# Patient Record
Sex: FEMALE | Race: WHITE | NOT HISPANIC OR LATINO | Employment: OTHER | URBAN - METROPOLITAN AREA
[De-identification: names, ages, dates, MRNs, and addresses within clinical notes are randomized per-mention and may not be internally consistent; named-entity substitution may affect disease eponyms.]

---

## 2016-05-27 LAB
CHOLEST SERPL-MCNC: 151 MG/DL (ref 100–199)
CREAT SERPL-MCNC: 0.76 MG/DL (ref 0.57–1.11)
HDLC SERPL-MCNC: 45 MG/DL (ref 40–?)
LDLC SERPL CALC-MCNC: 89 MG/DL (ref 0–130)
NONHDLC SERPL-MCNC: 106 MG/DL (ref 0–145)
TRIGL SERPL-MCNC: 86 MG/DL (ref 0–150)

## 2017-02-21 LAB
CHOLEST SERPL-MCNC: 154 MG/DL (ref 100–199)
HDLC SERPL-MCNC: 51 MG/DL (ref 40–?)
LDLC SERPL CALC-MCNC: 86 MG/DL (ref 0–130)
NONHDLC SERPL-MCNC: 103 MG/DL (ref 0–145)
TRIGL SERPL-MCNC: 86 MG/DL (ref 0–150)
TSH SERPL-ACNC: 1.63 UIU/ML (ref 0.35–4.94)

## 2017-07-21 ENCOUNTER — HOSPITAL ENCOUNTER (EMERGENCY)
Facility: CLINIC | Age: 72
Discharge: HOME | End: 2017-07-21
Attending: EMERGENCY MEDICINE | Admitting: EMERGENCY MEDICINE
Payer: MEDICARE

## 2017-07-21 VITALS
BODY MASS INDEX: 24.11 KG/M2 | RESPIRATION RATE: 25 BRPM | HEIGHT: 66 IN | WEIGHT: 150 LBS | OXYGEN SATURATION: 97 % | SYSTOLIC BLOOD PRESSURE: 131 MMHG | HEART RATE: 70 BPM | DIASTOLIC BLOOD PRESSURE: 70 MMHG | TEMPERATURE: 97.2 F

## 2017-07-21 DIAGNOSIS — F43.9 SITUATIONAL STRESS: ICD-10-CM

## 2017-07-21 DIAGNOSIS — R07.9 ACUTE CHEST PAIN: ICD-10-CM

## 2017-07-21 LAB
ALBUMIN SERPL-MCNC: 3.5 G/DL (ref 3.4–5)
ALP SERPL-CCNC: 65 U/L (ref 40–150)
ALT SERPL W P-5'-P-CCNC: 30 U/L (ref 0–50)
ANION GAP SERPL CALCULATED.3IONS-SCNC: 8 MMOL/L (ref 3–14)
AST SERPL W P-5'-P-CCNC: 62 U/L (ref 0–45)
BASOPHILS # BLD AUTO: 0.1 10E9/L (ref 0–0.2)
BASOPHILS NFR BLD AUTO: 0.9 %
BILIRUB SERPL-MCNC: 0.6 MG/DL (ref 0.2–1.3)
BUN SERPL-MCNC: 13 MG/DL (ref 7–30)
CALCIUM SERPL-MCNC: 8.4 MG/DL (ref 8.5–10.1)
CHLORIDE SERPL-SCNC: 106 MMOL/L (ref 94–109)
CO2 SERPL-SCNC: 27 MMOL/L (ref 20–32)
CREAT SERPL-MCNC: 0.7 MG/DL (ref 0.52–1.04)
DIFFERENTIAL METHOD BLD: NORMAL
EOSINOPHIL # BLD AUTO: 0.2 10E9/L (ref 0–0.7)
EOSINOPHIL NFR BLD AUTO: 3 %
ERYTHROCYTE [DISTWIDTH] IN BLOOD BY AUTOMATED COUNT: 12.7 % (ref 10–15)
GFR SERPL CREATININE-BSD FRML MDRD: 83 ML/MIN/1.7M2
GLUCOSE SERPL-MCNC: 97 MG/DL (ref 70–99)
HCT VFR BLD AUTO: 38.2 % (ref 35–47)
HGB BLD-MCNC: 12.9 G/DL (ref 11.7–15.7)
IMM GRANULOCYTES # BLD: 0 10E9/L (ref 0–0.4)
IMM GRANULOCYTES NFR BLD: 0 %
INTERPRETATION ECG - MUSE: NORMAL
LYMPHOCYTES # BLD AUTO: 1.5 10E9/L (ref 0.8–5.3)
LYMPHOCYTES NFR BLD AUTO: 27.5 %
MCH RBC QN AUTO: 32.4 PG (ref 26.5–33)
MCHC RBC AUTO-ENTMCNC: 33.8 G/DL (ref 31.5–36.5)
MCV RBC AUTO: 96 FL (ref 78–100)
MONOCYTES # BLD AUTO: 0.7 10E9/L (ref 0–1.3)
MONOCYTES NFR BLD AUTO: 11.6 %
NEUTROPHILS # BLD AUTO: 3.2 10E9/L (ref 1.6–8.3)
NEUTROPHILS NFR BLD AUTO: 57 %
NRBC # BLD AUTO: 0 10*3/UL
NRBC BLD AUTO-RTO: 0 /100
PLATELET # BLD AUTO: 269 10E9/L (ref 150–450)
POTASSIUM SERPL-SCNC: 4.2 MMOL/L (ref 3.4–5.3)
PROT SERPL-MCNC: 6.7 G/DL (ref 6.8–8.8)
RBC # BLD AUTO: 3.98 10E12/L (ref 3.8–5.2)
SODIUM SERPL-SCNC: 141 MMOL/L (ref 133–144)
TROPONIN I SERPL-MCNC: NORMAL UG/L (ref 0–0.04)
TROPONIN I SERPL-MCNC: NORMAL UG/L (ref 0–0.04)
WBC # BLD AUTO: 5.6 10E9/L (ref 4–11)

## 2017-07-21 PROCEDURE — 99284 EMERGENCY DEPT VISIT MOD MDM: CPT

## 2017-07-21 PROCEDURE — 85025 COMPLETE CBC W/AUTO DIFF WBC: CPT | Performed by: EMERGENCY MEDICINE

## 2017-07-21 PROCEDURE — 80053 COMPREHEN METABOLIC PANEL: CPT | Performed by: EMERGENCY MEDICINE

## 2017-07-21 PROCEDURE — 84484 ASSAY OF TROPONIN QUANT: CPT | Mod: 91 | Performed by: EMERGENCY MEDICINE

## 2017-07-21 PROCEDURE — 93005 ELECTROCARDIOGRAM TRACING: CPT

## 2017-07-21 RX ORDER — BUSPIRONE HYDROCHLORIDE 5 MG/1
TABLET ORAL
COMMUNITY
Start: 2016-10-19 | End: 2018-08-13

## 2017-07-21 RX ORDER — LORAZEPAM 0.5 MG/1
0.5 TABLET ORAL
Qty: 5 TABLET | Refills: 0 | Status: SHIPPED | OUTPATIENT
Start: 2017-07-21 | End: 2018-08-13

## 2017-07-21 RX ORDER — CITALOPRAM HYDROBROMIDE 40 MG/1
40 TABLET ORAL DAILY
COMMUNITY
Start: 2016-09-13 | End: 2018-08-13

## 2017-07-21 RX ORDER — ALENDRONATE SODIUM 70 MG/1
70 TABLET ORAL
COMMUNITY
Start: 2016-11-02 | End: 2018-11-20

## 2017-07-21 RX ORDER — SIMVASTATIN 20 MG
20 TABLET ORAL AT BEDTIME
COMMUNITY
Start: 2016-11-02 | End: 2018-12-04

## 2017-07-21 RX ORDER — MINOCYCLINE HYDROCHLORIDE 50 MG/1
50 CAPSULE ORAL
COMMUNITY
Start: 2016-10-17 | End: 2018-08-13

## 2017-07-21 ASSESSMENT — ENCOUNTER SYMPTOMS
PALPITATIONS: 1
FATIGUE: 0
SHORTNESS OF BREATH: 1
SLEEP DISTURBANCE: 1
COUGH: 0
LIGHT-HEADEDNESS: 0
CHILLS: 0
GASTROINTESTINAL NEGATIVE: 1
DIAPHORESIS: 0
FEVER: 0

## 2017-07-21 NOTE — ED AVS SNAPSHOT
Emergency Department    [address] [place] [id number]    Phone:  [phone]    Fax:  [phone]                                       Mary Jo Mcleod   MRN: [id number]    Department:   Emergency Department   Date of Visit:  7/21/2017           After Visit Summary Signature Page     I have received my discharge instructions, and my questions have been answered. I have discussed any challenges I see with this plan with the nurse or doctor.    ..........................................................................................................................................  Patient/Patient Representative Signature      ..........................................................................................................................................  Patient Representative Print Name and Relationship to Patient    ..................................................               ................................................  Date                                            Time    ..........................................................................................................................................  Reviewed by Signature/Title    ...................................................              ..............................................  Date                                                            Time
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
## 2017-07-21 NOTE — ED AVS SNAPSHOT
Emergency Department    [address] [facility] [address]    Phone:  [phone]    Fax:  [phone]                                       Mary Jo Mcleod   MRN: [id number]    Department:   Emergency Department   Date of Visit:  7/21/2017           Patient Information     Date Of Birth          1945        Your diagnoses for this visit were:     Situational stress     Acute chest pain        You were seen by Fiona Simon MD.      Follow-up Information     Follow up with [facility].    Why:  next week    Contact information:    [address] [place] [id number] [phone]          Discharge Instructions       You will receive a call about setting up a stress test. Discharge Instructions  Chest Pain    You have been seen today for chest pain or discomfort.  At this time, your doctor has found no signs that your chest pain is due to a serious or life-threatening condition, (or you have declined more testing and/or admission to the hospital). However, sometimes there is a serious problem that does not show up right away. Your evaluation today may not be complete and you may need further testing and evaluation.     You need to follow-up with your regular doctor within 3 days.    Return to the Emergency Department if:    Your chest pain changes, gets worse, starts to happen more often, or comes with less activity.    You are short of breath.    You get very weak or tired.    You pass out or faint.    You have any new symptoms, like fever, cough, numb legs, or you cough up blood.    You have anything else that worries you.    Until you follow-up with your regular doctor please do the following:    Take one aspirin daily unless you have an allergy or are told not to by your doctor.    If a stress test appointment has been made, go to the appointment.    If you have questions, contact your regular doctor.    If your doctor today has told you to follow-up with your  regular doctor, it is very important that you make an appointment with your clinic and go to the appointment.  If you do not follow-up with your primary doctor, it may result in missing an important development which could result in permanent injury or disability and/or lasting pain.  If there is any problem keeping your appointment, call your doctor or return to the Emergency Department.    If you were given a prescription for medicine here today, be sure to read all of the information (including the package insert) that comes with your prescription.  This will include important information about the medicine, its side effects, and any warnings that you need to know about.  The pharmacist who fills the prescription can provide more information and answer questions you may have about the medicine.  If you have questions or concerns that the pharmacist cannot address, please call or return to the Emergency Department.     Opioid Medication Information    Pain medications are among the most commonly prescribed medicines, so we are including this information for all our patients. If you did not receive pain medication or get a prescription for pain medicine, you can ignore it.     You may have been given a prescription for an opioid (narcotic) pain medicine and/or have received a pain medicine while here in the Emergency Department. These medicines can make you drowsy or impaired. You must not drive, operate dangerous equipment, or engage in any other dangerous activities while taking these medications. If you drive while taking these medications, you could be arrested for DUI, or driving under the influence. Do not drink any alcohol while you are taking these medications.     Opioid pain medications can cause addiction. If you have a history of chemical dependency of any type, you are at a higher risk of becoming addicted to pain medications.  Only take these prescribed medications to treat your pain when all other  options have been tried. Take it for as short a time and as few doses as possible. Store your pain pills in a secure place, as they are frequently stolen and provide a dangerous opportunity for children or visitors in your house to start abusing these powerful medications. We will not replace any lost or stolen medicine.  As soon as your pain is better, you should flush all your remaining medication.     Many prescription pain medications contain Tylenol  (acetaminophen), including Vicodin , Tylenol #3 , Norco , Lortab , and Percocet .  You should not take any extra pills of Tylenol  if you are using these prescription medications or you can get very sick.  Do not ever take more than 3000 mg of acetaminophen in any 24 hour period.    All opioids tend to cause constipation. Drink plenty of water and eat foods that have a lot of fiber, such as fruits, vegetables, prune juice, apple juice and high fiber cereal.  Take a laxative if you don t move your bowels at least every other day. Miralax , Milk of Magnesia, Colace , or Senna  can be used to keep you regular.      Remember that you can always come back to the Emergency Department if you are not able to see your regular doctor in the amount of time listed above, if you get any new symptoms, or if there is anything that worries you.        Causes and Effects of Stress  Anything that brings on feelings of stress is called a stressor. Today, we often face many stressors. Read on to find out how stress affects you and how you can gain control.    Your body s response to stress  When you re faced with stress, certain hormones (chemicals) in your body are released. These hormones trigger many changes in your body. For instance, your:    Blood pressure may rise    Heart may pound    Muscles may tighten    Stomach may become tense  Stressors  Stressors may include:    Adapting to constant, rapid change    Worrying about your finances and the economy    Handling a major life  event, such as changing jobs or moving to a new home    Handling more than one major life event at the same time, for instance, dealing with a family illness while changing jobs    Juggling many roles and responsibilities, such as spouse or life partner, parent, friend, employee, and caregiver for aging parents    Going from one challenging situation to the next without taking time to relax    Being overwhelmed by technology such as, keeping up with cell phone messages, e-mails, and text messages   The long-term effects of stress  If you re often under stress, you need to learn to manage it well. Stress can affect your well-being. Over time, you may show some of these symptoms of being stressed:    Physical. Frequent colds or flu, headaches, trouble sleeping, muscle tension, skin problems, trouble with digestion    Mental. Poor concentration, forgetfulness, learning problems, frequent negative thoughts, speech problems    Emotional. Anxiety, depression, anger, irritability, feelings of helplessness, lack of purpose, relationship troubles    Behavioral. Eating poorly, driving recklessly, abusing alcohol or drugs, being accident prone, showing aggression  If you do not believe you are successfully managing the stressors in your life, get help from your healthcare provider or a mental health professional.  There are many effective strategies that can help you adjust your environment and get your stress level under better control.  Date Last Reviewed: 1/1/2017 2000-2017 [employer]. [address] [place] [address]. All rights reserved. This information is not intended as a substitute for professional medical care. Always follow your healthcare professional's instructions.          24 Hour Appointment Hotline       To make an appointment at any [facility] clinic, call [phone] ([phone]). If you don't have a family doctor or clinic, we will help you find one. [facility] are  conveniently located to serve the needs of you and your family.          ED Discharge Orders     Exercise Stress Echocardiogram       The supervising Cardiologist may change the type of echocardiogram requested to answer a specific clinical question based on existing protocols in the Echocardiography laboratory.    Use of contrast is at the discretion of the supervising Cardiologist.                     Review of your medicines      Our records show that you are taking the medicines listed below. If these are incorrect, please call your family doctor or clinic.        Dose / Directions Last dose taken    alendronate 70 MG tablet   Commonly known as:  FOSAMAX        Refills:  0        busPIRone 5 MG tablet   Commonly known as:  BUSPAR        TAKE 1 TABLET BY MOUTH ONCE DAILY. AT BEDTIME   Refills:  0        * CELEXA PO        Take  by mouth.   Refills:  0        * citalopram 40 MG tablet   Commonly known as:  celeXA        Refills:  0        minocycline 50 MG capsule   Commonly known as:  MINOCIN/DYNACIN   Dose:  50 mg        Take 50 mg by mouth   Refills:  0        simvastatin 20 MG tablet   Commonly known as:  ZOCOR        Refills:  0        * Notice:  This list has 2 medication(s) that are the same as other medications prescribed for you. Read the directions carefully, and ask your doctor or other care provider to review them with you.            Procedures and tests performed during your visit     Procedure/Test Number of Times Performed    CBC with platelets differential 1    Comprehensive metabolic panel 1    EKG 12-lead, tracing only 1    Troponin I 2      Orders Needing Specimen Collection     None      Pending Results     No orders found from 7/19/2017 to 7/22/2017.            Pending Culture Results     No orders found from 7/19/2017 to 7/22/2017.            Pending Results Instructions     If you had any lab results that were not finalized at the time of your Discharge, you can call the ED Lab Result RN at  [phone]. You will be contacted by this team for any positive Lab results or changes in treatment. The nurses are available 7 days a week from 10A to 6:30P.  You can leave a message 24 hours per day and they will return your call.        Test Results From Your Hospital Stay        7/21/2017  8:45 AM      Component Results     Component Value Ref Range & Units Status    WBC 5.6 4.0 - 11.0 10e9/L Final    RBC Count 3.98 3.8 - 5.2 10e12/L Final    Hemoglobin 12.9 11.7 - 15.7 g/dL Final    Hematocrit 38.2 35.0 - 47.0 % Final    MCV 96 78 - 100 fl Final    MCH 32.4 26.5 - 33.0 pg Final    MCHC 33.8 31.5 - 36.5 g/dL Final    RDW 12.7 10.0 - 15.0 % Final    Platelet Count 269 150 - 450 10e9/L Final    Diff Method Automated Method  Final    % Neutrophils 57.0 % Final    % Lymphocytes 27.5 % Final    % Monocytes 11.6 % Final    % Eosinophils 3.0 % Final    % Basophils 0.9 % Final    % Immature Granulocytes 0.0 % Final    Nucleated RBCs 0 0 /100 Final    Absolute Neutrophil 3.2 1.6 - 8.3 10e9/L Final    Absolute Lymphocytes 1.5 0.8 - 5.3 10e9/L Final    Absolute Monocytes 0.7 0.0 - 1.3 10e9/L Final    Absolute Eosinophils 0.2 0.0 - 0.7 10e9/L Final    Absolute Basophils 0.1 0.0 - 0.2 10e9/L Final    Abs Immature Granulocytes 0.0 0 - 0.4 10e9/L Final    Absolute Nucleated RBC 0.0  Final         7/21/2017  9:08 AM      Component Results     Component Value Ref Range & Units Status    Sodium 141 133 - 144 mmol/L Final    Potassium 4.2 3.4 - 5.3 mmol/L Final    Chloride 106 94 - 109 mmol/L Final    Carbon Dioxide 27 20 - 32 mmol/L Final    Anion Gap 8 3 - 14 mmol/L Final    Glucose 97 70 - 99 mg/dL Final    Urea Nitrogen 13 7 - 30 mg/dL Final    Creatinine 0.70 0.52 - 1.04 mg/dL Final    GFR Estimate 83 >60 mL/min/1.7m2 Final    Non  GFR Calc    GFR Estimate If Black >90   GFR Calc   >60 mL/min/1.7m2 Final    Calcium 8.4 (L) 8.5 - 10.1 mg/dL Final    Bilirubin Total 0.6 0.2 - 1.3 mg/dL Final     Albumin 3.5 3.4 - 5.0 g/dL Final    Protein Total 6.7 (L) 6.8 - 8.8 g/dL Final    Alkaline Phosphatase 65 40 - 150 U/L Final    ALT 30 0 - 50 U/L Final    AST 62 (H) 0 - 45 U/L Final         7/21/2017  9:08 AM      Component Results     Component Value Ref Range & Units Status    Troponin I ES  0.000 - 0.045 ug/L Final    <0.015  The 99th percentile for upper reference range is 0.045 ug/L.  Troponin values in   the range of 0.045 - 0.120 ug/L may be associated with risks of adverse   clinical events.           7/21/2017 10:35 AM      Component Results     Component Value Ref Range & Units Status    Troponin I ES  0.000 - 0.045 ug/L Final    <0.015  The 99th percentile for upper reference range is 0.045 ug/L.  Troponin values in   the range of 0.045 - 0.120 ug/L may be associated with risks of adverse   clinical events.                  Clinical Quality Measure: Blood Pressure Screening     Your blood pressure was checked while you were in the emergency department today. The last reading we obtained was  BP: 132/66 . Please read the guidelines below about what these numbers mean and what you should do about them.  If your systolic blood pressure (the top number) is less than 120 and your diastolic blood pressure (the bottom number) is less than 80, then your blood pressure is normal. There is nothing more that you need to do about it.  If your systolic blood pressure (the top number) is 120-139 or your diastolic blood pressure (the bottom number) is 80-89, your blood pressure may be higher than it should be. You should have your blood pressure rechecked within a year by a primary care provider.  If your systolic blood pressure (the top number) is 140 or greater or your diastolic blood pressure (the bottom number) is 90 or greater, you may have high blood pressure. High blood pressure is treatable, but if left untreated over time it can put you at risk for heart attack, stroke, or kidney failure. You should have your  "blood pressure rechecked by a primary care provider within the next 4 weeks.  If your provider in the emergency department today gave you specific instructions to follow-up with your doctor or provider even sooner than that, you should follow that instruction and not wait for up to 4 weeks for your follow-up visit.        Thank you for choosing [facility]       Thank you for choosing [facility] for your care. Our goal is always to provide you with excellent care. Hearing back from our patients is one way we can continue to improve our services. Please take a few minutes to complete the written survey that you may receive in the mail after you visit with us. Thank you!        [employer] Information     [employer] lets you send messages to your doctor, view your test results, renew your prescriptions, schedule appointments and more. To sign up, go to [email or web address] . Click on \"Log in\" on the left side of the screen, which will take you to the Welcome page. Then click on \"Sign up Now\" on the right side of the page.     You will be asked to enter the access code listed below, as well as some personal information. Please follow the directions to create your username and password.     Your access code is: [email or web address]  Expires: 10/19/2017 10:52 AM     Your access code will  in 90 days. If you need help or a new code, please call your [facility] clinic or [phone].        Care EveryWhere ID     This is your Care EveryWhere ID. This could be used by other organizations to access your [facility] medical records  [id number]        Equal Access to Services     SMITA LIMON : Hadii miguelina wray hadasho Soomaali, waaxda luqadaha, qaybta kaalmada adeegyada, roxanne cheek . So [facility] [phone].    ATENCIÓN: Si habla español, tiene a blake disposición servicios gratuitos de asistencia lingüística. Llame al [phone].    We comply with applicable federal civil rights laws and [place] laws. We do not " discriminate on the basis of race, color, national origin, age, disability sex, sexual orientation or gender identity.            After Visit Summary       This is your record. Keep this with you and show to your community pharmacist(s) and doctor(s) at your next visit.

## 2017-07-21 NOTE — ED NOTES
Discharge instructions discussed in detail including medications and follow-up appointments. Patient verbalizes understanding of discharge instructions and leaves in no acute distress.

## 2017-07-21 NOTE — DISCHARGE INSTRUCTIONS
You will receive a call about setting up a stress test. Discharge Instructions  Chest Pain    You have been seen today for chest pain or discomfort.  At this time, your doctor has found no signs that your chest pain is due to a serious or life-threatening condition, (or you have declined more testing and/or admission to the hospital). However, sometimes there is a serious problem that does not show up right away. Your evaluation today may not be complete and you may need further testing and evaluation.     You need to follow-up with your regular doctor within 3 days.    Return to the Emergency Department if:    Your chest pain changes, gets worse, starts to happen more often, or comes with less activity.    You are short of breath.    You get very weak or tired.    You pass out or faint.    You have any new symptoms, like fever, cough, numb legs, or you cough up blood.    You have anything else that worries you.    Until you follow-up with your regular doctor please do the following:    Take one aspirin daily unless you have an allergy or are told not to by your doctor.    If a stress test appointment has been made, go to the appointment.    If you have questions, contact your regular doctor.    If your doctor today has told you to follow-up with your regular doctor, it is very important that you make an appointment with your clinic and go to the appointment.  If you do not follow-up with your primary doctor, it may result in missing an important development which could result in permanent injury or disability and/or lasting pain.  If there is any problem keeping your appointment, call your doctor or return to the Emergency Department.    If you were given a prescription for medicine here today, be sure to read all of the information (including the package insert) that comes with your prescription.  This will include important information about the medicine, its side effects, and any warnings that you need to know  about.  The pharmacist who fills the prescription can provide more information and answer questions you may have about the medicine.  If you have questions or concerns that the pharmacist cannot address, please call or return to the Emergency Department.     Opioid Medication Information    Pain medications are among the most commonly prescribed medicines, so we are including this information for all our patients. If you did not receive pain medication or get a prescription for pain medicine, you can ignore it.     You may have been given a prescription for an opioid (narcotic) pain medicine and/or have received a pain medicine while here in the Emergency Department. These medicines can make you drowsy or impaired. You must not drive, operate dangerous equipment, or engage in any other dangerous activities while taking these medications. If you drive while taking these medications, you could be arrested for DUI, or driving under the influence. Do not drink any alcohol while you are taking these medications.     Opioid pain medications can cause addiction. If you have a history of chemical dependency of any type, you are at a higher risk of becoming addicted to pain medications.  Only take these prescribed medications to treat your pain when all other options have been tried. Take it for as short a time and as few doses as possible. Store your pain pills in a secure place, as they are frequently stolen and provide a dangerous opportunity for children or visitors in your house to start abusing these powerful medications. We will not replace any lost or stolen medicine.  As soon as your pain is better, you should flush all your remaining medication.     Many prescription pain medications contain Tylenol  (acetaminophen), including Vicodin , Tylenol #3 , Norco , Lortab , and Percocet .  You should not take any extra pills of Tylenol  if you are using these prescription medications or you can get very sick.  Do not ever  take more than 3000 mg of acetaminophen in any 24 hour period.    All opioids tend to cause constipation. Drink plenty of water and eat foods that have a lot of fiber, such as fruits, vegetables, prune juice, apple juice and high fiber cereal.  Take a laxative if you don t move your bowels at least every other day. Miralax , Milk of Magnesia, Colace , or Senna  can be used to keep you regular.      Remember that you can always come back to the Emergency Department if you are not able to see your regular doctor in the amount of time listed above, if you get any new symptoms, or if there is anything that worries you.        Causes and Effects of Stress  Anything that brings on feelings of stress is called a stressor. Today, we often face many stressors. Read on to find out how stress affects you and how you can gain control.    Your body s response to stress  When you re faced with stress, certain hormones (chemicals) in your body are released. These hormones trigger many changes in your body. For instance, your:    Blood pressure may rise    Heart may pound    Muscles may tighten    Stomach may become tense  Stressors  Stressors may include:    Adapting to constant, rapid change    Worrying about your finances and the economy    Handling a major life event, such as changing jobs or moving to a new home    Handling more than one major life event at the same time, for instance, dealing with a family illness while changing jobs    Juggling many roles and responsibilities, such as spouse or life partner, parent, friend, employee, and caregiver for aging parents    Going from one challenging situation to the next without taking time to relax    Being overwhelmed by technology such as, keeping up with cell phone messages, e-mails, and text messages   The long-term effects of stress  If you re often under stress, you need to learn to manage it well. Stress can affect your well-being. Over time, you may show some of these  symptoms of being stressed:    Physical. Frequent colds or flu, headaches, trouble sleeping, muscle tension, skin problems, trouble with digestion    Mental. Poor concentration, forgetfulness, learning problems, frequent negative thoughts, speech problems    Emotional. Anxiety, depression, anger, irritability, feelings of helplessness, lack of purpose, relationship troubles    Behavioral. Eating poorly, driving recklessly, abusing alcohol or drugs, being accident prone, showing aggression  If you do not believe you are successfully managing the stressors in your life, get help from your healthcare provider or a mental health professional.  There are many effective strategies that can help you adjust your environment and get your stress level under better control.  Date Last Reviewed: 1/1/2017 2000-2017 [employer]. [address] [place] [address], [place] [address]. All rights reserved. This information is not intended as a substitute for professional medical care. Always follow your healthcare professional's instructions.

## 2017-07-27 ENCOUNTER — HOSPITAL ENCOUNTER (OUTPATIENT)
Dept: CARDIOLOGY | Facility: CLINIC | Age: 72
Discharge: HOME | End: 2017-07-27
Attending: EMERGENCY MEDICINE | Admitting: EMERGENCY MEDICINE
Payer: MEDICARE

## 2017-07-27 DIAGNOSIS — R07.9 ACUTE CHEST PAIN: ICD-10-CM

## 2017-07-27 PROCEDURE — 93018 CV STRESS TEST I&R ONLY: CPT | Performed by: INTERNAL MEDICINE

## 2017-07-27 PROCEDURE — 93321 DOPPLER ECHO F-UP/LMTD STD: CPT | Mod: 26 | Performed by: INTERNAL MEDICINE

## 2017-07-27 PROCEDURE — 93325 DOPPLER ECHO COLOR FLOW MAPG: CPT | Mod: 26 | Performed by: INTERNAL MEDICINE

## 2017-07-27 PROCEDURE — 93350 STRESS TTE ONLY: CPT | Mod: 26 | Performed by: INTERNAL MEDICINE

## 2017-07-27 PROCEDURE — 25500064 ZZH RX 255 OP 636: Performed by: EMERGENCY MEDICINE

## 2017-07-27 PROCEDURE — 40000264 ECHO STRESS TEST WITH LUMASON

## 2017-07-27 PROCEDURE — 93016 CV STRESS TEST SUPVJ ONLY: CPT | Performed by: INTERNAL MEDICINE

## 2017-07-27 RX ADMIN — SULFUR HEXAFLUORIDE 5 ML: KIT at 15:54

## 2017-11-13 LAB
CHOLEST SERPL-MCNC: 160 MG/DL (ref 100–199)
HDLC SERPL-MCNC: 47 MG/DL (ref 40–?)
LDLC SERPL CALC-MCNC: 89 MG/DL (ref ?–130)
NONHDLC SERPL-MCNC: 113 MG/DL (ref 0–145)
TRIGL SERPL-MCNC: 121 MG/DL (ref 0–150)

## 2017-12-09 ENCOUNTER — HOSPITAL ENCOUNTER (EMERGENCY)
Facility: CLINIC | Age: 72
Discharge: HOME | End: 2017-12-09
Attending: EMERGENCY MEDICINE | Admitting: EMERGENCY MEDICINE
Payer: MEDICARE

## 2017-12-09 VITALS
WEIGHT: 157 LBS | RESPIRATION RATE: 16 BRPM | HEART RATE: 84 BPM | OXYGEN SATURATION: 97 % | HEIGHT: 65 IN | DIASTOLIC BLOOD PRESSURE: 72 MMHG | SYSTOLIC BLOOD PRESSURE: 133 MMHG | BODY MASS INDEX: 26.16 KG/M2 | TEMPERATURE: 97.2 F

## 2017-12-09 DIAGNOSIS — R09.89 THROAT TIGHTNESS: ICD-10-CM

## 2017-12-09 LAB
ANION GAP SERPL CALCULATED.3IONS-SCNC: 6 MMOL/L (ref 3–14)
BASOPHILS # BLD AUTO: 0 10E9/L (ref 0–0.2)
BASOPHILS NFR BLD AUTO: 0 %
BUN SERPL-MCNC: 15 MG/DL (ref 7–30)
CALCIUM SERPL-MCNC: 8.9 MG/DL (ref 8.5–10.1)
CHLORIDE SERPL-SCNC: 107 MMOL/L (ref 94–109)
CO2 SERPL-SCNC: 25 MMOL/L (ref 20–32)
CREAT SERPL-MCNC: 0.75 MG/DL (ref 0.52–1.04)
DIFFERENTIAL METHOD BLD: ABNORMAL
EOSINOPHIL # BLD AUTO: 0 10E9/L (ref 0–0.7)
EOSINOPHIL NFR BLD AUTO: 0 %
ERYTHROCYTE [DISTWIDTH] IN BLOOD BY AUTOMATED COUNT: 12.5 % (ref 10–15)
GFR SERPL CREATININE-BSD FRML MDRD: 76 ML/MIN/1.7M2
GLUCOSE SERPL-MCNC: 131 MG/DL (ref 70–99)
HCT VFR BLD AUTO: 38.5 % (ref 35–47)
HGB BLD-MCNC: 12.9 G/DL (ref 11.7–15.7)
IMM GRANULOCYTES # BLD: 0.1 10E9/L (ref 0–0.4)
IMM GRANULOCYTES NFR BLD: 0.6 %
INTERPRETATION ECG - MUSE: NORMAL
LYMPHOCYTES # BLD AUTO: 1.2 10E9/L (ref 0.8–5.3)
LYMPHOCYTES NFR BLD AUTO: 11 %
MCH RBC QN AUTO: 32.2 PG (ref 26.5–33)
MCHC RBC AUTO-ENTMCNC: 33.5 G/DL (ref 31.5–36.5)
MCV RBC AUTO: 96 FL (ref 78–100)
MONOCYTES # BLD AUTO: 1.1 10E9/L (ref 0–1.3)
MONOCYTES NFR BLD AUTO: 10.4 %
NEUTROPHILS # BLD AUTO: 8.4 10E9/L (ref 1.6–8.3)
NEUTROPHILS NFR BLD AUTO: 78 %
NRBC # BLD AUTO: 0 10*3/UL
NRBC BLD AUTO-RTO: 0 /100
PLATELET # BLD AUTO: 272 10E9/L (ref 150–450)
POTASSIUM SERPL-SCNC: 4.1 MMOL/L (ref 3.4–5.3)
RBC # BLD AUTO: 4.01 10E12/L (ref 3.8–5.2)
SODIUM SERPL-SCNC: 138 MMOL/L (ref 133–144)
TROPONIN I SERPL-MCNC: <0.015 UG/L (ref 0–0.04)
WBC # BLD AUTO: 10.7 10E9/L (ref 4–11)

## 2017-12-09 PROCEDURE — 93005 ELECTROCARDIOGRAM TRACING: CPT

## 2017-12-09 PROCEDURE — 25000132 ZZH RX MED GY IP 250 OP 250 PS 637: Mod: GY | Performed by: EMERGENCY MEDICINE

## 2017-12-09 PROCEDURE — 80048 BASIC METABOLIC PNL TOTAL CA: CPT | Performed by: EMERGENCY MEDICINE

## 2017-12-09 PROCEDURE — 85025 COMPLETE CBC W/AUTO DIFF WBC: CPT | Performed by: EMERGENCY MEDICINE

## 2017-12-09 PROCEDURE — A9270 NON-COVERED ITEM OR SERVICE: HCPCS | Mod: GY | Performed by: EMERGENCY MEDICINE

## 2017-12-09 PROCEDURE — 99284 EMERGENCY DEPT VISIT MOD MDM: CPT

## 2017-12-09 PROCEDURE — 84484 ASSAY OF TROPONIN QUANT: CPT | Performed by: EMERGENCY MEDICINE

## 2017-12-09 RX ORDER — FAMOTIDINE 20 MG/1
40 TABLET, FILM COATED ORAL ONCE
Status: COMPLETED | OUTPATIENT
Start: 2017-12-09 | End: 2017-12-09

## 2017-12-09 RX ADMIN — FAMOTIDINE 40 MG: 20 TABLET ORAL at 05:32

## 2017-12-09 NOTE — ED AVS SNAPSHOT
Emergency Department    [address] [place] [address]    Phone:  [phone]    Fax:  [phone]                                       Mary Jo Mcleod   MRN: [id number]    Department:   Emergency Department   Date of Visit:  12/9/2017           Patient Information     Date Of Birth          1945        Your diagnoses for this visit were:     Throat tightness        You were seen by Benedict Pierre MD.        Discharge Instructions       You may take over the counter pepcid as needed.      24 Hour Appointment Hotline       To make an appointment at any [facility], call [phone]). If you don't have a family doctor or clinic, we will help you find one. [facility] are conveniently located to serve the needs of you and your family.             Review of your medicines      Our records show that you are taking the medicines listed below. If these are incorrect, please call your family doctor or clinic.        Dose / Directions Last dose taken    alendronate 70 MG tablet   Commonly known as:  FOSAMAX        Refills:  0        busPIRone 5 MG tablet   Commonly known as:  BUSPAR        TAKE 1 TABLET BY MOUTH ONCE DAILY. AT BEDTIME   Refills:  0        * CELEXA PO        Take  by mouth.   Refills:  0        * citalopram 40 MG tablet   Commonly known as:  celeXA        Refills:  0        LORazepam 0.5 MG tablet   Commonly known as:  ATIVAN   Dose:  0.5 mg   Quantity:  5 tablet        Take 1 tablet (0.5 mg) by mouth nightly as needed for anxiety   Refills:  0        minocycline 50 MG capsule   Commonly known as:  MINOCIN/DYNACIN   Dose:  50 mg        Take 50 mg by mouth   Refills:  0        simvastatin 20 MG tablet   Commonly known as:  ZOCOR        Refills:  0        * Notice:  This list has 2 medication(s) that are the same as other medications prescribed for you. Read the directions carefully, and ask your doctor or other care provider to review them  with you.            Procedures and tests performed during your visit     Basic metabolic panel    CBC with platelets differential    EKG 12 lead    Troponin I (now)      Orders Needing Specimen Collection     None      Pending Results     No orders found from 12/7/2017 to 12/10/2017.            Pending Culture Results     No orders found from 12/7/2017 to 12/10/2017.            Pending Results Instructions     If you had any lab results that were not finalized at the time of your Discharge, you can call the ED Lab Result RN at [phone]. You will be contacted by this team for any positive Lab results or changes in treatment. The nurses are available 7 days a week from 10A to 6:30P.  You can leave a message 24 hours per day and they will return your call.        Test Results From Your Hospital Stay        12/9/2017  5:44 AM      Component Results     Component Value Ref Range & Units Status    WBC 10.7 4.0 - 11.0 10e9/L Final    RBC Count 4.01 3.8 - 5.2 10e12/L Final    Hemoglobin 12.9 11.7 - 15.7 g/dL Final    Hematocrit 38.5 35.0 - 47.0 % Final    MCV 96 78 - 100 fl Final    MCH 32.2 26.5 - 33.0 pg Final    MCHC 33.5 31.5 - 36.5 g/dL Final    RDW 12.5 10.0 - 15.0 % Final    Platelet Count 272 150 - 450 10e9/L Final    Diff Method Automated Method  Final    % Neutrophils 78.0 % Final    % Lymphocytes 11.0 % Final    % Monocytes 10.4 % Final    % Eosinophils 0.0 % Final    % Basophils 0.0 % Final    % Immature Granulocytes 0.6 % Final    Nucleated RBCs 0 0 /100 Final    Absolute Neutrophil 8.4 (H) 1.6 - 8.3 10e9/L Final    Absolute Lymphocytes 1.2 0.8 - 5.3 10e9/L Final    Absolute Monocytes 1.1 0.0 - 1.3 10e9/L Final    Absolute Eosinophils 0.0 0.0 - 0.7 10e9/L Final    Absolute Basophils 0.0 0.0 - 0.2 10e9/L Final    Abs Immature Granulocytes 0.1 0 - 0.4 10e9/L Final    Absolute Nucleated RBC 0.0  Final         12/9/2017  5:57 AM      Component Results     Component Value Ref Range & Units Status    Sodium 138  133 - 144 mmol/L Final    Potassium 4.1 3.4 - 5.3 mmol/L Final    Chloride 107 94 - 109 mmol/L Final    Carbon Dioxide 25 20 - 32 mmol/L Final    Anion Gap 6 3 - 14 mmol/L Final    Glucose 131 (H) 70 - 99 mg/dL Final    Urea Nitrogen 15 7 - 30 mg/dL Final    Creatinine 0.75 0.52 - 1.04 mg/dL Final    GFR Estimate 76 >60 mL/min/1.7m2 Final    Non  GFR Calc    GFR Estimate If Black >90 >60 mL/min/1.7m2 Final    African American GFR Calc    Calcium 8.9 8.5 - 10.1 mg/dL Final         12/9/2017  6:02 AM      Component Results     Component Value Ref Range & Units Status    Troponin I ES <0.015 0.000 - 0.045 ug/L Final    The 99th percentile for upper reference range is 0.045 ug/L.  Troponin values   in the range of 0.045 - 0.120 ug/L may be associated with risks of adverse   clinical events.                  Clinical Quality Measure: Blood Pressure Screening     Your blood pressure was checked while you were in the emergency department today. The last reading we obtained was  BP: 133/72 . Please read the guidelines below about what these numbers mean and what you should do about them.  If your systolic blood pressure (the top number) is less than 120 and your diastolic blood pressure (the bottom number) is less than 80, then your blood pressure is normal. There is nothing more that you need to do about it.  If your systolic blood pressure (the top number) is 120-139 or your diastolic blood pressure (the bottom number) is 80-89, your blood pressure may be higher than it should be. You should have your blood pressure rechecked within a year by a primary care provider.  If your systolic blood pressure (the top number) is 140 or greater or your diastolic blood pressure (the bottom number) is 90 or greater, you may have high blood pressure. High blood pressure is treatable, but if left untreated over time it can put you at risk for heart attack, stroke, or kidney failure. You should have your blood pressure  "rechecked by a primary care provider within the next 4 weeks.  If your provider in the emergency department today gave you specific instructions to follow-up with your doctor or provider even sooner than that, you should follow that instruction and not wait for up to 4 weeks for your follow-up visit.        Thank you for choosing [facility]       Thank you for choosing [facility] for your care. Our goal is always to provide you with excellent care. Hearing back from our patients is one way we can continue to improve our services. Please take a few minutes to complete the written survey that you may receive in the mail after you visit with us. Thank you!        [employer] Information     [employer] lets you send messages to your doctor, view your test results, renew your prescriptions, schedule appointments and more. To sign up, go to [email or web address] . Click on \"Log in\" on the left side of the screen, which will take you to the Welcome page. Then click on \"Sign up Now\" on the right side of the page.     You will be asked to enter the access code listed below, as well as some personal information. Please follow the directions to create your username and password.     Your access code is: [email or web address]  Expires: 3/9/2018  7:22 AM     Your access code will  in 90 days. If you need help or a new code, please call your [facility] clinic or [phone].        Care EveryWhere ID     This is your Care EveryWhere ID. This could be used by other organizations to access your [facility] medical records  [id number]        Equal Access to Services     SMITA LIMON : Hadii miguelina wray hadasho Sovaniaali, waaxda luqadaha, qaybta kaalmada adejeffyyada, roxanne cheek . [facility] [phone].    ATENCIÓN: Si habla español, tiene a blake disposición servicios gratuitos de asistencia lingüística. Llame al [phone].    We comply with applicable federal civil rights laws and [place] laws. We do not discriminate on the " basis of race, color, national origin, age, disability, sex, sexual orientation, or gender identity.            After Visit Summary       This is your record. Keep this with you and show to your community pharmacist(s) and doctor(s) at your next visit.

## 2017-12-09 NOTE — ED AVS SNAPSHOT
Emergency Department    [address] [place] [address]    Phone:  [phone]    Fax:  [phone]                                       Mary Jo Mcleod   MRN: [id number]    Department:   Emergency Department   Date of Visit:  12/9/2017           After Visit Summary Signature Page     I have received my discharge instructions, and my questions have been answered. I have discussed any challenges I see with this plan with the nurse or doctor.    ..........................................................................................................................................  Patient/Patient Representative Signature      ..........................................................................................................................................  Patient Representative Print Name and Relationship to Patient    ..................................................               ................................................  Date                                            Time    ..........................................................................................................................................  Reviewed by Signature/Title    ...................................................              ..............................................  Date                                                            Time

## 2017-12-09 NOTE — ED PROVIDER NOTES
"  History     Chief Complaint:  Allergic Reaction     HPI   Mary Jo Mcleod is a 72-year-old female who presents for evaluation of a potential allergic reaction. The patient reports that she received her second cortisone shot ever yesterday at 0900. The patient states that she began to notice symptoms around 2130 last night. The patient reports that it started with scalp itchiness and she later developed itchiness in her legs, arms, and upper lip around 0300 this morning.  Pt reports symptoms would initially improve with eating. The patient also feels like her throat is constricted and she feels short of breath. The patient then decided to call the nursing line who told her to come to the ED and to take Benadryl before presenting. The patient also complains of pain across her eyes that goes into her ears, although she mentions that this is a chronic sensation for her. She denies abdominal pain, nausea, vomiting, or rash. She thinks that she is breaking out in hives across her forehead. The patient denies history of ancxiety. The patient took two Benadryl at 0330 and states that she is feeling mostly better on presentation, but still has some throat tightness.     Allergies:  Codeine Sulfate  Penicillin G  Sulfa Drugs  Tetracycline  Wellbutrin [Bupropion Hydrobromide]      Medications:    Fosamax   Buspar   Minocycline   Simvastatin   Celexa   Ativan     Past Medical History:    Depressive disorder     Past Surgical History:    History reviewed. No pertinent past surgical history.     Family History:    MI--Father     Social History:  Marital Status:    Presents to the ED alone  Tobacco Use: Never  Alcohol Use: No   PCP: Rodger Vergara     Review of Systems  As stated in the HPI, otherwise negative ROS.     Physical Exam   First Vitals:  BP: 133/72  Pulse: 84  Heart Rate: 89  Temp: 97.7  F (36.5  C)  Resp: 16  Height: 165.1 cm (5' 5\")  Weight: 71.2 kg (157 lb)  SpO2: 97 %    Physical Exam  Constitutional: Well " developed, nontox appearance  Head: Atraumatic.   Mouth/Throat: Oropharynx is clear and moist.   Neck:  no stridor  Eyes: no scleral icterus  Cardiovascular: RRR, 2+ bilat radial pulses  Pulmonary/Chest: nml resp effort, Clear BS bilat  Abdominal: ND, +BS, soft, NT, no rebound or guarding   : no CVA tenderness bilat  Ext: Warm, well perfused, no edema  Neurological: A&O, symmetric facies, moves ext x4  Skin: Skin is warm and dry.   Psychiatric: Behavior is normal. Thought content normal.   Nursing note and vitals reviewed.        Emergency Department Course   ECG:  @ 0511  Indication: Shortness of breath   Vent. Rate 76 bpm. MS interval 138 ms. QRS duration 78 ms. QT/QTc 422/474 ms. P-R-T axis 61 55 51.   Normal sinus rhythm. Abnormal ECG.  No significant change when compared to previous ECG from 9/21/12   Read @ 0520 by Dr. Pierre.     Laboratory:  Blood:  CBC:  WBC 10.7, HGB 12.9, , otherwise WNL  CMP: Glc 131, otherwise WNL (Creatinine 0.75)   Troponin I: <0.015 (WNL)      Interventions:  (0532) Pepcid, 40 mg, IV     Emergency Department Course:  Nursing notes and vitals reviewed.    (3435) I entered the room with my scribe, obtained the history, and performed an exam of the patient as documented above.    EKG was done, interpretation as above.     A peripheral IV was established. Blood was drawn from the patient. This was sent for laboratory testing, findings above.      Findings and plan explained to the patient. Patient discharged home with instructions regarding supportive care, medications, and reasons to return. The importance of close follow-up was reviewed.      Impression & Plan    Medical Decision Making:  Mary Jo Mcleod is a 72 year old female who presents for evaluation of throat tightness described as an allergic reaction.  DDx includes allergic reaction, anxiety, malingering, abnml throat sensation NOS.  Symptoms somewhat concerning for allergic reaction but pt reports symptoms were  alleviated by eating which is inconsistent with allergic reaction.  Exam is nml despite pt's symptoms. No airway involvement, bronchospasm, GI symptoms, hypotension, or other sign of anaphylaxis. Patient was treated here with medications as noted above stating that her symptoms resolved.  True allergic reaction seems less like.  Screening labs and EKG ordered for atypical ACS which returned nml.  At this point I feel the pt is safe for DC. Recommendations given regarding follow up with primary care doctor and return to the emergency department as needed for new or worsening symptoms.  Counseled on all results, disposition and diagnosis.  Pt understanding and agreeable to plan. Patient discharged in stable condition.        Diagnosis:    ICD-10-CM    1. Throat tightness R68.89        Disposition:  discharged to home    Discharge Medications:  Discharge Medication List as of 12/9/2017  7:29 AM            [facility] EMERGENCY DEPARTMENT     Scribe disclosure:  Alex SOUZA, am serving as a scribe on 12/9/2017 at 4:53 AM to personally document services performed by Benedict Pierre MD, based on my observations and the provider's statements to me.                Benedict Pierre MD  12/09/17 3534

## 2017-12-09 NOTE — DISCHARGE INSTRUCTIONS
You may take over the counter pepcid as needed.  Please follow-up with your primary doctor this week.  Please return to the ED as needed for new or worsening symptoms such as chest pain, vomiting, difficulty breathing, fainting, any other concerning symptoms.  Take benadryl 25 to 50 mg by mouth every 6 hours as needed for throat tightness, itching.

## 2018-08-06 ENCOUNTER — TRANSFERRED RECORDS (OUTPATIENT)
Dept: FAMILY MEDICINE | Facility: CLINIC | Age: 73
End: 2018-08-06

## 2018-08-13 ENCOUNTER — OFFICE VISIT (OUTPATIENT)
Dept: FAMILY MEDICINE | Facility: CLINIC | Age: 73
End: 2018-08-13

## 2018-08-13 VITALS
HEART RATE: 92 BPM | WEIGHT: 152.8 LBS | HEIGHT: 64 IN | RESPIRATION RATE: 12 BRPM | BODY MASS INDEX: 26.09 KG/M2 | DIASTOLIC BLOOD PRESSURE: 68 MMHG | SYSTOLIC BLOOD PRESSURE: 112 MMHG

## 2018-08-13 DIAGNOSIS — F41.1 GAD (GENERALIZED ANXIETY DISORDER): ICD-10-CM

## 2018-08-13 DIAGNOSIS — R15.2 INCONTINENCE OF FECES WITH FECAL URGENCY: ICD-10-CM

## 2018-08-13 DIAGNOSIS — F33.1 MODERATE EPISODE OF RECURRENT MAJOR DEPRESSIVE DISORDER (H): Primary | ICD-10-CM

## 2018-08-13 DIAGNOSIS — M85.89 OSTEOPENIA OF MULTIPLE SITES: ICD-10-CM

## 2018-08-13 DIAGNOSIS — R15.9 INCONTINENCE OF FECES WITH FECAL URGENCY: ICD-10-CM

## 2018-08-13 DIAGNOSIS — F43.10 PTSD (POST-TRAUMATIC STRESS DISORDER): ICD-10-CM

## 2018-08-13 DIAGNOSIS — E78.00 HYPERCHOLESTEROLEMIA: ICD-10-CM

## 2018-08-13 PROCEDURE — 99204 OFFICE O/P NEW MOD 45 MIN: CPT | Performed by: FAMILY MEDICINE

## 2018-08-13 RX ORDER — CALCIUM CARBONATE 750 MG/1
750 TABLET, CHEWABLE ORAL DAILY
COMMUNITY
Start: 2016-08-10 | End: 2018-12-04

## 2018-08-13 RX ORDER — CITALOPRAM HYDROBROMIDE 40 MG/1
40 TABLET ORAL DAILY
Qty: 90 TABLET | Refills: 3 | Status: SHIPPED | OUTPATIENT
Start: 2018-08-13 | End: 2018-12-04

## 2018-08-13 NOTE — MR AVS SNAPSHOT
"              After Visit Summary   8/13/2018    Mary Jo Mcleod    MRN: [id number]           Patient Information     Date Of Birth          1945        Visit Information        Provider Department      8/13/2018 3:45 PM Lore Tran MD [facility]        Today's Diagnoses     Moderate episode of recurrent major depressive disorder (H)    -  1    PTSD (post-traumatic stress disorder)        GERSON (generalized anxiety disorder)        Osteopenia of multiple sites        Incontinence of feces with fecal urgency        Hypercholesterolemia          Care Instructions    Recommend referral to counseling at [facility]  If they do not take your insurance, ask the counseling center where you currently go if they do any EMDR therapy.          Follow-ups after your visit        Who to contact     If you have questions or need follow up information about today's clinic visit or your schedule please contact [facility] directly at [phone].  Normal or non-critical lab and imaging results will be communicated to you by Boxeverhart, letter or phone within 4 business days after the clinic has received the results. If you do not hear from us within 7 days, please contact the clinic through Box[employer] or phone. If you have a critical or abnormal lab result, we will notify you by phone as soon as possible.  Submit refill requests through [employer] or call your pharmacy and they will forward the refill request to us. Please allow 3 business days for your refill to be completed.          Additional Information About Your Visit        [employer] Information     [employer] lets you send messages to your doctor, view your test results, renew your prescriptions, schedule appointments and more. To sign up, go to [email or web address] . Click on \"Log in\" on the left side of the screen, which will take you to the Welcome page. Then click on \"Sign up Now\" on the right side of " "the page.     You will be asked to enter the access code listed below, as well as some personal information. Please follow the directions to create your username and password.     Your access code is: [email or web address]  Expires: 2018  4:36 PM     Your access code will  in 90 days. If you need help or a new code, please call your [facility] clinic or [phone].        Care EveryWhere ID     This is your Care EveryWhere ID. This could be used by other organizations to access your [facility] medical records  [id number]        Your Vitals Were     Pulse Respirations Height BMI (Body Mass Index)          92 12 1.626 m (5' 4\") 26.23 kg/m2         Blood Pressure from Last 3 Encounters:   18 112/68   17 133/72   17 131/70    Weight from Last 3 Encounters:   18 69.3 kg (152 lb 12.8 oz)   17 71.2 kg (157 lb)   17 68 kg (150 lb)              Today, you had the following     No orders found for display         Today's Medication Changes          These changes are accurate as of 18  4:36 PM.  If you have any questions, ask your nurse or doctor.               Stop taking these medicines if you haven't already. Please contact your care team if you have questions.     busPIRone 5 MG tablet   Commonly known as:  BUSPAR   Stopped by:  Lore Tran MD                Where to get your medicines      These medications were sent to [place] [id number] [place] [facility] [address]  [address] [facility] [address]     Phone:  [phone]     citalopram 40 MG tablet                Primary Care Provider Office Phone # Fax #    Lore Tarn [phone] [address] [place] [place] [address]        Equal Access to Services     [facility]: Jaret quarles Somason, waaxda luqadaha, qaybta kaalmaroxanne carias. [facility] [phone].    ATENCIÓN: Si zeke wadsworth, tiene a blake disposición servicios " semaj de asistencia lingüística. Danna mcrae [phone].    We comply with applicable federal civil rights laws and [place] laws. We do not discriminate on the basis of race, color, national origin, age, disability, sex, sexual orientation, or gender identity.            Thank you!     Thank you for choosing [facility]  for your care. Our goal is always to provide you with excellent care. Hearing back from our patients is one way we can continue to improve our services. Please take a few minutes to complete the written survey that you may receive in the mail after your visit with us. Thank you!             Your Updated Medication List - Protect others around you: Learn how to safely use, store and throw away your medicines at www.disposemymeds.org.          This list is accurate as of 8/13/18  4:36 PM.  Always use your most recent med list.                   Brand Name Dispense Instructions for use Diagnosis    alendronate 70 MG tablet    FOSAMAX     Take 70 mg by mouth every 7 days On Thursdays        calcium carbonate 750 MG Chew      Take 750 mg by mouth daily        citalopram 40 MG tablet    celeXA    90 tablet    Take 1 tablet (40 mg) by mouth daily    Moderate episode of recurrent major depressive disorder (H), PTSD (post-traumatic stress disorder), GERSON (generalized anxiety disorder)       simvastatin 20 MG tablet    ZOCOR     Take 20 mg by mouth At Bedtime

## 2018-08-13 NOTE — PATIENT INSTRUCTIONS
Recommend referral to counseling at [facility] for Trauma and Emotional Healing  If they do not take your insurance, ask the counseling center where you currently go if they do any EMDR therapy.

## 2018-08-13 NOTE — LETTER
[facility]  [address] [place] [address] [phone]      August 13, 2018      Mary Jo Mcleod  [address]  [facility] [address]      EMERGENCY CARE PLAN  Presenting Problem Treatment Plan   Questions or concerns during clinic hours I will call the clinic directly:    [facility]  [address] [place], [place] [id number] [phone]   Questions or concerns outside clinic hours I will call the after-hours on-call line at [phone]   Patient needs to schedule an appointment I will call the scheduling team during business hours at [phone]   Same day treatment  I will call the clinic first, then urgent care and express care if needed   Clinic Care Coordinators TARA Benton:  [phone]  Jennifer Pickering RN: [phone]   Crisis Services:  Behavioral or Mental Health [facility] (Behavioral Health Providers)   [phone]   Emergency treatment--Immediately CALL [phone]
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
## 2018-11-20 DIAGNOSIS — M81.0 AGE-RELATED OSTEOPOROSIS WITHOUT CURRENT PATHOLOGICAL FRACTURE: Primary | ICD-10-CM

## 2018-11-20 RX ORDER — ALENDRONATE SODIUM 70 MG/1
TABLET ORAL
Qty: 4 TABLET | Refills: 3 | Status: SHIPPED | OUTPATIENT
Start: 2018-11-20 | End: 2018-12-17

## 2018-12-04 ENCOUNTER — OFFICE VISIT (OUTPATIENT)
Dept: FAMILY MEDICINE | Facility: CLINIC | Age: 73
End: 2018-12-04

## 2018-12-04 VITALS
HEART RATE: 72 BPM | DIASTOLIC BLOOD PRESSURE: 72 MMHG | BODY MASS INDEX: 26.6 KG/M2 | SYSTOLIC BLOOD PRESSURE: 102 MMHG | WEIGHT: 155.8 LBS | RESPIRATION RATE: 16 BRPM | HEIGHT: 64 IN

## 2018-12-04 DIAGNOSIS — F33.1 MODERATE EPISODE OF RECURRENT MAJOR DEPRESSIVE DISORDER (H): ICD-10-CM

## 2018-12-04 DIAGNOSIS — F41.1 GAD (GENERALIZED ANXIETY DISORDER): ICD-10-CM

## 2018-12-04 DIAGNOSIS — M85.852 OSTEOPENIA OF LEFT HIP: ICD-10-CM

## 2018-12-04 DIAGNOSIS — F43.10 PTSD (POST-TRAUMATIC STRESS DISORDER): ICD-10-CM

## 2018-12-04 DIAGNOSIS — M81.0 AGE-RELATED OSTEOPOROSIS WITHOUT CURRENT PATHOLOGICAL FRACTURE: ICD-10-CM

## 2018-12-04 DIAGNOSIS — Z13.6 SCREENING FOR CARDIOVASCULAR CONDITION: ICD-10-CM

## 2018-12-04 DIAGNOSIS — K59.09 CHRONIC CONSTIPATION WITH OVERFLOW INCONTINENCE: ICD-10-CM

## 2018-12-04 DIAGNOSIS — E78.00 HYPERCHOLESTEROLEMIA: ICD-10-CM

## 2018-12-04 DIAGNOSIS — Z00.00 MEDICARE ANNUAL WELLNESS VISIT, SUBSEQUENT: Primary | ICD-10-CM

## 2018-12-04 LAB
% GRANULOCYTES: 56.2 % (ref 42.2–75.2)
HCT VFR BLD AUTO: 41.8 % (ref 35–46)
HEMOGLOBIN: 13.6 G/DL (ref 11.8–15.5)
LYMPHOCYTES NFR BLD AUTO: 34.2 % (ref 20.5–51.1)
MCH RBC QN AUTO: 31.2 PG (ref 27–31)
MCHC RBC AUTO-ENTMCNC: 32.5 G/DL (ref 33–37)
MCV RBC AUTO: 96 FL (ref 80–100)
MONOCYTES NFR BLD AUTO: 8.6 % (ref 1.7–9.3)
PLATELET # BLD AUTO: 293 K/UL (ref 140–450)
RBC # BLD AUTO: 4.35 X10/CMM (ref 3.7–5.2)
WBC # BLD AUTO: 5.8 X10/CMM (ref 3.8–11)

## 2018-12-04 PROCEDURE — 36415 COLL VENOUS BLD VENIPUNCTURE: CPT | Performed by: FAMILY MEDICINE

## 2018-12-04 PROCEDURE — 99214 OFFICE O/P EST MOD 30 MIN: CPT | Mod: 25 | Performed by: FAMILY MEDICINE

## 2018-12-04 PROCEDURE — G0439 PPPS, SUBSEQ VISIT: HCPCS | Performed by: FAMILY MEDICINE

## 2018-12-04 PROCEDURE — 82306 VITAMIN D 25 HYDROXY: CPT | Mod: 90 | Performed by: FAMILY MEDICINE

## 2018-12-04 PROCEDURE — 85025 COMPLETE CBC W/AUTO DIFF WBC: CPT | Performed by: FAMILY MEDICINE

## 2018-12-04 PROCEDURE — 80061 LIPID PANEL: CPT | Mod: 90 | Performed by: FAMILY MEDICINE

## 2018-12-04 PROCEDURE — 80053 COMPREHEN METABOLIC PANEL: CPT | Mod: 90 | Performed by: FAMILY MEDICINE

## 2018-12-04 RX ORDER — CITALOPRAM HYDROBROMIDE 40 MG/1
40 TABLET ORAL DAILY
Qty: 90 TABLET | Refills: 3 | Status: SHIPPED | OUTPATIENT
Start: 2018-12-04 | End: 2019-02-12

## 2018-12-04 RX ORDER — CALCIUM CARBONATE 500 MG/1
2 TABLET, CHEWABLE ORAL DAILY
COMMUNITY

## 2018-12-04 RX ORDER — SIMVASTATIN 20 MG
20 TABLET ORAL AT BEDTIME
Qty: 90 TABLET | Refills: 3 | Status: SHIPPED | OUTPATIENT
Start: 2018-12-04 | End: 2019-02-07

## 2018-12-04 ASSESSMENT — PATIENT HEALTH QUESTIONNAIRE - PHQ9: SUM OF ALL RESPONSES TO PHQ QUESTIONS 1-9: 14

## 2018-12-04 NOTE — MR AVS SNAPSHOT
After Visit Summary   12/4/2018    Mary Jo Mcleod    MRN: [id number]           Patient Information     Date Of Birth          1945        Visit Information        Provider Department      12/4/2018 10:00 AM Lore Tran MD [facility]        Today's Diagnoses     Medicare annual wellness visit, subsequent    -  1    Hypercholesterolemia        PTSD (post-traumatic stress disorder)        Moderate episode of recurrent major depressive disorder (H)        Age-related osteoporosis without current pathological fracture        GERSON (generalized anxiety disorder)        Screening for cardiovascular condition        Osteopenia of left hip        Chronic constipation with overflow incontinence          Care Instructions      Preventive Health Recommendations    See your health care provider every year to    Review health changes.     Discuss preventive care.      Review your medicines if your doctor has prescribed any.      You no longer need a yearly Pap test unless you've had an abnormal Pap test in the past 10 years. If you have vaginal symptoms, such as bleeding or discharge, be sure to talk with your provider about a Pap test.      Every 1 to 2 years, have a mammogram.  If you are over 69, talk with your health care provider about whether or not you want to continue having screening mammograms.      Every 10 years, have a colonoscopy. Or, have a yearly FIT test (stool test). These exams will check for colon cancer.       Have a cholesterol test every 5 years, or more often if your doctor advises it.       Have a diabetes test (fasting glucose) every three years. If you are at risk for diabetes, you should have this test more often.       At age 65, have a bone density scan (DEXA) to check for osteoporosis (brittle bone disease).    Shots:    Get a flu shot each year.    Get a tetanus shot every 10 years.    Talk to your doctor about your pneumonia vaccines. There are now two  you should receive - Pneumovax (PPSV 23) and Prevnar (PCV 13).    Talk to your pharmacist about the shingles vaccine.    Talk to your doctor about the hepatitis B vaccine.    Nutrition:     Eat at least 5 servings of fruits and vegetables each day.      Eat whole-grain bread, whole-wheat pasta and brown rice instead of white grains and rice.      Get adequate Calcium and Vitamin D.     Lifestyle    Exercise at least 150 minutes a week (30 minutes a day, 5 days a week). This will help you control your weight and prevent disease.      Limit alcohol to one drink per day.      No smoking.       Wear sunscreen to prevent skin cancer.       See your dentist twice a year for an exam and cleaning.      See your eye doctor every 1 to 2 years to screen for conditions such as glaucoma, macular degeneration and cataracts.    Personalized Prevention Plan  You are due for the preventive services outlined below.  Your care team is available to assist you in scheduling these services.  If you have already completed any of these items, please share that information with your care team to update in your medical record.  Health Maintenance Due   Topic Date Due     Depression Action Plan Review  09/21/1964     Depression Assessment - every 6 months  09/21/1964     Hepatitis C Screening  09/21/1964     Cholesterol Lab - every 5 years  09/21/1991     Mammogram - every 2 years  09/21/1996     Discuss Advance Directive Planning  09/21/2001     FALL RISK ASSESSMENT  09/21/2011     Bone Density Screening (Dexa)  09/21/2011     Flu Vaccine (1) 09/01/2018       To help with constipation, I recommend a bowel clean out.  Start with fleets enema, then take Dulcolax oral tablets (three tabs) followed by 16 -24 ounces of water.  This process may need to be repeated in 2-3 days if you do not get an adequate response.    For bowel maintenance, I recommend increased fiber in diet and at minimum 64 ounces of water daily.          Follow-ups after your  "visit        Future tests that were ordered for you today     Open Future Orders        Priority Expected Expires Ordered    DEXA - Hip/Pelvis/Spine (FUTURE/[facility]) Routine  2019            Who to contact     If you have questions or need follow up information about today's clinic visit or your schedule please contact [facility] directly at [phone].  Normal or non-critical lab and imaging results will be communicated to you by Twitpayhart, letter or phone within 4 business days after the clinic has received the results. If you do not hear from us within 7 days, please contact the clinic through Twitpayhart or phone. If you have a critical or abnormal lab result, we will notify you by phone as soon as possible.  Submit refill requests through 1jiajie or call your pharmacy and they will forward the refill request to us. Please allow 3 business days for your refill to be completed.          Additional Information About Your Visit        MyChart Information     1jiajie lets you send messages to your doctor, view your test results, renew your prescriptions, schedule appointments and more. To sign up, go to [email or web address] . Click on \"Log in\" on the left side of the screen, which will take you to the Welcome page. Then click on \"Sign up Now\" on the right side of the page.     You will be asked to enter the access code listed below, as well as some personal information. Please follow the directions to create your username and password.     Your access code is: [email or web address]  Expires: 3/4/2019 11:07 AM     Your access code will  in 90 days. If you need help or a new code, please call your [place] clinic or [phone].        Care EveryWhere ID     This is your Care EveryWhere ID. This could be used by other organizations to access your [place] medical records  [id number]        Your Vitals Were     Pulse Respirations Height Last Period BMI (Body Mass Index)       72 16 " "1.626 m (5' 4\") 12/04/1989 26.74 kg/m2        Blood Pressure from Last 3 Encounters:   12/04/18 102/72   08/13/18 112/68   12/09/17 133/72    Weight from Last 3 Encounters:   12/04/18 70.7 kg (155 lb 12.8 oz)   08/13/18 69.3 kg (152 lb 12.8 oz)   12/09/17 71.2 kg (157 lb)              We Performed the Following     CBC with Diff/Plt ([facility])     Comp. Metabolic Panel (14) ([facility])     Lipid Panel ([facility])     Vitamin D  25-Hydroxy ([facility])          Where to get your medicines      These medications were sent to [facility] [address] [facility] - [facility] [address]  [address] [facility] [address]     Phone:  [phone]     citalopram 40 MG tablet    simvastatin 20 MG tablet          Primary Care Provider Office Phone # Fax #    Lore Genesis Tran [phone] [address] [place] [address]        Equal Access to Services     [facility]: Hadii miguelina ku hadasho Soomaali, waaxda luqadaha, qaybta kaalmada adeegyada, roxanne cheek . So [facility] [phone].    ATENCIÓN: Si habla español, tiene a blake disposición servicios gratuitos de asistencia lingüística. [facility] [phone].    We comply with applicable federal civil rights laws and [place] laws. We do not discriminate on the basis of race, color, national origin, age, disability, sex, sexual orientation, or gender identity.            Thank you!     Thank you for choosing [facility]  for your care. Our goal is always to provide you with excellent care. Hearing back from our patients is one way we can continue to improve our services. Please take a few minutes to complete the written survey that you may receive in the mail after your visit with us. Thank you!             Your Updated Medication List - Protect others around you: Learn how to safely use, store and throw away your medicines at www.disposemymeds.org.          This list is accurate as of 12/4/18 11:07 AM.  Always use your most " recent med list.                   Brand Name Dispense Instructions for use Diagnosis    alendronate 70 MG tablet    FOSAMAX    4 tablet    Take 1 tablet by mouth once a week in the morning    Age-related osteoporosis without current pathological fracture       calcium carbonate 500 MG chewable tablet    TUMS     Take 1 chew tab by mouth 3 times daily        citalopram 40 MG tablet    celeXA    90 tablet    Take 1 tablet (40 mg) by mouth daily    Moderate episode of recurrent major depressive disorder (H), PTSD (post-traumatic stress disorder), GERSON (generalized anxiety disorder)       simvastatin 20 MG tablet    ZOCOR    90 tablet    Take 1 tablet (20 mg) by mouth At Bedtime    Hypercholesterolemia       UNABLE TO FIND      Take by mouth daily MEDICATION NAME: quinine tonic

## 2018-12-04 NOTE — LETTER
[facility] [address] [place]  [address]  Phone: [phone]    December 6, 2018      Mary Jo [address]  [place] [address]              Dear Mary Jo,    The results from your recent visit showed Labs all look good.  Normal blood counts.  Normal glucose, electrolytes, kidney function, and liver function.  Normal vitamin D level.  Cholesterol is fine.        Sincerely,     Lore Tran M.D.    Results for orders placed or performed in visit on 12/04/18   Lipid Panel ([facility])   Result Value Ref Range    Cholesterol 188 100 - 199 mg/dL    Triglycerides 136 0 - 149 mg/dL    HDL Cholesterol 43 >39 mg/dL    VLDL Cholesterol Mickey 27 5 - 40 mg/dL    LDL Cholesterol Calculated 118 (H) 0 - 99 mg/dL    LDL/HDL Ratio 2.7 0.0 - 3.2 ratio    Narrative    Performed at:  01 - [facility]  [employer] [place] [address]  [id number]  : Angel Diaz MD, Phone:  [phone]   Comp. Metabolic Panel (14) ([facility])   Result Value Ref Range    Glucose 94 65 - 99 mg/dL    Urea Nitrogen 11 8 - 27 mg/dL    Creatinine 0.73 0.57 - 1.00 mg/dL    eGFR If NonAfricn Am 83 >59 mL/min/1.73    eGFR If Africn Am 95 >59 mL/min/1.73    BUN/Creatinine Ratio 15 12 - 28    Sodium 142 134 - 144 mmol/L    Potassium 4.6 3.5 - 5.2 mmol/L    Chloride 102 96 - 106 mmol/L    Total CO2 27 20 - 29 mmol/L    Calcium 9.4 8.7 - 10.3 mg/dL    Protein Total 7.1 6.0 - 8.5 g/dL    Albumin 4.2 3.5 - 4.8 g/dL    Globulin, Total 2.9 1.5 - 4.5 g/dL    A/G Ratio 1.4 1.2 - 2.2    Bilirubin Total 0.5 0.0 - 1.2 mg/dL    Alkaline Phosphatase 61 39 - 117 IU/L    AST 25 0 - 40 IU/L    ALT 15 0 - 32 IU/L    Narrative    Performed at:  01 - [facility]  [facility] [place], [place]  [id number]  : Angel Diaz MD, Phone:  [phone]   CBC with Diff/Plt ([facility])   Result Value Ref Range    WBC x10/cmm 5.8 3.8 - 11.0 x10/cmm    % Lymphocytes 34.2 20.5 - 51.1 %    % Monocytes 8.6 1.7 - 9.3 %    %  Granulocytes 56.2 42.2 - 75.2 %    RBC x10/cmm 4.35 3.7 - 5.2 x10/cmm    Hemoglobin 13.6 11.8 - 15.5 g/dl    Hematocrit 41.8 35 - 46 %    MCV 96.0 80 - 100 fL    MCH 31.2 (A) 27.0 - 31.0 pg    MCHC 32.5 (A) 33.0 - 37.0 g/dL    Platelet Count 293 140 - 450 K/uL   Vitamin D  25-Hydroxy ([facility])   Result Value Ref Range    Vitamin D,25-Hydroxy 66.0 30.0 - 100.0 ng/mL    Narrative    Performed at:  01 - [facility] [address], [place], [place]  [id number]  : Angel Diaz MD, Phone:  [phone]

## 2018-12-04 NOTE — PATIENT INSTRUCTIONS
Preventive Health Recommendations    See your health care provider every year to    Review health changes.     Discuss preventive care.      Review your medicines if your doctor has prescribed any.      You no longer need a yearly Pap test unless you've had an abnormal Pap test in the past 10 years. If you have vaginal symptoms, such as bleeding or discharge, be sure to talk with your provider about a Pap test.      Every 1 to 2 years, have a mammogram.  If you are over 69, talk with your health care provider about whether or not you want to continue having screening mammograms.      Every 10 years, have a colonoscopy. Or, have a yearly FIT test (stool test). These exams will check for colon cancer.       Have a cholesterol test every 5 years, or more often if your doctor advises it.       Have a diabetes test (fasting glucose) every three years. If you are at risk for diabetes, you should have this test more often.       At age 65, have a bone density scan (DEXA) to check for osteoporosis (brittle bone disease).    Shots:    Get a flu shot each year.    Get a tetanus shot every 10 years.    Talk to your doctor about your pneumonia vaccines. There are now two you should receive - Pneumovax (PPSV 23) and Prevnar (PCV 13).    Talk to your pharmacist about the shingles vaccine.    Talk to your doctor about the hepatitis B vaccine.    Nutrition:     Eat at least 5 servings of fruits and vegetables each day.      Eat whole-grain bread, whole-wheat pasta and brown rice instead of white grains and rice.      Get adequate Calcium and Vitamin D.     Lifestyle    Exercise at least 150 minutes a week (30 minutes a day, 5 days a week). This will help you control your weight and prevent disease.      Limit alcohol to one drink per day.      No smoking.       Wear sunscreen to prevent skin cancer.       See your dentist twice a year for an exam and cleaning.      See your eye doctor every 1 to 2 years to screen for conditions  such as glaucoma, macular degeneration and cataracts.    Personalized Prevention Plan  You are due for the preventive services outlined below.  Your care team is available to assist you in scheduling these services.  If you have already completed any of these items, please share that information with your care team to update in your medical record.  Health Maintenance Due   Topic Date Due     Depression Action Plan Review  09/21/1964     Depression Assessment - every 6 months  09/21/1964     Hepatitis C Screening  09/21/1964     Cholesterol Lab - every 5 years  09/21/1991     Mammogram - every 2 years  09/21/1996     Discuss Advance Directive Planning  09/21/2001     FALL RISK ASSESSMENT  09/21/2011     Bone Density Screening (Dexa)  09/21/2011     Flu Vaccine (1) 09/01/2018       To help with constipation, I recommend a bowel clean out.  Start with fleets enema, then take Dulcolax oral tablets (three tabs) followed by 16 -24 ounces of water.  This process may need to be repeated in 2-3 days if you do not get an adequate response.    For bowel maintenance, I recommend increased fiber in diet and at minimum 64 ounces of water daily.

## 2018-12-04 NOTE — PROGRESS NOTES
"  SUBJECTIVE:   Mary Jo Mcleod is a 73 year old female who presents for Preventive Visit.  ISSUES TO ADDRESS TODAY:  1. Hypercholesterolemia: tolerating simvastatin, no apparent muscle aches. She is trying to eat low saturated fat diet.   2. GERSON/Depression/PTSD: taking citalopram and doing well. She has a therapist weekly and is doing well with this. She has  herself from her family and this has been very helpful. She denies any impulse control issues. She does not use any substances for management.   3. Osteopenia: taking alendronate. Her most recent DEXA was 2013 with T score of -2.1 of the left hip and -2.5 of the right hip. She has been on alendranate since then.    Are you in the first 12 months of your Medicare Part B coverage?  No    Physical Health:    In general, how would you rate your overall physical health? good    Outside of work, how many days during the week do you exercise? 6-7 days/week    Outside of work, approximately how many minutes a day do you exercise?30-45 minutes    If you drink alcohol do you typically have >3 drinks per day or >7 drinks per week? No    Do you usually eat at least 4 servings of fruit and vegetables a day, include whole grains & fiber and avoid regularly eating high fat or \"junk\" foods? NO, supplements. Avoids junk food    Do you have any problems taking medications regularly?  No    Do you have any side effects from medications? muscle aches and cramps in legs    Needs assistance for the following daily activities: no assistance needed    Which of the following safety concerns are present in your home?  throw rugs in the hallway and poor lighting     Hearing impairment: No  2  HEARING FREQUENCY TESTED BOTH EARS:Failed  Right Ear/Left Ear      500 Hz: passed/failed: pass    1000 Hz: Passed   2000 Hz: Passed   4000 Hz: Passed  Monica Christian MA 12/4/2018            In the past 6 months, have you been bothered by leaking of urine? Yes, sneezing , " coughing    Mental Health:    In general, how would you rate your overall mental or emotional health? poor  PHQ-2 Score:      Do you feel safe in your environment? Yes, from [facility]    Do you have a Health Care Directive? Yes: Advance Directive has been received and scanned. 2018    Additional concerns to address?  YES  Mental health issues  Urine issues  Fasting labs    Fall risk:  Fallen 2 or more times in the past year?: No  Any fall with injury in the past year?: No    Cognitive Screenin) Repeat 3 items (Leader, Season, Table)    2) Clock draw: NORMAL  3) 3 item recall: Recalls 2 objects   Results: NORMAL clock, 1-2 items recalled: COGNITIVE IMPAIRMENT LESS LIKELY    Mini-CogTM Copyright S Sushma. Licensed by the author for use in [facility]; reprinted with permission ([email or web address]). All rights reserved.      Do you have sleep apnea, excessive snoring or daytime drowsiness?: no            Reviewed and updated as needed this visit by clinical staff  Tobacco  Allergies  Meds         Reviewed and updated as needed this visit by Provider        Social History   Substance Use Topics     Smoking status: Never Smoker     Smokeless tobacco: Never Used     Alcohol use 0.0 - 1.2 oz/week     0 - 2 Cans of beer per week                           Current providers sharing in care for this patient include:   Patient Care Team:  Lore Tran MD as PCP - General (Family Practice)    The following health maintenance items are reviewed in Epic and correct as of today:  Health Maintenance   Topic Date Due     DEPRESSION ACTION PLAN  1964     PHQ-9 Q6 MONTHS  1964     HEPATITIS C SCREENING  1964     LIPID SCREEN Q5 YR FEMALE (SYSTEM ASSIGNED)  1991     MAMMO SCREEN Q2 YR (SYSTEM ASSIGNED)  1996     ADVANCE DIRECTIVE PLANNING Q5 YRS  2001     FALL RISK ASSESSMENT  2011     DEXA SCAN SCREENING (SYSTEM ASSIGNED)  2011     COLON CANCER SCREEN  (SYSTEM ASSIGNED)  06/24/2020     TETANUS IMMUNIZATION (SYSTEM ASSIGNED)  05/09/2025     PNEUMOCOCCAL  Completed     INFLUENZA VACCINE  Addressed     Labs reviewed in EPIC  BP Readings from Last 3 Encounters:   12/04/18 102/72   08/13/18 112/68   12/09/17 133/72    Wt Readings from Last 3 Encounters:   12/04/18 70.7 kg (155 lb 12.8 oz)   08/13/18 69.3 kg (152 lb 12.8 oz)   12/09/17 71.2 kg (157 lb)                  Patient Active Problem List   Diagnosis     Incontinence of feces with fecal urgency     Hypercholesterolemia     Moderate episode of recurrent major depressive disorder (H)     PTSD (post-traumatic stress disorder)     Osteopenia of multiple sites     GERSON (generalized anxiety disorder)     Past Surgical History:   Procedure Laterality Date     finger reattachment       TONSILLECTOMY         Social History   Substance Use Topics     Smoking status: Never Smoker     Smokeless tobacco: Never Used     Alcohol use 0.0 - 1.2 oz/week     0 - 2 Cans of beer per week     Family History   Problem Relation Age of Onset     Myocardial Infarction Father 63     Bipolar Disorder Sister      Chronic Obstructive Pulmonary Disease Brother      Lung Cancer Brother      Suicide Brother      Influenza/Pneumonia Sister      Alcoholism Sister          Current Outpatient Prescriptions   Medication Sig Dispense Refill     alendronate (FOSAMAX) 70 MG tablet Take 1 tablet by mouth once a week in the morning 4 tablet 3     calcium carbonate (TUMS) 500 MG chewable tablet Take 1 chew tab by mouth 3 times daily       citalopram (CELEXA) 40 MG tablet Take 1 tablet (40 mg) by mouth daily 90 tablet 3     simvastatin (ZOCOR) 20 MG tablet Take 1 tablet (20 mg) by mouth At Bedtime 90 tablet 3     UNABLE TO FIND Take by mouth daily MEDICATION NAME: quinine tonic       [DISCONTINUED] citalopram (CELEXA) 40 MG tablet Take 1 tablet (40 mg) by mouth daily 90 tablet 3     [DISCONTINUED] simvastatin (ZOCOR) 20 MG tablet Take 20 mg by mouth At  "Bedtime        Allergies   Allergen Reactions     Bupropion      Codeine Sulfate      Hydrocodone Other (See Comments) and Itching     Ear ache     Penicillin G      Sulfa Drugs      Tetracycline      Trazodone      Does not work for patient     Wellbutrin [Bupropion Hydrobromide]      Recent Labs   Lab Test  12/09/17   0536  07/21/17   0819   ALT   --   30   CR  0.75  0.70   GFRESTIMATED  76  83   GFRESTBLACK  >90  >90  African American GFR Calc     POTASSIUM  4.1  4.2          ROS:  Constitutional, HEENT, cardiovascular, pulmonary, GI, , musculoskeletal, neuro, skin, endocrine and psych systems are negative, except as otherwise noted.    OBJECTIVE:   /72  Pulse 72  Resp 16  Ht 1.626 m (5' 4\")  Wt 70.7 kg (155 lb 12.8 oz)  LMP 12/04/1989  BMI 26.74 kg/m2 Estimated body mass index is 26.74 kg/(m^2) as calculated from the following:    Height as of this encounter: 1.626 m (5' 4\").    Weight as of this encounter: 70.7 kg (155 lb 12.8 oz).  EXAM:   GENERAL APPEARANCE: healthy, alert and no distress  EYES: Eyes grossly normal to inspection, PERRL and conjunctivae and sclerae normal  HENT: ear canals and TM's normal, nose and mouth without ulcers or lesions, oropharynx clear and oral mucous membranes moist  NECK: no adenopathy, no asymmetry, masses, or scars and thyroid normal to palpation  RESP: lungs clear to auscultation - no rales, rhonchi or wheezes  BREAST: normal without masses, tenderness or nipple discharge and no palpable axillary masses or adenopathy  CV: regular rate and rhythm, normal S1 S2, no S3 or S4, no murmur, click or rub, no peripheral edema and peripheral pulses strong  ABDOMEN: soft, nontender, no hepatosplenomegaly, no masses, bowel sounds normal and large amount of stool is palpated throughout  MS: no musculoskeletal defects are noted and gait is age appropriate without ataxia  SKIN: no suspicious lesions or rashes  NEURO: Normal strength and tone, sensory exam grossly normal, " mentation intact and speech normal  PSYCH: mentation appears normal and affect normal/bright        ASSESSMENT / PLAN:   Mary Jo was seen today for physical.    Diagnoses and all orders for this visit:    Medicare annual wellness visit, subsequent  Mary Jo is a healthy appearing 73 year old female. Imms are UTD, BSE reviewed, mammo is ordered through her GYN, request we get a copy for our records.    Hypercholesterolemia  -     simvastatin (ZOCOR) 20 MG tablet; Take 1 tablet (20 mg) by mouth At Bedtime  -     Lipid Panel ([facility])  -     Comp. Metabolic Panel (14) ([facility])  -     CBC with Diff/Plt ([facility])    PTSD / GERSON (generalized anxiety disorder) / Moderate episode of recurrent major depressive disorder (H)  -     citalopram (CELEXA) 40 MG tablet; Take 1 tablet (40 mg) by mouth daily  Stable, continue same dose of medication.    Screening for cardiovascular condition  -     Comp. Metabolic Panel (14) ([facility])    Osteopenia of left hip / Age-related osteoporosis without current pathological fracture  -     DEXA - Hip/Pelvis/Spine (FUTURE/[facility]); Future  -     Vitamin D  25-Hydroxy ([facility])  Recommend adequate calcium in the diet, vitamin D supplement, and weight bearing exercise.   Continue weekly alendronate.    Chronic constipation with overflow incontinence  To help with constipation, I recommend a bowel clean out.  Start with fleets enema, then take Dulcolax oral tablets (three tabs) followed by 16 -24 ounces of water.  This process may need to be repeated in 2-3 days if you do not get an adequate response.    For bowel maintenance, I recommend increased fiber in diet and at minimum 64 ounces of water daily.      End of Life Planning:  Patient currently has an advanced directive: No.  I have verified the patient's ablity to prepare an advanced directive/make health care decisions.  Literature was provided to assist patient in preparing an advanced directive.    COUNSELING:  Reviewed preventive health  "counseling, as reflected in patient instructions       Regular exercise       Healthy diet/nutrition       Vision screening       Hearing screening       Dental care       Osteoporosis Prevention/Bone Health       Colon cancer screening    BP Readings from Last 1 Encounters:   12/04/18 102/72     Estimated body mass index is 26.74 kg/(m^2) as calculated from the following:    Height as of this encounter: 1.626 m (5' 4\").    Weight as of this encounter: 70.7 kg (155 lb 12.8 oz).           reports that she has never smoked. She has never used smokeless tobacco.      Appropriate preventive services were discussed with this patient, including applicable screening as appropriate for cardiovascular disease, diabetes, osteopenia/osteoporosis, and glaucoma.  As appropriate for age/gender, discussed screening for colorectal cancer, prostate cancer, breast cancer, and cervical cancer. Checklist reviewing preventive services available has been given to the patient.    Reviewed patients plan of care and provided an AVS. The Basic Care Plan (routine screening as documented in Health Maintenance) for Mary Jo meets the Care Plan requirement. This Care Plan has been established and reviewed with the Patient.    Counseling Resources:  ATP IV Guidelines  Pooled Cohorts Equation Calculator  Breast Cancer Risk Calculator  FRAX Risk Assessment  ICSI Preventive Guidelines  Dietary Guidelines for Americans, 2010  [employer]'s MyPlate  ASA Prophylaxis  Lung CA Screening    Lore Tran MD  [facility]  "

## 2018-12-05 LAB
ALBUMIN SERPL-MCNC: 4.2 G/DL (ref 3.5–4.8)
ALBUMIN/GLOB SERPL: 1.4 {RATIO} (ref 1.2–2.2)
ALP SERPL-CCNC: 61 IU/L (ref 39–117)
ALT SERPL-CCNC: 15 IU/L (ref 0–32)
AST SERPL-CCNC: 25 IU/L (ref 0–40)
BILIRUB SERPL-MCNC: 0.5 MG/DL (ref 0–1.2)
BUN SERPL-MCNC: 11 MG/DL (ref 8–27)
BUN/CREATININE RATIO: 15 (ref 12–28)
CALCIUM SERPL-MCNC: 9.4 MG/DL (ref 8.7–10.3)
CHLORIDE SERPLBLD-SCNC: 102 MMOL/L (ref 96–106)
CHOLEST SERPL-MCNC: 188 MG/DL (ref 100–199)
CREAT SERPL-MCNC: 0.73 MG/DL (ref 0.57–1)
EGFR IF AFRICN AM: 95 ML/MIN/1.73
EGFR IF NONAFRICN AM: 83 ML/MIN/1.73
GLOBULIN, TOTAL: 2.9 G/DL (ref 1.5–4.5)
GLUCOSE SERPL-MCNC: 94 MG/DL (ref 65–99)
HDLC SERPL-MCNC: 43 MG/DL
LDL/HDL RATIO: 2.7 RATIO (ref 0–3.2)
LDLC SERPL CALC-MCNC: 118 MG/DL (ref 0–99)
POTASSIUM SERPL-SCNC: 4.6 MMOL/L (ref 3.5–5.2)
PROT SERPL-MCNC: 7.1 G/DL (ref 6–8.5)
SODIUM SERPL-SCNC: 142 MMOL/L (ref 134–144)
TOTAL CO2: 27 MMOL/L (ref 20–29)
TRIGL SERPL-MCNC: 136 MG/DL (ref 0–149)
VITAMIN D, 25-HYDROXY: 66 NG/ML (ref 30–100)
VLDLC SERPL CALC-MCNC: 27 MG/DL (ref 5–40)

## 2018-12-17 RX ORDER — ALENDRONATE SODIUM 70 MG/1
TABLET ORAL
Qty: 12 TABLET | Refills: 3 | Status: SHIPPED | OUTPATIENT
Start: 2018-12-17 | End: 2019-02-12

## 2019-02-07 DIAGNOSIS — E78.00 HYPERCHOLESTEROLEMIA: ICD-10-CM

## 2019-02-07 RX ORDER — SIMVASTATIN 20 MG
20 TABLET ORAL AT BEDTIME
Qty: 90 TABLET | Refills: 3 | Status: SHIPPED | OUTPATIENT
Start: 2019-02-07 | End: 2020-02-17

## 2019-02-11 DIAGNOSIS — M81.0 AGE-RELATED OSTEOPOROSIS WITHOUT CURRENT PATHOLOGICAL FRACTURE: ICD-10-CM

## 2019-02-11 DIAGNOSIS — F33.1 MODERATE EPISODE OF RECURRENT MAJOR DEPRESSIVE DISORDER (H): ICD-10-CM

## 2019-02-11 DIAGNOSIS — F41.1 GAD (GENERALIZED ANXIETY DISORDER): ICD-10-CM

## 2019-02-11 DIAGNOSIS — F43.10 PTSD (POST-TRAUMATIC STRESS DISORDER): ICD-10-CM

## 2019-02-11 DIAGNOSIS — M85.852 OSTEOPENIA OF LEFT HIP: ICD-10-CM

## 2019-02-12 RX ORDER — CITALOPRAM HYDROBROMIDE 40 MG/1
40 TABLET ORAL DAILY
Qty: 90 TABLET | Refills: 3 | Status: SHIPPED | OUTPATIENT
Start: 2019-02-12 | End: 2020-02-17

## 2019-02-12 RX ORDER — ALENDRONATE SODIUM 70 MG/1
TABLET ORAL
Qty: 12 TABLET | Refills: 3 | Status: SHIPPED | OUTPATIENT
Start: 2019-02-12 | End: 2020-11-20 | Stop reason: SINTOL

## 2019-04-03 ENCOUNTER — OFFICE VISIT (OUTPATIENT)
Dept: FAMILY MEDICINE | Facility: CLINIC | Age: 74
End: 2019-04-03

## 2019-04-03 VITALS
OXYGEN SATURATION: 96 % | BODY MASS INDEX: 27.12 KG/M2 | WEIGHT: 158 LBS | RESPIRATION RATE: 16 BRPM | DIASTOLIC BLOOD PRESSURE: 70 MMHG | HEART RATE: 87 BPM | SYSTOLIC BLOOD PRESSURE: 112 MMHG

## 2019-04-03 DIAGNOSIS — Z12.39 SCREENING FOR BREAST CANCER: ICD-10-CM

## 2019-04-03 DIAGNOSIS — J45.20 MILD INTERMITTENT ASTHMA, UNSPECIFIED WHETHER COMPLICATED: ICD-10-CM

## 2019-04-03 DIAGNOSIS — M54.16 LUMBAR BACK PAIN WITH RADICULOPATHY AFFECTING RIGHT LOWER EXTREMITY: Primary | ICD-10-CM

## 2019-04-03 PROCEDURE — 99213 OFFICE O/P EST LOW 20 MIN: CPT | Performed by: FAMILY MEDICINE

## 2019-04-03 RX ORDER — CYCLOBENZAPRINE HCL 10 MG
10 TABLET ORAL 3 TIMES DAILY PRN
Qty: 30 TABLET | Refills: 1 | Status: SHIPPED | OUTPATIENT
Start: 2019-04-03 | End: 2019-09-04

## 2019-04-03 RX ORDER — ALBUTEROL SULFATE 90 UG/1
2 AEROSOL, METERED RESPIRATORY (INHALATION) EVERY 6 HOURS
Qty: 8.5 G | Refills: 1 | Status: SHIPPED | OUTPATIENT
Start: 2019-04-03 | End: 2021-04-23

## 2019-04-03 NOTE — LETTER
My Depression Action Plan  Name: Mary Jo Mcleod   Date of Birth 1945  Date: 4/3/2019    My doctor: Lore Tran   My clinic: [facility] [address] [place] [address] [phone]          GREEN    ZONE   Good Control    What it looks like:     Things are going generally well. You have normal up s and down s. You may even feel depressed from time to time, but bad moods usually last less than a day.   What you need to do:  1. Continue to care for yourself (see self care plan)  2. Check your depression survival kit and update it as needed  3. Follow your physician s recommendations including any medication.  4. Do not stop taking medication unless you consult with your physician first.           YELLOW         ZONE Getting Worse    What it looks like:     Depression is starting to interfere with your life.     It may be hard to get out of bed; you may be starting to isolate yourself from others.    Symptoms of depression are starting to last most all day and this has happened for several days.     You may have suicidal thoughts but they are not constant.   What you need to do:     1. Call your care team, your response to treatment will improve if you keep your care team informed of your progress. Yellow periods are signs an adjustment may need to be made.     2. Continue your self-care, even if you have to fake it!    3. Talk to someone in your support network    4. Open up your depression survival kit           RED    ZONE Medical Alert - Get Help    What it looks like:     Depression is seriously interfering with your life.     You may experience these or other symptoms: You can t get out of bed most days, can t work or engage in other necessary activities, you have trouble taking care of basic hygiene, or basic responsibilities, thoughts of suicide or death that will not go away, self-injurious behavior.     What you need to do:  1. Call your care team and  request a same-day appointment. If they are not available (weekends or after hours) call your local crisis line, emergency room or 911.            Depression Self Care Plan / Survival Kit    Self-Care for Depression  Here s the deal. Your body and mind are really not as separate as most people think.  What you do and think affects how you feel and how you feel influences what you do and think. This means if you do things that people who feel good do, it will help you feel better.  Sometimes this is all it takes.  There is also a place for medication and therapy depending on how severe your depression is, so be sure to consult with your medical provider and/ or Behavioral Health Consultant if your symptoms are worsening or not improving.     In order to better manage my stress, I will:    Exercise  Get some form of exercise, every day. This will help reduce pain and release endorphins, the  feel good  chemicals in your brain. This is almost as good as taking antidepressants!  This is not the same as joining a gym and then never going! (they count on that by the way ) It can be as simple as just going for a walk or doing some gardening, anything that will get you moving.      Hygiene   Maintain good hygiene (Get out of bed in the morning, Make your bed, Brush your teeth, Take a shower, and Get dressed like you were going to work, even if you are unemployed).  If your clothes don't fit try to get ones that do.    Diet  I will strive to eat foods that are good for me, drink plenty of water, and avoid excessive sugar, caffeine, alcohol, and other mood-altering substances.  Some foods that are helpful in depression are: complex carbohydrates, B vitamins, flaxseed, fish or fish oil, fresh fruits and vegetables.    Psychotherapy  I agree to participate in Individual Therapy (if recommended).    Medication  If prescribed medications, I agree to take them.  Missing doses can result in serious side effects.  I understand that  drinking alcohol, or other illicit drug use, may cause potential side effects.  I will not stop my medication abruptly without first discussing it with my provider.    Staying Connected With Others  I will stay in touch with my friends, family members, and my primary care provider/team.    Use your imagination  Be creative.  We all have a creative side; it doesn t matter if it s oil painting, sand castles, or mud pies! This will also kick up the endorphins.    Witness Beauty  (AKA stop and smell the roses) Take a look outside, even in mid-winter. Notice colors, textures. Watch the squirrels and birds.     Service to others  Be of service to others.  There is always someone else in need.  By helping others we can  get out of ourselves  and remember the really important things.  This also provides opportunities for practicing all the other parts of the program.    Humor  Laugh and be silly!  Adjust your TV habits for less news and crime-drama and more comedy.    Control your stress  Try breathing deep, massage therapy, biofeedback, and meditation. Find time to relax each day.     My support system    Clinic Contact:  Phone number:    Contact 1:  Phone number:    Contact 2:  Phone number:    [facility]/:  Phone number:    Therapist:  Phone number:    Local crisis center:    Phone number:    Other community support:  Phone number:

## 2019-04-03 NOTE — PROGRESS NOTES
"Problem(s) Oriented visit        SUBJECTIVE:                                                    Mary Jo Mcleod is a 73 year old female who presents to clinic today for a few issues:  1. Back pain with radiculopathy: seen at [facility] and has had epidural steroids as well as in other knee and hip areas. [facility] apparently now feels that her treatment plan should be for pain only. She did well for a few months. Now she feels that her alignment is not good, she has gluteal and thigh pain. She hasn't been to PT since October. She does lots of stretching. Family history of alcohol abuse. In the past she has used higher amounts but reports that now she only has a \"capful of eric\" each night in order to get to sleep.  2. Cough: noticed when it is \"muggy.\" No tobacco history. She can feel like she cannot take a deep breath. She denies hearing a wheeze. She also notes that cold air can trigger the same.      Problem list, Medication list, Allergies, and Medical/Social/Surgical histories reviewed in [facility] and updated as appropriate.   Additional history: as documented    ROS:  5 point ROS completed and negative except noted above, including Gen, CV, Resp, GI, MS      Histories:   Patient Active Problem List   Diagnosis     Incontinence of feces with fecal urgency     Hypercholesterolemia     Moderate episode of recurrent major depressive disorder (H)     PTSD (post-traumatic stress disorder)     Osteopenia of multiple sites     GERSON (generalized anxiety disorder)     Past Surgical History:   Procedure Laterality Date     finger reattachment       TONSILLECTOMY         Social History     Tobacco Use     Smoking status: Never Smoker     Smokeless tobacco: Never Used   Substance Use Topics     Alcohol use: Yes     Alcohol/week: 0.0 - 1.2 oz     Family History   Problem Relation Age of Onset     Myocardial Infarction Father 63     Bipolar Disorder Sister      Chronic Obstructive Pulmonary Disease Brother      Lung Cancer Brother      " Suicide Brother      Influenza/Pneumonia Sister      Alcoholism Sister            OBJECTIVE:                                                    /70   Pulse 87   Resp 16   Wt 71.7 kg (158 lb)   LMP 12/04/1989   SpO2 96%   BMI 27.12 kg/m    Body mass index is 27.12 kg/m .   GENERAL APPEARANCE: Alert, no acute distress  HENT: Ears and TMs normal, oral mucosa and posterior oropharynx normal  NECK: No adenopathy,masses or thyromegaly  RESP: lungs clear to auscultation   CV: normal rate, regular rhythm, no murmur or gallop  MS: spine is visually straight. Pain is noted to palpation of the right SI region and right mid buttock. She also has lateral hip pain. Normal strength, sensation, and reflexes in the LEs, and equal bilaterally  NEURO: Alert, oriented, speech and mentation normal  PSYCH: mentation appears normal, affect and mood normal   Labs Resulted Today:   Results for orders placed or performed in visit on 12/04/18   Lipid Panel ([facility])   Result Value Ref Range    Cholesterol 188 100 - 199 mg/dL    Triglycerides 136 0 - 149 mg/dL    HDL Cholesterol 43 >39 mg/dL    VLDL Cholesterol Mickey 27 5 - 40 mg/dL    LDL Cholesterol Calculated 118 (H) 0 - 99 mg/dL    LDL/HDL Ratio 2.7 0.0 - 3.2 ratio    Narrative    Performed at:  01 - [facility]  [address] [place] [address]  [id number]  : Angel Diaz MD, Phone:  [phone]   Comp. Metabolic Panel (14) ([facility])   Result Value Ref Range    Glucose 94 65 - 99 mg/dL    Urea Nitrogen 11 8 - 27 mg/dL    Creatinine 0.73 0.57 - 1.00 mg/dL    eGFR If NonAfricn Am 83 >59 mL/min/1.73    eGFR If Africn Am 95 >59 mL/min/1.73    BUN/Creatinine Ratio 15 12 - 28    Sodium 142 134 - 144 mmol/L    Potassium 4.6 3.5 - 5.2 mmol/L    Chloride 102 96 - 106 mmol/L    Total CO2 27 20 - 29 mmol/L    Calcium 9.4 8.7 - 10.3 mg/dL    Protein Total 7.1 6.0 - 8.5 g/dL    Albumin 4.2 3.5 - 4.8 g/dL    Globulin, Total 2.9 1.5 - 4.5 g/dL    A/G Ratio 1.4 1.2 - 2.2     Bilirubin Total 0.5 0.0 - 1.2 mg/dL    Alkaline Phosphatase 61 39 - 117 IU/L    AST 25 0 - 40 IU/L    ALT 15 0 - 32 IU/L    Narrative    Performed at:   - [facility] [address], [place], [place]  [id number]  : Angel Diaz MD, Phone:  [phone]   CBC with Diff/Plt ([facility])   Result Value Ref Range    WBC x10/cmm 5.8 3.8 - 11.0 x10/cmm    % Lymphocytes 34.2 20.5 - 51.1 %    % Monocytes 8.6 1.7 - 9.3 %    % Granulocytes 56.2 42.2 - 75.2 %    RBC x10/cmm 4.35 3.7 - 5.2 x10/cmm    Hemoglobin 13.6 11.8 - 15.5 g/dl    Hematocrit 41.8 35 - 46 %    MCV 96.0 80 - 100 fL    MCH 31.2 (A) 27.0 - 31.0 pg    MCHC 32.5 (A) 33.0 - 37.0 g/dL    Platelet Count 293 140 - 450 K/uL   Vitamin D  25-Hydroxy ([facility])   Result Value Ref Range    Vitamin D,25-Hydroxy 66.0 30.0 - 100.0 ng/mL    Narrative    Performed at:  01 - [facility] [address], [place], [place]  [id number]  : Angel Diaz MD, Phone:  [phone]     ASSESSMENT/PLAN:                                                        Mary Jo was seen today for back pain, breathing problem and recheck medication.    Diagnoses and all orders for this visit:    Lumbar back pain with radiculopathy affecting right lower extremity  -     cyclobenzaprine (FLEXERIL) 10 MG tablet; Take 1 tablet (10 mg) by mouth 3 times daily as needed for muscle spasms  -     PHYSICAL THERAPY REFERRAL; Future  Hopefully she will get some relief from the muscle relaxant. I think this is an issue that needs to be maintained regularly.    Screening for breast cancer  -     MAMMO -  Screening Digital Bilateral (FUT[facility]); Future    Mild intermittent asthma, unspecified whether complicated  -     albuterol (PROAIR HFA/PROVENTIL HFA/VENTOLIN HFA) 108 (90 Base) MCG/ACT inhaler; Inhale 2 puffs into the lungs every 6 hours  Clear now, but with a history suggestive of intermittent asthma. Inhaler for prn use is given.    Other orders  -     DEPRESSION ACTION PLAN  (DAP)        Patient Instructions   Refer to PT for back pain with right lower extremity radiculopathy.      The following health maintenance items are reviewed in Epic and correct as of today:  Health Maintenance   Topic Date Due     ASTHMA ACTION PLAN Q1 YR  09/21/1950     ASTHMA CONTROL TEST Q6 MOS  09/21/1950     DEPRESSION ACTION PLAN  09/21/1964     HEPATITIS C SCREENING  09/21/1964     ADVANCE DIRECTIVE PLANNING Q5 YRS  09/21/2001     ZOSTER IMMUNIZATION (2 of 3) 05/30/2011     DEXA SCAN SCREENING (SYSTEM ASSIGNED)  09/21/2011     PHQ-9 Q6 MONTHS  06/04/2019     MAMMO SCREEN Q2 YR (SYSTEM ASSIGNED)  11/16/2019     MEDICARE ANNUAL WELLNESS VISIT  12/04/2019     FALL RISK ASSESSMENT  12/04/2019     COLON CANCER SCREEN (SYSTEM ASSIGNED)  06/24/2020     LIPID SCREEN Q5 YR FEMALE (SYSTEM ASSIGNED)  12/04/2023     DTAP/TDAP/TD IMMUNIZATION (3 - Td) 05/09/2025     PNEUMOCOCCAL IMMUNIZATION 65+ LOW/MEDIUM RISK  Completed     INFLUENZA VACCINE  Addressed     IPV IMMUNIZATION  Aged Out     MENINGITIS IMMUNIZATION  Aged Out       Lore Tran MD  [facility]  Family Practice  [facility]  [phone]    For any issues my office # is [phone]

## 2019-04-09 ENCOUNTER — TELEPHONE (OUTPATIENT)
Dept: FAMILY MEDICINE | Facility: CLINIC | Age: 74
End: 2019-04-09

## 2019-04-09 NOTE — TELEPHONE ENCOUNTER
Received fax from [email or web address] requesting PA be done for patient's rx for Cyclobenzaprine.  Submitted through covermymeds on 04/03.  Received fax back with approval. Approved until 04/03/2021.  Called [email or web address] to inform of approval.

## 2019-07-11 ENCOUNTER — OFFICE VISIT (OUTPATIENT)
Dept: FAMILY MEDICINE | Facility: CLINIC | Age: 74
End: 2019-07-11

## 2019-07-11 VITALS
RESPIRATION RATE: 16 BRPM | OXYGEN SATURATION: 98 % | WEIGHT: 159 LBS | HEART RATE: 83 BPM | SYSTOLIC BLOOD PRESSURE: 118 MMHG | HEIGHT: 64 IN | DIASTOLIC BLOOD PRESSURE: 89 MMHG | TEMPERATURE: 98.4 F | BODY MASS INDEX: 27.14 KG/M2

## 2019-07-11 DIAGNOSIS — M85.89 OSTEOPENIA OF MULTIPLE SITES: ICD-10-CM

## 2019-07-11 DIAGNOSIS — M54.2 NECK PAIN: Primary | ICD-10-CM

## 2019-07-11 PROCEDURE — 72040 X-RAY EXAM NECK SPINE 2-3 VW: CPT | Performed by: FAMILY MEDICINE

## 2019-07-11 PROCEDURE — 99213 OFFICE O/P EST LOW 20 MIN: CPT | Performed by: FAMILY MEDICINE

## 2019-07-11 ASSESSMENT — MIFFLIN-ST. JEOR: SCORE: 1216.22

## 2019-07-12 ENCOUNTER — HOSPITAL ENCOUNTER (OUTPATIENT)
Dept: BONE DENSITY | Facility: CLINIC | Age: 74
Discharge: HOME | End: 2019-07-12
Attending: FAMILY MEDICINE | Admitting: FAMILY MEDICINE
Payer: COMMERCIAL

## 2019-07-12 DIAGNOSIS — M85.852 OSTEOPENIA OF LEFT HIP: ICD-10-CM

## 2019-07-12 PROCEDURE — 77080 DXA BONE DENSITY AXIAL: CPT

## 2019-07-31 ENCOUNTER — OFFICE VISIT (OUTPATIENT)
Dept: FAMILY MEDICINE | Facility: CLINIC | Age: 74
End: 2019-07-31

## 2019-07-31 DIAGNOSIS — M85.89 OSTEOPENIA OF MULTIPLE SITES: ICD-10-CM

## 2019-07-31 DIAGNOSIS — M25.551 HIP PAIN, RIGHT: Primary | ICD-10-CM

## 2019-07-31 PROCEDURE — 99214 OFFICE O/P EST MOD 30 MIN: CPT | Performed by: FAMILY MEDICINE

## 2019-07-31 NOTE — LETTER
[place] [id number] [place]  [address]  Phone: [phone]    August 1, 2019      Mary Jo Mcleod  [address]  [facility] [address]              Dear Mary Jo,    The results from your recent visit showed normal thyroid testing.        Sincerely,         Karlo Calero M.D.    Results for orders placed or performed in visit on 07/31/19   TSH ([facility])   Result Value Ref Range    TSH 1.850 0.450 - 4.500 uIU/mL    Narrative    Performed at:  01 - [facility] [address], [place], [place]  [id number]  : Angel Diaz MD, Phone:  [phone]

## 2019-08-01 VITALS — SYSTOLIC BLOOD PRESSURE: 121 MMHG | DIASTOLIC BLOOD PRESSURE: 81 MMHG | HEART RATE: 70 BPM

## 2019-08-01 LAB — TSH BLD-ACNC: 1.85 UIU/ML (ref 0.45–4.5)

## 2019-08-01 NOTE — PROGRESS NOTES
SUBJECTIVE  HPI: Mary Jo Mcleod is a 73 year old female who presents for evaluation of back pain  Symptoms began 4 week(s) ago, have been onset acute and are unchanged.  Pain is located in the low back right region, with radiation to radiates into the right buttocks, and are at worst a 8 on a scale of 1-10.  Recent injury:fall/near fall at [employer]  Personal hx:on fosamax for osteopenia  Pain is exacerbated by: weight bearing.  Pain is relieved by: ice and rest.    Past Medical History:   Diagnosis Date     Depressive disorder      Current Outpatient Medications   Medication Sig Dispense Refill     albuterol (PROAIR HFA/PROVENTIL HFA/VENTOLIN HFA) 108 (90 Base) MCG/ACT inhaler Inhale 2 puffs into the lungs every 6 hours 8.5 g 1     alendronate (FOSAMAX) 70 MG tablet Take 1 tablet by mouth once a week in the morning 12 tablet 3     calcium carbonate (TUMS) 500 MG chewable tablet Take 1 chew tab by mouth 3 times daily       citalopram (CELEXA) 40 MG tablet Take 1 tablet (40 mg) by mouth daily 90 tablet 3     cyclobenzaprine (FLEXERIL) 10 MG tablet Take 1 tablet (10 mg) by mouth 3 times daily as needed for muscle spasms 30 tablet 1     simvastatin (ZOCOR) 20 MG tablet Take 1 tablet (20 mg) by mouth At Bedtime 90 tablet 3     UNABLE TO FIND Take by mouth daily MEDICATION NAME: quinine tonic       Social History     Tobacco Use     Smoking status: Never Smoker     Smokeless tobacco: Never Used   Substance Use Topics     Alcohol use: Yes     Alcohol/week: 0.0 - 1.2 oz     ROS:  CONSTITUTIONAL:NEGATIVE for fever, chills, change in weight  INTEGUMENTARY/SKIN: NEGATIVE for worrisome rashes, moles or lesions    OBJECTIVE:  /81 (BP Location: Right arm, Patient Position: Sitting, Cuff Size: Adult Regular)   Pulse 70   LMP 12/04/1989   Back examination: Back symmetric, no curvature. ROM normal. No CVA tenderness.  [unfilled] leg raise test: positive  GENERAL APPEARANCE: healthy and mild distress  RESP: lungs clear to  auscultation - no rales, rhonchi or wheezes  CV: regular rates and rhythm, normal S1 S2, no murmur noted  NEURO: Normal strength and tone with no weakness or sensory deficit noted, reflexes normal   SKIN: no suspicious lesions or rashes    ASSESSMENT/IMPRESSION:  1. Hip pain, right  I am concerned for an occult insufficiency fracture given her pain with wb and her past hx of osteopenia.  XRAY likely not sufficient for detection and CT warranted   Will order PT pending CT results     - Referral to [facility]  - TSH ([facility])  - PHYSICAL THERAPY REFERRAL; Future    2. Osteopenia of multiple sites  Reviewed dexa    Has been on fosamax for 5+ years   Consider discontinuation pending CT results  - Referral to [facility]  - TSH ([facility]) .    Greater than 25 minutes spent with patient discussing risks and benefits and management with possible outcomes regarding this issue with greater than 50% in counseling for medical decision making and coordination of care.

## 2019-08-02 NOTE — PROGRESS NOTES
8/1/19 Faxed this office note to [facility] imaging, Atten: Dale Peace,   [facility]  [phone]

## 2019-08-06 ENCOUNTER — TRANSFERRED RECORDS (OUTPATIENT)
Dept: FAMILY MEDICINE | Facility: CLINIC | Age: 74
End: 2019-08-06

## 2019-08-27 ENCOUNTER — TELEPHONE (OUTPATIENT)
Dept: FAMILY MEDICINE | Facility: CLINIC | Age: 74
End: 2019-08-27

## 2019-08-27 DIAGNOSIS — Z53.9 ERRONEOUS ENCOUNTER--DISREGARD: Primary | ICD-10-CM

## 2019-08-28 ENCOUNTER — TRANSFERRED RECORDS (OUTPATIENT)
Dept: FAMILY MEDICINE | Facility: CLINIC | Age: 74
End: 2019-08-28

## 2019-09-04 ENCOUNTER — OFFICE VISIT (OUTPATIENT)
Dept: FAMILY MEDICINE | Facility: CLINIC | Age: 74
End: 2019-09-04

## 2019-09-04 VITALS
RESPIRATION RATE: 16 BRPM | BODY MASS INDEX: 27.16 KG/M2 | DIASTOLIC BLOOD PRESSURE: 68 MMHG | OXYGEN SATURATION: 95 % | HEART RATE: 95 BPM | SYSTOLIC BLOOD PRESSURE: 112 MMHG | WEIGHT: 158.25 LBS

## 2019-09-04 DIAGNOSIS — M54.41 CHRONIC RIGHT-SIDED LOW BACK PAIN WITH RIGHT-SIDED SCIATICA: Primary | ICD-10-CM

## 2019-09-04 DIAGNOSIS — G89.29 CHRONIC RIGHT-SIDED LOW BACK PAIN WITH RIGHT-SIDED SCIATICA: Primary | ICD-10-CM

## 2019-09-04 PROCEDURE — 99213 OFFICE O/P EST LOW 20 MIN: CPT | Performed by: FAMILY MEDICINE

## 2019-09-04 RX ORDER — TRAMADOL HYDROCHLORIDE 50 MG/1
50 TABLET ORAL EVERY 6 HOURS PRN
Qty: 30 TABLET | Refills: 0 | Status: SHIPPED | OUTPATIENT
Start: 2019-09-04 | End: 2019-10-31

## 2019-09-04 NOTE — PROGRESS NOTES
SUBJECTIVE:  Chief Complaint   Patient presents with     RECHECK     Right hip      Mary Jo Mcleod is a 73 year old female who presents with a chief complaint of right hip and R back pain with sciatica  Symptoms began 3 month(s) ago, are moderate and sudden onset and still present  Context:  Injury:Yes: [employer] injury.  DOI 6/8/2019  Pain exacerbated by flexion/extension Relieved by nothing.  She treated it initially with Tylenol and PT. This is not the first time this type of injury has occurred to this patient and she had a lengthy stint with [facility] last year with injections, etc.    Past Medical History:   Diagnosis Date     Depressive disorder      Current Outpatient Medications   Medication Sig Dispense Refill     albuterol (PROAIR HFA/PROVENTIL HFA/VENTOLIN HFA) 108 (90 Base) MCG/ACT inhaler Inhale 2 puffs into the lungs every 6 hours 8.5 g 1     alendronate (FOSAMAX) 70 MG tablet Take 1 tablet by mouth once a week in the morning 12 tablet 3     calcium carbonate (TUMS) 500 MG chewable tablet Take 1 chew tab by mouth 3 times daily       citalopram (CELEXA) 40 MG tablet Take 1 tablet (40 mg) by mouth daily 90 tablet 3     simvastatin (ZOCOR) 20 MG tablet Take 1 tablet (20 mg) by mouth At Bedtime 90 tablet 3     UNABLE TO FIND Take by mouth daily MEDICATION NAME: quinine tonic       cyclobenzaprine (FLEXERIL) 10 MG tablet Take 1 tablet (10 mg) by mouth 3 times daily as needed for muscle spasms (Patient not taking: Reported on 9/4/2019) 30 tablet 1     Social History     Tobacco Use     Smoking status: Never Smoker     Smokeless tobacco: Never Used   Substance Use Topics     Alcohol use: Yes     Alcohol/week: 0.0 - 1.2 oz     ROS:  CONSTITUTIONAL:NEGATIVE for fever, chills, change in weight  INTEGUMENTARY/SKIN: NEGATIVE for worrisome rashes, moles or lesions    EXAM:   /68   Pulse 95   Resp 16   Wt 71.8 kg (158 lb 4 oz)   LMP 12/04/1989   SpO2 95%   BMI 27.16 kg/m    + st leg raise R  leg  irritated    X-RAY was not done.    ASSESSMENT:  1. Chronic right-sided low back pain with right-sided sciatica  Will refer to [facility] for further direction, imaging, etc  - ORTHOPEDICS ADULT REFERRAL  - traMADol (ULTRAM) 50 MG tablet; Take 1 tablet (50 mg) by mouth every 6 hours as needed for severe pain  Dispense: 30 tablet; Refill: 0

## 2019-09-10 ENCOUNTER — TELEPHONE (OUTPATIENT)
Dept: FAMILY MEDICINE | Facility: CLINIC | Age: 74
End: 2019-09-10

## 2019-09-10 NOTE — TELEPHONE ENCOUNTER
Patient calls to update  that since her 9/4/19 visit. Reports has been sleeping well with the help of 1/2 tab of tramadol at . She has stopped going to [facility]. Has not yet contacted ortho to follow up.   Plan:  informed and encourages patient follow up with ortho. Patient agrees.  Carla Hua RN

## 2019-10-31 ENCOUNTER — OFFICE VISIT (OUTPATIENT)
Dept: FAMILY MEDICINE | Facility: CLINIC | Age: 74
End: 2019-10-31

## 2019-10-31 VITALS
WEIGHT: 156.25 LBS | BODY MASS INDEX: 26.67 KG/M2 | HEART RATE: 76 BPM | OXYGEN SATURATION: 97 % | HEIGHT: 64 IN | SYSTOLIC BLOOD PRESSURE: 116 MMHG | DIASTOLIC BLOOD PRESSURE: 68 MMHG | RESPIRATION RATE: 16 BRPM

## 2019-10-31 DIAGNOSIS — M54.41 CHRONIC RIGHT-SIDED LOW BACK PAIN WITH RIGHT-SIDED SCIATICA: ICD-10-CM

## 2019-10-31 DIAGNOSIS — G89.29 CHRONIC RIGHT-SIDED LOW BACK PAIN WITH RIGHT-SIDED SCIATICA: ICD-10-CM

## 2019-10-31 DIAGNOSIS — M79.10 MYALGIA: Primary | ICD-10-CM

## 2019-10-31 PROCEDURE — 99213 OFFICE O/P EST LOW 20 MIN: CPT | Performed by: FAMILY MEDICINE

## 2019-10-31 RX ORDER — TRAMADOL HYDROCHLORIDE 50 MG/1
50 TABLET ORAL EVERY 6 HOURS PRN
Qty: 30 TABLET | Refills: 0 | Status: SHIPPED | OUTPATIENT
Start: 2019-10-31 | End: 2019-11-04

## 2019-10-31 ASSESSMENT — MIFFLIN-ST. JEOR: SCORE: 1194.78

## 2019-10-31 NOTE — PROGRESS NOTES
"SUBJECTIVE:  Chief Complaint   Patient presents with     Musculoskeletal Problem     Mary Jo Mcleod is a 74 year old female presents with a chief complaint of right wrist, elbow, shoulder, hand, foot, leg, thigh and hip pain and tenderness.  The injury occurred 5 day(s) ago.   The injury happened while while walking into a store, tripped and fell into doorway landing on right side of body. How: fall immediate pain, was able to bear weight directly after injury.  The patient complained of moderate pain  and has not had decreased ROM.  Pain exacerbated by movement.  Relieved by rest and ice.  She treated it initially with Tylenol. This is the first time this type of injury has occurred to this patient.     Past Medical History:   Diagnosis Date     Depressive disorder      Current Outpatient Medications   Medication Sig Dispense Refill     albuterol (PROAIR HFA/PROVENTIL HFA/VENTOLIN HFA) 108 (90 Base) MCG/ACT inhaler Inhale 2 puffs into the lungs every 6 hours 8.5 g 1     alendronate (FOSAMAX) 70 MG tablet Take 1 tablet by mouth once a week in the morning 12 tablet 3     calcium carbonate (TUMS) 500 MG chewable tablet Take 1 chew tab by mouth 3 times daily       citalopram (CELEXA) 40 MG tablet Take 1 tablet (40 mg) by mouth daily 90 tablet 3     simvastatin (ZOCOR) 20 MG tablet Take 1 tablet (20 mg) by mouth At Bedtime 90 tablet 3     traMADol (ULTRAM) 50 MG tablet Take 1 tablet (50 mg) by mouth every 6 hours as needed for severe pain 30 tablet 0     UNABLE TO FIND Take by mouth daily MEDICATION NAME: quinine tonic       Social History     Tobacco Use     Smoking status: Never Smoker     Smokeless tobacco: Never Used   Substance Use Topics     Alcohol use: Yes     Alcohol/week: 0.0 - 2.0 standard drinks     ROS:  CONSTITUTIONAL:NEGATIVE for fever, chills, change in weight  INTEGUMENTARY/SKIN: NEGATIVE for worrisome rashes, moles or lesions    EXAM:   /68   Pulse 76   Resp 16   Ht 1.619 m (5' 3.75\")   Wt " 70.9 kg (156 lb 4 oz)   LMP 12/04/1989   SpO2 97%   BMI 27.03 kg/m      Extremity: back has FROM.   GENERAL APPEARANCE: healthy, alert and no distress  EXTREMITIES: peripheral pulses normal  SKIN: no suspicious lesions or rashes  NEURO: Normal strength and tone, sensory exam grossly normal, mentation intact and speech normal    X-RAY was not done.    ASSESSMENT:   1. Myalgia  Reassure    Ice and rest    2. Chronic right-sided low back pain with right-sided sciatica  Refilled    Caution about habituation

## 2019-11-04 DIAGNOSIS — M54.41 CHRONIC RIGHT-SIDED LOW BACK PAIN WITH RIGHT-SIDED SCIATICA: ICD-10-CM

## 2019-11-04 DIAGNOSIS — G89.29 CHRONIC RIGHT-SIDED LOW BACK PAIN WITH RIGHT-SIDED SCIATICA: ICD-10-CM

## 2019-11-05 RX ORDER — TRAMADOL HYDROCHLORIDE 50 MG/1
TABLET ORAL
Qty: 28 TABLET | Refills: 1 | Status: SHIPPED | OUTPATIENT
Start: 2019-11-05 | End: 2020-11-04

## 2019-12-17 ENCOUNTER — TRANSFERRED RECORDS (OUTPATIENT)
Dept: FAMILY MEDICINE | Facility: CLINIC | Age: 74
End: 2019-12-17

## 2020-02-17 DIAGNOSIS — F41.1 GAD (GENERALIZED ANXIETY DISORDER): ICD-10-CM

## 2020-02-17 DIAGNOSIS — E78.00 HYPERCHOLESTEROLEMIA: ICD-10-CM

## 2020-02-17 DIAGNOSIS — F43.10 PTSD (POST-TRAUMATIC STRESS DISORDER): ICD-10-CM

## 2020-02-17 DIAGNOSIS — F33.1 MODERATE EPISODE OF RECURRENT MAJOR DEPRESSIVE DISORDER (H): ICD-10-CM

## 2020-02-17 NOTE — TELEPHONE ENCOUNTER
multiple rx--last lipid 12/4/18, last ov 10/31/19 (not related)--patient is due for appt (I will reach out to patient to schedule appt)

## 2020-02-18 RX ORDER — SIMVASTATIN 20 MG
20 TABLET ORAL AT BEDTIME
Qty: 90 TABLET | Refills: 0 | Status: SHIPPED | OUTPATIENT
Start: 2020-02-18 | End: 2020-08-17

## 2020-02-18 RX ORDER — CITALOPRAM HYDROBROMIDE 40 MG/1
40 TABLET ORAL DAILY
Qty: 90 TABLET | Refills: 0 | Status: SHIPPED | OUTPATIENT
Start: 2020-02-18 | End: 2020-06-07

## 2020-02-19 NOTE — TELEPHONE ENCOUNTER
I have left a message for the patient to call our office.  Over due for fasting labs and an office visit.,    Vahe Peace,   [facility]  [phone]

## 2020-06-06 DIAGNOSIS — F41.1 GAD (GENERALIZED ANXIETY DISORDER): ICD-10-CM

## 2020-06-06 DIAGNOSIS — F43.10 PTSD (POST-TRAUMATIC STRESS DISORDER): ICD-10-CM

## 2020-06-06 DIAGNOSIS — F33.1 MODERATE EPISODE OF RECURRENT MAJOR DEPRESSIVE DISORDER (H): ICD-10-CM

## 2020-06-07 RX ORDER — CITALOPRAM HYDROBROMIDE 40 MG/1
TABLET ORAL
Qty: 90 TABLET | Refills: 0 | Status: SHIPPED | OUTPATIENT
Start: 2020-06-07 | End: 2020-09-21

## 2020-06-29 ENCOUNTER — TRANSFERRED RECORDS (OUTPATIENT)
Dept: HEALTH INFORMATION MANAGEMENT | Facility: CLINIC | Age: 75
End: 2020-06-29

## 2020-08-17 DIAGNOSIS — E78.00 HYPERCHOLESTEROLEMIA: ICD-10-CM

## 2020-08-17 RX ORDER — SIMVASTATIN 20 MG
TABLET ORAL
Qty: 90 TABLET | Refills: 0 | Status: SHIPPED | OUTPATIENT
Start: 2020-08-17 | End: 2020-12-21

## 2020-09-20 DIAGNOSIS — F43.10 PTSD (POST-TRAUMATIC STRESS DISORDER): ICD-10-CM

## 2020-09-20 DIAGNOSIS — F33.1 MODERATE EPISODE OF RECURRENT MAJOR DEPRESSIVE DISORDER (H): ICD-10-CM

## 2020-09-20 DIAGNOSIS — F41.1 GAD (GENERALIZED ANXIETY DISORDER): ICD-10-CM

## 2020-09-21 RX ORDER — CITALOPRAM HYDROBROMIDE 40 MG/1
TABLET ORAL
Qty: 90 TABLET | Refills: 0 | Status: SHIPPED | OUTPATIENT
Start: 2020-09-21 | End: 2020-12-21

## 2020-11-04 ENCOUNTER — OFFICE VISIT (OUTPATIENT)
Dept: FAMILY MEDICINE | Facility: CLINIC | Age: 75
End: 2020-11-04

## 2020-11-04 VITALS
BODY MASS INDEX: 25.61 KG/M2 | SYSTOLIC BLOOD PRESSURE: 124 MMHG | OXYGEN SATURATION: 96 % | DIASTOLIC BLOOD PRESSURE: 88 MMHG | TEMPERATURE: 97.9 F | HEIGHT: 64 IN | HEART RATE: 84 BPM | WEIGHT: 150 LBS

## 2020-11-04 DIAGNOSIS — M54.50 CHRONIC BILATERAL LOW BACK PAIN WITHOUT SCIATICA: ICD-10-CM

## 2020-11-04 DIAGNOSIS — F41.1 GAD (GENERALIZED ANXIETY DISORDER): ICD-10-CM

## 2020-11-04 DIAGNOSIS — E78.00 HYPERCHOLESTEROLEMIA: ICD-10-CM

## 2020-11-04 DIAGNOSIS — Z11.59 NEED FOR HEPATITIS C SCREENING TEST: ICD-10-CM

## 2020-11-04 DIAGNOSIS — M85.89 OSTEOPENIA OF MULTIPLE SITES: ICD-10-CM

## 2020-11-04 DIAGNOSIS — Z12.11 SCREENING FOR COLON CANCER: ICD-10-CM

## 2020-11-04 DIAGNOSIS — G89.29 CHRONIC BILATERAL LOW BACK PAIN WITHOUT SCIATICA: ICD-10-CM

## 2020-11-04 DIAGNOSIS — F33.1 MODERATE EPISODE OF RECURRENT MAJOR DEPRESSIVE DISORDER (H): ICD-10-CM

## 2020-11-04 DIAGNOSIS — Z12.39 ENCOUNTER FOR OTHER SCREENING FOR MALIGNANT NEOPLASM OF BREAST: ICD-10-CM

## 2020-11-04 DIAGNOSIS — Z00.00 ENCOUNTER FOR MEDICARE ANNUAL WELLNESS EXAM: Primary | ICD-10-CM

## 2020-11-04 PROCEDURE — G0439 PPPS, SUBSEQ VISIT: HCPCS | Performed by: FAMILY MEDICINE

## 2020-11-04 PROCEDURE — 99214 OFFICE O/P EST MOD 30 MIN: CPT | Mod: 25 | Performed by: FAMILY MEDICINE

## 2020-11-04 ASSESSMENT — ANXIETY QUESTIONNAIRES
6. BECOMING EASILY ANNOYED OR IRRITABLE: SEVERAL DAYS
3. WORRYING TOO MUCH ABOUT DIFFERENT THINGS: NOT AT ALL
5. BEING SO RESTLESS THAT IT IS HARD TO SIT STILL: MORE THAN HALF THE DAYS
IF YOU CHECKED OFF ANY PROBLEMS ON THIS QUESTIONNAIRE, HOW DIFFICULT HAVE THESE PROBLEMS MADE IT FOR YOU TO DO YOUR WORK, TAKE CARE OF THINGS AT HOME, OR GET ALONG WITH OTHER PEOPLE: SOMEWHAT DIFFICULT
7. FEELING AFRAID AS IF SOMETHING AWFUL MIGHT HAPPEN: SEVERAL DAYS
GAD7 TOTAL SCORE: 6
2. NOT BEING ABLE TO STOP OR CONTROL WORRYING: NOT AT ALL
1. FEELING NERVOUS, ANXIOUS, OR ON EDGE: NOT AT ALL

## 2020-11-04 ASSESSMENT — PATIENT HEALTH QUESTIONNAIRE - PHQ9
SUM OF ALL RESPONSES TO PHQ QUESTIONS 1-9: 8
5. POOR APPETITE OR OVEREATING: MORE THAN HALF THE DAYS

## 2020-11-04 ASSESSMENT — MIFFLIN-ST. JEOR: SCORE: 1165.4

## 2020-11-04 NOTE — PATIENT INSTRUCTIONS
Patient Education   Personalized Prevention Plan  You are due for the preventive services outlined below.  Your care team is available to assist you in scheduling these services.  If you have already completed any of these items, please share that information with your care team to update in your medical record.  Health Maintenance Due   Topic Date Due     Asthma Action Plan - yearly  1945     Asthma Control Test  1945     Discuss Advance Care Planning  1945     Hepatitis C Screening  09/21/1964     Zoster (Shingles) Vaccine (2 of 3) 05/30/2011     Depression Assessment  06/04/2019     Mammogram  11/16/2019     FALL RISK ASSESSMENT  12/04/2019     Colorectal Cancer Screening  06/24/2020     Flu Vaccine (1) 09/01/2020

## 2020-11-04 NOTE — PROGRESS NOTES
"  SUBJECTIVE:   Mary Jo Mcleod is a 75 year old female who presents for Preventive Visit.    Low back pain: persistent since remote fall.  Has been seen by [facility] and [facility].  Has undergone PT and injections.  Still bothersome.  Frustrated that has not improved more and has not healed well since her injury.      MDD: stable on citalopram    Anxiety: stable on citalopram    Osteopenia: on alendronate    HLD: on simva, tolerating well    Patient has been advised of split billing requirements and indicates understanding: Yes  Are you in the first 12 months of your Medicare Part B coverage?  No    Physical Health:    In general, how would you rate your overall physical health? good    Outside of work, how many days during the week do you exercise? 6-7 days/week    Outside of work, approximately how many minutes a day do you exercise?30-45 minutes    If you drink alcohol do you typically have >3 drinks per day or >7 drinks per week? No    Do you usually eat at least 4 servings of fruit and vegetables a day, include whole grains & fiber and avoid regularly eating high fat or \"junk\" foods? NO    Do you have any problems taking medications regularly?  No    Do you have any side effects from medications? many side effects    Needs assistance for the following daily activities: no assistance needed    Which of the following safety concerns are present in your home?  none identified   Hearing impairment: Hearing Screening:    Left Ear-  500Hz: Pass  1000Hz: Pass  2000Hz: Pass  4000Hz: Pass    Right Ear-  500Hz: Pass  1000Hz: Pass  2000Hz: Pass  4000Hz: Pass        In the past 6 months, have you been bothered by leaking of urine? no    Mental Health:    In general, how would you rate your overall mental or emotional health? good  PHQ-2 Score:      Do you feel safe in your environment? Yes    Have you ever done Advance Care Planning? (For example, a Health Directive, POLST, or a discussion with a medical provider or your loved ones " about your wishes): Yes, advance care planning is on file.    Additional concerns to address?  No    Fall risk:  Fallen 2 or more times in the past year?: No  Any fall with injury in the past year?: Yes(about a year ago - saw chiro)    Cognitive Screenin) Repeat 3 items (Leader, Season, Table)    2) Clock draw: NORMAL  3) 3 item recall: Recalls 3 objects  Results: 3 items recalled: COGNITIVE IMPAIRMENT LESS LIKELY    Mini-CogTM Copyright RADHA Ordaz. Licensed by the author for use in [facility]; reprinted with permission (marisel@[facility]). All rights reserved.      Do you have sleep apnea, excessive snoring or daytime drowsiness?: no        -------------------------------------    Reviewed and updated as needed this visit by clinical staff   Allergies  Meds              Reviewed and updated as needed this visit by Provider                Social History     Tobacco Use     Smoking status: Never Smoker     Smokeless tobacco: Never Used   Substance Use Topics     Alcohol use: Yes     Alcohol/week: 0.0 - 2.0 standard drinks                           Current providers sharing in care for this patient include:   Patient Care Team:  Levi Mcdonnell MD as PCP - General (Family Practice)  Karlo Calero MD as Assigned PCP    The following health maintenance items are reviewed in Epic and correct as of today:  Health Maintenance   Topic Date Due     ASTHMA ACTION PLAN  1945     ASTHMA CONTROL TEST  1945     HEPATITIS C SCREENING  1964     ZOSTER IMMUNIZATION (2 of 3) 2011     MAMMO SCREENING  2019     FALL RISK ASSESSMENT  2019     COLORECTAL CANCER SCREENING  2020     INFLUENZA VACCINE (1) 2020     PHQ-9  2021     MEDICARE ANNUAL WELLNESS VISIT  2021     LIPID  2023     DTAP/TDAP/TD IMMUNIZATION (3 - Td) 2025     ADVANCE CARE PLANNING  2025     DEXA  Completed     DEPRESSION ACTION PLAN  Completed     Pneumococcal Vaccine:  "65+ Years  Completed     Pneumococcal Vaccine: Pediatrics (0 to 5 Years) and At-Risk Patients (6 to 64 Years)  Aged Out     IPV IMMUNIZATION  Aged Out     MENINGITIS IMMUNIZATION  Aged Out     HEPATITIS B IMMUNIZATION  Aged Out     Lab work is in process  Mammogram Screening: Mammogram Screening: Patient over age 50, mutual decision to screen reflected in health maintenance.    ROS:  Constitutional, HEENT, cardiovascular, pulmonary, GI, , musculoskeletal, neuro, skin, endocrine and psych systems are negative, except as otherwise noted.    OBJECTIVE:   /88   Pulse 84   Temp 97.9  F (36.6  C)   Ht 1.626 m (5' 4\")   Wt 68 kg (150 lb)   LMP 12/04/1989   SpO2 96%   BMI 25.75 kg/m   Estimated body mass index is 25.75 kg/m  as calculated from the following:    Height as of this encounter: 1.626 m (5' 4\").    Weight as of this encounter: 68 kg (150 lb).  EXAM:   GENERAL APPEARANCE: healthy, alert and no distress  EYES: Eyes grossly normal to inspection, PERRL and conjunctivae and sclerae normal  HENT: ear canals and TM's normal, nose and mouth without ulcers or lesions, oropharynx clear and oral mucous membranes moist  NECK: no adenopathy, no asymmetry, masses, or scars and thyroid normal to palpation  RESP: lungs clear to auscultation - no rales, rhonchi or wheezes  BREAST: normal without masses, tenderness or nipple discharge and no palpable axillary masses or adenopathy  CV: regular rate and rhythm, normal S1 S2, no S3 or S4, no murmur, click or rub, no peripheral edema and peripheral pulses strong  ABDOMEN: soft, nontender, no hepatosplenomegaly, no masses and bowel sounds normal  SKIN: no suspicious lesions or rashes  NEURO: Normal strength and tone, sensory exam grossly normal, mentation intact and speech normal  PSYCH: mentation appears normal and affect normal/bright    Diagnostic Test Results:  Labs reviewed in Epic    ASSESSMENT / PLAN:   1. Encounter for Medicare annual wellness exam  - discussed " "preventative guidelines, healthy diet, exercise and weight management    2. Moderate episode of recurrent major depressive disorder (H)  stable/controlled. Cont current medication(s) and treatment    3. Chronic bilateral low back pain without sciatica  stable/controlled. Cont current medication(s) and treatment    4. Osteopenia of multiple sites  - cont alendronate  - Comp. Metabolic Panel (14) ([facility]); Future    5. Hypercholesterolemia  - cont simva  - Comp. Metabolic Panel (14) ([facility]); Future  - Lipid Panel ([facility]); Future    6. GERSON (generalized anxiety disorder)  stable/controlled. Cont current medication(s) and treatment    7. Need for hepatitis C screening test  - HCV Antibody ([facility]); Future    8. Screening for colon cancer  - GASTROENTEROLOGY ADULT REF PROCEDURE ONLY; Future    9. Encounter for other screening for malignant neoplasm of breast  - MAMMO -  Screening Digital Bilateral (FUTURE/[facility]); Future    Patient has been advised of split billing requirements and indicates understanding: Yes    COUNSELING:  Reviewed preventive health counseling, as reflected in patient instructions       Regular exercise       Healthy diet/nutrition    Estimated body mass index is 25.75 kg/m  as calculated from the following:    Height as of this encounter: 1.626 m (5' 4\").    Weight as of this encounter: 68 kg (150 lb).        She reports that she has never smoked. She has never used smokeless tobacco.    Appropriate preventive services were discussed with this patient, including applicable screening as appropriate for cardiovascular disease, diabetes, osteopenia/osteoporosis, and glaucoma.  As appropriate for age/gender, discussed screening for colorectal cancer, prostate cancer, breast cancer, and cervical cancer. Checklist reviewing preventive services available has been given to the patient.    Reviewed patients plan of care and provided an AVS. The Basic Care Plan (routine screening as documented in " Health Maintenance) for Mary Jo meets the Care Plan requirement. This Care Plan has been established and reviewed with the Patient.    Counseling Resources:  ATP IV Guidelines  Pooled Cohorts Equation Calculator  Breast Cancer Risk Calculator  BRCA-Related Cancer Risk Assessment: FHS-7 Tool  FRAX Risk Assessment  ICSI Preventive Guidelines  Dietary Guidelines for Americans, 2010  USDA's MyPlate  ASA Prophylaxis  Lung CA Screening    Levi Mcdonnell MD  [facility]

## 2020-11-05 ASSESSMENT — ANXIETY QUESTIONNAIRES: GAD7 TOTAL SCORE: 6

## 2020-11-16 ENCOUNTER — HOSPITAL ENCOUNTER (OUTPATIENT)
Dept: MAMMOGRAPHY | Facility: CLINIC | Age: 75
Discharge: HOME | End: 2020-11-16
Attending: FAMILY MEDICINE | Admitting: FAMILY MEDICINE
Payer: COMMERCIAL

## 2020-11-16 DIAGNOSIS — Z12.39 ENCOUNTER FOR OTHER SCREENING FOR MALIGNANT NEOPLASM OF BREAST: ICD-10-CM

## 2020-11-16 DIAGNOSIS — Z12.31 VISIT FOR SCREENING MAMMOGRAM: ICD-10-CM

## 2020-11-16 PROCEDURE — 77067 SCR MAMMO BI INCL CAD: CPT

## 2020-11-20 DIAGNOSIS — E78.00 HYPERCHOLESTEROLEMIA: ICD-10-CM

## 2020-11-20 DIAGNOSIS — M85.89 OSTEOPENIA OF MULTIPLE SITES: ICD-10-CM

## 2020-11-20 DIAGNOSIS — R73.01 IFG (IMPAIRED FASTING GLUCOSE): ICD-10-CM

## 2020-11-20 DIAGNOSIS — Z11.59 NEED FOR HEPATITIS C SCREENING TEST: ICD-10-CM

## 2020-11-20 DIAGNOSIS — R77.8 ELEVATED TOTAL PROTEIN: Primary | ICD-10-CM

## 2020-11-20 PROCEDURE — 36415 COLL VENOUS BLD VENIPUNCTURE: CPT | Performed by: FAMILY MEDICINE

## 2020-11-20 NOTE — LETTER
[facility] [address] [place]  [address]  Phone: [phone]    November 23, 2020      [address]  [facility] [address]            Dear Mary Jo,     Your labs are overall stable.  Your cholesterol levels look good.  No changes are needed here.     Your fasting blood sugar is very slightly elevated.  Keep working on low sugar diet and we will recheck next year.     Your kidney function has decreased slightly.  We will continue to monitor this annually.     Finally, your protein levels in your blood are just slightly abnormal.  I would like to add one additional test to look into this further.  If we are able to add it to your previous labs we will.  If not, we may need you to follow up for additional blood work.     Please let me know if you have any questions.             Sincerely,     Levi Mcdonnell MD       Results for orders placed or performed in visit on 11/20/20   Lipid Panel ([facility])     Status: Abnormal   Result Value Ref Range    Cholesterol 178 100 - 199 mg/dL    Triglycerides 138 0 - 149 mg/dL    HDL Cholesterol 41 >39 mg/dL    VLDL Cholesterol Mickey 25 5 - 40 mg/dL    LDL Cholesterol Calculated 112 (H) 0 - 99 mg/dL    LDL/HDL Ratio 2.7 0.0 - 3.2 ratio    Narrative    Performed at:  01 - [facility] [address], [place], [place]  [id number]  : Angel Diaz MD, Phone:  [phone]   Comp. Metabolic Panel (14) ([facility])     Status: Abnormal   Result Value Ref Range    Glucose 101 (H) 65 - 99 mg/dL    Urea Nitrogen 15 8 - 27 mg/dL    Creatinine 0.95 0.57 - 1.00 mg/dL    eGFR If NonAfricn Am 59 (L) >59 mL/min/1.73    eGFR If Africn Am 68 >59 mL/min/1.73    BUN/Creatinine Ratio 16 12 - 28    Sodium 138 134 - 144 mmol/L    Potassium 4.5 3.5 - 5.2 mmol/L    Chloride 104 96 - 106 mmol/L    Total CO2 29 20 - 29 mmol/L    Calcium 8.8 8.7 - 10.3 mg/dL    Protein Total 8.3 6.0 - 8.5 g/dL    Albumin 3.7 3.7 - 4.7 g/dL    Globulin, Total  4.6 (H) 1.5 - 4.5 g/dL    A/G Ratio 0.8 (L) 1.2 - 2.2    Bilirubin Total 0.4 0.0 - 1.2 mg/dL    Alkaline Phosphatase 57 39 - 117 IU/L    AST 17 0 - 40 IU/L    ALT 12 0 - 32 IU/L    Narrative    Performed at:  01 - [facility] [address], [place], [place]  [id number]  : Angel Diaz MD, Phone:  [phone]   HCV Antibody ([facility])     Status: None   Result Value Ref Range    Hep C Virus Ab 0.3 0.0 - 0.9 s/co ratio    Narrative    Performed at:  01 - [facility] [address], [place], [place]  [id number]  : Angel Diaz MD, Phone:  [phone]

## 2020-11-21 LAB
ALBUMIN SERPL-MCNC: 3.7 G/DL (ref 3.7–4.7)
ALBUMIN/GLOB SERPL: 0.8 {RATIO} (ref 1.2–2.2)
ALP SERPL-CCNC: 57 IU/L (ref 39–117)
ALT SERPL-CCNC: 12 IU/L (ref 0–32)
AST SERPL-CCNC: 17 IU/L (ref 0–40)
BILIRUB SERPL-MCNC: 0.4 MG/DL (ref 0–1.2)
BUN SERPL-MCNC: 15 MG/DL (ref 8–27)
BUN/CREATININE RATIO: 16 (ref 12–28)
CALCIUM SERPL-MCNC: 8.8 MG/DL (ref 8.7–10.3)
CHLORIDE SERPLBLD-SCNC: 104 MMOL/L (ref 96–106)
CHOLEST SERPL-MCNC: 178 MG/DL (ref 100–199)
CREAT SERPL-MCNC: 0.95 MG/DL (ref 0.57–1)
EGFR IF AFRICN AM: 68 ML/MIN/1.73
EGFR IF NONAFRICN AM: 59 ML/MIN/1.73
GLOBULIN, TOTAL: 4.6 G/DL (ref 1.5–4.5)
GLUCOSE SERPL-MCNC: 101 MG/DL (ref 65–99)
HCV AB SERPL QL IA: 0.3 S/CO RATIO (ref 0–0.9)
HDLC SERPL-MCNC: 41 MG/DL
LDL/HDL RATIO: 2.7 RATIO (ref 0–3.2)
LDLC SERPL CALC-MCNC: 112 MG/DL (ref 0–99)
POTASSIUM SERPL-SCNC: 4.5 MMOL/L (ref 3.5–5.2)
PROT SERPL-MCNC: 8.3 G/DL (ref 6–8.5)
SODIUM SERPL-SCNC: 138 MMOL/L (ref 134–144)
TOTAL CO2: 29 MMOL/L (ref 20–29)
TRIGL SERPL-MCNC: 138 MG/DL (ref 0–149)
VLDLC SERPL CALC-MCNC: 25 MG/DL (ref 5–40)

## 2020-11-21 ASSESSMENT — ASTHMA QUESTIONNAIRES: ACT_TOTALSCORE: 25

## 2020-11-23 NOTE — NURSING NOTE
Levi Mcdonnell MD sent to Monica Christian MA             Yes, so she should just come back for CBC and serum protein electrophoresis. Thank you.    Previous Messages    ----- Message -----   From: Monica Christian MA   Sent: 11/23/2020   8:33 AM CST   To: Levi Mcdonnell MD   Subject: morning - verify test code please  before i *     Morning Dr ROBERTS   Cbc is only good within 24 hrs- unable   Is the SPEP  #[id number] protein electrophoresis test?   Yes can add A1c   Please let me know-   Thanks Monica   ----- Message -----   From: Levi Mcdonnell MD   Sent: 11/22/2020   5:00 PM CST   To: [facility]     Can you add a CBC, SPEP and an A1c for this patient.     CBC and SPEP would be for diagnosis elevated total protein   A1c for [facility]G

## 2020-11-23 NOTE — NURSING NOTE
Added just #[id number] protein electrophoresis- DX elevated total protein  And A1c- IF[facility] today to DOS 11/20/2020  Informed Dr Levi Mcdonnell  That cbc over 24 hrs old and unable to perform- he decided not to call patient back to do cbc at this time  Monica Christian MA November 23, 2020 11:58 AM

## 2020-11-24 LAB — HBA1C MFR BLD: 5.8 % (ref 4.8–5.6)

## 2020-11-26 LAB
ALBUMIN SERPL-MCNC: 3.8 G/DL (ref 2.9–4.4)
ALBUMIN/GLOB SERPL: 0.8 {RATIO} (ref 0.7–1.7)
ALPHA-1-GLOBULIN: 0.1 G/DL (ref 0–0.4)
ALPHA-2-GLOBULIN: 0.7 G/DL (ref 0.4–1)
BETA GLOBULIN: 0.8 G/DL (ref 0.7–1.3)
GAMMA GLOBULIN: 3 G/DL (ref 0.4–1.8)
GLOBULIN, TOTAL: 4.7 G/DL (ref 2.2–3.9)
Lab: ABNORMAL
M-SPIKE: 2.8 G/DL
PROT SERPL-MCNC: 8.5 G/DL (ref 6–8.5)

## 2020-12-08 PROBLEM — R73.03 PREDIABETES: Status: ACTIVE | Noted: 2020-12-08

## 2020-12-11 ENCOUNTER — TELEPHONE (OUTPATIENT)
Dept: FAMILY MEDICINE | Facility: CLINIC | Age: 75
End: 2020-12-11

## 2020-12-11 DIAGNOSIS — R77.8 ABNORMAL SPEP: Primary | ICD-10-CM

## 2020-12-11 NOTE — TELEPHONE ENCOUNTER
----- Message from Levi Mcdonnell MD sent at 12/8/2020 12:02 PM CST -----  Please let patient know the follow up testing I ordered does show abnormal levels of some of the different kinds of proteins in the blood.  This should be further evaluated by a hematologist and I would recommend a referral to decide if additional tests are needed.    Levi Mcdonnell MD

## 2020-12-18 ENCOUNTER — VIRTUAL VISIT (OUTPATIENT)
Dept: FAMILY MEDICINE | Facility: CLINIC | Age: 75
End: 2020-12-18

## 2020-12-18 DIAGNOSIS — R19.7 DIARRHEA OF PRESUMED INFECTIOUS ORIGIN: Primary | ICD-10-CM

## 2020-12-18 PROCEDURE — 99213 OFFICE O/P EST LOW 20 MIN: CPT | Mod: 95 | Performed by: FAMILY MEDICINE

## 2020-12-18 NOTE — PROGRESS NOTES
"  Problem(s) Oriented visit      Video-Visit Details    Type of service:  Video Visit    Video Start Time (time video started): 1250    Video End Time (time video stopped): 1256    Originating Location (pt. Location): Home    Distant Location (provider location):  [facility]     Mode of Communication:  Telephone    Physician has received verbal consent for a Video Visit from the patient? Yes      Levi Mcdonnell MD        Mary Jo Mcleod is being evaluated via a billable video visit.      The patient has been notified of following:     \"This video visit will be conducted via a call between you and your physician/provider. We have found that certain health care needs can be provided without the need for an in-person physical exam.  This service lets us provide the care you need with a video conversation.  If a prescription is necessary we can send it directly to your pharmacy.  If lab work is needed we can place an order for that and you can then stop by our lab to have the test done at a later time.    If during the course of the call the physician/provider feels a video visit is not appropriate, you will not be charged for this service.\"     Physician has received verbal consent for a Video Visit from the patient? Yes    SUBJECTIVE:                                                      Mary Jo Mcleod is a 75 year old female who is being seen through video consult for evaluation.  Reports onset of diarrhea a couple days ago.  Bought a premade salad and thinks this may have caused it.  Yesterday had an accident.  Thinks the fact that she didn't feel it could be related to chronic lumbar issue that she sees specialist for.  Today is better.  Eating oatmeal which seems to be helping.  No further incontinence.  No pain or bloody stool.  No other concerns.      Histories:   Patient Active Problem List   Diagnosis     Incontinence of feces with fecal urgency     Hypercholesterolemia     Moderate episode " "of recurrent major depressive disorder (H)     PTSD (post-traumatic stress disorder)     Osteopenia of multiple sites     GERSON (generalized anxiety disorder)     Prediabetes     Past Surgical History:   Procedure Laterality Date     finger reattachment       TONSILLECTOMY         Social History     Tobacco Use     Smoking status: Never Smoker     Smokeless tobacco: Never Used   Substance Use Topics     Alcohol use: Yes     Alcohol/week: 0.0 - 2.0 standard drinks     Family History   Problem Relation Age of Onset     Myocardial Infarction Father 63     Bipolar Disorder Sister      Chronic Obstructive Pulmonary Disease Brother      Lung Cancer Brother      Suicide Brother      Influenza/Pneumonia Sister      Alcoholism Sister            Reviewed and updated as needed this visit by Provider                 ROS:    A 10 system review was completed and is as noted in HPI and otherwise negative.            OBJECTIVE:                                                      Objective    LMP 12/04/1989   Estimated body mass index is 25.75 kg/m  as calculated from the following:    Height as of 11/4/20: 1.626 m (5' 4\").    Weight as of 11/4/20: 68 kg (150 lb).      Gen: Well sounding, NAD              ASSESSMENT/PLAN:                                                        Mary Jo was seen today for gastrointestinal problem.    Diagnoses and all orders for this visit:    Diarrhea of presumed infectious origin: seems to be improving.  Cont supportive cares.  Consider probiotics.  Red flags that should prompt immediate follow up discussed.          "

## 2020-12-21 DIAGNOSIS — F33.1 MODERATE EPISODE OF RECURRENT MAJOR DEPRESSIVE DISORDER (H): ICD-10-CM

## 2020-12-21 DIAGNOSIS — F43.10 PTSD (POST-TRAUMATIC STRESS DISORDER): ICD-10-CM

## 2020-12-21 DIAGNOSIS — F41.1 GAD (GENERALIZED ANXIETY DISORDER): ICD-10-CM

## 2020-12-21 DIAGNOSIS — E78.00 HYPERCHOLESTEROLEMIA: ICD-10-CM

## 2020-12-21 RX ORDER — CITALOPRAM HYDROBROMIDE 40 MG/1
TABLET ORAL
Qty: 90 TABLET | Refills: 0 | Status: SHIPPED | OUTPATIENT
Start: 2020-12-21 | End: 2021-03-28

## 2020-12-21 RX ORDER — SIMVASTATIN 20 MG
TABLET ORAL
Qty: 90 TABLET | Refills: 3 | Status: SHIPPED | OUTPATIENT
Start: 2020-12-21 | End: 2022-03-02

## 2020-12-21 NOTE — TELEPHONE ENCOUNTER
citalopram (CELEXA) 40 MG  Simvastatin 20 mg    Last OV 12/18/20 VV    Last labs 11/20/20    Recent Labs   Lab Test 11/20/20  1019 12/04/18  1119   CHOL 178 188   HDL 41 43   * 118*   TRIG 138 136     PHQ 12/4/2018 11/4/2020   PHQ-9 Total Score 14 8   Q9: Thoughts of better off dead/self-harm past 2 weeks More than half the days Several days

## 2020-12-22 ENCOUNTER — TELEPHONE (OUTPATIENT)
Dept: FAMILY MEDICINE | Facility: CLINIC | Age: 75
End: 2020-12-22

## 2020-12-22 NOTE — TELEPHONE ENCOUNTER
Patient called asking for a note for her condo saying she may use the gym.  Note done by Dr Mcdonnell and patient picked up.

## 2021-01-06 ENCOUNTER — TRANSFERRED RECORDS (OUTPATIENT)
Dept: HEALTH INFORMATION MANAGEMENT | Facility: CLINIC | Age: 76
End: 2021-01-06

## 2021-01-07 ENCOUNTER — TRANSFERRED RECORDS (OUTPATIENT)
Dept: HEALTH INFORMATION MANAGEMENT | Facility: CLINIC | Age: 76
End: 2021-01-07

## 2021-01-26 DIAGNOSIS — Z11.59 ENCOUNTER FOR SCREENING FOR OTHER VIRAL DISEASES: ICD-10-CM

## 2021-02-04 DIAGNOSIS — Z11.59 ENCOUNTER FOR SCREENING FOR OTHER VIRAL DISEASES: ICD-10-CM

## 2021-02-04 LAB
LABORATORY COMMENT REPORT: NORMAL
SARS-COV-2 RNA RESP QL NAA+PROBE: NEGATIVE
SARS-COV-2 RNA RESP QL NAA+PROBE: NORMAL
SPECIMEN SOURCE: NORMAL
SPECIMEN SOURCE: NORMAL

## 2021-02-04 PROCEDURE — 87635 SARS-COV-2 COVID-19 AMP PRB: CPT | Performed by: PATHOLOGY

## 2021-02-08 ENCOUNTER — ANESTHESIA EVENT (OUTPATIENT)
Dept: GASTROENTEROLOGY | Facility: CLINIC | Age: 76
End: 2021-02-08
Payer: COMMERCIAL

## 2021-02-08 ENCOUNTER — ANESTHESIA (OUTPATIENT)
Dept: GASTROENTEROLOGY | Facility: CLINIC | Age: 76
End: 2021-02-08
Payer: COMMERCIAL

## 2021-02-08 ENCOUNTER — HOSPITAL ENCOUNTER (OUTPATIENT)
Facility: CLINIC | Age: 76
Discharge: HOME | End: 2021-02-08
Attending: PATHOLOGY | Admitting: PATHOLOGY
Payer: COMMERCIAL

## 2021-02-08 VITALS
OXYGEN SATURATION: 98 % | DIASTOLIC BLOOD PRESSURE: 62 MMHG | SYSTOLIC BLOOD PRESSURE: 109 MMHG | HEIGHT: 64 IN | HEART RATE: 70 BPM | WEIGHT: 149 LBS | RESPIRATION RATE: 13 BRPM | BODY MASS INDEX: 25.44 KG/M2

## 2021-02-08 DIAGNOSIS — D47.2 MGUS (MONOCLONAL GAMMOPATHY OF UNKNOWN SIGNIFICANCE): Primary | ICD-10-CM

## 2021-02-08 LAB
BASOPHILS # BLD AUTO: 0.1 10E9/L (ref 0–0.2)
BASOPHILS NFR BLD AUTO: 0.8 %
DIFFERENTIAL METHOD BLD: ABNORMAL
EOSINOPHIL # BLD AUTO: 0.1 10E9/L (ref 0–0.7)
EOSINOPHIL NFR BLD AUTO: 2.2 %
ERYTHROCYTE [DISTWIDTH] IN BLOOD BY AUTOMATED COUNT: 12.7 % (ref 10–15)
HCT VFR BLD AUTO: 32.8 % (ref 35–47)
HGB BLD-MCNC: 11 G/DL (ref 11.7–15.7)
IMM GRANULOCYTES # BLD: 0 10E9/L (ref 0–0.4)
IMM GRANULOCYTES NFR BLD: 0.3 %
LYMPHOCYTES # BLD AUTO: 1.8 10E9/L (ref 0.8–5.3)
LYMPHOCYTES NFR BLD AUTO: 29.3 %
MCH RBC QN AUTO: 32.8 PG (ref 26.5–33)
MCHC RBC AUTO-ENTMCNC: 33.5 G/DL (ref 31.5–36.5)
MCV RBC AUTO: 98 FL (ref 78–100)
MONOCYTES # BLD AUTO: 0.8 10E9/L (ref 0–1.3)
MONOCYTES NFR BLD AUTO: 12.8 %
NEUTROPHILS # BLD AUTO: 3.3 10E9/L (ref 1.6–8.3)
NEUTROPHILS NFR BLD AUTO: 54.6 %
NRBC # BLD AUTO: 0 10*3/UL
NRBC BLD AUTO-RTO: 0 /100
PLATELET # BLD AUTO: 227 10E9/L (ref 150–450)
RBC # BLD AUTO: 3.35 10E12/L (ref 3.8–5.2)
RETICS # AUTO: 55.9 10E9/L (ref 25–95)
RETICS/RBC NFR AUTO: 1.7 % (ref 0.5–2)
WBC # BLD AUTO: 6 10E9/L (ref 4–11)

## 2021-02-08 PROCEDURE — 999N001109 HC STATISTIC MORPHOLOGY W/INTERP HISTOLOGY TC 85060: Performed by: INTERNAL MEDICINE

## 2021-02-08 PROCEDURE — 88305 TISSUE EXAM BY PATHOLOGIST: CPT | Mod: 26 | Performed by: PATHOLOGY

## 2021-02-08 PROCEDURE — 38221 DX BONE MARROW BIOPSIES: CPT | Performed by: PATHOLOGY

## 2021-02-08 PROCEDURE — 36415 COLL VENOUS BLD VENIPUNCTURE: CPT | Performed by: PATHOLOGY

## 2021-02-08 PROCEDURE — 88185 FLOWCYTOMETRY/TC ADD-ON: CPT | Mod: TC | Performed by: PATHOLOGY

## 2021-02-08 PROCEDURE — 370N000017 HC ANESTHESIA TECHNICAL FEE, PER MIN: Performed by: PATHOLOGY

## 2021-02-08 PROCEDURE — 999N000010 HC STATISTIC ANES STAT CODE-CRNA PER MINUTE: Performed by: PATHOLOGY

## 2021-02-08 PROCEDURE — 88188 FLOWCYTOMETRY/READ 9-15: CPT | Performed by: STUDENT IN AN ORGANIZED HEALTH CARE EDUCATION/TRAINING PROGRAM

## 2021-02-08 PROCEDURE — 88280 CHROMOSOME KARYOTYPE STUDY: CPT | Performed by: PATHOLOGY

## 2021-02-08 PROCEDURE — 88305 TISSUE EXAM BY PATHOLOGIST: CPT | Mod: TC | Performed by: INTERNAL MEDICINE

## 2021-02-08 PROCEDURE — 999N001102 HC STATISTIC DNA PROCESS AND HOLD: Performed by: PATHOLOGY

## 2021-02-08 PROCEDURE — 88311 DECALCIFY TISSUE: CPT | Mod: TC | Performed by: INTERNAL MEDICINE

## 2021-02-08 PROCEDURE — 88271 CYTOGENETICS DNA PROBE: CPT | Performed by: PATHOLOGY

## 2021-02-08 PROCEDURE — 85060 BLOOD SMEAR INTERPRETATION: CPT | Performed by: PATHOLOGY

## 2021-02-08 PROCEDURE — 88184 FLOWCYTOMETRY/ TC 1 MARKER: CPT | Mod: TC | Performed by: PATHOLOGY

## 2021-02-08 PROCEDURE — 88311 DECALCIFY TISSUE: CPT | Mod: 26 | Performed by: PATHOLOGY

## 2021-02-08 PROCEDURE — 88291 CYTO/MOLECULAR REPORT: CPT | Performed by: MEDICAL GENETICS

## 2021-02-08 PROCEDURE — 38222 DX BONE MARROW BX & ASPIR: CPT | Performed by: PATHOLOGY

## 2021-02-08 PROCEDURE — 999N001068 HC STATISTIC BONE MARROW CORE PERF TC 38221: Performed by: INTERNAL MEDICINE

## 2021-02-08 PROCEDURE — 88275 CYTOGENETICS 100-300: CPT | Performed by: PATHOLOGY

## 2021-02-08 PROCEDURE — 250N000011 HC RX IP 250 OP 636: Performed by: REGISTERED NURSE

## 2021-02-08 PROCEDURE — 88237 TISSUE CULTURE BONE MARROW: CPT | Performed by: PATHOLOGY

## 2021-02-08 PROCEDURE — 88313 SPECIAL STAINS GROUP 2: CPT | Mod: TC | Performed by: INTERNAL MEDICINE

## 2021-02-08 PROCEDURE — 88313 SPECIAL STAINS GROUP 2: CPT | Mod: 26 | Performed by: PATHOLOGY

## 2021-02-08 PROCEDURE — 85025 COMPLETE CBC W/AUTO DIFF WBC: CPT | Performed by: PATHOLOGY

## 2021-02-08 PROCEDURE — 258N000003 HC RX IP 258 OP 636: Performed by: REGISTERED NURSE

## 2021-02-08 PROCEDURE — 999N001136 HC STATISTIC FLOW INT 9-15 ABY TC 88188: Performed by: PATHOLOGY

## 2021-02-08 PROCEDURE — 88264 CHROMOSOME ANALYSIS 20-25: CPT | Performed by: PATHOLOGY

## 2021-02-08 PROCEDURE — 85045 AUTOMATED RETICULOCYTE COUNT: CPT | Performed by: PATHOLOGY

## 2021-02-08 PROCEDURE — 250N000009 HC RX 250: Performed by: REGISTERED NURSE

## 2021-02-08 RX ORDER — NALOXONE HYDROCHLORIDE 0.4 MG/ML
0.2 INJECTION, SOLUTION INTRAMUSCULAR; INTRAVENOUS; SUBCUTANEOUS
Status: DISCONTINUED | OUTPATIENT
Start: 2021-02-08 | End: 2021-02-08 | Stop reason: HOSPADM

## 2021-02-08 RX ORDER — FLUMAZENIL 0.1 MG/ML
0.2 INJECTION, SOLUTION INTRAVENOUS
Status: DISCONTINUED | OUTPATIENT
Start: 2021-02-08 | End: 2021-02-08 | Stop reason: HOSPADM

## 2021-02-08 RX ORDER — PROPOFOL 10 MG/ML
INJECTION, EMULSION INTRAVENOUS CONTINUOUS PRN
Status: DISCONTINUED | OUTPATIENT
Start: 2021-02-08 | End: 2021-02-08

## 2021-02-08 RX ORDER — NALOXONE HYDROCHLORIDE 0.4 MG/ML
0.4 INJECTION, SOLUTION INTRAMUSCULAR; INTRAVENOUS; SUBCUTANEOUS
Status: DISCONTINUED | OUTPATIENT
Start: 2021-02-08 | End: 2021-02-08 | Stop reason: HOSPADM

## 2021-02-08 RX ORDER — SODIUM CHLORIDE, SODIUM LACTATE, POTASSIUM CHLORIDE, CALCIUM CHLORIDE 600; 310; 30; 20 MG/100ML; MG/100ML; MG/100ML; MG/100ML
INJECTION, SOLUTION INTRAVENOUS CONTINUOUS PRN
Status: DISCONTINUED | OUTPATIENT
Start: 2021-02-08 | End: 2021-02-08

## 2021-02-08 RX ORDER — ONDANSETRON 2 MG/ML
INJECTION INTRAMUSCULAR; INTRAVENOUS PRN
Status: DISCONTINUED | OUTPATIENT
Start: 2021-02-08 | End: 2021-02-08

## 2021-02-08 RX ORDER — PROPOFOL 10 MG/ML
INJECTION, EMULSION INTRAVENOUS PRN
Status: DISCONTINUED | OUTPATIENT
Start: 2021-02-08 | End: 2021-02-08

## 2021-02-08 RX ADMIN — DEXMEDETOMIDINE HYDROCHLORIDE 12 MCG: 100 INJECTION, SOLUTION INTRAVENOUS at 12:45

## 2021-02-08 RX ADMIN — SODIUM CHLORIDE, SODIUM LACTATE, POTASSIUM CHLORIDE, CALCIUM CHLORIDE: 600; 310; 30; 20 INJECTION, SOLUTION INTRAVENOUS at 12:40

## 2021-02-08 RX ADMIN — ONDANSETRON 4 MG: 2 INJECTION INTRAMUSCULAR; INTRAVENOUS at 12:49

## 2021-02-08 RX ADMIN — PROPOFOL 100 MCG/KG/MIN: 10 INJECTION, EMULSION INTRAVENOUS at 12:44

## 2021-02-08 RX ADMIN — PHENYLEPHRINE HYDROCHLORIDE 100 MCG: 10 INJECTION INTRAVENOUS at 13:04

## 2021-02-08 RX ADMIN — PROPOFOL 20 MG: 10 INJECTION, EMULSION INTRAVENOUS at 12:45

## 2021-02-08 RX ADMIN — PROPOFOL 20 MG: 10 INJECTION, EMULSION INTRAVENOUS at 12:46

## 2021-02-08 RX ADMIN — PROPOFOL 30 MG: 10 INJECTION, EMULSION INTRAVENOUS at 12:51

## 2021-02-08 ASSESSMENT — LIFESTYLE VARIABLES: TOBACCO_USE: 0

## 2021-02-08 ASSESSMENT — MIFFLIN-ST. JEOR: SCORE: 1160.86

## 2021-02-08 NOTE — ANESTHESIA PREPROCEDURE EVALUATION
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]   Procedure : Procedure(s):  BONE MARROW BIOPSY     Past Medical History:   Diagnosis Date     Depressive disorder       Past Surgical History:   Procedure Laterality Date     finger reattachment       TONSILLECTOMY        Allergies   Allergen Reactions     Bupropion      Erythromycin      Hydrocodone Other (See Comments) and Itching     Ear ache     Lidocaine      Penicillin G Itching     Itching around mouth then heavy breathing     Sulfa Drugs Itching     Itching around the mouth and then heavy breathing     Tetracycline      Trazodone      Does not work for patient     Wellbutrin [Bupropion Hydrobromide]      Codeine Sulfate Rash      Social History     Tobacco Use     Smoking status: Never Smoker     Smokeless tobacco: Never Used   Substance Use Topics     Alcohol use: Yes     Alcohol/week: 0.0 - 2.0 standard drinks      Wt Readings from Last 1 Encounters:   20 68 kg (150 lb)        Anesthesia Evaluation   Pt has had prior anesthetic.     No history of anesthetic complications       ROS/MED HX  ENT/Pulmonary:     (+) Intermittent, asthma  (-) tobacco use   Neurologic:       Cardiovascular:     (+) Dyslipidemia -----    METS/Exercise Tolerance:     Hematologic:       Musculoskeletal:       GI/Hepatic:       Renal/Genitourinary:       Endo:       Psychiatric/Substance Use:       Infectious Disease:       Malignancy:   (+) Malignancy, History of Other.Other CA paraproteinemia Active status post.    Other:             Prior to Admission medications    Medication Sig Start Date End Date Taking? Authorizing Provider   albuterol (PROAIR HFA/PROVENTIL HFA/VENTOLIN HFA) 108 (90 Base) MCG/ACT inhaler Inhale 2 puffs into the lungs every 6 hours  Patient not taking: Reported on 2020 4/3/19   Lore Tran MD   calcium carbonate (TUMS) 500 MG  chewable tablet Take 1 chew tab by mouth 3 times daily    Reported, Patient   citalopram (CELEXA) 40 MG tablet Take 1 tablet by mouth once daily 12/21/20   Levi Mcdonnell MD   simvastatin (ZOCOR) 20 MG tablet TAKE 1 TABLET BY MOUTH AT BEDTIME 12/21/20   Levi Mcdonnell MD   VITAMIN D PO Take by mouth daily     Reported, Patient     No current Epic-ordered facility-administered medications on file.      No current Epic-ordered outpatient medications on file.       No results for input(s): ABO, RH in the last 85429 hours.  No results for input(s): HCG in the last 68593 hours.  No results found for this or any previous visit (from the past 744 hour(s)).  Physical Exam    Airway  airway exam normal           Respiratory Devices and Support         Dental  no notable dental history     (+) caps      Cardiovascular   cardiovascular exam normal          Pulmonary   pulmonary exam normal                OUTSIDE LABS:  CBC:   Lab Results   Component Value Date    WBC 5.8 12/04/2018    WBC 10.7 12/09/2017    HGB 13.6 12/04/2018    HGB 12.9 12/09/2017    HCT 41.8 12/04/2018    HCT 38.5 12/09/2017     12/04/2018     12/09/2017     BMP:   Lab Results   Component Value Date     11/20/2020     12/04/2018    POTASSIUM 4.5 11/20/2020    POTASSIUM 4.6 12/04/2018    CHLORIDE 104 11/20/2020    CHLORIDE 102 12/04/2018    CO2 25 12/09/2017    CO2 27 07/21/2017    BUN 15 11/20/2020    BUN 16 11/20/2020    CR 0.95 11/20/2020    CR 0.73 12/04/2018     (H) 11/20/2020    GLC 94 12/04/2018     COAGS: No results found for: PTT, INR, FIBR  POC: No results found for: BGM, HCG, HCGS  HEPATIC:   Lab Results   Component Value Date    ALBUMIN 3.7 11/20/2020    PROTTOTAL 8.3 11/20/2020    ALT 12 11/20/2020    AST 17 11/20/2020    ALKPHOS 57 11/20/2020    BILITOTAL 0.4 11/20/2020     OTHER:   Lab Results   Component Value Date    A1C 5.8 (H) 11/20/2020    ARA 8.8 11/20/2020    LIPASE 269 (H) 10/06/2008     TSH 1.63 02/21/2017       Anesthesia Plan    ASA Status:  2      Anesthesia Type: MAC     - Reason for MAC: Deep or markedly invasive procedure (G8)              Consents    Anesthesia Plan(s) and associated risks, benefits, and realistic alternatives discussed. Questions answered and patient/representative(s) expressed understanding.     - Discussed with:  Patient         Postoperative Care    Pain management: IV analgesics.   PONV prophylaxis: Ondansetron (or other 5HT-3)     Comments:                Nolan Ritter MD

## 2021-02-08 NOTE — PROCEDURES
The patient was positively identified and informed consent was obtained (see the completed Affirmation of Consent for Bone Marrow Aspiration and/or Biopsy Procedure(s) form in the patient's chart). The patient was placed in the prone position and the bony landmarks of the pelvis were identified. Medical staff reconfirmed the patient's name, date of birth and procedure. The skin over the posterior iliac crest was scrubbed and draped in a sterile fashion. The local area of the procedure was anesthetized with a total of 5 mL of 1.5% mepivacaine and a small incision was made.  The patient did receive conscious sedation.    Trephine bone marrow core(s) was/were obtained from the left posterior iliac crest. Bone marrow aspirate was obtained from the left posterior iliac crest for: morphology with immunophenotyping, cytogenetics and possible molecular diagnostics.    Direct pressure was applied to the biopsy site with sterile gauze. The biopsy site was cleaned with alcohol and a sterile dressing was placed over the biopsy incision using a pressure bandage. The patient was then placed in the supine position to maintain pressure on the biopsy site. Post-procedure wound care instructions, including routine dressing instructions and analgesia, were given to the patient. The procedure was completed without complication.

## 2021-02-08 NOTE — ANESTHESIA POSTPROCEDURE EVALUATION
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    Anesthesia Type:  MAC    Note:  Disposition: Outpatient   Postop Pain Control: Uneventful            Sign Out: Well controlled pain   PONV: No   Neuro/Psych: Uneventful            Sign Out: Acceptable/Baseline neuro status   Airway/Respiratory: Uneventful            Sign Out: Acceptable/Baseline resp. status   CV/Hemodynamics: Uneventful            Sign Out: Acceptable CV status   Other NRE: NONE   DID A NON-ROUTINE EVENT OCCUR? No         Last vitals:  Vitals:    02/08/21 1331 02/08/21 1340 02/08/21 1341   BP: 99/53 109/62    Pulse: 66 77 70   Resp: 24 30 13   SpO2: 98% 99% 98%       Last vitals prior to Anesthesia Care Transfer:  CRNA VITALS  2/8/2021 1237 - 2/8/2021 1337      2/8/2021             Pulse:  66    SpO2:  98 %    Resp Rate (set):  10          Electronically Signed By: Nolan Ritter MD  February 8, 2021  2:32 PM

## 2021-02-08 NOTE — ANESTHESIA CARE TRANSFER NOTE
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    Anesthesia Type:   MAC     Note:    Oropharynx: oropharynx clear of all foreign objects and spontaneously breathing  Level of Consciousness: drowsy  Oxygen Supplementation: nasal cannula  Level of Supplemental Oxygen (L/min / FiO2): 2  Independent Airway: airway patency satisfactory and stable  Dentition: dentition unchanged  Vital Signs Stable: post-procedure vital signs reviewed and stable  Report to RN Given: handoff report given  Patient transferred to: Phase II  Comments: At end of procedure, spontaneous respirations, patient alert to voice, able to follow commands. Oxygen via nasal cannula at 4 liters per minute to PACU. Oxygen tubing connected to wall O2 in PACU, SpO2, NiBP, and EKG monitors and alarms on and functioning, report on patient's clinical status given to PACU RN, RN questions answered.  Handoff Report: Identifed the Patient, Identified the Reponsible Provider, Reviewed the pertinent medical history, Discussed the surgical course, Reviewed Intra-OP anesthesia mangement and issues during anesthesia, Set expectations for post-procedure period and Allowed opportunity for questions and acknowledgement of understanding      Vitals: (Last set prior to Anesthesia Care Transfer)  101/60  HR 66  RR 14  SPO2 100$%  CRNA VITALS  2/8/2021 1237 - 2/8/2021 1314      2/8/2021             Pulse:  66    SpO2:  98 %    Resp Rate (set):  10        Electronically Signed By: MARLON Sanchez CRNA  February 8, 2021  1:14 PM

## 2021-02-09 LAB — COPATH REPORT: NORMAL

## 2021-02-10 LAB — COPATH REPORT: NORMAL

## 2021-02-12 LAB
COPATH REPORT: NORMAL
COPATH REPORT: NORMAL

## 2021-02-15 ENCOUNTER — OFFICE VISIT (OUTPATIENT)
Dept: FAMILY MEDICINE | Facility: CLINIC | Age: 76
End: 2021-02-15

## 2021-02-15 VITALS
WEIGHT: 148.2 LBS | HEART RATE: 89 BPM | TEMPERATURE: 97.8 F | OXYGEN SATURATION: 97 % | RESPIRATION RATE: 16 BRPM | SYSTOLIC BLOOD PRESSURE: 106 MMHG | DIASTOLIC BLOOD PRESSURE: 68 MMHG | HEIGHT: 64 IN | BODY MASS INDEX: 25.3 KG/M2

## 2021-02-15 DIAGNOSIS — F33.1 MODERATE EPISODE OF RECURRENT MAJOR DEPRESSIVE DISORDER (H): ICD-10-CM

## 2021-02-15 DIAGNOSIS — E88.09 PLASMA CELL DYSCRASIA: ICD-10-CM

## 2021-02-15 DIAGNOSIS — M70.61 TROCHANTERIC BURSITIS OF RIGHT HIP: Primary | ICD-10-CM

## 2021-02-15 DIAGNOSIS — M25.551 HIP PAIN, RIGHT: ICD-10-CM

## 2021-02-15 PROCEDURE — 20610 DRAIN/INJ JOINT/BURSA W/O US: CPT | Performed by: FAMILY MEDICINE

## 2021-02-15 PROCEDURE — 99213 OFFICE O/P EST LOW 20 MIN: CPT | Mod: 25 | Performed by: FAMILY MEDICINE

## 2021-02-15 RX ORDER — TRIAMCINOLONE ACETONIDE 40 MG/ML
40 INJECTION, SUSPENSION INTRA-ARTICULAR; INTRAMUSCULAR ONCE
Status: COMPLETED | OUTPATIENT
Start: 2021-02-15 | End: 2021-02-15

## 2021-02-15 RX ADMIN — TRIAMCINOLONE ACETONIDE 40 MG: 40 INJECTION, SUSPENSION INTRA-ARTICULAR; INTRAMUSCULAR at 12:20

## 2021-02-15 ASSESSMENT — MIFFLIN-ST. JEOR: SCORE: 1153.26

## 2021-02-15 NOTE — PROGRESS NOTES
"  Grace Camargo is a 75 year old patient who presents to clinic for evaluation.  Has had hip and buttock pain for a long time.  Was previously seen by ortho and had imaging that showed arthritis.  Also received bursa injection a few years ago that was helpful.  She has right lateral hip pain that is worse when walking and is limiting her.  She is interested in a repeat injection    She followed up with Keeley regarding her gammopathy and recently underwent biopsy which may indicate plasma cell dyscrasia.  She will be seeing him again in follow up soon.  She did not have any symptoms or other findings of myeloma.    Review of Systems   Constitutional, HEENT, cardiovascular, pulmonary, gi and gu systems are negative, except as otherwise noted.      Objective    /68   Pulse 89   Temp 97.8  F (36.6  C) (Skin)   Resp 16   Ht 1.619 m (5' 3.75\")   Wt 67.2 kg (148 lb 3.2 oz)   LMP 12/04/1989   SpO2 97%   BMI 25.64 kg/m      General: Well appearing, NAD  MSK: Right hip without swelling or redness.  Slight decrease in IR.  Mild tenderness over greater trochanter.  Neg FADDIR and JANN.    Psych: normal mood and affect    PROCEDURE:    The potential risks of the procedure were discussed and understood.  Verbal consent was obtained.  All questions were answered to the best of my ability. With the patient in the lateral recumbent position with painful side up, the point of maximal tenderness was palpated.  The area was cleaned with alcohol. Topical anesthesia with Gebauer's eythl chloride was used.  Using a 27 gauge 1.25 inch needle, the needle was inserted perpendicular to the skin down to bone.  It was withdrawn 2-3 mm and a total of 5 cc of 1% lidocaine and 40 mg of Kenalog were injected without resistance.  There was a good lidocaine response.  The patient tolerated the procedure well.  There were no immediate complications. Anticipatory guidance regarding the injection was given and understood. Red " flags were discussed.  All questions were answered when able.      Assessment & Plan     Trochanteric bursitis of right hip  Not severe but is limiting her ability to walk and be active.  Injection as above.  If residual symptoms remain consider ortho follow up    Hip pain, right    Moderate episode of recurrent major depressive disorder (H)  Controlled, doing well on citalopram    Plasma cell dyscrasia  Follow up with Dr Mina as planned      Review of external notes as documented elsewhere in note  No LOS data to display         See Patient Instructions    No follow-ups on file.    Levi Mcdonnell MD  [facility]

## 2021-02-19 ENCOUNTER — TRANSFERRED RECORDS (OUTPATIENT)
Dept: HEALTH INFORMATION MANAGEMENT | Facility: CLINIC | Age: 76
End: 2021-02-19

## 2021-02-19 ENCOUNTER — TRANSFERRED RECORDS (OUTPATIENT)
Dept: FAMILY MEDICINE | Facility: CLINIC | Age: 76
End: 2021-02-19

## 2021-02-24 ENCOUNTER — TRANSCRIBE ORDERS (OUTPATIENT)
Dept: OTHER | Age: 76
End: 2021-02-24

## 2021-02-24 DIAGNOSIS — D47.2 SMOLDERING MULTIPLE MYELOMA: Primary | ICD-10-CM

## 2021-02-25 ENCOUNTER — PATIENT OUTREACH (OUTPATIENT)
Dept: ONCOLOGY | Facility: CLINIC | Age: 76
End: 2021-02-25

## 2021-02-26 ENCOUNTER — TELEPHONE (OUTPATIENT)
Dept: ONCOLOGY | Facility: CLINIC | Age: 76
End: 2021-02-26

## 2021-02-26 NOTE — PROGRESS NOTES
New Patient Oncology Nurse Navigator Note     Referring provider: Dr. Jose Garcia    Referring Clinic/Organization: [facility]      Referred to: Malignant Hematology    Requested provider (if applicable): Dr. 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 Pt confirmed with me that this is not a transplant consult referral. Pt voiced understanding of above instructions and information and denied further questions    Scheduling instructions: SCHEDULE per guidelines for 2nd opinion multiple myeloma at [facility]     Zayra Munoz, RN, BSN, OCN  Hematology/Oncology Nurse Navigator   [facility]  [phone]

## 2021-02-26 NOTE — TELEPHONE ENCOUNTER
The patient called and then said she didn't have time to schedule the appointment so she said she will call back to schedule the appointment.

## 2021-03-01 LAB — COPATH REPORT: NORMAL

## 2021-03-02 NOTE — TELEPHONE ENCOUNTER
Action    Action Taken 3/2/21:    -Spoke w/ [facility] -  Pt had some imaging @ [employer]. I was advised pt has something scheduled 3/22 w/ Dr. Mccall. I was advised we had all recs up until that point in Epic    -Spoke w/ [facility] - they will push imaging, and fax reports shortly.  12:33 PM    3/3/21:    -Imaging resolved to PACS    -Reports sent to HIM for scanning.  10:42 AM    3/18/21:    -Spoke w/ Annmarie @ [facility], I was advised no future appt scheduled now.  10:06 AM       RECORDS STATUS - ALL OTHER DIAGNOSIS      RECORDS RECEIVED FROM: [facility], [facility], [facility]   DATE RECEIVED:    NOTES STATUS DETAILS   OFFICE NOTE from referring provider     OFFICE NOTE from medical oncologist Upcoming appt 3/22 @ [facility] Dr. Mccall   DISCHARGE SUMMARY from hospital     DISCHARGE REPORT from the ER     OPERATIVE REPORT [facility] 2/8/21: BMB   MEDICATION LIST [facility]    CLINICAL TRIAL TREATMENTS TO DATE     LABS     PATHOLOGY REPORTS [facility] 2/8/21: BMB, FISH   ANYTHING RELATED TO DIAGNOSIS Epic 2/8/21   GENONOMIC TESTING     TYPE:     IMAGING (NEED IMAGES & REPORT)     CT SCANS PACS 8/6/19: [facility], Report in [facility]   MRI PACS 6/29/20: [facility], Report sent to HIM 3/3   MAMMO     ULTRASOUND     PET PACS 1/6/21: [facility], Report sent to HIM 3/3

## 2021-03-02 NOTE — TELEPHONE ENCOUNTER
Oncology/Surgical Oncology Referral Request:     Specialty Requested: Medical Oncology     Referring Provider: Jose Polanco    Referring Clinic/Organization: [facility]    Records location: [facility]     Requested Provider (if specified): Marely eKita MD

## 2021-03-23 NOTE — PROGRESS NOTES
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 She is aware of this from prior discussion with Dr. Mina. We discussed that her treatment options include rafael/dex, rafael alone, or observation. I explained that if she is interested in treatment that I recommend rafael 25 mg every day according to the below referenced study. I did let her know that while this regimen has been associated with PFS of >95% at 3 years, than patients on observation alone had PFS of 66% at 3 years.  I also explained that possible side effects include infections, low blood counts, blood clots, and secondary cancers. For ppx of blood clots she will take  mg every day.  She will let me know if she notices any new or altered skin lesions. I will check her CBC, CMP, FLC, and SPEP prior to the first cycle as well as Vitamin D.    I will see her April 7 to assess for side effects.     Benedict S, Danny S, Dominick R, Janessa M, Rigoberto S, John ALMENDAREZ, Luis JL, Mukesh AM, Deborah FK, Milan T, Yfn SENV, Artem DM, Bhavani RV, Crys A, Warren DE LEON, Gabby MV, Moody SV. Randomized Trial of Lenalidomide Versus Observation in Smoldering Multiple Myeloma. J Clin Oncol. 2020 Apr 10;38(11):0437-1372. doi: 10.1200/JCO.19.67550. Epub 2019 Oct 25. PMID: 96928858; PMCID: SCJ0306264.    Ale Hanks MD PhD    More than 50% of this 45 min visit was spent in counseling the patient.              History of Present Illness:   This patient is a 74 yo woman with IgG Kappa SM previously seen by Dr. Mina [facility]. This was found in Nov 2020 on routine blood work which showed elevated globulin level and M spike of 2.8. At that time her her Cr was 1.2, calcium WNL, and Hgb of 11.7. Serum kappa FLC was elevated at 3.3. PET/CT 1/6/2021 without disease. She underwent BM bx on 2/8/2021 showing 24% plasma cells with gains of chromosomes 5,9, and  "15.    Mary Jo is here today to switch providers. She is feeling well overall but has recently had a flare of eczema on her buttocks after wearing panty hose. She is applying lotion for this. She also feels her complexion is 'gray' in the mornings. No bone pains, swollen glands, fevers, chills, or nightsweats. She remains active in volunteer work.           Past Medical History:   Depression  Hip arthritis  Anemia- remote         Past Surgical History:    tonsillectomy   Fall 1.5 years ago on concrete         Social History:     No tobacco use  Lives in [facility]         Family History:     Father- MI  Brother - lung ca  Sister- ovarian cancer-survived           Review of Systems:     A comprehensive ROS was performed and is negative except as noted above         Physical Exam:   /72   Pulse 92   Temp 98.8  F (37.1  C) (Oral)   Resp 16   Ht 1.619 m (5' 3.75\")   Wt 67 kg (147 lb 12.8 oz)   LMP 12/04/1989   SpO2 98%   BMI 25.57 kg/m    Constitutional: NAD  Eyes: no scleral icterus  ENT: no oral lesions  Lymph: no cervical, supraclavicular, axillary LAD  Pulm: CTAB  CV: RRR, no m/r/g  GI: bowel sounds present, soft and nontender to palpation  MSK: No edema in the extremities  Neuro: alert, conversant, normal gait  Psych: appropriate mood and affect        "

## 2021-03-24 ENCOUNTER — PRE VISIT (OUTPATIENT)
Dept: ONCOLOGY | Facility: CLINIC | Age: 76
End: 2021-03-24

## 2021-03-24 ENCOUNTER — ONCOLOGY VISIT (OUTPATIENT)
Dept: ONCOLOGY | Facility: CLINIC | Age: 76
End: 2021-03-24
Attending: INTERNAL MEDICINE
Payer: COMMERCIAL

## 2021-03-24 VITALS
RESPIRATION RATE: 16 BRPM | SYSTOLIC BLOOD PRESSURE: 111 MMHG | WEIGHT: 147.8 LBS | HEIGHT: 64 IN | TEMPERATURE: 98.8 F | DIASTOLIC BLOOD PRESSURE: 72 MMHG | OXYGEN SATURATION: 98 % | BODY MASS INDEX: 25.23 KG/M2 | HEART RATE: 92 BPM

## 2021-03-24 DIAGNOSIS — D47.2 SMOLDERING MULTIPLE MYELOMA: Primary | ICD-10-CM

## 2021-03-24 PROCEDURE — 99204 OFFICE O/P NEW MOD 45 MIN: CPT | Performed by: STUDENT IN AN ORGANIZED HEALTH CARE EDUCATION/TRAINING PROGRAM

## 2021-03-24 PROCEDURE — G0463 HOSPITAL OUTPT CLINIC VISIT: HCPCS

## 2021-03-24 RX ORDER — MINOCYCLINE HYDROCHLORIDE 50 MG/1
CAPSULE ORAL
COMMUNITY
Start: 2021-03-12 | End: 2022-05-28

## 2021-03-24 ASSESSMENT — MIFFLIN-ST. JEOR: SCORE: 1151.45

## 2021-03-24 ASSESSMENT — PAIN SCALES - GENERAL: PAINLEVEL: NO PAIN (0)

## 2021-03-24 NOTE — LETTER
Date:November 13, 2021      Provider requested that no letter be sent. Do not send.       [facility]

## 2021-03-24 NOTE — NURSING NOTE
"Oncology Rooming Note    March 24, 2021 1:04 PM   Mary Jo Mcleod is a 75 year old female who presents for:    Chief Complaint   Patient presents with     New Patient     smoldering multiple myeloma      Initial Vitals: /72   Pulse 92   Temp 98.8  F (37.1  C) (Oral)   Resp 16   Ht 1.619 m (5' 3.75\")   Wt 67 kg (147 lb 12.8 oz)   LMP 12/04/1989   SpO2 98%   BMI 25.57 kg/m   Estimated body mass index is 25.57 kg/m  as calculated from the following:    Height as of this encounter: 1.619 m (5' 3.75\").    Weight as of this encounter: 67 kg (147 lb 12.8 oz). Body surface area is 1.74 meters squared.  No Pain (0) Comment: Data Unavailable   Patient's last menstrual period was 12/04/1989.  Allergies reviewed: Yes  Medications reviewed: Yes    Medications: Medication refills not needed today.  Pharmacy name entered into [facility]:    [id number] [place] [facility] [address]  [id number] [place] [facility] [address] [facility] [address] [place] [address]    Clinical concerns: New pt.  Dr Hanks  was NOT notified.      Lisbeth Ochoa            "

## 2021-03-24 NOTE — LETTER
3/24/2021         RE: Mary Jo Mcleod  [address]  [facility] [address]        Dear Colleague,    Thank you for referring your patient, Mary Jo Mcleod, to the [facility]. Please see a copy of my visit note below.      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 She is aware of this from prior discussion with Dr. Mina. We discussed that her treatment options include rafael/dex, rafael alone, or observation. I explained that if she is interested in treatment that I recommend rafael 25 mg every day according to the below referenced study. I did let her know that while this regimen has been associated with PFS of >95% at 3 years, than patients on observation alone had PFS of 66% at 3 years.  I also explained that possible side effects include infections, low blood counts, blood clots, and secondary cancers. For ppx of blood clots she will take  mg every day.  She will let me know if she notices any new or altered skin lesions. I will check her CBC, CMP, FLC, and SPEP prior to the first cycle as well as Vitamin D.    I will see her April 7 to assess for side effects.     Benedict S, Danny S, Dominick R, Riddle M, Rigoberto S, John BERTRANDZ, Luis JL, Mukesh AM, Deborah FK, [place] T, Yfn JV, Artem DM, Bhavani RV, Crys A, Warren LI, Gabby MV, Moody SV. Randomized Trial of Lenalidomide Versus Observation in Smoldering Multiple Myeloma. J Clin Oncol. 2020 Apr 10;38(11):8343-5764. doi: 10.1200/JCO.19.25961. Epub 2019 Oct 25. PMID: 98304032; PMCID: LPJ6770237.    Ale Hanks MD PhD    More than 50% of this 45 min visit was spent in counseling the patient.              History of Present Illness:   This patient is a 76 yo woman with IgG Kappa SM previously seen by [facility]. [facility]ina [facility]. This was found in Nov 2020 on routine blood work which showed elevated  "globulin level and M spike of 2.8. At that time her her Cr was 1.2, calcium WNL, and Hgb of 11.7. Serum kappa FLC was elevated at 3.3. PET/CT 1/6/2021 without disease. She underwent BM bx on 2/8/2021 showing 24% plasma cells with gains of chromosomes 5,9, and 15.    Mary Jo is here today to switch providers. She is feeling well overall but has recently had a flare of eczema on her buttocks after wearing panty hose. She is applying lotion for this. She also feels her complexion is 'gray' in the mornings. No bone pains, swollen glands, fevers, chills, or nightsweats. She remains active in volunteer work.           Past Medical History:   Depression  Hip arthritis  Anemia- remote         Past Surgical History:    tonsillectomy   Fall 1.5 years ago on concrete         Social History:     No tobacco use  Lives in [facility]         Family History:     Father- MI  Brother - lung ca  Sister- ovarian cancer-survived           Review of Systems:     A comprehensive ROS was performed and is negative except as noted above         Physical Exam:   /72   Pulse 92   Temp 98.8  F (37.1  C) (Oral)   Resp 16   Ht 1.619 m (5' 3.75\")   Wt 67 kg (147 lb 12.8 oz)   LMP 12/04/1989   SpO2 98%   BMI 25.57 kg/m    Constitutional: NAD  Eyes: no scleral icterus  ENT: no oral lesions  Lymph: no cervical, supraclavicular, axillary LAD  Pulm: CTAB  CV: RRR, no m/r/g  GI: bowel sounds present, soft and nontender to palpation  MSK: No edema in the extremities  Neuro: alert, conversant, normal gait  Psych: appropriate mood and affect            Again, thank you for allowing me to participate in the care of your patient.        Sincerely,        Ale Hanks MD    "

## 2021-03-25 ENCOUNTER — PATIENT OUTREACH (OUTPATIENT)
Dept: ONCOLOGY | Facility: CLINIC | Age: 76
End: 2021-03-25

## 2021-03-25 NOTE — PROGRESS NOTES
Oncology RN Care Coordination Note:     Outgoing Call:  This writer called Mary Jo to introduce myself as RNCC.  Writer explained my role briefly and asked what questions she has about Montyt, she asked if we could discuss this tomorrow?      This writer will call patient tomorrow.     Kelly Guadarrama, RN BSN   [facility]  Nurse Coordinator

## 2021-03-26 ENCOUNTER — TELEPHONE (OUTPATIENT)
Dept: ONCOLOGY | Facility: CLINIC | Age: 76
End: 2021-03-26

## 2021-03-26 NOTE — TELEPHONE ENCOUNTER
Tammy Moss CPhT  [facility]  Oncology Pharmacy Liaison   [email or web address]  [phone] (phone  [phone] (fax

## 2021-03-26 NOTE — PROGRESS NOTES
Oncology RN Care Coordination Note:     Outgoing Call:  This writer attempted to reach this patient to discuss her questions about MyChart, but she was in the car, requested a call back at a later date.  We will touch base on Monday or next week.    Kelly Guadarrama, RN BSN   [facility]  Nurse Coordinator

## 2021-03-26 NOTE — TELEPHONE ENCOUNTER
BIN:  [id number]  ID:  [id number]  PCN:  [id number]  GROUP: [id number]     Effective Dates:  3/26/2021-03-26/2022  lookback to 9/27/20    [address] [place] [address] Infusion Pharmacy  Oncology Pharmacy [email or web address]  [phone] (phone  [phone] (fax

## 2021-03-26 NOTE — TELEPHONE ENCOUNTER
Tammy Moss CPhT  [facility]  Oncology Pharmacy Liaison   [email or web address]  [phone] (phone  [phone] (fax

## 2021-03-27 DIAGNOSIS — F41.1 GAD (GENERALIZED ANXIETY DISORDER): ICD-10-CM

## 2021-03-27 DIAGNOSIS — F33.1 MODERATE EPISODE OF RECURRENT MAJOR DEPRESSIVE DISORDER (H): ICD-10-CM

## 2021-03-27 DIAGNOSIS — F43.10 PTSD (POST-TRAUMATIC STRESS DISORDER): ICD-10-CM

## 2021-03-28 RX ORDER — CITALOPRAM HYDROBROMIDE 40 MG/1
TABLET ORAL
Qty: 90 TABLET | Refills: 0 | Status: SHIPPED | OUTPATIENT
Start: 2021-03-28 | End: 2021-06-10

## 2021-03-30 ENCOUNTER — TELEPHONE (OUTPATIENT)
Dept: ONCOLOGY | Facility: CLINIC | Age: 76
End: 2021-03-30

## 2021-03-30 DIAGNOSIS — D47.2 SMOLDERING MULTIPLE MYELOMA: Primary | ICD-10-CM

## 2021-03-30 NOTE — ORAL ONC MGMT
Oral Chemotherapy Monitoring Program     Placed call to patient for new teach of oral chemotherapy. Left message requesting call back. No drug names were mentioned. Will update when response received.     My Hanna, PharmD, BCOP  Hematology/Oncology Clinical Pharmacist  [facility]  [facility]

## 2021-03-31 ENCOUNTER — INFUSION THERAPY VISIT (OUTPATIENT)
Dept: INFUSION THERAPY | Facility: CLINIC | Age: 76
End: 2021-03-31
Attending: STUDENT IN AN ORGANIZED HEALTH CARE EDUCATION/TRAINING PROGRAM
Payer: COMMERCIAL

## 2021-03-31 ENCOUNTER — HOSPITAL ENCOUNTER (OUTPATIENT)
Facility: CLINIC | Age: 76
Setting detail: SPECIMEN
Discharge: HOME | End: 2021-03-31
Attending: STUDENT IN AN ORGANIZED HEALTH CARE EDUCATION/TRAINING PROGRAM | Admitting: STUDENT IN AN ORGANIZED HEALTH CARE EDUCATION/TRAINING PROGRAM
Payer: COMMERCIAL

## 2021-03-31 DIAGNOSIS — D47.2 SMOLDERING MULTIPLE MYELOMA: ICD-10-CM

## 2021-03-31 LAB
ALBUMIN SERPL-MCNC: 3.1 G/DL (ref 3.4–5)
ALP SERPL-CCNC: 67 U/L (ref 40–150)
ALT SERPL W P-5'-P-CCNC: 23 U/L (ref 0–50)
ANION GAP SERPL CALCULATED.3IONS-SCNC: <1 MMOL/L (ref 3–14)
AST SERPL W P-5'-P-CCNC: 22 U/L (ref 0–45)
BASOPHILS # BLD AUTO: 0.1 10E9/L (ref 0–0.2)
BASOPHILS NFR BLD AUTO: 1.2 %
BILIRUB SERPL-MCNC: 0.3 MG/DL (ref 0.2–1.3)
BUN SERPL-MCNC: 13 MG/DL (ref 7–30)
CALCIUM SERPL-MCNC: 8.9 MG/DL (ref 8.5–10.1)
CHLORIDE SERPL-SCNC: 104 MMOL/L (ref 94–109)
CO2 SERPL-SCNC: 30 MMOL/L (ref 20–32)
CREAT SERPL-MCNC: 0.89 MG/DL (ref 0.52–1.04)
DEPRECATED CALCIDIOL+CALCIFEROL SERPL-MC: 64 UG/L (ref 20–75)
DIFFERENTIAL METHOD BLD: ABNORMAL
EOSINOPHIL # BLD AUTO: 0.2 10E9/L (ref 0–0.7)
EOSINOPHIL NFR BLD AUTO: 3.6 %
ERYTHROCYTE [DISTWIDTH] IN BLOOD BY AUTOMATED COUNT: 13.5 % (ref 10–15)
GFR SERPL CREATININE-BSD FRML MDRD: 63 ML/MIN/{1.73_M2}
GLUCOSE SERPL-MCNC: 102 MG/DL (ref 70–99)
HCT VFR BLD AUTO: 35.9 % (ref 35–47)
HGB BLD-MCNC: 11.7 G/DL (ref 11.7–15.7)
IMM GRANULOCYTES # BLD: 0 10E9/L (ref 0–0.4)
IMM GRANULOCYTES NFR BLD: 0.5 %
LYMPHOCYTES # BLD AUTO: 1.7 10E9/L (ref 0.8–5.3)
LYMPHOCYTES NFR BLD AUTO: 30.3 %
MCH RBC QN AUTO: 32.3 PG (ref 26.5–33)
MCHC RBC AUTO-ENTMCNC: 32.6 G/DL (ref 31.5–36.5)
MCV RBC AUTO: 99 FL (ref 78–100)
MONOCYTES # BLD AUTO: 0.7 10E9/L (ref 0–1.3)
MONOCYTES NFR BLD AUTO: 11.8 %
NEUTROPHILS # BLD AUTO: 3 10E9/L (ref 1.6–8.3)
NEUTROPHILS NFR BLD AUTO: 52.6 %
NRBC # BLD AUTO: 0 10*3/UL
NRBC BLD AUTO-RTO: 0 /100
PLATELET # BLD AUTO: 273 10E9/L (ref 150–450)
POTASSIUM SERPL-SCNC: 4.2 MMOL/L (ref 3.4–5.3)
PROT SERPL-MCNC: 9 G/DL (ref 6.8–8.8)
RBC # BLD AUTO: 3.62 10E12/L (ref 3.8–5.2)
SODIUM SERPL-SCNC: 134 MMOL/L (ref 133–144)
WBC # BLD AUTO: 5.6 10E9/L (ref 4–11)

## 2021-03-31 PROCEDURE — 36415 COLL VENOUS BLD VENIPUNCTURE: CPT

## 2021-03-31 PROCEDURE — 86334 IMMUNOFIX E-PHORESIS SERUM: CPT | Mod: TC | Performed by: STUDENT IN AN ORGANIZED HEALTH CARE EDUCATION/TRAINING PROGRAM

## 2021-03-31 PROCEDURE — 85025 COMPLETE CBC W/AUTO DIFF WBC: CPT | Performed by: STUDENT IN AN ORGANIZED HEALTH CARE EDUCATION/TRAINING PROGRAM

## 2021-03-31 PROCEDURE — 82306 VITAMIN D 25 HYDROXY: CPT | Mod: GZ | Performed by: STUDENT IN AN ORGANIZED HEALTH CARE EDUCATION/TRAINING PROGRAM

## 2021-03-31 PROCEDURE — 86334 IMMUNOFIX E-PHORESIS SERUM: CPT | Mod: 26 | Performed by: PATHOLOGY

## 2021-03-31 PROCEDURE — 82784 ASSAY IGA/IGD/IGG/IGM EACH: CPT | Performed by: STUDENT IN AN ORGANIZED HEALTH CARE EDUCATION/TRAINING PROGRAM

## 2021-03-31 PROCEDURE — 80053 COMPREHEN METABOLIC PANEL: CPT | Performed by: STUDENT IN AN ORGANIZED HEALTH CARE EDUCATION/TRAINING PROGRAM

## 2021-03-31 NOTE — PROGRESS NOTES
Medical Assistant Note:  Mary Jo Mcleod presents today for lab draw.    Patient seen by provider today: No.   present during visit today: Not Applicable.    Concerns: No Concerns.    Procedure:  Lab draw site: LAC, Needle type: BF, Gauge: 21. Gauze and coban applied    Post Assessment:  Labs drawn without difficulty: Yes.    Discharge Plan:  Departure Mode: Ambulatory.    Face to Face Time: 5.    Jamee Robles CMA

## 2021-04-02 ENCOUNTER — TELEPHONE (OUTPATIENT)
Dept: ONCOLOGY | Facility: CLINIC | Age: 76
End: 2021-04-02

## 2021-04-02 LAB
IGA SERPL-MCNC: 35 MG/DL (ref 84–499)
IGG SERPL-MCNC: 3465 MG/DL (ref 610–1616)
IGM SERPL-MCNC: 40 MG/DL (ref 35–242)
PROT PATTERN SERPL IFE-IMP: ABNORMAL

## 2021-04-02 RX ORDER — LENALIDOMIDE 10 MG/1
10 CAPSULE ORAL DAILY
Qty: 21 CAPSULE | Refills: 0 | Status: SHIPPED | OUTPATIENT
Start: 2021-04-02 | End: 2021-09-28

## 2021-04-02 NOTE — TELEPHONE ENCOUNTER
Oral Chemotherapy Monitoring Program   Medication: Revlimid  Rx: 10mg PO daily on days 1 through 21 of 28 day cycle   Adult Female Not of Reproductive Potential Auth#[id number]  Routine survey questions reviewed.   [facility]  Oncology Pharmacy Liaison   [email or web address]  [phone] (phone  [phone] (fax

## 2021-04-02 NOTE — ORAL ONC MGMT
"Oral Chemotherapy Monitoring Program    Lab Monitoring Plan  CBC weekly x8, then q2w x2, then monthly - CMP monthly  Labs drawn outside of [facility]: No, will get at [facility] [place] or [place]  Subjective/Objective:  Mary Jo Mcleod is a 75 year old female contacted by phone for an initial visit for oral chemotherapy education.     ORAL CHEMOTHERAPY 3/30/2021 4/2/2021   Assessment Type Other New Teach   Diagnosis Code Other -   Other Smoldering myeloma Smoldering myeloma   Providers Dr. Demario Hanks   [facility]   Drug Name Revlimid (lenalidomide) Revlimid (lenalidomide)   Dose 25 mg 10 mg   Current Schedule Daily Daily   Cycle Details 3 weeks on, 1 week off 3 weeks on, 1 week off       Last PHQ-2 Score on record:   PHQ-2 ( 1999 [employer]izer) 11/4/2020 12/4/2018   Q1: Little interest or pleasure in doing things 1 3   Q2: Feeling down, depressed or hopeless 1 3   PHQ-2 Score 2 6       Vitals:  BP:   BP Readings from Last 1 Encounters:   03/24/21 111/72     Wt Readings from Last 1 Encounters:   03/24/21 67 kg (147 lb 12.8 oz)     Estimated body surface area is 1.74 meters squared as calculated from the following:    Height as of 3/24/21: 1.619 m (5' 3.75\").    Weight as of 3/24/21: 67 kg (147 lb 12.8 oz).    Labs:  _  Result Component Current Result Ref Range   Sodium 134 (3/31/2021) 133 - 144 mmol/L     _  Result Component Current Result Ref Range   Potassium 4.2 (3/31/2021) 3.4 - 5.3 mmol/L     _  Result Component Current Result Ref Range   Calcium 8.9 (3/31/2021) 8.5 - 10.1 mg/dL     No results found for Mag within last 30 days.     No results found for Phos within last 30 days.     _  Result Component Current Result Ref Range   Albumin 3.1 (L) (3/31/2021) 3.4 - 5.0 g/dL     _  Result Component Current Result Ref Range   Urea Nitrogen 13 (3/31/2021) 7 - 30 mg/dL     _  Result Component Current Result Ref Range   Creatinine 0.89 (3/31/2021) 0.52 - 1.04 mg/dL     _  Result Component Current Result " Ref Range   AST 22 (3/31/2021) 0 - 45 U/L     _  Result Component Current Result Ref Range   ALT 23 (3/31/2021) 0 - 50 U/L     _  Result Component Current Result Ref Range   Bilirubin Total 0.3 (3/31/2021) 0.2 - 1.3 mg/dL     _  Result Component Current Result Ref Range   WBC 5.6 (3/31/2021) 4.0 - 11.0 10e9/L     _  Result Component Current Result Ref Range   Hemoglobin 11.7 (3/31/2021) 11.7 - 15.7 g/dL     _  Result Component Current Result Ref Range   Platelet Count 273 (3/31/2021) 150 - 450 10e9/L     _  Result Component Current Result Ref Range   Absolute Neutrophil 3.0 (3/31/2021) 1.6 - 8.3 10e9/L       Assessment:  Patient is appropriate to start therapy.    Plan:  Basic chemotherapy teaching was reviewed with the patient including indication, start date of therapy, dose, administration, adverse effects, missed doses, food and drug interactions, monitoring, side effect management, office contact information, and safe handling. Written materials were provided and all questions answered.    Follow-Up:  One week after starting therapy to assess tolerance and labs.     My Hanna, PharmD, BCOP  Hematology/Oncology Clinical Pharmacist  [facility]  [facility]  [phone]

## 2021-04-06 DIAGNOSIS — D47.2 SMOLDERING MULTIPLE MYELOMA: Primary | ICD-10-CM

## 2021-04-23 ENCOUNTER — OFFICE VISIT (OUTPATIENT)
Dept: FAMILY MEDICINE | Facility: CLINIC | Age: 76
End: 2021-04-23

## 2021-04-23 VITALS
OXYGEN SATURATION: 98 % | DIASTOLIC BLOOD PRESSURE: 70 MMHG | TEMPERATURE: 97.8 F | BODY MASS INDEX: 25.5 KG/M2 | SYSTOLIC BLOOD PRESSURE: 128 MMHG | HEART RATE: 71 BPM | WEIGHT: 147.4 LBS

## 2021-04-23 DIAGNOSIS — M54.41 ACUTE RIGHT-SIDED LOW BACK PAIN WITH RIGHT-SIDED SCIATICA: Primary | ICD-10-CM

## 2021-04-23 PROCEDURE — 99213 OFFICE O/P EST LOW 20 MIN: CPT | Performed by: FAMILY MEDICINE

## 2021-04-23 NOTE — PATIENT INSTRUCTIONS
Take Naproxen 220 mg twice daily with food for 3-5 days    Take Acetaminophen as we discussed    Gentle daily stretching and heat or ice, depending on what feels better to you    Please let me know if pain is not gradually improving over the next couple weeks of if it worsens  Patient Education     Understanding Lumbar Radiculopathy    Lumbar radiculopathy is irritation or inflammation of a nerve root in the low back. It causes symptoms that spread out from the back down one or both legs. To understand this condition, it helps to understand the parts of the spine:    Vertebrae. These are bones that stack to form the spine. The lumbar spine contains 5 vertebrae near the bottom of your spine.    Disks. These are soft pads of tissue between the vertebrae. They act as shock absorbers for the spine.    Spinal canal. This is a tunnel formed within the stacked vertebrae. In the lumbar spine, nerves run through this canal.    Nerves. These branch off and leave the spinal canal, traveling out to parts of the body. As they leave the spinal canal, nerves pass through openings between the vertebrae. The nerve root is the part of the nerve that is closest to the spinal canal.    Sciatic nerve. This is a large nerve formed from several nerve roots in the low back. This nerve extends down the back of the leg to the foot.  With lumbar radiculopathy, nerve roots in the low back become irritated. This leads to pain and symptoms. The sciatic nerve is commonly involved, so the condition is often called sciatica.  What causes lumbar radiculopathy?  Aging, injury, poor posture, extra body weight, and other issues can lead to problems in the low back. These problems may then irritate nerve roots. They include:    Damage to a disk in the lumbar spine. The damaged disk may then press on nearby nerve roots.    Degeneration from wear and tear, and aging. This can lead to narrowing (stenosis) of the openings between the vertebrae. The narrowed  openings press on nerve roots as they leave the spinal canal.    Unstable spine. This is when a vertebra slips forward. It can then press on a nerve root.  Other, less common things can put pressure on nerves in the low back. These include diabetes, infection, or a tumor.  Symptoms of lumbar radiculopathy  These include:    Pain in the low back    Pain, numbness, tingling, or weakness that travels into the buttocks, hip, groin, or leg    Muscle spasms in the low back, or leg  Treatment for lumbar radiculopathy  In most cases, your healthcare provider will first try treatments that help relieve symptoms. These may include:    Prescription and over-the-counter pain medicines. These help relieve pain, swelling, and irritation.    Limits on positions and activities that increase pain. But lying in bed or avoiding all movement is only recommended for a short period of time.    Physical therapy, including exercises and stretches. This helps decrease pain and increase movement and function.    Steroid shots into the lower back. This may help relieve symptoms for a time.    Weight-loss program. If you are overweight, losing extra pounds (kilograms) may help relieve symptoms.  In some cases, you may need surgery to fix the underlying problem. This depends on the cause, the symptoms, and how long the pain has lasted.  Possible complications  Over time, an irritated and inflamed nerve may become damaged. This may lead to long-lasting (permanent) numbness or weakness in your legs and feet. If symptoms change suddenly or get worse, be sure to let your healthcare provider know.  When to call your healthcare provider  Call your healthcare provider right away if you have any of these:    New pain or pain that gets worse    New or increasing weakness, tingling, or numbness in your leg or foot    Problems controlling your bladder or bowel  Jamey last reviewed this educational content on 2/1/2020 2000-2021 [employer]  [employer]. All rights reserved. This information is not intended as a substitute for professional medical care. Always follow your healthcare professional's instructions.

## 2021-04-23 NOTE — PROGRESS NOTES
Grace Camargo is a 75 year old patient who presents to clinic for evaluation.  Developed right low back and buttock pain a few days ago after overdoing it.  Using acetaminophen with mild improvement.   Some pain radiating into upper posterior leg.  No urinary symptoms or hematuria.  No fever or chills.  Denies fever, urinary incontinence of retention, stool changes or saddle anesthesia.      Review of Systems   Constitutional, HEENT, cardiovascular, pulmonary, GI, , musculoskeletal, neuro, skin, endocrine and psych systems are negative, except as otherwise noted.      Objective    /70   Pulse 71   Temp 97.8  F (36.6  C)   Wt 66.9 kg (147 lb 6.4 oz)   LMP 12/04/1989   SpO2 98%   BMI 25.50 kg/m      General: Well appearing, NAD  MSK: Gait is mildly antalgic  Sensory exam in the legs is normal.   Knee reflexes are normal and symmetric.  Ankle reflexes are normal and symmetric  Strength is normal and symmetric.  Mild paraspinal muscle spasm.  There is no midline tenderness.  ROM of spine limited in flexion, extension, lateral range of motion to the right and left, and rotation to the right and left due to pain  Straight leg raising negative bilaterally for radicular symptoms  Psych: normal mood and affect    Assessment & Plan     Acute right-sided low back pain with right-sided sciatica  History and exam consistent with likely mechanical low back pain with mild lumbar radiculopathy without red flags.  We discussed the natural history, pathophysiology and treatment options in detail.  The potential harms and benefits of Acetaminophen, NSAIDs, oral steroids, muscle relaxants were discussed.  Gentle stretching and modified rest with activity as tolerated encouraged.  Physical therapy, as well as potential imaging, including MRI, was discussed.  Imaging not indicated at this time.  Follow up if not improving or worsening.                     No follow-ups on file.    MD DALLAS SullivanFormerly [facility]  [facility]ICAL [facility]

## 2021-04-25 ENCOUNTER — DOCUMENTATION ONLY (OUTPATIENT)
Dept: ONCOLOGY | Facility: CLINIC | Age: 76
End: 2021-04-25

## 2021-04-25 NOTE — PROGRESS NOTES
ORAL CHEMOTHERAPY DISCONTINUATION       Primary Oncologist:  Dr. Hanks  Primary Oncology Clinic: [facility] Diagnosis:  Smoldering myeloma/plasma cell dyscosia  Therapy History:  Patient never started therapy  Additional Notes:  Derek Sanders  Pharmacy Intern  Oral Chemotherapy Monitoring Program  [facility]  [phone]

## 2021-06-10 DIAGNOSIS — F33.1 MODERATE EPISODE OF RECURRENT MAJOR DEPRESSIVE DISORDER (H): ICD-10-CM

## 2021-06-10 DIAGNOSIS — F41.1 GAD (GENERALIZED ANXIETY DISORDER): ICD-10-CM

## 2021-06-10 DIAGNOSIS — F43.10 PTSD (POST-TRAUMATIC STRESS DISORDER): ICD-10-CM

## 2021-06-10 RX ORDER — CITALOPRAM HYDROBROMIDE 40 MG/1
TABLET ORAL
Qty: 90 TABLET | Refills: 0 | Status: SHIPPED | OUTPATIENT
Start: 2021-06-10 | End: 2021-09-23

## 2021-06-22 NOTE — PROGRESS NOTES
Patient called and said that she is going to [facility] and will do PT there.  Order entered and sent to [facility]    Vahe Peace,   [facility]  [phone]

## 2021-06-23 DIAGNOSIS — D47.2 SMOLDERING MULTIPLE MYELOMA: ICD-10-CM

## 2021-06-23 LAB
ALBUMIN SERPL-MCNC: 3.3 G/DL (ref 3.4–5)
ALP SERPL-CCNC: 59 U/L (ref 40–150)
ALT SERPL W P-5'-P-CCNC: 31 U/L (ref 0–50)
ANION GAP SERPL CALCULATED.3IONS-SCNC: 3 MMOL/L (ref 3–14)
AST SERPL W P-5'-P-CCNC: 30 U/L (ref 0–45)
BASOPHILS # BLD AUTO: 0.1 10E9/L (ref 0–0.2)
BASOPHILS NFR BLD AUTO: 1.1 %
BILIRUB SERPL-MCNC: 0.4 MG/DL (ref 0.2–1.3)
BUN SERPL-MCNC: 18 MG/DL (ref 7–30)
CALCIUM SERPL-MCNC: 8.7 MG/DL (ref 8.5–10.1)
CHLORIDE SERPL-SCNC: 104 MMOL/L (ref 94–109)
CO2 SERPL-SCNC: 28 MMOL/L (ref 20–32)
CREAT SERPL-MCNC: 1.11 MG/DL (ref 0.52–1.04)
DIFFERENTIAL METHOD BLD: ABNORMAL
EOSINOPHIL # BLD AUTO: 0.1 10E9/L (ref 0–0.7)
EOSINOPHIL NFR BLD AUTO: 1.4 %
ERYTHROCYTE [DISTWIDTH] IN BLOOD BY AUTOMATED COUNT: 13.1 % (ref 10–15)
GFR SERPL CREATININE-BSD FRML MDRD: 49 ML/MIN/{1.73_M2}
GLUCOSE SERPL-MCNC: 100 MG/DL (ref 70–99)
HCT VFR BLD AUTO: 34.6 % (ref 35–47)
HGB BLD-MCNC: 11.3 G/DL (ref 11.7–15.7)
IMM GRANULOCYTES # BLD: 0 10E9/L (ref 0–0.4)
IMM GRANULOCYTES NFR BLD: 0.7 %
LYMPHOCYTES # BLD AUTO: 1.9 10E9/L (ref 0.8–5.3)
LYMPHOCYTES NFR BLD AUTO: 34.7 %
MCH RBC QN AUTO: 32.5 PG (ref 26.5–33)
MCHC RBC AUTO-ENTMCNC: 32.7 G/DL (ref 31.5–36.5)
MCV RBC AUTO: 99 FL (ref 78–100)
MONOCYTES # BLD AUTO: 0.7 10E9/L (ref 0–1.3)
MONOCYTES NFR BLD AUTO: 12.5 %
NEUTROPHILS # BLD AUTO: 2.8 10E9/L (ref 1.6–8.3)
NEUTROPHILS NFR BLD AUTO: 49.6 %
NRBC # BLD AUTO: 0 10*3/UL
NRBC BLD AUTO-RTO: 0 /100
PLATELET # BLD AUTO: 239 10E9/L (ref 150–450)
POTASSIUM SERPL-SCNC: 4.1 MMOL/L (ref 3.4–5.3)
PROT SERPL-MCNC: 10 G/DL (ref 6.8–8.8)
RBC # BLD AUTO: 3.48 10E12/L (ref 3.8–5.2)
SODIUM SERPL-SCNC: 135 MMOL/L (ref 133–144)
TSH SERPL DL<=0.005 MIU/L-ACNC: 0.87 MU/L (ref 0.4–4)
WBC # BLD AUTO: 5.5 10E9/L (ref 4–11)

## 2021-06-23 PROCEDURE — 82784 ASSAY IGA/IGD/IGG/IGM EACH: CPT | Performed by: STUDENT IN AN ORGANIZED HEALTH CARE EDUCATION/TRAINING PROGRAM

## 2021-06-23 PROCEDURE — 83883 ASSAY NEPHELOMETRY NOT SPEC: CPT | Performed by: STUDENT IN AN ORGANIZED HEALTH CARE EDUCATION/TRAINING PROGRAM

## 2021-06-23 PROCEDURE — 84165 PROTEIN E-PHORESIS SERUM: CPT | Mod: 26 | Performed by: PATHOLOGY

## 2021-06-23 PROCEDURE — 86334 IMMUNOFIX E-PHORESIS SERUM: CPT | Mod: 26 | Performed by: PATHOLOGY

## 2021-06-23 PROCEDURE — 84165 PROTEIN E-PHORESIS SERUM: CPT | Mod: TC | Performed by: STUDENT IN AN ORGANIZED HEALTH CARE EDUCATION/TRAINING PROGRAM

## 2021-06-23 PROCEDURE — 999N001036 HC STATISTIC TOTAL PROTEIN: Performed by: STUDENT IN AN ORGANIZED HEALTH CARE EDUCATION/TRAINING PROGRAM

## 2021-06-23 PROCEDURE — 85025 COMPLETE CBC W/AUTO DIFF WBC: CPT | Performed by: STUDENT IN AN ORGANIZED HEALTH CARE EDUCATION/TRAINING PROGRAM

## 2021-06-23 PROCEDURE — 86334 IMMUNOFIX E-PHORESIS SERUM: CPT | Mod: TC | Performed by: STUDENT IN AN ORGANIZED HEALTH CARE EDUCATION/TRAINING PROGRAM

## 2021-06-23 PROCEDURE — 84443 ASSAY THYROID STIM HORMONE: CPT | Mod: GZ | Performed by: STUDENT IN AN ORGANIZED HEALTH CARE EDUCATION/TRAINING PROGRAM

## 2021-06-23 PROCEDURE — 80053 COMPREHEN METABOLIC PANEL: CPT | Performed by: STUDENT IN AN ORGANIZED HEALTH CARE EDUCATION/TRAINING PROGRAM

## 2021-06-23 NOTE — NURSING NOTE
Chief Complaint   Patient presents with     Blood Draw     blood drawn via VPT by [place].      JOSE RAUL Candelaria LPN

## 2021-06-24 LAB
ALBUMIN SERPL ELPH-MCNC: 4.3 G/DL (ref 3.7–5.1)
ALPHA1 GLOB SERPL ELPH-MCNC: 0.3 G/DL (ref 0.2–0.4)
ALPHA2 GLOB SERPL ELPH-MCNC: 0.9 G/DL (ref 0.5–0.9)
B-GLOBULIN SERPL ELPH-MCNC: 0.7 G/DL (ref 0.6–1)
GAMMA GLOB SERPL ELPH-MCNC: 3.5 G/DL (ref 0.7–1.6)
IGA SERPL-MCNC: 32 MG/DL (ref 84–499)
IGG SERPL-MCNC: 4391 MG/DL (ref 610–1616)
IGM SERPL-MCNC: 36 MG/DL (ref 35–242)
KAPPA LC UR-MCNC: 5.31 MG/DL (ref 0.33–1.94)
KAPPA LC/LAMBDA SER: 7.81 {RATIO} (ref 0.26–1.65)
LAMBDA LC SERPL-MCNC: 0.68 MG/DL (ref 0.57–2.63)
M PROTEIN SERPL ELPH-MCNC: 2.8 G/DL
PROT PATTERN SERPL ELPH-IMP: ABNORMAL
PROT PATTERN SERPL IFE-IMP: ABNORMAL

## 2021-06-30 ENCOUNTER — ONCOLOGY VISIT (OUTPATIENT)
Dept: ONCOLOGY | Facility: CLINIC | Age: 76
End: 2021-06-30
Attending: STUDENT IN AN ORGANIZED HEALTH CARE EDUCATION/TRAINING PROGRAM
Payer: COMMERCIAL

## 2021-06-30 VITALS
HEART RATE: 90 BPM | DIASTOLIC BLOOD PRESSURE: 67 MMHG | BODY MASS INDEX: 25.11 KG/M2 | WEIGHT: 147.1 LBS | TEMPERATURE: 98.5 F | HEIGHT: 64 IN | OXYGEN SATURATION: 99 % | RESPIRATION RATE: 16 BRPM | SYSTOLIC BLOOD PRESSURE: 112 MMHG

## 2021-06-30 DIAGNOSIS — I49.9 CARDIAC ARRHYTHMIA, UNSPECIFIED CARDIAC ARRHYTHMIA TYPE: ICD-10-CM

## 2021-06-30 DIAGNOSIS — D47.2 SMOLDERING MULTIPLE MYELOMA: Primary | ICD-10-CM

## 2021-06-30 PROCEDURE — 99214 OFFICE O/P EST MOD 30 MIN: CPT | Performed by: STUDENT IN AN ORGANIZED HEALTH CARE EDUCATION/TRAINING PROGRAM

## 2021-06-30 PROCEDURE — G0463 HOSPITAL OUTPT CLINIC VISIT: HCPCS

## 2021-06-30 ASSESSMENT — PAIN SCALES - GENERAL: PAINLEVEL: NO PAIN (0)

## 2021-06-30 ASSESSMENT — MIFFLIN-ST. JEOR: SCORE: 1148.27

## 2021-06-30 NOTE — LETTER
"    6/30/2021         RE: Mary Jo Mcleod  [address]  [facility] [address]        Dear Colleague,    Thank you for referring your patient, Mary Jo Mcleod, to the [facility]. Please see a copy of my visit note below.      Oncologic Hx:   -IgG Kappa SM previously seen by Dr. Mina at [facility]. Dx in Nov 2020 on routine blood work which showed elevated globulin level and M spike of 2.8. At that time her her Cr was 1.2, calcium WNL, and Hgb of 11.7. Serum kappa FLC was elevated at 3.3. PET/CT 1/6/2021 without disease. She underwent BM bx on 2/8/2021 showing 24% plasma cells with gains of chromosomes 5,9, and 15.    - Offered rafael 25 mg every day but declined.       Interval Hx: Mary Jo has been dealing with some family issues related to her inheritance and subsequently has been having trouble sleeping. She denies any bone pains but does have some issues (long standing) with right leg weakness. She is seeing PT for this. She felt like she was having 'an arrythmia' when she woke up this morning and has alerted her PCP. She also notes that she hasn't been drinking as much water as she should when we discussed her slightly increased creatinine.       A comprehensive review of systems was completed and negative except as described above.     Physical Exam:   /67   Pulse 90   Temp 98.5  F (36.9  C) (Oral)   Resp 16   Ht 1.619 m (5' 3.75\")   Wt 66.7 kg (147 lb 1.6 oz)   LMP 12/04/1989   SpO2 99%   BMI 25.45 kg/m    Constitutional: NAD  Eyes: no scleral icterus  ENT: no oral lesions  Lymph: no cervical, supraclavicular, axillary LAD  Pulm: CTAB  CV: RRR, no m/r/g  GI: bowel sounds present, soft and nontender to palpation  MSK: No edema in the extremities  Neuro: alert, conversant, normal gait  Psych: appropriate mood and affect      Pertinent Data Summarized:  6/23/2021 Cr 1.1, Hgb 11.3, IgG 4391, FLC K 5.3, L 7.8 K/L 0.7, M spike 2.8      Assessment and Plan: Mary Jo " has smoldering myeloma. Her M spike is unchanged but her creatinine is slightly elevated today and IgG level has increased. She is not interested in treatment at this time and I think it reasonable to continue q 3 month surveillance with FLC, SPEP, and EDITA She will increase her fluid intake. I will also check UPEP and UA with her next labs. RTC in 3 months. Continue q 3 month evaluations until indication for myeloma treatment (anemia, worsening kidney fx with elevated urine M spike, hypercalcemia, K/L ratio >100).     No evidence of arrythmia on exam. TSH WNL. Will get Mg with next labs.     Ale Hanks MD PhD                Again, thank you for allowing me to participate in the care of your patient.        Sincerely,        Ale Hanks MD

## 2021-06-30 NOTE — PROGRESS NOTES
"  Oncologic Hx:   -IgG Kappa SM previously seen by Dr. Mina at [facility]. Dx in Nov 2020 on routine blood work which showed elevated globulin level and M spike of 2.8. At that time her her Cr was 1.2, calcium WNL, and Hgb of 11.7. Serum kappa FLC was elevated at 3.3. PET/CT 1/6/2021 without disease. She underwent BM bx on 2/8/2021 showing 24% plasma cells with gains of chromosomes 5,9, and 15.    - Offered rafael 25 mg every day but declined.       Interval Hx: Mary Jo has been dealing with some family issues related to her inheritance and subsequently has been having trouble sleeping. She denies any bone pains but does have some issues (long standing) with right leg weakness. She is seeing PT for this. She felt like she was having 'an arrythmia' when she woke up this morning and has alerted her PCP. She also notes that she hasn't been drinking as much water as she should when we discussed her slightly increased creatinine.       A comprehensive review of systems was completed and negative except as described above.     Physical Exam:   /67   Pulse 90   Temp 98.5  F (36.9  C) (Oral)   Resp 16   Ht 1.619 m (5' 3.75\")   Wt 66.7 kg (147 lb 1.6 oz)   LMP 12/04/1989   SpO2 99%   BMI 25.45 kg/m    Constitutional: NAD  Eyes: no scleral icterus  ENT: no oral lesions  Lymph: no cervical, supraclavicular, axillary LAD  Pulm: CTAB  CV: RRR, no m/r/g  GI: bowel sounds present, soft and nontender to palpation  MSK: No edema in the extremities  Neuro: alert, conversant, normal gait  Psych: appropriate mood and affect      Pertinent Data Summarized:  6/23/2021 Cr 1.1, Hgb 11.3, IgG 4391, FLC K 5.3, L 7.8 K/L 0.7, M spike 2.8      Assessment and Plan: Mary Jo has smoldering myeloma. Her M spike is unchanged but her creatinine is slightly elevated today and IgG level has increased. She is not interested in treatment at this time and I think it reasonable to continue q 3 month surveillance with FLC, SPEP, and EDITA She will " increase her fluid intake. I will also check UPEP and UA with her next labs. RTC in 3 months. Continue q 3 month evaluations until indication for myeloma treatment (anemia, worsening kidney fx with elevated urine M spike, hypercalcemia, K/L ratio >100).     No evidence of arrythmia on exam. TSH WNL. Will get Mg with next labs.     Ale Hanks MD PhD

## 2021-06-30 NOTE — NURSING NOTE
"Oncology Rooming Note    June 30, 2021 1:53 PM   Mary Jo Mcleod is a 75 year old female who presents for:    Chief Complaint   Patient presents with     Oncology Clinic Visit     myeloma      Initial Vitals: /67   Pulse 90   Temp 98.5  F (36.9  C) (Oral)   Resp 16   Ht 1.619 m (5' 3.75\")   Wt 66.7 kg (147 lb 1.6 oz)   LMP 12/04/1989   SpO2 99%   BMI 25.45 kg/m   Estimated body mass index is 25.45 kg/m  as calculated from the following:    Height as of this encounter: 1.619 m (5' 3.75\").    Weight as of this encounter: 66.7 kg (147 lb 1.6 oz). Body surface area is 1.73 meters squared.  No Pain (0) Comment: Data Unavailable   Patient's last menstrual period was 12/04/1989.  Allergies reviewed: Yes  Medications reviewed: Yes    Medications: Medication refills not needed today.  Pharmacy name entered into [facility]:    [id number] [place] [facility] [address]  [facility] [address] [place] [facility] [address] [facility] - [address] [facility] - [place] [address] [address]    Clinical concerns: Patient would like to discuss the painful cuts on her fingers  Dr Hanks was notified.      Lisbeth Ochoa            "

## 2021-07-09 DIAGNOSIS — M25.551 HIP PAIN, RIGHT: Primary | ICD-10-CM

## 2021-07-12 ENCOUNTER — TELEPHONE (OUTPATIENT)
Dept: ORTHOPEDICS | Facility: CLINIC | Age: 76
End: 2021-07-12

## 2021-07-12 NOTE — TELEPHONE ENCOUNTER
----- Message from Emmie Chance sent at 7/11/2021 12:12 PM CDT -----    ----- Message -----  From: Ale Hanks MD  Sent: 7/9/2021   1:51 PM CDT  To: Kelly Guadarrama RN, [facility]    This patient emailed me regarding hip pain and she needs MRI and an ortho referral. I have placed the orders.    Thx  AM

## 2021-07-14 ENCOUNTER — TELEPHONE (OUTPATIENT)
Dept: ORTHOPEDICS | Facility: CLINIC | Age: 76
End: 2021-07-14

## 2021-07-14 NOTE — TELEPHONE ENCOUNTER
schedule with ortho for hip pain     ----- Message from Emmie Chance sent at 7/11/2021 12:12 PM CDT -----    ----- Message -----  From: Ale Hanks MD  Sent: 7/9/2021   1:51 PM CDT  To: Kelly Guadarrama RN, [facility]    This patient emailed me regarding hip pain and she needs MRI and an ortho referral. I have placed the orders.    Thx  AM

## 2021-08-03 ENCOUNTER — PATIENT OUTREACH (OUTPATIENT)
Dept: ONCOLOGY | Facility: CLINIC | Age: 76
End: 2021-08-03

## 2021-08-03 NOTE — PROGRESS NOTES
Oncology RN Care Coordination Note:     Incoming Call:  This patient left a message stating she has been waiting for approval of some x-rays, however they have not been approved.     This writer reviewed patient's chart, she has been waiting for approval of an MRI for some pain and a follow up with orthopedics.  This writer sent a message to scheduling as well as to help see if her imaging has been approved.  Requesting scheduling follow up with her to make appointments.     Kelly Guadarrama, RN BSN   [facility]  Nurse Coordinator

## 2021-08-04 ENCOUNTER — TELEPHONE (OUTPATIENT)
Dept: ORTHOPEDICS | Facility: CLINIC | Age: 76
End: 2021-08-04

## 2021-08-04 ENCOUNTER — HOSPITAL ENCOUNTER (OUTPATIENT)
Dept: GENERAL RADIOLOGY | Facility: CLINIC | Age: 76
Discharge: HOME | End: 2021-08-04
Attending: STUDENT IN AN ORGANIZED HEALTH CARE EDUCATION/TRAINING PROGRAM | Admitting: STUDENT IN AN ORGANIZED HEALTH CARE EDUCATION/TRAINING PROGRAM
Payer: COMMERCIAL

## 2021-08-04 PROCEDURE — 73502 X-RAY EXAM HIP UNI 2-3 VIEWS: CPT

## 2021-08-04 NOTE — TELEPHONE ENCOUNTER
LVM attempting to schedule with Dr Becerra, Dr Brown or Dr Estrada. Provided call center number and encouraged pt to call.       Zayra Fernández ATC

## 2021-08-04 NOTE — TELEPHONE ENCOUNTER
----- Message from Kelly Guadarrama RN sent at 8/3/2021  4:49 PM CDT -----  Regarding: Imaging Appt  Team,       Patient left a voicemail stating she has been trying to get an imaging appointment to go along with her ortho referral.  Dr. Hanks placed MRI orders in July for her.      Can someone please reach out to her regarding scheduling?    Thank you,  Kelly Guadarrama RN BSN   [facility]  Nurse Coordinator

## 2021-08-06 NOTE — TELEPHONE ENCOUNTER
DIAGNOSIS: New hip   APPOINTMENT DATE: 8.25.21   NOTES STATUS DETAILS   OFFICE NOTE from referring provider Internal 7.9.21 Hanks, [facility]   OFFICE NOTE from other specialist Received 7.6.20 Rianna Mcdowell,   7.31.19 [facility]  8.7.17 JASON Waterman  More in Epic if needed   DISCHARGE SUMMARY from hospital N/A    DISCHARGE REPORT from the ER N/A    OPERATIVE REPORT N/A    MEDICATION LIST Internal    EMG (for Spine) N/A    IMPLANT RECORD/STICKER N/A    LABS     CBC/DIFF Internal    CULTURES N/A    INJECTIONS DONE IN RADIOLOGY In process 8.8.17 R hip, HP   MRI NA    CT SCAN In process 8.6.19 pelvis, [facility]   Dexa Internal 7.12.19 hip pelvis spine   XRAYS (IMAGES & REPORTS) In process 7.9.21 R hip  7.6.20 pelvis, HP  12.17.19 pelvis, HP  8.7.17 pelvis, HP   TUMOR     PATHOLOGY  Slides & report N/A      Action 8.6.21 MJ   Action Taken Requested images from  and [facility].    08/18/21   11:00 AM   CALLED MIGUEL AT  FOR IMAGES TO BE PUSHED  Kera Bull CMA    08/20/21   12:31 PM   IMAGES RESOLVED IN PACS  COMPLETE  Kera Bull CMA

## 2021-08-21 ENCOUNTER — HOSPITAL ENCOUNTER (OUTPATIENT)
Dept: MRI IMAGING | Facility: CLINIC | Age: 76
Discharge: HOME | End: 2021-08-21
Attending: STUDENT IN AN ORGANIZED HEALTH CARE EDUCATION/TRAINING PROGRAM | Admitting: STUDENT IN AN ORGANIZED HEALTH CARE EDUCATION/TRAINING PROGRAM
Payer: COMMERCIAL

## 2021-08-21 DIAGNOSIS — M25.551 HIP PAIN, RIGHT: ICD-10-CM

## 2021-08-21 PROCEDURE — 73721 MRI JNT OF LWR EXTRE W/O DYE: CPT | Mod: 26 | Performed by: RADIOLOGY

## 2021-08-21 PROCEDURE — 73721 MRI JNT OF LWR EXTRE W/O DYE: CPT | Mod: RT

## 2021-08-25 ENCOUNTER — OFFICE VISIT (OUTPATIENT)
Dept: ORTHOPEDICS | Facility: CLINIC | Age: 76
End: 2021-08-25
Payer: COMMERCIAL

## 2021-08-25 ENCOUNTER — PRE VISIT (OUTPATIENT)
Dept: ORTHOPEDICS | Facility: CLINIC | Age: 76
End: 2021-08-25

## 2021-08-25 VITALS — BODY MASS INDEX: 24.62 KG/M2 | WEIGHT: 144.2 LBS | HEIGHT: 64 IN

## 2021-08-25 DIAGNOSIS — M25.551 RIGHT HIP PAIN: ICD-10-CM

## 2021-08-25 DIAGNOSIS — M13.851 ALLERGIC ARTHRITIS OF RIGHT HIP: Primary | ICD-10-CM

## 2021-08-25 DIAGNOSIS — Z11.59 ENCOUNTER FOR SCREENING FOR OTHER VIRAL DISEASES: ICD-10-CM

## 2021-08-25 PROCEDURE — 99204 OFFICE O/P NEW MOD 45 MIN: CPT | Performed by: STUDENT IN AN ORGANIZED HEALTH CARE EDUCATION/TRAINING PROGRAM

## 2021-08-25 ASSESSMENT — MIFFLIN-ST. JEOR: SCORE: 1139.09

## 2021-08-25 NOTE — LETTER
8/25/2021         RE: Mary Jo Mcleod  [address]  [place] [address]        Dear Colleague,    Thank you for referring your patient, Mary Jo Mcleod, to the [facility] [place]. Please see a copy of my visit note below.        [facility]  Orthopaedic Surgery Consultation by Jorge Becerra M.D.    Mary Jo Mcleod MRN# [id number]   Age: 75 year old YOB: 1945     Requesting physician: Levi Shetty     Background history:  DX:  Smoldering multiple myeloma (H)  Plasma cell dyscrasia  Prediabetes  Hypercholesterolemia  Moderate episode of recurrent major depressive disorder (H)  PTSD (post-traumatic stress disorder)  GERSON (generalized anxiety disorder)    TREATMENTS:  1. 1952 had finger reattached           History of Present Illness:   75 year old female who presents her clinic because of chronic right hip/low back pain.  This pain has been present for multiple years.  She has been treated by [facility] and [place] [facility] in the past and is now here for further evaluation.  Patient states that she experiencing pain in the right buttock region that radiates down to her knee and then occasionally past her knee towards her foot.  She describes this as a burning and tingling sensation.  She denies the presence of any significant groin pain.  She feels like she is dragging her foot when she is walking.  She can no longer walk more than 6 blocks well previously she was able to ambulate without restrictions.  Patient denies the presence of any night pain or clear initiation stiffness or soreness.  She endorses the presence of lower back pain.  She has had injections in the past but does not recall whether this was in the hip or not.    Social:   Occupation: Retired -    Living situation: lives alone  Hobbies / Sports: art, [place]    Smoking: No  Alcohol: No  Illicit drug use: No         Physical Exam:  "    EXAMINATION pertinent findings:   PSYCH: Pleasant, healthy-appearing, alert, oriented x3, cooperative. Normal mood and affect.  VITAL SIGNS: Height 1.626 m (5' 4\"), weight 65.4 kg (144 lb 3.2 oz), last menstrual period 12/04/1989.  Reviewed nursing intake notes.   Body mass index is 24.75 kg/m .  RESP: non labored breathing   ABD: benign, soft, non-tender, no acute peritoneal findings  SKIN: grossly normal   LYMPHATIC: grossly normal, no adenopathy, no extremity edema  NEURO: grossly normal , no motor deficits  VASCULAR: satisfactory perfusion of all extremities   MUSCULOSKELETAL:   Alignment: Neutral  Gait: Normal  Level pelvis.  Reduced lumbar lordosis.    The right hip exhibits a full range of motion.  No pain upon rotations.  Lasegue's test is negative.  No tenderness to palpation over the greater trochanteric region.     Right LE:   Thigh and leg compartments soft and compressible   +Quad/TA/GSC/FHL/EHL   SILT DP/SP/Ja/Saph/Tib nerve distributions   Palpable dorsalis pedis pulse              Data:   All laboratory data reviewed  All imaging studies reviewed by me personally.    XR pelvis/hip right 7/9/2021:  My interpretation: Minimal degenerative changes of right hip.  No signs of fractures or dislocation.  No osseous pathology visualized.    MRI pelvis hip right 8/21/2021:  My impression: Minimal osteoarthritic changes with degenerative tearing of the labrum.  No marrow signal abnormalities suggestive for fracture, osteonecrosis or marrow infiltration.  No obvious hip joint effusion.         Assessment and Plan:   Assessment:  75-year-old female with chronic pain of left lower back/hip region clinically more consistent with lumbar spine pathology then intra-articular hip pathology.     Plan:  I extensively discussed my findings with the patient.  In order to attempt to differentiate between hip and spine pathology it would be my recommendation to proceed with intra-articular injection of the right hip.  " Patient will maintain a pain diary and was instructed to do the things that would normally hurt her in the first hours after injection.  Patient understands and agrees to treatment plan as set forth.  We will follow-up via virtual visit 1 week after the injection.    Thank you for your referral.      Jorge Becerra MD, PhD     Adult Reconstruction  [facility] Department of Orthopaedic Surgery  Pager [phone]      DATA for DOCUMENTATION:         Past Medical History:     Patient Active Problem List   Diagnosis     Incontinence of feces with fecal urgency     Hypercholesterolemia     Moderate episode of recurrent major depressive disorder (H)     PTSD (post-traumatic stress disorder)     Osteopenia of multiple sites     GERSON (generalized anxiety disorder)     Prediabetes     Plasma cell dyscrasia     Smoldering multiple myeloma (H)     Past Medical History:   Diagnosis Date     Depressive disorder        Also see scanned health assessment forms.       Past Surgical History:     Past Surgical History:   Procedure Laterality Date     BONE MARROW BIOPSY, BONE SPECIMEN, NEEDLE/TROCAR N/A 2/8/2021    Procedure: BONE MARROW BIOPSY;  Surgeon: Naomie Saeed MD;  Location:  GI     finger reattachment       TONSILLECTOMY              Social History:     Social History     Socioeconomic History     Marital status:      Spouse name: Not on file     Number of children: Not on file     Years of education: Not on file     Highest education level: Not on file   Occupational History     Not on file   Tobacco Use     Smoking status: Never Smoker     Smokeless tobacco: Never Used   Substance and Sexual Activity     Alcohol use: Yes     Alcohol/week: 0.0 - 2.0 standard drinks     Drug use: No     Sexual activity: Not Currently   Other Topics Concern     Parent/sibling w/ CABG, MI or angioplasty before 65F 55M? Not Asked   Social History Narrative    Estranged from family, lives  alone in condo     Social Determinants of Health     Financial Resource Strain:      Difficulty of Paying Living Expenses:    Food Insecurity:      Worried About Running Out of Food in the Last Year:      Ran Out of Food in the Last Year:    Transportation Needs:      Lack of Transportation (Medical):      Lack of Transportation (Non-Medical):    Physical Activity:      Days of Exercise per Week:      Minutes of Exercise per Session:    Stress:      Feeling of Stress :    Social Connections:      Frequency of Communication with Friends and Family:      Frequency of Social Gatherings with Friends and Family:      Attends Faith Services:      Active Member of Clubs or Organizations:      Attends Club or Organization Meetings:      Marital Status:    Intimate Partner Violence:      Fear of Current or Ex-Partner:      Emotionally Abused:      Physically Abused:      Sexually Abused:             Family History:       Family History   Problem Relation Age of Onset     Myocardial Infarction Father 63     Bipolar Disorder Sister      Chronic Obstructive Pulmonary Disease Brother      Lung Cancer Brother      Suicide Brother      Influenza/Pneumonia Sister      Alcoholism Sister             Medications:     Current Outpatient Medications   Medication Sig     calcium carbonate (TUMS) 500 MG chewable tablet Take 1 chew tab by mouth 3 times daily     citalopram (CELEXA) 40 MG tablet Take 1 tablet by mouth once daily     LENalidomide (REVLIMID) 10 MG CAPS capsule Take 1 capsule (10 mg) by mouth daily for 21 days Take on Days 1 through 21 of 28-day cycle. (Patient not taking: Reported on 4/23/2021)     minocycline (MINOCIN) 50 MG capsule TAKE 1 CAPSULE BY MOUTH ONCE DAILY     simvastatin (ZOCOR) 20 MG tablet TAKE 1 TABLET BY MOUTH AT BEDTIME     VITAMIN D PO Take by mouth daily      No current facility-administered medications for this visit.              Review of Systems:   A comprehensive 10 point review of systems  (constitutional, ENT, cardiac, peripheral vascular, lymphatic, respiratory, GI, , Musculoskeletal, skin, Neurological) was performed and found to be negative except as described in this note.     See intake form completed by patient

## 2021-08-25 NOTE — PROGRESS NOTES
"    [facility]  Orthopaedic Surgery Consultation by Jorge Becerra M.D.    Mary Jo Mcleod MRN# [id number]   Age: 75 year old YOB: 1945     Requesting physician: Levi Shetty     Background history:  DX:  Smoldering multiple myeloma (H)  Plasma cell dyscrasia  Prediabetes  Hypercholesterolemia  Moderate episode of recurrent major depressive disorder (H)  PTSD (post-traumatic stress disorder)  GERSON (generalized anxiety disorder)    TREATMENTS:  1. 1952 had finger reattached           History of Present Illness:   75 year old female who presents her clinic because of chronic right hip/low back pain.  This pain has been present for multiple years.  She has been treated by [facility] and [place] [facility] in the past and is now here for further evaluation.  Patient states that she experiencing pain in the right buttock region that radiates down to her knee and then occasionally past her knee towards her foot.  She describes this as a burning and tingling sensation.  She denies the presence of any significant groin pain.  She feels like she is dragging her foot when she is walking.  She can no longer walk more than 6 blocks well previously she was able to ambulate without restrictions.  Patient denies the presence of any night pain or clear initiation stiffness or soreness.  She endorses the presence of lower back pain.  She has had injections in the past but does not recall whether this was in the hip or not.    Social:   Occupation: Retired -    Living situation: lives alone  Hobbies / Sports: art, [place]    Smoking: No  Alcohol: No  Illicit drug use: No         Physical Exam:     EXAMINATION pertinent findings:   PSYCH: Pleasant, healthy-appearing, alert, oriented x3, cooperative. Normal mood and affect.  VITAL SIGNS: Height 1.626 m (5' 4\"), weight 65.4 kg (144 lb 3.2 oz), last menstrual period 12/04/1989.  Reviewed nursing intake notes.   Body " mass index is 24.75 kg/m .  RESP: non labored breathing   ABD: benign, soft, non-tender, no acute peritoneal findings  SKIN: grossly normal   LYMPHATIC: grossly normal, no adenopathy, no extremity edema  NEURO: grossly normal , no motor deficits  VASCULAR: satisfactory perfusion of all extremities   MUSCULOSKELETAL:   Alignment: Neutral  Gait: Normal  Level pelvis.  Reduced lumbar lordosis.    The right hip exhibits a full range of motion.  No pain upon rotations.  Lasegue's test is negative.  No tenderness to palpation over the greater trochanteric region.     Right LE:   Thigh and leg compartments soft and compressible   +Quad/TA/GSC/FHL/EHL   SILT DP/SP/Ja/Saph/Tib nerve distributions   Palpable dorsalis pedis pulse              Data:   All laboratory data reviewed  All imaging studies reviewed by me personally.    XR pelvis/hip right 7/9/2021:  My interpretation: Minimal degenerative changes of right hip.  No signs of fractures or dislocation.  No osseous pathology visualized.    MRI pelvis hip right 8/21/2021:  My impression: Minimal osteoarthritic changes with degenerative tearing of the labrum.  No marrow signal abnormalities suggestive for fracture, osteonecrosis or marrow infiltration.  No obvious hip joint effusion.         Assessment and Plan:   Assessment:  75-year-old female with chronic pain of left lower back/hip region clinically more consistent with lumbar spine pathology then intra-articular hip pathology.     Plan:  I extensively discussed my findings with the patient.  In order to attempt to differentiate between hip and spine pathology it would be my recommendation to proceed with intra-articular injection of the right hip.  Patient will maintain a pain diary and was instructed to do the things that would normally hurt her in the first hours after injection.  Patient understands and agrees to treatment plan as set forth.  We will follow-up via virtual visit 1 week after the injection.    Thank  you for your referral.      Jorge Becerra MD, PhD     Adult Reconstruction  [facility] Department of Orthopaedic Surgery  Pager [phone]      DATA for DOCUMENTATION:         Past Medical History:     Patient Active Problem List   Diagnosis     Incontinence of feces with fecal urgency     Hypercholesterolemia     Moderate episode of recurrent major depressive disorder (H)     PTSD (post-traumatic stress disorder)     Osteopenia of multiple sites     GERSON (generalized anxiety disorder)     Prediabetes     Plasma cell dyscrasia     Smoldering multiple myeloma (H)     Past Medical History:   Diagnosis Date     Depressive disorder        Also see scanned health assessment forms.       Past Surgical History:     Past Surgical History:   Procedure Laterality Date     BONE MARROW BIOPSY, BONE SPECIMEN, NEEDLE/TROCAR N/A 2/8/2021    Procedure: BONE MARROW BIOPSY;  Surgeon: Naomie Saeed MD;  Location:  GI     finger reattachment       TONSILLECTOMY              Social History:     Social History     Socioeconomic History     Marital status:      Spouse name: Not on file     Number of children: Not on file     Years of education: Not on file     Highest education level: Not on file   Occupational History     Not on file   Tobacco Use     Smoking status: Never Smoker     Smokeless tobacco: Never Used   Substance and Sexual Activity     Alcohol use: Yes     Alcohol/week: 0.0 - 2.0 standard drinks     Drug use: No     Sexual activity: Not Currently   Other Topics Concern     Parent/sibling w/ CABG, MI or angioplasty before 65F 55M? Not Asked   Social History Narrative    Estranged from family, lives alone in condo     Social Determinants of Health     Financial Resource Strain:      Difficulty of Paying Living Expenses:    Food Insecurity:      Worried About Running Out of Food in the Last Year:      Ran Out of Food in the Last Year:    Transportation Needs:      Lack  of Transportation (Medical):      Lack of Transportation (Non-Medical):    Physical Activity:      Days of Exercise per Week:      Minutes of Exercise per Session:    Stress:      Feeling of Stress :    Social Connections:      Frequency of Communication with Friends and Family:      Frequency of Social Gatherings with Friends and Family:      Attends Holiness Services:      Active Member of Clubs or Organizations:      Attends Club or Organization Meetings:      Marital Status:    Intimate Partner Violence:      Fear of Current or Ex-Partner:      Emotionally Abused:      Physically Abused:      Sexually Abused:             Family History:       Family History   Problem Relation Age of Onset     Myocardial Infarction Father 63     Bipolar Disorder Sister      Chronic Obstructive Pulmonary Disease Brother      Lung Cancer Brother      Suicide Brother      Influenza/Pneumonia Sister      Alcoholism Sister             Medications:     Current Outpatient Medications   Medication Sig     calcium carbonate (TUMS) 500 MG chewable tablet Take 1 chew tab by mouth 3 times daily     citalopram (CELEXA) 40 MG tablet Take 1 tablet by mouth once daily     LENalidomide (REVLIMID) 10 MG CAPS capsule Take 1 capsule (10 mg) by mouth daily for 21 days Take on Days 1 through 21 of 28-day cycle. (Patient not taking: Reported on 4/23/2021)     minocycline (MINOCIN) 50 MG capsule TAKE 1 CAPSULE BY MOUTH ONCE DAILY     simvastatin (ZOCOR) 20 MG tablet TAKE 1 TABLET BY MOUTH AT BEDTIME     VITAMIN D PO Take by mouth daily      No current facility-administered medications for this visit.              Review of Systems:   A comprehensive 10 point review of systems (constitutional, ENT, cardiac, peripheral vascular, lymphatic, respiratory, GI, , Musculoskeletal, skin, Neurological) was performed and found to be negative except as described in this note.     See intake form completed by patient

## 2021-08-26 NOTE — PROGRESS NOTES
Mary Jo is a 75 year old who is being evaluated via a billable telephone visit.      What phone number would you like to be contacted at? [phone]  How would you like to obtain your AVS? Jo-Ann  Time of consultation: 10 minutes          [facility]  Orthopaedic Surgery Consultation by Jorge Becerra M.D.    Mary Jo Mcleod MRN# [id number]   Age: 75 year old YOB: 1945     Requesting physician: Levi Shetty     Background history:  DX:  1. Smoldering multiple myeloma (H)  2. Plasma cell dyscrasia  3. Prediabetes  4. Hypercholesterolemia  5. Moderate episode of recurrent major depressive disorder (H)  6. PTSD (post-traumatic stress disorder)  7. GERSON (generalized anxiety disorder)    TREATMENTS:  1. 1952 had finger reattached           History of Present Illness:     75-year-old female with chronic pain of left lower back/hip region clinically more consistent with lumbar spine pathology then intra-articular hip pathology who underwent injection of the right hip with a combination of lidocaine and cortisone.  Today we are following up to discuss his results.  Patient states that her pain significantly reduced in the first 30 minutes after injection where she could ambulate completely pain-free.  The pain then returned to some extent and now has subsided again over the past 10-12 days.  She currently is able to ambulate the distances that she like and she is happy with the result.      75 year old female who presents her clinic because of chronic right hip/low back pain.  This pain has been present for multiple years.  She has been treated by [facility] and [facility] in the past and is now here for further evaluation.  Patient states that she experiencing pain in the right buttock region that radiates down to her knee and then occasionally past her knee towards her foot.  She describes this as a burning and tingling sensation.  She denies the presence of any  significant groin pain.  She feels like she is dragging her foot when she is walking.  She can no longer walk more than 6 blocks well previously she was able to ambulate without restrictions.  Patient denies the presence of any night pain or clear initiation stiffness or soreness.  She endorses the presence of lower back pain.  She has had injections in the past but does not recall whether this was in the hip or not.    Social:   Occupation: Retired -    Living situation: lives alone  Hobbies / Sports: art, [place]    Smoking: No  Alcohol: No  Illicit drug use: No         Physical Exam:     Deferred due to nature of visit.    EXAMINATION pertinent findings:   PSYCH: Pleasant, healthy-appearing, alert, oriented x3, cooperative. Normal mood and affect.  VITAL SIGNS: Last menstrual period 12/04/1989.  Reviewed nursing intake notes.   There is no height or weight on file to calculate BMI.  RESP: non labored breathing   ABD: benign, soft, non-tender, no acute peritoneal findings  SKIN: grossly normal   LYMPHATIC: grossly normal, no adenopathy, no extremity edema  NEURO: grossly normal , no motor deficits  VASCULAR: satisfactory perfusion of all extremities   MUSCULOSKELETAL:   Alignment: Neutral  Gait: Normal  Level pelvis.  Reduced lumbar lordosis.    The right hip exhibits a full range of motion.  No pain upon rotations.  Lasegue's test is negative.  No tenderness to palpation over the greater trochanteric region.     Right LE:   Thigh and leg compartments soft and compressible   +Quad/TA/GSC/FHL/EHL   SILT DP/SP/Ja/Saph/Tib nerve distributions   Palpable dorsalis pedis pulse              Data:   All laboratory data reviewed  All imaging studies reviewed by me personally.    XR pelvis/hip right 7/9/2021:  My interpretation: Minimal degenerative changes of right hip.  No signs of fractures or dislocation.  No osseous pathology visualized.    MRI pelvis hip right 8/21/2021:  My impression:  Minimal osteoarthritic changes with degenerative tearing of the labrum.  No marrow signal abnormalities suggestive for fracture, osteonecrosis or marrow infiltration.  No obvious hip joint effusion.         Assessment and Plan:   Assessment:  75-year-old female with chronic pain of left lower back/hip region clinically more consistent with lumbar spine pathology then intra-articular hip pathology however, responding very well to intra-articular injection of the right hip with a combination of lidocaine and cortisone.     Plan:  I extensively discussed my findings with the patient.  Because of her gross response to the intra-articular injection is likely that her pain is not caused by lumbar spine pathology but by degenerative changes of the right hip.  We discussed that we will follow-up once the pain of right hip exacerbates.  At that point in time, depending on the time interval, we can discuss either a repeat injection or a potential right total hip replacement surgery.  Patient understands and agrees to the treatment plan as set forth.    Jorge Becerra MD, PhD     Adult [facility] Department of Orthopaedic Surgery  Pager [phone]    Total combined visit time and work time before and after clinic visit = 20 min

## 2021-08-27 ENCOUNTER — LAB (OUTPATIENT)
Dept: URGENT CARE | Facility: URGENT CARE | Age: 76
End: 2021-08-27
Payer: COMMERCIAL

## 2021-08-27 DIAGNOSIS — Z11.59 ENCOUNTER FOR SCREENING FOR OTHER VIRAL DISEASES: ICD-10-CM

## 2021-08-27 PROCEDURE — U0005 INFEC AGEN DETEC AMPLI PROBE: HCPCS

## 2021-08-27 PROCEDURE — U0003 INFECTIOUS AGENT DETECTION BY NUCLEIC ACID (DNA OR RNA); SEVERE ACUTE RESPIRATORY SYNDROME CORONAVIRUS 2 (SARS-COV-2) (CORONAVIRUS DISEASE [COVID-19]), AMPLIFIED PROBE TECHNIQUE, MAKING USE OF HIGH THROUGHPUT TECHNOLOGIES AS DESCRIBED BY CMS-2020-01-R: HCPCS

## 2021-08-28 LAB — SARS-COV-2 RNA RESP QL NAA+PROBE: NEGATIVE

## 2021-08-30 ENCOUNTER — HOSPITAL ENCOUNTER (OUTPATIENT)
Facility: CLINIC | Age: 76
Discharge: HOME | End: 2021-08-30
Payer: COMMERCIAL

## 2021-08-30 ENCOUNTER — HOSPITAL ENCOUNTER (OUTPATIENT)
Dept: GENERAL RADIOLOGY | Facility: CLINIC | Age: 76
End: 2021-08-30
Attending: STUDENT IN AN ORGANIZED HEALTH CARE EDUCATION/TRAINING PROGRAM
Payer: COMMERCIAL

## 2021-08-30 VITALS — HEART RATE: 91 BPM | DIASTOLIC BLOOD PRESSURE: 65 MMHG | OXYGEN SATURATION: 99 % | SYSTOLIC BLOOD PRESSURE: 116 MMHG

## 2021-08-30 DIAGNOSIS — M25.551 RIGHT HIP PAIN: ICD-10-CM

## 2021-08-30 PROCEDURE — 250N000009 HC RX 250: Performed by: PHYSICIAN ASSISTANT

## 2021-08-30 PROCEDURE — 255N000002 HC RX 255 OP 636: Performed by: PHYSICIAN ASSISTANT

## 2021-08-30 PROCEDURE — 20610 DRAIN/INJ JOINT/BURSA W/O US: CPT | Mod: RT

## 2021-08-30 PROCEDURE — 250N000011 HC RX IP 250 OP 636: Performed by: PHYSICIAN ASSISTANT

## 2021-08-30 RX ORDER — BUPIVACAINE HYDROCHLORIDE AND EPINEPHRINE 5; 5 MG/ML; UG/ML
30 INJECTION, SOLUTION PERINEURAL ONCE
Status: COMPLETED | OUTPATIENT
Start: 2021-08-30 | End: 2021-08-30

## 2021-08-30 RX ORDER — IOPAMIDOL 408 MG/ML
10 INJECTION, SOLUTION INTRATHECAL ONCE
Status: COMPLETED | OUTPATIENT
Start: 2021-08-30 | End: 2021-08-30

## 2021-08-30 RX ORDER — TRIAMCINOLONE ACETONIDE 40 MG/ML
40 INJECTION, SUSPENSION INTRA-ARTICULAR; INTRAMUSCULAR ONCE
Status: COMPLETED | OUTPATIENT
Start: 2021-08-30 | End: 2021-08-30

## 2021-08-30 RX ADMIN — TRIAMCINOLONE ACETONIDE 3 ML: 40 INJECTION, SUSPENSION INTRA-ARTICULAR; INTRAMUSCULAR at 14:25

## 2021-08-30 RX ADMIN — BUPIVACAINE HYDROCHLORIDE AND EPINEPHRINE BITARTRATE 3 ML: 5; .005 INJECTION, SOLUTION PERINEURAL at 14:18

## 2021-08-30 RX ADMIN — IOPAMIDOL 1 ML: 408 INJECTION, SOLUTION INTRATHECAL at 14:25

## 2021-08-30 NOTE — DISCHARGE INSTRUCTIONS
Orthopedic Discharge Instructions:   After Your Injection or Aspiration  ________________________________________    Patient Name:  Mary Jo Mcleod  Today's Date:  August 30, 2021  The provider who performed your Right Hip Injection at [facility] in the Radiology Department  Care of needle site    If you have new numbness down your leg, this may last up to 6 hours, but it should go away. You may need help with walking until your leg feels normal.     Over the next 24 to 48 hours, pain at the needle site may increase before it gets better.     For the next 48 hours, use ice packs for 15 minutes, three to four times a day for pain.    If you have a bandage, you may remove it the next morning.     No tub baths, hot tubs or swimming for 48 hours. You may shower the next day.   Activity    Do not drive until morning.     Limit your activity based on your pain level. Follow your doctor s orders for activity.     You may eat a normal diet.     If you had sedation,   - You may feel sleepy, forgetful or unsteady.   - Do not drink alcohol for 24 hours.  Medicines    If you take aspirin or platelet inhibitors, you can restart them tomorrow.     Restart all other medicines today at your regular dose, including Coumadin (warfarin).    If you are restarting Coumadin, talk to your doctor about having your INR checked.   If you had a steroid shot     The medicine should help reduce swelling and pain. This may take from 7 to 10 days.     Side effects from the shot will be mild and go away in 2 to 3 days. Common side effects may include:  -  Insomnia (trouble sleeping).  -  Heartburn.  -  Flushed face.  -  Water retention (bloating or fluid build-up).  -  Increased appetite (feeling more hungry than usual).  -  Increased blood sugar.  If you have diabetes, watch your blood sugar closely. If needed, call your doctor to help you control your blood sugar.  Some patients will get lasting relief from a single shot.  Others may require up to three shots to get results. If you have more than one steroid shot, they should be given two weeks apart.  Some patients do not have relief of symptoms.    Follow-up:  Call your doctor or go to the Emergency Room if you have severe pain, fever or problems with bowel or bladder control.

## 2021-09-10 ENCOUNTER — MYC MEDICAL ADVICE (OUTPATIENT)
Dept: FAMILY MEDICINE | Facility: CLINIC | Age: 76
End: 2021-09-10

## 2021-09-15 ENCOUNTER — VIRTUAL VISIT (OUTPATIENT)
Dept: ORTHOPEDICS | Facility: CLINIC | Age: 76
End: 2021-09-15
Payer: COMMERCIAL

## 2021-09-15 DIAGNOSIS — M16.11 PRIMARY OSTEOARTHRITIS OF RIGHT HIP: Primary | ICD-10-CM

## 2021-09-15 PROCEDURE — 99213 OFFICE O/P EST LOW 20 MIN: CPT | Mod: 95 | Performed by: STUDENT IN AN ORGANIZED HEALTH CARE EDUCATION/TRAINING PROGRAM

## 2021-09-15 NOTE — LETTER
9/15/2021         RE: Mary Jo Mcleod  [address]  [facility] [address]        Dear Colleague,    Thank you for referring your patient, Mary Jo Mcleod, to the [facility] [place]. Please see a copy of my visit note below.    Mary Jo is a 75 year old who is being evaluated via a billable telephone visit.      What phone number would you like to be contacted at? [phone]  How would you like to obtain your AVS? MyChart  Time of consultation: 10 minutes          [facility]  Orthopaedic Surgery Consultation by Jorge Becerra M.D.    Mary Jo Mcleod MRN# [id number]   Age: 75 year old YOB: 1945     Requesting physician: Levi Shetty     Background history:  DX:  1. Smoldering multiple myeloma (H)  2. Plasma cell dyscrasia  3. Prediabetes  4. Hypercholesterolemia  5. Moderate episode of recurrent major depressive disorder (H)  6. PTSD (post-traumatic stress disorder)  7. GERSON (generalized anxiety disorder)    TREATMENTS:  1. 1952 had finger reattached           History of Present Illness:     75-year-old female with chronic pain of left lower back/hip region clinically more consistent with lumbar spine pathology then intra-articular hip pathology who underwent injection of the right hip with a combination of lidocaine and cortisone.  Today we are following up to discuss his results.  Patient states that her pain significantly reduced in the first 30 minutes after injection where she could ambulate completely pain-free.  The pain then returned to some extent and now has subsided again over the past 10-12 days.  She currently is able to ambulate the distances that she like and she is happy with the result.      75 year old female who presents her clinic because of chronic right hip/low back pain.  This pain has been present for multiple years.  She has been treated by [facility] and [facility] in the past and is now  here for further evaluation.  Patient states that she experiencing pain in the right buttock region that radiates down to her knee and then occasionally past her knee towards her foot.  She describes this as a burning and tingling sensation.  She denies the presence of any significant groin pain.  She feels like she is dragging her foot when she is walking.  She can no longer walk more than 6 blocks well previously she was able to ambulate without restrictions.  Patient denies the presence of any night pain or clear initiation stiffness or soreness.  She endorses the presence of lower back pain.  She has had injections in the past but does not recall whether this was in the hip or not.    Social:   Occupation: Retired -    Living situation: lives alone  Hobbies / Sports: art, [place]    Smoking: No  Alcohol: No  Illicit drug use: No         Physical Exam:     Deferred due to nature of visit.    EXAMINATION pertinent findings:   PSYCH: Pleasant, healthy-appearing, alert, oriented x3, cooperative. Normal mood and affect.  VITAL SIGNS: Last menstrual period 12/04/1989.  Reviewed nursing intake notes.   There is no height or weight on file to calculate BMI.  RESP: non labored breathing   ABD: benign, soft, non-tender, no acute peritoneal findings  SKIN: grossly normal   LYMPHATIC: grossly normal, no adenopathy, no extremity edema  NEURO: grossly normal , no motor deficits  VASCULAR: satisfactory perfusion of all extremities   MUSCULOSKELETAL:   Alignment: Neutral  Gait: Normal  Level pelvis.  Reduced lumbar lordosis.    The right hip exhibits a full range of motion.  No pain upon rotations.  Lasegue's test is negative.  No tenderness to palpation over the greater trochanteric region.     Right LE:   Thigh and leg compartments soft and compressible   +Quad/TA/GSC/FHL/EHL   SILT DP/SP/Ja/Saph/Tib nerve distributions   Palpable dorsalis pedis pulse              Data:   All laboratory data  reviewed  All imaging studies reviewed by me personally.    XR pelvis/hip right 7/9/2021:  My interpretation: Minimal degenerative changes of right hip.  No signs of fractures or dislocation.  No osseous pathology visualized.    MRI pelvis hip right 8/21/2021:  My impression: Minimal osteoarthritic changes with degenerative tearing of the labrum.  No marrow signal abnormalities suggestive for fracture, osteonecrosis or marrow infiltration.  No obvious hip joint effusion.         Assessment and Plan:   Assessment:  75-year-old female with chronic pain of left lower back/hip region clinically more consistent with lumbar spine pathology then intra-articular hip pathology however, responding very well to intra-articular injection of the right hip with a combination of lidocaine and cortisone.     Plan:  I extensively discussed my findings with the patient.  Because of her gross response to the intra-articular injection is likely that her pain is not caused by lumbar spine pathology but by degenerative changes of the right hip.  We discussed that we will follow-up once the pain of right hip exacerbates.  At that point in time, depending on the time interval, we can discuss either a repeat injection or a potential right total hip replacement surgery.  Patient understands and agrees to the treatment plan as set forth.    Jorge Becerra MD, PhD     Adult [facility] Department of Orthopaedic Surgery  Pager [phone]    Total combined visit time and work time before and after clinic visit = 20 min

## 2021-09-17 ENCOUNTER — LAB (OUTPATIENT)
Dept: LAB | Facility: CLINIC | Age: 76
End: 2021-09-17
Attending: STUDENT IN AN ORGANIZED HEALTH CARE EDUCATION/TRAINING PROGRAM
Payer: COMMERCIAL

## 2021-09-17 DIAGNOSIS — D47.2 SMOLDERING MULTIPLE MYELOMA: ICD-10-CM

## 2021-09-17 DIAGNOSIS — I49.9 CARDIAC ARRHYTHMIA, UNSPECIFIED CARDIAC ARRHYTHMIA TYPE: ICD-10-CM

## 2021-09-17 LAB
ALBUMIN SERPL-MCNC: 3.3 G/DL (ref 3.4–5)
ALP SERPL-CCNC: 67 U/L (ref 40–150)
ALT SERPL W P-5'-P-CCNC: 29 U/L (ref 0–50)
ANION GAP SERPL CALCULATED.3IONS-SCNC: 6 MMOL/L (ref 3–14)
AST SERPL W P-5'-P-CCNC: 27 U/L (ref 0–45)
BASOPHILS # BLD AUTO: 0.1 10E3/UL (ref 0–0.2)
BASOPHILS NFR BLD AUTO: 1 %
BILIRUB SERPL-MCNC: 0.4 MG/DL (ref 0.2–1.3)
BUN SERPL-MCNC: 23 MG/DL (ref 7–30)
CALCIUM SERPL-MCNC: 8.9 MG/DL (ref 8.5–10.1)
CHLORIDE BLD-SCNC: 103 MMOL/L (ref 94–109)
CO2 SERPL-SCNC: 28 MMOL/L (ref 20–32)
CREAT SERPL-MCNC: 1.17 MG/DL (ref 0.52–1.04)
EOSINOPHIL # BLD AUTO: 0.1 10E3/UL (ref 0–0.7)
EOSINOPHIL NFR BLD AUTO: 1 %
ERYTHROCYTE [DISTWIDTH] IN BLOOD BY AUTOMATED COUNT: 14.3 % (ref 10–15)
GFR SERPL CREATININE-BSD FRML MDRD: 46 ML/MIN/1.73M2
GLUCOSE BLD-MCNC: 93 MG/DL (ref 70–99)
HCT VFR BLD AUTO: 37 % (ref 35–47)
HGB BLD-MCNC: 12.1 G/DL (ref 11.7–15.7)
IMM GRANULOCYTES # BLD: 0 10E3/UL
IMM GRANULOCYTES NFR BLD: 0 %
LYMPHOCYTES # BLD AUTO: 1.7 10E3/UL (ref 0.8–5.3)
LYMPHOCYTES NFR BLD AUTO: 31 %
MAGNESIUM SERPL-MCNC: 2.2 MG/DL (ref 1.6–2.3)
MCH RBC QN AUTO: 32.2 PG (ref 26.5–33)
MCHC RBC AUTO-ENTMCNC: 32.7 G/DL (ref 31.5–36.5)
MCV RBC AUTO: 98 FL (ref 78–100)
MONOCYTES # BLD AUTO: 0.6 10E3/UL (ref 0–1.3)
MONOCYTES NFR BLD AUTO: 11 %
NEUTROPHILS # BLD AUTO: 3.1 10E3/UL (ref 1.6–8.3)
NEUTROPHILS NFR BLD AUTO: 56 %
NRBC # BLD AUTO: 0 10E3/UL
NRBC BLD AUTO-RTO: 0 /100
PLATELET # BLD AUTO: 262 10E3/UL (ref 150–450)
POTASSIUM BLD-SCNC: 4 MMOL/L (ref 3.4–5.3)
PROT SERPL-MCNC: 9.9 G/DL (ref 6.8–8.8)
RBC # BLD AUTO: 3.76 10E6/UL (ref 3.8–5.2)
SODIUM SERPL-SCNC: 137 MMOL/L (ref 133–144)
TOTAL PROTEIN SERUM FOR ELP: 9.5 G/DL (ref 6.8–8.8)
WBC # BLD AUTO: 5.6 10E3/UL (ref 4–11)

## 2021-09-17 PROCEDURE — 83883 ASSAY NEPHELOMETRY NOT SPEC: CPT

## 2021-09-17 PROCEDURE — 83735 ASSAY OF MAGNESIUM: CPT

## 2021-09-17 PROCEDURE — 82784 ASSAY IGA/IGD/IGG/IGM EACH: CPT

## 2021-09-17 PROCEDURE — 84155 ASSAY OF PROTEIN SERUM: CPT | Mod: 91

## 2021-09-17 PROCEDURE — 85004 AUTOMATED DIFF WBC COUNT: CPT

## 2021-09-17 PROCEDURE — 36415 COLL VENOUS BLD VENIPUNCTURE: CPT

## 2021-09-17 PROCEDURE — 84165 PROTEIN E-PHORESIS SERUM: CPT | Mod: TC | Performed by: STUDENT IN AN ORGANIZED HEALTH CARE EDUCATION/TRAINING PROGRAM

## 2021-09-17 PROCEDURE — 82040 ASSAY OF SERUM ALBUMIN: CPT

## 2021-09-17 PROCEDURE — 86334 IMMUNOFIX E-PHORESIS SERUM: CPT | Mod: TC

## 2021-09-17 NOTE — NURSING NOTE
Chief Complaint   Patient presents with     Blood Draw     Labs drawn via  by RN.     Labs drawn with vpt by rn.  Pt tolerated well.     Walker Berry RN

## 2021-09-20 LAB
KAPPA LC FREE SER-MCNC: 3.78 MG/DL (ref 0.33–1.94)
KAPPA LC FREE/LAMBDA FREE SER NEPH: 4.4 {RATIO} (ref 0.26–1.65)
LAMBDA LC FREE SERPL-MCNC: 0.86 MG/DL (ref 0.57–2.63)
PROT PATTERN SERPL IFE-IMP: NORMAL

## 2021-09-20 PROCEDURE — 86334 IMMUNOFIX E-PHORESIS SERUM: CPT | Mod: 26 | Performed by: STUDENT IN AN ORGANIZED HEALTH CARE EDUCATION/TRAINING PROGRAM

## 2021-09-21 ENCOUNTER — VIRTUAL VISIT (OUTPATIENT)
Dept: ONCOLOGY | Facility: CLINIC | Age: 76
End: 2021-09-21
Attending: STUDENT IN AN ORGANIZED HEALTH CARE EDUCATION/TRAINING PROGRAM
Payer: COMMERCIAL

## 2021-09-21 DIAGNOSIS — D47.2 SMOLDERING MULTIPLE MYELOMA: Primary | ICD-10-CM

## 2021-09-21 DIAGNOSIS — F41.1 GAD (GENERALIZED ANXIETY DISORDER): ICD-10-CM

## 2021-09-21 DIAGNOSIS — F33.1 MODERATE EPISODE OF RECURRENT MAJOR DEPRESSIVE DISORDER (H): ICD-10-CM

## 2021-09-21 DIAGNOSIS — F43.10 PTSD (POST-TRAUMATIC STRESS DISORDER): ICD-10-CM

## 2021-09-21 LAB
ALBUMIN SERPL ELPH-MCNC: 4.2 G/DL (ref 3.7–5.1)
ALPHA1 GLOB SERPL ELPH-MCNC: 0.3 G/DL (ref 0.2–0.4)
ALPHA2 GLOB SERPL ELPH-MCNC: 0.9 G/DL (ref 0.5–0.9)
B-GLOBULIN SERPL ELPH-MCNC: 0.8 G/DL (ref 0.6–1)
GAMMA GLOB SERPL ELPH-MCNC: 3.3 G/DL (ref 0.7–1.6)
IGA SERPL-MCNC: 27 MG/DL (ref 84–499)
IGG SERPL-MCNC: 4053 MG/DL (ref 610–1616)
IGM SERPL-MCNC: 31 MG/DL (ref 35–242)
M PROTEIN SERPL ELPH-MCNC: 3.2 G/DL
PROT PATTERN SERPL ELPH-IMP: ABNORMAL

## 2021-09-21 PROCEDURE — 999N001193 HC VIDEO/TELEPHONE VISIT; NO CHARGE

## 2021-09-21 PROCEDURE — 99214 OFFICE O/P EST MOD 30 MIN: CPT | Mod: 95 | Performed by: PHYSICIAN ASSISTANT

## 2021-09-21 PROCEDURE — 84165 PROTEIN E-PHORESIS SERUM: CPT | Mod: 26 | Performed by: STUDENT IN AN ORGANIZED HEALTH CARE EDUCATION/TRAINING PROGRAM

## 2021-09-21 NOTE — LETTER
9/21/2021         RE: Mary Jo Mcleod  [address]  [facility] [address]        Dear Colleague,    Thank you for referring your patient, Mary Jo Mcleod, to the [facility]. Please see a copy of my visit note below.    Mary Jo is a 76 year old who is being evaluated via a billable telephone visit.      What phone number would you like to be contacted at? [phone]  How would you like to obtain your AVS? MyChart Taylor Myhre   Pt states she is able to tolerate Lidocaine please take off allergy list.    Phone call duration: 14 minutes    [facility] CANCER PROGRESS NOTE  Sep 21, 2021    Oncologic Hx:   -IgG Kappa SM previously seen by Dr. Mina at [facility]. Dx in Nov 2020 on routine blood work which showed elevated globulin level and M spike of 2.8. At that time her her Cr was 1.2, calcium WNL, and Hgb of 11.7. Serum kappa FLC was elevated at 3.3. PET/CT 1/6/2021 without disease. She underwent BM bx on 2/8/2021 showing 24% plasma cells with gains of chromosomes 5,9, and 15.    Offered rafael 25 mg every day but declined. Currently on active surveillance.     Interval Hx:   -Feels great. Has always felt great since dx.   -Had cortisone shot in right hip. Has helped her pain. Able to walk more[place]   -Energy is good  -Appetite is great    A comprehensive review of systems was completed and negative except as described above.     Physical Exam:   [facility] 12/04/1989     Objective:  General: patient sounds in no audible acute distress, alert and oriented, speech clear and fluid  Resp: Speaking in full sentences, no audible respiratory distress, no cough, no audible wheeze  Psych: able to articulate logical thoughts, able to abstract reason, no tangential thoughts, no hallucinations or delusions.  Affect is normal    The rest of a comprehensive physical examination is deferred due to PHE (public health emergency) phone  restrictions    Pertinent Data Summarized:  I personally reviewed labs    Most Recent 3 CBC's:Recent Labs   Lab Test 09/17/21  1338 06/23/21  1358 03/31/21  0842   WBC 5.6 5.5 5.6   HGB 12.1 11.3* 11.7   MCV 98 99 99    239 273     Most Recent 3 BMP's:  Recent Labs   Lab Test 09/17/21  1338 06/23/21  1358 03/31/21  0842    135 134   POTASSIUM 4.0 4.1 4.2   CHLORIDE 103 104 104   CO2 28 28 30   BUN 23 18 13   CR 1.17* 1.11* 0.89   ANIONGAP 6 3 <1*   ARA 8.9 8.7 8.9   GLC 93 100* 102*     Most Recent 2 LFT's:  Recent Labs   Lab Test 09/17/21  1338 06/23/21  1358   AST 27 30   ALT 29 31   ALKPHOS 67 59   BILITOTAL 0.4 0.4        3/31/2021 08:42 6/23/2021 13:58 9/17/2021 13:38   IGA 35 (L) 32 (L)    IGG 3,465 (H) 4,391 (H)    IGM 40 36    Immunofixation ELP (Note) (Note) Monoclonal IgG im...   [place] Free Lt Chain  5.31 (H)    Kappa Lambda Ratio  7.81 (H)    Lambda Free Lt Chain  0.68    Monoclonal Peak  2.8 (H) 3.2 (H)   KAPPA/LAMBDA RATIO   4.40 (H)   LAMBDA FREE LT CHAINS   0.86       Assessment and Plan:     Smoldering myeloma. Dx in Nov 2020 on routine blood work. Mspike and kappa light chains overall stable, though mspike increased from 2.8 to 3.2. No anemia or other cytopenias and calcium is normal. She continues to not want to pursue treatment at this time which is reasonable as she is still relatively stable. Indication for myeloma treatment are anemia, worsening kidney fx with elevated urine M spike, hypercalcemia, K/L ratio >100 or <0.01.   -will continue q 3 month surveillance with FLC, SPEP, and EDITA. Will add UPEP and UA with next labs. Will follow-up with Dr. Hanks in 3 months after labs    Right Hip Pain: Chronic, not related to myeloma. Managed by ortho. Recently received cortisone shot which was helpful and pain is better managed and has been able to increase her activity. Continue to follow-up with ortho prn    Elevated Cr: Remains slightly elevated. Could be related to myeloma, though no  anemia or hypercalcemia. MM relatively stable. Discussed other things that could cause DOROTHEA including dehydration, NSAIDs and supplements. She takes Naproxen regularly. She is going to try to back off and use APAP more and increase hydration.     Candace Hutchinson PA-C  [facility] [address] [address] [phone]            Again, thank you for allowing me to participate in the care of your patient.        Sincerely,        Candace Hutchinson PA-C

## 2021-09-21 NOTE — PROGRESS NOTES
[facility] PROGRESS NOTE  Sep 21, 2021    Oncologic Hx:   -IgG Kappa SM previously seen by Dr. Mina at [facility]. Dx in Nov 2020 on routine blood work which showed elevated globulin level and M spike of 2.8. At that time her her Cr was 1.2, calcium WNL, and Hgb of 11.7. Serum kappa FLC was elevated at 3.3. PET/CT 1/6/2021 without disease. She underwent BM bx on 2/8/2021 showing 24% plasma cells with gains of chromosomes 5,9, and 15.    Offered rafael 25 mg every day but declined. Currently on active surveillance.     Interval Hx:   -Feels great. Has always felt great since dx.   -Had cortisone shot in right hip. Has helped her pain. Able to walk more[place]   -Energy is good  -Appetite is great    A comprehensive review of systems was completed and negative except as described above.     Physical Exam:   [facility] 12/04/1989     Objective:  General: patient sounds in no audible acute distress, alert and oriented, speech clear and fluid  Resp: Speaking in full sentences, no audible respiratory distress, no cough, no audible wheeze  Psych: able to articulate logical thoughts, able to abstract reason, no tangential thoughts, no hallucinations or delusions.  Affect is normal    The rest of a comprehensive physical examination is deferred due to PHE (public health emergency) phone restrictions    Pertinent Data Summarized:  I personally reviewed labs    Most Recent 3 CBC's:Recent Labs   Lab Test 09/17/21  1338 06/23/21  1358 03/31/21  0842   WBC 5.6 5.5 5.6   HGB 12.1 11.3* 11.7   MCV 98 99 99    239 273     Most Recent 3 BMP's:  Recent Labs   Lab Test 09/17/21  1338 06/23/21  1358 03/31/21  0842    135 134   POTASSIUM 4.0 4.1 4.2   CHLORIDE 103 104 104   CO2 28 28 30   BUN 23 18 13   CR 1.17* 1.11* 0.89   ANIONGAP 6 3 <1*   ARA 8.9 8.7 8.9   GLC 93 100* 102*     Most Recent 2 LFT's:  Recent Labs   Lab Test 09/17/21  1338 06/23/21  1358   AST 27 30   ALT 29 31   ALKPHOS 67  59   BILITOTAL 0.4 0.4        3/31/2021 08:42 6/23/2021 13:58 9/17/2021 13:38   IGA 35 (L) 32 (L)    IGG 3,465 (H) 4,391 (H)    IGM 40 36    Immunofixation ELP (Note) (Note) Monoclonal IgG im...   [place] Free Lt Chain  5.31 (H)    Kappa Lambda Ratio  7.81 (H)    Lambda Free Lt Chain  0.68    Monoclonal Peak  2.8 (H) 3.2 (H)   KAPPA/LAMBDA RATIO   4.40 (H)   LAMBDA FREE LT CHAINS   0.86       Assessment and Plan:     Smoldering myeloma. Dx in Nov 2020 on routine blood work. Mspike and kappa light chains overall stable, though mspike increased from 2.8 to 3.2. No anemia or other cytopenias and calcium is normal. She continues to not want to pursue treatment at this time which is reasonable as she is still relatively stable. Indication for myeloma treatment are anemia, worsening kidney fx with elevated urine M spike, hypercalcemia, K/L ratio >100 or <0.01.   -will continue q 3 month surveillance with FLC, SPEP, and EDITA. Will add UPEP and UA with next labs. Will follow-up with Dr. Hanks in 3 months after labs    Right Hip Pain: Chronic, not related to myeloma. Managed by ortho. Recently received cortisone shot which was helpful and pain is better managed and has been able to increase her activity. Continue to follow-up with ortho prn    Elevated Cr: Remains slightly elevated. Could be related to myeloma, though no anemia or hypercalcemia. MM relatively stable. Discussed other things that could cause DOROTHEA including dehydration, NSAIDs and supplements. She takes Naproxen regularly. She is going to try to back off and use APAP more and increase hydration.     Candace Hutchinson PA-C  [facility]  [address] [id number] [phone]

## 2021-09-21 NOTE — PROGRESS NOTES
Mary Jo is a 76 year old who is being evaluated via a billable telephone visit.      What phone number would you like to be contacted at? [phone]  How would you like to obtain your AVS? MyChart Taylor Myhre   Pt states she is able to tolerate Lidocaine please take off allergy list.    Phone call duration: 14 minutes

## 2021-09-21 NOTE — NURSING NOTE
Patient verified meds and allergies are correct via patients echeck in.    Taylor Myhre, Virtual Facilitator

## 2021-09-23 NOTE — TELEPHONE ENCOUNTER
[facility]  LOV 4/23/21    DUE FOR MED CHECK    PHQ 12/4/2018 11/4/2020   PHQ-9 Total Score 14 8   Q9: Thoughts of better off dead/self-harm past 2 weeks More than half the days Several days

## 2021-09-24 RX ORDER — CITALOPRAM HYDROBROMIDE 40 MG/1
TABLET ORAL
Qty: 90 TABLET | Refills: 0 | Status: SHIPPED | OUTPATIENT
Start: 2021-09-24 | End: 2021-12-27

## 2021-09-28 ENCOUNTER — OFFICE VISIT (OUTPATIENT)
Dept: FAMILY MEDICINE | Facility: CLINIC | Age: 76
End: 2021-09-28

## 2021-09-28 ENCOUNTER — PATIENT OUTREACH (OUTPATIENT)
Dept: ONCOLOGY | Facility: CLINIC | Age: 76
End: 2021-09-28

## 2021-09-28 ENCOUNTER — HOSPITAL ENCOUNTER (EMERGENCY)
Facility: CLINIC | Age: 76
Discharge: HOME | End: 2021-09-28
Attending: EMERGENCY MEDICINE | Admitting: EMERGENCY MEDICINE
Payer: COMMERCIAL

## 2021-09-28 ENCOUNTER — APPOINTMENT (OUTPATIENT)
Dept: GENERAL RADIOLOGY | Facility: CLINIC | Age: 76
End: 2021-09-28
Attending: EMERGENCY MEDICINE
Payer: COMMERCIAL

## 2021-09-28 VITALS
RESPIRATION RATE: 18 BRPM | HEIGHT: 64 IN | OXYGEN SATURATION: 99 % | HEART RATE: 79 BPM | SYSTOLIC BLOOD PRESSURE: 116 MMHG | DIASTOLIC BLOOD PRESSURE: 61 MMHG | WEIGHT: 141 LBS | TEMPERATURE: 98.3 F | BODY MASS INDEX: 24.07 KG/M2

## 2021-09-28 VITALS
SYSTOLIC BLOOD PRESSURE: 110 MMHG | HEART RATE: 83 BPM | TEMPERATURE: 97.5 F | BODY MASS INDEX: 24.24 KG/M2 | WEIGHT: 141.2 LBS | OXYGEN SATURATION: 98 % | DIASTOLIC BLOOD PRESSURE: 64 MMHG

## 2021-09-28 DIAGNOSIS — R00.2 PALPITATIONS: ICD-10-CM

## 2021-09-28 DIAGNOSIS — R06.02 SOB (SHORTNESS OF BREATH): ICD-10-CM

## 2021-09-28 DIAGNOSIS — R07.89 CHEST PRESSURE: Primary | ICD-10-CM

## 2021-09-28 DIAGNOSIS — E78.00 HYPERCHOLESTEROLEMIA: ICD-10-CM

## 2021-09-28 DIAGNOSIS — Z12.11 SCREEN FOR COLON CANCER: ICD-10-CM

## 2021-09-28 LAB
ALBUMIN SERPL-MCNC: 3 G/DL (ref 3.4–5)
ALP SERPL-CCNC: 62 U/L (ref 40–150)
ALT SERPL W P-5'-P-CCNC: 27 U/L (ref 0–50)
ANION GAP SERPL CALCULATED.3IONS-SCNC: 3 MMOL/L (ref 3–14)
AST SERPL W P-5'-P-CCNC: 24 U/L (ref 0–45)
ATRIAL RATE - MUSE: 85 BPM
BASOPHILS # BLD AUTO: 0 10E3/UL (ref 0–0.2)
BASOPHILS NFR BLD AUTO: 0 %
BILIRUB SERPL-MCNC: 0.4 MG/DL (ref 0.2–1.3)
BUN SERPL-MCNC: 20 MG/DL (ref 7–30)
CALCIUM SERPL-MCNC: 8.1 MG/DL (ref 8.5–10.1)
CHLORIDE BLD-SCNC: 105 MMOL/L (ref 94–109)
CO2 SERPL-SCNC: 27 MMOL/L (ref 20–32)
CREAT SERPL-MCNC: 1.11 MG/DL (ref 0.52–1.04)
DIASTOLIC BLOOD PRESSURE - MUSE: NORMAL MMHG
EOSINOPHIL # BLD AUTO: 0.1 10E3/UL (ref 0–0.7)
EOSINOPHIL NFR BLD AUTO: 1 %
ERYTHROCYTE [DISTWIDTH] IN BLOOD BY AUTOMATED COUNT: 14.2 % (ref 10–15)
GFR SERPL CREATININE-BSD FRML MDRD: 49 ML/MIN/1.73M2
GLUCOSE BLD-MCNC: 132 MG/DL (ref 70–99)
HCT VFR BLD AUTO: 32.9 % (ref 35–47)
HGB BLD-MCNC: 10.7 G/DL (ref 11.7–15.7)
HOLD SPECIMEN: NORMAL
IMM GRANULOCYTES # BLD: 0 10E3/UL
IMM GRANULOCYTES NFR BLD: 1 %
INTERPRETATION ECG - MUSE: NORMAL
LYMPHOCYTES # BLD AUTO: 2.2 10E3/UL (ref 0.8–5.3)
LYMPHOCYTES NFR BLD AUTO: 39 %
MCH RBC QN AUTO: 32.5 PG (ref 26.5–33)
MCHC RBC AUTO-ENTMCNC: 32.5 G/DL (ref 31.5–36.5)
MCV RBC AUTO: 100 FL (ref 78–100)
MONOCYTES # BLD AUTO: 0.4 10E3/UL (ref 0–1.3)
MONOCYTES NFR BLD AUTO: 8 %
NEUTROPHILS # BLD AUTO: 2.9 10E3/UL (ref 1.6–8.3)
NEUTROPHILS NFR BLD AUTO: 51 %
NRBC # BLD AUTO: 0 10E3/UL
NRBC BLD AUTO-RTO: 0 /100
P AXIS - MUSE: 43 DEGREES
PLATELET # BLD AUTO: 258 10E3/UL (ref 150–450)
POTASSIUM BLD-SCNC: 3.7 MMOL/L (ref 3.4–5.3)
PR INTERVAL - MUSE: 134 MS
PROT SERPL-MCNC: 9.2 G/DL (ref 6.8–8.8)
QRS DURATION - MUSE: 84 MS
QT - MUSE: 392 MS
QTC - MUSE: 466 MS
R AXIS - MUSE: 18 DEGREES
RBC # BLD AUTO: 3.29 10E6/UL (ref 3.8–5.2)
SODIUM SERPL-SCNC: 135 MMOL/L (ref 133–144)
SYSTOLIC BLOOD PRESSURE - MUSE: NORMAL MMHG
T AXIS - MUSE: 47 DEGREES
TROPONIN I SERPL-MCNC: <0.015 UG/L (ref 0–0.04)
TSH SERPL DL<=0.005 MIU/L-ACNC: 1.13 MU/L (ref 0.4–4)
VENTRICULAR RATE- MUSE: 85 BPM
WBC # BLD AUTO: 5.6 10E3/UL (ref 4–11)

## 2021-09-28 PROCEDURE — 71046 X-RAY EXAM CHEST 2 VIEWS: CPT

## 2021-09-28 PROCEDURE — 84443 ASSAY THYROID STIM HORMONE: CPT | Performed by: EMERGENCY MEDICINE

## 2021-09-28 PROCEDURE — 36415 COLL VENOUS BLD VENIPUNCTURE: CPT | Performed by: EMERGENCY MEDICINE

## 2021-09-28 PROCEDURE — 93000 ELECTROCARDIOGRAM COMPLETE: CPT | Performed by: FAMILY MEDICINE

## 2021-09-28 PROCEDURE — 84484 ASSAY OF TROPONIN QUANT: CPT | Performed by: EMERGENCY MEDICINE

## 2021-09-28 PROCEDURE — 85004 AUTOMATED DIFF WBC COUNT: CPT | Performed by: EMERGENCY MEDICINE

## 2021-09-28 PROCEDURE — 80053 COMPREHEN METABOLIC PANEL: CPT | Performed by: EMERGENCY MEDICINE

## 2021-09-28 PROCEDURE — 82040 ASSAY OF SERUM ALBUMIN: CPT | Performed by: EMERGENCY MEDICINE

## 2021-09-28 PROCEDURE — 93005 ELECTROCARDIOGRAM TRACING: CPT

## 2021-09-28 PROCEDURE — 99285 EMERGENCY DEPT VISIT HI MDM: CPT | Mod: 25

## 2021-09-28 PROCEDURE — 85025 COMPLETE CBC W/AUTO DIFF WBC: CPT | Performed by: EMERGENCY MEDICINE

## 2021-09-28 PROCEDURE — 99215 OFFICE O/P EST HI 40 MIN: CPT | Performed by: FAMILY MEDICINE

## 2021-09-28 RX ORDER — PREGABALIN 25 MG/1
25 CAPSULE ORAL
COMMUNITY
Start: 2020-07-27 | End: 2021-09-28

## 2021-09-28 ASSESSMENT — PATIENT HEALTH QUESTIONNAIRE - PHQ9
SUM OF ALL RESPONSES TO PHQ QUESTIONS 1-9: 11
5. POOR APPETITE OR OVEREATING: MORE THAN HALF THE DAYS

## 2021-09-28 ASSESSMENT — ENCOUNTER SYMPTOMS
DIFFICULTY URINATING: 0
COUGH: 0
CONSTIPATION: 1
FEVER: 0
PALPITATIONS: 1
SHORTNESS OF BREATH: 1

## 2021-09-28 ASSESSMENT — ANXIETY QUESTIONNAIRES
5. BEING SO RESTLESS THAT IT IS HARD TO SIT STILL: MORE THAN HALF THE DAYS
6. BECOMING EASILY ANNOYED OR IRRITABLE: MORE THAN HALF THE DAYS
1. FEELING NERVOUS, ANXIOUS, OR ON EDGE: MORE THAN HALF THE DAYS
3. WORRYING TOO MUCH ABOUT DIFFERENT THINGS: MORE THAN HALF THE DAYS
GAD7 TOTAL SCORE: 14
IF YOU CHECKED OFF ANY PROBLEMS ON THIS QUESTIONNAIRE, HOW DIFFICULT HAVE THESE PROBLEMS MADE IT FOR YOU TO DO YOUR WORK, TAKE CARE OF THINGS AT HOME, OR GET ALONG WITH OTHER PEOPLE: VERY DIFFICULT
7. FEELING AFRAID AS IF SOMETHING AWFUL MIGHT HAPPEN: MORE THAN HALF THE DAYS
2. NOT BEING ABLE TO STOP OR CONTROL WORRYING: MORE THAN HALF THE DAYS

## 2021-09-28 ASSESSMENT — MIFFLIN-ST. JEOR: SCORE: 1119.57

## 2021-09-28 NOTE — LETTER
[facility] [id number] [place] [address] [phone]      September 28, 2021      Mary Jo Harsha  [facility]  [facility] [address]              To Whom It May Concern:     Mary Jo has a new medical issues that requires immediate further evaluation, and in my medical opinion, it is not safe for her to travel by plane at this time.        Please do not hesitate to contact me with any questions.      Sincerely,          Levi Mcdonnell MD

## 2021-09-28 NOTE — PROGRESS NOTES
Grace Camargo is a 76 year old patient who presents to clinic for evaluation.  She is overall in quite good health.  Beginning 5 days ago began having palpitations that awoke her from sleep several nights in a row.  2 days ago felt fine.  Today and yesterday left exercise class early due to low energy and sensation of difficulty catching her breath.  She endorses a heaviness in her chest and SOB.  Denies associated nausea, diaphoresis, radiating pain to shoulder or jaw.  Of note, her father  from MI in his 60s.  She is on simvastatin but not aspirin.        Review of Systems   Constitutional, HEENT, cardiovascular, pulmonary, GI, , musculoskeletal, neuro, skin, endocrine and psych systems are negative, except as otherwise noted.      Objective    /64   Pulse 83   Temp 97.5  F (36.4  C) (Skin)   Wt 64 kg (141 lb 3.2 oz)   LMP 1989   SpO2 98%   BMI 24.24 kg/m      General: Well appearing, NAD  HEENT: Clear  Heart: IRRR, no murmur  Chest: Lungs CTAB  Skin: Clear  Psych: normal mood and affect        EKG - Reviewed and interpreted by me appears normal, NSR, normal axis, normal intervals, no acute ST/T changes c/w ischemia, no LVH by voltage criteria, unchanged from previous tracings    Assessment & Plan     Chest pressure  On auscultation heart sounded irregular but EKG less than 5 minutes later is normal.  She could be having intermittent afib with RVR or other arrhythmia.  However, more urgently, given ongoing SOB and sensation of chest heaviness cannot rule out NSTEMI.  Advise ED immediately for troponins and cardiac monitor.  She is HD stable and well appearing.  She would like to drive herself.  Further evaluation at their discretion.  [facility] notified.  If workup normal would plan outpatient monitor.    Palpitations  See above  - EKG 12-lead complete w/read - Clinics    SOB (shortness of breath)  See above    Screen for colon cancer  - Adult Gastro Ref - Procedure Only;  Future    Hypercholesterolemia  On statin      See Patient Instructions    No follow-ups on file.    Levi Mcdonnell MD  [facility]

## 2021-09-28 NOTE — LETTER
10/1/2021    To Whom It May Concern:  Mary Jo Mcleod, 1945, is currently under medical treatment at the [facility]. Ms. Mcleod has a history of Smoldering Multiple Myeloma for which she is currently receiving treatment for. Due to Ms. Mcleod's medical status, she is unable to travel.  Please do not hesitate to contact the [facility] for any additional questions or concerns.    Sincerely,        Kelly Guadarrama RN    [facility] [address] [address]  Phone: [phone]  Fax: [phone]

## 2021-09-29 ASSESSMENT — ANXIETY QUESTIONNAIRES: GAD7 TOTAL SCORE: 14

## 2021-09-29 NOTE — ED PROVIDER NOTES
History   Chief Complaint:  Palpitations     The history is provided by the patient.      Mary Jo Mcleod is a 76 year old female with history of anxiety and hypercholesteremia who presents with palpitations. The patient reports that the palpitations first woke her up Thursday night. She describes it as a weak, fluttering feeling in her heart. She was woken up from her sleep with the palpitations Friday and Saturday but felt fine all day Sunday. She then got the palpitations again Monday and today during her exercise class at the [facility]. She has shortness of breath as well and feels as though she cannot get a full breath in. She notes that she has been more thirsty lately but denies any drastic diet changes. She also denies any fever, cough, chest pain, leg swelling, difficulty urinating, or known COVID exposure. She has constipation but notes this is baseline. She denies any tobacco use, recent travel, or medication changes. She has been vaccinated for COVID. Of note, she was seen here a few years ago for palpitations but has not had any episodes since then.     Review of Systems   Constitutional: Negative for fever.   Respiratory: Positive for shortness of breath. Negative for cough.    Cardiovascular: Positive for palpitations. Negative for chest pain and leg swelling.   Gastrointestinal: Positive for constipation.   Genitourinary: Negative for difficulty urinating.   All other systems reviewed and are negative.    Allergies:  Codeine  Bupropion  Erythromycin  Hydrocodone  Penicillin G  Sulfa Drugs  Tetracycline  Trazodone    Medications:  Celexa  Minocin   Zocor    Past Medical History:    Depression  Multiple myeloma  PTSD  Anxiety  Osteopenia  GERD  Arthritis  Hypercholesteremia     Past Surgical History:    Tonsillectomy  Bone marrow biopsy  Finger reattachment    Family History:    Heart attack, father  Bipolar disorder, sister  COPD, brother  Lung cancer, brother  Suicide, brother  Alcoholism,  "sister    Social History:  Smoking status: never smoker  PCP: Levi Mcdonnell  Presents to the ED alone.  Used to live in [place].    Physical Exam     Patient Vitals for the past 24 hrs:   BP Temp Temp src Pulse Resp SpO2 Height Weight   09/28/21 2051 116/61 -- -- 79 -- -- -- --   09/28/21 2042 -- -- -- -- -- 99 % -- --   09/28/21 1638 106/46 98.3  F (36.8  C) Temporal 87 18 99 % 1.626 m (5' 4\") 64 kg (141 lb)       Physical Exam  Physical Exam   Constitutional:  Patient is oriented to person, place, and time. They appear well-developed and well-nourished. Mild distress secondary to palpitations.   HENT:   Mouth/Throat:   Oropharynx is clear and moist.   Eyes:    Conjunctivae normal and EOM are normal. Pupils are equal, round, and reactive to light.   Neck:    Normal range of motion.   Cardiovascular: Normal rate, regular rhythm and normal heart sounds.  Exam reveals no gallop and no friction rub.  No murmur heard.  Pulmonary/Chest:  Effort normal and breath sounds normal. Patient has no wheezes. Patient has no rales.   Abdominal:   Soft. Bowel sounds are normal. Patient exhibits no mass. There is no tenderness. There is no rebound and no guarding.   Musculoskeletal:  Normal range of motion. Patient exhibits no edema.   Neurological:   Patient is alert and oriented to person, place, and time. Patient has normal strength. No cranial nerve deficit or sensory deficit. GCS 15  Skin:   Skin is warm and dry. No rash noted. No erythema.   Psychiatric:   Patient has a normal mood and affect. Patient's behavior is normal. Judgment and thought content normal.     Emergency Department Course     ECG:  ECG taken at 1642, ECG read at 2000  Normal sinus rhythm  Normal ECG  No significant change as compared to prior, dated 12/09/2014.   Rate 85 bpm. MD interval 134 ms. QRS duration 84 ms. QT/QTc 392/466 ms. P-R-T axes 43 18 47.    Imaging:  Chest XR:  Negative chest. No pleural effusions. Heart size and pulmonary vascularity " are within normal limits.    Reading per radiology     Laboratory:  CBC: WBC 5.6, HGB 10.7(L),    CMP: Glucose 132(H), Calcium 8.1(L), Protein Total 9.2(H), Albumin 3.0(L), GFR 49(L), o/w WNL (Creatinine: 1.11(H))    Troponin (Collected 1648): <0.015  TSH with Free T4 Reflex: 1.13    Emergency Department Course:  Reviewed:  I reviewed the patient's nursing notes, vitals, past medical records, Care Everywhere.     Assessments:  2031 I performed an assessment and examination of the patient as noted above.      2132 Findings and plan explained to the Patient. Patient discharged home with instructions regarding supportive care, medications, and reasons to return. The importance of close follow-up was reviewed.     Disposition:  The patient was discharged to home.     Impression & Plan   Medical Decision Making:  Mary Jo Mcleod is a 76 year old female who presents to the emergency department with palpitations. She denies any chest pain but felt a little short of breath. She otherwise denies any infectious symptoms. No change in medications. Her examination here is completely normal. It is noted that an EKG done at her primary care doctor's office today was also reassuringly normal. We repeated this here and it was a sinus rhythm. Blood work was done and her cardiac enzyme is normal. She has no acute metabolic derangement. Her creatinine is slightly elevated but this is normal for her. TSH was also checked. No evidence of thyroid disease. She is chronically anemic. At this point I am recommending a Holter monitor. Because of the time of day, we do not have them available but I will put in a discharge order for a 48 hour Holter monitor. She will follow up with Dr. Mcdonnell after this is done. At this time, given her symptomatology, her very benign exam, her vital signs which have been consistently normal, I feel she is safe for outpatient follow-up.    Covid-19  Mary Jo Mcleod was evaluated during a global  COVID-19 pandemic, which necessitated consideration that the patient might be at risk for infection with the SARS-CoV-2 virus that causes COVID-19.   Applicable protocols for evaluation were followed during the patient's care.     Diagnosis:    ICD-10-CM    1. Palpitations  R00.2 Holter Monitor 48 hour Adult Pediatric     Scribe Disclosure:  I, Sandra Cedeno, am serving as a scribe at 8:23 PM on 9/28/2021 to document services personally performed by Stephanie Dudley MD based on my observations and the provider's statements to me.       Stephanie Dudley MD  09/28/21 [id number]

## 2021-10-01 ENCOUNTER — HOSPITAL ENCOUNTER (OUTPATIENT)
Dept: CARDIOLOGY | Facility: CLINIC | Age: 76
Discharge: HOME | End: 2021-10-01
Attending: EMERGENCY MEDICINE | Admitting: EMERGENCY MEDICINE
Payer: COMMERCIAL

## 2021-10-01 DIAGNOSIS — R00.2 PALPITATIONS: ICD-10-CM

## 2021-10-01 PROCEDURE — 93227 XTRNL ECG REC<48 HR R&I: CPT | Performed by: INTERNAL MEDICINE

## 2021-10-01 PROCEDURE — 93225 XTRNL ECG REC<48 HRS REC: CPT

## 2021-10-01 NOTE — PROGRESS NOTES
Oncology RN Care Coordination Note:     Outgoing Call:  Called patient to discuss her request for a letter for reimbursement for travel that she is no longer planning to take later this year.  She said the airline is requiring her a letter.  She doesn't know the flight information at this time, she said she would call back or send in a [employer] message with the information, however she hasn't done so yet.     Kelly Guadarrama RN BSN   [facility]  Nurse Coordinator

## 2021-10-04 NOTE — TELEPHONE ENCOUNTER
10/4/21 Noticed message was not read, LM for the patient to call the office to see if she had the colonoscopy.    Vahe Peace,   [facility]  [phone]

## 2021-10-11 DIAGNOSIS — Z12.11 SCREEN FOR COLON CANCER: ICD-10-CM

## 2021-10-11 DIAGNOSIS — Z12.11 SCREEN FOR COLON CANCER: Primary | ICD-10-CM

## 2021-10-13 NOTE — PROGRESS NOTES
This encounter was created solely for the purpose of releasing the future order that was placed for Cologuard.  This is a necessary step in order for the results to be abstracted once they are available.  Ray Hernandez

## 2021-10-24 ENCOUNTER — HEALTH MAINTENANCE LETTER (OUTPATIENT)
Age: 76
End: 2021-10-24

## 2021-10-30 ENCOUNTER — TRANSFERRED RECORDS (OUTPATIENT)
Dept: FAMILY MEDICINE | Facility: CLINIC | Age: 76
End: 2021-10-30

## 2021-10-30 LAB — COLOGUARD-ABSTRACT: NEGATIVE

## 2021-11-05 NOTE — PROGRESS NOTES
"  SUBJECTIVE:   Mary Jo Mcleod is a 76 year old female who presents for Preventive Visit.      Patient has been advised of split billing requirements and indicates understanding: Yes  Are you in the first 12 months of your Medicare Part B coverage?  No    MDD: stable on citalopram     Anxiety: stable on citalopram    MM: follows with TINA Rand.  Under active surveillance.  Stable     Osteopenia: on alendronate    CKD3a: presumably secondary to MM.       HLD: on simva, tolerating well       Physical Health:    In general, how would you rate your overall physical health? good    Outside of work, how many days during the week do you exercise? 4-5 days/week    Outside of work, approximately how many minutes a day do you exercise?30-45 minutes    If you drink alcohol do you typically have >3 drinks per day or >7 drinks per week? No    Do you usually eat at least 4 servings of fruit and vegetables a day, include whole grains & fiber and avoid regularly eating high fat or \"junk\" foods? NO    Do you have any problems taking medications regularly?  No    Do you have any side effects from medications? none    Needs assistance for the following daily activities: no assistance needed    Which of the following safety concerns are present in your home?  none identified     Hearing impairment: No    In the past 6 months, have you been bothered by leaking of urine? yes    Mental Health:    In general, how would you rate your overall mental or emotional health? good  PHQ-2 Score:      Do you feel safe in your environment? Yes    Have you ever done Advance Care Planning? (For example, a Health Directive, POLST, or a discussion with a medical provider or your loved ones about your wishes): Yes, advance care planning is on file.    Additional concerns to address?  No    Fall risk:  Fallen 2 or more times in the past year?: No  Any fall with injury in the past year?: No    Cognitive Screenin) Repeat 3 items (Leader, " Season, Table)    2) Clock draw: NORMAL  3) 3 item recall: Recalls 3 objects  Results: 3 items recalled: COGNITIVE IMPAIRMENT LESS LIKELY    Mini-CogTM Copyright RADHA Ordaz. Licensed by the author for use in [facility]; reprinted with permission (marisel@[facility]). All rights reserved.      Do you have sleep apnea, excessive snoring or daytime drowsiness?: no        -------------------------------------    Reviewed and updated as needed this visit by clinical staff   Allergies  Meds             Reviewed and updated as needed this visit by Provider               Social History     Tobacco Use     Smoking status: Never Smoker     Smokeless tobacco: Never Used   Substance Use Topics     Alcohol use: Yes     Alcohol/week: 0.0 - 2.0 standard drinks                           Current providers sharing in care for this patient include:   Patient Care Team:  Levi Mcdonnell MD as PCP - General (Family Practice)  Levi Mcdonnell MD as Assigned PCP  Kelly Guadarrama, RN as Specialty Care Coordinator (Hematology & Oncology)  Ale Hanks MD as Assigned Cancer Care Provider  Jorge Becerra MD as Assigned Musculoskeletal Provider    The following health maintenance items are reviewed in Epic and correct as of today:  Health Maintenance   Topic Date Due     MICROALBUMIN  Never done     URINALYSIS  Never done     ZOSTER IMMUNIZATION (2 of 3) 05/30/2011     COVID-19 Vaccine (3 - Moderna risk 4-dose series) 04/24/2021     ASTHMA CONTROL TEST  05/20/2021     LIPID  11/20/2021     ASTHMA ACTION PLAN  11/20/2021     PHQ-9  03/28/2022     BMP  09/28/2022     FALL RISK ASSESSMENT  09/28/2022     HEMOGLOBIN  09/28/2022     MEDICARE ANNUAL WELLNESS VISIT  11/09/2022     MAMMO SCREENING  11/16/2022     COLORECTAL CANCER SCREENING  10/30/2024     DTAP/TDAP/TD IMMUNIZATION (3 - Td or Tdap) 05/09/2025     ADVANCE CARE PLANNING  11/09/2026     DEXA  07/12/2034     HEPATITIS C SCREENING  Completed      "DEPRESSION ACTION PLAN  Completed     INFLUENZA VACCINE  Completed     Pneumococcal Vaccine: 65+ Years  Completed     IPV IMMUNIZATION  Aged Out     MENINGITIS IMMUNIZATION  Aged Out     HEPATITIS B IMMUNIZATION  Aged Out     Labs reviewed in [facility]  Mammogram Screening: Mammogram Screening - Patient over age 75, has elected to continue with screening.    ROS:  Constitutional, HEENT, cardiovascular, pulmonary, GI, , musculoskeletal, neuro, skin, endocrine and psych systems are negative, except as otherwise noted.    OBJECTIVE:   /66   Pulse 85   Ht 1.613 m (5' 3.5\")   Wt 63.5 kg (140 lb)   LMP 12/04/1989   SpO2 97%   BMI 24.41 kg/m   Estimated body mass index is 24.41 kg/m  as calculated from the following:    Height as of this encounter: 1.613 m (5' 3.5\").    Weight as of this encounter: 63.5 kg (140 lb).  EXAM:   GENERAL APPEARANCE: healthy, alert and no distress  EYES: Eyes grossly normal to inspection, PERRL and conjunctivae and sclerae normal  HENT: ear canals and TM's normal, nose and mouth without ulcers or lesions, oropharynx clear and oral mucous membranes moist  NECK: no adenopathy, no asymmetry, masses, or scars and thyroid normal to palpation  RESP: lungs clear to auscultation - no rales, rhonchi or wheezes  BREAST: deferred per patient  CV: regular rate and rhythm, normal S1 S2, no S3 or S4, no murmur, click or rub, no peripheral edema and peripheral pulses strong  ABDOMEN: soft, nontender, no hepatosplenomegaly, no masses and bowel sounds normal  MS: no musculoskeletal defects are noted and gait is age appropriate without ataxia  SKIN: no suspicious lesions or rashes  NEURO: Normal strength and tone, sensory exam grossly normal, mentation intact and speech normal  PSYCH: mentation appears normal and affect normal/bright    Diagnostic Test Results:  Labs reviewed in Epic    ASSESSMENT / PLAN:   Encounter for Medicare annual wellness exam  - discussed preventative guidelines, healthy diet, " "exercise and weight management    Needs flu shot  - FLUAD QUADRIVALENT 65+ ([facility] ONLY)  - ADMIN INFLUENZA VIRUS VAC    Chronic kidney disease, stage 3a (H)  Stable, cont to monitor.  Blood pressure control, blood sugar control, vitamin D supplementation and avoidance of NSAIDs discussed.    Prediabetes  Asymptomatic    Hypercholesterolemia  Cont statin.  Repeat lipids felt to be unnecessary    Moderate episode of recurrent major depressive disorder (H)  stable/controlled. Cont current medication(s) and treatment    Smoldering multiple myeloma (H)  Cont oncology follow up    Encounter for screening mammogram for malignant neoplasm of breast  - MAMMO -  Screening Digital Bilateral (FUTURE/[facility]); Future      Patient has been advised of split billing requirements and indicates understanding: Yes    COUNSELING:  Reviewed preventive health counseling, as reflected in patient instructions    Estimated body mass index is 24.41 kg/m  as calculated from the following:    Height as of this encounter: 1.613 m (5' 3.5\").    Weight as of this encounter: 63.5 kg (140 lb).        She reports that she has never smoked. She has never used smokeless tobacco.    Appropriate preventive services were discussed with this patient, including applicable screening as appropriate for cardiovascular disease, diabetes, osteopenia/osteoporosis, and glaucoma.  As appropriate for age/gender, discussed screening for colorectal cancer, prostate cancer, breast cancer, and cervical cancer. Checklist reviewing preventive services available has been given to the patient.    Reviewed patients plan of care and provided an AVS. The Basic Care Plan (routine screening as documented in Health Maintenance) for Mary Jo meets the Care Plan requirement. This Care Plan has been established and reviewed with the Patient.    Counseling Resources:  ATP IV Guidelines  Pooled Cohorts Equation Calculator  Breast Cancer Risk Calculator  BRCA-Related Cancer Risk " Assessment: FHS-7 Tool  FRAX Risk Assessment  ICSI Preventive Guidelines  Dietary Guidelines for Americans, 2010  USDA's MyPlate  ASA Prophylaxis  Lung CA Screening    Levi Mcdonnell MD  [facility]

## 2021-11-05 NOTE — PATIENT INSTRUCTIONS
Personalized Prevention Plan  You are due for the preventive services outlined below.  Your care team is available to assist you in scheduling these services.  If you have already completed any of these items, please share that information with your care team to update in your medical record.  Health Maintenance Due   Topic Date Due     Zoster (Shingles) Vaccine (2 of 3) 05/30/2011     COVID-19 Vaccine (3 - Moderna risk 4-dose series) 04/24/2021     Asthma Control Test  05/20/2021     Flu Vaccine (1) 09/01/2021     Asthma Action Plan - yearly  11/20/2021

## 2021-11-09 ENCOUNTER — OFFICE VISIT (OUTPATIENT)
Dept: FAMILY MEDICINE | Facility: CLINIC | Age: 76
End: 2021-11-09

## 2021-11-09 VITALS
SYSTOLIC BLOOD PRESSURE: 110 MMHG | HEART RATE: 85 BPM | DIASTOLIC BLOOD PRESSURE: 66 MMHG | WEIGHT: 140 LBS | BODY MASS INDEX: 23.9 KG/M2 | OXYGEN SATURATION: 97 % | HEIGHT: 64 IN

## 2021-11-09 DIAGNOSIS — E78.00 HYPERCHOLESTEROLEMIA: ICD-10-CM

## 2021-11-09 DIAGNOSIS — N18.31 CHRONIC KIDNEY DISEASE, STAGE 3A (H): ICD-10-CM

## 2021-11-09 DIAGNOSIS — F33.1 MODERATE EPISODE OF RECURRENT MAJOR DEPRESSIVE DISORDER (H): ICD-10-CM

## 2021-11-09 DIAGNOSIS — Z00.00 ENCOUNTER FOR MEDICARE ANNUAL WELLNESS EXAM: Primary | ICD-10-CM

## 2021-11-09 DIAGNOSIS — R73.03 PREDIABETES: ICD-10-CM

## 2021-11-09 DIAGNOSIS — D47.2 SMOLDERING MULTIPLE MYELOMA: ICD-10-CM

## 2021-11-09 DIAGNOSIS — Z12.31 ENCOUNTER FOR SCREENING MAMMOGRAM FOR MALIGNANT NEOPLASM OF BREAST: ICD-10-CM

## 2021-11-09 DIAGNOSIS — Z23 NEEDS FLU SHOT: ICD-10-CM

## 2021-11-09 PROCEDURE — 90694 VACC AIIV4 NO PRSRV 0.5ML IM: CPT | Performed by: FAMILY MEDICINE

## 2021-11-09 PROCEDURE — 99214 OFFICE O/P EST MOD 30 MIN: CPT | Mod: 25 | Performed by: FAMILY MEDICINE

## 2021-11-09 PROCEDURE — G0008 ADMIN INFLUENZA VIRUS VAC: HCPCS | Mod: 59 | Performed by: FAMILY MEDICINE

## 2021-11-09 PROCEDURE — G0439 PPPS, SUBSEQ VISIT: HCPCS | Performed by: FAMILY MEDICINE

## 2021-11-09 ASSESSMENT — MIFFLIN-ST. JEOR: SCORE: 1107.1

## 2021-12-20 ENCOUNTER — HOSPITAL ENCOUNTER (OUTPATIENT)
Dept: MAMMOGRAPHY | Facility: CLINIC | Age: 76
Discharge: HOME | End: 2021-12-20
Attending: FAMILY MEDICINE | Admitting: FAMILY MEDICINE
Payer: COMMERCIAL

## 2021-12-20 DIAGNOSIS — Z12.31 ENCOUNTER FOR SCREENING MAMMOGRAM FOR MALIGNANT NEOPLASM OF BREAST: ICD-10-CM

## 2021-12-20 PROCEDURE — 77067 SCR MAMMO BI INCL CAD: CPT

## 2021-12-27 DIAGNOSIS — F33.1 MODERATE EPISODE OF RECURRENT MAJOR DEPRESSIVE DISORDER (H): ICD-10-CM

## 2021-12-27 DIAGNOSIS — F41.1 GAD (GENERALIZED ANXIETY DISORDER): ICD-10-CM

## 2021-12-27 DIAGNOSIS — F43.10 PTSD (POST-TRAUMATIC STRESS DISORDER): ICD-10-CM

## 2021-12-27 RX ORDER — CITALOPRAM HYDROBROMIDE 40 MG/1
TABLET ORAL
Qty: 90 TABLET | Refills: 0 | Status: SHIPPED | OUTPATIENT
Start: 2021-12-27 | End: 2022-03-29

## 2021-12-27 NOTE — TELEPHONE ENCOUNTER
Citalopram  LOV 11/9/21  Follow up in 1 year for cpx  PHQ 12/4/2018 11/4/2020 9/28/2021   PHQ-9 Total Score 14 8 11   Q9: Thoughts of better off dead/self-harm past 2 weeks More than half the days Several days Several days     GERSON-7 SCORE 11/4/2020 9/28/2021   Total Score 6 14     MDD: stable on citalopram     Anxiety: stable on citalopram

## 2021-12-31 ENCOUNTER — TELEPHONE (OUTPATIENT)
Dept: ONCOLOGY | Facility: CLINIC | Age: 76
End: 2021-12-31
Payer: COMMERCIAL

## 2021-12-31 ENCOUNTER — LAB (OUTPATIENT)
Dept: INFUSION THERAPY | Facility: CLINIC | Age: 76
End: 2021-12-31
Attending: STUDENT IN AN ORGANIZED HEALTH CARE EDUCATION/TRAINING PROGRAM
Payer: COMMERCIAL

## 2021-12-31 DIAGNOSIS — D47.2 SMOLDERING MULTIPLE MYELOMA: ICD-10-CM

## 2021-12-31 LAB
ALBUMIN SERPL-MCNC: 3.1 G/DL (ref 3.4–5)
ALP SERPL-CCNC: 54 U/L (ref 40–150)
ALT SERPL W P-5'-P-CCNC: 18 U/L (ref 0–50)
ANION GAP SERPL CALCULATED.3IONS-SCNC: 1 MMOL/L (ref 3–14)
AST SERPL W P-5'-P-CCNC: 16 U/L (ref 0–45)
BASOPHILS # BLD AUTO: 0 10E3/UL (ref 0–0.2)
BASOPHILS NFR BLD AUTO: 1 %
BILIRUB SERPL-MCNC: 0.5 MG/DL (ref 0.2–1.3)
BUN SERPL-MCNC: 16 MG/DL (ref 7–30)
CALCIUM SERPL-MCNC: 9 MG/DL (ref 8.5–10.1)
CHLORIDE BLD-SCNC: 107 MMOL/L (ref 94–109)
CO2 SERPL-SCNC: 28 MMOL/L (ref 20–32)
CREAT SERPL-MCNC: 1.17 MG/DL (ref 0.52–1.04)
EOSINOPHIL # BLD AUTO: 0.1 10E3/UL (ref 0–0.7)
EOSINOPHIL NFR BLD AUTO: 2 %
ERYTHROCYTE [DISTWIDTH] IN BLOOD BY AUTOMATED COUNT: 13.2 % (ref 10–15)
GFR SERPL CREATININE-BSD FRML MDRD: 48 ML/MIN/1.73M2
GLUCOSE BLD-MCNC: 99 MG/DL (ref 70–99)
HCT VFR BLD AUTO: 35.3 % (ref 35–47)
HGB BLD-MCNC: 11.4 G/DL (ref 11.7–15.7)
IMM GRANULOCYTES # BLD: 0 10E3/UL
IMM GRANULOCYTES NFR BLD: 0 %
LYMPHOCYTES # BLD AUTO: 1.9 10E3/UL (ref 0.8–5.3)
LYMPHOCYTES NFR BLD AUTO: 43 %
MCH RBC QN AUTO: 32 PG (ref 26.5–33)
MCHC RBC AUTO-ENTMCNC: 32.3 G/DL (ref 31.5–36.5)
MCV RBC AUTO: 99 FL (ref 78–100)
MONOCYTES # BLD AUTO: 0.5 10E3/UL (ref 0–1.3)
MONOCYTES NFR BLD AUTO: 12 %
NEUTROPHILS # BLD AUTO: 1.9 10E3/UL (ref 1.6–8.3)
NEUTROPHILS NFR BLD AUTO: 42 %
NRBC # BLD AUTO: 0 10E3/UL
NRBC BLD AUTO-RTO: 0 /100
PLATELET # BLD AUTO: 246 10E3/UL (ref 150–450)
POTASSIUM BLD-SCNC: 4 MMOL/L (ref 3.4–5.3)
PROT SERPL-MCNC: 10.5 G/DL (ref 6.8–8.8)
RBC # BLD AUTO: 3.56 10E6/UL (ref 3.8–5.2)
SODIUM SERPL-SCNC: 136 MMOL/L (ref 133–144)
TOTAL PROTEIN SERUM FOR ELP: 10.1 G/DL (ref 6.8–8.8)
TSH SERPL DL<=0.005 MIU/L-ACNC: 0.94 MU/L (ref 0.4–4)
WBC # BLD AUTO: 4.5 10E3/UL (ref 4–11)

## 2021-12-31 PROCEDURE — 85025 COMPLETE CBC W/AUTO DIFF WBC: CPT | Performed by: STUDENT IN AN ORGANIZED HEALTH CARE EDUCATION/TRAINING PROGRAM

## 2021-12-31 PROCEDURE — 80053 COMPREHEN METABOLIC PANEL: CPT | Performed by: STUDENT IN AN ORGANIZED HEALTH CARE EDUCATION/TRAINING PROGRAM

## 2021-12-31 PROCEDURE — 86334 IMMUNOFIX E-PHORESIS SERUM: CPT | Performed by: STUDENT IN AN ORGANIZED HEALTH CARE EDUCATION/TRAINING PROGRAM

## 2021-12-31 PROCEDURE — 84443 ASSAY THYROID STIM HORMONE: CPT | Performed by: STUDENT IN AN ORGANIZED HEALTH CARE EDUCATION/TRAINING PROGRAM

## 2021-12-31 PROCEDURE — 83883 ASSAY NEPHELOMETRY NOT SPEC: CPT | Performed by: STUDENT IN AN ORGANIZED HEALTH CARE EDUCATION/TRAINING PROGRAM

## 2021-12-31 PROCEDURE — 84165 PROTEIN E-PHORESIS SERUM: CPT | Mod: TC | Performed by: STUDENT IN AN ORGANIZED HEALTH CARE EDUCATION/TRAINING PROGRAM

## 2021-12-31 PROCEDURE — 84155 ASSAY OF PROTEIN SERUM: CPT | Performed by: STUDENT IN AN ORGANIZED HEALTH CARE EDUCATION/TRAINING PROGRAM

## 2021-12-31 PROCEDURE — 36415 COLL VENOUS BLD VENIPUNCTURE: CPT

## 2021-12-31 PROCEDURE — 82040 ASSAY OF SERUM ALBUMIN: CPT | Performed by: STUDENT IN AN ORGANIZED HEALTH CARE EDUCATION/TRAINING PROGRAM

## 2021-12-31 NOTE — PROGRESS NOTES
Medical Assistant Note:  Mary Jo Mcleod presents today for blood draw.    Patient seen by provider today: No.   present during visit today: Not Applicable.    Concerns: No Concerns.    Procedure:  Lab draw site: lac, Needle type: bf, Gauge: 23.    Post Assessment:  Labs drawn without difficulty: Yes.    Discharge Plan:  Departure Mode: Ambulatory.    Face to Face Time: 5 min.    Daily Hernandes, CMA

## 2022-01-03 PROCEDURE — 84165 PROTEIN E-PHORESIS SERUM: CPT | Mod: 26 | Performed by: STUDENT IN AN ORGANIZED HEALTH CARE EDUCATION/TRAINING PROGRAM

## 2022-01-03 PROCEDURE — 86334 IMMUNOFIX E-PHORESIS SERUM: CPT | Mod: 26 | Performed by: STUDENT IN AN ORGANIZED HEALTH CARE EDUCATION/TRAINING PROGRAM

## 2022-01-04 NOTE — PROGRESS NOTES
Mary Jo is a 76 year old who is being evaluated via a billable video visit.      How would you like to obtain your AVS? MyChart  If the video visit is dropped, the invitation should be resent by: Send to e-mail at: [email or web address]  Will anyone else be joining your video visit? No    Rachel Scott VF    Video Start Time: 2:31 PM  Video-Visit Details    Type of service:  Video Visit    Video End Time:2:59 PM    Originating Location (pt. Location): Home    Distant Location (provider location):  [facility]     Platform used for Video Visit: [facility]      Oncologic Hx:   -IgG Kappa SM previously seen by Dr. Mina at [facility]. Dx in Nov 2020 on routine blood work which showed elevated globulin level and M spike of 2.8. At that time her her Cr was 1.2, calcium WNL, and Hgb of 11.7. Serum kappa FLC was elevated at 3.3. PET/CT 1/6/2021 without disease. She underwent BM bx on 2/8/2021 showing 24% plasma cells with gains of chromosomes 5,9, and 15.    - Offered rafael 25 mg every day but declined.       Interval Hx: Mary Jo notes back and shoulder pain but 'no different' from what she has had in the past. She reports she is being 'plagarized' and her books were published by her family. She also reports someone is stealing her mail. 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 She is also making chalk landscapes.    A comprehensive review of systems was completed and negative except as described above.     Physical Exam:   General: NAD  HEENT: no scleral icterus  Resp: nonlabored breathing, no cough  Skin: no rashes observed  Neuro: alert, conversant, no facial droop  Psych: appropriate mood and affect      The rest of a comprehensive physical examination is deferred due to Public Health Emergency telephone visit restrictions    Pertinent Data Summarized:  12/31/2021 Cr 1.17, Lambda FLC 7.1, 3.3      Assessment and Plan:  Mary Jo has smoldering myeloma. Her M spike is unchanged but her creatinine is slightly elevated today and lambda FLC are increasing. Will get skeletal survey to assess for bone lesions given bone pains. Mary Jo continues to want to observe rather than start treatment for as long as possible. Recommended she continue Vit D. Continue to monitor CBC, CMP, FLC, SPEP, and EDITA    Depression: continues on celexa. She finds exercise helpful.     CKD: has not been drinking a lot of fluids. Will increase this.     Ale Hanks MD PhD

## 2022-01-05 ENCOUNTER — VIRTUAL VISIT (OUTPATIENT)
Dept: ONCOLOGY | Facility: CLINIC | Age: 77
End: 2022-01-05
Attending: STUDENT IN AN ORGANIZED HEALTH CARE EDUCATION/TRAINING PROGRAM
Payer: COMMERCIAL

## 2022-01-05 DIAGNOSIS — N18.31 CHRONIC KIDNEY DISEASE, STAGE 3A (H): ICD-10-CM

## 2022-01-05 DIAGNOSIS — F33.1 MODERATE EPISODE OF RECURRENT MAJOR DEPRESSIVE DISORDER (H): ICD-10-CM

## 2022-01-05 DIAGNOSIS — D47.2 SMOLDERING MULTIPLE MYELOMA: ICD-10-CM

## 2022-01-05 PROCEDURE — 999N001193 HC VIDEO/TELEPHONE VISIT; NO CHARGE

## 2022-01-05 PROCEDURE — 99214 OFFICE O/P EST MOD 30 MIN: CPT | Mod: 95 | Performed by: STUDENT IN AN ORGANIZED HEALTH CARE EDUCATION/TRAINING PROGRAM

## 2022-01-05 NOTE — LETTER
1/5/2022         RE: Mary Jo Mcleod  [address]  [place] [address]        Dear Colleague,    Thank you for referring your patient, Mary Jo Mcleod, to the [facility]. Please see a copy of my visit note below.    Mary Jo is a 76 year old who is being evaluated via a billable video visit.      How would you like to obtain your AVS? MyChart  If the video visit is dropped, the invitation should be resent by: Send to e-mail at: [email or web address]  Will anyone else be joining your video visit? No    Rachel Scott VF    Video Start Time: 2:31 PM  Video-Visit Details    Type of service:  Video Visit    Video End Time:2:59 PM    Originating Location (pt. Location): Home    Distant Location (provider location):  [facility]     Platform used for Video Visit: [facility]      Oncologic Hx:   -IgG Kappa SM previously seen by Dr. Mina at [facility]. Dx in Nov 2020 on routine blood work which showed elevated globulin level and M spike of 2.8. At that time her her Cr was 1.2, calcium WNL, and Hgb of 11.7. Serum kappa FLC was elevated at 3.3. PET/CT 1/6/2021 without disease. She underwent BM bx on 2/8/2021 showing 24% plasma cells with gains of chromosomes 5,9, and 15.    - Offered rafael 25 mg every day but declined.       Interval Hx: Mary Jo notes back and shoulder pain but 'no different' from what she has had in the past. She reports she is being 'plagarized' and her books were published by her family. She also reports someone is stealing her mail. 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 She is also making chalk landscapes.    A comprehensive review of systems was completed and negative except as described above.     Physical Exam:   General: NAD  HEENT: no scleral icterus  Resp: nonlabored breathing, no cough  Skin: no rashes observed  Neuro: alert, conversant, no facial  droop  Psych: appropriate mood and affect      The rest of a comprehensive physical examination is deferred due to Public Health Emergency telephone visit restrictions    Pertinent Data Summarized:  12/31/2021 Cr 1.17, Lambda FLC 7.1, 3.3      Assessment and Plan: Mary Jo has smoldering myeloma. Her M spike is unchanged but her creatinine is slightly elevated today and lambda FLC are increasing. Will get skeletal survey to assess for bone lesions given bone pains. Mary Jo continues to want to observe rather than start treatment for as long as possible. Recommended she continue Vit D. Continue to monitor CBC, CMP, FLC, SPEP, and EDITA    Depression: continues on celexa. She finds exercise helpful.     CKD: has not been drinking a lot of fluids. Will increase this.         Again, thank you for allowing me to participate in the care of your patient.      Sincerely,    Ale Hanks MD

## 2022-01-12 ENCOUNTER — PATIENT OUTREACH (OUTPATIENT)
Dept: ONCOLOGY | Facility: CLINIC | Age: 77
End: 2022-01-12
Payer: COMMERCIAL

## 2022-01-12 NOTE — PROGRESS NOTES
Oncology Distress Screening Follow-up  Clinical Social Work  [facility]    Identified Concern and Score From Distress Screenin. How concerned are you about your ability to eat?  0         2. How concerned are you about unintended weight loss or your current weight?  9 Abnormal          3. How concerned are you about feeling depressed or very sad?  6 Abnormal          4. How concerned are you about feeling anxious or very scared?  6 Abnormal          5. Do you struggle with the loss of meaning and nancy in your life?  Not at all         6. How concerned are you about work and home life issues that may be affected by your cancer?  0         7. How concerned are you about knowing what resources are available to help you?  0         8. Do you currently have what you would describe as Restoration or spiritual struggles?             Not at all                   You can also ask to be contacted by one of our Oncology Supportive Care professionals.      9. If you want to be contacted by one of our professionals, I can send a message to them right now.  Oncology Social Worker           Date of Distress Screenin22    Intervention:   Mary Jo is a 76-year-old woman with a diagnosis of smoldering myeloma who is followed be Dr. Hanks at [facility]. This clinician called Mary Jo today with goal of following up on elevated distress screen and orienting to psychosocial services and support.     This clinician called and left detailed voicemail for Mary Jo with social work contact information and availability.     Education Provided:   Onc SW contact information    Follow-up Required:   SW will await return call and continue to be available as needed for ongoing psychosocial support.     CHAI Saavedra, LICSW  Phone: [phone]  [facility]: M, Thu  *every other Tue, 8am-4:30pm  [facility]: W, F, *every other Tue, 8am-4:30pm

## 2022-01-13 ENCOUNTER — TELEPHONE (OUTPATIENT)
Dept: ONCOLOGY | Facility: CLINIC | Age: 77
End: 2022-01-13
Payer: COMMERCIAL

## 2022-01-13 NOTE — PROGRESS NOTES
CLINICAL NUTRITION SERVICES- ONCOLOGY DISTRESS SCREENING     Identified Concern and Score From Distress Screening: This patient has scored >= 6 on Nutrition question 1 and/or 2 on the Oncology Distress Screening questionaire. Nutritionist Referral recommended. See Onc Distress Screening Flowsheet     Date of Distress Screenin/5     Findings: Mary Jo reports that she has a very good appetite and she monitors her intake to keep her weight stable. Pt reports that her weight fluctuates around the holidays and depending on what anti depressants she is taking.      Intervention: None at this time, discussed current PO intake.      Follow-up Required: LARISSA Beckwith RD, LD  [phone]

## 2022-01-17 ENCOUNTER — HOSPITAL ENCOUNTER (OUTPATIENT)
Dept: GENERAL RADIOLOGY | Facility: CLINIC | Age: 77
Discharge: HOME | End: 2022-01-17
Attending: STUDENT IN AN ORGANIZED HEALTH CARE EDUCATION/TRAINING PROGRAM | Admitting: STUDENT IN AN ORGANIZED HEALTH CARE EDUCATION/TRAINING PROGRAM
Payer: COMMERCIAL

## 2022-01-17 DIAGNOSIS — D47.2 SMOLDERING MULTIPLE MYELOMA: ICD-10-CM

## 2022-01-17 DIAGNOSIS — F33.1 MODERATE EPISODE OF RECURRENT MAJOR DEPRESSIVE DISORDER (H): ICD-10-CM

## 2022-01-17 PROCEDURE — 77073 BONE LENGTH STUDIES: CPT

## 2022-02-07 ENCOUNTER — OFFICE VISIT (OUTPATIENT)
Dept: FAMILY MEDICINE | Facility: CLINIC | Age: 77
End: 2022-02-07

## 2022-02-07 VITALS
SYSTOLIC BLOOD PRESSURE: 112 MMHG | OXYGEN SATURATION: 97 % | HEART RATE: 79 BPM | DIASTOLIC BLOOD PRESSURE: 70 MMHG | TEMPERATURE: 97.3 F | BODY MASS INDEX: 25 KG/M2 | WEIGHT: 143.4 LBS

## 2022-02-07 DIAGNOSIS — F41.1 GAD (GENERALIZED ANXIETY DISORDER): ICD-10-CM

## 2022-02-07 DIAGNOSIS — R63.1 POLYDIPSIA: ICD-10-CM

## 2022-02-07 DIAGNOSIS — R53.83 EXHAUSTION: Primary | ICD-10-CM

## 2022-02-07 DIAGNOSIS — R07.89 ATYPICAL CHEST PAIN: ICD-10-CM

## 2022-02-07 DIAGNOSIS — D47.2 SMOLDERING MULTIPLE MYELOMA: ICD-10-CM

## 2022-02-07 PROCEDURE — 93000 ELECTROCARDIOGRAM COMPLETE: CPT | Performed by: FAMILY MEDICINE

## 2022-02-07 PROCEDURE — 99215 OFFICE O/P EST HI 40 MIN: CPT | Performed by: FAMILY MEDICINE

## 2022-02-07 NOTE — PROGRESS NOTES
Grace Camargo is a 76 year old patient who presents to clinic for evaluation.  Reports 4 days ago had mild intermitted stabbing type chest pain.  They resolved.  They were not exertional.  No associated SOB, diaphoresis or nausea.  2 days ago began feeling fine in the morning but then becomes thirsty and exhausted as day progresses.  This occurred yesterday and today as well.  No CP or SOB currently.  No fever, chills.  Unsure if she was having palpitations.  She has a lot of anxiety ongoing due to immense family stressors.  No other new symptoms.        Review of Systems   Constitutional, HEENT, cardiovascular, pulmonary, GI, , musculoskeletal, neuro, skin, endocrine and psych systems are negative, except as otherwise noted.      Objective    /70   Pulse 79   Temp 97.3  F (36.3  C)   Wt 65 kg (143 lb 6.4 oz)   LMP 12/04/1989   SpO2 97%   BMI 25.00 kg/m      General: Well appearing, NAD  HEENT: Clear  Heart: RRR, no murmur  Chest: Lungs CTAB  Skin: Clear  Psych: normal mood and affect        EKG - Reviewed and interpreted by me appears normal, NSR, normal axis, normal intervals, no acute ST/T changes c/w ischemia, no LVH by voltage criteria, unchanged from previous tracings    Exhaustion  Uncertain cause.  ekg normal.  Await labs  - TSH reflex to T4 ([facility])  - Hemoglobin A1C ([facility])  - Comp. Metabolic Panel (14) ([facility])    Polydipsia  See above  - TSH reflex to T4 ([facility])  - Hemoglobin A1C ([facility])  - Comp. Metabolic Panel (14) ([facility])    Atypical chest pain  EKG reassuring.  Currently without any chest pain.  If recurs advise ED.  Stress test and holter monitor can also be considered if intermittent transient symptoms.  Close follow up.  - EKG 12-lead complete w/read - Clinics    GERSON (generalized anxiety disorder)  Likely contributing    Smoldering multiple myeloma (H)  Cont current oncology follow up    52 minutes total time including chart review, exam and face to face time,  and documentation.

## 2022-02-08 LAB — HBA1C MFR BLD: 6.1 % (ref 4.8–5.6)

## 2022-02-09 LAB
ALBUMIN SERPL-MCNC: 3.5 G/DL (ref 3.7–4.7)
ALBUMIN/GLOB SERPL: 0.6 {RATIO} (ref 1.2–2.2)
ALP SERPL-CCNC: 66 IU/L (ref 44–121)
ALT SERPL-CCNC: 13 IU/L (ref 0–32)
AST SERPL-CCNC: 25 IU/L (ref 0–40)
BILIRUB SERPL-MCNC: 0.4 MG/DL (ref 0–1.2)
BUN SERPL-MCNC: 27 MG/DL (ref 8–27)
BUN/CREATININE RATIO: 21 (ref 12–28)
CALCIUM SERPL-MCNC: 8.7 MG/DL (ref 8.7–10.3)
CHLORIDE SERPLBLD-SCNC: 100 MMOL/L (ref 96–106)
CREAT SERPL-MCNC: 1.29 MG/DL (ref 0.57–1)
EGFR IF AFRICN AM: 47 ML/MIN/1.73
EGFR IF NONAFRICN AM: 41 ML/MIN/1.73
GLOBULIN, TOTAL: 6.2 G/DL (ref 1.5–4.5)
GLUCOSE SERPL-MCNC: 97 MG/DL (ref 65–99)
POTASSIUM SERPL-SCNC: 4.5 MMOL/L (ref 3.5–5.2)
PROT SERPL-MCNC: 9.7 G/DL (ref 6–8.5)
SODIUM SERPL-SCNC: 132 MMOL/L (ref 134–144)
TOTAL CO2: 22 MMOL/L (ref 20–29)
TSH BLD-ACNC: 1.81 UIU/ML (ref 0.45–4.5)

## 2022-03-02 DIAGNOSIS — E78.00 HYPERCHOLESTEROLEMIA: ICD-10-CM

## 2022-03-02 RX ORDER — SIMVASTATIN 20 MG
TABLET ORAL
Qty: 90 TABLET | Refills: 0 | Status: SHIPPED | OUTPATIENT
Start: 2022-03-02 | End: 2022-08-16

## 2022-03-02 NOTE — TELEPHONE ENCOUNTER
simvastatin (ZOCOR) 20 MG    LOV 2/7/22 not related    Last lipid 11/2020    Patient aware of need to set up fasting lab appt soon    Recent Labs   Lab Test 11/20/20  1019 12/04/18  1119   CHOL 178 188   HDL 41 43   * 118*   TRIG 138 136

## 2022-03-29 DIAGNOSIS — F33.1 MODERATE EPISODE OF RECURRENT MAJOR DEPRESSIVE DISORDER (H): ICD-10-CM

## 2022-03-29 DIAGNOSIS — F43.10 PTSD (POST-TRAUMATIC STRESS DISORDER): ICD-10-CM

## 2022-03-29 DIAGNOSIS — F41.1 GAD (GENERALIZED ANXIETY DISORDER): ICD-10-CM

## 2022-03-29 RX ORDER — CITALOPRAM HYDROBROMIDE 40 MG/1
TABLET ORAL
Qty: 90 TABLET | Refills: 0 | Status: SHIPPED | OUTPATIENT
Start: 2022-03-29 | End: 2022-03-30

## 2022-03-29 NOTE — TELEPHONE ENCOUNTER
citalopram (CELEXA) 40 MG    LOV and labs 2/7/22    Has appt 3/30/22    PHQ 12/4/2018 11/4/2020 9/28/2021   PHQ-9 Total Score 14 8 11   Q9: Thoughts of better off dead/self-harm past 2 weeks More than half the days Several days Several days

## 2022-03-30 ENCOUNTER — OFFICE VISIT (OUTPATIENT)
Dept: FAMILY MEDICINE | Facility: CLINIC | Age: 77
End: 2022-03-30

## 2022-03-30 VITALS
OXYGEN SATURATION: 98 % | HEIGHT: 63 IN | DIASTOLIC BLOOD PRESSURE: 60 MMHG | SYSTOLIC BLOOD PRESSURE: 103 MMHG | RESPIRATION RATE: 18 BRPM | BODY MASS INDEX: 25.52 KG/M2 | WEIGHT: 144 LBS | HEART RATE: 93 BPM

## 2022-03-30 DIAGNOSIS — F43.10 PTSD (POST-TRAUMATIC STRESS DISORDER): ICD-10-CM

## 2022-03-30 DIAGNOSIS — F33.1 MODERATE EPISODE OF RECURRENT MAJOR DEPRESSIVE DISORDER (H): ICD-10-CM

## 2022-03-30 DIAGNOSIS — F41.1 GAD (GENERALIZED ANXIETY DISORDER): ICD-10-CM

## 2022-03-30 DIAGNOSIS — M70.61 TROCHANTERIC BURSITIS OF RIGHT HIP: Primary | ICD-10-CM

## 2022-03-30 DIAGNOSIS — L82.0 INFLAMED SEBORRHEIC KERATOSIS: ICD-10-CM

## 2022-03-30 PROCEDURE — 17110 DESTRUCTION B9 LES UP TO 14: CPT | Performed by: FAMILY MEDICINE

## 2022-03-30 PROCEDURE — 99214 OFFICE O/P EST MOD 30 MIN: CPT | Mod: 25 | Performed by: FAMILY MEDICINE

## 2022-03-30 PROCEDURE — 20610 DRAIN/INJ JOINT/BURSA W/O US: CPT | Mod: 59 | Performed by: FAMILY MEDICINE

## 2022-03-30 RX ORDER — CITALOPRAM HYDROBROMIDE 40 MG/1
40 TABLET ORAL DAILY
Qty: 90 TABLET | Refills: 0 | Status: SHIPPED | OUTPATIENT
Start: 2022-03-30 | End: 2022-06-29

## 2022-03-30 RX ORDER — TRIAMCINOLONE ACETONIDE 40 MG/ML
40 INJECTION, SUSPENSION INTRA-ARTICULAR; INTRAMUSCULAR ONCE
Status: COMPLETED | OUTPATIENT
Start: 2022-03-30 | End: 2022-03-30

## 2022-03-30 RX ADMIN — TRIAMCINOLONE ACETONIDE 40 MG: 40 INJECTION, SUSPENSION INTRA-ARTICULAR; INTRAMUSCULAR at 17:29

## 2022-03-30 NOTE — PROGRESS NOTES
"  Grace Camargo is a 76 year old patient who presents to clinic for evaluation.    Right hip pain similar to previous.  Has done well with trochanteric bursa injections in the past.      Irritated skin lesion on back.  In bra line.          Review of Systems   Constitutional, HEENT, cardiovascular, pulmonary, gi and gu systems are negative, except as otherwise noted.      Objective    /60   Pulse 93   Resp 18   Ht 1.6 m (5' 3\")   Wt 65.3 kg (144 lb)   LMP 12/04/1989   SpO2 98%   BMI 25.51 kg/m      General: Well appearing, NAD  MSK: tender over right trochanteric bursa  Skin: inflamed raised skin lesion mid back  Psych: normal mood and affect        PROCEDURE:    After the potential risks were discussed, verbal consent was obtained.  Approximately 15 seconds of freeze time was applied to the lesion using open spray technique with a 1 mm margin using 3 freeze-thaw cycles.  There were no immediate complications.  The patient tolerated the procedure well.  Frequent use of vaseline was recommended to promote good healing.  Signs of infection or complications were discussed and understood.    PROCEDURE:    The potential risks of the procedure were discussed and understood.  Verbal consent was obtained.  All questions were answered to the best of my ability. With the patient in the lateral recumbent position with painful side up, the point of maximal tenderness was palpated.  The area was cleaned with alcohol. Topical anesthesia with Gebauer's eythl chloride was used.  Using a 27 gauge 1.25 inch needle, the needle was inserted perpendicular to the skin down to bone.  It was withdrawn 2-3 mm and a total of 5 cc of 1% lidocaine and 40 mg of Kenalog were injected without resistance.  There was a good lidocaine response.  The patient tolerated the procedure well.  There were no immediate complications. Anticipatory guidance regarding the injection was given and understood. Red flags were discussed.  All " questions were answered when able.      Trochanteric bursitis of right hip  Procedure as above    Inflamed seborrheic keratosis  Follow up if not resolved after cryo    Moderate episode of recurrent major depressive disorder (H)  stable/controlled. Cont current medication(s) and treatment    PTSD (post-traumatic stress disorder)  stable/controlled. Cont current medication(s) and treatment    GERSON (generalized anxiety disorder)  stable/controlled. Cont current medication(s) and treatment

## 2022-04-06 ENCOUNTER — ONCOLOGY VISIT (OUTPATIENT)
Dept: ONCOLOGY | Facility: CLINIC | Age: 77
End: 2022-04-06
Attending: STUDENT IN AN ORGANIZED HEALTH CARE EDUCATION/TRAINING PROGRAM
Payer: COMMERCIAL

## 2022-04-06 ENCOUNTER — APPOINTMENT (OUTPATIENT)
Dept: LAB | Facility: CLINIC | Age: 77
End: 2022-04-06
Attending: STUDENT IN AN ORGANIZED HEALTH CARE EDUCATION/TRAINING PROGRAM
Payer: COMMERCIAL

## 2022-04-06 VITALS
BODY MASS INDEX: 25.26 KG/M2 | TEMPERATURE: 98 F | HEART RATE: 81 BPM | OXYGEN SATURATION: 99 % | WEIGHT: 142.6 LBS | RESPIRATION RATE: 18 BRPM | DIASTOLIC BLOOD PRESSURE: 76 MMHG | SYSTOLIC BLOOD PRESSURE: 113 MMHG

## 2022-04-06 DIAGNOSIS — D47.2 SMOLDERING MULTIPLE MYELOMA: ICD-10-CM

## 2022-04-06 LAB
ALBUMIN SERPL-MCNC: 2.9 G/DL (ref 3.4–5)
ALP SERPL-CCNC: 61 U/L (ref 40–150)
ALT SERPL W P-5'-P-CCNC: 23 U/L (ref 0–50)
ANION GAP SERPL CALCULATED.3IONS-SCNC: 4 MMOL/L (ref 3–14)
AST SERPL W P-5'-P-CCNC: 17 U/L (ref 0–45)
BASOPHILS # BLD AUTO: 0.1 10E3/UL (ref 0–0.2)
BASOPHILS NFR BLD AUTO: 1 %
BILIRUB SERPL-MCNC: 0.3 MG/DL (ref 0.2–1.3)
BUN SERPL-MCNC: 35 MG/DL (ref 7–30)
CALCIUM SERPL-MCNC: 8.7 MG/DL (ref 8.5–10.1)
CHLORIDE BLD-SCNC: 104 MMOL/L (ref 94–109)
CO2 SERPL-SCNC: 26 MMOL/L (ref 20–32)
CREAT SERPL-MCNC: 1.17 MG/DL (ref 0.52–1.04)
EOSINOPHIL # BLD AUTO: 0.2 10E3/UL (ref 0–0.7)
EOSINOPHIL NFR BLD AUTO: 2 %
ERYTHROCYTE [DISTWIDTH] IN BLOOD BY AUTOMATED COUNT: 14.1 % (ref 10–15)
GFR SERPL CREATININE-BSD FRML MDRD: 48 ML/MIN/1.73M2
GLUCOSE BLD-MCNC: 100 MG/DL (ref 70–99)
HCT VFR BLD AUTO: 31.6 % (ref 35–47)
HGB BLD-MCNC: 10.4 G/DL (ref 11.7–15.7)
IMM GRANULOCYTES # BLD: 0.1 10E3/UL
IMM GRANULOCYTES NFR BLD: 1 %
LYMPHOCYTES # BLD AUTO: 1.5 10E3/UL (ref 0.8–5.3)
LYMPHOCYTES NFR BLD AUTO: 21 %
MCH RBC QN AUTO: 32 PG (ref 26.5–33)
MCHC RBC AUTO-ENTMCNC: 32.9 G/DL (ref 31.5–36.5)
MCV RBC AUTO: 97 FL (ref 78–100)
MONOCYTES # BLD AUTO: 1.1 10E3/UL (ref 0–1.3)
MONOCYTES NFR BLD AUTO: 15 %
NEUTROPHILS # BLD AUTO: 4.3 10E3/UL (ref 1.6–8.3)
NEUTROPHILS NFR BLD AUTO: 60 %
NRBC # BLD AUTO: 0 10E3/UL
NRBC BLD AUTO-RTO: 0 /100
PLATELET # BLD AUTO: 301 10E3/UL (ref 150–450)
POTASSIUM BLD-SCNC: 4.1 MMOL/L (ref 3.4–5.3)
PROT SERPL-MCNC: 10.7 G/DL (ref 6.8–8.8)
RBC # BLD AUTO: 3.25 10E6/UL (ref 3.8–5.2)
SODIUM SERPL-SCNC: 134 MMOL/L (ref 133–144)
TOTAL PROTEIN SERUM FOR ELP: 10.5 G/DL (ref 6.8–8.8)
WBC # BLD AUTO: 7.2 10E3/UL (ref 4–11)

## 2022-04-06 PROCEDURE — 85004 AUTOMATED DIFF WBC COUNT: CPT | Performed by: STUDENT IN AN ORGANIZED HEALTH CARE EDUCATION/TRAINING PROGRAM

## 2022-04-06 PROCEDURE — 84155 ASSAY OF PROTEIN SERUM: CPT | Performed by: STUDENT IN AN ORGANIZED HEALTH CARE EDUCATION/TRAINING PROGRAM

## 2022-04-06 PROCEDURE — 80053 COMPREHEN METABOLIC PANEL: CPT | Performed by: STUDENT IN AN ORGANIZED HEALTH CARE EDUCATION/TRAINING PROGRAM

## 2022-04-06 PROCEDURE — 99213 OFFICE O/P EST LOW 20 MIN: CPT | Performed by: STUDENT IN AN ORGANIZED HEALTH CARE EDUCATION/TRAINING PROGRAM

## 2022-04-06 PROCEDURE — G0463 HOSPITAL OUTPT CLINIC VISIT: HCPCS

## 2022-04-06 PROCEDURE — 84165 PROTEIN E-PHORESIS SERUM: CPT | Mod: TC | Performed by: PATHOLOGY

## 2022-04-06 PROCEDURE — 86334 IMMUNOFIX E-PHORESIS SERUM: CPT | Performed by: PATHOLOGY

## 2022-04-06 PROCEDURE — 36415 COLL VENOUS BLD VENIPUNCTURE: CPT | Performed by: STUDENT IN AN ORGANIZED HEALTH CARE EDUCATION/TRAINING PROGRAM

## 2022-04-06 PROCEDURE — 83521 IG LIGHT CHAINS FREE EACH: CPT | Performed by: STUDENT IN AN ORGANIZED HEALTH CARE EDUCATION/TRAINING PROGRAM

## 2022-04-06 ASSESSMENT — PAIN SCALES - GENERAL: PAINLEVEL: NO PAIN (0)

## 2022-04-06 NOTE — NURSING NOTE
"Oncology Rooming Note    April 6, 2022 3:59 PM   Mary Jo Mcleod is a 76 year old female who presents for:    Chief Complaint   Patient presents with     Blood Draw     Labs drawn via  by RN in lab. VS taken.      Oncology Clinic Visit     Smoldering multiple myeloma (H)     Initial Vitals: /76 (BP Location: Right arm, Patient Position: Sitting, Cuff Size: Adult Regular)   Pulse 81   Temp 98  F (36.7  C) (Oral)   Resp 18   Wt 64.7 kg (142 lb 9.6 oz)   LMP 12/04/1989   SpO2 99%   BMI 25.26 kg/m   Estimated body mass index is 25.26 kg/m  as calculated from the following:    Height as of 3/30/22: 1.6 m (5' 3\").    Weight as of this encounter: 64.7 kg (142 lb 9.6 oz). Body surface area is 1.7 meters squared.  No Pain (0) Comment: Data Unavailable   Patient's last menstrual period was 12/04/1989.  Allergies reviewed: Yes  Medications reviewed: Yes    Medications: Medication refills not needed today.  Pharmacy name entered into [facility]:    [id number] [place] [facility] [address]  [facility] [place] [facility] [address] [facility] [address] [facility] - [place] [address] [address]    Clinical concerns: States that she feels great.        María Schneider LPN April 6, 2022 4:00 PM                "

## 2022-04-06 NOTE — NURSING NOTE
Chief Complaint   Patient presents with     Blood Draw     Labs drawn via  by RN in lab. VS taken.      Labs collected from venipuncture by RN. Vitals taken. Checked in for appointment(s).    Viki Arnett RN

## 2022-04-06 NOTE — PROGRESS NOTES
Oncologic Hx:   -IgG Kappa SM previously seen by Dr. Mina at [facility]. Dx in Nov 2020 on routine blood work which showed elevated globulin level and M spike of 2.8. At that time her her Cr was 1.2, calcium WNL, and Hgb of 11.7. Serum kappa FLC was elevated at 3.3. PET/CT 1/6/2021 without disease. She underwent BM bx on 2/8/2021 showing 24% plasma cells with gains of chromosomes 5,9, and 15.    - Offered rafael 25 mg every day but declined.       Interval Hx: Mary Jo reports she is feeling 'great'. She denies any bone pains or new sx.      A comprehensive review of systems was completed and negative except as described above.  Physical Exam:   /76 (BP Location: Right arm, Patient Position: Sitting, Cuff Size: Adult Regular)   Pulse 81   Temp 98  F (36.7  C) (Oral)   Resp 18   Wt 64.7 kg (142 lb 9.6 oz)   LMP 12/04/1989   SpO2 99%   BMI 25.26 kg/m    Constitutional: NAD  Eyes: no scleral icterus  ENT: no oral lesions  Lymph: no cervical, supraclavicular, axillary LAD  Pulm: CTAB  CV: RRR, no m/r/g  GI: bowel sounds present, soft and nontender to palpation  MSK: No edema in the extremities  Neuro: alert, conversant, normal gait  Psych: appropriate mood and affect      Bone Scan 1/17/2022  . There are a few lucencies in the proximal right humerus which I  suspect are related to a trabecular pattern. Lucencies from myeloma  difficult to exclude but I think less likely.  2. Otherwise unremarkable. No compression fractures.        Assessment and Plan: Mary Jo has smoldering myeloma. Her M spike is now >4. I have recommended a bone marrow biopsy to see if she has transitioned to myeloma. She is unwilling to do this as she states she is 'feeling better than ever.' I again offered her revlimid today as this has been studied in smoldering myeloma and can delay progression. She is not willing to do this. I will increase the interval of her monitoring for FLC, SPEP, EDITA, CMP, and CBC. Creatinine and Hgb are stable  but her albumin has worsened. Will again recommend bone marrow bx at next visit or call her with her next lab results if worsening to recommend this again.     Depression: continues on celexa. She finds exercise helpful.       Ale Hanks MD PhD

## 2022-04-06 NOTE — LETTER
4/6/2022         RE: Mary Jo Mcleod  [address]  [place] [place] [address]        Dear Colleague,    Thank you for referring your patient, Mary Jo Mcleod, to the [facility]. Please see a copy of my visit note below.          Oncologic Hx:   -IgG Kappa SM previously seen by Dr. Mina at [facility]. Dx in Nov 2020 on routine blood work which showed elevated globulin level and M spike of 2.8. At that time her her Cr was 1.2, calcium WNL, and Hgb of 11.7. Serum kappa FLC was elevated at 3.3. PET/CT 1/6/2021 without disease. She underwent BM bx on 2/8/2021 showing 24% plasma cells with gains of chromosomes 5,9, and 15.    - Offered rafael 25 mg every day but declined.       Interval Hx: Mary Jo reports she is feeling 'great'. She denies any bone pains or new sx.      A comprehensive review of systems was completed and negative except as described above.  Physical Exam:   /76 (BP Location: Right arm, Patient Position: Sitting, Cuff Size: Adult Regular)   Pulse 81   Temp 98  F (36.7  C) (Oral)   Resp 18   Wt 64.7 kg (142 lb 9.6 oz)   LMP 12/04/1989   SpO2 99%   BMI 25.26 kg/m    Constitutional: NAD  Eyes: no scleral icterus  ENT: no oral lesions  Lymph: no cervical, supraclavicular, axillary LAD  Pulm: CTAB  CV: RRR, no m/r/g  GI: bowel sounds present, soft and nontender to palpation  MSK: No edema in the extremities  Neuro: alert, conversant, normal gait  Psych: appropriate mood and affect      Bone Scan 1/17/2022  . There are a few lucencies in the proximal right humerus which I  suspect are related to a trabecular pattern. Lucencies from myeloma  difficult to exclude but I think less likely.  2. Otherwise unremarkable. No compression fractures.        Assessment and Plan: Mary Jo has smoldering myeloma. Her M spike is now >4. I have recommended a bone marrow biopsy to see if she has transitioned to myeloma. She is unwilling to do this as she states she is 'feeling  better than ever.' I again offered her revlimid today as this has been studied in smoldering myeloma and can delay progression. She is not willing to do this. I will increase the interval of her monitoring for FLC, SPEP, EDITA, CMP, and CBC. Creatinine and Hgb are stable but her albumin has worsened. Will again recommend bone marrow bx at next visit or call her with her next lab results if worsening to recommend this again.     Depression: continues on celexa. She finds exercise helpful.             Again, thank you for allowing me to participate in the care of your patient.      Sincerely,    Ale Hanks MD

## 2022-04-07 LAB
ALBUMIN SERPL ELPH-MCNC: 4.1 G/DL (ref 3.7–5.1)
ALPHA1 GLOB SERPL ELPH-MCNC: 0.3 G/DL (ref 0.2–0.4)
ALPHA2 GLOB SERPL ELPH-MCNC: 0.9 G/DL (ref 0.5–0.9)
B-GLOBULIN SERPL ELPH-MCNC: 0.7 G/DL (ref 0.6–1)
GAMMA GLOB SERPL ELPH-MCNC: 4.5 G/DL (ref 0.7–1.6)
KAPPA LC FREE SER-MCNC: 2.8 MG/DL (ref 0.33–1.94)
KAPPA LC FREE/LAMBDA FREE SER NEPH: 3.78 {RATIO} (ref 0.26–1.65)
LAMBDA LC FREE SERPL-MCNC: 0.74 MG/DL (ref 0.57–2.63)
M PROTEIN SERPL ELPH-MCNC: 4.3 G/DL
PROT PATTERN SERPL ELPH-IMP: ABNORMAL
PROT PATTERN SERPL IFE-IMP: NORMAL

## 2022-04-07 PROCEDURE — 86334 IMMUNOFIX E-PHORESIS SERUM: CPT | Mod: 26 | Performed by: PATHOLOGY

## 2022-04-07 PROCEDURE — 84165 PROTEIN E-PHORESIS SERUM: CPT | Mod: 26 | Performed by: PATHOLOGY

## 2022-05-05 ENCOUNTER — OFFICE VISIT (OUTPATIENT)
Dept: FAMILY MEDICINE | Facility: CLINIC | Age: 77
End: 2022-05-05

## 2022-05-05 ENCOUNTER — LAB (OUTPATIENT)
Dept: LAB | Facility: CLINIC | Age: 77
End: 2022-05-05
Payer: COMMERCIAL

## 2022-05-05 VITALS
BODY MASS INDEX: 25.15 KG/M2 | WEIGHT: 142 LBS | HEART RATE: 98 BPM | DIASTOLIC BLOOD PRESSURE: 72 MMHG | OXYGEN SATURATION: 99 % | SYSTOLIC BLOOD PRESSURE: 118 MMHG | RESPIRATION RATE: 16 BRPM

## 2022-05-05 DIAGNOSIS — M54.41 ACUTE BILATERAL LOW BACK PAIN WITH BILATERAL SCIATICA: Primary | ICD-10-CM

## 2022-05-05 DIAGNOSIS — R20.2 NUMBNESS AND TINGLING OF BOTH LEGS: ICD-10-CM

## 2022-05-05 DIAGNOSIS — D47.2 SMOLDERING MULTIPLE MYELOMA: ICD-10-CM

## 2022-05-05 DIAGNOSIS — R93.7 ABNORMAL X-RAY OF LUMBAR SPINE: ICD-10-CM

## 2022-05-05 DIAGNOSIS — M54.42 ACUTE BILATERAL LOW BACK PAIN WITH BILATERAL SCIATICA: Primary | ICD-10-CM

## 2022-05-05 DIAGNOSIS — R20.0 NUMBNESS AND TINGLING OF BOTH LEGS: ICD-10-CM

## 2022-05-05 LAB
BASOPHILS # BLD AUTO: 0.1 10E3/UL (ref 0–0.2)
BASOPHILS NFR BLD AUTO: 1 %
EOSINOPHIL # BLD AUTO: 0 10E3/UL (ref 0–0.7)
EOSINOPHIL NFR BLD AUTO: 1 %
ERYTHROCYTE [DISTWIDTH] IN BLOOD BY AUTOMATED COUNT: 14.2 % (ref 10–15)
HCT VFR BLD AUTO: 36 % (ref 35–47)
HGB BLD-MCNC: 11.4 G/DL (ref 11.7–15.7)
IMM GRANULOCYTES # BLD: 0.1 10E3/UL
IMM GRANULOCYTES NFR BLD: 1 %
LYMPHOCYTES # BLD AUTO: 1.7 10E3/UL (ref 0.8–5.3)
LYMPHOCYTES NFR BLD AUTO: 34 %
MCH RBC QN AUTO: 32.8 PG (ref 26.5–33)
MCHC RBC AUTO-ENTMCNC: 31.7 G/DL (ref 31.5–36.5)
MCV RBC AUTO: 103 FL (ref 78–100)
MONOCYTES # BLD AUTO: 0.5 10E3/UL (ref 0–1.3)
MONOCYTES NFR BLD AUTO: 9 %
NEUTROPHILS # BLD AUTO: 2.7 10E3/UL (ref 1.6–8.3)
NEUTROPHILS NFR BLD AUTO: 54 %
NRBC # BLD AUTO: 0 10E3/UL
NRBC BLD AUTO-RTO: 0 /100
PLATELET # BLD AUTO: 340 10E3/UL (ref 150–450)
RBC # BLD AUTO: 3.48 10E6/UL (ref 3.8–5.2)
TOTAL PROTEIN SERUM FOR ELP: 10.4 G/DL (ref 6.8–8.8)
WBC # BLD AUTO: 5.1 10E3/UL (ref 4–11)

## 2022-05-05 PROCEDURE — 85025 COMPLETE CBC W/AUTO DIFF WBC: CPT

## 2022-05-05 PROCEDURE — 72100 X-RAY EXAM L-S SPINE 2/3 VWS: CPT | Performed by: NURSE PRACTITIONER

## 2022-05-05 PROCEDURE — 80053 COMPREHEN METABOLIC PANEL: CPT

## 2022-05-05 PROCEDURE — 99214 OFFICE O/P EST MOD 30 MIN: CPT | Performed by: NURSE PRACTITIONER

## 2022-05-05 PROCEDURE — 86334 IMMUNOFIX E-PHORESIS SERUM: CPT

## 2022-05-05 PROCEDURE — 84165 PROTEIN E-PHORESIS SERUM: CPT

## 2022-05-05 PROCEDURE — 36415 COLL VENOUS BLD VENIPUNCTURE: CPT

## 2022-05-05 PROCEDURE — 83521 IG LIGHT CHAINS FREE EACH: CPT

## 2022-05-05 PROCEDURE — 84155 ASSAY OF PROTEIN SERUM: CPT

## 2022-05-05 RX ORDER — CYCLOBENZAPRINE HCL 5 MG
5 TABLET ORAL 2 TIMES DAILY PRN
Qty: 15 TABLET | Refills: 1 | Status: SHIPPED | OUTPATIENT
Start: 2022-05-05 | End: 2022-05-28

## 2022-05-05 NOTE — PROGRESS NOTES
Problem(s) Oriented visit        SUBJECTIVE:                                                    Mary Jo Mcleod is a 76 year old female who presents to clinic today for the following health issues :    Bilateral low back pain worse on left side. Started Sunday (4 days ago) without any known trauma or injury. History of right hip trochanteric bursitis - usually gets injections for this. Pain radiating down backs of both legs all the way down to feet. Feeling weak and needing to use cane and umbrella (one on each side) to steady herself. Does not usually have to use any assistive device for ambulation. Does report some numbness in pelvic/groin area, numbness/tingling in both legs. History of occasional fecal incontinence prior to this - not worsening. No urinary incontinence. Ice packs, stretching helps pain. Does not take Ibuprofen much since it causes upset stomach. Tylenol has not helped in the past.     Problem list, Medication list, Allergies, and Medical/Social/Surgical histories reviewed in EPIC and updated as appropriate.   Additional history: as documented    ROS:  6 point ROS completed and negative except noted above, including Gen, CV, Resp, GI, MS, Neuro    OBJECTIVE:                                                    /72   Pulse 98   Resp 16   Wt 64.4 kg (142 lb)   LMP 12/04/1989   SpO2 99%   BMI 25.15 kg/m    Body mass index is 25.15 kg/m .   GENERAL: Elderly female, alert, active, no acute distress  RESP: lungs clear to auscultation - no rales, rhonchi or wheezes  CV: regular rates and rhythm, normal S1 S2, no S3 or S4, no murmur, click or rub, peripheral pulses strong and no peripheral edema  MS: bilateral lower back/buttock pain, strength 3/5 bilateral lower extremities  SKIN: no suspicious lesions or rashes  NEURO: sensory exam grossly normal, mentation intact, cranial nerves 2-12 intact and DTR's normal and symmetric   PSYCH: normal affect & mood       ASSESSMENT/PLAN:                                                       Mary Jo was seen today for musculoskeletal problem.    Diagnoses and all orders for this visit:    Acute bilateral low back pain with bilateral sciatica  -     X-ray lumbar spine 2-3 views*  -     Radiology Referral; Future  -     cyclobenzaprine (FLEXERIL) 5 MG tablet; Take 1 tablet (5 mg) by mouth 2 times daily as needed for muscle spasms    Numbness and tingling of both legs    Abnormal x-ray of lumbar spine    Lumbar spine x-ray done in clinic. Shows some calcification at base of spine - concerning due to patient having multiple myeloma - concern for calcified uterine fibroids versus bone metastases. Plan for lumbar spine MRI to better evaluate.    MARLON Barton CNP  [facility]  Family Practice  [place]  [phone]    For any issues my office # is [phone]

## 2022-05-06 ENCOUNTER — HOSPITAL ENCOUNTER (EMERGENCY)
Facility: CLINIC | Age: 77
Discharge: HOME | End: 2022-05-06
Attending: EMERGENCY MEDICINE | Admitting: EMERGENCY MEDICINE
Payer: COMMERCIAL

## 2022-05-06 ENCOUNTER — APPOINTMENT (OUTPATIENT)
Dept: MRI IMAGING | Facility: CLINIC | Age: 77
End: 2022-05-06
Attending: EMERGENCY MEDICINE
Payer: COMMERCIAL

## 2022-05-06 VITALS
WEIGHT: 140 LBS | BODY MASS INDEX: 24.8 KG/M2 | SYSTOLIC BLOOD PRESSURE: 101 MMHG | HEART RATE: 96 BPM | TEMPERATURE: 97.9 F | OXYGEN SATURATION: 99 % | RESPIRATION RATE: 20 BRPM | HEIGHT: 63 IN | DIASTOLIC BLOOD PRESSURE: 48 MMHG

## 2022-05-06 DIAGNOSIS — S32.050A CLOSED COMPRESSION FRACTURE OF L5 LUMBAR VERTEBRA, INITIAL ENCOUNTER (H): ICD-10-CM

## 2022-05-06 DIAGNOSIS — C90.00 MULTIPLE MYELOMA, REMISSION STATUS UNSPECIFIED (H): ICD-10-CM

## 2022-05-06 DIAGNOSIS — M54.42 ACUTE BILATERAL LOW BACK PAIN WITH LEFT-SIDED SCIATICA: ICD-10-CM

## 2022-05-06 DIAGNOSIS — N28.1 RENAL CYST: ICD-10-CM

## 2022-05-06 LAB
ALBUMIN SERPL ELPH-MCNC: 4.1 G/DL (ref 3.7–5.1)
ALBUMIN SERPL-MCNC: 2.8 G/DL (ref 3.4–5)
ALBUMIN SERPL-MCNC: 3.1 G/DL (ref 3.4–5)
ALP SERPL-CCNC: 55 U/L (ref 40–150)
ALP SERPL-CCNC: 69 U/L (ref 40–150)
ALPHA1 GLOB SERPL ELPH-MCNC: 0.4 G/DL (ref 0.2–0.4)
ALPHA2 GLOB SERPL ELPH-MCNC: 0.9 G/DL (ref 0.5–0.9)
ALT SERPL W P-5'-P-CCNC: 24 U/L (ref 0–50)
ALT SERPL W P-5'-P-CCNC: 25 U/L (ref 0–50)
ANION GAP SERPL CALCULATED.3IONS-SCNC: 3 MMOL/L (ref 3–14)
ANION GAP SERPL CALCULATED.3IONS-SCNC: 5 MMOL/L (ref 3–14)
AST SERPL W P-5'-P-CCNC: 21 U/L (ref 0–45)
AST SERPL W P-5'-P-CCNC: 23 U/L (ref 0–45)
B-GLOBULIN SERPL ELPH-MCNC: 0.7 G/DL (ref 0.6–1)
BASOPHILS # BLD AUTO: 0 10E3/UL (ref 0–0.2)
BASOPHILS NFR BLD AUTO: 1 %
BILIRUB SERPL-MCNC: 0.5 MG/DL (ref 0.2–1.3)
BILIRUB SERPL-MCNC: 0.7 MG/DL (ref 0.2–1.3)
BUN SERPL-MCNC: 23 MG/DL (ref 7–30)
BUN SERPL-MCNC: 26 MG/DL (ref 7–30)
CALCIUM SERPL-MCNC: 8.8 MG/DL (ref 8.5–10.1)
CALCIUM SERPL-MCNC: 8.9 MG/DL (ref 8.5–10.1)
CHLORIDE BLD-SCNC: 100 MMOL/L (ref 94–109)
CHLORIDE BLD-SCNC: 102 MMOL/L (ref 94–109)
CO2 SERPL-SCNC: 25 MMOL/L (ref 20–32)
CO2 SERPL-SCNC: 27 MMOL/L (ref 20–32)
CREAT SERPL-MCNC: 1.12 MG/DL (ref 0.52–1.04)
CREAT SERPL-MCNC: 1.12 MG/DL (ref 0.52–1.04)
EOSINOPHIL # BLD AUTO: 0 10E3/UL (ref 0–0.7)
EOSINOPHIL NFR BLD AUTO: 1 %
ERYTHROCYTE [DISTWIDTH] IN BLOOD BY AUTOMATED COUNT: 14.3 % (ref 10–15)
GAMMA GLOB SERPL ELPH-MCNC: 4.2 G/DL (ref 0.7–1.6)
GFR SERPL CREATININE-BSD FRML MDRD: 51 ML/MIN/1.73M2
GFR SERPL CREATININE-BSD FRML MDRD: 51 ML/MIN/1.73M2
GLUCOSE BLD-MCNC: 184 MG/DL (ref 70–99)
GLUCOSE BLD-MCNC: 188 MG/DL (ref 70–99)
HCT VFR BLD AUTO: 32.2 % (ref 35–47)
HGB BLD-MCNC: 10.3 G/DL (ref 11.7–15.7)
IMM GRANULOCYTES # BLD: 0.1 10E3/UL
IMM GRANULOCYTES NFR BLD: 1 %
KAPPA LC FREE SER-MCNC: 7.03 MG/DL (ref 0.33–1.94)
KAPPA LC FREE/LAMBDA FREE SER NEPH: 7.64 {RATIO} (ref 0.26–1.65)
LAMBDA LC FREE SERPL-MCNC: 0.92 MG/DL (ref 0.57–2.63)
LYMPHOCYTES # BLD AUTO: 2 10E3/UL (ref 0.8–5.3)
LYMPHOCYTES NFR BLD AUTO: 38 %
M PROTEIN SERPL ELPH-MCNC: 3.9 G/DL
MCH RBC QN AUTO: 32.8 PG (ref 26.5–33)
MCHC RBC AUTO-ENTMCNC: 32 G/DL (ref 31.5–36.5)
MCV RBC AUTO: 103 FL (ref 78–100)
MONOCYTES # BLD AUTO: 0.5 10E3/UL (ref 0–1.3)
MONOCYTES NFR BLD AUTO: 9 %
NEUTROPHILS # BLD AUTO: 2.8 10E3/UL (ref 1.6–8.3)
NEUTROPHILS NFR BLD AUTO: 50 %
NRBC # BLD AUTO: 0 10E3/UL
NRBC BLD AUTO-RTO: 0 /100
PLATELET # BLD AUTO: 280 10E3/UL (ref 150–450)
POTASSIUM BLD-SCNC: 3.6 MMOL/L (ref 3.4–5.3)
POTASSIUM BLD-SCNC: 4.3 MMOL/L (ref 3.4–5.3)
PROT PATTERN SERPL ELPH-IMP: ABNORMAL
PROT PATTERN SERPL IFE-IMP: NORMAL
PROT SERPL-MCNC: 10 G/DL (ref 6.8–8.8)
PROT SERPL-MCNC: 11 G/DL (ref 6.8–8.8)
RBC # BLD AUTO: 3.14 10E6/UL (ref 3.8–5.2)
SODIUM SERPL-SCNC: 130 MMOL/L (ref 133–144)
SODIUM SERPL-SCNC: 132 MMOL/L (ref 133–144)
WBC # BLD AUTO: 5.4 10E3/UL (ref 4–11)

## 2022-05-06 PROCEDURE — 72148 MRI LUMBAR SPINE W/O DYE: CPT

## 2022-05-06 PROCEDURE — 85025 COMPLETE CBC W/AUTO DIFF WBC: CPT | Performed by: EMERGENCY MEDICINE

## 2022-05-06 PROCEDURE — 84155 ASSAY OF PROTEIN SERUM: CPT | Performed by: EMERGENCY MEDICINE

## 2022-05-06 PROCEDURE — 99284 EMERGENCY DEPT VISIT MOD MDM: CPT | Mod: 25

## 2022-05-06 PROCEDURE — 36415 COLL VENOUS BLD VENIPUNCTURE: CPT | Performed by: EMERGENCY MEDICINE

## 2022-05-06 RX ORDER — OXYCODONE AND ACETAMINOPHEN 5; 325 MG/1; MG/1
1 TABLET ORAL EVERY 6 HOURS PRN
Qty: 12 TABLET | Refills: 0 | Status: SHIPPED | OUTPATIENT
Start: 2022-05-06 | End: 2022-05-09

## 2022-05-06 RX ORDER — ACETAMINOPHEN 500 MG
1000 TABLET ORAL ONCE
Status: DISCONTINUED | OUTPATIENT
Start: 2022-05-06 | End: 2022-05-07 | Stop reason: HOSPADM

## 2022-05-06 NOTE — ED PROVIDER NOTES
History     Chief Complaint:  Back Pain       HPI   Mary Jo Mcleod is a 76 year old female who presents for evaluation of back pain.  She states a few days ago she was walking and twisted funny in developed pain in her lower back that is been progressively worsening.  Pain in her back is improved but she continues to have pain down her buttocks, particularly her left buttock.  She has pain behind both thighs.  No numbness or weakness currently.  She reports some numbness on the inner thighs yesterday but that resolved.  She is having no urinary changes such as retention or incontinence and no bowel incontinence or constipation.  No fevers or chills.  She has no history of back pain or surgeries.  No abdominal pain.    ROS:  Review of Systems   Constitutional: Negative for chills and fever.   Respiratory: Negative for shortness of breath.    Cardiovascular: Negative for chest pain.   Gastrointestinal: Negative for abdominal pain, blood in stool, constipation, diarrhea, nausea and vomiting.   Genitourinary: Negative for dysuria and flank pain.   Musculoskeletal: Positive for back pain.   Skin: Negative for rash and wound.   Neurological: Negative for weakness and numbness.   All other systems reviewed and are negative.         Allergies:  Codeine  Bupropion  Erythromycin  Hydrocodone  Lidocaine  Penicillin G  Sulfa Drugs  Tetracycline  Trazodone  Wellbutrin [Bupropion Hydrobromide]  Codeine Sulfate     Medications:    calcium carbonate (TUMS) 500 MG chewable tablet  citalopram (CELEXA) 40 MG tablet  cyclobenzaprine (FLEXERIL) 5 MG tablet  minocycline (MINOCIN) 50 MG capsule  simvastatin (ZOCOR) 20 MG tablet  VITAMIN D PO        Past Medical History:    Past Medical History:   Diagnosis Date     Depressive disorder      Patient Active Problem List   Diagnosis     Incontinence of feces with fecal urgency     Hypercholesterolemia     Moderate episode of recurrent major depressive disorder (H)     PTSD (post-traumatic  "stress disorder)     Osteopenia of multiple sites     GERSON (generalized anxiety disorder)     Prediabetes     Plasma cell dyscrasia     Smoldering multiple myeloma (H)     Chronic kidney disease, stage 3a (H)        Past Surgical History:    Past Surgical History:   Procedure Laterality Date     BONE MARROW BIOPSY, BONE SPECIMEN, NEEDLE/TROCAR N/A 2/8/2021    Procedure: BONE MARROW BIOPSY;  Surgeon: Naomie Saeed MD;  Location:  GI     finger reattachment       TONSILLECTOMY          Family History:    family history includes Alcoholism in her sister; Bipolar Disorder in her sister; Chronic Obstructive Pulmonary Disease in her brother; Influenza/Pneumonia in her sister; Lung Cancer in her brother; Myocardial Infarction (age of onset: 63) in her father; Suicide in her brother.    Social History:   reports that she has never smoked. She has never used smokeless tobacco. She reports current alcohol use. She reports that she does not use drugs.  PCP: Levi Mcdonnell     Physical Exam     Patient Vitals for the past 24 hrs:   BP Temp Temp src Pulse Resp SpO2 Height Weight   05/06/22 1140 101/48 -- -- -- -- -- -- --   05/06/22 1136 -- 97.9  F (36.6  C) Temporal 96 20 99 % 1.6 m (5' 3\") 63.5 kg (140 lb)        Physical Exam  Constitutional: Well appearing.  HEENT: Atraumatic.  Moist mucous membranes.  Neck: Soft.  Supple.    Cardiac: Regular rate and rhythm.  No murmur or rub.  Respiratory: Clear to auscultation bilaterally.  No respiratory distress.  No wheezing, rhonchi, or rales.  Abdomen: Soft and nontender.  No rebound or guarding.  Nondistended.  Musculoskeletal: No edema.  Normal range of motion.  Neurologic: Alert and oriented x3.  Normal tone and bulk. 5/5 strength in bilateral upper and lower extremities.  Sensation to light touch intact throughout. No saddle anesthesia. Normal gait.  Skin: No rashes.  No edema.  Psych: Normal affect.  Normal behavior.      Emergency Department Course "       Imaging:  Lumbar spine MRI w/o contrast   Final Result   Addendum 1 of 1   Radiology assistant Carmen Anaya dedicated report to Dr Don Saavedra at    10:15 PM 05/06/2022.      Final   IMPRESSION:   L5 vertebral body inferior endplate acute or subacute mild to moderate compression deformity.        Remaining vertebral body heights within normal limits.      Osseous structures demonstrate numerous small lesions with decreased T1 signal, nonspecific. Patient has a history of monoclonal gammopathy. These findings probably correlate with underlying history of monoclonal gammopathy.       Degenerative changes described above.      No critical thecal sac stenosis.      Left kidney superior pole partially visualized cyst with septation. This is a Bosniak 2F cyst. Recommend nonemergent MRI renal protocol.         Report per radiology    Laboratory:  Labs Ordered and Resulted from Time of ED Arrival to Time of ED Departure - No data to display     Procedures       Emergency Department Course:             Reviewed:  I reviewed nursing notes, vitals and past medical history      Interventions:  Medications - No data to display     Disposition:  The patient was discharged to home.     Medical Decision Making:  Mary Jo Mcleod is a 76-year-old woman who is afebrile and hemodynamically stable.  She is neurologically intact with no focal deficits in lower extremities.  She expresses some sciatic pain and states that she is having difficulty with her ADLs at home.  MRI of the lumbar spine is noted as above.  I discussed the results with her.  The acute to subacute endplate compression fracture of L5 likely explains her symptoms beginning a few days ago.  We discussed that this requires pain control.  Discussed her radicular symptoms and need to follow-up closely with primary care physician and orthopedics and that information was given for further evaluation and treatment.  No evidence of cauda equina syndrome or other  neurosurgical emergencies. We discussed her osseous lesions and need to contact her oncologist as soon as possible. She is understanding.  She is up and ambulating without difficulty and independently here.  We discussed pain control at home and I see no negation for hospitalization today.  I counseled on the use of opiate pain medication I gave her strict return precautions.  Questions were answered and she was in no distress at time of discharge.    Diagnosis:    ICD-10-CM    1. Closed compression fracture of L5 lumbar vertebra, initial encounter (H)  S32.050A    2. Renal cyst  N28.1    3. Acute bilateral low back pain with left-sided sciatica  M54.42    4. Multiple myeloma, remission status unspecified (H)  C90.00         Discharge Medications:  Discharge Medication List as of 5/6/2022 10:45 PM      START taking these medications    Details   oxyCODONE-acetaminophen (PERCOCET) 5-325 MG tablet Take 1 tablet by mouth every 6 hours as needed for pain, Disp-12 tablet, R-0, E-Prescribe              5/6/2022   Don Savaedra MD Salay, Nicholas J, MD  05/09/22 2010

## 2022-05-06 NOTE — ED TRIAGE NOTES
Chronic low back pain, unable to get a MRI at [facility], here due to low back pain that it making it hard to walk. Pt. Rates pain 10/10.     Triage Assessment     Row Name 05/06/22 1141       Triage Assessment (Adult)    Airway WDL WDL       Respiratory WDL    Respiratory WDL WDL       Skin Circulation/Temperature WDL    Skin Circulation/Temperature WDL WDL       Cardiac WDL    Cardiac WDL WDL       Cognitive/Neuro/Behavioral WDL    Cognitive/Neuro/Behavioral WDL WDL

## 2022-05-07 NOTE — ED NOTES
Staff ambulated the patient with a walker and she did remarkably well. The patient said she had some minor discomfort while ambulating, but stated she could do this at home and felt safe.

## 2022-05-07 NOTE — DISCHARGE INSTRUCTIONS
Discharge Instructions  Back Pain  You were seen today for back pain. Back pain can have many causes, but most will get better without surgery or other specific treatment. Sometimes there is a herniated ( slipped ) disc. We do not usually do MRI scans to look for these right away, since most herniated discs will get better on their own with time.  Today, we did not find any evidence that your back pain was caused by a serious condition. However, sometimes symptoms develop over time and cannot be found during an emergency visit, so it is very important that you follow up with your primary provider.  Generally, every Emergency Department visit should have a follow-up clinic visit with either a primary or a specialty clinic/provider. Please follow-up as instructed by your emergency provider today.    Return to the Emergency Department if:  You develop a fever with your back pain.   You have weakness or change in sensation in one or both legs.  You lose control of your bowels or bladder, or cannot empty your bladder (cannot pee).  Your pain gets much worse.     Follow-up with your provider:  Unless your pain has completely gone away, please make an appointment with your provider within one week. Most of the routine care for back pain is available in a clinic and not the Emergency Department. You may need further management of your back pain, such as more pain medication, imaging such as an X-ray or MRI, or physical therapy.    What can I do to help myself?  Remain Active -- People are often afraid that they will hurt their back further or delay recovery by remaining active, but this is one of the best things you can do for your back. In fact, staying in bed for a long time to rest is not recommended. Studies have shown that people with low back pain recover faster when they remain active. Movement helps to bring blood flow to the muscles and relieve muscle spasms as well as preventing loss of muscle strength.  Heat --  Using a heating pad can help with low back pain during the first few weeks. Do not sleep with a heating pad, as you can be burned.   Pain medications - You may take a pain medication such as Tylenol  (acetaminophen), Advil , Motrin  (ibuprofen) or Aleve  (naproxen).  If you were given a prescription for medicine here today, be sure to read all of the information (including the package insert) that comes with your prescription.  This will include important information about the medicine, its side effects, and any warnings that you need to know about.  The pharmacist who fills the prescription can provide more information and answer questions you may have about the medicine.  If you have questions or concerns that the pharmacist cannot address, please call or return to the Emergency Department.   Remember that you can always come back to the Emergency Department if you are not able to see your regular provider in the amount of time listed above, if you get any new symptoms, or if there is anything that worries you.    Opioid Medication Information    You have been given a prescription for an opioid (narcotic) pain medicine and/or have received a pain medicine while here in the Emergency Department. These medicines can make you drowsy or impaired. You must not drive, operate dangerous equipment, or engage in any other dangerous activities while taking these medications. If you drive while taking these medications, you could be arrested for driving under the influence (DUI). Do not drink any alcohol while you are taking these medications.     Opioid pain medications can cause addiction. If you have a history of chemical dependency of any type, you are at a higher risk of becoming addicted to pain medications.  Only take these prescribed medications to treat your pain when all other options have been tried. Take it for as short a time and as few doses as possible. Store your pain pills in a secure place, as they are  frequently stolen and provide a dangerous opportunity for children or visitors in your house to start abusing these powerful medications. We will not replace any lost or stolen medicine.    If you do not finish your medication, it is a good idea to get rid of it but please do not flush it down the toilet. Please dispose of the remaining medication at a local pharmacy or law enforcement facility. The [place] [employer] has additional information on medication disposal: https://www.pca.[facility]/living-green/managing-unwanted-medications.      Many prescription pain medications contain Tylenol  (acetaminophen), including Vicodin , Tylenol #3 , Norco , Lortab , and Percocet .  You should not take any extra pills of Tylenol  if you are using these prescription medications or you can get very sick.  Do not ever take more than 3000 mg of acetaminophen in any 24 hour period.    All opioids tend to cause constipation. Drink plenty of water and eat foods that have a lot of fiber, such as fruits, vegetables, prune juice, apple juice and high fiber cereal.  Take a laxative if you don t move your bowels at least every other day. Miralax , Milk of Magnesia, Colace , or Senna  can be used to keep you regular.

## 2022-05-09 ENCOUNTER — TELEPHONE (OUTPATIENT)
Dept: FAMILY MEDICINE | Facility: CLINIC | Age: 77
End: 2022-05-09

## 2022-05-09 DIAGNOSIS — M54.41 ACUTE BILATERAL LOW BACK PAIN WITH BILATERAL SCIATICA: Primary | ICD-10-CM

## 2022-05-09 DIAGNOSIS — M54.42 ACUTE BILATERAL LOW BACK PAIN WITH BILATERAL SCIATICA: Primary | ICD-10-CM

## 2022-05-09 DIAGNOSIS — S32.050A CLOSED COMPRESSION FRACTURE OF L5 LUMBAR VERTEBRA, INITIAL ENCOUNTER (H): ICD-10-CM

## 2022-05-09 ASSESSMENT — ENCOUNTER SYMPTOMS
NUMBNESS: 0
BACK PAIN: 1
VOMITING: 0
FEVER: 0
DIARRHEA: 0
BLOOD IN STOOL: 0
NAUSEA: 0
DYSURIA: 0
WEAKNESS: 0
CHILLS: 0
FLANK PAIN: 0
WOUND: 0
SHORTNESS OF BREATH: 0
ABDOMINAL PAIN: 0
CONSTIPATION: 0

## 2022-05-09 NOTE — TELEPHONE ENCOUNTER
Patient called clinic requesting PT orders. Patient was seen in clinic 5/5/22 for low back pain. Patient called clinic 5/6/22 with c/o excruciating back pain and felt that she needed evaluation in ED. Patient was then seen in ED, MRI done. PT referral was offered but patient declined at 5/5 office visit but is now requesting this. Referral entered and call routed to Kelly Mayo CNP for review. Marta Dong

## 2022-05-10 NOTE — TELEPHONE ENCOUNTER
Faxed PT referral, MRI lumbar report, ER and visit notes to [facility] More[facility] - fax:[phone].   Carla Hua RN

## 2022-05-16 DIAGNOSIS — D47.2 SMOLDERING MULTIPLE MYELOMA: Primary | ICD-10-CM

## 2022-05-18 ENCOUNTER — OFFICE VISIT (OUTPATIENT)
Dept: ONCOLOGY | Facility: CLINIC | Age: 77
End: 2022-05-18
Attending: REGISTERED NURSE
Payer: COMMERCIAL

## 2022-05-18 ENCOUNTER — APPOINTMENT (OUTPATIENT)
Dept: LAB | Facility: CLINIC | Age: 77
End: 2022-05-18
Attending: STUDENT IN AN ORGANIZED HEALTH CARE EDUCATION/TRAINING PROGRAM
Payer: COMMERCIAL

## 2022-05-18 VITALS
TEMPERATURE: 98 F | DIASTOLIC BLOOD PRESSURE: 64 MMHG | OXYGEN SATURATION: 94 % | HEART RATE: 87 BPM | RESPIRATION RATE: 18 BRPM | WEIGHT: 139.9 LBS | SYSTOLIC BLOOD PRESSURE: 104 MMHG | BODY MASS INDEX: 24.78 KG/M2

## 2022-05-18 DIAGNOSIS — D47.2 SMOLDERING MULTIPLE MYELOMA: ICD-10-CM

## 2022-05-18 LAB
ALBUMIN SERPL-MCNC: 2.9 G/DL (ref 3.4–5)
ALP SERPL-CCNC: 52 U/L (ref 40–150)
ALT SERPL W P-5'-P-CCNC: 23 U/L (ref 0–50)
ANION GAP SERPL CALCULATED.3IONS-SCNC: 5 MMOL/L (ref 3–14)
AST SERPL W P-5'-P-CCNC: 23 U/L (ref 0–45)
BASOPHILS # BLD AUTO: 0 10E3/UL (ref 0–0.2)
BASOPHILS NFR BLD AUTO: 1 %
BILIRUB SERPL-MCNC: 0.3 MG/DL (ref 0.2–1.3)
BUN SERPL-MCNC: 22 MG/DL (ref 7–30)
CALCIUM SERPL-MCNC: 9.2 MG/DL (ref 8.5–10.1)
CHLORIDE BLD-SCNC: 102 MMOL/L (ref 94–109)
CO2 SERPL-SCNC: 28 MMOL/L (ref 20–32)
CREAT SERPL-MCNC: 1.15 MG/DL (ref 0.52–1.04)
EOSINOPHIL # BLD AUTO: 0 10E3/UL (ref 0–0.7)
EOSINOPHIL NFR BLD AUTO: 1 %
ERYTHROCYTE [DISTWIDTH] IN BLOOD BY AUTOMATED COUNT: 13.9 % (ref 10–15)
GFR SERPL CREATININE-BSD FRML MDRD: 49 ML/MIN/1.73M2
GLUCOSE BLD-MCNC: 89 MG/DL (ref 70–99)
HCT VFR BLD AUTO: 32.7 % (ref 35–47)
HGB BLD-MCNC: 10.5 G/DL (ref 11.7–15.7)
IMM GRANULOCYTES # BLD: 0 10E3/UL
IMM GRANULOCYTES NFR BLD: 1 %
LYMPHOCYTES # BLD AUTO: 1.7 10E3/UL (ref 0.8–5.3)
LYMPHOCYTES NFR BLD AUTO: 32 %
MCH RBC QN AUTO: 32.7 PG (ref 26.5–33)
MCHC RBC AUTO-ENTMCNC: 32.1 G/DL (ref 31.5–36.5)
MCV RBC AUTO: 102 FL (ref 78–100)
MONOCYTES # BLD AUTO: 0.7 10E3/UL (ref 0–1.3)
MONOCYTES NFR BLD AUTO: 14 %
NEUTROPHILS # BLD AUTO: 2.7 10E3/UL (ref 1.6–8.3)
NEUTROPHILS NFR BLD AUTO: 51 %
NRBC # BLD AUTO: 0 10E3/UL
NRBC BLD AUTO-RTO: 0 /100
PLATELET # BLD AUTO: 232 10E3/UL (ref 150–450)
POTASSIUM BLD-SCNC: 4.5 MMOL/L (ref 3.4–5.3)
PROT SERPL-MCNC: 10.4 G/DL (ref 6.8–8.8)
RBC # BLD AUTO: 3.21 10E6/UL (ref 3.8–5.2)
SODIUM SERPL-SCNC: 135 MMOL/L (ref 133–144)
WBC # BLD AUTO: 5.2 10E3/UL (ref 4–11)

## 2022-05-18 PROCEDURE — 88237 TISSUE CULTURE BONE MARROW: CPT | Performed by: REGISTERED NURSE

## 2022-05-18 PROCEDURE — 88305 TISSUE EXAM BY PATHOLOGIST: CPT | Mod: TC | Performed by: STUDENT IN AN ORGANIZED HEALTH CARE EDUCATION/TRAINING PROGRAM

## 2022-05-18 PROCEDURE — 38222 DX BONE MARROW BX & ASPIR: CPT | Performed by: REGISTERED NURSE

## 2022-05-18 PROCEDURE — 88185 FLOWCYTOMETRY/TC ADD-ON: CPT | Performed by: STUDENT IN AN ORGANIZED HEALTH CARE EDUCATION/TRAINING PROGRAM

## 2022-05-18 PROCEDURE — 88264 CHROMOSOME ANALYSIS 20-25: CPT | Performed by: REGISTERED NURSE

## 2022-05-18 PROCEDURE — 85025 COMPLETE CBC W/AUTO DIFF WBC: CPT | Performed by: REGISTERED NURSE

## 2022-05-18 PROCEDURE — 88187 FLOWCYTOMETRY/READ 2-8: CPT | Performed by: STUDENT IN AN ORGANIZED HEALTH CARE EDUCATION/TRAINING PROGRAM

## 2022-05-18 PROCEDURE — 36415 COLL VENOUS BLD VENIPUNCTURE: CPT | Performed by: REGISTERED NURSE

## 2022-05-18 PROCEDURE — 88271 CYTOGENETICS DNA PROBE: CPT | Performed by: REGISTERED NURSE

## 2022-05-18 PROCEDURE — 80053 COMPREHEN METABOLIC PANEL: CPT | Performed by: REGISTERED NURSE

## 2022-05-18 ASSESSMENT — PAIN SCALES - GENERAL: PAINLEVEL: MODERATE PAIN (5)

## 2022-05-18 NOTE — PROGRESS NOTES
"BMT ONC Adult Bone Marrow Biopsy Procedure Note  May 18, 2022  /64 (BP Location: Right arm, Patient Position: Sitting)   Pulse 87   Temp 98  F (36.7  C) (Oral)   Resp 18   Wt 63.5 kg (139 lb 14.4 oz)   LMP 12/04/1989   SpO2 94%   BMI 24.78 kg/m       Learning needs assessment complete within 12 months? 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 Informed consent was obtained. Patient reports one reaction to lidocaine in the past after a dental appointment that consisted of severe shaking when she was in the parking lot. She states that since that time, she has received lidocaine with cortisone injections and has had it again at the dentist with no adverse effects.  She was told she likely \"just had too much lidocaine that day.\" We discussed the risks and she is comfortable proceeding with lidocaine today.  Discussed with Dr. Hanks and the infusion pharmacy who also agree it is reasonable to use lidocaine for today's procedure. Patient was placed in the prone position and prepped and draped in a sterile manner. Approximately 20 cc of 1% Lidocaine was used over the right posterior iliac spine. Following this a 3 mm incision was made. Trephine bone marrow core(s) was (were) obtained from the [facility]. Bone marrow aspirates were obtained from the [facility]. Aspirates were sent for morphology, immunophenotyping, cytogenetics and molecular diagnostics. A total of approximately 17 ml of marrow was aspirated. Following this procedure a sterile dressing was applied to the bone marrow biopsy site(s). The patient was placed in the supine position to maintain pressure on the biopsy site. Post-procedure wound care instructions were given.  The procedure went well overall, " but patient did have a fair amount of discomfort with both the core and the aspirate and expressed that she does not want to do any outpatient bone marrows in the future - she would like to have the procedure done under sedation.    Complications: NO    Post-procedural pain assessment: 2 out of 10 on the numeric pain rating scale.     Interventions: YES, ice    Length of procedure:21 minutes to 45 minutes    Procedure performed by:   MARLON Keane [facility] [address] [id number] [phone]

## 2022-05-18 NOTE — NURSING NOTE
BMT Teaching Flowsheet   Teaching Topic: post biopsy instructions    Person(s) involved in teaching: Patient  Motivation Level  Asks Questions: Yes  Eager to Learn: Yes  Cooperative: Yes  Receptive (willing/able to accept information): Yes    Patient demonstrates understanding of the following:   - Reason for the appointment, diagnosis and treatment plan: Yes  - Knowledge of proper use of medications and conditions for which they are ordered (with special attention to potential side effects or drug interactions): Yes  - Which situations necessitate calling provider and whom to contact: Yes    Teaching concerns addressed: reviewed activity restrictions if received premeds, potential for bleeding and actions to take if develops any of the issues below    Proper use and care of (medical equipment, care aids, etc.) Yes  Pain management techniques: Yes  Patient instructed on hand hygiene: Yes  How and/when to access community resources: Yes    Infection Control:  Patient demonstrates understanding of the following:   Surgical procedure site care taught NA  Signs and symptoms of infection taught Yes  Wound care taught Yes  Central venous catheter care taught NA    Instructional Materials Used/Given: verbal, print out of post biopsy instructions.     Pt roomed, weight and VS obtained. VSS. Labs drawn via venipuncture. Aftercare instructions reviewed including to leave dressing dry and intact for 24 hours, to report bleeding from site, fevers >100.3, or other signs of infection, and to take Tylenol as needed for pain. All questions answered and pt verbalized understanding.     Patient supine for 30 minutes following biopsy. After 30 minutes, dressing clean, dry and intact. See DOC flow sheets for details. Left ambulatory.    Eladia Dawkins RN

## 2022-05-18 NOTE — LETTER
"    5/18/2022         RE: Mary Jo Mcleod  [address]  [place] [address]        Dear Colleague,    Thank you for referring your patient, Mary Jo Mcleod, to the [facility]. Please see a copy of my visit note below.    BMT ONC Adult Bone Marrow Biopsy Procedure Note  May 18, 2022  /64 (BP Location: Right arm, Patient Position: Sitting)   Pulse 87   Temp 98  F (36.7  C) (Oral)   Resp 18   Wt 63.5 kg (139 lb 14.4 oz)   LMP 12/04/1989   SpO2 94%   BMI 24.78 kg/m       Learning needs assessment complete within 12 months? 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 Informed consent was obtained. Patient reports one reaction to lidocaine in the past after a dental appointment that consisted of severe shaking when she was in the parking lot. She states that since that time, she has received lidocaine with cortisone injections and has had it again at the dentist with no adverse effects.  She was told she likely \"just had too much lidocaine that day.\" We discussed the risks and she is comfortable proceeding with lidocaine today.  Discussed with Dr. Hanks and the infusion pharmacy who also agree it is reasonable to use lidocaine for today's procedure. Patient was placed in the prone position and prepped and draped in a sterile manner. Approximately 20 cc of 1% Lidocaine was used over the right posterior iliac spine. Following this a 3 mm incision was made. Trephine bone marrow core(s) was (were) obtained from the [facility]. Bone marrow aspirates were obtained from the [facility]. Aspirates were sent for morphology, immunophenotyping, cytogenetics and molecular diagnostics. A total of approximately 17 ml of marrow was " aspirated. Following this procedure a sterile dressing was applied to the bone marrow biopsy site(s). The patient was placed in the supine position to maintain pressure on the biopsy site. Post-procedure wound care instructions were given.  The procedure went well overall, but patient did have a fair amount of discomfort with both the core and the aspirate and expressed that she does not want to do any outpatient bone marrows in the future - she would like to have the procedure done under sedation.    Complications: NO    Post-procedural pain assessment: 2 out of 10 on the numeric pain rating scale.     Interventions: YES, ice    Length of procedure:21 minutes to 45 minutes    Procedure performed by:   MARLON Keane [facility] [address] [address] [phone]

## 2022-05-18 NOTE — NURSING NOTE
"Oncology Rooming Note    May 18, 2022 2:18 PM   Mary Jo Mcleod is a 76 year old female who presents for:    Chief Complaint   Patient presents with     Bone Marrow Biopsy     BmBx; hx smoldering MM     Initial Vitals: LMP 12/04/1989  Estimated body mass index is 24.8 kg/m  as calculated from the following:    Height as of 5/6/22: 1.6 m (5' 3\").    Weight as of 5/6/22: 63.5 kg (140 lb). There is no height or weight on file to calculate BSA.  Data Unavailable Comment: Data Unavailable   Patient's last menstrual period was 12/04/1989.  Allergies reviewed: Yes  Medications reviewed: Yes    Medications: Medication refills not needed today.  Pharmacy name entered into [facility]:    [id number] [place] [facility] [address]  [facility] [address] [place] [facility] [address] [facility] - [address] [facility] - [place] [address] [address]    Clinical concerns: None.     Eladia Dawkins RN              "

## 2022-05-19 LAB
INTERPRETATION: NORMAL
PATH REPORT.COMMENTS IMP SPEC: NORMAL
PATH REPORT.FINAL DX SPEC: NORMAL
PATH REPORT.MICROSCOPIC SPEC OTHER STN: NORMAL
PATH REPORT.RELEVANT HX SPEC: NORMAL

## 2022-05-20 ENCOUNTER — TELEPHONE (OUTPATIENT)
Dept: FAMILY MEDICINE | Facility: CLINIC | Age: 77
End: 2022-05-20

## 2022-05-20 DIAGNOSIS — M54.41 ACUTE BILATERAL LOW BACK PAIN WITH BILATERAL SCIATICA: Primary | ICD-10-CM

## 2022-05-20 DIAGNOSIS — R20.2 NUMBNESS AND TINGLING OF BOTH LEGS: ICD-10-CM

## 2022-05-20 DIAGNOSIS — S32.050A CLOSED COMPRESSION FRACTURE OF L5 LUMBAR VERTEBRA, INITIAL ENCOUNTER (H): ICD-10-CM

## 2022-05-20 DIAGNOSIS — M54.42 ACUTE BILATERAL LOW BACK PAIN WITH BILATERAL SCIATICA: Primary | ICD-10-CM

## 2022-05-20 DIAGNOSIS — R20.0 NUMBNESS AND TINGLING OF BOTH LEGS: ICD-10-CM

## 2022-05-20 NOTE — TELEPHONE ENCOUNTER
Patient called clinic with continue back pain. Discussed pain with Kelly Mayo CNP who advises referral to [facility] spine. Referral entered with results. Marta Dong

## 2022-05-21 PROCEDURE — 88305 TISSUE EXAM BY PATHOLOGIST: CPT | Mod: 26 | Performed by: STUDENT IN AN ORGANIZED HEALTH CARE EDUCATION/TRAINING PROGRAM

## 2022-05-21 PROCEDURE — 88342 IMHCHEM/IMCYTCHM 1ST ANTB: CPT | Mod: 26 | Performed by: STUDENT IN AN ORGANIZED HEALTH CARE EDUCATION/TRAINING PROGRAM

## 2022-05-21 PROCEDURE — 85060 BLOOD SMEAR INTERPRETATION: CPT | Performed by: STUDENT IN AN ORGANIZED HEALTH CARE EDUCATION/TRAINING PROGRAM

## 2022-05-21 PROCEDURE — 88313 SPECIAL STAINS GROUP 2: CPT | Mod: 26 | Performed by: STUDENT IN AN ORGANIZED HEALTH CARE EDUCATION/TRAINING PROGRAM

## 2022-05-21 PROCEDURE — 88341 IMHCHEM/IMCYTCHM EA ADD ANTB: CPT | Mod: 26 | Performed by: STUDENT IN AN ORGANIZED HEALTH CARE EDUCATION/TRAINING PROGRAM

## 2022-05-21 PROCEDURE — 88311 DECALCIFY TISSUE: CPT | Mod: 26 | Performed by: STUDENT IN AN ORGANIZED HEALTH CARE EDUCATION/TRAINING PROGRAM

## 2022-05-21 PROCEDURE — 85097 BONE MARROW INTERPRETATION: CPT | Performed by: STUDENT IN AN ORGANIZED HEALTH CARE EDUCATION/TRAINING PROGRAM

## 2022-05-24 NOTE — PROGRESS NOTES
5/20/22 faxed physician order and 5/6/22 MRI lumbar result, 5/5/22 xray lumber result, this office note and 5/6/22 ER note to [facility] @ [phone]    Vahe Peace,   [facility]  [phone]

## 2022-05-28 ENCOUNTER — HOSPITAL ENCOUNTER (OUTPATIENT)
Facility: CLINIC | Age: 77
Setting detail: OBSERVATION
Discharge: HOME | End: 2022-05-30
Attending: EMERGENCY MEDICINE | Admitting: EMERGENCY MEDICINE
Payer: COMMERCIAL

## 2022-05-28 ENCOUNTER — APPOINTMENT (OUTPATIENT)
Dept: GENERAL RADIOLOGY | Facility: CLINIC | Age: 77
End: 2022-05-28
Attending: EMERGENCY MEDICINE
Payer: COMMERCIAL

## 2022-05-28 ENCOUNTER — APPOINTMENT (OUTPATIENT)
Dept: CT IMAGING | Facility: CLINIC | Age: 77
End: 2022-05-28
Attending: EMERGENCY MEDICINE
Payer: COMMERCIAL

## 2022-05-28 DIAGNOSIS — N18.31 CHRONIC KIDNEY DISEASE, STAGE 3A (H): Primary | ICD-10-CM

## 2022-05-28 DIAGNOSIS — R07.9 CHEST PAIN, UNSPECIFIED TYPE: ICD-10-CM

## 2022-05-28 DIAGNOSIS — D47.2 SMOLDERING MULTIPLE MYELOMA: ICD-10-CM

## 2022-05-28 DIAGNOSIS — E87.1 HYPONATREMIA: ICD-10-CM

## 2022-05-28 DIAGNOSIS — M62.81 GENERALIZED MUSCLE WEAKNESS: ICD-10-CM

## 2022-05-28 DIAGNOSIS — R45.851 SUICIDAL IDEATION: ICD-10-CM

## 2022-05-28 LAB
ALBUMIN SERPL-MCNC: 2.8 G/DL (ref 3.4–5)
ALP SERPL-CCNC: 64 U/L (ref 40–150)
ALT SERPL W P-5'-P-CCNC: 24 U/L (ref 0–50)
ANION GAP SERPL CALCULATED.3IONS-SCNC: 5 MMOL/L (ref 3–14)
AST SERPL W P-5'-P-CCNC: 44 U/L (ref 0–45)
ATRIAL RATE - MUSE: 101 BPM
BASOPHILS # BLD AUTO: 0 10E3/UL (ref 0–0.2)
BASOPHILS NFR BLD AUTO: 1 %
BILIRUB SERPL-MCNC: 0.7 MG/DL (ref 0.2–1.3)
BUN SERPL-MCNC: 24 MG/DL (ref 7–30)
CALCIUM SERPL-MCNC: 8.5 MG/DL (ref 8.5–10.1)
CHLORIDE BLD-SCNC: 98 MMOL/L (ref 94–109)
CO2 SERPL-SCNC: 24 MMOL/L (ref 20–32)
CREAT BLD-MCNC: 1.2 MG/DL (ref 0.5–1)
CREAT SERPL-MCNC: 1.12 MG/DL (ref 0.52–1.04)
D DIMER PPP FEU-MCNC: 0.9 UG/ML FEU (ref 0–0.5)
DIASTOLIC BLOOD PRESSURE - MUSE: NORMAL MMHG
EOSINOPHIL # BLD AUTO: 0 10E3/UL (ref 0–0.7)
EOSINOPHIL NFR BLD AUTO: 1 %
ERYTHROCYTE [DISTWIDTH] IN BLOOD BY AUTOMATED COUNT: 13.3 % (ref 10–15)
GFR SERPL CREATININE-BSD FRML MDRD: 47 ML/MIN/1.73M2
GFR SERPL CREATININE-BSD FRML MDRD: 51 ML/MIN/1.73M2
GLUCOSE BLD-MCNC: 104 MG/DL (ref 70–99)
HCT VFR BLD AUTO: 32.4 % (ref 35–47)
HGB BLD-MCNC: 10.8 G/DL (ref 11.7–15.7)
IMM GRANULOCYTES # BLD: 0 10E3/UL
IMM GRANULOCYTES NFR BLD: 1 %
INTERPRETATION ECG - MUSE: NORMAL
LYMPHOCYTES # BLD AUTO: 1.9 10E3/UL (ref 0.8–5.3)
LYMPHOCYTES NFR BLD AUTO: 33 %
MCH RBC QN AUTO: 32.7 PG (ref 26.5–33)
MCHC RBC AUTO-ENTMCNC: 33.3 G/DL (ref 31.5–36.5)
MCV RBC AUTO: 98 FL (ref 78–100)
MONOCYTES # BLD AUTO: 0.7 10E3/UL (ref 0–1.3)
MONOCYTES NFR BLD AUTO: 12 %
NEUTROPHILS # BLD AUTO: 3.1 10E3/UL (ref 1.6–8.3)
NEUTROPHILS NFR BLD AUTO: 52 %
NRBC # BLD AUTO: 0 10E3/UL
NRBC BLD AUTO-RTO: 0 /100
NT-PROBNP SERPL-MCNC: 90 PG/ML (ref 0–900)
P AXIS - MUSE: 37 DEGREES
PLATELET # BLD AUTO: 276 10E3/UL (ref 150–450)
POTASSIUM BLD-SCNC: 4.6 MMOL/L (ref 3.4–5.3)
PR INTERVAL - MUSE: 120 MS
PROT SERPL-MCNC: 10.1 G/DL (ref 6.8–8.8)
QRS DURATION - MUSE: 72 MS
QT - MUSE: 358 MS
QTC - MUSE: 464 MS
R AXIS - MUSE: -3 DEGREES
RBC # BLD AUTO: 3.3 10E6/UL (ref 3.8–5.2)
SARS-COV-2 RNA RESP QL NAA+PROBE: NEGATIVE
SODIUM SERPL-SCNC: 127 MMOL/L (ref 133–144)
SYSTOLIC BLOOD PRESSURE - MUSE: NORMAL MMHG
T AXIS - MUSE: 33 DEGREES
TROPONIN I SERPL HS-MCNC: 7 NG/L
VENTRICULAR RATE- MUSE: 101 BPM
WBC # BLD AUTO: 5.8 10E3/UL (ref 4–11)

## 2022-05-28 PROCEDURE — 36415 COLL VENOUS BLD VENIPUNCTURE: CPT | Performed by: HOSPITALIST

## 2022-05-28 PROCEDURE — 71275 CT ANGIOGRAPHY CHEST: CPT

## 2022-05-28 PROCEDURE — 82565 ASSAY OF CREATININE: CPT

## 2022-05-28 PROCEDURE — 250N000011 HC RX IP 250 OP 636: Performed by: EMERGENCY MEDICINE

## 2022-05-28 PROCEDURE — 96374 THER/PROPH/DIAG INJ IV PUSH: CPT | Mod: 59

## 2022-05-28 PROCEDURE — 36415 COLL VENOUS BLD VENIPUNCTURE: CPT | Performed by: EMERGENCY MEDICINE

## 2022-05-28 PROCEDURE — 84484 ASSAY OF TROPONIN QUANT: CPT | Performed by: HOSPITALIST

## 2022-05-28 PROCEDURE — 84484 ASSAY OF TROPONIN QUANT: CPT | Performed by: EMERGENCY MEDICINE

## 2022-05-28 PROCEDURE — C9803 HOPD COVID-19 SPEC COLLECT: HCPCS

## 2022-05-28 PROCEDURE — 85025 COMPLETE CBC W/AUTO DIFF WBC: CPT | Performed by: EMERGENCY MEDICINE

## 2022-05-28 PROCEDURE — 83880 ASSAY OF NATRIURETIC PEPTIDE: CPT | Performed by: EMERGENCY MEDICINE

## 2022-05-28 PROCEDURE — 93005 ELECTROCARDIOGRAM TRACING: CPT

## 2022-05-28 PROCEDURE — 99207 PR MOONLIGHTING INDICATOR: CPT | Performed by: HOSPITALIST

## 2022-05-28 PROCEDURE — 80053 COMPREHEN METABOLIC PANEL: CPT | Performed by: EMERGENCY MEDICINE

## 2022-05-28 PROCEDURE — 99285 EMERGENCY DEPT VISIT HI MDM: CPT | Mod: 25

## 2022-05-28 PROCEDURE — U0003 INFECTIOUS AGENT DETECTION BY NUCLEIC ACID (DNA OR RNA); SEVERE ACUTE RESPIRATORY SYNDROME CORONAVIRUS 2 (SARS-COV-2) (CORONAVIRUS DISEASE [COVID-19]), AMPLIFIED PROBE TECHNIQUE, MAKING USE OF HIGH THROUGHPUT TECHNOLOGIES AS DESCRIBED BY CMS-2020-01-R: HCPCS | Performed by: EMERGENCY MEDICINE

## 2022-05-28 PROCEDURE — 99220 PR INITIAL OBSERVATION CARE,LEVEL III: CPT | Performed by: HOSPITALIST

## 2022-05-28 PROCEDURE — 250N000009 HC RX 250: Performed by: EMERGENCY MEDICINE

## 2022-05-28 PROCEDURE — 85379 FIBRIN DEGRADATION QUANT: CPT | Performed by: EMERGENCY MEDICINE

## 2022-05-28 PROCEDURE — 73610 X-RAY EXAM OF ANKLE: CPT | Mod: LT

## 2022-05-28 PROCEDURE — G0378 HOSPITAL OBSERVATION PER HR: HCPCS

## 2022-05-28 RX ORDER — HYDROMORPHONE HYDROCHLORIDE 1 MG/ML
0.5 INJECTION, SOLUTION INTRAMUSCULAR; INTRAVENOUS; SUBCUTANEOUS
Status: DISCONTINUED | OUTPATIENT
Start: 2022-05-28 | End: 2022-05-29

## 2022-05-28 RX ORDER — IOPAMIDOL 755 MG/ML
58 INJECTION, SOLUTION INTRAVASCULAR ONCE
Status: COMPLETED | OUTPATIENT
Start: 2022-05-28 | End: 2022-05-28

## 2022-05-28 RX ORDER — CHOLECALCIFEROL (VITAMIN D3) 50 MCG
1 TABLET ORAL DAILY
COMMUNITY

## 2022-05-28 RX ADMIN — HYDROMORPHONE HYDROCHLORIDE 0.5 MG: 1 INJECTION, SOLUTION INTRAMUSCULAR; INTRAVENOUS; SUBCUTANEOUS at 16:19

## 2022-05-28 RX ADMIN — SODIUM CHLORIDE 85 ML: 9 INJECTION, SOLUTION INTRAVENOUS at 17:44

## 2022-05-28 RX ADMIN — IOPAMIDOL 58 ML: 755 INJECTION, SOLUTION INTRAVENOUS at 17:44

## 2022-05-28 ASSESSMENT — ENCOUNTER SYMPTOMS
SORE THROAT: 1
FATIGUE: 1
ARTHRALGIAS: 1
NAUSEA: 1
DIARRHEA: 0
HEADACHES: 0
MYALGIAS: 1
ABDOMINAL PAIN: 0
SHORTNESS OF BREATH: 1
FEVER: 0
VOMITING: 0

## 2022-05-28 NOTE — ED PROVIDER NOTES
History   Chief Complaint:  Shortness of Breath      HPI   Mary Jo Mcleod is a 76 year old female with a history of osteoporosis who presents with shortness of breath. The patient reports that she was seen May 3rd for bilateral hip pain and since has been having difficulty ambulating, frequent falls and continued pain. She states she presents today because she has been having increased fatigue and shortness of breath the past couple days. The patient reports she also has been having flu symptoms the past week with generalized myalgias and sore throat. She states she also has some associated nausea but denies any fever, vomiting and diarrhea. She reports her most recent fall was this past Thursday and she rolled her left ankle. She denies hitting her head or any loss of consciousness at time of the fall. The patient denies headache, leg swelling and abdominal pain as well.  Additionally, she expresses worsening depression and suicidal thoughts with thoughts of wanting to cut her wrists as a result of issues with her siblings.      Review of Systems   Constitutional: Positive for fatigue. Negative for fever.   HENT: Positive for sore throat.    Respiratory: Positive for shortness of breath.    Cardiovascular: Negative for leg swelling.   Gastrointestinal: Positive for nausea. Negative for abdominal pain, diarrhea and vomiting.   Musculoskeletal: Positive for arthralgias (bilateral hip) and myalgias.   Neurological: Negative for headaches.   All other systems reviewed and are negative.      Allergies:  Codeine  Bupropion  Erythromycin  Hydrocodone  Lidocaine  Penicillin G  Sulfa Drugs  Tetracycline  Trazodone  Wellbutrin     Medications:  Celexa   Flexeril   Minocin   Zocor     Past Medical History:     Depression   GERD   Osteoporosis     Past Surgical History:    Tonsillectomy and adenoidectomy    Bone marrow biopsy   Finger reattachment     Family History:    Father- MI   Sister- bipolar disorder, alcoholism  "  Brother- COPD, lung cancer, suicide     Social History:  The patient presents with her neighbor.    Physical Exam     Patient Vitals for the past 24 hrs:   BP Temp Temp src Pulse Resp SpO2 Height Weight   05/28/22 1800 -- -- -- 85 -- 96 % -- --   05/28/22 1700 -- -- -- 93 16 94 % -- --   05/28/22 1545 137/71 98.7  F (37.1  C) Oral 102 23 100 % 1.6 m (5' 3\") 63.5 kg (140 lb)       Physical Exam  Nursing note and vitals reviewed.  Constitutional:  Oriented to person, place, and time. Cooperative.   HENT:   Nose:    Nose normal.   Mouth/Throat:   Facemask in place.   Eyes:    Conjunctivae normal and EOM are normal.      Pupils are equal, round, and reactive to light.   Neck:    Trachea normal.   Cardiovascular:  Normal rate, regular rhythm, normal heart sounds and normal pulses. No murmur heard.  Pulmonary/Chest:  Effort normal and breath sounds normal.   Abdominal:   Soft. Normal appearance and bowel sounds are normal.      There is no tenderness.      There is no rebound and no CVA tenderness.   Musculoskeletal:  No swelling to left ankle. Left distal fibular region tenderness to palpation.      Full range of motion to hips bilaterally. Extremities otherwise atraumatic x 4.  Lymphadenopathy:  No cervical adenopathy.   Neurological:   Alert and oriented to person, place, and time. Normal strength.      No cranial nerve deficit or sensory deficit. GCS eye subscore is 4. GCS verbal subscore is 5. GCS motor subscore is 6.   Skin:    Skin is intact. No rash noted.   Psychiatric:   Depressed mood with ongoing suicidal thoughts and some paranoia.    Emergency Department Course   ECG  ECG taken at 1549, ECG read at 1549  Sinus tachycardia with frequent premature ventricular complexes   Rate 101 bpm. UT interval 120. QRS duration 72. QT/QTc 358/464. P-R-T axes 37 -3 33.     Imaging:  CT Chest Pulmonary Embolism w Contrast   Final Result   IMPRESSION:   1.  No pulmonary embolus, aortic dissection, or aneurysm.   2.  Small " hiatal hernia.      XR Ankle Left 3 Views   Final Result   IMPRESSION: Ankle mortise is intact. No evidence of an acute fracture about the ankle. There is an age-indeterminate deformity at the base of the fifth metatarsal. There is tenderness in this location currently, recommend dedicated foot radiograph.        Report per radiology    Laboratory:  Labs Ordered and Resulted from Time of ED Arrival to Time of ED Departure   COMPREHENSIVE METABOLIC PANEL - Abnormal       Result Value    Sodium 127 (*)     Potassium 4.6      Chloride 98      Carbon Dioxide (CO2) 24      Anion Gap 5      Urea Nitrogen 24      Creatinine 1.12 (*)     Calcium 8.5      Glucose 104 (*)     Alkaline Phosphatase 64      AST 44      ALT 24      Protein Total 10.1 (*)     Albumin 2.8 (*)     Bilirubin Total 0.7      GFR Estimate 51 (*)    D DIMER QUANTITATIVE - Abnormal    D-Dimer Quantitative 0.90 (*)    CBC WITH PLATELETS AND DIFFERENTIAL - Abnormal    WBC Count 5.8      RBC Count 3.30 (*)     Hemoglobin 10.8 (*)     Hematocrit 32.4 (*)     MCV 98      MCH 32.7      MCHC 33.3      RDW 13.3      Platelet Count 276      % Neutrophils 52      % Lymphocytes 33      % Monocytes 12      % Eosinophils 1      % Basophils 1      % Immature Granulocytes 1      NRBCs per 100 WBC 0      Absolute Neutrophils 3.1      Absolute Lymphocytes 1.9      Absolute Monocytes 0.7      Absolute Eosinophils 0.0      Absolute Basophils 0.0      Absolute Immature Granulocytes 0.0      Absolute NRBCs 0.0     ISTAT CREATININE POCT - Abnormal    Creatinine POCT 1.2 (*)     GFR, ESTIMATED POCT 47 (*)    TROPONIN I - Normal    Troponin I High Sensitivity 7     NT PROBNP INPATIENT - Normal    N terminal Pro BNP Inpatient 90     ISTAT CREATININE POCT          Emergency Department Course:    Reviewed:  I reviewed nursing notes, vitals and past medical history    Assessments:  1548 I obtained history and examined the patient as noted above.   1830 I rechecked the patient and  explained findings. The patient upon recheck expressed active suicide ideation with a plan.       Interventions:  Medications   HYDROmorphone (PF) (DILAUDID) injection 0.5 mg (0.5 mg Intravenous Given 5/28/22 1619)   iopamidol (ISOVUE-370) solution 58 mL (58 mLs Intravenous Given 5/28/22 1744)   Saline Flush (85 mLs Intravenous Given 5/28/22 1744)     Disposition:  The patient was admitted to the hospital under the care of Dr. Lizarraga.     Impression & Plan     Medical Decision Making:  This is a 76-year-old female who came in for mainly dyspnea on exertion and weakness today.  However she does have quite a few other complaints, with the main one being the inability to care for herself due to all of her pains.  She apparently had a compression fracture of her lumbar spine when she was seen here earlier this month.  Even though my suspicion was low for an ankle fracture, I obtained x-rays of her left ankle, and those x-rays are negative. I also proceeded with the above work-up including blood work, EKG, and CT scan.  I did obtain the CT scan when her D-dimer came back elevated to rule out a PE, and the CT scan is unremarkable.  Her work-up is otherwise unremarkable other than a slightly low sodium level.  She also does have ongoing suicidal ideation though as well.  She also apparently is unable to care for herself at home and therefore might require placement in a TCU.  I think it is best that she be brought into the hospital for further evaluation and management.  I subsequently spoke with Dr. Lizarraga, who will be taking care of her.    Diagnosis:    ICD-10-CM    1. Chest pain, unspecified type  R07.9    2. Hyponatremia  E87.1    3. Suicidal ideation  R45.851    4. Generalized muscle weakness  M62.81      Scribe Disclosure:  I, [place], am serving as a scribe at 3:42 PM on 5/28/2022 to document services personally performed by Cristian Sepulveda MD based on my observations and the provider's statements to me.               Cristian Sepulveda MD  05/28/22 1919

## 2022-05-28 NOTE — ED NOTES
[facility]    ED Nurse Handoff Report    ED Chief complaint: Chest Pain      ED Diagnosis:   Final diagnoses:   None       Code Status: To be determined by hospitalist upon admission    Allergies:   Allergies   Allergen Reactions     Codeine Difficulty breathing, Other (See Comments) and Rash     Bupropion      Erythromycin      Hydrocodone Other (See Comments) and Itching     Ear ache     Lidocaine Other (See Comments)     Penicillin G Itching     Itching around mouth then heavy breathing     Sulfa Drugs Itching     Itching around the mouth and then heavy breathing     Tetracycline      Trazodone      Does not work for patient     Wellbutrin [Bupropion Hydrobromide]      Codeine Sulfate Rash       Patient Story:  Pt arrived from home with complaints of flu like symptoms for 1+ week, generalized weakness, chest pain worse with deep breath, and dyspnea on exertion. Pt also states she has life stressors exacerbating her depression.    Focused Assessment:    Pt is A&Ox4, independent. Upon admission states she had previously felt so weak that she was crawling around at home, however has since been able to walk, do errands and move appropriately here at ER. She does not have any chest pain, states her breathing is only worse while moving around however has even and unlabored breathing. Only complaint has been 2/10 ankle pain from fall earlier this week.     Labs Ordered and Resulted from Time of ED Arrival to Time of ED Departure   COMPREHENSIVE METABOLIC PANEL - Abnormal       Result Value    Sodium 127 (*)     Potassium 4.6      Chloride 98      Carbon Dioxide (CO2) 24      Anion Gap 5      Urea Nitrogen 24      Creatinine 1.12 (*)     Calcium 8.5      Glucose 104 (*)     Alkaline Phosphatase 64      AST 44      ALT 24      Protein Total 10.1 (*)     Albumin 2.8 (*)     Bilirubin Total 0.7      GFR Estimate 51 (*)    D DIMER QUANTITATIVE - Abnormal    D-Dimer Quantitative 0.90 (*)    CBC WITH PLATELETS  "AND DIFFERENTIAL - Abnormal    WBC Count 5.8      RBC Count 3.30 (*)     Hemoglobin 10.8 (*)     Hematocrit 32.4 (*)     MCV 98      MCH 32.7      MCHC 33.3      RDW 13.3      Platelet Count 276      % Neutrophils 52      % Lymphocytes 33      % Monocytes 12      % Eosinophils 1      % Basophils 1      % Immature Granulocytes 1      NRBCs per 100 WBC 0      Absolute Neutrophils 3.1      Absolute Lymphocytes 1.9      Absolute Monocytes 0.7      Absolute Eosinophils 0.0      Absolute Basophils 0.0      Absolute Immature Granulocytes 0.0      Absolute NRBCs 0.0     ISTAT CREATININE POCT - Abnormal    Creatinine POCT 1.2 (*)     GFR, ESTIMATED POCT 47 (*)    TROPONIN I - Normal    Troponin I High Sensitivity 7     NT PROBNP INPATIENT - Normal    N terminal Pro BNP Inpatient 90     ISTAT CREATININE POCT       CT Chest Pulmonary Embolism w Contrast   Final Result   IMPRESSION:   1.  No pulmonary embolus, aortic dissection, or aneurysm.   2.  Small hiatal hernia.      XR Ankle Left 3 Views   Final Result   IMPRESSION: Ankle mortise is intact. No evidence of an acute fracture about the ankle. There is an age-indeterminate deformity at the base of the fifth metatarsal. There is tenderness in this location currently, recommend dedicated foot radiograph.            Treatments and/or interventions provided:    Comfort measures and pain meds.     Medications   HYDROmorphone (PF) (DILAUDID) injection 0.5 mg (0.5 mg Intravenous Given 5/28/22 1619)   iopamidol (ISOVUE-370) solution 58 mL (58 mLs Intravenous Given 5/28/22 1744)   Saline Flush (85 mLs Intravenous Given 5/28/22 1744)       Patient's response to treatments and/or interventions:    Pt states she is a \"happy camper\".     To be done/followed up on inpatient unit:   See any in-patient orders    Does this patient have any cognitive concerns?: none    Activity level - Baseline/Home:    Walker    Activity Level - Current:    Walker    Patient's Preferred language: " "English     Needed?: No    Isolation: None  Infection: Not Applicable  Patient tested for COVID 19 prior to admission: YES    Bariatric?: No    Vital Signs:   Vitals:    05/28/22 1545 05/28/22 1700 05/28/22 1800   BP: 137/71     Pulse: 102 93 85   Resp: 23 16    Temp: 98.7  F (37.1  C)     TempSrc: Oral     SpO2: 100% 94% 96%   Weight: 63.5 kg (140 lb)     Height: 1.6 m (5' 3\")         Cardiac Rhythm:     Was the PSS-3 completed:   Yes  What interventions are required if any?       Required Interventions: Provider notified;Room made safe       Family Comments: none at this time  OBS brochure/video discussed/provided to patient/family: N/A              Name of person given brochure if not patient:               Relationship to patient:     For the majority of the shift this patient's behavior was Green.  Behavioral interventions performed were .    ED NURSE PHONE NUMBER: *[id number]    "

## 2022-05-28 NOTE — ED TRIAGE NOTES
Patient reports fall last Thursday in bathroom. Patient reports injury to left ankle. Denies head injury. Denies LOC.    Patient reports crawling around in house d/t generalized weakness. Reports sleeping on floor.    Patient reports flu symptoms last week. (body aches, and intermittent diaphoresis).    Patient reports inability to care for self at home.     Patient reports increased difficulty breathing after doing errands this afternoon. Started around 1300.     Reports congestion and nausea. Denies vomiting or diarrhea.     Denies HA.     Patient reports Tylenol gives heartburn and upset stomach.    Patient accompanied by neighbor/friend who is a former nurse.    Patient complaints of chest pain. Worse with deep breath.     Patient is awake, alert and oriented to person, place, time and situation.    Airway is patent.    Respirations are regular and unlabored.  Patient talking in full sentences.  Patient denies cough.   Patient reports some shortness of breath with activity.    Pulses are strong and regular with palpation.  Skin is normal color, warm and dry.   Cap refill is less than 3 seconds.

## 2022-05-29 LAB
ANION GAP SERPL CALCULATED.3IONS-SCNC: 7 MMOL/L (ref 3–14)
BUN SERPL-MCNC: 22 MG/DL (ref 7–30)
CALCIUM SERPL-MCNC: 8.8 MG/DL (ref 8.5–10.1)
CHLORIDE BLD-SCNC: 100 MMOL/L (ref 94–109)
CK SERPL-CCNC: 198 U/L (ref 30–225)
CO2 SERPL-SCNC: 24 MMOL/L (ref 20–32)
CREAT SERPL-MCNC: 0.95 MG/DL (ref 0.52–1.04)
CREAT SERPL-MCNC: 1 MG/DL (ref 0.52–1.04)
CREAT SERPL-MCNC: 1.01 MG/DL (ref 0.52–1.04)
FERRITIN SERPL-MCNC: 119 NG/ML (ref 8–252)
FOLATE SERPL-MCNC: 8.1 NG/ML
GFR SERPL CREATININE-BSD FRML MDRD: 58 ML/MIN/1.73M2
GFR SERPL CREATININE-BSD FRML MDRD: 58 ML/MIN/1.73M2
GFR SERPL CREATININE-BSD FRML MDRD: 62 ML/MIN/1.73M2
GLUCOSE BLD-MCNC: 92 MG/DL (ref 70–99)
IRON SATN MFR SERPL: 21 % (ref 15–46)
IRON SERPL-MCNC: 54 UG/DL (ref 35–180)
MAGNESIUM SERPL-MCNC: 2.1 MG/DL (ref 1.6–2.3)
OSMOLALITY SERPL: 291 MMOL/KG (ref 280–301)
OSMOLALITY UR: 518 MMOL/KG (ref 100–1200)
POTASSIUM BLD-SCNC: 4.2 MMOL/L (ref 3.4–5.3)
SODIUM SERPL-SCNC: 131 MMOL/L (ref 133–144)
SODIUM UR-SCNC: 58 MMOL/L
TIBC SERPL-MCNC: 261 UG/DL (ref 240–430)
TROPONIN I SERPL HS-MCNC: 6 NG/L
TROPONIN I SERPL HS-MCNC: 8 NG/L
TROPONIN I SERPL HS-MCNC: 8 NG/L
TSH SERPL DL<=0.005 MIU/L-ACNC: 1.51 MU/L (ref 0.4–4)
VIT B12 SERPL-MCNC: 128 PG/ML (ref 193–986)

## 2022-05-29 PROCEDURE — 250N000013 HC RX MED GY IP 250 OP 250 PS 637: Performed by: HOSPITALIST

## 2022-05-29 PROCEDURE — 83550 IRON BINDING TEST: CPT | Performed by: HOSPITALIST

## 2022-05-29 PROCEDURE — 82565 ASSAY OF CREATININE: CPT | Performed by: HOSPITALIST

## 2022-05-29 PROCEDURE — 82607 VITAMIN B-12: CPT | Performed by: HOSPITALIST

## 2022-05-29 PROCEDURE — G0378 HOSPITAL OBSERVATION PER HR: HCPCS

## 2022-05-29 PROCEDURE — 84300 ASSAY OF URINE SODIUM: CPT | Performed by: HOSPITALIST

## 2022-05-29 PROCEDURE — 96372 THER/PROPH/DIAG INJ SC/IM: CPT | Performed by: HOSPITALIST

## 2022-05-29 PROCEDURE — 83735 ASSAY OF MAGNESIUM: CPT | Performed by: HOSPITALIST

## 2022-05-29 PROCEDURE — 82550 ASSAY OF CK (CPK): CPT | Performed by: HOSPITALIST

## 2022-05-29 PROCEDURE — 84484 ASSAY OF TROPONIN QUANT: CPT | Performed by: HOSPITALIST

## 2022-05-29 PROCEDURE — 82746 ASSAY OF FOLIC ACID SERUM: CPT | Performed by: HOSPITALIST

## 2022-05-29 PROCEDURE — 258N000003 HC RX IP 258 OP 636: Performed by: HOSPITALIST

## 2022-05-29 PROCEDURE — 83930 ASSAY OF BLOOD OSMOLALITY: CPT | Performed by: HOSPITALIST

## 2022-05-29 PROCEDURE — 82306 VITAMIN D 25 HYDROXY: CPT | Performed by: HOSPITALIST

## 2022-05-29 PROCEDURE — 36415 COLL VENOUS BLD VENIPUNCTURE: CPT | Mod: 91 | Performed by: HOSPITALIST

## 2022-05-29 PROCEDURE — 250N000011 HC RX IP 250 OP 636: Performed by: HOSPITALIST

## 2022-05-29 PROCEDURE — 83935 ASSAY OF URINE OSMOLALITY: CPT | Performed by: HOSPITALIST

## 2022-05-29 PROCEDURE — 82728 ASSAY OF FERRITIN: CPT | Performed by: HOSPITALIST

## 2022-05-29 PROCEDURE — 99225 PR SUBSEQUENT OBSERVATION CARE,LEVEL II: CPT | Performed by: HOSPITALIST

## 2022-05-29 PROCEDURE — 84443 ASSAY THYROID STIM HORMONE: CPT | Performed by: HOSPITALIST

## 2022-05-29 PROCEDURE — 36415 COLL VENOUS BLD VENIPUNCTURE: CPT | Performed by: HOSPITALIST

## 2022-05-29 PROCEDURE — 80048 BASIC METABOLIC PNL TOTAL CA: CPT | Performed by: HOSPITALIST

## 2022-05-29 RX ORDER — SIMVASTATIN 20 MG
20 TABLET ORAL AT BEDTIME
Status: DISCONTINUED | OUTPATIENT
Start: 2022-05-29 | End: 2022-05-30 | Stop reason: HOSPADM

## 2022-05-29 RX ORDER — SODIUM CHLORIDE 9 MG/ML
INJECTION, SOLUTION INTRAVENOUS CONTINUOUS
Status: DISCONTINUED | OUTPATIENT
Start: 2022-05-29 | End: 2022-05-30

## 2022-05-29 RX ORDER — ACETAMINOPHEN 325 MG/1
650 TABLET ORAL EVERY 6 HOURS PRN
Status: DISCONTINUED | OUTPATIENT
Start: 2022-05-29 | End: 2022-05-30 | Stop reason: HOSPADM

## 2022-05-29 RX ORDER — ENOXAPARIN SODIUM 100 MG/ML
40 INJECTION SUBCUTANEOUS EVERY 24 HOURS
Status: DISCONTINUED | OUTPATIENT
Start: 2022-05-29 | End: 2022-05-30

## 2022-05-29 RX ORDER — HYDROMORPHONE HYDROCHLORIDE 1 MG/ML
0.5 INJECTION, SOLUTION INTRAMUSCULAR; INTRAVENOUS; SUBCUTANEOUS
Status: DISCONTINUED | OUTPATIENT
Start: 2022-05-29 | End: 2022-05-30 | Stop reason: HOSPADM

## 2022-05-29 RX ORDER — ACETAMINOPHEN 650 MG/1
650 SUPPOSITORY RECTAL EVERY 6 HOURS PRN
Status: DISCONTINUED | OUTPATIENT
Start: 2022-05-29 | End: 2022-05-30 | Stop reason: HOSPADM

## 2022-05-29 RX ORDER — NALOXONE HYDROCHLORIDE 0.4 MG/ML
0.2 INJECTION, SOLUTION INTRAMUSCULAR; INTRAVENOUS; SUBCUTANEOUS
Status: DISCONTINUED | OUTPATIENT
Start: 2022-05-29 | End: 2022-05-30 | Stop reason: HOSPADM

## 2022-05-29 RX ORDER — PROCHLORPERAZINE MALEATE 5 MG
5 TABLET ORAL EVERY 6 HOURS PRN
Status: DISCONTINUED | OUTPATIENT
Start: 2022-05-29 | End: 2022-05-30 | Stop reason: HOSPADM

## 2022-05-29 RX ORDER — ONDANSETRON 4 MG/1
4 TABLET, ORALLY DISINTEGRATING ORAL EVERY 6 HOURS PRN
Status: DISCONTINUED | OUTPATIENT
Start: 2022-05-29 | End: 2022-05-30 | Stop reason: HOSPADM

## 2022-05-29 RX ORDER — NALOXONE HYDROCHLORIDE 0.4 MG/ML
0.4 INJECTION, SOLUTION INTRAMUSCULAR; INTRAVENOUS; SUBCUTANEOUS
Status: DISCONTINUED | OUTPATIENT
Start: 2022-05-29 | End: 2022-05-30 | Stop reason: HOSPADM

## 2022-05-29 RX ORDER — ONDANSETRON 2 MG/ML
4 INJECTION INTRAMUSCULAR; INTRAVENOUS EVERY 6 HOURS PRN
Status: DISCONTINUED | OUTPATIENT
Start: 2022-05-29 | End: 2022-05-30 | Stop reason: HOSPADM

## 2022-05-29 RX ORDER — AMOXICILLIN 250 MG
1 CAPSULE ORAL AT BEDTIME
Status: DISCONTINUED | OUTPATIENT
Start: 2022-05-29 | End: 2022-05-30 | Stop reason: HOSPADM

## 2022-05-29 RX ORDER — PROCHLORPERAZINE 25 MG
12.5 SUPPOSITORY, RECTAL RECTAL EVERY 12 HOURS PRN
Status: DISCONTINUED | OUTPATIENT
Start: 2022-05-29 | End: 2022-05-30 | Stop reason: HOSPADM

## 2022-05-29 RX ORDER — CITALOPRAM HYDROBROMIDE 20 MG/1
40 TABLET ORAL DAILY
Status: DISCONTINUED | OUTPATIENT
Start: 2022-05-29 | End: 2022-05-30 | Stop reason: HOSPADM

## 2022-05-29 RX ORDER — METAXALONE 800 MG/1
800 TABLET ORAL 3 TIMES DAILY PRN
Status: DISCONTINUED | OUTPATIENT
Start: 2022-05-29 | End: 2022-05-30

## 2022-05-29 RX ADMIN — CITALOPRAM HYDROBROMIDE 40 MG: 40 TABLET ORAL at 09:51

## 2022-05-29 RX ADMIN — SENNOSIDES AND DOCUSATE SODIUM 1 TABLET: 50; 8.6 TABLET ORAL at 21:42

## 2022-05-29 RX ADMIN — SODIUM CHLORIDE: 9 INJECTION, SOLUTION INTRAVENOUS at 12:05

## 2022-05-29 RX ADMIN — SODIUM CHLORIDE: 9 INJECTION, SOLUTION INTRAVENOUS at 19:52

## 2022-05-29 RX ADMIN — ENOXAPARIN SODIUM 40 MG: 40 INJECTION SUBCUTANEOUS at 09:52

## 2022-05-29 RX ADMIN — METAXALONE 800 MG: 800 TABLET ORAL at 18:25

## 2022-05-29 NOTE — H&P
[facility]    History and Physical - Hospitalist Service       Date of Admission:  5/28/2022    Assessment & Plan      Mary Jo Mcleod is a 76 year old female admitted on 5/28/2022. She has a PMH most remarkable for depression, low back pain. She presents from home for evaluation of several things--she was admitted to observation for ACS rule out, SI, and hyponatremia.    1. Shortness of Breath with Exertion. Patient notes she has increased shortness of breath with activity requiring a few minutes to catch her breath after activity. I don't have a high suspicion for ACS, although coronary disease should be ruled out.  --No concerning ECG changes  --Initial troponin was negative will continue trending for now  --Telemetry monitoring  --NPO at midnight with dobutamine stress in the morning    2. SI. During evaluation with ER physician, she noted that she was suicidal with a plan; her plan is to cut her wrists. She tells me that she was seriously considering this on Thursday but wants to do it less now. She states that the reason she wanted to do this is because of a number of stressors in her life--family issues with splitting her mothers estate, and several lawsuits she is involved with. I don't actually know if these are true or confabulations as it is an impressive amount of situations she claims to be involved with--including several successful intellectual property lawsuits she has won and has pending.  --Sitter  --Psych evaluation    3. Hyponatremia. Unclear etiology, I suspect patient is hypovolemic as she has only eaten a small hot dog today for food. Will obtain preliminary workup and trend for the AM  --Morning labs  --TSH, Urine / Serum OSM, Urine sodium pending    4. Concern for Ability to perform ADLs. Lower Back Pain. Patient notes that she has been unable to function at home, crawling around on the floor frequently. She also mentions doing errands around town so it's not clear  if this patient is potentially malingering or not. Will obtain formal PT consult for this.  --SW for placement; PT for recommendation on discharge  --PRN pain meds; will attempt to use tylenol with opioids sparingly. I advised her they would not be prescribed on discharge    Chronic Issues:  1. Depression. Continue citalopram  2. HLD. Continue simvastatin     Diet:   General  DVT Prophylaxis: Enoxaparin (Lovenox) SQ  Astudillo Catheter: Not present  Central Lines: None  Cardiac Monitoring: None  Code Status:   Full Code    Clinically Significant Risk Factors Present on Admission         # Hyponatremia: Na = 127 mmol/L (Ref range: 133 - 144 mmol/L) on admission, will monitor as appropriate     # Hypoalbuminemia: Albumin = 2.8 g/dL (Ref range: 3.4 - 5.0 g/dL) on admission, will monitor as appropriate          Disposition Plan   Expected Discharge:  Likely in the next 1 - 2 days; pending full evaluations  Anticipated discharge location:  Awaiting care coordination huddle  Delays:    Placement, psych evaluation     The patient's care was discussed with the Bedside Nurse, Patient and Patient's Family.    Ronald Lizarraga MD PhD  Hospitalist Service  [facility]  Securely message with the Vocera Web Console (learn more here)  Text page via [employer] Paging/Directory     ______________________________________________________________________    Chief Complaint   Multiple complaints    History is obtained from the patient    History of Present Illness   Mary Jo Mcleod is a 76 year old female admitted on 5/28/2022. She has a PMH most remarkable for depression, low back pain. She presents from home for evaluation of several things--she was admitted to observation for ACS rule out, SI, and hyponatremia.    Patient tells me that she was seen in the ER on May the 3rd, and that she had hip pain at that time. She was discharged and has outpatient follow up with [facility]. She notes that the pain has progressed to the  point of requiring her to crawl around her home on her hands and knees. She thinks it has something to do with her bed being too soft and has now been sleeping on the floor. She also tells me that she is still able to walk, drive, and do errands (run to the store, etc.) but she has been requiring a walker due to pain. It is unclear what distinguishes her being able to walk and drive and what requires her to crawl at home. She has had a few falls as well, but nothing in the past week.    She also notes a feeling of SI; she had planned to cut her wrists on Thursday but ended up deciding against it. She tells me that this was due to significant stressors in her life. She listed many, the main ones were that her entire family was fighting over her mother's estate and she believe her family wishes she was dead so they could have her portion of the inheritance. She also tells me about a series of pending and successful lawsuits she is involved with mainly around intellectual property surround her writing. All of these have caused her a great deal of stress and frustration.    She has no fevers, chills, chest pain. She has some hip pain that comes and goes and is achey.     Review of Systems    The 10 point Review of Systems is negative other than noted in the HPI or here.    Past Medical History    I have reviewed this patient's medical history and updated it with pertinent information if needed.   Past Medical History:   Diagnosis Date     Depressive disorder        Past Surgical History   I have reviewed this patient's surgical history and updated it with pertinent information if needed.  Past Surgical History:   Procedure Laterality Date     BONE MARROW BIOPSY, BONE SPECIMEN, NEEDLE/TROCAR N/A 2/8/2021    Procedure: BONE MARROW BIOPSY;  Surgeon: Naomie Saeed MD;  Location:  GI     finger reattachment       TONSILLECTOMY         Social History   I have reviewed this patient's social history and updated it  with pertinent information if needed.  Social History     Tobacco Use     Smoking status: Never Smoker     Smokeless tobacco: Never Used   Substance Use Topics     Alcohol use: Not Currently     Alcohol/week: 0.0 - 2.0 standard drinks     Drug use: No       Family History   I have reviewed this patient's family history and updated it with pertinent information if needed.  Family History   Problem Relation Age of Onset     Myocardial Infarction Father 63     Bipolar Disorder Sister      Chronic Obstructive Pulmonary Disease Brother      Lung Cancer Brother      Suicide Brother      Influenza/Pneumonia Sister      Alcoholism Sister        Prior to Admission Medications   Prior to Admission Medications   Prescriptions Last Dose Informant Patient Reported? Taking?   VITAMIN D PO   Yes Yes   Sig: Take by mouth daily   calcium carbonate (TUMS) 500 MG chewable tablet   Yes Yes   Sig: Take 1 chew tab by mouth 3 times daily   citalopram (CELEXA) 40 MG tablet   No Yes   Sig: Take 1 tablet (40 mg) by mouth daily   cyclobenzaprine (FLEXERIL) 5 MG tablet   No No   Sig: Take 1 tablet (5 mg) by mouth 2 times daily as needed for muscle spasms   Patient not taking: Reported on 5/18/2022   minocycline (MINOCIN) 50 MG capsule   Yes Yes   Sig: PRN for acne   simvastatin (ZOCOR) 20 MG tablet   No Yes   Sig: TAKE 1 TABLET BY MOUTH AT BEDTIME      Facility-Administered Medications: None     Allergies   Allergies   Allergen Reactions     Codeine Difficulty breathing, Other (See Comments) and Rash     Bupropion      Erythromycin      Hydrocodone Other (See Comments) and Itching     Ear ache     Lidocaine Other (See Comments)     Penicillin G Itching     Itching around mouth then heavy breathing     Sulfa Drugs Itching     Itching around the mouth and then heavy breathing     Tetracycline      Trazodone      Does not work for patient     Wellbutrin [Bupropion Hydrobromide]      Codeine Sulfate Rash       Physical Exam   Vital Signs: Temp:  98.7  F (37.1  C) Temp src: Oral BP: 137/71 Pulse: 85   Resp: 16 SpO2: 96 % O2 Device: None (Room air)    Weight: 140 lbs 0 oz    General Appearance: Elderly, no distress  Eyes: MICKEY, EOMI  HEENT: NC, AT. MMM.   Respiratory: CTAB, normal work of breathing  Cardiovascular: Regular Rate and Rhythm, normal S1, S2. No murmurs, rubs, gallops  GI: Soft, non-tender, non-distended  Lymph/Hematologic: No asymetric swelling, edema. No bruising.  Genitourinary: Deferred  Skin: No rashes, lesions, wounds.  Musculoskeletal: Warm, well perfused  Neurologic: AOx4, CNII-XII intact.  Psychiatric: Euthymic. Mood appropriate.     Data   Data reviewed today: I reviewed all medications, new labs and imaging results over the last 24 hours. I personally reviewed the EKG tracing showing no CÉSAR or TWI and the chest CT image(s) showing no PE.    Recent Labs   Lab 05/28/22  1616 05/28/22  1614   WBC  --  5.8   HGB  --  10.8*   MCV  --  98   PLT  --  276   NA  --  127*   POTASSIUM  --  4.6   CHLORIDE  --  98   CO2  --  24   BUN  --  24   CR 1.2* 1.12*   ANIONGAP  --  5   ARA  --  8.5   GLC  --  104*   ALBUMIN  --  2.8*   PROTTOTAL  --  10.1*   BILITOTAL  --  0.7   ALKPHOS  --  64   ALT  --  24   AST  --  44

## 2022-05-29 NOTE — PROGRESS NOTES
RECEIVING UNIT ED HANDOFF REVIEW    ED Nurse Handoff Report was reviewed by: Shoaib Kingston RN on May 29, 2022 at 11:10 AM

## 2022-05-29 NOTE — PROVIDER NOTIFICATION
MD Notification    Notified Person: MD    Notified Person Name:Dr Guillaume    Notification Date/Time: 5/29/2022  1725PM    Notification Interaction: Text paged    Purpose of Notification: Pt c/o constant leg cramps, getting restless, requesting muscle relaxant, constipated and asking for stool softeners    Orders Received: Skelaxin and Senna ordered    Comments:

## 2022-05-29 NOTE — PROGRESS NOTES
[facility]    Medicine Progress Note - Hospitalist Service    Date of Admission:  5/28/2022    Assessment & Plan               Mary Jo Mcleod is a 76 year old female admitted on 5/28/2022. She has a PMH most remarkable for depression, low back pain. She presents from home for evaluation of several things--she was admitted to observation for ACS rule out, SI, and hyponatremia.    Chest pain  Shortness of Breath with Exertion. Patient notes she has increased shortness of breath with activity requiring a few minutes to catch her breath after activity. I don't have a high suspicion for ACS, although coronary disease should be ruled out.  --No concerning ECG changes  --Troponins negative x3  --Telemetry monitoring  --TTE today - stress test had been ordered, but couldn't be done and I don't think that is necessary if everything else wnl    Suicidal ideation. During evaluation with ER physician, she noted that she was suicidal with a plan; her plan is to cut her wrists. She tells me that she was seriously considering this on Thursday but wants to do it less now. She states that the reason she wanted to do this is because of a number of stressors in her life--family issues with splitting her mothers estate, and several lawsuits she is involved with. I don't actually know if these are true or confabulations as it is an impressive amount of situations she claims to be involved with--including several successful intellectual property lawsuits she has won and has pending.  --suicide precautions  --Psych evaluation    Hyponatremia. Unclear etiology, I suspect patient is hypovolemic. She also notes having influenza recently.   --Celexa can also cause hyponatremia. Patient has been on it for decades though. Will hold for now and defer to psych.  --Morning labs  --TSH, Urine / Serum OSM, Urine sodium pending  --Giving 1L NS today    Concern for Ability to perform ADLs. Lower Back Pain  Muscle cramps.  Patient notes that she has been unable to function at home, crawling around on the floor frequently. She also mentions doing errands around town so it's not clear if this patient is potentially malingering or not. Will obtain formal PT consult for this.  --SW for placement; PT for recommendation on discharge  --PRN pain meds; will attempt to use tylenol with opioids sparingly. I advised her they would not be prescribed on discharge    --given cramps and restlessness today, ordered iron and ferritin, B12 and folate, mag, CK for workup. Also vit D for weakness.  --ice to affected areas    Macrocytic anemia  --appears chronic, B12, folate sent as above.    Chronic Issues:  1. Depression.   2. HLD. Continue simvastatin       Diet: Regular Diet Adult    DVT Prophylaxis: Pneumatic Compression Devices  Astudillo Catheter: Not present  Central Lines: None  Cardiac Monitoring: None  Code Status:   Full Code    Disposition Plan   Expected Discharge:   Anticipated discharge location:  Awaiting care coordination huddle  Delays:           The patient's care was discussed with the Bedside Nurse and Patient.    Georgie Guillaume MD  Hospitalist Service  [facility]  Securely message with the [employer]oc[employer] Web Console (learn more here)  Text page via [employer] Paging/Directory         Clinically Significant Risk Factors Present on Admission         # Hyponatremia: Na = 127 mmol/L (Ref range: 133 - 144 mmol/L) on admission, will monitor as appropriate     # Hypoalbuminemia: Albumin = 2.8 g/dL (Ref range: 3.4 - 5.0 g/dL) on admission, will monitor as appropriate          ______________________________________________________________________    Interval History   Patient c/o buttock and leg cramps. Said she twisted her ankle a few days ago and that is bothering her. Ankle looks normal. She is anxious to talk to psychiatry about her issues with family, etc. Odd affect.    Data reviewed today: I reviewed all medications, new  labs and imaging results over the last 24 hours.    Physical Exam   Vital Signs: Temp: 98.7  F (37.1  C) Temp src: Oral BP: 104/74 Pulse: 96   Resp: 21 SpO2: 99 % O2 Device: None (Room air)    Weight: 140 lbs 0 oz  Constitutional: Awake, alert, cooperative, no apparent distress  HEENT: neck supple, no LAD noted, moist mucous membranes  Respiratory: Clear to auscultation bilaterally, no crackles or wheezing  Cardiovascular: Regular rate and rhythm, normal S1 and S2, and no murmur noted  GI: Normal bowel sounds, soft, non-distended, non-tender  Skin/Integumen: No rashes, no cyanosis, no edema  Ext: no edema, moving all extremities  Neuro: CN 2-12 intact, non focal exam    Data   Recent Labs   Lab 05/29/22  0322 05/28/22  1616 05/28/22  1614   WBC  --   --  5.8   HGB  --   --  10.8*   MCV  --   --  98   PLT  --   --  276   NA  --   --  127*   POTASSIUM  --   --  4.6   CHLORIDE  --   --  98   CO2  --   --  24   BUN  --   --  24   CR 1.00 1.2* 1.12*   ANIONGAP  --   --  5   ARA  --   --  8.5   GLC  --   --  104*   ALBUMIN  --   --  2.8*   PROTTOTAL  --   --  10.1*   BILITOTAL  --   --  0.7   ALKPHOS  --   --  64   ALT  --   --  24   AST  --   --  44     Recent Results (from the past 24 hour(s))   XR Ankle Left 3 Views    Narrative    EXAM: XR ANKLE LEFT G/E 3 VIEWS  LOCATION: [facility]  DATE/TIME: 5/28/2022 4:23 PM    INDICATION: pain after twisting  COMPARISON: None.      Impression    IMPRESSION: Ankle mortise is intact. No evidence of an acute fracture about the ankle. There is an age-indeterminate deformity at the base of the fifth metatarsal. There is tenderness in this location currently, recommend dedicated foot radiograph.   CT Chest Pulmonary Embolism w Contrast    Narrative    EXAM: CT CHEST PULMONARY EMBOLISM W CONTRAST  LOCATION: [facility]  DATE/TIME: 5/28/2022 5:44 PM    INDICATION: PE suspected, low/intermediate prob, positive D-dimer  COMPARISON:  None.  TECHNIQUE: CT chest pulmonary angiogram during arterial phase injection of IV contrast. Multiplanar reformats and MIP reconstructions were performed. Dose reduction techniques were used.   CONTRAST: 58mL Isovue 370    FINDINGS:  ANGIOGRAM CHEST: Pulmonary arteries are normal caliber and negative for pulmonary emboli. Thoracic aorta is negative for dissection. No CT evidence of right heart strain.    LUNGS AND PLEURA: Normal.    MEDIASTINUM/AXILLAE: Normal.    CORONARY ARTERY CALCIFICATION: None.    UPPER ABDOMEN: Normal.    MUSCULOSKELETAL: Normal.      Impression    IMPRESSION:  1.  No pulmonary embolus, aortic dissection, or aneurysm.  2.  Small hiatal hernia.     Medications       citalopram  40 mg Oral Daily     enoxaparin ANTICOAGULANT  40 mg Subcutaneous Q24H

## 2022-05-29 NOTE — PHARMACY-ADMISSION MEDICATION HISTORY
Pharmacy Medication History  Admission medication history interview status for the 5/28/2022  admission is complete. See EPIC admission navigator for prior to admission medications     Location of Interview: Patient room  Medication history sources: Patient, Surescripts and Care Everywhere    Significant changes made to the medication list:  Deleted: Flexeril    In the past week, patient estimated taking medication this percent of the time: greater than 90%    Additional medication history information:   Updated med list and last doses per patient's testimony.   Patient reports that flexeril didn't help her and she isn't taking it anymore.     Medication reconciliation completed by provider prior to medication history? Yes    Time spent in this activity: 15 min    Prior to Admission medications    Medication Sig Last Dose Taking? Auth Provider   calcium carbonate (TUMS) 500 MG chewable tablet Take 1 chew tab by mouth 3 times daily Past Week at prn Yes Reported, Patient   citalopram (CELEXA) 40 MG tablet Take 1 tablet (40 mg) by mouth daily 5/28/2022 at am Yes Levi Mcdonnell MD   simvastatin (ZOCOR) 20 MG tablet TAKE 1 TABLET BY MOUTH AT BEDTIME 5/27/2022 at pm Yes Levi Mcdonnell MD   vitamin D3 (CHOLECALCIFEROL) 50 mcg (2000 units) tablet Take 1 tablet by mouth daily 5/28/2022 at am Yes Unknown, Entered By History       The information provided in this note is only as accurate as the sources available at the time of update(s)

## 2022-05-29 NOTE — CONSULTS
"Triage and Transition - Consult and Liaison     Mary Jo Mcleod  May 29, 2022    Session start: 1:24 pm  Session end: 1:54 pm  Session duration in minutes: 30   CPT utilized: 16631 - Brief diagnostic assessment (modifier 52)  Patient was seen virtually ([employer] cart or other teleconferencing device).    Reason for consult: Psychiatry consult was requested due to patient voicing suicidal ideation in ED.. Patient was seen by [facility] Consult & Liaison team.     Identifying information: Mary Jo is 76 year old White  female   followed related to falls, increased fatigue and shortness of breath. Patient is not  and lives in [facility] alone. Patient not currently employed.    Presenting problem (including symptoms, strengths risks, resources used): Patient reports concerns of family stress. In individual therapeutic contact with patient today, patient presents with labile affect, labile mood.. Safety concerns are not present today. Patient reports history of depression, stating she is taking 40 mg citalaprom and it is \"working very well\". She presents with pressured speech, is tangential. She appears paranoid and slightly delusional. She reports she  came home to take care of her mother many years ago. And recent problem for her has been [place] with inheritance with brothers and sister.  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 She reports she called Ryley Alvarez to complain. And states \"Viki Rodgers is an addict\" and has been gambling away her  money. Lastly, she reports she wrote letters to  of [place] [employer] who made a book about her. Two books were printed and sibilings stole them.    Patient reports she lives under the poverty line, which is limiting socially. Patient reports she has started to isolate. She does have some friends at condo she lives.     Patient reports suicidal " "thoughts have been coming up recently. She states it is the long-haul of being \"pummeled\" by sibilings. Feels like relatives just want her dead and to collect her money.   She reports she previously saw therapists, states they ruined her life, made her stay in marriages, reports they are a fraud.  Patient reports people are supportive and positive here in hospital and she would not harm herself here. Patient reports feeling somewhat hopeful today. When asked about adjusting medications, Patient states she is an \"allergic person\", would rather work with people therapeutically then be medicated. Patient reports she will call kajeet when she is discharged to get therapist set up.       Per chart review, history of some paranoid thoughts, doctors note indicate concern for paranoia and delusions regarding family members, at one point was prescribed abilify for this, which patient does not recall . Invidual therapy notes indicate history of paranoia. It appears when she has been confronted about this in the past, she becomes upset and accuses the therapist of lying.         Mental Status Exam   Affect: Appropriate  Appearance: Appropriate   Attention Span/Concentration: Inattentive    Eye Contact: Variable  Fund of Knowledge: Appropriate   Language /Speech Content: Fluent  Language /Speech Volume: Normal   Language /Speech Rate/Productions: Hyperverbal   Recent Memory: Variable  Remote Memory: Variable  Mood: Irritable   Orientation:   Person: Yes   Place: Yes  Time of Day: Yes   Date: Yes   Situation (Do they understand why they are here?): Yes   Psychomotor Behavior: Normal   Thought Content: Delusions and Paranoia  Thought Form: Flight of Ideas and Tangential    Current medications:     Relevant history:     Cultural Influences:    Therapeutic intervention and progress:  Therapeutic intervention consisted of building therapeutic rapport, active listening and validation.     Collateral information:   Reviewed chart, " see above for history regarding paranoia and mental health therapy.     Diagnosis:   296.32 (F33.1) Major Depressive Disorder, Recurrent Episode, Moderate _, by history;        Plan:     Can discontinue sitter, patient reports feels safe and supported in hospital and would not harm herself. Agrees to share with staff if these thoughts change.     Patient appears paranoid and delusion, records indicate this is longstanding/chronic, patient not interested in medications and states she has resources to set up therapy upon discharge.    Patient will not continue to be followed by this service.    Lore Hanson, [facility]  Triage and Transition - Consult and Liaison   [phone]

## 2022-05-29 NOTE — PLAN OF CARE
"Goal Outcome Evaluation:    PRIOR TO DISCHARGE        Comments: -diagnostic tests and consults completed and resulted No,psych and Echo pending  -vital signs normal or at patient baseline Yes  -safe disposition plan has been identified No, PT/SW/CC consult pending  Nurse to notify provider when observation goals have been met and patient is ready for discharge.   Orientation/Cognitive: A&OX4  Observation Goals (Met/ Not Met): Not met  Mobility Level/Assist Equipment: AX1 with GB and walker  Fall Risk (Y/N): Yes  Behavior Concerns: None, calm and cooperative  Pain Management: Leg cramps managed with skelexin  Tele/VS/O2: VSS on RA. Tele was SR  ABNL Lab/BG: Na+ low at 131, B12 down  Diet: Regular diet, good appetite  Bowel/Bladder: Continent, c/o constipation, scheduled started tonight  Skin Concerns: None  Drains/Devices: None  Tests/Procedures for next shift: None  Anticipated DC date & active delays: Pending psych consult, seen by mental health today, Suicide watch d/david. Pt denying SI  Patient Stated Goal for Today: \" to go home\"                      "

## 2022-05-29 NOTE — PROGRESS NOTES
PRIOR TO DISCHARGE        Comments: -diagnostic tests and consults completed and resulted No,psych and Echo pending  -vital signs normal or at patient baseline Yes  -safe disposition plan has been identified No, PT/SW/CC consult pending  Nurse to notify provider when observation goals have been met and patient is ready for discharge.

## 2022-05-29 NOTE — ED NOTES
Pt up to restroom with walker without difficulties. Updated on need to stay in the ER overnight. Pt remains pleasant and cooperative.

## 2022-05-30 ENCOUNTER — APPOINTMENT (OUTPATIENT)
Dept: PHYSICAL THERAPY | Facility: CLINIC | Age: 77
End: 2022-05-30
Attending: HOSPITALIST
Payer: COMMERCIAL

## 2022-05-30 ENCOUNTER — APPOINTMENT (OUTPATIENT)
Dept: CARDIOLOGY | Facility: CLINIC | Age: 77
End: 2022-05-30
Attending: HOSPITALIST
Payer: COMMERCIAL

## 2022-05-30 VITALS
DIASTOLIC BLOOD PRESSURE: 44 MMHG | HEART RATE: 92 BPM | BODY MASS INDEX: 24.41 KG/M2 | OXYGEN SATURATION: 98 % | SYSTOLIC BLOOD PRESSURE: 110 MMHG | TEMPERATURE: 98.4 F | WEIGHT: 137.79 LBS | RESPIRATION RATE: 16 BRPM | HEIGHT: 63 IN

## 2022-05-30 LAB
ALBUMIN SERPL-MCNC: 2.5 G/DL (ref 3.4–5)
ALP SERPL-CCNC: 42 U/L (ref 40–150)
ALT SERPL W P-5'-P-CCNC: 21 U/L (ref 0–50)
ANION GAP SERPL CALCULATED.3IONS-SCNC: 1 MMOL/L (ref 3–14)
AST SERPL W P-5'-P-CCNC: 24 U/L (ref 0–45)
BASOPHILS # BLD AUTO: 0 10E3/UL (ref 0–0.2)
BASOPHILS NFR BLD AUTO: 1 %
BILIRUB SERPL-MCNC: 0.4 MG/DL (ref 0.2–1.3)
BUN SERPL-MCNC: 15 MG/DL (ref 7–30)
CALCIUM SERPL-MCNC: 8.2 MG/DL (ref 8.5–10.1)
CHLORIDE BLD-SCNC: 104 MMOL/L (ref 94–109)
CO2 SERPL-SCNC: 25 MMOL/L (ref 20–32)
CREAT SERPL-MCNC: 0.92 MG/DL (ref 0.52–1.04)
EOSINOPHIL # BLD AUTO: 0 10E3/UL (ref 0–0.7)
EOSINOPHIL NFR BLD AUTO: 0 %
ERYTHROCYTE [DISTWIDTH] IN BLOOD BY AUTOMATED COUNT: 13.4 % (ref 10–15)
GFR SERPL CREATININE-BSD FRML MDRD: 65 ML/MIN/1.73M2
GLUCOSE BLD-MCNC: 103 MG/DL (ref 70–99)
HCT VFR BLD AUTO: 28.6 % (ref 35–47)
HGB BLD-MCNC: 9.3 G/DL (ref 11.7–15.7)
IMM GRANULOCYTES # BLD: 0 10E3/UL
IMM GRANULOCYTES NFR BLD: 1 %
LVEF ECHO: NORMAL
LYMPHOCYTES # BLD AUTO: 1.7 10E3/UL (ref 0.8–5.3)
LYMPHOCYTES NFR BLD AUTO: 33 %
MCH RBC QN AUTO: 32.3 PG (ref 26.5–33)
MCHC RBC AUTO-ENTMCNC: 32.5 G/DL (ref 31.5–36.5)
MCV RBC AUTO: 99 FL (ref 78–100)
MONOCYTES # BLD AUTO: 0.6 10E3/UL (ref 0–1.3)
MONOCYTES NFR BLD AUTO: 12 %
NEUTROPHILS # BLD AUTO: 2.7 10E3/UL (ref 1.6–8.3)
NEUTROPHILS NFR BLD AUTO: 53 %
NRBC # BLD AUTO: 0 10E3/UL
NRBC BLD AUTO-RTO: 0 /100
PLATELET # BLD AUTO: 240 10E3/UL (ref 150–450)
POTASSIUM BLD-SCNC: 4.1 MMOL/L (ref 3.4–5.3)
PROT SERPL-MCNC: 8.8 G/DL (ref 6.8–8.8)
RBC # BLD AUTO: 2.88 10E6/UL (ref 3.8–5.2)
SODIUM SERPL-SCNC: 130 MMOL/L (ref 133–144)
WBC # BLD AUTO: 5.1 10E3/UL (ref 4–11)

## 2022-05-30 PROCEDURE — 93306 TTE W/DOPPLER COMPLETE: CPT

## 2022-05-30 PROCEDURE — G0378 HOSPITAL OBSERVATION PER HR: HCPCS

## 2022-05-30 PROCEDURE — 250N000011 HC RX IP 250 OP 636: Performed by: HOSPITALIST

## 2022-05-30 PROCEDURE — 85025 COMPLETE CBC W/AUTO DIFF WBC: CPT | Performed by: HOSPITALIST

## 2022-05-30 PROCEDURE — 80053 COMPREHEN METABOLIC PANEL: CPT | Performed by: HOSPITALIST

## 2022-05-30 PROCEDURE — 99217 PR OBSERVATION CARE DISCHARGE: CPT | Performed by: HOSPITALIST

## 2022-05-30 PROCEDURE — 93306 TTE W/DOPPLER COMPLETE: CPT | Mod: 26 | Performed by: INTERNAL MEDICINE

## 2022-05-30 PROCEDURE — 97116 GAIT TRAINING THERAPY: CPT | Mod: GP | Performed by: PHYSICAL THERAPIST

## 2022-05-30 PROCEDURE — 99207 PR MOONLIGHTING INDICATOR: CPT | Performed by: HOSPITALIST

## 2022-05-30 PROCEDURE — 2894A VOIDCORRECT: CPT | Performed by: HOSPITALIST

## 2022-05-30 PROCEDURE — 258N000003 HC RX IP 258 OP 636: Performed by: HOSPITALIST

## 2022-05-30 PROCEDURE — 97161 PT EVAL LOW COMPLEX 20 MIN: CPT | Mod: GP | Performed by: PHYSICAL THERAPIST

## 2022-05-30 PROCEDURE — 96372 THER/PROPH/DIAG INJ SC/IM: CPT | Performed by: HOSPITALIST

## 2022-05-30 PROCEDURE — 96376 TX/PRO/DX INJ SAME DRUG ADON: CPT

## 2022-05-30 PROCEDURE — 36415 COLL VENOUS BLD VENIPUNCTURE: CPT | Performed by: HOSPITALIST

## 2022-05-30 PROCEDURE — 97530 THERAPEUTIC ACTIVITIES: CPT | Mod: GP | Performed by: PHYSICAL THERAPIST

## 2022-05-30 RX ORDER — UBIDECARENONE 75 MG
100 CAPSULE ORAL DAILY
Status: DISCONTINUED | OUTPATIENT
Start: 2022-05-31 | End: 2022-05-30 | Stop reason: HOSPADM

## 2022-05-30 RX ORDER — CYANOCOBALAMIN 1000 UG/ML
1000 INJECTION, SOLUTION INTRAMUSCULAR; SUBCUTANEOUS ONCE
Status: COMPLETED | OUTPATIENT
Start: 2022-05-30 | End: 2022-05-30

## 2022-05-30 RX ORDER — CYCLOBENZAPRINE HCL 10 MG
10 TABLET ORAL EVERY 8 HOURS PRN
Status: DISCONTINUED | OUTPATIENT
Start: 2022-05-30 | End: 2022-05-30 | Stop reason: HOSPADM

## 2022-05-30 RX ADMIN — ENOXAPARIN SODIUM 40 MG: 40 INJECTION SUBCUTANEOUS at 10:20

## 2022-05-30 RX ADMIN — CYANOCOBALAMIN 1000 MCG: 1000 INJECTION, SOLUTION INTRAMUSCULAR at 13:29

## 2022-05-30 RX ADMIN — HYDROMORPHONE HYDROCHLORIDE 0.5 MG: 1 INJECTION, SOLUTION INTRAMUSCULAR; INTRAVENOUS; SUBCUTANEOUS at 01:22

## 2022-05-30 RX ADMIN — SODIUM CHLORIDE: 9 INJECTION, SOLUTION INTRAVENOUS at 03:48

## 2022-05-30 NOTE — PLAN OF CARE
"Goal Outcome Evaluation:      Orientation/Cognitive: A&OX4  Observation Goals (Met/ Not Met): Not met  Mobility Level/Assist Equipment: SBA with GB and walker  Fall Risk (Y/N): Yes  Behavior Concerns: None, calm and cooperative  Pain Management: Leg cramps managed with skelaxin  Tele/VS/O2: VSS on RA. Tele was SR with occ PVC's  ABNL Lab/BG: Na+ low at 131, B12 down  Diet: Regular   Bowel/Bladder: Continent, BM  Skin Concerns: None  Drains/Devices: None  Tests/Procedures for next shift: None  Anticipated DC date & active delays: Pending psych consult, seen by mental health today, Suicide watch d/david. Pt denying SI contract for safety   Patient Stated Goal for Today: \" to go home\"  "

## 2022-05-30 NOTE — PLAN OF CARE
Goal Outcome Evaluation:  A&OX4, VSS on RA. Up SBA with a walker. All discharge meds and instructions reviewed with pt, pt verbalized understanding. All belongings returned to pt. Pt planning to hire a cab ride home. Taxi number provided. Awaiting ride now. Continue to monitor.

## 2022-05-30 NOTE — PLAN OF CARE
A&O x4. VSS. Up with SBA. Tolerating regular diet. Tele SR. NS infusing at 125.  Pain controlled with one dose of IV Dilaudid, pt said Skelaxin was not effective. Pt possibly to discharge today. Continue to monitor.

## 2022-05-30 NOTE — PROGRESS NOTES
05/30/22 1100   Quick Adds   Type of Visit Initial PT Evaluation   Living Environment   People in Home alone   Current Living Arrangements condominium   Home Accessibility no concerns   Transportation Anticipated car, drives self;family or friend will provide   Living Environment Comments Pt reports her family is all going to intermediate and being sued, so no family help   Self-Care   Usual Activity Tolerance good   Current Activity Tolerance moderate   Regular Exercise Yes   Activity/Exercise Type strength training;walking;other (see comments)   Equipment Currently Used at Home walker, rolling   Fall history within last six months yes   Number of times patient has fallen within last six months 1   Activity/Exercise/Self-Care Comment pt reports having been sick with the flu for 3 days and not able to amb after, was crawling around her home., normally able to to complete ADL's, no services at home, states she is thinking about it   General Information   Onset of Illness/Injury or Date of Surgery 05/28/22   Referring Physician Ronald Lizarraga   Patient/Family Therapy Goals Statement (PT) none stated   Pertinent History of Current Problem (include personal factors and/or comorbidities that impact the POC) Mary Jo Mcleod is a 76 year old female admitted on 5/28/2022. She has a PMH most remarkable for depression, low back pain. She presents from home for evaluation of several things--she was admitted to observation for ACS rule out, SI, and hyponatremia.   Existing Precautions/Restrictions no known precautions/restrictions   Weight-Bearing Status - LUE full weight-bearing   Weight-Bearing Status - RUE full weight-bearing   Weight-Bearing Status - LLE full weight-bearing   Weight-Bearing Status - RLE full weight-bearing   General Observations pt reports cramping in B LE, states they have not let her walk around at all.  Per chart chonic LBP   Cognition   Affect/Mental Status (Cognition) anxious   Orientation Status  (Cognition) oriented x 3   Follows Commands (Cognition) verbal cues/prompting required   Pain Assessment   Patient Currently in Pain Yes, see Vital Sign flowsheet  (does not rate)   Range of Motion (ROM)   Range of Motion ROM is WFL   Strength (Manual Muscle Testing)   Strength (Manual Muscle Testing) Deficits observed during functional mobility   Bed Mobility   Bed Mobility no deficits identified   Comment, (Bed Mobility) SBA/I  supine to sit   Transfers   Transfers no deficits identified   Comment, (Transfers) cues for hand placement and FWW use   Gait/Stairs (Locomotion)   [place] Level (Gait) supervision   Assistive Device (Gait) walker, front-wheeled   Distance in Feet (Required for LE Total Joints) 700'   Pattern (Gait) swing-through   Comment, (Gait/Stairs) declined stairs   Balance   Balance Comments decreased balance with dynaic standing requires B UE support to maintain due to pain   Clinical Impression   Criteria for Skilled Therapeutic Intervention Yes, treatment indicated   PT Diagnosis (PT) difficulty walking   Influenced by the following impairments pain with decreased balance   Functional limitations due to impairments impaired functional mob I and safety. requiring increased support/AD   Clinical Presentation (PT Evaluation Complexity) Stable/Uncomplicated   Clinical Presentation Rationale clinical judgement   Clinical Decision Making (Complexity) low complexity   Planned Therapy Interventions (PT) gait training   Risk & Benefits of therapy have been explained evaluation/treatment results reviewed;care plan/treatment goals reviewed;risks/benefits reviewed;participants voiced agreement with care plan;current/potential barriers reviewed;patient;participants included   PT Discharge Planning   PT Discharge Recommendation (DC Rec) home;home with outpatient physical therapy   PT Rationale for DC Rec Pt pt appears to nearing baseline for mob, newer use of FWW due to LE pain, may benefit from OP PT to  address with chrinic LBP.  Anticipate pt will meet goals for safe discharge to home from mob stand point. May benfit from OT consult if cont to have difficulty with ADL's   PT Brief overview of current status Pt moves SBA for bed mob and transfers with FWW, cues for safety, amb with SBA with FWW, should cont with nursing to amb 3 x day if able   Total Evaluation Time   Total Evaluation Time (Minutes) 12   Physical Therapy Goals   PT Frequency 3x/week   PT Predicted Duration/Target Date for Goal Attainment 06/01/22   PT Goals Bed Mobility;Transfers;Gait   PT: Bed Mobility Independent   PT: Transfers Modified independent   PT: Gait Modified independent;Greater than 200 feet

## 2022-05-30 NOTE — PROGRESS NOTES
PRIOR TO DISCHARGE        Comments:   -diagnostic tests and consults completed and resulted not met  -vital signs normal or at patient baseline Met  -safe disposition plan has been identified Not Met  Nurse to notify provider when observation goals have been met and patient is ready for discharge.

## 2022-05-30 NOTE — PROGRESS NOTES
diagnostic tests and consults completed and resulted Echo results pending  -vital signs normal or at patient baseline  Yes  -safe disposition plan has been identified Yes

## 2022-05-30 NOTE — PROGRESS NOTES
[facility]      OUTPATIENT PHYSICAL THERAPY EVALUATION  PLAN OF TREATMENT FOR OUTPATIENT REHABILITATION  (COMPLETE FOR INITIAL CLAIMS ONLY)  Patient's Last Name, First Name, M.I.  YOB: 1945  Mary Jo Mcleod                           Provider's Name  [facility] Medical Record No.  [id number]                               Onset Date:  05/28/22   Start of Care Date:         Type:     _X_PT   ___OT   ___SLP Medical Diagnosis:                           PT Diagnosis:  difficulty walking   Visits from SOC:  1   _________________________________________________________________________________  Plan of Treatment/Functional Goals    Planned Interventions: gait training     Goals: See Physical Therapy Goals on Care Plan in [facility] electronic health record.    Therapy Frequency: 3x/week  Predicted Duration of Therapy Intervention: 06/01/22  _________________________________________________________________________________    I CERTIFY THE NEED FOR THESE SERVICES FURNISHED UNDER        THIS PLAN OF TREATMENT AND WHILE UNDER MY CARE     (Physician co-signature of this document indicates review and certification of the therapy plan).                 ,      Referring Physician: Ronald Lizarraga            Initial Assessment        See Physical Therapy evaluation dated   in Epic electronic health record.

## 2022-05-30 NOTE — DISCHARGE SUMMARY
[facility]  Hospitalist Discharge Summary      Date of Admission:  5/28/2022  Date of Discharge:  5/30/2022  Discharging Provider: Georgie Guillaume MD  Discharge Service: Hospitalist Service    Discharge Diagnoses   Chest pain  Shortness of Breath with Exertion  Suicidal ideation  Hyponatremia  Concern for Ability to perform ADLs. Lower Back Pain  Muscle cramps  Vitamin B12 deficiency  Macrocytic anemia      Follow-ups Needed After Discharge   Consider pernicious anemia w/u    Unresulted Labs Ordered in the Past 30 Days of this Admission     Date and Time Order Name Status Description    5/29/2022  1:46 PM Vitamin D Deficiency In process     5/18/2022  8:17 AM CHROMOSOME ANALYSIS, BONE MARROW, DIAGNOSIS/RELAPSE In process     5/18/2022  8:17 AM FISH In process       These results will be followed up by PCP    Discharge Disposition   Discharged to home  Condition at discharge: Stable    Hospital Course   Mary Jo Mcleod is a 76 year old female admitted on 5/28/2022. She has a PMH most remarkable for depression, low back pain. She presents from home for evaluation of several things--she was admitted to observation for ACS rule out, SI, and hyponatremia.    Chest pain  Shortness of Breath with Exertion. Patient notes she has increased shortness of breath with activity requiring a few minutes to catch her breath after activity. I don't have a high suspicion for ACS, although coronary disease should be ruled out.  --No concerning ECG changes  --Troponins negative x3  --Telemetry monitoring  --TTE - stress test had been ordered, but couldn't be done and I don't think that is necessary if everything else wnl. Read pending.    Suicidal ideation. During evaluation with ER physician, she noted that she was suicidal with a plan; her plan is to cut her wrists. She tells me that she was seriously considering this on Thursday but wants to do it less now. She states that the reason she wanted to do this  is because of a number of stressors in her life--family issues with splitting her mothers estate, and several lawsuits she is involved with. I don't actually know if these are true or confabulations as it is an impressive amount of situations she claims to be involved with--including several successful intellectual property lawsuits she has won and has pending.  --suicide precautions  --Psych evaluation 5/29. Recommended continue celexa and f/u outpatient.     Hyponatremia. Unclear etiology, I suspect patient is hypovolemic. She also notes having influenza recently.   --Celexa can also cause hyponatremia. Patient has been on it for decades though.   --Morning labs  --TSH, Urine / Serum OSM, Urine sodium unremarkable.    Concern for Ability to perform ADLs. Lower Back Pain  Muscle cramps. Patient notes that she has been unable to function at home, crawling around on the floor frequently. She also mentions doing errands around town so it's not clear if this patient is potentially malingering or not. Will obtain formal PT consult for this.  --SW for placement; PT for recommendation on discharge  --PRN pain meds; will attempt to use tylenol with opioids sparingly. I advised her they would not be prescribed on discharge  --given cramps and restlessness, ordered iron and ferritin, B12 and folate, mag, CK for workup. Also vit D for weakness.  --ice to affected areas    Vitamin B12 deficiency: injection given today. Prescribed oral B12 on discharge.    Macrocytic anemia  --appears chronic, B12, folate sent as above.    Chronic Issues:  1. Depression.   2. HLD. Continue simvastatin      Consultations This Hospital Stay   PSYCHIATRY IP CONSULT  CARE MANAGEMENT / SOCIAL WORK IP CONSULT  PHYSICAL THERAPY ADULT IP CONSULT  ADVANCE DIRECTIVE IP CONSULT    Code Status   Full Code    Time Spent on this Encounter   I, Georgie Guillaume MD, personally saw the patient today and spent greater than 30 minutes discharging this patient.        Georgie Guillaume MD  [facility] EXTENDED RECOVERY AND SHORT STAY  [address] [facility] [address]  Phone: [phone]  ______________________________________________________________________    Physical Exam   Vital Signs: Temp: 98.4  F (36.9  C) Temp src: Oral BP: 110/44 Pulse: 92   Resp: 16 SpO2: 98 % O2 Device: None (Room air)    Weight: 137 lbs 12.6 oz         Primary Care Physician   Levi Mcdonnell    Discharge Orders   No discharge procedures on file.    Significant Results and Procedures   Most Recent 3 CBC's:Recent Labs   Lab Test 05/30/22  0631 05/28/22  1614 05/18/22  1422   WBC 5.1 5.8 5.2   HGB 9.3* 10.8* 10.5*   MCV 99 98 102*    276 232     Most Recent 3 BMP's:Recent Labs   Lab Test 05/30/22  0631 05/29/22  1309 05/29/22  1031 05/28/22  1616 05/28/22  1614   *  --  131*  --  127*   POTASSIUM 4.1  --  4.2  --  4.6   CHLORIDE 104  --  100  --  98   CO2 25  --  24  --  24   BUN 15  --  22  --  24   CR 0.92 0.95 1.01   < > 1.12*   ANIONGAP 1*  --  7  --  5   ARA 8.2*  --  8.8  --  8.5   *  --  92  --  104*    < > = values in this interval not displayed.   ,   Results for orders placed or performed during the hospital encounter of 05/28/22   XR Ankle Left 3 Views    Narrative    EXAM: XR ANKLE LEFT G/E 3 VIEWS  LOCATION: [facility]  DATE/TIME: 5/28/2022 4:23 PM    INDICATION: pain after twisting  COMPARISON: None.      Impression    IMPRESSION: Ankle mortise is intact. No evidence of an acute fracture about the ankle. There is an age-indeterminate deformity at the base of the fifth metatarsal. There is tenderness in this location currently, recommend dedicated foot radiograph.   CT Chest Pulmonary Embolism w Contrast    Narrative    EXAM: CT CHEST PULMONARY EMBOLISM W CONTRAST  LOCATION: [facility]  DATE/TIME: 5/28/2022 5:44 PM    INDICATION: PE suspected, low/intermediate prob, positive  D-dimer  COMPARISON: None.  TECHNIQUE: CT chest pulmonary angiogram during arterial phase injection of IV contrast. Multiplanar reformats and MIP reconstructions were performed. Dose reduction techniques were used.   CONTRAST: 58mL Isovue 370    FINDINGS:  ANGIOGRAM CHEST: Pulmonary arteries are normal caliber and negative for pulmonary emboli. Thoracic aorta is negative for dissection. No CT evidence of right heart strain.    LUNGS AND PLEURA: Normal.    MEDIASTINUM/AXILLAE: Normal.    CORONARY ARTERY CALCIFICATION: None.    UPPER ABDOMEN: Normal.    MUSCULOSKELETAL: Normal.      Impression    IMPRESSION:  1.  No pulmonary embolus, aortic dissection, or aneurysm.  2.  Small hiatal hernia.       Discharge Medications   Current Discharge Medication List      CONTINUE these medications which have NOT CHANGED    Details   calcium carbonate (TUMS) 500 MG chewable tablet Take 1 chew tab by mouth 3 times daily      citalopram (CELEXA) 40 MG tablet Take 1 tablet (40 mg) by mouth daily  Qty: 90 tablet, Refills: 0    Associated Diagnoses: Moderate episode of recurrent major depressive disorder (H); PTSD (post-traumatic stress disorder); GERSON (generalized anxiety disorder)      simvastatin (ZOCOR) 20 MG tablet TAKE 1 TABLET BY MOUTH AT BEDTIME  Qty: 90 tablet, Refills: 0    Comments: Reminder needs fasting lipid blood test  Associated Diagnoses: Hypercholesterolemia      vitamin D3 (CHOLECALCIFEROL) 50 mcg (2000 units) tablet Take 1 tablet by mouth daily           Allergies   Allergies   Allergen Reactions     Codeine Difficulty breathing, Other (See Comments) and Rash     Bupropion      Erythromycin      Hydrocodone Other (See Comments) and Itching     Ear ache     Lidocaine Other (See Comments)     Penicillin G Itching     Itching around mouth then heavy breathing     Sulfa Drugs Itching     Itching around the mouth and then heavy breathing     Tetracycline      Trazodone      Does not work for patient     Wellbutrin  [Bupropion Hydrobromide]      Codeine Sulfate Rash

## 2022-05-30 NOTE — PROGRESS NOTES
SPIRITUAL HEALTH SERVICES Progress Note  [facility]    Visited PT per [facility] request for Health Care Directive. PT states that she wishes to designate a different health care agent than the person named in her existing health care directive.    Delivered blank document, supporting documentation, and a copy of the existing health care directive to PT at bedside. Briefly described the requirements and process for completion of the document.    PT indicates that she will notify bedside nurse when document is complete and ready for notarization, or alternatively have complete the process outside the hospital.    Jorge Moreland  Associate

## 2022-05-31 ENCOUNTER — PATIENT OUTREACH (OUTPATIENT)
Dept: CARE COORDINATION | Facility: CLINIC | Age: 77
End: 2022-05-31
Payer: COMMERCIAL

## 2022-05-31 DIAGNOSIS — Z71.89 OTHER SPECIFIED COUNSELING: ICD-10-CM

## 2022-05-31 LAB — DEPRECATED CALCIDIOL+CALCIFEROL SERPL-MC: 64 UG/L (ref 20–75)

## 2022-05-31 NOTE — PROGRESS NOTES
"Clinic Care Coordination Contact    Referral Information:  Referral Source: IP Handoff    Care coordination referral placed post hospital discharge. Pt was admitted to [facility] from 5/28/22-5/30/22 for SI, Hyponatremia, muscle weakness and chest pain. PT evaluated and put in orders for outpatient PT and OT. Pt discharged back home. PCP follow up appt scheduled for 6/3/22.      Chief Complaint   Patient presents with     Clinic Care Coordination - Post Hospital        Universal Utilization:   Utilization    Hospital Admissions  2             ED Visits  3             No Show Count (past year)  2                Current as of: 5/31/2022 12:11 PM              Clinical Concerns:  Current Medical Concerns:  Pt feels really weak and is requesting a TCU stay. She says she cannot manage her ADL's at home. She doesn't have much for support as she is at odds with family members right now. She does have a few friends. She feels she needs TCU for PT to get back to baseline independence with ADL's.      Current Behavioral Concerns: Family Stress    History of depression- prescribed Citalopram    Paranoia and delusions    \"recent problem for her has been p[place] with inheritance with brothers and sister.  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 She reports she called Ryley Alvarez to complain. And states \"Viki Rodgers is an addict\" and has been gambling away her  money. Lastly, she reports she wrote letters to  of [place] [employer] who made a book about her. Two books were printed and sibilings stole them.\" (per [facility] In the hospital that evaluated her).    Patient has had bad experiences with therapy although she was to call [employer] to see who was in network that she could see post discharge.     When confronted about paranoia/delusions pt gets upset and accuses providers of " lying.      Functional Status:  Uses a rolling walker  Reports having been sick with the flu for 3 days and not able to amb after, was crawling around her home., normally able to to complete ADL's, no services at home, states she is thinking about it  No problems with transfers, weight bearing. She does have decreased balance with dynaic standing due to pain.      Lifestyle & Psychosocial Needs:  Pt is 76 with BCBS Medicare insurance.    Social Determinants of Health     Tobacco Use: Low Risk      Smoking Tobacco Use: Never Smoker     Smokeless Tobacco Use: Never Used   Alcohol Use: Not on file   Financial Resource Strain: Not on file   Food Insecurity: Not on file   Transportation Needs: Not on file   Physical Activity: Not on file   Stress: Not on file   Social Connections: Not on file   Intimate Partner Violence: Not on file   Depression: Not at risk     PHQ-2 Score: 2   Housing Stability: Not on file       Outreach Frequency: monthly  Future Appointments              Tomorrow  PT 1 WAITLIST [facility]    In 3 days Levi Mcdonnell MD [facility]    In 1 month [facility] DRAW [facility]    In 1 month Ale Hanks MD [facility]          Plan:   - updated Dr Queen via message. Will await input.  -Patient has an appointment 6/3/22 with PCP  -Patient has orders for outpatient PT/OT currently.   - can help with things if need be    Zayra Loja MSW, [facility]  Clinic Care Coordinator  [email or web address]  [phone]

## 2022-06-01 ENCOUNTER — PATIENT OUTREACH (OUTPATIENT)
Dept: CARE COORDINATION | Facility: CLINIC | Age: 77
End: 2022-06-01
Payer: COMMERCIAL

## 2022-06-01 ENCOUNTER — TELEPHONE (OUTPATIENT)
Dept: FAMILY MEDICINE | Facility: CLINIC | Age: 77
End: 2022-06-01

## 2022-06-01 DIAGNOSIS — E53.8 B12 DEFICIENCY: ICD-10-CM

## 2022-06-01 DIAGNOSIS — R29.898 LOWER EXTREMITY WEAKNESS: ICD-10-CM

## 2022-06-01 DIAGNOSIS — M54.50 LOW BACK PAIN: ICD-10-CM

## 2022-06-01 DIAGNOSIS — E87.1 HYPONATREMIA: Primary | ICD-10-CM

## 2022-06-01 DIAGNOSIS — C90.00 MULTIPLE MYELOMA NOT HAVING ACHIEVED REMISSION (H): Primary | ICD-10-CM

## 2022-06-01 DIAGNOSIS — M43.9 COMPRESSION DEFORMITY OF VERTEBRA: ICD-10-CM

## 2022-06-01 DIAGNOSIS — R26.81 UNSTEADY GAIT: ICD-10-CM

## 2022-06-01 NOTE — TELEPHONE ENCOUNTER
"Hi, my name is @ [email or web address]@  @, a Medical Assistant, and I am calling on behalf of Levi Hernandez's office at [facility].  I am calling to follow up and see how things are going for you after your recent visit.\"    \"I see that you were in the (ER/UC/IP) on 5/29/22.    How are you doing now that you are home?\" Not good. Extreme fatigue. Spoke to Nguyen who she said told her to get/take B12. She is at pharm right now.    Is patient experiencing symptoms that may require a hospital visit?  Possibly. Spoke with Dr Mcdonnell. If she is not feeling better prior to Friday's visit, she is to return to ED.     Discharge Instructions    \"Let's review your discharge instructions.  What is/are the follow-up recommendations?  Pt. Response: PCP follow up    \"Were you instructed to make a follow-up appointment?\"  Pt. Response: Yes.  Has appointment been made?   Yes      \"When you see the provider, I would recommend that you bring your discharge instructions with you. And bring medications/or list along with you.    Medications    \"How many new medications are you on since your hospitalization/ED visit?\"    0-1  \"How many of your current medicines changed (dose, timing, name, etc.) while you were in the hospital/ED visit?\"   0-1  \"Do you have questions about your medications?\"   No  \"Were you newly diagnosed with heart failure, COPD, diabetes, dvt, blood clot, pulmonary emboli or did you have a heart attack?\"   No  For patients on insulin: \"Did you start on insulin in the hospital or did you have your insulin dose changed?\"   No    Medication reconciliation completed? Yes    Was MTM referral placed (*Make sure to put transitions as reason for referral)?   No    Call Summary    \"Do you have any questions or concerns about your condition or care plan at the moment?\"    No  Triage advice given: PT will follow up with PCP             PT will continue with ED advise             PT will  contact hospital, if symptoms " "return or get worse    Patient was in ER 3 in the past year (assess appropriateness of ER visits.)      \"If you have questions or things don't continue to improve, we encourage you contact us through the main clinic number,  [phone]. If the clinic is not open, the on-call provider is available to help you.     \"Thank you for your time and take care!\"          "

## 2022-06-01 NOTE — PROGRESS NOTES
Clinic Care Coordination Contact    Situation: Patient chart reviewed by care coordinator.    Yesterday patient was adament about wanting emergent TCU admit from outpatient (no longer at hospital).  spoke with patient about it and updated PCP to get input.  Patient has an appointment this week with Dr. Mcdonnell and if she wasn't feeling better was to go back to the ED for requesting placement.   Patient spoke with Carla (RN) at the clinic today and was agreeable to homecare for PT instead. Referral was placed. Pt will not drive. She has no family to help her or to get around.    No other needs at this time.  will do no further outreach at this time but can be available if needs arise.    Zayra Loja, MSW, [facility]  Clinic Care Coordinator  [email or web address]  [phone]

## 2022-06-01 NOTE — PROGRESS NOTES
Mary Jo has myeloma based on her BM bx. It is unclear if she has bone lesions but will get a PET to determine this as it will be important to planning bishosphonate therapy. I tried to get Mary Jo into clinic today but she is not feeling up to it due to recent hospitalization for cardiac issues.

## 2022-06-01 NOTE — PLAN OF CARE
Physical Therapy Discharge Summary    Reason for therapy discharge:    Discharged to home with outpatient therapy.    Progress towards therapy goal(s). See goals on Care Plan in [facility] electronic health record for goal details.  Pt at or near baseline at discharge, ambulated 700+ feet with SBA. Disch prior to next PT visit.    Therapy recommendation(s):    Continued therapy is recommended.  Rationale/Recommendations:  Pt may benefit from OP PT for chronic low back pain. .

## 2022-06-03 ENCOUNTER — OFFICE VISIT (OUTPATIENT)
Dept: FAMILY MEDICINE | Facility: CLINIC | Age: 77
End: 2022-06-03

## 2022-06-03 VITALS
SYSTOLIC BLOOD PRESSURE: 116 MMHG | BODY MASS INDEX: 24.27 KG/M2 | WEIGHT: 137 LBS | DIASTOLIC BLOOD PRESSURE: 58 MMHG | HEART RATE: 87 BPM | OXYGEN SATURATION: 97 %

## 2022-06-03 DIAGNOSIS — N18.31 CHRONIC KIDNEY DISEASE, STAGE 3A (H): ICD-10-CM

## 2022-06-03 DIAGNOSIS — R45.851 SUICIDAL IDEATION: ICD-10-CM

## 2022-06-03 DIAGNOSIS — D51.9 ANEMIA DUE TO VITAMIN B12 DEFICIENCY, UNSPECIFIED B12 DEFICIENCY TYPE: Primary | ICD-10-CM

## 2022-06-03 DIAGNOSIS — D47.2 SMOLDERING MULTIPLE MYELOMA: ICD-10-CM

## 2022-06-03 DIAGNOSIS — E87.1 HYPONATREMIA: ICD-10-CM

## 2022-06-03 LAB — CULTURE HARVEST COMPLETE DATE: NORMAL

## 2022-06-03 PROCEDURE — 36415 COLL VENOUS BLD VENIPUNCTURE: CPT | Performed by: FAMILY MEDICINE

## 2022-06-03 PROCEDURE — 99214 OFFICE O/P EST MOD 30 MIN: CPT | Performed by: FAMILY MEDICINE

## 2022-06-03 RX ORDER — CYANOCOBALAMIN 1000 UG/ML
1000 INJECTION, SOLUTION INTRAMUSCULAR; SUBCUTANEOUS WEEKLY
Status: ACTIVE | OUTPATIENT
Start: 2022-06-03 | End: 2022-06-24

## 2022-06-03 ASSESSMENT — ANXIETY QUESTIONNAIRES
IF YOU CHECKED OFF ANY PROBLEMS ON THIS QUESTIONNAIRE, HOW DIFFICULT HAVE THESE PROBLEMS MADE IT FOR YOU TO DO YOUR WORK, TAKE CARE OF THINGS AT HOME, OR GET ALONG WITH OTHER PEOPLE: EXTREMELY DIFFICULT
GAD7 TOTAL SCORE: 2
GAD7 TOTAL SCORE: 2
7. FEELING AFRAID AS IF SOMETHING AWFUL MIGHT HAPPEN: MORE THAN HALF THE DAYS
6. BECOMING EASILY ANNOYED OR IRRITABLE: NOT AT ALL
1. FEELING NERVOUS, ANXIOUS, OR ON EDGE: NOT AT ALL
3. WORRYING TOO MUCH ABOUT DIFFERENT THINGS: NOT AT ALL
2. NOT BEING ABLE TO STOP OR CONTROL WORRYING: NOT AT ALL
5. BEING SO RESTLESS THAT IT IS HARD TO SIT STILL: NOT AT ALL

## 2022-06-03 ASSESSMENT — PATIENT HEALTH QUESTIONNAIRE - PHQ9
SUM OF ALL RESPONSES TO PHQ QUESTIONS 1-9: 4
5. POOR APPETITE OR OVEREATING: NOT AT ALL

## 2022-06-03 NOTE — TELEPHONE ENCOUNTER
[facility] called clinic 6/1/22 informing they are accepting the home care referral. Patient is requesting delayed start of care. Home nurse eval is scheduled for Monday 6/6/22.   Carla Hua RN

## 2022-06-03 NOTE — NURSING NOTE
The following medication was given:     MEDICATION: Vitamin B12  1000mcg  ROUTE: IM  SITE: Deltoid - Left  DOSE: 1mL  LOT #: [id number]  :  [employer] [place]  EXPIRATION DATE:  05/2023  NDC#: 3608-8598-54    Mechelle Meraz CMA on 6/3/2022 at 1:17 PM

## 2022-06-03 NOTE — PROGRESS NOTES
Hospital Follow-up Visit:    Hospital/Nursing Home/IP Rehab Facility: [facility]  Date of Admission: 5/28/2022  Date of Discharge: 5/30/2022  Reason(s) for Admission:   Chest pain  Shortness of Breath with Exertion  Suicidal ideation  Hyponatremia  Concern for Ability to perform ADLs. Lower Back Pain  Muscle cramps  Vitamin B12 deficiency  Macrocytic anemia      Was your hospitalization related to COVID-19? No   Problems taking medications regularly:  None  Medication changes since discharge: None  Problems adhering to non-medication therapy:  None    Summary of hospitalization:  [facility] discharge summary reviewed  Diagnostic Tests/Treatments reviewed.  Follow up needed: none  Other Healthcare Providers Involved in Patient s Care:         None  Update since discharge: improved. Post Discharge Medication Reconciliation: discharge medications reconciled, continue medications without change.  Plan of care communicated with patient            Grace Camargo is a 76 year old patient who presents to clinic for hospital follow up.  She was admitted with chest pain and SOB but all workup was unrevealing.  She was also found to have suicidal ideation and some questions of delusions as she perseverates on her family stealing money and intellectual property from her.  She does endorse ongoing suicidal ideation but has felt a little better the past couple days.    B12 deficiency: B12 quite low on the hospital.  Received first injection 5 days ago.      Hyponatremia: mild.      Depression: see above.  On citalopram 40.        Review of Systems   Constitutional, HEENT, cardiovascular, pulmonary, GI, , musculoskeletal, neuro, skin, endocrine and psych systems are negative, except as otherwise noted.      Objective    /58   Pulse 87   Wt 62.1 kg (137 lb)   LMP 12/04/1989   SpO2 97%   BMI 24.27 kg/m      General: Well appearing, NAD  HEENT: Clear  Skin:  Clear  Psych: normal mood and affect.  Mild perseveration about family stealing from her.        No results found for this or any previous visit (from the past 24 hour(s)).    Anemia due to vitamin B12 deficiency, unspecified B12 deficiency type  Will give weekly x 1 month then switch to oral.  If IF Ab positive may stay with IM monthly  - Intrinsic Factor THELMA ([facility])  - cyanocobalamin injection 1,000 mcg  - VITAMIN B12 INJ /1000MCG; Standing  - INJECTION INTRAMUSCULAR OR SUB-Q; Standing  - VITAMIN B12 INJ /1000MCG  - INJECTION INTRAMUSCULAR OR SUB-Q  - VENOUS COLLECTION    Hyponatremia  Recheck, uncertain etiology    Chronic kidney disease, stage 3a (H)  Stable, cont to monitor.  Blood pressure control, blood sugar control, vitamin D supplementation and avoidance of NSAIDs discussed.    Suicidal ideation  Recommend psych consultation due to SI and possible delusions  - Adult Mental Health  Referral; Future    Smoldering multiple myeloma (H)  Cont current follow up with oncology

## 2022-06-05 LAB
INTERPRETATION: NORMAL
INTRINSIC FACTOR ABS, SERUM: 1 AU/ML (ref 0–1.1)

## 2022-06-05 PROCEDURE — 88369 M/PHMTRC ALYSISHQUANT/SEMIQ: CPT | Mod: 26 | Performed by: MEDICAL GENETICS

## 2022-06-05 PROCEDURE — 88368 INSITU HYBRIDIZATION MANUAL: CPT | Mod: 26 | Performed by: MEDICAL GENETICS

## 2022-06-05 PROCEDURE — 2894A VOIDCORRECT: CPT | Mod: XS | Performed by: MEDICAL GENETICS

## 2022-06-06 PROCEDURE — G0180 MD CERTIFICATION HHA PATIENT: HCPCS | Performed by: FAMILY MEDICINE

## 2022-06-08 ENCOUNTER — MEDICAL CORRESPONDENCE (OUTPATIENT)
Dept: FAMILY MEDICINE | Facility: CLINIC | Age: 77
End: 2022-06-08

## 2022-06-08 ENCOUNTER — HOSPITAL ENCOUNTER (OUTPATIENT)
Dept: PHYSICAL THERAPY | Facility: CLINIC | Age: 77
Discharge: HOME | End: 2022-06-08
Payer: COMMERCIAL

## 2022-06-08 DIAGNOSIS — N18.31 CHRONIC KIDNEY DISEASE, STAGE 3A (H): ICD-10-CM

## 2022-06-08 DIAGNOSIS — D47.2 SMOLDERING MULTIPLE MYELOMA: ICD-10-CM

## 2022-06-08 DIAGNOSIS — R45.851 SUICIDAL IDEATION: ICD-10-CM

## 2022-06-08 DIAGNOSIS — E87.1 HYPONATREMIA: ICD-10-CM

## 2022-06-08 DIAGNOSIS — R07.9 CHEST PAIN, UNSPECIFIED TYPE: ICD-10-CM

## 2022-06-08 DIAGNOSIS — M62.81 GENERALIZED MUSCLE WEAKNESS: ICD-10-CM

## 2022-06-08 PROCEDURE — 97110 THERAPEUTIC EXERCISES: CPT | Mod: GP

## 2022-06-08 PROCEDURE — 97162 PT EVAL MOD COMPLEX 30 MIN: CPT | Mod: GP

## 2022-06-09 NOTE — PROGRESS NOTES
06/08/22 1355   Quick Adds   Quick Adds Certification   Type of Visit Initial OP PT Evaluation   General Information   Start of Care Date 06/08/22   Referring Physician Georgie Guillaume MD   Orders Evaluate and Treat as Indicated   Additional Orders mental health   Order Date 05/28/22   Medical Diagnosis Chest pain, unspecified type  Hyponatremia  Suicidal ideation  Generalized muscle weakness  Chronic kidney disease, stage 3a (H)  Smoldering multiple myeloma (H)   Onset of illness/injury or Date of Surgery 05/28/22   Surgical/Medical history reviewed Yes   Pertinent history of current problem (include personal factors and/or comorbidities that impact the POC) Patient presents to OP PT following recent hospitilization. Hospitilzed 5/28/2022 to 5/30/2022 for Chest pain, Shortness of Breath with Exertion, Suicidal ideation, Hyponatremia, Concern for Ability to perform ADLs. Lower Back Pain, Muscle cramps, Vitamin B12 deficiency, Macrocytic anemia. Patient originally requesting TCU but unable to be placed. Saw PCP on 6/3 for follow up. Has referral for mental health consult but nothing scheduled - patient reports not being able to be scheduled until 8/1/2022, plans to follow up with another option. Being followed for smoldering multiple myeloma with PET scan scheduled at end of month. Patient reports difficulty with walking, stairs, bathing, and using the toilet - not interested on OT referral at this time desptie education on role and potential benefit. Patient reports difficulty walking leading to needing to crawl on ground due to fatigue, worse the farther she is away from most recent B12 supplementaion (currently received weekly).   Prior level of function comment Reports no difficulty with walking or need for AD prior to 3 weeks ago.   Patient role/Employment history Retired   Home/Community Accessibility Comments Lives alone in condo, no stairs   Assistive Devices Comments Owns SPC, FWW , and bath bench    Patient/Family Goals Statement improve walking   Fall Risk Screen   Fall screen completed by PT   Have you fallen 2 or more times in the past year? Yes   Have you fallen and had an injury in the past year? Yes   Is patient a fall risk? Yes;Department fall risk interventions implemented   Fall screen comments 1st fall occurred 3 weeks ago, all unsteadiness began at this time   Abuse Screen (yes response referral indicated)   Feels Unsafe at Home or Work/School yes  (concern from family)   Physical Signs of Abuse Present no   Safety Plan Sent message to PCP for referral to  to address resources/current concerns which patient was agreeable to   Patient needs abuse support services and resources Yes   Pain   Patient currently in pain Yes   Pain comments Cramping along R leg, improves with ice   Cognitive Status Examination   Cognitive Comment alert, anxious during, repeated stories multiple times during evaluation   Observation   Observation Reports to clinic with two single point canes - reports FWW is difficult to transport   Posture   Posture Forward head position   Strength   Strength Comments LE MMT: hip flexion B 4+/5, knee extension B 5/5, knee flexion B 4+/5, ankle DF B 5/5   Gait Special Tests 25 Foot Timed Walk   Seconds 8.19   Comments 0.93 m/s no AD. Ambulates with high guard UE positioning, shortened step lengt, decreased step time on R leg with antalgic gait   Balance   Balance Comments Unable to finish DGI due to R leg pain that began during testing, see flowsheet   Balance Special Tests Timed Up and Go   Seconds 11.33 Seconds   Comments no AD   Balance Special Tests Sit to Stand Reps in 30 Seconds   Reps in 30 seconds 4   Modality Interventions   Planned Modality Interventions Comments Per therapist discretion   Planned Therapy Interventions   Planned Therapy Interventions ADL retraining;balance training;gait training;neuromuscular re-education;ROM;strengthening;stretching   Clinical  Impression   Criteria for Skilled Therapeutic Interventions Met yes, treatment indicated   PT Diagnosis impaired gait   Influenced by the following impairments decreased force production in core and LEs, decreased activity tolerance, mobility defecits 2/2 pain, static/dynamic balance impairments   Functional limitations due to impairments transfers, home and community ambulation, home ADLs   Clinical Presentation Evolving/Changing   Clinical Presentation Rationale level of impairment, progressionn of symptoms   Clinical Decision Making (Complexity) Moderate complexity   Therapy Frequency 1 time/week   Predicted Duration of Therapy Intervention (days/wks) up to 90 days   Risk & Benefits of therapy have been explained No   Patient, Family & other staff in agreement with plan of care Yes   Clinical Impression Comments Patient reports with above impairments in presense fo recent increase in falls and difficulty completing ADLs around home. Patient would benefit from skilled therapy to improve safety with functional mobility to progress towards prior level of function. Patient also would benefit from referral to  and message sent to PCP who affirmed  is currently working with patient. Patient would also benefit from OT assessment given difficulty with ADLs, but patient declines at this time.   GOALS   PT Eval Goals 1;2;3;4   Goal 1   Goal Identifier Gait Speed   Goal Description Patient to improve gait speed to >1.05 m/s in order to display improved safety with home and community ambulation.   Goal Progress baseline: 0.92 m/s no AD   Target Date 09/06/22   Goal 2   Goal Identifier Functional LE Strength   Goal Description Patient to improve with 30 second sit to stand to 8 repetitions to display improved LE endurance for greater tolerance and safety with completion of transfers from chairs, bed and in/out of cars.   Goal Progress baseline: 4 rep   Target Date 09/06/22   Goal 3   Goal Identifier  DGI   Goal Description Patient to improve on DGI to 19/24 to display significant improvement in dynamic balance for safety and decreased fall risk with home and community ambulation.   Goal Progress baseline: unable to tolerate full testing   Target Date 09/06/22   Goal 4   Goal Identifier HEP   Goal Description Patient to independently complete regular exercise program with correct mechanics in order to safely manage impairments upon discharge from skilled therapy.   Target Date 09/06/22   Total Evaluation Time   PT Eval, Moderate Complexity Minutes (50510) 35   Therapy Certification   Certification date from 06/08/22   Certification date to 09/06/22   Medical Diagnosis Chest pain, unspecified type  Hyponatremia  Suicidal ideation  Generalized muscle weakness  Chronic kidney disease, stage 3a (H)  Smoldering multiple myeloma (H)   Certification I certify the need for these services furnished under this plan of treatment and while under my care.  (Physician co-signature of this document indicates review and certification of the therapy plan).     Patient lost to follow up after initial physical therapy evaluation. Goals not met and current status unknown.

## 2022-06-10 ENCOUNTER — MEDICAL CORRESPONDENCE (OUTPATIENT)
Dept: FAMILY MEDICINE | Facility: CLINIC | Age: 77
End: 2022-06-10

## 2022-06-10 DIAGNOSIS — D51.9 ANEMIA DUE TO VITAMIN B12 DEFICIENCY, UNSPECIFIED B12 DEFICIENCY TYPE: ICD-10-CM

## 2022-06-10 PROCEDURE — 96372 THER/PROPH/DIAG INJ SC/IM: CPT | Performed by: FAMILY MEDICINE

## 2022-06-10 NOTE — NURSING NOTE
The following medication was given:     MEDICATION: Vitamin B12  1000mcg  ROUTE: IM  SITE: Deltoid - Right  DOSE: 1.0ml  LOT #: [id number]  :  [employer] [place]  EXPIRATION DATE:  5-2023  NDC#: 5323-3676-02  Monica Christian MA Nella 10, 2022 10:03 AM

## 2022-06-13 ENCOUNTER — NURSE TRIAGE (OUTPATIENT)
Dept: ONCOLOGY | Facility: CLINIC | Age: 77
End: 2022-06-13
Payer: COMMERCIAL

## 2022-06-13 ENCOUNTER — MEDICAL CORRESPONDENCE (OUTPATIENT)
Dept: FAMILY MEDICINE | Facility: CLINIC | Age: 77
End: 2022-06-13

## 2022-06-13 NOTE — TELEPHONE ENCOUNTER
Made 2 attempts.  Left Message for pt to call back triage [phone] Opt 5 Opt 2 regarding symptoms of leg cramps as requested by care team to reach out.

## 2022-06-14 NOTE — PROGRESS NOTES
[facility]                                                                                   OUTPATIENT PHYSICAL THERAPY FUNCTIONAL EVALUATION  PLAN OF TREATMENT FOR OUTPATIENT REHABILITATION  (COMPLETE FOR INITIAL CLAIMS ONLY)  Patient's Last Name, First Name, M.I.  YOB: 1945  Mary Jo Mcleod        Provider's Name   [facility]   Medical Record No.  [id number]     Start of Care Date:  06/08/22   Onset Date:  05/28/22   Type:     _X__PT   ____OT  ____SLP Medical Diagnosis:  Chest pain, unspecified type  Hyponatremia  Suicidal ideation  Generalized muscle weakness  Chronic kidney disease, stage 3a (H)  Smoldering multiple myeloma (H)     PT Diagnosis:  impaired gait Visits from SOC:  1                              __________________________________________________________________________________  Plan of Treatment/Functional Goals:  ADL retraining, balance training, gait training, neuromuscular re-education, ROM, strengthening, stretching           GOALS  Gait Speed  Patient to improve gait speed to >1.05 m/s in order to display improved safety with home and community ambulation.  09/06/22    Functional LE Strength  Patient to improve with 30 second sit to stand to 8 repetitions to display improved LE endurance for greater tolerance and safety with completion of transfers from chairs, bed and in/out of cars.  09/06/22    DGI  Patient to improve on DGI to 19/24 to display significant improvement in dynamic balance for safety and decreased fall risk with home and community ambulation.  09/06/22    HEP  Patient to independently complete regular exercise program with correct mechanics in order to safely manage impairments upon discharge from skilled therapy.  09/06/22              Therapy Frequency:  1 time/week (decrease as indicated)   Predicted Duration of Therapy  Intervention:  up to 90 days    Tiffanie Bender PT                                    I CERTIFY THE NEED FOR THESE SERVICES FURNISHED UNDER        THIS PLAN OF TREATMENT AND WHILE UNDER MY CARE     (Physician co-signature of this document indicates review and certification of the therapy plan).                Certification Date From:  06/08/22   Certification Date To:  09/06/22    Referring Provider:  Georgie Guillaume MD    Initial Assessment  See Epic Evaluation- Start of Care Date: 06/08/22

## 2022-06-14 NOTE — TELEPHONE ENCOUNTER
Nurse Triage SBAR      Situation:  BLE cramps     Background:   Pt is a 76 year old female with IgG Kappa SM   Assessment:   Pt reports been in the hospital from May 28-May 30. Due to vit B12 insufficiency.  She was feeling SOB and fainted that is why she was admitted. She had vit B12 infusion during her stay and is feeling well now.   She didn't know that she send a message to Dr. Hanks's care team and meant to sed it to her PCP.  She reports symptoms her related to vit B12 insufficiency    She reports cramps get worse around 4PM, she applies ice and takes tylenol PRN.   She has an appointment this Friday with PCP to get B12 injection.   She denies leg swelling, no fever, is walking around her condo using the walker, and exercising.             Recommendation:     Pt has a follow up appointment with PCP 6/17/22  She has upcoming appointment with Dr. Hanks 7/6/22  Advised pt to continue walking around the house, and doing range of motion activity when sitting/laying in bed.   Advised pt to follow up with PCP if unable to reach to reach out to Dr. Hanks care team.   Pt voiced understanding and agreement with plan.     Routed to provider Dr. Hanks

## 2022-06-17 ENCOUNTER — OFFICE VISIT (OUTPATIENT)
Dept: FAMILY MEDICINE | Facility: CLINIC | Age: 77
End: 2022-06-17

## 2022-06-17 VITALS
HEIGHT: 63 IN | BODY MASS INDEX: 23.6 KG/M2 | OXYGEN SATURATION: 96 % | RESPIRATION RATE: 18 BRPM | WEIGHT: 133.2 LBS | HEART RATE: 105 BPM | SYSTOLIC BLOOD PRESSURE: 99 MMHG | DIASTOLIC BLOOD PRESSURE: 59 MMHG

## 2022-06-17 DIAGNOSIS — D47.2 SMOLDERING MULTIPLE MYELOMA: ICD-10-CM

## 2022-06-17 DIAGNOSIS — E87.1 HYPONATREMIA: ICD-10-CM

## 2022-06-17 DIAGNOSIS — N18.31 CHRONIC KIDNEY DISEASE, STAGE 3A (H): ICD-10-CM

## 2022-06-17 DIAGNOSIS — E53.8 B12 DEFICIENCY: Primary | ICD-10-CM

## 2022-06-17 DIAGNOSIS — D51.9 ANEMIA DUE TO VITAMIN B12 DEFICIENCY, UNSPECIFIED B12 DEFICIENCY TYPE: ICD-10-CM

## 2022-06-17 LAB
PATH REPORT.ADDENDUM SPEC: NORMAL
PATH REPORT.ADDENDUM SPEC: NORMAL
PATH REPORT.COMMENTS IMP SPEC: NORMAL
PATH REPORT.COMMENTS IMP SPEC: NORMAL
PATH REPORT.FINAL DX SPEC: NORMAL
PATH REPORT.GROSS SPEC: NORMAL
PATH REPORT.MICROSCOPIC SPEC OTHER STN: NORMAL
PATH REPORT.MICROSCOPIC SPEC OTHER STN: NORMAL
PATH REPORT.RELEVANT HX SPEC: NORMAL

## 2022-06-17 PROCEDURE — 99214 OFFICE O/P EST MOD 30 MIN: CPT | Performed by: FAMILY MEDICINE

## 2022-06-17 NOTE — PROGRESS NOTES
"  Grace Camargo is a 76 year old patient who presents to clinic for follow up    B12 deficiency: B12 quite low on the hospital.  Received first injection 5 days ago.       Hyponatremia: mild.       Depression:  On citalopram 40.  Mood improved lately per report    MM: follows with TINA Rand.  Under active surveillance.  Stable     Osteopenia: on alendronate     CKD3a: presumably secondary to MM.       HLD: on simva, tolerating well    Review of Systems   Constitutional, HEENT, cardiovascular, pulmonary, GI, , musculoskeletal, neuro, skin, endocrine and psych systems are negative, except as otherwise noted.      Objective    BP 99/59   Pulse 105   Resp 18   Ht 1.6 m (5' 3\")   Wt 60.4 kg (133 lb 3.2 oz)   LMP 12/04/1989   SpO2 96%   BMI 23.60 kg/m      General: Well appearing, NAD  Psych: normal mood and affect        No results found for this or any previous visit (from the past 24 hour(s)).    B12 deficiency  4th injection today.  Will do 2 more and then recheck level    Chronic kidney disease, stage 3a (H)  Likely secondary to MM.  Cont to monitor.  Avoid NSAIDs.    - vitamin B-12 (CYANOCOBALAMIN) 1000 MCG tablet; Take 1 tablet (1,000 mcg) by mouth daily for 90 days    Smoldering multiple myeloma (H)  Followed by oncology  - vitamin B-12 (CYANOCOBALAMIN) 1000 MCG tablet; Take 1 tablet (1,000 mcg) by mouth daily for 90 days    Hyponatremia  Mild, recheck at follow up  - vitamin B-12 (CYANOCOBALAMIN) 1000 MCG tablet; Take 1 tablet (1,000 mcg) by mouth daily for 90 days    Anemia due to vitamin B12 deficiency, unspecified B12 deficiency type  - INJECTION INTRAMUSCULAR OR SUB-Q  - VITAMIN B12 INJ /1000MCG    Follow up in 4 weeks for CBC, B12 and BMP          "

## 2022-06-17 NOTE — NURSING NOTE
The following medication was given:     MEDICATION: Vitamin B12  1000mcg  ROUTE: IM  SITE: Deltoid - Left  DOSE: 1ML  LOT #: [id number]  :  [employer] [place]  EXPIRATION DATE:  5/30/2023  NDC#: 9471-8948-15  Mya Acevedo CMA. 6/17/2022

## 2022-06-22 ENCOUNTER — TELEPHONE (OUTPATIENT)
Dept: FAMILY MEDICINE | Facility: CLINIC | Age: 77
End: 2022-06-22

## 2022-06-22 DIAGNOSIS — M43.9 COMPRESSION DEFORMITY OF VERTEBRA: ICD-10-CM

## 2022-06-22 DIAGNOSIS — M54.50 LOW BACK PAIN: Primary | ICD-10-CM

## 2022-06-22 DIAGNOSIS — R29.898 LOWER EXTREMITY WEAKNESS: ICD-10-CM

## 2022-06-22 DIAGNOSIS — R26.81 UNSTEADY GAIT: ICD-10-CM

## 2022-06-22 NOTE — TELEPHONE ENCOUNTER
Patient calls wanting to stop home PT with [facility] and go back to [facility] physical therapist Marie Whitehead. Has seen her in past with good results.   Asks that order be faxed to [facility] at fax: [phone].     Okay per Dr. Mcdonnell for this referral. Referral faxed to [facility] and message left for patient that this was done.   Carla Hua RN

## 2022-06-24 DIAGNOSIS — E87.1 HYPONATREMIA: ICD-10-CM

## 2022-06-24 DIAGNOSIS — D51.9 ANEMIA DUE TO VITAMIN B12 DEFICIENCY, UNSPECIFIED B12 DEFICIENCY TYPE: Primary | ICD-10-CM

## 2022-06-24 DIAGNOSIS — N18.31 CHRONIC KIDNEY DISEASE, STAGE 3A (H): ICD-10-CM

## 2022-06-24 PROCEDURE — 96372 THER/PROPH/DIAG INJ SC/IM: CPT | Performed by: FAMILY MEDICINE

## 2022-06-24 NOTE — NURSING NOTE
The following medication was given:     MEDICATION: Vitamin B12  1000mcg  ROUTE: IM  SITE: Deltoid - Right  DOSE: 1.0ml  LOT #: [id number]  :  [employer] [place]  EXPIRATION DATE:  5/1/23  NDC#: 8529-0739-71  Monica Christian MA June 24, 2022 10:05 AM    Reminded patient to return to clinic for next B 12 shot and then end of July 2022 to have blood level checked per JUANA

## 2022-06-25 ENCOUNTER — HOSPITAL ENCOUNTER (EMERGENCY)
Facility: CLINIC | Age: 77
Discharge: HOME | End: 2022-06-25
Attending: NURSE PRACTITIONER | Admitting: NURSE PRACTITIONER
Payer: COMMERCIAL

## 2022-06-25 ENCOUNTER — APPOINTMENT (OUTPATIENT)
Dept: CT IMAGING | Facility: CLINIC | Age: 77
End: 2022-06-25
Attending: NURSE PRACTITIONER
Payer: COMMERCIAL

## 2022-06-25 ENCOUNTER — APPOINTMENT (OUTPATIENT)
Dept: GENERAL RADIOLOGY | Facility: CLINIC | Age: 77
End: 2022-06-25
Attending: NURSE PRACTITIONER
Payer: COMMERCIAL

## 2022-06-25 VITALS
WEIGHT: 133 LBS | SYSTOLIC BLOOD PRESSURE: 102 MMHG | BODY MASS INDEX: 23.56 KG/M2 | OXYGEN SATURATION: 100 % | DIASTOLIC BLOOD PRESSURE: 42 MMHG | TEMPERATURE: 98.7 F | HEART RATE: 99 BPM | RESPIRATION RATE: 12 BRPM

## 2022-06-25 DIAGNOSIS — F43.10 PTSD (POST-TRAUMATIC STRESS DISORDER): ICD-10-CM
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
## 2022-06-25 DIAGNOSIS — E87.1 HYPONATREMIA: ICD-10-CM

## 2022-06-25 DIAGNOSIS — F33.1 MODERATE EPISODE OF RECURRENT MAJOR DEPRESSIVE DISORDER (H): ICD-10-CM

## 2022-06-25 DIAGNOSIS — S20.211A RIB CONTUSION, RIGHT, INITIAL ENCOUNTER: ICD-10-CM

## 2022-06-25 LAB
ALBUMIN SERPL-MCNC: 2.9 G/DL (ref 3.4–5)
ALP SERPL-CCNC: 59 U/L (ref 40–150)
ALT SERPL W P-5'-P-CCNC: 19 U/L (ref 0–50)
ANION GAP SERPL CALCULATED.3IONS-SCNC: 1 MMOL/L (ref 3–14)
AST SERPL W P-5'-P-CCNC: 22 U/L (ref 0–45)
BASOPHILS # BLD AUTO: 0 10E3/UL (ref 0–0.2)
BASOPHILS NFR BLD AUTO: 1 %
BILIRUB SERPL-MCNC: 0.7 MG/DL (ref 0.2–1.3)
BUN SERPL-MCNC: 21 MG/DL (ref 7–30)
CALCIUM SERPL-MCNC: 9 MG/DL (ref 8.5–10.1)
CHLORIDE BLD-SCNC: 101 MMOL/L (ref 94–109)
CO2 SERPL-SCNC: 26 MMOL/L (ref 20–32)
CREAT SERPL-MCNC: 1.18 MG/DL (ref 0.52–1.04)
EOSINOPHIL # BLD AUTO: 0 10E3/UL (ref 0–0.7)
EOSINOPHIL NFR BLD AUTO: 0 %
ERYTHROCYTE [DISTWIDTH] IN BLOOD BY AUTOMATED COUNT: 13.3 % (ref 10–15)
GFR SERPL CREATININE-BSD FRML MDRD: 48 ML/MIN/1.73M2
GLUCOSE BLD-MCNC: 97 MG/DL (ref 70–99)
HCT VFR BLD AUTO: 32.8 % (ref 35–47)
HGB BLD-MCNC: 10.5 G/DL (ref 11.7–15.7)
IMM GRANULOCYTES # BLD: 0 10E3/UL
IMM GRANULOCYTES NFR BLD: 1 %
LIPASE SERPL-CCNC: 362 U/L (ref 73–393)
LYMPHOCYTES # BLD AUTO: 2.3 10E3/UL (ref 0.8–5.3)
LYMPHOCYTES NFR BLD AUTO: 32 %
MCH RBC QN AUTO: 32.1 PG (ref 26.5–33)
MCHC RBC AUTO-ENTMCNC: 32 G/DL (ref 31.5–36.5)
MCV RBC AUTO: 100 FL (ref 78–100)
MONOCYTES # BLD AUTO: 1 10E3/UL (ref 0–1.3)
MONOCYTES NFR BLD AUTO: 13 %
NEUTROPHILS # BLD AUTO: 4 10E3/UL (ref 1.6–8.3)
NEUTROPHILS NFR BLD AUTO: 53 %
NRBC # BLD AUTO: 0 10E3/UL
NRBC BLD AUTO-RTO: 0 /100
PLATELET # BLD AUTO: 261 10E3/UL (ref 150–450)
POTASSIUM BLD-SCNC: 4.4 MMOL/L (ref 3.4–5.3)
PROT SERPL-MCNC: 10.4 G/DL (ref 6.8–8.8)
RBC # BLD AUTO: 3.27 10E6/UL (ref 3.8–5.2)
SODIUM SERPL-SCNC: 128 MMOL/L (ref 133–144)
WBC # BLD AUTO: 7.4 10E3/UL (ref 4–11)

## 2022-06-25 PROCEDURE — 36415 COLL VENOUS BLD VENIPUNCTURE: CPT | Performed by: NURSE PRACTITIONER

## 2022-06-25 PROCEDURE — 70450 CT HEAD/BRAIN W/O DYE: CPT

## 2022-06-25 PROCEDURE — 72170 X-RAY EXAM OF PELVIS: CPT

## 2022-06-25 PROCEDURE — 74177 CT ABD & PELVIS W/CONTRAST: CPT

## 2022-06-25 PROCEDURE — 250N000009 HC RX 250: Performed by: NURSE PRACTITIONER

## 2022-06-25 PROCEDURE — 99285 EMERGENCY DEPT VISIT HI MDM: CPT | Mod: 25

## 2022-06-25 PROCEDURE — 80053 COMPREHEN METABOLIC PANEL: CPT | Performed by: NURSE PRACTITIONER

## 2022-06-25 PROCEDURE — 83690 ASSAY OF LIPASE: CPT | Performed by: NURSE PRACTITIONER

## 2022-06-25 PROCEDURE — 85025 COMPLETE CBC W/AUTO DIFF WBC: CPT | Performed by: NURSE PRACTITIONER

## 2022-06-25 PROCEDURE — 250N000011 HC RX IP 250 OP 636: Performed by: NURSE PRACTITIONER

## 2022-06-25 RX ORDER — IOPAMIDOL 755 MG/ML
60 INJECTION, SOLUTION INTRAVASCULAR ONCE
Status: COMPLETED | OUTPATIENT
Start: 2022-06-25 | End: 2022-06-25

## 2022-06-25 RX ADMIN — SODIUM CHLORIDE 60 ML: 900 INJECTION INTRAVENOUS at 19:47

## 2022-06-25 RX ADMIN — IOPAMIDOL 60 ML: 755 INJECTION, SOLUTION INTRAVENOUS at 19:47

## 2022-06-25 ASSESSMENT — ENCOUNTER SYMPTOMS
PALPITATIONS: 0
LIGHT-HEADEDNESS: 0
DIFFICULTY URINATING: 0
CONFUSION: 1
ARTHRALGIAS: 1
CONSTIPATION: 0

## 2022-06-25 NOTE — ED TRIAGE NOTES
Triage Assessment     Row Name 06/25/22 0093       Triage Assessment (Adult)    Airway WDL WDL       Respiratory WDL    Respiratory WDL WDL       Skin Circulation/Temperature WDL    Skin Circulation/Temperature WDL WDL       Cardiac WDL    Cardiac WDL WDL       Peripheral/Neurovascular WDL    Peripheral Neurovascular WDL WDL       Cognitive/Neuro/Behavioral WDL    Cognitive/Neuro/Behavioral WDL WDL

## 2022-06-25 NOTE — ED PROVIDER NOTES
"  History   Chief Complaint:  Fall and Rib Pain       HPI   Mary Jo Mcleod is a 76 year old female with history of GERSON who presents with sustained rib pain followed a fall. Per patient's report, yesterday at 4 pm, her right foot was caught on a kitchen counter, causing her to fall on her right hip and right head. She lives in her condo alone. She was down on the floor for 20 minutes. She denied any loss of consciousness. She could hear people in the hallway and yelled through the window. She also broke a glass during the fall that shattered in her apartment. She crawled around the glass pieces to the phone to ask for help. She called a call line at her clinic. She decided to come in due to the severe pain in her right back, right chest ribs, as well as pain to right hip. She also reports pain to her right neck that felt muscular to her. She is also \"befuddled\" and sensitive to sounds. She took a Tylenol at 3 pm today. She fell a month ago and has been undergoing PT for her hip. She had a dark and dry, non-painful bowel movement this afternoon. She denies trouble urinating, palpitations, or lightheadedness. She had a normal lunch today.  She drove here today.  Of note, patient received her vitamin B12 injection yesterday.    Review of Systems   Cardiovascular: Negative for palpitations.   Gastrointestinal: Negative for constipation.   Genitourinary: Negative for difficulty urinating.   Musculoskeletal: Positive for arthralgias.   Neurological: Negative for light-headedness.   Psychiatric/Behavioral: Positive for confusion.   All other systems reviewed and are negative.    Allergies:  Codeine  Bupropion  Erythromycin  Hydrocodone  Lidocaine  Penicillin G  Sulfa Drugs  Tetracycline  Trazodone  Wellbutrin [Bupropion Hydrobromide]  Codeine Sulfate    Medications:  citalopram (CELEXA) 40 MG tablet  simvastatin (ZOCOR) 20 MG tablet    Past Medical History:     Arthritis  Depression   GERD   Osteoporosis "   Hypercholesterolemia  PTSD  GERSON  Osteopenia  Prediabetes  Plasma cell dyscrasia  Smoldering multiple myeloma  CKD, stage 3a  SI  Hyponatremia  B12 deficiency  Monoclonal gammopathy    Past Surgical History:    Bone marrow biopsy  Tonsillectomy  Finger reattachment    Family History:    Father: MI  Sister: bipolar, alcoholism  Brother: COPD, lung cancer, alcohol/drug, suicide    Social History:  Patient drove here alone via private car. Lives alone in a condo.    Physical Exam     Patient Vitals for the past 24 hrs:   BP Temp Temp src Pulse Resp SpO2 Weight   06/25/22 1731 102/42 98.7  F (37.1  C) Temporal 99 12 100 % 60.3 kg (133 lb)       Physical Exam  General: Alert. Well kept.  HEENT:   Head: No facial asymmetry. No palpable scalp hematomas or bony step offs. No frontal or maxillary facial tenderness.   Eyes: Normal conjunctiva. PERRLA. EOMI. No raccoon s eyes. Wearing glasses.  Ears:  No hemotympanum bilaterally. No Guajardo's signs.   Nose: No deformity. No nasal drainage.   Throat: Moist mucous membranes. No evidence for intraoral trauma.   Neck: No midline tenderness over cervical spine or paraspinal musculature. Normal range of motion.   Cardiac: Normal rate and regular rhythm.  No murmurs, rubs, or gallops appreciated. 2+ radial and DP pulses.   Pulmonary: CTA bilaterally.  No wheezing, crackles, or rhonchi appreciated.   Abdomen: Soft, non-tender, non-distended. No rebound or guarding.   Neuro: GCS 15. Alert and oriented. Cranial nerves II-XII intact. 5/5 strength equal bilateral upper and lower extremities. Gait smooth.  Visual fields bilateral without deficit.  MUSCULOSKELETAL: Normal gross range of motion of all 4 extremities. No midline tenderness over thoracic, lumbar or sacral spine. Tenderness over right ischial tuberosity.  Upper extremities: 5/5 symmetric strength and ROM with shoulder abduction and adduction, elbow flexion and extension, dorsi- and palmar-flexion, , compartments soft.    Lower extremities: 5/5 symmetric strength and ROM with dorsi- and plantar-flexion, knee flexion and extension, hip flexion, hip internal and external rotation, compartments soft.   SKIN: Skin is warm and dry. No rashes, petechiae or pallor. Bruising without crepitance right mid axillary line rib 3-5  PSYCH: Normal affect and mood. Good eye contact.    Emergency Department Course     Imaging:  CT Chest/Abdomen/Pelvis w Contrast   Final Result   IMPRESSION:   1.  No traumatic visceral, vascular, or osseous injury.   2.  Atherosclerotic vascular disease.   3.  Calcified uterine fibroids.      Head CT w/o contrast   Final Result   IMPRESSION:     1.  No evidence of acute intracranial hemorrhage or mass effect.   2.  Mild nonspecific white matter changes.   3.  Mild brain parenchymal volume loss.      XR Pelvis 1/2 Views   Final Result   IMPRESSION: AP view the pelvis shows no displaced fracture or dislocation. Minimal degenerative changes of both hips. Fibroid uterus.        Report per radiology    Laboratory:  Labs Ordered and Resulted from Time of ED Arrival to Time of ED Departure   COMPREHENSIVE METABOLIC PANEL - Abnormal       Result Value    Sodium 128 (*)     Potassium 4.4      Chloride 101      Carbon Dioxide (CO2) 26      Anion Gap 1 (*)     Urea Nitrogen 21      Creatinine 1.18 (*)     Calcium 9.0      Glucose 97      Alkaline Phosphatase 59      AST 22      ALT 19      Protein Total 10.4 (*)     Albumin 2.9 (*)     Bilirubin Total 0.7      GFR Estimate 48 (*)    CBC WITH PLATELETS AND DIFFERENTIAL - Abnormal    WBC Count 7.4      RBC Count 3.27 (*)     Hemoglobin 10.5 (*)     Hematocrit 32.8 (*)           MCH 32.1      MCHC 32.0      RDW 13.3      Platelet Count 261      % Neutrophils 53      % Lymphocytes 32      % Monocytes 13      % Eosinophils 0      % Basophils 1      % Immature Granulocytes 1      NRBCs per 100 WBC 0      Absolute Neutrophils 4.0      Absolute Lymphocytes 2.3      Absolute  Monocytes 1.0      Absolute Eosinophils 0.0      Absolute Basophils 0.0      Absolute Immature Granulocytes 0.0      Absolute NRBCs 0.0     LIPASE - Normal    Lipase 362        Emergency Department Course:     Reviewed:  I reviewed nursing notes, vitals, past medical history and Care Everywhere    Assessments:  1815 I obtained history and examined the patient as noted above.   2025 I rechecked the patient and explained findings.  Patient was able to ambulate to the bathroom without difficulty and dressed herself.     Disposition:  The patient was discharged to home.     Impression & Plan     Medical Decision Making:  Mary Jo Mcleod is a 76 year old female with history of GERSON who presents with sustained rib pain followed a fall.   The fall was a trip and fall when she caught her foot on a table and she denied precipitating factors such as palpitations, lightheadedness, chest pain, syncope.  Her neurologic exam is without deficit and head CT negative for intracerebral bleeding today.  No indication for EKG or cardiac work-up.  The patient did have bruising and tenderness over the right mid axillary ribs without crepitance.  CT of the chest/abdomen/pelvis shows no traumatic vascular, visceral, osseous injury.  X-ray of the pelvis also negative for fracture or dislocation.  The patient is ambulatory without difficulty.  Lab work shows a mild hyponatremia at 128 which is similar to previous and a mild elevation in her creatinine 1.18 with a normal hepatic panel, normal WBC and lipase, chronic anemia.  The patient did not require pain medication in the emergency department.  We had a very detailed and lengthy conversation regarding observation admission to facilitate additional services and/or transfer to a transitional care/rehab facility versus home with outpatient follow-up.  Patient declines transfer to transitional care, rehab and notes she already has physical therapy services and has an appointment scheduled in 2  days on Monday.  The patient also notes that her chronic pain has exacerbated her depression.  She on multiple questioning today denies suicidal or homicidal ideation.  She states she has gotten a referral for mental health services through primary care but notes the visit is in August.  She requests mental health  referral which was placed.  The patient declined transfer to [facility].  She is not holdable.  In shared decision-making, in light of all these factors, patient is requesting discharge home.  As patient drove herself here, has been able to eat a meal in the ED, is able to dress herself and ambulate throughout the ED I do think this is appropriate.  She is capable and competent to make this decision and has close follow-up with primary care and physical therapy.    Diagnosis:    ICD-10-CM    1. Fall, initial encounter  W19.XXXA    2. Hyponatremia  E87.1    3. Rib contusion, right, initial encounter  S20.211A        Discharge Medications:  New Prescriptions    No medications on file       Scribe Disclosure:  I, Geronimo Guaman, am serving as a scribe at 6:15 PM on 6/25/2022 to document services personally performed by Kasie Rothman DNP based on my observations and the provider's statements to me.          Kasie Rothman, CNP  06/25/22 7867

## 2022-06-25 NOTE — ED TRIAGE NOTES
Patient feel last night in the kitchen, she was down for 20 for about twenty minutes and then crawled around every two hours till this morning when she was able to drive to the ER, she don't remember really how she got to the ER.  She stated that she hit her head and fell on right ribs and right hip, she feels like she might have broken her pelvis or a hip.  She states that she has a headache on the right side and is sensitive to load noises and felt like I was shouting at her. She is not on blood thinners and stated that she did not have a LOC, she did not call EMS last night because of the glass on the floor in her apartment and she just wanted to see how things went.       Xray or pelvis and ribs-pending

## 2022-06-26 NOTE — ED NOTES
Pt walked with her own walker to restroom with little to no assistance. Wants to stay in hospital. Provider notified.

## 2022-06-29 ENCOUNTER — OFFICE VISIT (OUTPATIENT)
Dept: FAMILY MEDICINE | Facility: CLINIC | Age: 77
End: 2022-06-29

## 2022-06-29 VITALS
DIASTOLIC BLOOD PRESSURE: 62 MMHG | WEIGHT: 131.2 LBS | SYSTOLIC BLOOD PRESSURE: 115 MMHG | BODY MASS INDEX: 23.24 KG/M2 | OXYGEN SATURATION: 95 % | HEART RATE: 101 BPM
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
## 2022-06-29 DIAGNOSIS — E87.1 HYPONATREMIA: ICD-10-CM

## 2022-06-29 DIAGNOSIS — F43.10 PTSD (POST-TRAUMATIC STRESS DISORDER): ICD-10-CM

## 2022-06-29 DIAGNOSIS — F41.1 GAD (GENERALIZED ANXIETY DISORDER): ICD-10-CM

## 2022-06-29 DIAGNOSIS — F33.1 MODERATE EPISODE OF RECURRENT MAJOR DEPRESSIVE DISORDER (H): ICD-10-CM

## 2022-06-29 DIAGNOSIS — E53.8 B12 DEFICIENCY: ICD-10-CM

## 2022-06-29 LAB
CULTURE HARVEST COMPLETE DATE: NORMAL
CULTURE HARVEST COMPLETE DATE: NORMAL
INTERPRETATION: NORMAL

## 2022-06-29 PROCEDURE — 99214 OFFICE O/P EST MOD 30 MIN: CPT | Performed by: FAMILY MEDICINE

## 2022-06-29 RX ORDER — SODIUM CHLORIDE 1 G/1
1 TABLET ORAL DAILY
Qty: 90 TABLET | Refills: 0 | Status: ON HOLD | OUTPATIENT
Start: 2022-06-29 | End: 2023-12-13

## 2022-06-29 RX ORDER — CITALOPRAM HYDROBROMIDE 40 MG/1
40 TABLET ORAL DAILY
Qty: 90 TABLET | Refills: 3 | Status: SHIPPED | OUTPATIENT
Start: 2022-06-29 | End: 2023-04-16

## 2022-06-29 NOTE — PROGRESS NOTES
Mary Jo has myeloma and has cancelled her follow up visit and PET. I called her to get her to come in this week and see me as an add on and have left a voice message stating that she has myeloma and is at risk for organ failure, bone fracture, and death if she does not see me in clinic to start therapy ASAP.

## 2022-06-29 NOTE — PROGRESS NOTES
Subjective     Mary Jo is a 76 year old patient who presents to clinic for ER follow up.  She suffered a mechanical fall but all imaging negative.  She is feeling well and pain is improving.  No headache or SOB.    B12 deficiency: due for 4th injection in 2 days and will then transition to oral    Hyponatremia: worked up in the hospital 5/2022.  Uncertain cause.  Citalopram considered but has been taking for a very long time.      Mental health: reports feeling improved.  Variably very upset about family issues but today reports they are resolved.        Review of Systems   Constitutional, HEENT, cardiovascular, pulmonary, GI, , musculoskeletal, neuro, skin, endocrine and psych systems are negative, except as otherwise noted.      Objective    /62   Pulse 101   Wt 59.5 kg (131 lb 3.2 oz)   LMP 12/04/1989   SpO2 95%   BMI 23.24 kg/m      General: Well appearing, NAD  Heart: RRR, no murmur  Chest: Lungs CTAB  Skin: Clear  Psych: normal mood and affect      Fall, subsequent encounter  Discussed fall precautions and home safety changes    Moderate episode of recurrent major depressive disorder (H)  Well controlled, cont celexa.  I have been quite concerned about variable mood lately.  ?neurocognitive issue.  B12 being addressed.  Encouraged mental health consultation as ordered.  Consider neurology consult if worsens or continues  - citalopram (CELEXA) 40 MG tablet; Take 1 tablet (40 mg) by mouth daily    PTSD (post-traumatic stress disorder)  stable  - citalopram (CELEXA) 40 MG tablet; Take 1 tablet (40 mg) by mouth daily    GERSON (generalized anxiety disorder)  stable  - citalopram (CELEXA) 40 MG tablet; Take 1 tablet (40 mg) by mouth daily    B12 deficiency  One more injection then switch to oral and recheck in 1 month    Hyponatremia  Start NaCl tab 1 gram daily and recheck in 1 week  - sodium chloride 1 GM tablet; Take 1 tablet (1 g) by mouth daily  - Basic Metabolic Panel (8) ([facility]);  Future    Follow up in 1 week

## 2022-07-01 DIAGNOSIS — D51.9 ANEMIA DUE TO VITAMIN B12 DEFICIENCY, UNSPECIFIED B12 DEFICIENCY TYPE: ICD-10-CM

## 2022-07-01 PROCEDURE — 96372 THER/PROPH/DIAG INJ SC/IM: CPT | Performed by: FAMILY MEDICINE

## 2022-07-01 NOTE — NURSING NOTE
The following medication was given:     MEDICATION: Vitamin B12  1000mcg  ROUTE: IM  SITE: Deltoid - Left  DOSE: 1 mL  LOT #: [id number]  :  [employer] [place]  EXPIRATION DATE:  5/2023  NDC#: 3844-8770-02

## 2022-07-08 ENCOUNTER — APPOINTMENT (OUTPATIENT)
Dept: LAB | Facility: CLINIC | Age: 77
End: 2022-07-08
Attending: REGISTERED NURSE
Payer: COMMERCIAL

## 2022-07-08 ENCOUNTER — OFFICE VISIT (OUTPATIENT)
Dept: FAMILY MEDICINE | Facility: CLINIC | Age: 77
End: 2022-07-08

## 2022-07-08 ENCOUNTER — ONCOLOGY VISIT (OUTPATIENT)
Dept: ONCOLOGY | Facility: CLINIC | Age: 77
End: 2022-07-08
Attending: REGISTERED NURSE
Payer: COMMERCIAL

## 2022-07-08 VITALS
DIASTOLIC BLOOD PRESSURE: 67 MMHG | WEIGHT: 132.7 LBS | TEMPERATURE: 97.8 F | RESPIRATION RATE: 16 BRPM | OXYGEN SATURATION: 98 % | SYSTOLIC BLOOD PRESSURE: 108 MMHG | HEART RATE: 86 BPM | BODY MASS INDEX: 23.51 KG/M2

## 2022-07-08 VITALS
BODY MASS INDEX: 23.21 KG/M2 | DIASTOLIC BLOOD PRESSURE: 58 MMHG | WEIGHT: 131 LBS | SYSTOLIC BLOOD PRESSURE: 98 MMHG | OXYGEN SATURATION: 99 % | RESPIRATION RATE: 16 BRPM | HEART RATE: 95 BPM

## 2022-07-08 DIAGNOSIS — D51.9 ANEMIA DUE TO VITAMIN B12 DEFICIENCY, UNSPECIFIED B12 DEFICIENCY TYPE: Primary | ICD-10-CM

## 2022-07-08 DIAGNOSIS — C90.00 MULTIPLE MYELOMA NOT HAVING ACHIEVED REMISSION (H): Primary | ICD-10-CM

## 2022-07-08 DIAGNOSIS — D47.2 SMOLDERING MULTIPLE MYELOMA: ICD-10-CM

## 2022-07-08 DIAGNOSIS — E87.1 HYPONATREMIA: ICD-10-CM

## 2022-07-08 DIAGNOSIS — M25.551 HIP PAIN, RIGHT: ICD-10-CM

## 2022-07-08 LAB
% GRANULOCYTES: 56.9 % (ref 42.2–75.2)
ALBUMIN SERPL-MCNC: 2.8 G/DL (ref 3.4–5)
ALP SERPL-CCNC: 53 U/L (ref 40–150)
ALT SERPL W P-5'-P-CCNC: 14 U/L (ref 0–50)
ANION GAP SERPL CALCULATED.3IONS-SCNC: 7 MMOL/L (ref 3–14)
AST SERPL W P-5'-P-CCNC: 21 U/L (ref 0–45)
BASOPHILS # BLD AUTO: 0 10E3/UL (ref 0–0.2)
BASOPHILS NFR BLD AUTO: 0 %
BILIRUB SERPL-MCNC: 0.4 MG/DL (ref 0.2–1.3)
BUN SERPL-MCNC: 15 MG/DL (ref 7–30)
CALCIUM SERPL-MCNC: 9.9 MG/DL (ref 8.5–10.1)
CHLORIDE BLD-SCNC: 102 MMOL/L (ref 94–109)
CO2 SERPL-SCNC: 29 MMOL/L (ref 20–32)
CREAT SERPL-MCNC: 1.13 MG/DL (ref 0.52–1.04)
EOSINOPHIL # BLD AUTO: 0 10E3/UL (ref 0–0.7)
EOSINOPHIL NFR BLD AUTO: 1 %
ERYTHROCYTE [DISTWIDTH] IN BLOOD BY AUTOMATED COUNT: 13.6 % (ref 10–15)
GFR SERPL CREATININE-BSD FRML MDRD: 50 ML/MIN/1.73M2
GLUCOSE BLD-MCNC: 101 MG/DL (ref 70–99)
HCT VFR BLD AUTO: 27.5 % (ref 35–46)
HCT VFR BLD AUTO: 30.6 % (ref 35–47)
HEMOGLOBIN: 9.9 G/DL (ref 11.8–15.5)
HGB BLD-MCNC: 10 G/DL (ref 11.7–15.7)
IMM GRANULOCYTES # BLD: 0 10E3/UL
IMM GRANULOCYTES NFR BLD: 1 %
LYMPHOCYTES # BLD AUTO: 2.1 10E3/UL (ref 0.8–5.3)
LYMPHOCYTES NFR BLD AUTO: 33.9 % (ref 20.5–51.1)
LYMPHOCYTES NFR BLD AUTO: 39 %
MCH RBC QN AUTO: 32.1 PG (ref 26.5–33)
MCH RBC QN AUTO: 33.1 PG (ref 27–31)
MCHC RBC AUTO-ENTMCNC: 32.7 G/DL (ref 31.5–36.5)
MCHC RBC AUTO-ENTMCNC: 36 G/DL (ref 33–37)
MCV RBC AUTO: 92 FL (ref 80–100)
MCV RBC AUTO: 98 FL (ref 78–100)
MONOCYTES # BLD AUTO: 0.7 10E3/UL (ref 0–1.3)
MONOCYTES NFR BLD AUTO: 13 %
MONOCYTES NFR BLD AUTO: 9.2 % (ref 1.7–9.3)
NEUTROPHILS # BLD AUTO: 2.5 10E3/UL (ref 1.6–8.3)
NEUTROPHILS NFR BLD AUTO: 46 %
NRBC # BLD AUTO: 0 10E3/UL
NRBC BLD AUTO-RTO: 0 /100
PLATELET # BLD AUTO: 258 10E3/UL (ref 150–450)
PLATELET # BLD AUTO: 286 K/UL (ref 140–450)
POTASSIUM BLD-SCNC: 4 MMOL/L (ref 3.4–5.3)
PROT SERPL-MCNC: 10.3 G/DL (ref 6.8–8.8)
RBC # BLD AUTO: 2.98 X10/CMM (ref 3.7–5.2)
RBC # BLD AUTO: 3.12 10E6/UL (ref 3.8–5.2)
SODIUM SERPL-SCNC: 138 MMOL/L (ref 133–144)
TOTAL PROTEIN SERUM FOR ELP: 9.9 G/DL (ref 6.4–8.3)
WBC # BLD AUTO: 4.4 X10/CMM (ref 3.8–11)
WBC # BLD AUTO: 5.4 10E3/UL (ref 4–11)

## 2022-07-08 PROCEDURE — 85025 COMPLETE CBC W/AUTO DIFF WBC: CPT | Performed by: FAMILY MEDICINE

## 2022-07-08 PROCEDURE — 86334 IMMUNOFIX E-PHORESIS SERUM: CPT | Performed by: PATHOLOGY

## 2022-07-08 PROCEDURE — 80053 COMPREHEN METABOLIC PANEL: CPT | Performed by: REGISTERED NURSE

## 2022-07-08 PROCEDURE — 83521 IG LIGHT CHAINS FREE EACH: CPT | Mod: 59 | Performed by: REGISTERED NURSE

## 2022-07-08 PROCEDURE — 84165 PROTEIN E-PHORESIS SERUM: CPT | Mod: TC | Performed by: PATHOLOGY

## 2022-07-08 PROCEDURE — 36415 COLL VENOUS BLD VENIPUNCTURE: CPT | Performed by: FAMILY MEDICINE

## 2022-07-08 PROCEDURE — 99215 OFFICE O/P EST HI 40 MIN: CPT | Performed by: REGISTERED NURSE

## 2022-07-08 PROCEDURE — 99214 OFFICE O/P EST MOD 30 MIN: CPT | Performed by: FAMILY MEDICINE

## 2022-07-08 PROCEDURE — 36415 COLL VENOUS BLD VENIPUNCTURE: CPT | Performed by: REGISTERED NURSE

## 2022-07-08 PROCEDURE — 84155 ASSAY OF PROTEIN SERUM: CPT | Performed by: REGISTERED NURSE

## 2022-07-08 PROCEDURE — 84443 ASSAY THYROID STIM HORMONE: CPT

## 2022-07-08 PROCEDURE — G0463 HOSPITAL OUTPT CLINIC VISIT: HCPCS

## 2022-07-08 PROCEDURE — 85025 COMPLETE CBC W/AUTO DIFF WBC: CPT | Performed by: REGISTERED NURSE

## 2022-07-08 RX ORDER — DIPHENHYDRAMINE HCL 25 MG
50 CAPSULE ORAL ONCE
Status: CANCELLED | OUTPATIENT
Start: 2022-07-25

## 2022-07-08 RX ORDER — METHYLPREDNISOLONE SODIUM SUCCINATE 125 MG/2ML
125 INJECTION, POWDER, LYOPHILIZED, FOR SOLUTION INTRAMUSCULAR; INTRAVENOUS
Status: CANCELLED
Start: 2022-08-01

## 2022-07-08 RX ORDER — ALBUTEROL SULFATE 90 UG/1
1-2 AEROSOL, METERED RESPIRATORY (INHALATION)
Status: CANCELLED
Start: 2022-08-01

## 2022-07-08 RX ORDER — MONTELUKAST SODIUM 10 MG/1
10 TABLET ORAL ONCE
Status: CANCELLED | OUTPATIENT
Start: 2022-07-18

## 2022-07-08 RX ORDER — ALBUTEROL SULFATE 0.83 MG/ML
2.5 SOLUTION RESPIRATORY (INHALATION)
Status: CANCELLED | OUTPATIENT
Start: 2022-07-18

## 2022-07-08 RX ORDER — HEPARIN SODIUM (PORCINE) LOCK FLUSH IV SOLN 100 UNIT/ML 100 UNIT/ML
5 SOLUTION INTRAVENOUS
Status: CANCELLED | OUTPATIENT
Start: 2022-07-25

## 2022-07-08 RX ORDER — LORAZEPAM 2 MG/ML
0.5 INJECTION INTRAMUSCULAR EVERY 4 HOURS PRN
Status: CANCELLED | OUTPATIENT
Start: 2022-07-18

## 2022-07-08 RX ORDER — LORAZEPAM 2 MG/ML
0.5 INJECTION INTRAMUSCULAR EVERY 4 HOURS PRN
Status: CANCELLED | OUTPATIENT
Start: 2022-08-01

## 2022-07-08 RX ORDER — ALBUTEROL SULFATE 0.83 MG/ML
2.5 SOLUTION RESPIRATORY (INHALATION)
Status: CANCELLED | OUTPATIENT
Start: 2022-08-01

## 2022-07-08 RX ORDER — DIPHENHYDRAMINE HYDROCHLORIDE 50 MG/ML
50 INJECTION INTRAMUSCULAR; INTRAVENOUS
Status: CANCELLED
Start: 2022-07-25

## 2022-07-08 RX ORDER — METHYLPREDNISOLONE SODIUM SUCCINATE 125 MG/2ML
125 INJECTION, POWDER, LYOPHILIZED, FOR SOLUTION INTRAMUSCULAR; INTRAVENOUS
Status: CANCELLED
Start: 2022-07-11

## 2022-07-08 RX ORDER — DIPHENHYDRAMINE HCL 25 MG
50 CAPSULE ORAL
Status: CANCELLED | OUTPATIENT
Start: 2022-08-01

## 2022-07-08 RX ORDER — HEPARIN SODIUM (PORCINE) LOCK FLUSH IV SOLN 100 UNIT/ML 100 UNIT/ML
5 SOLUTION INTRAVENOUS
Status: CANCELLED | OUTPATIENT
Start: 2022-08-01

## 2022-07-08 RX ORDER — DIPHENHYDRAMINE HYDROCHLORIDE 50 MG/ML
50 INJECTION INTRAMUSCULAR; INTRAVENOUS
Status: CANCELLED
Start: 2022-07-11

## 2022-07-08 RX ORDER — HEPARIN SODIUM,PORCINE 10 UNIT/ML
5 VIAL (ML) INTRAVENOUS
Status: CANCELLED | OUTPATIENT
Start: 2022-07-25

## 2022-07-08 RX ORDER — MONTELUKAST SODIUM 10 MG/1
10 TABLET ORAL ONCE
Status: CANCELLED | OUTPATIENT
Start: 2022-07-11

## 2022-07-08 RX ORDER — HEPARIN SODIUM,PORCINE 10 UNIT/ML
5 VIAL (ML) INTRAVENOUS
Status: CANCELLED | OUTPATIENT
Start: 2022-07-18

## 2022-07-08 RX ORDER — ACETAMINOPHEN 325 MG/1
650 TABLET ORAL ONCE
Status: CANCELLED | OUTPATIENT
Start: 2022-07-18

## 2022-07-08 RX ORDER — DIPHENHYDRAMINE HYDROCHLORIDE 50 MG/ML
50 INJECTION INTRAMUSCULAR; INTRAVENOUS
Status: CANCELLED
Start: 2022-07-18

## 2022-07-08 RX ORDER — EPINEPHRINE 1 MG/ML
0.3 INJECTION, SOLUTION INTRAMUSCULAR; SUBCUTANEOUS EVERY 5 MIN PRN
Status: CANCELLED | OUTPATIENT
Start: 2022-07-25

## 2022-07-08 RX ORDER — EPINEPHRINE 1 MG/ML
0.3 INJECTION, SOLUTION INTRAMUSCULAR; SUBCUTANEOUS EVERY 5 MIN PRN
Status: CANCELLED | OUTPATIENT
Start: 2022-08-01

## 2022-07-08 RX ORDER — LORAZEPAM 2 MG/ML
0.5 INJECTION INTRAMUSCULAR EVERY 4 HOURS PRN
Status: CANCELLED | OUTPATIENT
Start: 2022-07-25

## 2022-07-08 RX ORDER — EPINEPHRINE 1 MG/ML
0.3 INJECTION, SOLUTION INTRAMUSCULAR; SUBCUTANEOUS EVERY 5 MIN PRN
Status: CANCELLED | OUTPATIENT
Start: 2022-07-11

## 2022-07-08 RX ORDER — ALBUTEROL SULFATE 90 UG/1
1-2 AEROSOL, METERED RESPIRATORY (INHALATION)
Status: CANCELLED
Start: 2022-07-18

## 2022-07-08 RX ORDER — HEPARIN SODIUM,PORCINE 10 UNIT/ML
5 VIAL (ML) INTRAVENOUS
Status: CANCELLED | OUTPATIENT
Start: 2022-08-01

## 2022-07-08 RX ORDER — LEVOFLOXACIN 250 MG/1
250 TABLET, FILM COATED ORAL DAILY
Qty: 30 TABLET | Refills: 3 | Status: SHIPPED | OUTPATIENT
Start: 2022-07-08 | End: 2023-12-06

## 2022-07-08 RX ORDER — EPINEPHRINE 1 MG/ML
0.3 INJECTION, SOLUTION INTRAMUSCULAR; SUBCUTANEOUS EVERY 5 MIN PRN
Status: CANCELLED | OUTPATIENT
Start: 2022-07-18

## 2022-07-08 RX ORDER — ACETAMINOPHEN 325 MG/1
650 TABLET ORAL
Status: CANCELLED | OUTPATIENT
Start: 2022-08-01

## 2022-07-08 RX ORDER — MONTELUKAST SODIUM 10 MG/1
10 TABLET ORAL ONCE
Status: CANCELLED | OUTPATIENT
Start: 2022-07-25

## 2022-07-08 RX ORDER — DIPHENHYDRAMINE HCL 25 MG
50 CAPSULE ORAL ONCE
Status: CANCELLED | OUTPATIENT
Start: 2022-07-11

## 2022-07-08 RX ORDER — DIPHENHYDRAMINE HYDROCHLORIDE 50 MG/ML
50 INJECTION INTRAMUSCULAR; INTRAVENOUS
Status: CANCELLED
Start: 2022-08-01

## 2022-07-08 RX ORDER — ASPIRIN 325 MG
325 TABLET ORAL DAILY
Qty: 30 TABLET | Refills: 11 | Status: SHIPPED | OUTPATIENT
Start: 2022-07-08 | End: 2022-07-20

## 2022-07-08 RX ORDER — METHYLPREDNISOLONE SODIUM SUCCINATE 125 MG/2ML
125 INJECTION, POWDER, LYOPHILIZED, FOR SOLUTION INTRAMUSCULAR; INTRAVENOUS
Status: CANCELLED
Start: 2022-07-18

## 2022-07-08 RX ORDER — DEXAMETHASONE 4 MG/1
20 TABLET ORAL ONCE
Status: CANCELLED | OUTPATIENT
Start: 2022-07-25

## 2022-07-08 RX ORDER — ALBUTEROL SULFATE 0.83 MG/ML
2.5 SOLUTION RESPIRATORY (INHALATION)
Status: CANCELLED | OUTPATIENT
Start: 2022-07-25

## 2022-07-08 RX ORDER — HEPARIN SODIUM (PORCINE) LOCK FLUSH IV SOLN 100 UNIT/ML 100 UNIT/ML
5 SOLUTION INTRAVENOUS
Status: CANCELLED | OUTPATIENT
Start: 2022-07-18

## 2022-07-08 RX ORDER — MEPERIDINE HYDROCHLORIDE 25 MG/ML
25 INJECTION INTRAMUSCULAR; INTRAVENOUS; SUBCUTANEOUS EVERY 30 MIN PRN
Status: CANCELLED | OUTPATIENT
Start: 2022-07-25

## 2022-07-08 RX ORDER — ACETAMINOPHEN 325 MG/1
650 TABLET ORAL ONCE
Status: CANCELLED | OUTPATIENT
Start: 2022-07-11

## 2022-07-08 RX ORDER — MEPERIDINE HYDROCHLORIDE 25 MG/ML
25 INJECTION INTRAMUSCULAR; INTRAVENOUS; SUBCUTANEOUS EVERY 30 MIN PRN
Status: CANCELLED | OUTPATIENT
Start: 2022-07-11

## 2022-07-08 RX ORDER — HEPARIN SODIUM,PORCINE 10 UNIT/ML
5 VIAL (ML) INTRAVENOUS
Status: CANCELLED | OUTPATIENT
Start: 2022-07-11

## 2022-07-08 RX ORDER — DEXAMETHASONE 4 MG/1
20 TABLET ORAL ONCE
Status: CANCELLED | OUTPATIENT
Start: 2022-07-18

## 2022-07-08 RX ORDER — MEPERIDINE HYDROCHLORIDE 25 MG/ML
25 INJECTION INTRAMUSCULAR; INTRAVENOUS; SUBCUTANEOUS EVERY 30 MIN PRN
Status: CANCELLED | OUTPATIENT
Start: 2022-07-18

## 2022-07-08 RX ORDER — DEXAMETHASONE 4 MG/1
20 TABLET ORAL ONCE
Status: CANCELLED | OUTPATIENT
Start: 2022-08-01

## 2022-07-08 RX ORDER — ALBUTEROL SULFATE 90 UG/1
1-2 AEROSOL, METERED RESPIRATORY (INHALATION)
Status: CANCELLED
Start: 2022-07-25

## 2022-07-08 RX ORDER — MEPERIDINE HYDROCHLORIDE 25 MG/ML
25 INJECTION INTRAMUSCULAR; INTRAVENOUS; SUBCUTANEOUS EVERY 30 MIN PRN
Status: CANCELLED | OUTPATIENT
Start: 2022-08-01

## 2022-07-08 RX ORDER — ALBUTEROL SULFATE 0.83 MG/ML
2.5 SOLUTION RESPIRATORY (INHALATION)
Status: CANCELLED | OUTPATIENT
Start: 2022-07-11

## 2022-07-08 RX ORDER — LORAZEPAM 2 MG/ML
0.5 INJECTION INTRAMUSCULAR EVERY 4 HOURS PRN
Status: CANCELLED | OUTPATIENT
Start: 2022-07-11

## 2022-07-08 RX ORDER — DIPHENHYDRAMINE HCL 25 MG
50 CAPSULE ORAL ONCE
Status: CANCELLED | OUTPATIENT
Start: 2022-07-18

## 2022-07-08 RX ORDER — METHYLPREDNISOLONE SODIUM SUCCINATE 125 MG/2ML
125 INJECTION, POWDER, LYOPHILIZED, FOR SOLUTION INTRAMUSCULAR; INTRAVENOUS
Status: CANCELLED
Start: 2022-07-25

## 2022-07-08 RX ORDER — HEPARIN SODIUM (PORCINE) LOCK FLUSH IV SOLN 100 UNIT/ML 100 UNIT/ML
5 SOLUTION INTRAVENOUS
Status: CANCELLED | OUTPATIENT
Start: 2022-07-11

## 2022-07-08 RX ORDER — ALBUTEROL SULFATE 90 UG/1
1-2 AEROSOL, METERED RESPIRATORY (INHALATION)
Status: CANCELLED
Start: 2022-07-11

## 2022-07-08 RX ORDER — ACETAMINOPHEN 325 MG/1
650 TABLET ORAL ONCE
Status: CANCELLED | OUTPATIENT
Start: 2022-07-25

## 2022-07-08 RX ORDER — DEXAMETHASONE 4 MG/1
20 TABLET ORAL ONCE
Status: CANCELLED | OUTPATIENT
Start: 2022-07-11

## 2022-07-08 ASSESSMENT — PAIN SCALES - GENERAL: PAINLEVEL: SEVERE PAIN (7)

## 2022-07-08 NOTE — LETTER
7/8/2022         RE: Mary Jo Mcleod  [address]  [place] [place] [address]        Dear Colleague,    Thank you for referring your patient, Mary Jo Mcleod, to the [facility]. Please see a copy of my visit note below.    Oncologic Hx:   -IgG Kappa SM previously seen by Dr. Mina at [facility]. Dx in Nov 2020 on routine blood work which showed elevated globulin level and M spike of 2.8. At that time her her Cr was 1.2, calcium WNL, and Hgb of 11.7. Serum kappa FLC was elevated at 3.3. PET/CT 1/6/2021 without disease. She underwent BM bx on 2/8/2021 showing 24% plasma cells with gains of chromosomes 5,9, and 15.  - Offered rafael 25 mg every day but declined.   - BMBx 5/18/22 with 60-70% kappa-monotypic plasma cells, now qualifying her disease as active myeloma.  Monoclonal peak 3.9, kappa FLC 7.03. MRI 5/6/22 with mild to moderate compression deformity of L5 and numerous small lesions.  She cancelled her PET and follow-up appointment with Dr. Hanks.      Interval Hx:   Mary Jo presents to clinic today to discuss results of her bone marrow biopsy in May. She had a PET and follow-up appointment with Dr. Hanks that were cancelled due to several other health issues she has been dealing with, namely two falls and ongoing right hip pain without clear cause. She was seen at a [facility] ED as well as O and no clear fracture was identified. She intends to pursue further follow-up with [facility] orthopedics.  She has had a 10 lb weight loss over the past 6 weeks which she attributes to a lack of appetite secondary to pain from her fall.    Other than her right hip pain and some increased fatigue, she reports feeling fairly well overall. Denies fevers, chills, night sweats, headaches, dizziness, vision or hearing changes, new lumps or bumps, chest pain, shortness of breath, cough, abdominal pain, nausea, vomiting, changes to bowel or bladder, swelling of extremities, bleeding issues, or  rash.         A comprehensive review of systems was completed and negative except as described above.  Physical Exam:   /67   Pulse 86   Temp 97.8  F (36.6  C) (Tympanic)   Resp 16   Wt 60.2 kg (132 lb 11.2 oz)   LMP 12/04/1989   SpO2 98%   BMI 23.51 kg/m    Constitutional: NAD  Eyes: no scleral icterus  ENT: no oral lesions  Lymph: no cervical, supraclavicular, axillary LAD  Pulm: CTAB  CV: RRR, no m/r/g  GI: bowel sounds present, soft and nontender to palpation  MSK: No edema in the extremities  Neuro: alert, conversant, normal gait  Psych: appropriate mood and affect      Labs:  I personally reviewed the following labs:    Most Recent 3 CBC's:Recent Labs   Lab Test 07/08/22  1452 07/08/22  1315 06/25/22  1851   WBC 5.4 4.4 7.4   HGB 10.0* 9.9* 10.5*   MCV 98 92.0 100    286 261     Most Recent 3 BMP's:  Recent Labs   Lab Test 07/08/22  1452 06/25/22  1851 05/30/22  0631    128* 130*   POTASSIUM 4.0 4.4 4.1   CHLORIDE 102 101 104   CO2 29 26 25   BUN 15 21 15   CR 1.13* 1.18* 0.92   ANIONGAP 7 1* 1*   ARA 9.9 9.0 8.2*   * 97 103*     Most Recent 2 LFT's:  Recent Labs   Lab Test 07/08/22  1452 06/25/22  1851   AST 21 22   ALT 14 19   ALKPHOS 53 59   BILITOTAL 0.4 0.7       Bone Marrow Biopsy 5/18/22    Component  Resulting Agency   Addendum 2   This addendum is issued to document that a  immunohistochemical stain was performed on a core biopsy section, evaluated with the rest of the case at the time of initial review, and supported the original interpretation (documentation was inadvertently omitted from the original report). The original interpretation is unchanged.   Addendum electronically signed by Viki Conn MD on 6/17/2022 at  9:53 AM   Addendum   This addendum is issued to report the results of an additional stain.  The original interpretation is unchanged.    There is no evidence of amyloid deposition on Congo red stain, which was performed on the marrow  core biopsy with appropriate controls.   Addendum electronically signed by Viki Conn MD on 5/25/2022 at  1:53 PM   Final Diagnosis   Bone marrow, posterior iliac crest, right decalcified trephine biopsy and touch imprint; right particle crush, direct aspirate smear, and concentrated aspirate smear; and peripheral smear:     Recurrent/persistent plasma cell myeloma with the following features:    Slightly hypercellular marrow for age (cellularity estimated at 30-40%) with trilineage hematopoietic maturation and 60-70% kappa-monotypic plasma cells    Peripheral blood showing slight normochromic, macrocytic anemia; increased rouleaux formation    See comment    Electronically signed by Viki Conn MD on 5/21/2022 at  9:55 AM   Comment  [facility]   The findings are consistent with recurrence/persistence of the patient's previously diagnosed plasma cell neoplasm, and are diagnostic of plasma cell myeloma according to the 2017 WHO. A Congo red stain is in process to evaluate for amyloid deposition; the results will be reported in an addendum. Concurrent flow cytometry ([id number]) showed kappa monotypic plasma cells and polytypic B cells. Please correlate these findings with the results of other ancillary studies and the clinical presentation          Flow cytometry 5/18/22  Case Report   Flow Cytometry Report                             Case: [id number]                                   Authorizing Provider:  Ale Hanks MD    Collected:           05/18/2022 03:28 PM           Ordering Location:     [facility]  Received:            05/18/2022 03:53 PM                                  Cancer Clinic                                                                 Pathologist:           Viki Conn MD                                                     Specimen:    Iliac Crest, Bone Marrow Aspirate, Right                                                   Flow  Interpretation   A. Iliac Crest, Bone Marrow Aspirate, Right:  -Kappa-monotypic plasma cells  -Polytypic B cells  -See comment     FISH 5/18/22:  Interpretation    METHODS:  Specimen: Bone marrow specimen enriched for plasma cells using a  immunomagnetic isolation assay ([employer])  Test performed: Fluorescence in situ hybridization (FISH)  Interphase cells examined: 100-200 for each probe set  Probes:  - CDKN2C (1p32.3) / CKS1B (1q21.3) (dual color) - [employer]  - [id number] (5p15.2), CEP9 and D15Z4 (centromeres of 9 and 15) (Tri-Color) - [employer]  - MYC (8q24.21) (breakapart) - [employer]  - PHIL (11q22.3) / TP53 (17p13.1) (dual color) - [employer]  - [id number] (13q14.3) / LAMP1 (13q34) (dual color) - [employer]        RESULTS:    ABNORMAL: HIGH-RISK  - No high risk abnormality detected     ABNORMAL: OTHER  - Gain of chromosomes 5,9 and 15 (95%)  - MYC rearrangement (93.5%)     NORMAL  - No loss of 1p or gain of 1q  - Rate of loss of chromosome 13 slightly elevated (1%; control range 0-0.4%)  - No loss of TP53            Assessment and Plan:   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 Discussion of these results has been delayed due to the patient having 2 mechanical falls over the past 6 weeks resulting in persistent right hip pain. We discussed the natural course of myeloma, the recommendation for treatment, and the need for maintenance therapy after a response to try to postpone a relapse.  I specifically discussed starting treatment with Daratumumab, Revlimid, dexamethasone, anticipating 6 cycles of treatment followed by Revlimid maintenance if she tolerates the medication and has had a sufficient response.  We discussed the need to be on aspirin prophylaxis to reduce the risk of blood clots while on Revlimid. We do not need to pursue acyclovir prophylaxis, per Dr. Hanks.      We discussed needing to do a baseline PET scan prior to starting treatment. The treatment schedule was discussed, including infusion appointments weekly x 8 weeks, every other week x 8 doses, then monthly, Revlimid dosing 3 out of every 4 weeks, and weekly dexamethasone.   She is eager to get started and would prefer to receive treatment at [facility] for the convenience of travel.    She intends to follow-up with [facility] for further investigation into her right hip pain. I offered an orthopedic referral here, but she declined, stating [facility] is more convenient for her.    PLAN:  - PET scan next week  - Start Humera-R[facility] at [facility] by the end of next week  - Follow-up in 1 month prior to C2  - She will follow-up with [facility]    60 minutes spent on the date of the encounter doing chart review, review of test results, patient visit and documentation         Again, thank you for allowing me to participate in the care of your patient.      Sincerely,    MARLON Keane CNP

## 2022-07-08 NOTE — NURSING NOTE
"Oncology Rooming Note    July 8, 2022 3:25 PM   Mary Jo Mcleod is a 76 year old female who presents for:    Chief Complaint   Patient presents with     Blood Draw     Labs drawn via VPT by RN in lab. VS taken     Oncology Clinic Visit     Smoldering multiple myeloma (H)     Initial Vitals: /67   Pulse 86   Temp 97.8  F (36.6  C) (Tympanic)   Resp 16   Wt 60.2 kg (132 lb 11.2 oz)   LMP 12/04/1989   SpO2 98%   BMI 23.51 kg/m   Estimated body mass index is 23.51 kg/m  as calculated from the following:    Height as of 6/17/22: 1.6 m (5' 3\").    Weight as of this encounter: 60.2 kg (132 lb 11.2 oz). Body surface area is 1.64 meters squared.  Severe Pain (7) Comment: Data Unavailable   Patient's last menstrual period was 12/04/1989.  Allergies reviewed: Yes  Medications reviewed: Yes    Medications: Medication refills not needed today.  Pharmacy name entered into [facility]:    [facility] [id number] [place] [facility] [address]  [facility] [address] [place] [facility] [address] [facility] - [address] [facility] - [place] [address] [address]    Clinical concerns: Patient states that she had a fall two weeks ago.        María Schneider LPN July 8, 2022 3:26 PM              "

## 2022-07-08 NOTE — PROGRESS NOTES
Oncologic Hx:   -IgG Kappa SM previously seen by Dr. Mina at [facility]. Dx in Nov 2020 on routine blood work which showed elevated globulin level and M spike of 2.8. At that time her her Cr was 1.2, calcium WNL, and Hgb of 11.7. Serum kappa FLC was elevated at 3.3. PET/CT 1/6/2021 without disease. She underwent BM bx on 2/8/2021 showing 24% plasma cells with gains of chromosomes 5,9, and 15.  - Offered rafael 25 mg every day but declined.   - BMBx 5/18/22 with 60-70% kappa-monotypic plasma cells, now qualifying her disease as active myeloma.  Monoclonal peak 3.9, kappa FLC 7.03. MRI 5/6/22 with mild to moderate compression deformity of L5 and numerous small lesions.  She cancelled her PET and follow-up appointment with Dr. Hanks.      Interval Hx:   Mary Jo presents to clinic today to discuss results of her bone marrow biopsy in May. She had a PET and follow-up appointment with Dr. Hanks that were cancelled due to several other health issues she has been dealing with, namely two falls and ongoing right hip pain without clear cause. She was seen at a [place] ED as well as [facility] and no clear fracture was identified. She intends to pursue further follow-up with [facility] orthopedics.  She has had a 10 lb weight loss over the past 6 weeks which she attributes to a lack of appetite secondary to pain from her fall.    Other than her right hip pain and some increased fatigue, she reports feeling fairly well overall. Denies fevers, chills, night sweats, headaches, dizziness, vision or hearing changes, new lumps or bumps, chest pain, shortness of breath, cough, abdominal pain, nausea, vomiting, changes to bowel or bladder, swelling of extremities, bleeding issues, or rash.         A comprehensive review of systems was completed and negative except as described above.  Physical Exam:   /67   Pulse 86   Temp 97.8  F (36.6  C) (Tympanic)   Resp 16   Wt 60.2 kg (132 lb 11.2 oz)   LMP 12/04/1989   SpO2 98%   BMI 23.51 kg/m     Constitutional: NAD  Eyes: no scleral icterus  ENT: no oral lesions  Lymph: no cervical, supraclavicular, axillary LAD  Pulm: CTAB  CV: RRR, no m/r/g  GI: bowel sounds present, soft and nontender to palpation  MSK: No edema in the extremities  Neuro: alert, conversant, normal gait  Psych: appropriate mood and affect      Labs:  I personally reviewed the following labs:    Most Recent 3 CBC's:Recent Labs   Lab Test 07/08/22  1452 07/08/22  1315 06/25/22  1851   WBC 5.4 4.4 7.4   HGB 10.0* 9.9* 10.5*   MCV 98 92.0 100    286 261     Most Recent 3 BMP's:  Recent Labs   Lab Test 07/08/22  1452 06/25/22  1851 05/30/22  0631    128* 130*   POTASSIUM 4.0 4.4 4.1   CHLORIDE 102 101 104   CO2 29 26 25   BUN 15 21 15   CR 1.13* 1.18* 0.92   ANIONGAP 7 1* 1*   ARA 9.9 9.0 8.2*   * 97 103*     Most Recent 2 LFT's:  Recent Labs   Lab Test 07/08/22  1452 06/25/22  1851   AST 21 22   ALT 14 19   ALKPHOS 53 59   BILITOTAL 0.4 0.7       Bone Marrow Biopsy 5/18/22    Component  Resulting Agency   Addendum 2   This addendum is issued to document that a  immunohistochemical stain was performed on a core biopsy section, evaluated with the rest of the case at the time of initial review, and supported the original interpretation (documentation was inadvertently omitted from the original report). The original interpretation is unchanged.   Addendum electronically signed by Viki Conn MD on 6/17/2022 at  9:53 AM   Addendum   This addendum is issued to report the results of an additional stain.  The original interpretation is unchanged.    There is no evidence of amyloid deposition on Congo red stain, which was performed on the marrow core biopsy with appropriate controls.   Addendum electronically signed by Viki Conn MD on 5/25/2022 at  1:53 PM   Final Diagnosis   Bone marrow, posterior iliac crest, right decalcified trephine biopsy and touch imprint; right particle crush, direct aspirate  smear, and concentrated aspirate smear; and peripheral smear:     Recurrent/persistent plasma cell myeloma with the following features:    Slightly hypercellular marrow for age (cellularity estimated at 30-40%) with trilineage hematopoietic maturation and 60-70% kappa-monotypic plasma cells    Peripheral blood showing slight normochromic, macrocytic anemia; increased rouleaux formation    See comment    Electronically signed by Viki Conn MD on 5/21/2022 at  9:55 AM   Comment  [facility]   The findings are consistent with recurrence/persistence of the patient's previously diagnosed plasma cell neoplasm, and are diagnostic of plasma cell myeloma according to the 2017 WHO. A Congo red stain is in process to evaluate for amyloid deposition; the results will be reported in an addendum. Concurrent flow cytometry ([id number]) showed kappa monotypic plasma cells and polytypic B cells. Please correlate these findings with the results of other ancillary studies and the clinical presentation          Flow cytometry 5/18/22  Case Report   Flow Cytometry Report                             Case: [id number]                                   Authorizing Provider:  Ale Hanks MD    Collected:           05/18/2022 03:28 PM           Ordering Location:     [facility]  Received:            05/18/2022 03:53 PM                                  Cancer Clinic                                                                 Pathologist:           Viki Conn MD                                                     Specimen:    Iliac Crest, Bone Marrow Aspirate, Right                                                   Flow Interpretation   A. Iliac Crest, Bone Marrow Aspirate, Right:  -Kappa-monotypic plasma cells  -Polytypic B cells  -See comment     FISH 5/18/22:  Interpretation    METHODS:  Specimen: Bone marrow specimen enriched for plasma cells using a  immunomagnetic isolation assay  (Goal Zero)  Test performed: Fluorescence in situ hybridization (FISH)  Interphase cells examined: 100-200 for each probe set  Probes:  - CDKN2C (1p32.3) / CKS1B (1q21.3) (dual color) - [employer]ocell  - [id number] (5p15.2), CEP9 and D15Z4 (centromeres of 9 and 15) (Tri-Color) - [employer]  - MYC (8q24.21) (breakapart) - [employer]  - PHIL (11q22.3) / TP53 (17p13.1) (dual color) - [employer]ell  - [id number] (13q14.3) / LAMP1 (13q34) (dual color) - [employer]        RESULTS:    ABNORMAL: HIGH-RISK  - No high risk abnormality detected     ABNORMAL: OTHER  - Gain of chromosomes 5,9 and 15 (95%)  - MYC rearrangement (93.5%)     NORMAL  - No loss of 1p or gain of 1q  - Rate of loss of chromosome 13 slightly elevated (1%; control range 0-0.4%)  - No loss of TP53            Assessment and Plan:   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 Discussion of these results has been delayed due to the patient having 2 mechanical falls over the past 6 weeks resulting in persistent right hip pain. We discussed the natural course of myeloma, the recommendation for treatment, and the need for maintenance therapy after a response to try to postpone a relapse.  I specifically discussed starting treatment with Daratumumab, Revlimid, dexamethasone, anticipating 6 cycles of treatment followed by Revlimid maintenance if she tolerates the medication and has had a sufficient response.  We discussed the need to be on aspirin prophylaxis to reduce the risk of blood clots while on Revlimid. We do not need to pursue acyclovir prophylaxis, per Dr. Hanks.     We discussed needing to do a baseline PET scan prior to starting treatment. The treatment schedule was discussed, including infusion appointments weekly x 8 weeks, every other week x 8 doses, then monthly, Revlimid dosing 3 out of every 4 weeks, and weekly dexamethasone.   She  is eager to get started and would prefer to receive treatment at [facility] for the convenience of travel.    She intends to follow-up with [facility] for further investigation into her right hip pain. I offered an orthopedic referral here, but she declined, stating [facility] is more convenient for her.    PLAN:  - PET scan next week  - Start Humera-Rd at [facility] by the end of next week  - Follow-up in 1 month prior to C2  - She will follow-up with [facility]    60 minutes spent on the date of the encounter doing chart review, review of test results, patient visit and documentation     MARLON Keane [facility]  [address] [place] [address] [phone]

## 2022-07-08 NOTE — PROGRESS NOTES
Grace Camargo is a 76 year old patient who presents to clinic for follow up.    B12 deficiency: here for B12 recheck after 4 injections.  Taking oral 1000 mcg.      Hyponatremia: worked up in the hospital 5/2022.  Uncertain cause.  Citalopram considered but has been taking for a very long time.  started on sodium chloride tablet    MM: following up with oncology today    Review of Systems   Constitutional, HEENT, cardiovascular, pulmonary, GI, , musculoskeletal, neuro, skin, endocrine and psych systems are negative, except as otherwise noted.      Objective    BP 98/58   Pulse 95   Resp 16   Wt 59.4 kg (131 lb)   LMP 12/04/1989   SpO2 99%   BMI 23.21 kg/m      General: Well appearing, NAD  Psych: normal mood and affect        No results found for this or any previous visit (from the past 24 hour(s)).    Anemia due to vitamin B12 deficiency, unspecified B12 deficiency type  recheck  - Vitamin B12 ([facility])  - CBC with Diff/Plt ([facility])  - VENOUS COLLECTION    Smoldering multiple myeloma (H)  Oncology follow up and chemo planned  - VENOUS COLLECTION    Hyponatremia  Recheck on sodium replacement  - Basic Metabolic Panel (8) ([facility])  - VENOUS COLLECTION

## 2022-07-08 NOTE — NURSING NOTE
Chief Complaint   Patient presents with     Blood Draw     Labs drawn via VPT by RN in lab. VS taken     Louise VILLEGAS RN PHN BSN  BMT/Oncology Lab

## 2022-07-10 LAB
BUN SERPL-MCNC: 15 MG/DL (ref 8–27)
BUN/CREATININE RATIO: 12 (ref 12–28)
CALCIUM SERPL-MCNC: 9.9 MG/DL (ref 8.7–10.3)
CHLORIDE SERPLBLD-SCNC: 101 MMOL/L (ref 96–106)
CREAT SERPL-MCNC: 1.23 MG/DL (ref 0.57–1)
EGFR: 46 ML/MIN/1.73
GLUCOSE SERPL-MCNC: 90 MG/DL (ref 65–99)
POTASSIUM SERPL-SCNC: 3.9 MMOL/L (ref 3.5–5.2)
SODIUM SERPL-SCNC: 134 MMOL/L (ref 134–144)
TOTAL CO2: 24 MMOL/L (ref 20–29)
VIT B12 SERPL-MCNC: >2000 PG/ML (ref 232–1245)

## 2022-07-11 LAB
ALBUMIN SERPL ELPH-MCNC: 3.8 G/DL (ref 3.7–5.1)
ALPHA1 GLOB SERPL ELPH-MCNC: 0.4 G/DL (ref 0.2–0.4)
ALPHA2 GLOB SERPL ELPH-MCNC: 0.8 G/DL (ref 0.5–0.9)
B-GLOBULIN SERPL ELPH-MCNC: 0.7 G/DL (ref 0.6–1)
GAMMA GLOB SERPL ELPH-MCNC: 4.2 G/DL (ref 0.7–1.6)
KAPPA LC FREE SER-MCNC: 9.95 MG/DL (ref 0.33–1.94)
KAPPA LC FREE/LAMBDA FREE SER NEPH: 12.92 {RATIO} (ref 0.26–1.65)
LAMBDA LC FREE SERPL-MCNC: 0.77 MG/DL (ref 0.57–2.63)
M PROTEIN SERPL ELPH-MCNC: 3.9 G/DL
PROT PATTERN SERPL ELPH-IMP: ABNORMAL
PROT PATTERN SERPL IFE-IMP: NORMAL

## 2022-07-11 PROCEDURE — 86334 IMMUNOFIX E-PHORESIS SERUM: CPT | Mod: 26

## 2022-07-11 PROCEDURE — 84165 PROTEIN E-PHORESIS SERUM: CPT | Mod: 26

## 2022-07-18 ENCOUNTER — MEDICAL CORRESPONDENCE (OUTPATIENT)
Dept: FAMILY MEDICINE | Facility: CLINIC | Age: 77
End: 2022-07-18

## 2022-07-20 ENCOUNTER — HOSPITAL ENCOUNTER (INPATIENT)
Facility: CLINIC | Age: 77
LOS: 8 days | Discharge: NURSING, REHAB OR LONG-TERM CARE | DRG: 543 | End: 2022-07-29
Attending: EMERGENCY MEDICINE | Admitting: STUDENT IN AN ORGANIZED HEALTH CARE EDUCATION/TRAINING PROGRAM
Payer: COMMERCIAL

## 2022-07-20 ENCOUNTER — APPOINTMENT (OUTPATIENT)
Dept: CT IMAGING | Facility: CLINIC | Age: 77
DRG: 543 | End: 2022-07-20
Payer: COMMERCIAL

## 2022-07-20 DIAGNOSIS — M25.551 HIP PAIN, RIGHT: ICD-10-CM

## 2022-07-20 DIAGNOSIS — M89.8X5 LYTIC BONE LESION OF HIP: ICD-10-CM

## 2022-07-20 DIAGNOSIS — M54.16 LUMBAR RADICULOPATHY: Primary | ICD-10-CM

## 2022-07-20 DIAGNOSIS — S32.050S COMPRESSION FRACTURE OF L5 VERTEBRA, SEQUELA: ICD-10-CM

## 2022-07-20 DIAGNOSIS — M54.50 ACUTE BILATERAL LOW BACK PAIN, UNSPECIFIED WHETHER SCIATICA PRESENT: ICD-10-CM

## 2022-07-20 DIAGNOSIS — D64.9 ANEMIA, UNSPECIFIED TYPE: ICD-10-CM

## 2022-07-20 DIAGNOSIS — M89.9 LYTIC BONE LESION OF FEMUR: ICD-10-CM

## 2022-07-20 DIAGNOSIS — C90.00 MULTIPLE MYELOMA NOT HAVING ACHIEVED REMISSION (H): ICD-10-CM

## 2022-07-20 DIAGNOSIS — M84.454A PATHOLOGICAL FRACTURE OF PELVIS, UNSPECIFIED PATHOLOGICAL CAUSE, INITIAL ENCOUNTER: ICD-10-CM

## 2022-07-20 LAB
ANION GAP SERPL CALCULATED.3IONS-SCNC: 5 MMOL/L (ref 3–14)
BASOPHILS # BLD AUTO: 0 10E3/UL (ref 0–0.2)
BASOPHILS NFR BLD AUTO: 0 %
BUN SERPL-MCNC: 18 MG/DL (ref 7–30)
CALCIUM SERPL-MCNC: 9 MG/DL (ref 8.5–10.1)
CHLORIDE BLD-SCNC: 105 MMOL/L (ref 94–109)
CO2 SERPL-SCNC: 27 MMOL/L (ref 20–32)
CREAT SERPL-MCNC: 1.03 MG/DL (ref 0.52–1.04)
EOSINOPHIL # BLD AUTO: 0 10E3/UL (ref 0–0.7)
EOSINOPHIL NFR BLD AUTO: 0 %
ERYTHROCYTE [DISTWIDTH] IN BLOOD BY AUTOMATED COUNT: 13.8 % (ref 10–15)
GFR SERPL CREATININE-BSD FRML MDRD: 56 ML/MIN/1.73M2
GLUCOSE BLD-MCNC: 99 MG/DL (ref 70–99)
HCT VFR BLD AUTO: 28 % (ref 35–47)
HGB BLD-MCNC: 9.1 G/DL (ref 11.7–15.7)
IMM GRANULOCYTES # BLD: 0 10E3/UL
IMM GRANULOCYTES NFR BLD: 1 %
LYMPHOCYTES # BLD AUTO: 1.6 10E3/UL (ref 0.8–5.3)
LYMPHOCYTES NFR BLD AUTO: 33 %
MCH RBC QN AUTO: 31.7 PG (ref 26.5–33)
MCHC RBC AUTO-ENTMCNC: 32.5 G/DL (ref 31.5–36.5)
MCV RBC AUTO: 98 FL (ref 78–100)
MONOCYTES # BLD AUTO: 0.6 10E3/UL (ref 0–1.3)
MONOCYTES NFR BLD AUTO: 12 %
NEUTROPHILS # BLD AUTO: 2.6 10E3/UL (ref 1.6–8.3)
NEUTROPHILS NFR BLD AUTO: 54 %
NRBC # BLD AUTO: 0 10E3/UL
NRBC BLD AUTO-RTO: 0 /100
PLATELET # BLD AUTO: 221 10E3/UL (ref 150–450)
POTASSIUM BLD-SCNC: 3.5 MMOL/L (ref 3.4–5.3)
RBC # BLD AUTO: 2.87 10E6/UL (ref 3.8–5.2)
SARS-COV-2 RNA RESP QL NAA+PROBE: NEGATIVE
SODIUM SERPL-SCNC: 137 MMOL/L (ref 133–144)
WBC # BLD AUTO: 4.8 10E3/UL (ref 4–11)

## 2022-07-20 PROCEDURE — G0378 HOSPITAL OBSERVATION PER HR: HCPCS

## 2022-07-20 PROCEDURE — 36415 COLL VENOUS BLD VENIPUNCTURE: CPT | Performed by: EMERGENCY MEDICINE

## 2022-07-20 PROCEDURE — 250N000011 HC RX IP 250 OP 636: Performed by: STUDENT IN AN ORGANIZED HEALTH CARE EDUCATION/TRAINING PROGRAM

## 2022-07-20 PROCEDURE — 80048 BASIC METABOLIC PNL TOTAL CA: CPT | Performed by: EMERGENCY MEDICINE

## 2022-07-20 PROCEDURE — 85025 COMPLETE CBC W/AUTO DIFF WBC: CPT | Performed by: EMERGENCY MEDICINE

## 2022-07-20 PROCEDURE — 96376 TX/PRO/DX INJ SAME DRUG ADON: CPT

## 2022-07-20 PROCEDURE — U0005 INFEC AGEN DETEC AMPLI PROBE: HCPCS | Performed by: EMERGENCY MEDICINE

## 2022-07-20 PROCEDURE — 250N000013 HC RX MED GY IP 250 OP 250 PS 637: Performed by: STUDENT IN AN ORGANIZED HEALTH CARE EDUCATION/TRAINING PROGRAM

## 2022-07-20 PROCEDURE — 99223 1ST HOSP IP/OBS HIGH 75: CPT | Mod: AI | Performed by: STUDENT IN AN ORGANIZED HEALTH CARE EDUCATION/TRAINING PROGRAM

## 2022-07-20 PROCEDURE — 96374 THER/PROPH/DIAG INJ IV PUSH: CPT

## 2022-07-20 PROCEDURE — C9803 HOPD COVID-19 SPEC COLLECT: HCPCS

## 2022-07-20 PROCEDURE — 72192 CT PELVIS W/O DYE: CPT

## 2022-07-20 PROCEDURE — 250N000011 HC RX IP 250 OP 636

## 2022-07-20 PROCEDURE — 72131 CT LUMBAR SPINE W/O DYE: CPT

## 2022-07-20 PROCEDURE — 250N000013 HC RX MED GY IP 250 OP 250 PS 637

## 2022-07-20 PROCEDURE — 99285 EMERGENCY DEPT VISIT HI MDM: CPT | Mod: 25

## 2022-07-20 RX ORDER — LEVOFLOXACIN 250 MG/1
250 TABLET, FILM COATED ORAL DAILY
Status: DISCONTINUED | OUTPATIENT
Start: 2022-07-21 | End: 2022-07-29 | Stop reason: HOSPADM

## 2022-07-20 RX ORDER — HYDROMORPHONE HYDROCHLORIDE 1 MG/ML
0.3 INJECTION, SOLUTION INTRAMUSCULAR; INTRAVENOUS; SUBCUTANEOUS ONCE
Status: COMPLETED | OUTPATIENT
Start: 2022-07-20 | End: 2022-07-20

## 2022-07-20 RX ORDER — HYDROMORPHONE HCL IN WATER/PF 6 MG/30 ML
.2-.4 PATIENT CONTROLLED ANALGESIA SYRINGE INTRAVENOUS
Status: DISCONTINUED | OUTPATIENT
Start: 2022-07-20 | End: 2022-07-29 | Stop reason: HOSPADM

## 2022-07-20 RX ORDER — AMOXICILLIN 250 MG
2 CAPSULE ORAL 2 TIMES DAILY PRN
Status: DISCONTINUED | OUTPATIENT
Start: 2022-07-20 | End: 2022-07-29 | Stop reason: HOSPADM

## 2022-07-20 RX ORDER — NALOXONE HYDROCHLORIDE 0.4 MG/ML
0.2 INJECTION, SOLUTION INTRAMUSCULAR; INTRAVENOUS; SUBCUTANEOUS
Status: DISCONTINUED | OUTPATIENT
Start: 2022-07-20 | End: 2022-07-29 | Stop reason: HOSPADM

## 2022-07-20 RX ORDER — CYCLOBENZAPRINE HCL 5 MG
5 TABLET ORAL ONCE
Status: COMPLETED | OUTPATIENT
Start: 2022-07-20 | End: 2022-07-20

## 2022-07-20 RX ORDER — NALOXONE HYDROCHLORIDE 0.4 MG/ML
0.4 INJECTION, SOLUTION INTRAMUSCULAR; INTRAVENOUS; SUBCUTANEOUS
Status: DISCONTINUED | OUTPATIENT
Start: 2022-07-20 | End: 2022-07-29 | Stop reason: HOSPADM

## 2022-07-20 RX ORDER — ACETAMINOPHEN 650 MG/1
650 SUPPOSITORY RECTAL EVERY 6 HOURS PRN
Status: DISCONTINUED | OUTPATIENT
Start: 2022-07-20 | End: 2022-07-29 | Stop reason: HOSPADM

## 2022-07-20 RX ORDER — PROCHLORPERAZINE 25 MG
12.5 SUPPOSITORY, RECTAL RECTAL EVERY 12 HOURS PRN
Status: DISCONTINUED | OUTPATIENT
Start: 2022-07-20 | End: 2022-07-29 | Stop reason: HOSPADM

## 2022-07-20 RX ORDER — CITALOPRAM HYDROBROMIDE 20 MG/1
40 TABLET ORAL DAILY
Status: DISCONTINUED | OUTPATIENT
Start: 2022-07-21 | End: 2022-07-29 | Stop reason: HOSPADM

## 2022-07-20 RX ORDER — PROCHLORPERAZINE MALEATE 5 MG
5 TABLET ORAL EVERY 6 HOURS PRN
Status: DISCONTINUED | OUTPATIENT
Start: 2022-07-20 | End: 2022-07-29 | Stop reason: HOSPADM

## 2022-07-20 RX ORDER — AMOXICILLIN 250 MG
1 CAPSULE ORAL 2 TIMES DAILY PRN
Status: DISCONTINUED | OUTPATIENT
Start: 2022-07-20 | End: 2022-07-29 | Stop reason: HOSPADM

## 2022-07-20 RX ORDER — SODIUM CHLORIDE 1 G/1
1 TABLET ORAL DAILY
Status: DISCONTINUED | OUTPATIENT
Start: 2022-07-21 | End: 2022-07-27

## 2022-07-20 RX ORDER — ONDANSETRON 2 MG/ML
4 INJECTION INTRAMUSCULAR; INTRAVENOUS EVERY 6 HOURS PRN
Status: DISCONTINUED | OUTPATIENT
Start: 2022-07-20 | End: 2022-07-29 | Stop reason: HOSPADM

## 2022-07-20 RX ORDER — POLYETHYLENE GLYCOL 3350 17 G/17G
17 POWDER, FOR SOLUTION ORAL DAILY PRN
Status: DISCONTINUED | OUTPATIENT
Start: 2022-07-20 | End: 2022-07-27

## 2022-07-20 RX ORDER — ACETAMINOPHEN 325 MG/1
650 TABLET ORAL EVERY 6 HOURS PRN
Status: DISCONTINUED | OUTPATIENT
Start: 2022-07-20 | End: 2022-07-29 | Stop reason: HOSPADM

## 2022-07-20 RX ORDER — ONDANSETRON 4 MG/1
4 TABLET, ORALLY DISINTEGRATING ORAL EVERY 6 HOURS PRN
Status: DISCONTINUED | OUTPATIENT
Start: 2022-07-20 | End: 2022-07-29 | Stop reason: HOSPADM

## 2022-07-20 RX ADMIN — HYDROMORPHONE HYDROCHLORIDE 0.4 MG: 0.2 INJECTION, SOLUTION INTRAMUSCULAR; INTRAVENOUS; SUBCUTANEOUS at 21:33

## 2022-07-20 RX ADMIN — HYDROMORPHONE HYDROCHLORIDE 0.3 MG: 1 INJECTION, SOLUTION INTRAMUSCULAR; INTRAVENOUS; SUBCUTANEOUS at 19:04

## 2022-07-20 RX ADMIN — OXYCODONE HYDROCHLORIDE 5 MG: 5 TABLET ORAL at 23:56

## 2022-07-20 RX ADMIN — CYCLOBENZAPRINE HYDROCHLORIDE 5 MG: 5 TABLET, FILM COATED ORAL at 19:07

## 2022-07-20 ASSESSMENT — ENCOUNTER SYMPTOMS
FLANK PAIN: 0
FATIGUE: 0
BACK PAIN: 1
VOMITING: 0
DIARRHEA: 0
ABDOMINAL PAIN: 0
ARTHRALGIAS: 1
DYSURIA: 0
NAUSEA: 0
FEVER: 0
ACTIVITY CHANGE: 1
FREQUENCY: 0

## 2022-07-20 NOTE — ED TRIAGE NOTES
Fell 3 weeks ago and having lower back pain since, had multiple work ups with MRI done. Today had appointment today to be seen but could not go. Came to ED via EMS for muscle relaxer and pain medication.

## 2022-07-20 NOTE — ED NOTES
Rapid Assessment Note    History:   Mary Jo Mcleod is a 76 year old female who presents with bilateral back and lower extremity radiating pain..  She had a fall and was seen by [facility] with an MRI last week.  She is supposed have an appointment today but she could not get to the appointment because her pain is out of control.  She is not able to walk at home.  She is unable to sleep.  Pain with movement lower extremities but no numbness in the saddle area, bowel or bladder incontinence.  Somewhat limited strength in the right lower extremity secondary to pain.    Exam:  General: Well-nourished, appears to be in severe pain  Eyes: PERRL, conjunctivae pink no scleral icterus or conjunctival injection  ENT:  Moist mucus membranes  Respiratory:  No respiratory distress  CV: Normal rate   Skin: Warm, dry.  No rashes or petechiae  Neuro: Alert and oriented to person/place/time. Normal saddle sensation.  Normal and symmetric reflexes at patella tendon bilaterally.  Normal and symmetric strength at BLE.  Psychiatric: Normal affect   appears to be in severe    Plan of Care:   I evaluated the patient and developed an initial plan of care. I discussed this plan and explained that I, or one of my partners, would be returning to complete the evaluation.     07/20/2022  [employer]    Portions of this medical record were completed by a scribe. UPON MY REVIEW AND AUTHENTICATION BY ELECTRONIC SIGNATURE, this confirms (a) I performed the applicable clinical services, and (b) the record is accurate.        Viki Mendez MD  07/20/22 4592

## 2022-07-20 NOTE — ED PROVIDER NOTES
"ED ATTENDING PHYSICIAN NOTE:   I evaluated this patient in conjunction with DOROTA Hutton. I have participated in the care of the patient and personally performed key elements of the history, exam, and medical decision making.      HPI:   Mary Jo Mcleod is a 76 year old female who presents via EMS with lower back pain and right hip pain. She woke up this morning with the pain and associated weakness. She was too weak to walk. She reports the pain is in the right hip and radiates into her left groin and down her legs bilaterally. She has used ibuprofen and Tylenol with no relief. Denies recent fall, fever, loss of control of bowel or bladder.     EXAM:   /55 (BP Location: Right arm, Patient Position: Supine, Cuff Size: Adult Regular)   Pulse 83   Temp 97.5  F (36.4  C) (Axillary)   Resp 16   Ht 1.6 m (5' 3\")   Wt 59 kg (130 lb)   LMP 12/04/1989   SpO2 95%   BMI 23.03 kg/m    General: The patient appears uncomfortable  Head:  The scalp, face, and head appear normal  Eyes:  The pupils are equal, round, and reactive to light    There is no nystagmus    Extraocular muscles are intact    Conjunctivae and sclerae are normal  ENT:    The nose is normal    Pinnae are normal    The oropharynx is normal    Uvula is in the midline  Neck:  Normal range of motion    There is no midline cervical spine pain/tenderness  CV:  Regular rate and underlying rhythm     Normal S1 and S2    No S3 or S4    No pathological murmur detected  Resp:  Lungs are clear    There is no tachypnea    Non-labored breathing    No rales    No wheezing   GI:  Abdomen is soft, there is no rigidity    No distension    No tympani    No rebound tenderness     Non-surgical without peritoneal features  MS:  Back:    The cervical and thoracic spine is non-tender in the midline    The lumbar spine is non-tender in the midline    There is faint bilateral lumbar paraspinous muscular pain to palpation    Legs:    There is normal motor strength of " bilateral lower extremities    There is no sensory abnormality/paresthesia    There is no numbness    There is RLE radiculopathy    There is normal capillary refill to the toes  Skin:  No rash or acute skin lesions noted.  No shingles noted.  Neuro: Speech is normal and fluent.  Unable to ambulate independently, pain too severe to walk.   Psych:  Awake. Alert.  Normal affect.  Appropriate interactions.    Labs Ordered and Resulted from Time of ED Arrival to Time of ED Departure   BASIC METABOLIC PANEL - Abnormal       Result Value    Sodium 137      Potassium 3.5      Chloride 105      Carbon Dioxide (CO2) 27      Anion Gap 5      Urea Nitrogen 18      Creatinine 1.03      Calcium 9.0      Glucose 99      GFR Estimate 56 (*)    CBC WITH PLATELETS AND DIFFERENTIAL - Abnormal    WBC Count 4.8      RBC Count 2.87 (*)     Hemoglobin 9.1 (*)     Hematocrit 28.0 (*)     MCV 98      MCH 31.7      MCHC 32.5      RDW 13.8      Platelet Count 221      % Neutrophils 54      % Lymphocytes 33      % Monocytes 12      % Eosinophils 0      % Basophils 0      % Immature Granulocytes 1      NRBCs per 100 WBC 0      Absolute Neutrophils 2.6      Absolute Lymphocytes 1.6      Absolute Monocytes 0.6      Absolute Eosinophils 0.0      Absolute Basophils 0.0      Absolute Immature Granulocytes 0.0      Absolute NRBCs 0.0     COVID-19 VIRUS (CORONAVIRUS) BY PCR - Normal    SARS CoV2 PCR Negative       CT Lumbar Spine w/o Contrast   Final Result   IMPRESSION:   1.  Chronic pathologic compression fracture L5 without change from CT of 06/25/2022.   2.  Unchanged study when compared to prior of 6/25/2022.      CT Pelvis Bone wo Contrast   Final Result   IMPRESSION:   1.  Innumerable lytic lesions throughout the pelvic bones and proximal femurs, compatible with the patient's known multiple myeloma. The bones are diffusely demineralized.      2.  A few larger lytic lesions are visualized in the left pelvis, including the posterior ilium and the  periacetabular left ischium, measuring up to 2.5 cm. There is a subtrochanteric lesion in the proximal right femur which has mild associated endosteal    cortical scalloping. No pathologic fracture identified with these larger lesions.      3.  Acute, mildly comminuted fracture of the L5 vertebral body with moderate compression and loss of vertebral body height. Please see the separate lumbar spine report for full details.      4.  Additional acute nondisplaced fracture through the left parasymphyseal pubic body in the anterior pelvis.      5.  Cortical irregularity in the right sacral ala, superiorly, compatible with age indeterminant fracture.      6.  No evidence of hip fracture.      7.  No significant musculoskeletal soft tissue abnormality. No hematoma or focal soft tissue edema.         MEDICAL DECISION MAKING/ASSESSMENT AND PLAN:   Patient is a 76-year-old female with a history of multiple myeloma who presents with pelvic pain and low back pain.  Patient has been having acute on chronic low back pain and has had progressive deterioration in recent weeks.  She is been unable to ambulate and has been crawling around in her home.  Repeat CT imaging tonight shows innumerable lytic lesions throughout the pelvic bones and proximal femurs compatible with multiple myeloma.  There also appears to be a acute comminuted fracture of the L5 vertebral body with moderate compression and loss of vertebral body height.  Additionally an acute nondisplaced fracture through the left parasymphyseal pubic body in the anterior pelvis.  Patient did have outpatient MRI in the last 2 weeks and so we are attempting to gather outpatient imaging records to help determine findings on that outpatient imaging study.  Patient received pain control here and will be admitted for further physical therapy, orthopedic, and oncology evaluation.  Patient admitted under the care of Dr. Shay.     DIAGNOSIS:     ICD-10-CM    1. Hip pain, right   M25.551    2. Acute bilateral low back pain, unspecified whether sciatica present  M54.50    3. Multiple myeloma not having achieved remission (H)  C90.00    4. Anemia, unspecified type  D64.9     At baseline   5. Compression fracture of L5 vertebra, sequela  S32.050S    6. Lytic bone lesion of femur  M89.9    7. Lytic bone lesion of hip  M89.8X5    8. Pathological fracture of pelvis, unspecified pathological cause, initial encounter  M84.454A        DISPOSITION:   Admitted     7/20/2022  [facility] EMERGENCY DEPT         Jeremias Negron MD  07/21/22 0121

## 2022-07-20 NOTE — ED PROVIDER NOTES
History   Chief Complaint:       NICOLE Mcleod is a 76 year old female with history of multiple myeloma, osteopenia, CKD who presents to the ED for lower back pain and right hip pain.  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  She follows with Dr. Marquez at the [facility] and is due for a PET scan this week and has a plan to start chemo.  She also states she was seen here in our emergency department June 25 after a fall.  She had the following imaging studies below.  At that encounter she was offered TCU placement, but declined.  She followed up as an outpatient with [facility] orthopedics and had an MRI recently.  This is not available in care everywhere right now and was requested.  She had an appointment at [facility] today to review the results of her MRI, but she states she was unable to make it to the appointment.  When she woke up this morning she was in too much pain and to week to walk.  She called EMS to bring her here.  She states she has been crawling around her condo in order to get around.  She denies any loss of bowel control.  No urinary retention or incontinence.  No saddle paresthesias.  No recent fevers.  She complains of intense pain in the right hip and also radiating into her groin on the left.  Her pain goes all the way down the back of her legs bilaterally.  She states ibuprofen and Tylenol at home are not touching the pain.  She states she has not fallen since she had the MRI at [facility].  She request something here for pain and asks for a muscle relaxant. Also, she had MRI lumbar spine 5/6/22, results below:    6/25/22 Imaging Results:  Imaging:  CT Chest/Abdomen/Pelvis w Contrast   Final Result   IMPRESSION:   1.  No traumatic visceral, vascular, or osseous injury.   2.  Atherosclerotic vascular disease.   3.  Calcified uterine fibroids.       Head CT w/o contrast   Final Result   IMPRESSION:     1.  No evidence of acute  intracranial hemorrhage or mass effect.   2.  Mild nonspecific white matter changes.   3.  Mild brain parenchymal volume loss.       XR Pelvis 1/2 Views   Final Result   IMPRESSION: AP view the pelvis shows no displaced fracture or dislocation. Minimal degenerative changes of both hips. Fibroid uterus.       Report per radiology    5/6/22 MRI Lumbar Spine                                                           IMPRESSION:  L5 vertebral body inferior endplate acute or subacute mild to moderate compression deformity.       Remaining vertebral body heights within normal limits.     Osseous structures demonstrate numerous small lesions with decreased T1 signal, nonspecific. Patient has a history of monoclonal gammopathy. These findings probably correlate with underlying history of monoclonal gammopathy.      Degenerative changes described above.     No critical thecal sac stenosis.     Left kidney superior pole partially visualized cyst with septation. This is a Bosniak 2F cyst. Recommend nonemergent MRI renal protocol.    Review of Systems   Constitutional: Positive for activity change. Negative for fatigue and fever.   Gastrointestinal: Negative for abdominal pain, diarrhea, nausea and vomiting.   Genitourinary: Negative for dysuria, flank pain, frequency and urgency.   Musculoskeletal: Positive for arthralgias, back pain and gait problem.   Neurological: Negative for syncope.   All other systems reviewed and are negative.    Allergies:  Codeine  Bupropion  Erythromycin  Hydrocodone  Lidocaine  Penicillin G  Sulfa Drugs  Tetracycline  Trazodone  Wellbutrin [Bupropion Hydrobromide]  Codeine Sulfate    Medications:  Aspirin 325  Tums  Celexa  Levaquin  Zocor  Sodium chloride  Cyanocobalamin  Cholecalciferol    Past Medical History:     Multiple myeloma  B12 deficiency  PTSD  Prediabetes  Hyponatremia  Suicidal ideation  GERSON  Hypercholesteremia  Incontinence of feces with fecal urgency  Osteopenia of multiple  "sites  CKD, stage III  Plasma cell dyscrasia  Depression    Past Surgical History:    Bone marrow biopsy, cone specimen, needle/trocar  Finger reattachment  Tonsillectomy    Family History:    Father - MI  Sister - bipolar disorder, alcoholism  Brother - COPD, lung cancer, suicide    Social History:  Ex alone in a condo    Physical Exam     Patient Vitals for the past 24 hrs:   BP Temp Temp src Pulse Resp SpO2 Height Weight   07/20/22 2133 -- -- -- -- -- -- 1.6 m (5' 3\") 59 kg (130 lb)   07/20/22 2116 108/58 97.2  F (36.2  C) Axillary 86 15 95 % -- --   07/20/22 2045 -- -- -- -- -- 94 % -- --   07/20/22 1930 -- -- -- -- -- 97 % -- --   07/20/22 1820 139/66 -- -- 75 15 100 % -- --   07/20/22 1246 101/43 98.8  F (37.1  C) Temporal 85 16 99 % -- --       Physical Exam  General: Elderly female laying on [facility].  Appears uncomfortable.  HENT: Patient wearing face mask. When taken off, mucous membranes appear moist.  Eyes: Conjunctive and sclera clear.  CV: Regular rate and rhythm. Normal S1, S2. No appreciable murmurs, gallops or rubs.  Resp: Lungs clear to auscultation bilaterally. Normal respiratory effort. Speaks in full sentences. No stridor or cough observed.  GI: Abdomen soft, non distended and nontender. No rebound or guarding.  MSK: No midline spinal tenderness to palpation.  Patient has tenderness over the right SI joint.  Patient has very weak straight leg raise bilaterally.  She is able to plantarflex and dorsiflex bilaterally without difficulty.  No lower extremity edema.  Skin: Warm and dry.  Neuro: Awake, alert, oriented. Cranial nerves grossly intact.  Psych: Cooperative on exam. Anxious.      Emergency Department Course     Laboratory:  Labs Ordered and Resulted from Time of ED Arrival to Time of ED Departure   BASIC METABOLIC PANEL - Abnormal       Result Value    Sodium 137      Potassium 3.5      Chloride 105      Carbon Dioxide (CO2) 27      Anion Gap 5      Urea Nitrogen 18      Creatinine " 1.03      Calcium 9.0      Glucose 99      GFR Estimate 56 (*)    CBC WITH PLATELETS AND DIFFERENTIAL - Abnormal    WBC Count 4.8      RBC Count 2.87 (*)     Hemoglobin 9.1 (*)     Hematocrit 28.0 (*)     MCV 98      MCH 31.7      MCHC 32.5      RDW 13.8      Platelet Count 221      % Neutrophils 54      % Lymphocytes 33      % Monocytes 12      % Eosinophils 0      % Basophils 0      % Immature Granulocytes 1      NRBCs per 100 WBC 0      Absolute Neutrophils 2.6      Absolute Lymphocytes 1.6      Absolute Monocytes 0.6      Absolute Eosinophils 0.0      Absolute Basophils 0.0      Absolute Immature Granulocytes 0.0      Absolute NRBCs 0.0     COVID-19 VIRUS (CORONAVIRUS) BY PCR - Normal    SARS CoV2 PCR Negative          Emergency Department Course:    Reviewed:  I reviewed nursing notes, vitals and past medical history    Assessments:  1802 I obtained history and examined the patient as noted above.     1915 I rechecked the patient and explained findings and plan for CT.     Interventions:  Medications   melatonin tablet 1 mg (has no administration in time range)   ondansetron (ZOFRAN ODT) ODT tab 4 mg (has no administration in time range)     Or   ondansetron (ZOFRAN) injection 4 mg (has no administration in time range)   acetaminophen (TYLENOL) tablet 650 mg (has no administration in time range)     Or   acetaminophen (TYLENOL) Suppository 650 mg (has no administration in time range)   oxyCODONE IR (ROXICODONE) half-tab 2.5-5 mg (has no administration in time range)   HYDROmorphone (DILAUDID) injection 0.2-0.4 mg (0.4 mg Intravenous Given 7/20/22 2133)   senna-docusate (SENOKOT-S/PERICOLACE) 8.6-50 MG per tablet 1 tablet (has no administration in time range)     Or   senna-docusate (SENOKOT-S/PERICOLACE) 8.6-50 MG per tablet 2 tablet (has no administration in time range)   polyethylene glycol (MIRALAX) Packet 17 g (has no administration in time range)   prochlorperazine (COMPAZINE) injection 5 mg (has no  administration in time range)     Or   prochlorperazine (COMPAZINE) tablet 5 mg (has no administration in time range)     Or   prochlorperazine (COMPAZINE) suppository 12.5 mg (has no administration in time range)   naloxone (NARCAN) injection 0.2 mg (has no administration in time range)     Or   naloxone (NARCAN) injection 0.4 mg (has no administration in time range)     Or   naloxone (NARCAN) injection 0.2 mg (has no administration in time range)     Or   naloxone (NARCAN) injection 0.4 mg (has no administration in time range)   HYDROmorphone (PF) (DILAUDID) injection 0.3 mg (0.3 mg Intravenous Given 7/20/22 1904)   cyclobenzaprine (FLEXERIL) tablet 5 mg (5 mg Oral Given 7/20/22 1907)       Disposition:  The patient was admitted to the hospital under the care of Dr. Shay.     Impression & Plan     Medical Decision Making:  aMry Jo Mcleod is a 76 year old female with history of multiple myeloma, osteopenia, CKD who was seen and evaluated in the ED [facility] for worsening lower back pain and right hip pain per HPI above.  She has a recent fall in June she was seen and evaluated here for.  Additionally, she has a history of multiple myeloma and is due for a PET scan and to initiate chemotherapy this week.  CT of her lumbar spine and of her pelvis was obtained here.  CT of the lumbosacral spine showed no change in the known L5 compression fracture she was diagnosed with previously.  CT imaging of the pelvis showed multiple lytic lesions through the pelvic bones and proximal femur.  She also has an acute nondisplaced fracture to the left parasymphyseal physeal body of the anterior pelvis.  She was given IV pain medication and a muscle relaxant to help control her symptoms.  Unfortunately, she has worsened to the point of not being able to ambulate.  She has been crawling around her condo in order to get around.  I discussed her case with the hospitalist Dr. Shay, who graciously excepted her for admission.   She will need admission for pain control, physical therapy consult and most likely oncology consult due to her progressive myeloma.  I explained findings to the patient who expressed understanding.  She remained hemodynamically stable through her course here.    Diagnosis:    ICD-10-CM    1. Hip pain, right  M25.551    2. Acute bilateral low back pain, unspecified whether sciatica present  M54.50    3. Multiple myeloma not having achieved remission (H)  C90.00    4. Anemia, unspecified type  D64.9     At baseline   5. Compression fracture of L5 vertebra, sequela  S32.050S    6. Lytic bone lesion of femur  M89.9    7. Lytic bone lesion of hip  M89.8X5    8. Pathological fracture of pelvis, unspecified pathological cause, initial encounter  M84.454A      Kelly Corrigan PA-C  [facility]  I saw and evaluated this patient under attending provider Dr. Negron.               Kelly Corrigan PA-C  07/20/22 9569

## 2022-07-21 ENCOUNTER — DOCUMENTATION ONLY (OUTPATIENT)
Dept: ONCOLOGY | Facility: CLINIC | Age: 77
End: 2022-07-21

## 2022-07-21 ENCOUNTER — APPOINTMENT (OUTPATIENT)
Dept: PHYSICAL THERAPY | Facility: CLINIC | Age: 77
DRG: 543 | End: 2022-07-21
Attending: STUDENT IN AN ORGANIZED HEALTH CARE EDUCATION/TRAINING PROGRAM
Payer: COMMERCIAL

## 2022-07-21 ENCOUNTER — APPOINTMENT (OUTPATIENT)
Dept: MRI IMAGING | Facility: CLINIC | Age: 77
DRG: 543 | End: 2022-07-21
Attending: PHYSICIAN ASSISTANT
Payer: COMMERCIAL

## 2022-07-21 ENCOUNTER — TELEPHONE (OUTPATIENT)
Dept: ONCOLOGY | Facility: CLINIC | Age: 77
End: 2022-07-21

## 2022-07-21 PROBLEM — M89.9 LYTIC BONE LESION OF FEMUR: Status: ACTIVE | Noted: 2022-07-21

## 2022-07-21 PROBLEM — D64.9 ANEMIA, UNSPECIFIED TYPE: Status: ACTIVE | Noted: 2022-07-21

## 2022-07-21 PROBLEM — M89.8X5 LYTIC BONE LESION OF FEMUR: Status: ACTIVE | Noted: 2022-07-21

## 2022-07-21 PROBLEM — S32.050S COMPRESSION FRACTURE OF L5 VERTEBRA, SEQUELA: Status: ACTIVE | Noted: 2022-07-21

## 2022-07-21 PROBLEM — M84.454A: Status: ACTIVE | Noted: 2022-07-21

## 2022-07-21 PROBLEM — M89.8X5 LYTIC BONE LESION OF HIP: Status: ACTIVE | Noted: 2022-07-21

## 2022-07-21 PROBLEM — M54.16 LUMBAR RADICULOPATHY: Status: ACTIVE | Noted: 2022-07-21

## 2022-07-21 PROCEDURE — 97530 THERAPEUTIC ACTIVITIES: CPT | Mod: GP

## 2022-07-21 PROCEDURE — 99233 SBSQ HOSP IP/OBS HIGH 50: CPT | Performed by: HOSPITALIST

## 2022-07-21 PROCEDURE — G0378 HOSPITAL OBSERVATION PER HR: HCPCS

## 2022-07-21 PROCEDURE — 99223 1ST HOSP IP/OBS HIGH 75: CPT | Performed by: INTERNAL MEDICINE

## 2022-07-21 PROCEDURE — 120N000001 HC R&B MED SURG/OB

## 2022-07-21 PROCEDURE — 72148 MRI LUMBAR SPINE W/O DYE: CPT

## 2022-07-21 PROCEDURE — 99222 1ST HOSP IP/OBS MODERATE 55: CPT | Performed by: PHYSICIAN ASSISTANT

## 2022-07-21 PROCEDURE — 258N000003 HC RX IP 258 OP 636: Performed by: HOSPITALIST

## 2022-07-21 PROCEDURE — L0627 LO SAG RI AN/POS PNL PRE CST: HCPCS

## 2022-07-21 PROCEDURE — 250N000013 HC RX MED GY IP 250 OP 250 PS 637: Performed by: STUDENT IN AN ORGANIZED HEALTH CARE EDUCATION/TRAINING PROGRAM

## 2022-07-21 PROCEDURE — 97161 PT EVAL LOW COMPLEX 20 MIN: CPT | Mod: GP

## 2022-07-21 RX ORDER — LORAZEPAM 0.5 MG/1
0.5 TABLET ORAL
Status: COMPLETED | OUTPATIENT
Start: 2022-07-21 | End: 2022-07-25

## 2022-07-21 RX ADMIN — LEVOFLOXACIN 250 MG: 250 TABLET, FILM COATED ORAL at 07:49

## 2022-07-21 RX ADMIN — SODIUM CHLORIDE 250 ML: 9 INJECTION, SOLUTION INTRAVENOUS at 11:54

## 2022-07-21 RX ADMIN — OXYCODONE HYDROCHLORIDE 5 MG: 5 TABLET ORAL at 03:55

## 2022-07-21 RX ADMIN — CITALOPRAM HYDROBROMIDE 40 MG: 20 TABLET ORAL at 07:49

## 2022-07-21 RX ADMIN — OXYCODONE HYDROCHLORIDE 2.5 MG: 5 TABLET ORAL at 13:45

## 2022-07-21 RX ADMIN — OXYCODONE HYDROCHLORIDE 5 MG: 5 TABLET ORAL at 21:50

## 2022-07-21 RX ADMIN — SODIUM CHLORIDE TAB 1 GM 1 G: 1 TAB at 07:49

## 2022-07-21 ASSESSMENT — ACTIVITIES OF DAILY LIVING (ADL)
ADLS_ACUITY_SCORE: 40
ADLS_ACUITY_SCORE: 41

## 2022-07-21 NOTE — PROGRESS NOTES
S: Pt. seen at [facility]. Female. Grant ENRIQUEZ. RX: LSO corset. DX: Pelvic Fx.  O:A: Pt. was able to sit up in bed. Today I F/D a Aspen Quikdraw RAP LSO  size Small.  LSO to provide intercavitary pressure to reduce load on the intervertebral discs and limit motion. Donning doffing wear and care instructions given.  P: Pt. to be seen as needed.    Efren ARMENTA,LO

## 2022-07-21 NOTE — PROGRESS NOTES
Observation goals  PRIOR TO DISCHARGE       Comments:   -diagnostic tests and consults completed and resulted- Not met   -vital signs normal or at patient baseline - Met

## 2022-07-21 NOTE — PLAN OF CARE
Orientation/Cognitive: AO  Inpatient status  Mobility Level/Assist Equipment: Ax 1 GB/Walker; LSO corset brace  Fall Risk (Y/N): Yes   Behavior Concerns: None   Pain Management: PRN oxy, cold packs, repositioning  Tele/VS/O2: VSS on RA ex hypotension (symptomatic)  ABNL Lab/BG: GFR 56  Diet: Regular   Bowel/Bladder: Continent   Skin Concerns: None   Drains/Devices: PIV SL   Tests/Procedures for next shift: MRI pending; CM/SW consult pending; neurosurg, hem/onc following.  Anticipated DC date & active delays: pending MRI results and consulting provider recommendations   Patient Stated Goal for Today: Pain control  PRN 2.5 mg oxy: effective; WBAT per ortho; Inpatient status; 250mL NS bolus x1

## 2022-07-21 NOTE — H&P
[facility]    History and Physical - Hospitalist Service       Date of Admission:  7/20/2022    Assessment & Plan      Mary Jo Mcleod is a 76 year old female admitted on 7/20/2022.     Ongoing, worsening mid back and right hip pain  Inability to ambulate 2/2 pain  Acute, mildly comminuted fracture of the L5 vertebral body with moderate compression and loss of vertebral body height.    Acute nondisplaced fracture through the left parasymphyseal pubic body in the anterior pelvis  Cortical irregularity in the right sacral ala, superiorly, compatible with age indeterminant fracture  - obs  - consult ortho  - consult NSX  - pain control   - PT  - SW  - pending consults, may need to consider radiation therapy.    Multiple myeloma  - plan to start chemo soon  - follow up with [facility] after discharge  - start levofloxacin ppx as prescribed    MDD  - continue citalopram    HLD  - hold statin on obs status           Diet: NPO for Medical/Clinical Reasons Except for: No Exceptions    DVT Prophylaxis: Pneumatic Compression Devices  [place] Catheter: Not present  Central Lines: None  Cardiac Monitoring: None  Code Status:   FULL, discussed    Clinically Significant Risk Factors Present on Admission                          Disposition Plan         The patient's care was discussed with the Bedside Nurse, Patient and ED Team.    Xavi Shay MD  Hospitalist Service  [facility]  Securely message with the Vocera Web Console (learn more here)  Text page via [employer] Paging/Directory         ______________________________________________________________________    Chief Complaint   pain    History is obtained from the patient, electronic health record and emergency department physician    History of Present Illness   Mary Jo Mcleod is a 76 year old female who has history of multiple myeloma with plan to start treatment soon, for falls in the last 2 months.  She presents to the  hospital on 7/20/2022 with several days of acute on chronic, progressive mid back and right hip pain.  They have progressed to the point that for the last 2 days she has been unable to ambulate.  She has been getting around at her home by scooting on the ground.  This a.m. when she woke she was unable to even move out of bed and so decided to call EMS.  She denies any neurologic symptoms.  She has no loss of bowel or bladder function.  No recent fevers, chills, rigors.  No IV drug use.    Has had significant follow-up outpatient with both [facility] and [facility].  Reportedly recently had a MRI at the TCU and then plan for appointment on 7/20/2022 for discussion of results, but due to her pain was unable to attend this and presented to the emergency department instead.    Review of Systems    The 10 point Review of Systems is negative other than noted in the HPI or here.     Past Medical History    I have reviewed this patient's medical history and updated it with pertinent information if needed.   Past Medical History:   Diagnosis Date     Depressive disorder        Past Surgical History   I have reviewed this patient's surgical history and updated it with pertinent information if needed.  Past Surgical History:   Procedure Laterality Date     BONE MARROW BIOPSY, BONE SPECIMEN, NEEDLE/TROCAR N/A 2/8/2021    Procedure: BONE MARROW BIOPSY;  Surgeon: Naomie Saeed MD;  Location:  GI     finger reattachment       TONSILLECTOMY         Social History   I have reviewed this patient's social history and updated it with pertinent information if needed.  Social History     Tobacco Use     Smoking status: Never Smoker     Smokeless tobacco: Never Used   Substance Use Topics     Alcohol use: Not Currently     Alcohol/week: 0.0 - 2.0 standard drinks     Drug use: No       Family History   I have reviewed this patient's family history and updated it with pertinent information if needed.  Family History   Problem Relation Age  of Onset     Myocardial Infarction Father 63     Bipolar Disorder Sister      Chronic Obstructive Pulmonary Disease Brother      Lung Cancer Brother      Suicide Brother      Influenza/Pneumonia Sister      Alcoholism Sister        Prior to Admission Medications   Prior to Admission Medications   Prescriptions Last Dose Informant Patient Reported? Taking?   aspirin (ASA) 325 MG tablet   No No   Sig: Take 1 tablet (325 mg) by mouth daily   calcium carbonate (TUMS) 500 MG chewable tablet   Yes No   Sig: Take 1 chew tab by mouth 3 times daily   citalopram (CELEXA) 40 MG tablet   No No   Sig: Take 1 tablet (40 mg) by mouth daily   levofloxacin (LEVAQUIN) 250 MG tablet   No No   Sig: Take 1 tablet (250 mg) by mouth daily   simvastatin (ZOCOR) 20 MG tablet   No No   Sig: TAKE 1 TABLET BY MOUTH AT BEDTIME   sodium chloride 1 GM tablet   No No   Sig: Take 1 tablet (1 g) by mouth daily   vitamin B-12 (CYANOCOBALAMIN) 1000 MCG tablet   No No   Sig: Take 1 tablet (1,000 mcg) by mouth daily for 90 days   vitamin D3 (CHOLECALCIFEROL) 50 mcg (2000 units) tablet   Yes No   Sig: Take 1 tablet by mouth daily      Facility-Administered Medications: None     Allergies   Allergies   Allergen Reactions     Codeine Difficulty breathing, Other (See Comments) and Rash     Bupropion      Erythromycin      Hydrocodone Other (See Comments) and Itching     Ear ache     Lidocaine Other (See Comments)     Penicillin G Itching     Itching around mouth then heavy breathing     Sulfa Drugs Itching     Itching around the mouth and then heavy breathing     Tetracycline      Trazodone      Does not work for patient     Wellbutrin [Bupropion Hydrobromide]      Codeine Sulfate Rash       Physical Exam   Vital Signs: Temp: 98.8  F (37.1  C) Temp src: Temporal BP: 139/66 Pulse: 75   Resp: 15 SpO2: 100 % O2 Device: None (Room air)    Weight: 0 lbs 0 oz    Constitutional: Awake, alert, cooperative, no apparent cardiopulmonary distress.  Eyes: Conjunctiva  and pupils examined and normal.  HEENT: Moist mucous membranes, normal dentition.  Respiratory: Clear to auscultation bilaterally, no crackles or wheezing.  Cardiovascular: Regular rate and rhythm, normal S1 and S2, and no murmur noted.  GI: Soft, non-distended, non-tender, normal bowel sounds.  Lymph/Hematologic: No anterior cervical or supraclavicular adenopathy.  Skin: No rashes, no cyanosis, no edema noted on exposed skin.  Musculoskeletal: No joint swelling, erythema or tenderness. No gross bony abnormalities  Neurologic: Cranial nerves 2-12 grossly intact, normal strength and sensation.  Psychiatric: Alert, oriented to person, place and time, no obvious anxiety or depression.      Data   Data reviewed today: I reviewed all medications, new labs and imaging results over the last 24 hours. I personally reviewed the CT pelvis and lumbar spine image(s) showing fractures and L5 compression.    Recent Labs   Lab 07/20/22  1906   WBC 4.8   HGB 9.1*   MCV 98         POTASSIUM 3.5   CHLORIDE 105   CO2 27   BUN 18   CR 1.03   ANIONGAP 5   ARA 9.0   GLC 99     Recent Results (from the past 24 hour(s))   CT Pelvis Bone wo Contrast    Narrative    EXAM: CT PELVIS BONE WO CONTRAST  LOCATION: [facility]  DATE/TIME: 7/20/2022 7:47 PM    INDICATION: Low back, pelvic, hip pain, multiple myeloma.  COMPARISON: CT 06/25/2022.  TECHNIQUE: CT scan of the pelvis was performed without IV contrast. Multiplanar reformats were obtained. Dose reduction techniques were used.  CONTRAST: None.    FINDINGS:    Generalized demineralization. Innumerable small round lytic lesions throughout the pelvic bones (best visible on the coronal images). There are a few more focal lytic lesions in the pelvis, such as in the posterior ilium (coronal image 70, axial image   44),, and in the left ischium, measuring up to 2.5 cm in largest diameter. The more prominent lesion in the subtrochanteric femur is present within  the medullary canal, partially imaged. There is mild associated endosteal erosion/scalloping.    Acute, mildly comminuted fracture of the L5 vertebral body, including a transverse fracture line, and vertical fracture line extending to both endplates. There is approximately 50% loss of vertebral body height. No significant central canal stenosis.    Additional nondisplaced fracture in the left parasymphyseal pubic body (coronal images 35-49). Question age indeterminant right sacral alar fracture (axial series 3 images 25-44).    Muscles are within normal limits by CT. No evidence of acute tendon tear.    Moderate aortoiliac atherosclerotic calcification.    Numerous calcified uterine fibroids.      Impression    IMPRESSION:  1.  Innumerable lytic lesions throughout the pelvic bones and proximal femurs, compatible with the patient's known multiple myeloma. The bones are diffusely demineralized.    2.  A few larger lytic lesions are visualized in the left pelvis, including the posterior ilium and the periacetabular left ischium, measuring up to 2.5 cm. There is a subtrochanteric lesion in the proximal right femur which has mild associated endosteal   cortical scalloping. No pathologic fracture identified with these larger lesions.    3.  Acute, mildly comminuted fracture of the L5 vertebral body with moderate compression and loss of vertebral body height. Please see the separate lumbar spine report for full details.    4.  Additional acute nondisplaced fracture through the left parasymphyseal pubic body in the anterior pelvis.    5.  Cortical irregularity in the right sacral ala, superiorly, compatible with age indeterminant fracture.    6.  No evidence of hip fracture.    7.  No significant musculoskeletal soft tissue abnormality. No hematoma or focal soft tissue edema.   CT Lumbar Spine w/o Contrast    Narrative    EXAM: CT LUMBAR SPINE W/O CONTRAST  LOCATION: [facility]  DATE/TIME:  7/20/2022 7:47 PM    INDICATION: h o multiple myeloma and low back pain  COMPARISON: MRI lumbar spine 05/06/2022, CT abdomen pelvis 6/25/2022.  TECHNIQUE: Routine CT Lumbar Spine without IV contrast. Multiplanar reformats. Dose reduction techniques were used.     FINDINGS:  VERTEBRA: Chronic pathologic compression fracture inferior endplate of L5 with 40% loss of height, unchanged from prior from 6/25/2022. Slight, 3 mm posterior retropulsion. No new fractures. Osteopenic bones.     CANAL/FORAMINA: No high grade canal or neural foraminal stenosis. Moderate degenerative changes at L4-L5.    PARASPINAL: No extraspinal abnormality.      Impression    IMPRESSION:  1.  Chronic pathologic compression fracture L5 without change from CT of 06/25/2022.  2.  Unchanged study when compared to prior of 6/25/2022.

## 2022-07-21 NOTE — PROGRESS NOTES
[facility]    Hospitalist Progress Note    Date of Admission:  7/20/2022    Assessment & Plan     Mary Jo Mcleod is a 76 year old female with a history of multiple myeloma admitted on 7/20/2022 with back pain, inability to ambulate.      Ongoing, worsening mid back and right hip pain  Inability to ambulate 2/2 pain  Acute, mildly comminuted fracture of the L5 vertebral body with moderate compression and loss of vertebral body height.    Acute nondisplaced fracture through the left parasymphyseal pubic body in the anterior pelvis  Cortical irregularity in the right sacral ala, superiorly, compatible with age indeterminant fracture  Recent falls  Above fractures, all likely pathologic secondary to multiple myeloma  Patient had a fall in end of May and another fall in June.  Was seen in ED on 6/25 at which time CT showed L5 fracture.  Subsequently was seen by orthopedics clinic and had MRI after that.  Unable to view this report.  Over the last few days has had escalating mid back and right hip pain with inability to ambulate and hence presented to ED.  CT findings at presentation as above.  - consult ortho and NSG.  MRI lumbar spine has been ordered.  Corset style brace ordered for support.  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  Treatment delayed secondary to patient's falls over last couple of months resulting in persistent right hip pain.  Since Dr. Hanks at [facility]. Plan for PET and to start chemo soon.  -Consult to [place] oncology here and follow up with [facility] after discharge  - start levofloxacin ppx as prescribed     MDD  - continue citalopram     HLD  -Continue PTA statin        Diet: NPO for Medical/Clinical Reasons Except for: No Exceptions    DVT Prophylaxis: Pneumatic Compression Devices  [place] Catheter: Not present  Central Lines: None  Cardiac Monitoring:  None  Code Status:   FULL, discussed        Clinically Significant Risk Factors Present on Admission                                Disposition Plan   Pending therapy evaluations.  Upgraded to inpatient status given need for pain control, multiple consultations.  Anticipate discharge in next 2 or 3 days pending clinical progress.  The patient's care was discussed with the Bedside Nurse, Patient       Erika Camacho MD  Text Page (7am - 6pm, M-F)  [facility]  Securely message with the Vocera Web Console (learn more here)  Text page via [facility] Paging/Directory      Interval History   Stable overnight.  As long as she is laying still in bed she does not have much pain.  Pain mostly in mid back and right hip, intermittently goes down both legs.  Does have some sensory symptoms in both legs.  Denies any issues with bowel or bladder.    -Data reviewed today: I reviewed all new labs and imaging results over the last 24 hours. I personally reviewed CT scan with result as noted above    Physical Exam   Temp: 99.5  F (37.5  C) Temp src: Oral BP: 94/42 Pulse: 86   Resp: 16 SpO2: 92 % O2 Device: None (Room air)    Vitals:    07/20/22 2133   Weight: 59 kg (130 lb)     Vital Signs with Ranges  Temp:  [97.2  F (36.2  C)-99.5  F (37.5  C)] 99.5  F (37.5  C)  Pulse:  [75-89] 86  Resp:  [15-18] 16  BP: ()/(40-66) 94/42  SpO2:  [92 %-100 %] 92 %  I/O last 3 completed shifts:  In: 300 [P.O.:300]  Out: -     Constitutional: Alert, appears comfortable, in no acute distress  Respiratory: Non labored breathing, clear to auscultation bilaterally, no crackles or wheezes  Cardiovascular: Heart sounds regular rate and rhythm, no murmurs, no leg edema  GI: Abdomen is soft, non distended, non tender. Normal BS  Skin/Integumen: no rashes, no pressure sores, movement of lower extremities limited due to back pain  Neuro: alert, converses appropriately, moving all extremities, fluent speech, no facial  asymmetry  Psych: mood and affect appropriate      Medications       citalopram  40 mg Oral Daily     levofloxacin  250 mg Oral Daily     sodium chloride  1 g Oral Daily       Data   Recent Labs   Lab 07/20/22  1906   WBC 4.8   HGB 9.1*   MCV 98         POTASSIUM 3.5   CHLORIDE 105   CO2 27   BUN 18   CR 1.03   ANIONGAP 5   ARA 9.0   GLC 99       Imaging  Recent Results (from the past 24 hour(s))   CT Pelvis Bone wo Contrast    Narrative    EXAM: CT PELVIS BONE WO CONTRAST  LOCATION: [facility]  DATE/TIME: 7/20/2022 7:47 PM    INDICATION: Low back, pelvic, hip pain, multiple myeloma.  COMPARISON: CT 06/25/2022.  TECHNIQUE: CT scan of the pelvis was performed without IV contrast. Multiplanar reformats were obtained. Dose reduction techniques were used.  CONTRAST: None.    FINDINGS:    Generalized demineralization. Innumerable small round lytic lesions throughout the pelvic bones (best visible on the coronal images). There are a few more focal lytic lesions in the pelvis, such as in the posterior ilium (coronal image 70, axial image   44),, and in the left ischium, measuring up to 2.5 cm in largest diameter. The more prominent lesion in the subtrochanteric femur is present within the medullary canal, partially imaged. There is mild associated endosteal erosion/scalloping.    Acute, mildly comminuted fracture of the L5 vertebral body, including a transverse fracture line, and vertical fracture line extending to both endplates. There is approximately 50% loss of vertebral body height. No significant central canal stenosis.    Additional nondisplaced fracture in the left parasymphyseal pubic body (coronal images 35-49). Question age indeterminant right sacral alar fracture (axial series 3 images 25-44).    Muscles are within normal limits by CT. No evidence of acute tendon tear.    Moderate aortoiliac atherosclerotic calcification.    Numerous calcified uterine fibroids.       Impression    IMPRESSION:  1.  Innumerable lytic lesions throughout the pelvic bones and proximal femurs, compatible with the patient's known multiple myeloma. The bones are diffusely demineralized.    2.  A few larger lytic lesions are visualized in the left pelvis, including the posterior ilium and the periacetabular left ischium, measuring up to 2.5 cm. There is a subtrochanteric lesion in the proximal right femur which has mild associated endosteal   cortical scalloping. No pathologic fracture identified with these larger lesions.    3.  Acute, mildly comminuted fracture of the L5 vertebral body with moderate compression and loss of vertebral body height. Please see the separate lumbar spine report for full details.    4.  Additional acute nondisplaced fracture through the left parasymphyseal pubic body in the anterior pelvis.    5.  Cortical irregularity in the right sacral ala, superiorly, compatible with age indeterminant fracture.    6.  No evidence of hip fracture.    7.  No significant musculoskeletal soft tissue abnormality. No hematoma or focal soft tissue edema.   CT Lumbar Spine w/o Contrast    Narrative    EXAM: CT LUMBAR SPINE W/O CONTRAST  LOCATION: [facility]  DATE/TIME: 7/20/2022 7:47 PM    INDICATION: h o multiple myeloma and low back pain  COMPARISON: MRI lumbar spine 05/06/2022, CT abdomen pelvis 6/25/2022.  TECHNIQUE: Routine CT Lumbar Spine without IV contrast. Multiplanar reformats. Dose reduction techniques were used.     FINDINGS:  VERTEBRA: Chronic pathologic compression fracture inferior endplate of L5 with 40% loss of height, unchanged from prior from 6/25/2022. Slight, 3 mm posterior retropulsion. No new fractures. Osteopenic bones.     CANAL/FORAMINA: No high grade canal or neural foraminal stenosis. Moderate degenerative changes at L4-L5.    PARASPINAL: No extraspinal abnormality.      Impression    IMPRESSION:  1.  Chronic pathologic compression fracture L5  without change from CT of 06/25/2022.  2.  Unchanged study when compared to prior of 6/25/2022.

## 2022-07-21 NOTE — PLAN OF CARE
Goal Outcome Evaluation:       -diagnostic tests and consults completed and resulted- Not met   -vital signs normal or at patient baseline - Not met

## 2022-07-21 NOTE — CONSULTS
[facility]    Neurosurgery Consultation     Date of Admission:  7/20/2022  Date of Consult (When I saw the patient): 07/21/22    Assessment & Plan   Mary Jo Mcleod is a 76 year old female who was admitted on 7/20/2022. I was asked to see the patient for L5 fracture.  She states that she has had worsening back pain for about 6 weeks.  She had an injury when she fell at the end of May, and then again towards the end of June.  She states that she has had multiple falls over the last several years.  She describes pain that is in the bilateral low back, and not in the midline.  Her pain then radiates into the posterior thighs and calves bilaterally.  She does not have any midline back pain.  She has noted that her right leg has started to spasm when she flexes her knee.  She did present to the ED where imaging showed a mildly comminuted fracture of the L5 vertebral body with about 50% collapse of vertebral body height. This fracture is likely pathologic, although she does have some trauma history recently as well.  Imaging from 6/25/22 also showed this same fracture, and MRI from 5/6/22 did not.   She also has an acute nondisplaced fracture through the left parasymphyseal pubic body in the anterior pelvis.  She does typically follow with [facility] and has been getting some injections that she describes as into her right hip joint.  She also has a diagnosis of multiple myeloma and will be starting chemotherapy in the near future.  She had an MRI of the lumbar spine in May that did not show the L5 fracture.  We will go ahead and obtain an MRI of her lumbar spine, as she does have a lot of radicular leg complaints.  For her L5 fracture, we did order a corset style brace to help give her some support.      I have discussed the following assessment and plan with Dr. Felipe, who is in agreement with the initial plan and will follow up with further consultation recommendations.    Gerald STEINBERG  [facility] Neurosurgery  [address]  [address]  [place] [address]    Tel [phone]  Pager [phone]        Code Status    Full Code    Reason for Consult   Reason for consult: L5 fracture    Primary Care Physician   Levi Mcdonnell    Chief Complaint   Back pain    History is obtained from the patient and electronic health record    History of Present Illness   Mary Jo Mcleod is a 76 year old female who presents with  L5 fracture.  She states that she has had worsening back pain for about 6 weeks.  She had an injury when she fell at the end of May, and then again towards the end of June.  She states that she has had multiple falls over the last several years.  She describes pain that is in the bilateral low back, and not in the midline.  Her pain then radiates into the posterior thighs and calves bilaterally.  She does not have any midline back pain.  She has noted that her right leg has started to spasm when she flexes her knee.  She did present to the ED where imaging showed a mildly comminuted fracture of the L5 vertebral body with about 50% collapse of vertebral body height.  She also has an acute nondisplaced fracture through the left parasymphyseal pubic body in the anterior pelvis.  She does typically follow with [facility] and has been getting some injections that she describes as into her right hip joint.  She also has a diagnosis of multiple myeloma and will be starting chemotherapy in the near future.    Past Medical History   I have reviewed this patient's medical history and updated it with pertinent information if needed.   Past Medical History:   Diagnosis Date     Depressive disorder        Past Surgical History   I have reviewed this patient's surgical history and updated it with pertinent information if needed.  Past Surgical History:   Procedure Laterality Date     BONE MARROW BIOPSY, BONE SPECIMEN, NEEDLE/TROCAR N/A 2/8/2021    Procedure: BONE  MARROW BIOPSY;  Surgeon: Naomie Saeed MD;  Location:  GI     finger reattachment       TONSILLECTOMY         Prior to Admission Medications   Prior to Admission Medications   Prescriptions Last Dose Informant Patient Reported? Taking?   calcium carbonate (TUMS) 500 MG chewable tablet 7/19/2022  Yes Yes   Sig: Take 2 chew tab by mouth daily   citalopram (CELEXA) 40 MG tablet 7/20/2022 at AM  No Yes   Sig: Take 1 tablet (40 mg) by mouth daily   levofloxacin (LEVAQUIN) 250 MG tablet NOT TAKING  No No   Sig: Take 1 tablet (250 mg) by mouth daily   simvastatin (ZOCOR) 20 MG tablet 7/19/2022 at hs  No Yes   Sig: TAKE 1 TABLET BY MOUTH AT BEDTIME   sodium chloride 1 GM tablet 7/20/2022 at am  No Yes   Sig: Take 1 tablet (1 g) by mouth daily   vitamin B-12 (CYANOCOBALAMIN) 1000 MCG tablet 7/20/2022 at am  No Yes   Sig: Take 1 tablet (1,000 mcg) by mouth daily for 90 days   vitamin D3 (CHOLECALCIFEROL) 50 mcg (2000 units) tablet 7/20/2022?  Yes Yes   Sig: Take 1 tablet by mouth daily      Facility-Administered Medications: None     Allergies   Allergies   Allergen Reactions     Codeine Difficulty breathing, Other (See Comments) and Rash     Bupropion      Erythromycin      Hydrocodone Other (See Comments) and Itching     Ear ache     Lidocaine Other (See Comments)     Penicillin G Itching     Itching around mouth then heavy breathing     Sulfa Drugs Itching     Itching around the mouth and then heavy breathing     Tetracycline      Trazodone      Does not work for patient     Wellbutrin [Bupropion Hydrobromide]      Codeine Sulfate Rash       Social History   I have reviewed this patient's social history and updated it with pertinent information if needed. Mary Jo Mcleod  reports that she has never smoked. She has never used smokeless tobacco. She reports previous alcohol use. She reports that she does not use drugs.    Family History   I have reviewed this patient's family history and updated it with  "pertinent information if needed.   Family History   Problem Relation Age of Onset     Myocardial Infarction Father 63     Bipolar Disorder Sister      Chronic Obstructive Pulmonary Disease Brother      Lung Cancer Brother      Suicide Brother      Influenza/Pneumonia Sister      Alcoholism Sister        Review of Systems   10 point review of systems is negative with the exception of HPI and PMH.       Physical Exam   Temp: 99.3  F (37.4  C) Temp src: Oral BP: 104/52 Pulse: 86   Resp: 16 SpO2: 93 % O2 Device: None (Room air)    Vital Signs with Ranges  Temp:  [97.2  F (36.2  C)-99.3  F (37.4  C)] 99.3  F (37.4  C)  Pulse:  [75-89] 86  Resp:  [15-18] 16  BP: ()/(40-66) 104/52  SpO2:  [93 %-100 %] 93 %  130 lbs 0 oz     , Blood pressure 104/52, pulse 86, temperature 99.3  F (37.4  C), temperature source Oral, resp. rate 16, height 1.6 m (5' 3\"), weight 59 kg (130 lb), last menstrual period 12/04/1989, SpO2 93 %.  130 lbs 0 oz  HEENT:  Normocephalic, atraumatic.  PERRLA.  EOM s intact.   Neck:  Supple, non-tender, without lymphadenopathy.  Heart:  No peripheral edema  Lungs:  No SOB  Abdomen:  Soft, non-tender, non-distended.  Skin:  Warm and dry, good capillary refill.  Extremities:  Good radial and dorsalis pedis pulses bilaterally, no edema, cyanosis or clubbing.    NEUROLOGICAL EXAMINATION:   Mental status:  Alert and Oriented x 3, speech is fluent.  Cranial nerves:  II-XII intact.   Motor:  Strength is 5/5 throughout the upper and lower extremities   Hip Flexor:                Right: 5/5  Left:  5/5  Hip Adductor:             Right:  5/5  Left:  5/5  Hip Abductor:             Right:  5/5  Left:  5/5  Gastroc Soleus:        Right:  5/5  Left:  5/5  Tib/Ant:                      Right:  5/5  Left:  5/5  EHL:                     Right:  5/5  Left:  5/5  Sensation:  intact  Reflexes:   Negative Babinski.  Negative Clonus.        Lumbar examination reveals no tenderness of the spine or paraspinous muscles.  Hip " height is symmetrical. Negative SI joint, sciatic notch or greater trochanteric tenderness to palpation bilaterally.        Data   All new lab and imaging data was personally reviewed by me.    CT:  EXAM: CT LUMBAR SPINE W/O CONTRAST  LOCATION: [facility]  DATE/TIME: 7/20/2022 7:47 PM     INDICATION: h o multiple myeloma and low back pain  COMPARISON: MRI lumbar spine 05/06/2022, CT abdomen pelvis 6/25/2022.  TECHNIQUE: Routine CT Lumbar Spine without IV contrast. Multiplanar reformats. Dose reduction techniques were used.      FINDINGS:  VERTEBRA: Chronic pathologic compression fracture inferior endplate of L5 with 40% loss of height, unchanged from prior from 6/25/2022. Slight, 3 mm posterior retropulsion. No new fractures. Osteopenic bones.      CANAL/FORAMINA: No high grade canal or neural foraminal stenosis. Moderate degenerative changes at L4-L5.     PARASPINAL: No extraspinal abnormality.                                                                      IMPRESSION:  1.  Chronic pathologic compression fracture L5 without change from CT of 06/25/2022.  2.  Unchanged study when compared to prior of 6/25/2022.    CBC RESULTS:   Recent Labs   Lab Test 07/20/22  1906   WBC 4.8   RBC 2.87*   HGB 9.1*   HCT 28.0*   MCV 98   MCH 31.7   MCHC 32.5   RDW 13.8        Basic Metabolic Panel:  Lab Results   Component Value Date     07/20/2022     07/08/2022      Lab Results   Component Value Date    POTASSIUM 3.5 07/20/2022    POTASSIUM 3.9 07/08/2022     Lab Results   Component Value Date    CHLORIDE 105 07/20/2022    CHLORIDE 101 07/08/2022     Lab Results   Component Value Date    ARA 9.0 07/20/2022    ARA 9.9 07/08/2022     Lab Results   Component Value Date    CO2 27 07/20/2022    CO2 28 06/23/2021     Lab Results   Component Value Date    BUN 18 07/20/2022    BUN 15 07/08/2022    BUN 12 07/08/2022     Lab Results   Component Value Date    CR 1.03 07/20/2022    CR 1.23  07/08/2022     Lab Results   Component Value Date    GLC 99 07/20/2022    GLC 90 07/08/2022     INR:  No results found for: INR

## 2022-07-21 NOTE — ED NOTES
[facility]  ED Nurse Handoff Report    ED Chief complaint: Back Pain (Hip pain/)      ED Diagnosis:   Final diagnoses:   Hip pain, right   Acute bilateral low back pain, unspecified whether sciatica present       Code Status: Full Code    Allergies:   Allergies   Allergen Reactions     Codeine Difficulty breathing, Other (See Comments) and Rash     Bupropion      Erythromycin      Hydrocodone Other (See Comments) and Itching     Ear ache     Lidocaine Other (See Comments)     Penicillin G Itching     Itching around mouth then heavy breathing     Sulfa Drugs Itching     Itching around the mouth and then heavy breathing     Tetracycline      Trazodone      Does not work for patient     Wellbutrin [Bupropion Hydrobromide]      Codeine Sulfate Rash       Patient Story: Mary Jo Mcleod is a 76 year old female who presents with bilateral back and lower extremity radiating pain. She had a fall and was seen by [facility] with an MRI last week.  She is supposed have an appointment today but she could not get to the appointment because her pain is out of control.  She is not able to walk at home.  She is unable to sleep.  Pain with movement lower extremities but no numbness in the saddle area, bowel or bladder incontinence.  Somewhat limited strength in the right lower extremity secondary to pain.  Focused Assessment:  Pt is A&O x4.  Airway is clear and patent, breathing is WDL.  Pt denies having any chest pain.  Pt reports sever pain in lower extremities, but has normal reflexes, strength, and sensation.    Treatments and/or interventions provided: See MAR, Labs, Imaging  Patient's response to treatments and/or interventions: Tolerated    To be done/followed up on inpatient unit:      Does this patient have any cognitive concerns?: A&O x4    Activity level - Baseline/Home:  Independent  Activity Level - Current:   Stand with Assist    Patient's Preferred language: English   Needed?: No    Isolation:  None  Infection: Not Applicable  Patient tested for COVID 19 prior to admission: YES  Bariatric?: No    Vital Signs:   Vitals:    07/20/22 1246 07/20/22 1820   BP: 101/43 139/66   Pulse: 85 75   Resp: 16 15   Temp: 98.8  F (37.1  C)    TempSrc: Temporal    SpO2: 99% 100%       Cardiac Rhythm:     Was the PSS-3 completed:   Yes  What interventions are required if any?               Family Comments:   OBS brochure/video discussed/provided to patient/family: Yes              Name of person given brochure if not patient:               Relationship to patient:     For the majority of the shift this patient's behavior was Green.   Behavioral interventions performed were .    ED NURSE PHONE NUMBER: [phone]

## 2022-07-21 NOTE — TELEPHONE ENCOUNTER
Prior Authorization Approval    Authorization Effective Date: 7/21/2022  Authorization Expiration Date: 7/21/2023  Medication: Revlimid - APPROVED  Approved Dose/Quantity:   Reference #:     Insurance Company: [employer] - Phone [phone] Fax [phone]  Expected CoPay:       CoPay Card Available:      Foundation Assistance Needed:    Which Pharmacy is filling the prescription (Not needed for infusion/clinic administered):    Pharmacy Notified:    Patient Notified:          Antony Isaaccologhema Pharmacy Liaison     [facility] [place] [address]  Office: [phone]  Fax: [phone]  [email or web address]

## 2022-07-21 NOTE — PHARMACY-ADMISSION MEDICATION HISTORY
Pharmacy Medication History  Admission medication history interview status for the 7/20/2022  admission is complete. See EPIC admission navigator for prior to admission medications     Location of Interview: Patient room  Medication history sources: Patient and Surescripts    Significant changes made to the medication list:      In the past week, patient estimated taking medication this percent of the time: greater than 90%    Additional medication history information:   ---  Levofloxacin appears to be a prescription from her oncologist (prescribed on 7/8/2022).  Pt is unaware that she is supposed to be taking this.  She reported not recognizing the name/ confirmed not taking any antibiotic at this time.    Medication reconciliation completed by provider prior to medication history? No    Time spent in this activity: 15 minutes    Prior to Admission medications    Medication Sig Last Dose Taking? Auth Provider Long Term End Date   calcium carbonate (TUMS) 500 MG chewable tablet Take 2 chew tab by mouth daily 7/19/2022 Yes Reported, Patient     citalopram (CELEXA) 40 MG tablet Take 1 tablet (40 mg) by mouth daily 7/20/2022 at AM Yes Levi Mcdonnell MD Yes    simvastatin (ZOCOR) 20 MG tablet TAKE 1 TABLET BY MOUTH AT BEDTIME 7/19/2022 at hs Yes Levi Mcdonnell MD Yes    sodium chloride 1 GM tablet Take 1 tablet (1 g) by mouth daily 7/20/2022 at am Yes Levi Mcdonnell MD     vitamin B-12 (CYANOCOBALAMIN) 1000 MCG tablet Take 1 tablet (1,000 mcg) by mouth daily for 90 days 7/20/2022 at am Yes Levi Mcdonnell MD  9/15/22   vitamin D3 (CHOLECALCIFEROL) 50 mcg (2000 units) tablet Take 1 tablet by mouth daily 7/20/2022? Yes Unknown, Entered By History     levofloxacin (LEVAQUIN) 250 MG tablet Take 1 tablet (250 mg) by mouth daily NOT TAKING  Ale Hanks MD         The information provided in this note is only as accurate as the sources available at the time of update(s)

## 2022-07-21 NOTE — PROGRESS NOTES
RECEIVING UNIT ED HANDOFF REVIEW    ED Nurse Handoff Report was reviewed by: Felicitas Finn RN on July 20, 2022 at 9:01 PM

## 2022-07-21 NOTE — CONSULTS
[facility]    Orthopedic Consultation    Mary Jo Mcleod MRN# [id number]   Age: 76 year old YOB: 1945     Date of Admission:  7/20/2022    Reason for consult: R hip pain       Requesting physician: Grant       Level of consult: One-time consult to assist in determining a diagnosis and to recommend an appropriate treatment plan           Assessment and Plan:   Assessment:   Pelvis fracture through parasymphyseal body  Multiple myeloma      Plan:   No surgical management of the pelvis fracture  Pain control  WBAT  PT/OT for mobilization  Follow up in 6 weeks for repeat imaging of pelvis           Chief Complaint:   Hip pain         History of Present Illness:   This patient is a 76 year old female who presents with the following condition requiring a hospital admission:    77 yo F with multiple myeloma as well as back pain and hip pain who states that she had a fall at the end of June about 2 weeks ago. Since then she has had worsening pain in her lower back and radiating pain down both lower extremities. The pain got bad enough yesterday that she had to come to the ED for evaluation. CT scan of the pelvis demonstrated a pubic body fracture in the pelvis as well as an L5 compression fracture. Ortho consulted for management of pelvis fracture. She does endorse bilateral lower extremity radiating pain. No other injuries. She is not complaining of any anterior pelvis pain at this time.          Past Medical History:     Past Medical History:   Diagnosis Date     Depressive disorder              Past Surgical History:     Past Surgical History:   Procedure Laterality Date     BONE MARROW BIOPSY, BONE SPECIMEN, NEEDLE/TROCAR N/A 2/8/2021    Procedure: BONE MARROW BIOPSY;  Surgeon: Naomie Saeed MD;  Location:  GI     finger reattachment       TONSILLECTOMY               Social History:     Social History     Tobacco Use     Smoking status: Never Smoker     Smokeless  tobacco: Never Used   Substance Use Topics     Alcohol use: Not Currently     Alcohol/week: 0.0 - 2.0 standard drinks             Family History:     Family History   Problem Relation Age of Onset     Myocardial Infarction Father 63     Bipolar Disorder Sister      Chronic Obstructive Pulmonary Disease Brother      Lung Cancer Brother      Suicide Brother      Influenza/Pneumonia Sister      Alcoholism Sister              Immunizations:     VACCINE/DOSE   Diptheria   DPT   DTAP   HBIG   Hepatitis A   Hepatitis B   HIB   Influenza   Measles   Meningococcal   MMR   Mumps   Pneumococcal   Polio   Rubella   Small Pox   TDAP   Varicella   Zoster             Allergies:     Allergies   Allergen Reactions     Codeine Difficulty breathing, Other (See Comments) and Rash     Bupropion      Erythromycin      Hydrocodone Other (See Comments) and Itching     Ear ache     Lidocaine Other (See Comments)     Penicillin G Itching     Itching around mouth then heavy breathing     Sulfa Drugs Itching     Itching around the mouth and then heavy breathing     Tetracycline      Trazodone      Does not work for patient     Wellbutrin [Bupropion Hydrobromide]      Codeine Sulfate Rash             Medications:     Current Facility-Administered Medications   Medication     acetaminophen (TYLENOL) tablet 650 mg    Or     acetaminophen (TYLENOL) Suppository 650 mg     citalopram (celeXA) tablet 40 mg     HYDROmorphone (DILAUDID) injection 0.2-0.4 mg     levofloxacin (LEVAQUIN) tablet 250 mg     LORazepam (ATIVAN) tablet 0.5 mg     melatonin tablet 1 mg     naloxone (NARCAN) injection 0.2 mg    Or     naloxone (NARCAN) injection 0.4 mg    Or     naloxone (NARCAN) injection 0.2 mg    Or     naloxone (NARCAN) injection 0.4 mg     ondansetron (ZOFRAN ODT) ODT tab 4 mg    Or     ondansetron (ZOFRAN) injection 4 mg     oxyCODONE IR (ROXICODONE) half-tab 2.5-5 mg     polyethylene glycol (MIRALAX) Packet 17 g     prochlorperazine (COMPAZINE) injection  "5 mg    Or     prochlorperazine (COMPAZINE) tablet 5 mg    Or     prochlorperazine (COMPAZINE) suppository 12.5 mg     senna-docusate (SENOKOT-S/PERICOLACE) 8.6-50 MG per tablet 1 tablet    Or     senna-docusate (SENOKOT-S/PERICOLACE) 8.6-50 MG per tablet 2 tablet     sodium chloride tablet 1 g             Review of Systems:   All review of systems was performed and was negative except as per hpi         Physical Exam:   All vitals have been reviewed  Patient Vitals for the past 24 hrs:   BP Temp Temp src Pulse Resp SpO2 Height Weight   07/21/22 1659 106/49 -- -- -- -- -- -- --   07/21/22 1540 94/42 99.5  F (37.5  C) Oral 86 16 92 % -- --   07/21/22 1303 104/52 -- -- 86 -- -- -- --   07/21/22 1139 92/45 99.3  F (37.4  C) Oral 89 16 93 % -- --   07/21/22 0752 97/40 -- -- 82 18 96 % -- --   07/21/22 0433 -- -- -- -- 16 -- -- --   07/21/22 0355 104/54 97.4  F (36.3  C) Axillary 80 16 95 % -- --   07/20/22 2356 104/55 97.5  F (36.4  C) Axillary 83 16 95 % -- --   07/20/22 2133 -- -- -- -- -- -- 1.6 m (5' 3\") 59 kg (130 lb)   07/20/22 2116 108/58 97.2  F (36.2  C) Axillary 86 15 95 % -- --   07/20/22 2045 -- -- -- -- -- 94 % -- --   07/20/22 1930 -- -- -- -- -- 97 % -- --   07/20/22 1820 139/66 -- -- 75 15 100 % -- --       Intake/Output Summary (Last 24 hours) at 7/21/2022 1712  Last data filed at 7/21/2022 0609  Gross per 24 hour   Intake 300 ml   Output --   Net 300 ml     NAD  nonlabored breathing  No peripheral edema  Skin intact  White sclera  BLE:  No pain with logroll  +Df/PF/EHL  Gross Sensation intact throughout  Skin intact            Data:   All laboratory data reviewed  Results for orders placed or performed during the hospital encounter of 07/20/22   CT Pelvis Bone wo Contrast     Status: None    Narrative    EXAM: CT PELVIS BONE WO CONTRAST  LOCATION: [facility]  DATE/TIME: 7/20/2022 7:47 PM    INDICATION: Low back, pelvic, hip pain, multiple myeloma.  COMPARISON: CT " 06/25/2022.  TECHNIQUE: CT scan of the pelvis was performed without IV contrast. Multiplanar reformats were obtained. Dose reduction techniques were used.  CONTRAST: None.    FINDINGS:    Generalized demineralization. Innumerable small round lytic lesions throughout the pelvic bones (best visible on the coronal images). There are a few more focal lytic lesions in the pelvis, such as in the posterior ilium (coronal image 70, axial image   44),, and in the left ischium, measuring up to 2.5 cm in largest diameter. The more prominent lesion in the subtrochanteric femur is present within the medullary canal, partially imaged. There is mild associated endosteal erosion/scalloping.    Acute, mildly comminuted fracture of the L5 vertebral body, including a transverse fracture line, and vertical fracture line extending to both endplates. There is approximately 50% loss of vertebral body height. No significant central canal stenosis.    Additional nondisplaced fracture in the left parasymphyseal pubic body (coronal images 35-49). Question age indeterminant right sacral alar fracture (axial series 3 images 25-44).    Muscles are within normal limits by CT. No evidence of acute tendon tear.    Moderate aortoiliac atherosclerotic calcification.    Numerous calcified uterine fibroids.      Impression    IMPRESSION:  1.  Innumerable lytic lesions throughout the pelvic bones and proximal femurs, compatible with the patient's known multiple myeloma. The bones are diffusely demineralized.    2.  A few larger lytic lesions are visualized in the left pelvis, including the posterior ilium and the periacetabular left ischium, measuring up to 2.5 cm. There is a subtrochanteric lesion in the proximal right femur which has mild associated endosteal   cortical scalloping. No pathologic fracture identified with these larger lesions.    3.  Acute, mildly comminuted fracture of the L5 vertebral body with moderate compression and loss of  vertebral body height. Please see the separate lumbar spine report for full details.    4.  Additional acute nondisplaced fracture through the left parasymphyseal pubic body in the anterior pelvis.    5.  Cortical irregularity in the right sacral ala, superiorly, compatible with age indeterminant fracture.    6.  No evidence of hip fracture.    7.  No significant musculoskeletal soft tissue abnormality. No hematoma or focal soft tissue edema.   CT Lumbar Spine w/o Contrast     Status: None    Narrative    EXAM: CT LUMBAR SPINE W/O CONTRAST  LOCATION: [facility]  DATE/TIME: 7/20/2022 7:47 PM    INDICATION: h o multiple myeloma and low back pain  COMPARISON: MRI lumbar spine 05/06/2022, CT abdomen pelvis 6/25/2022.  TECHNIQUE: Routine CT Lumbar Spine without IV contrast. Multiplanar reformats. Dose reduction techniques were used.     FINDINGS:  VERTEBRA: Chronic pathologic compression fracture inferior endplate of L5 with 40% loss of height, unchanged from prior from 6/25/2022. Slight, 3 mm posterior retropulsion. No new fractures. Osteopenic bones.     CANAL/FORAMINA: No high grade canal or neural foraminal stenosis. Moderate degenerative changes at L4-L5.    PARASPINAL: No extraspinal abnormality.      Impression    IMPRESSION:  1.  Chronic pathologic compression fracture L5 without change from CT of 06/25/2022.  2.  Unchanged study when compared to prior of 6/25/2022.   Basic metabolic panel     Status: Abnormal   Result Value Ref Range    Sodium 137 133 - 144 mmol/L    Potassium 3.5 3.4 - 5.3 mmol/L    Chloride 105 94 - 109 mmol/L    Carbon Dioxide (CO2) 27 20 - 32 mmol/L    Anion Gap 5 3 - 14 mmol/L    Urea Nitrogen 18 7 - 30 mg/dL    Creatinine 1.03 0.52 - 1.04 mg/dL    Calcium 9.0 8.5 - 10.1 mg/dL    Glucose 99 70 - 99 mg/dL    GFR Estimate 56 (L) >60 mL/min/1.73m2   CBC with platelets and differential     Status: Abnormal   Result Value Ref Range    WBC Count 4.8 4.0 - 11.0 10e3/uL     RBC Count 2.87 (L) 3.80 - 5.20 10e6/uL    Hemoglobin 9.1 (L) 11.7 - 15.7 g/dL    Hematocrit 28.0 (L) 35.0 - 47.0 %    MCV 98 78 - 100 fL    MCH 31.7 26.5 - 33.0 pg    MCHC 32.5 31.5 - 36.5 g/dL    RDW 13.8 10.0 - 15.0 %    Platelet Count 221 150 - 450 10e3/uL    % Neutrophils 54 %    % Lymphocytes 33 %    % Monocytes 12 %    % Eosinophils 0 %    % Basophils 0 %    % Immature Granulocytes 1 %    NRBCs per 100 WBC 0 <1 /100    Absolute Neutrophils 2.6 1.6 - 8.3 10e3/uL    Absolute Lymphocytes 1.6 0.8 - 5.3 10e3/uL    Absolute Monocytes 0.6 0.0 - 1.3 10e3/uL    Absolute Eosinophils 0.0 0.0 - 0.7 10e3/uL    Absolute Basophils 0.0 0.0 - 0.2 10e3/uL    Absolute Immature Granulocytes 0.0 <=0.4 10e3/uL    Absolute NRBCs 0.0 10e3/uL   Asymptomatic COVID-19 Virus (Coronavirus) by PCR Nasopharyngeal     Status: Normal    Specimen: Nasopharyngeal; Swab   Result Value Ref Range    SARS CoV2 PCR Negative Negative    Narrative    Testing was performed using the Xpert Xpress SARS-CoV-2 Assay on the   Cepheid Gene-Xpert Instrument Systems. Additional information about   this Emergency Use Authorization (EUA) assay can be found via the Lab   Guide. This test should be ordered for the detection of SARS-CoV-2 in   individuals who meet SARS-CoV-2 clinical and/or epidemiological   criteria. Test performance is unknown in asymptomatic patients. This   test is for in vitro diagnostic use under the FDA EUA for   laboratories certified under CLIA to perform high complexity testing.   This test has not been FDA cleared or approved. A negative result   does not rule out the presence of PCR inhibitors in the specimen or   target RNA in concentration below the limit of detection for the   assay. The possibility of a false negative should be considered if   the patient's recent exposure or clinical presentation suggests   COVID-19. This test was validated by the [facility]. This laboratory is certified under  the Clinical Laboratory Improvement Amendments of 1988 (CLIA-88) as qualified to perform high complexity laboratory testing.     CBC with platelets + differential     Status: Abnormal    Narrative    The following orders were created for panel order CBC with platelets + differential.  Procedure                               Abnormality         Status                     ---------                               -----------         ------                     CBC with platelets and d...[[id number]]  Abnormal            Final result                 Please view results for these tests on the individual orders.          Attestation:  Amount of time performed on this consult: 15 minutes.    Cali Mercedes MD

## 2022-07-21 NOTE — PROGRESS NOTES
07/21/22 1600   Quick Adds   Type of Visit Initial PT Evaluation   Living Environment   People in Home alone   Current Living Arrangements independent living facility   Home Accessibility no concerns   Transportation Anticipated car, drives self;family or friend will provide   Living Environment Comments Pt lives in senior ILF, elevator access   Self-Care   Usual Activity Tolerance good   Current Activity Tolerance moderate   Equipment Currently Used at Home walker, rolling;cane, straight   Fall history within last six months yes   Number of times patient has fallen within last six months 2   Activity/Exercise/Self-Care Comment Pt lives independently in senior ILF, Ind at baseline but had been using FWW at times and 2 SEC's after most recent fall, pt was IND with all ADL's   General Information   Onset of Illness/Injury or Date of Surgery 07/20/22   Referring Physician Xavi Shay MD   Patient/Family Therapy Goals Statement (PT) pt does not feel safe going back home wants to go to TCU   Pertinent History of Current Problem (include personal factors and/or comorbidities that impact the POC) Mary Jo Mcleod is a 76 year old female who presents with  L5 fracture.  She states that she has had worsening back pain for about 6 weeks.  She had an injury when she fell at the end of May, and then again towards the end of June.  She states that she has had multiple falls over the last several years.  She describes pain that is in the bilateral low back, and not in the midline.  Her pain then radiates into the posterior thighs and calves bilaterally.  She does not have any midline back pain.  She has noted that her right leg has started to spasm when she flexes her knee.  She did present to the ED where imaging showed a mildly comminuted fracture of the L5 vertebral body with about 50% collapse of vertebral body height.  She also has an acute nondisplaced fracture through the left parasymphyseal pubic body in the  anterior pelvis.  She does typically follow with [facility] and has been getting some injections that she describes as into her right hip joint.  She also has a diagnosis of multiple myeloma and will be starting chemotherapy in the near future.   Existing Precautions/Restrictions fall   Weight-Bearing Status - LLE weight-bearing as tolerated   Weight-Bearing Status - RLE weight-bearing as tolerated   Cognition   Affect/Mental Status (Cognition) WNL   Orientation Status (Cognition) oriented x 4   Follows Commands (Cognition) WNL   Pain Assessment   Patient Currently in Pain   (4/10 lower lumbar alternating left and right hip pain)   Posture    Posture Not impaired   Range of Motion (ROM)   Range of Motion ROM deficits secondary to pain   ROM Comment deficits with lumbar and trunk ROM secondary to pain   Strength (Manual Muscle Testing)   Strength (Manual Muscle Testing) Deficits observed during functional mobility   Strength Comments did not formally assess after attempting LE MMT and pt had too much pain, deficits appear during mobility   Bed Mobility   Comment, (Bed Mobility) supine<>sit SBA   Transfers   Comment, (Transfers) sit<>stand with FWW CGA   Gait/Stairs (Locomotion)   Bayard Level (Gait) contact guard   Assistive Device (Gait) walker, front-wheeled   Distance in Feet (Required for LE Total Joints) 10' eval + 30'   Pattern (Gait) step-through   Deviations/Abnormal Patterns (Gait) nicholas decreased;gait speed decreased;stride length decreased;weight shifting decreased   Maintains Weight-bearing Status (Gait) able to maintain   Comment, (Gait/Stairs) ambulate with FWW CGA   Balance   Balance Comments sitting EOB using bilat UE's, steady standing with FWW, deficits with dynamic balance   Sensory Examination   Sensory Perception Comments pt states intermittent bilat LE numbness and tingling   Clinical Impression   Criteria for Skilled Therapeutic Intervention Yes, treatment indicated   PT Diagnosis (PT)  impaired gait   Influenced by the following impairments pain, deficits with funcitonal ROM, strength, and balance   Functional limitations due to impairments reduced activity tolerance, reduced independence with funcitonal transfers, ambulation, and ADL's   Clinical Presentation (PT Evaluation Complexity) Evolving/Changing   Clinical Presentation Rationale PMH and clinical judgement   Clinical Decision Making (Complexity) low complexity   Planned Therapy Interventions (PT) balance training;bed mobility training;gait training;home exercise program;patient/family education;ROM (range of motion);strengthening;stretching;transfer training;progressive activity/exercise   Anticipated Equipment Needs at Discharge (PT) walker, rolling   Risk & Benefits of therapy have been explained evaluation/treatment results reviewed;care plan/treatment goals reviewed;risks/benefits reviewed;current/potential barriers reviewed;participants voiced agreement with care plan;participants included;patient   PT Discharge Planning   PT Discharge Recommendation (DC Rec) Transitional Care Facility   PT Rationale for DC Rec Pt is below baseline of being independent living alone in senior ILF. Currently pt has deficits with funcitonal strength, balance, and ROM that limit mobility, high levels of pain are also limiting activity. Pt was able to ambulate very short distance with FWW and CGA but the above deficits, recent history of falls, and evolving nature of patient's pain make her a fall risk and unsafe to go home alone. Pt would benefit from TCU to progress functional strength, balance, and activity tolerance before safely returning home   PT Brief overview of current status bed mobility SBA, sit<>stand with FWW CGA, ambulate with FWW CGA   Total Evaluation Time   Total Evaluation Time (Minutes) 12   Physical Therapy Goals   PT Frequency 5x/week   PT Predicted Duration/Target Date for Goal Attainment 07/25/22   PT Goals Bed  Mobility;Transfers;Gait   PT: Bed Mobility Independent;Supine to/from sit;Rolling;Bridging   PT: Transfers Modified independent;Sit to/from stand;Assistive device;Bed to/from chair   PT: Gait Modified independent;Rolling walker;50 feet

## 2022-07-21 NOTE — PLAN OF CARE
Goal Outcome Evaluation:       -diagnostic tests and consults completed and resulted- Not met, MRI pending, SW/CC pending  Hemoc, neurosurg, ortho following  -vital signs normal or at patient baseline - Not met

## 2022-07-21 NOTE — TELEPHONE ENCOUNTER
PA Initiation    Medication: Revlimid - Submitted  Insurance Company: [employer] - Phone [phone] Fax [phone]  Pharmacy Filling the Rx:    Filling Pharmacy Phone:    Filling Pharmacy Fax:    Start Date: 7/21/2022        Etta Alva CPhTOncology Pharmacy [facility] [address] [address]  Office: [phone]  Fax: [phone]  [email or web address]

## 2022-07-21 NOTE — PROGRESS NOTES
Observation goals  PRIOR TO DISCHARGE        Comments: -diagnostic tests and consults completed and resulted - not met  -vital signs normal or at patient baseline - met  Nurse to notify provider when observation goals have been met and patient is ready for discharge.     Orientation/Cognitive: AOX4  Observation Goals (Met/ Not Met): not met  Mobility Level/Assist Equipment: A 1-2 GB/walker. Pain get worse with ambulation.  Fall Risk (Y/N): Yes  Behavior Concerns: none  Pain Management: IV dilaudid, effective. Cold pack.  Tele/VS/O2: VSS on RA  ABNL Lab/BG:   Diet: Reg  Bowel/Bladder: Continent  Skin Concerns: None  Drains/Devices: PIV SL  Tests/Procedures for next shift: Orthopedic & Neurosurgery, SW consult ordered. PT eval tomorrow.  Anticipated DC date & active delays: TBD  Patient Stated Goal for Today: Pain control

## 2022-07-21 NOTE — PLAN OF CARE
Goal Outcome Evaluation:  Orientation/Cognitive: A&O X4  Observation Goals (Met/ Not Met): Not met  Mobility Level/Assist Equipment: Ax 1 GB/Walker up to BSC  Fall Risk (Y/N): Yes   Behavior Concerns: None   Pain Management: PRN oxycodone x2 and cold pack    Tele/VS/O2: VSS on RA   ABNL Lab/BG: Hgb-9.1   Diet: Regular   Bowel/Bladder: Continent   Skin Concerns: None   Drains/Devices: PIV SL   Tests/Procedures for next shift: Orthopedic, Neurosurgery, SW and  PT consult   Anticipated DC date & active delays: TBD  Patient Stated Goal for Today: Pain control             Observation goals  PRIOR TO DISCHARGE       Comments:   -diagnostic tests and consults completed and resulted- Not met   -vital signs normal or at patient baseline - Met

## 2022-07-21 NOTE — TELEPHONE ENCOUNTER
Frank/ Assistance Approved    Medication Revlimid  Amount/ $ 25988  Foundation PAF  Effective Dates 07/21/2022-07/20/2023  Patient notified? Yes        Etta Alva CPhTOncology Pharmacy Liaison     [address] [address]  Office: [phone]  Fax: [phone]  [email or web address]

## 2022-07-21 NOTE — PLAN OF CARE
Goal Outcome Evaluation:       -diagnostic tests and consults completed and resulted- Not met   -vital signs normal or at patient baseline - Not met, b/p 97/40 with dizziness

## 2022-07-22 ENCOUNTER — APPOINTMENT (OUTPATIENT)
Dept: PHYSICAL THERAPY | Facility: CLINIC | Age: 77
DRG: 543 | End: 2022-07-22
Payer: COMMERCIAL

## 2022-07-22 ENCOUNTER — TELEPHONE (OUTPATIENT)
Dept: NEUROSURGERY | Facility: CLINIC | Age: 77
End: 2022-07-22

## 2022-07-22 ENCOUNTER — PATIENT OUTREACH (OUTPATIENT)
Dept: ONCOLOGY | Facility: CLINIC | Age: 77
End: 2022-07-22

## 2022-07-22 PROCEDURE — 99232 SBSQ HOSP IP/OBS MODERATE 35: CPT | Performed by: PHYSICIAN ASSISTANT

## 2022-07-22 PROCEDURE — 250N000013 HC RX MED GY IP 250 OP 250 PS 637: Performed by: STUDENT IN AN ORGANIZED HEALTH CARE EDUCATION/TRAINING PROGRAM

## 2022-07-22 PROCEDURE — 250N000011 HC RX IP 250 OP 636: Performed by: STUDENT IN AN ORGANIZED HEALTH CARE EDUCATION/TRAINING PROGRAM

## 2022-07-22 PROCEDURE — 120N000001 HC R&B MED SURG/OB

## 2022-07-22 PROCEDURE — 250N000011 HC RX IP 250 OP 636: Performed by: INTERNAL MEDICINE

## 2022-07-22 PROCEDURE — 258N000003 HC RX IP 258 OP 636: Performed by: INTERNAL MEDICINE

## 2022-07-22 PROCEDURE — 99233 SBSQ HOSP IP/OBS HIGH 50: CPT | Performed by: HOSPITALIST

## 2022-07-22 PROCEDURE — 97530 THERAPEUTIC ACTIVITIES: CPT | Mod: GP

## 2022-07-22 PROCEDURE — 99233 SBSQ HOSP IP/OBS HIGH 50: CPT | Performed by: INTERNAL MEDICINE

## 2022-07-22 PROCEDURE — 97116 GAIT TRAINING THERAPY: CPT | Mod: GP

## 2022-07-22 RX ORDER — OXYCODONE HYDROCHLORIDE 5 MG/1
5 TABLET ORAL EVERY 6 HOURS PRN
Qty: 30 TABLET | Refills: 0 | Status: SHIPPED | OUTPATIENT
Start: 2022-07-22 | End: 2022-07-29

## 2022-07-22 RX ADMIN — HYDROMORPHONE HYDROCHLORIDE 0.2 MG: 0.2 INJECTION, SOLUTION INTRAMUSCULAR; INTRAVENOUS; SUBCUTANEOUS at 21:20

## 2022-07-22 RX ADMIN — OXYCODONE HYDROCHLORIDE 2.5 MG: 5 TABLET ORAL at 19:29

## 2022-07-22 RX ADMIN — ZOLEDRONIC ACID 4 MG: 4 INJECTION, SOLUTION, CONCENTRATE INTRAVENOUS at 16:33

## 2022-07-22 RX ADMIN — OXYCODONE HYDROCHLORIDE 2.5 MG: 5 TABLET ORAL at 23:46

## 2022-07-22 RX ADMIN — SENNOSIDES AND DOCUSATE SODIUM 1 TABLET: 50; 8.6 TABLET ORAL at 18:20

## 2022-07-22 RX ADMIN — LEVOFLOXACIN 250 MG: 250 TABLET, FILM COATED ORAL at 08:00

## 2022-07-22 RX ADMIN — SODIUM CHLORIDE TAB 1 GM 1 G: 1 TAB at 08:00

## 2022-07-22 RX ADMIN — OXYCODONE HYDROCHLORIDE 5 MG: 5 TABLET ORAL at 02:27

## 2022-07-22 RX ADMIN — CITALOPRAM HYDROBROMIDE 40 MG: 20 TABLET ORAL at 07:59

## 2022-07-22 ASSESSMENT — ACTIVITIES OF DAILY LIVING (ADL)
ADLS_ACUITY_SCORE: 39

## 2022-07-22 NOTE — PROGRESS NOTES
07/21/22 1600   Quick Adds   Type of Visit Initial PT Evaluation   Living Environment   People in Home alone   Current Living Arrangements independent living facility   Home Accessibility no concerns   Transportation Anticipated car, drives self;family or friend will provide   Living Environment Comments Pt lives in senior ILF, elevator access   Self-Care   Usual Activity Tolerance good   Current Activity Tolerance moderate   Equipment Currently Used at Home walker, rolling;cane, straight   Fall history within last six months yes   Number of times patient has fallen within last six months 2   Activity/Exercise/Self-Care Comment Pt lives independently in senior ILF, Ind at baseline but had been using FWW at times and 2 SEC's after most recent fall, pt was IND with all ADL's   General Information   Onset of Illness/Injury or Date of Surgery 07/20/22   Referring Physician Xavi Shay MD   Patient/Family Therapy Goals Statement (PT) pt does not feel safe going back home wants to go to TCU   Pertinent History of Current Problem (include personal factors and/or comorbidities that impact the POC) Mary Jo Mcleod is a 76 year old female who presents with  L5 fracture.  She states that she has had worsening back pain for about 6 weeks.  She had an injury when she fell at the end of May, and then again towards the end of June.  She states that she has had multiple falls over the last several years.  She describes pain that is in the bilateral low back, and not in the midline.  Her pain then radiates into the posterior thighs and calves bilaterally.  She does not have any midline back pain.  She has noted that her right leg has started to spasm when she flexes her knee.  She did present to the ED where imaging showed a mildly comminuted fracture of the L5 vertebral body with about 50% collapse of vertebral body height.  She also has an acute nondisplaced fracture through the left parasymphyseal pubic body in the  anterior pelvis.  She does typically follow with [facility] and has been getting some injections that she describes as into her right hip joint.  She also has a diagnosis of multiple myeloma and will be starting chemotherapy in the near future.   Existing Precautions/Restrictions fall   Weight-Bearing Status - LLE weight-bearing as tolerated   Weight-Bearing Status - RLE weight-bearing as tolerated   Cognition   Affect/Mental Status (Cognition) WNL   Orientation Status (Cognition) oriented x 4   Follows Commands (Cognition) WNL   Pain Assessment   Patient Currently in Pain   (4/10 lower lumbar alternating left and right hip pain)   Posture    Posture Not impaired   Range of Motion (ROM)   Range of Motion ROM deficits secondary to pain   ROM Comment deficits with lumbar and trunk ROM secondary to pain   Strength (Manual Muscle Testing)   Strength (Manual Muscle Testing) Deficits observed during functional mobility   Strength Comments did not formally assess after attempting LE MMT and pt had too much pain, deficits appear during mobility   Bed Mobility   Comment, (Bed Mobility) supine<>sit SBA   Transfers   Comment, (Transfers) sit<>stand with FWW CGA   Gait/Stairs (Locomotion)   [place] Level (Gait) contact guard   Assistive Device (Gait) walker, front-wheeled   Distance in Feet (Required for LE Total Joints) 10' eval + 30'   Pattern (Gait) step-through   Deviations/Abnormal Patterns (Gait) nicholas decreased;gait speed decreased;stride length decreased;weight shifting decreased   Maintains Weight-bearing Status (Gait) able to maintain   Comment, (Gait/Stairs) ambulate with FWW CGA   Balance   Balance Comments sitting EOB using bilat UE's, steady standing with FWW, deficits with dynamic balance   Sensory Examination   Sensory Perception Comments pt states intermittent bilat LE numbness and tingling   Clinical Impression   Criteria for Skilled Therapeutic Intervention Yes, treatment indicated   PT Diagnosis (PT)  impaired gait   Influenced by the following impairments pain, deficits with funcitonal ROM, strength, and balance   Functional limitations due to impairments reduced activity tolerance, reduced independence with funcitonal transfers, ambulation, and ADL's   Clinical Presentation (PT Evaluation Complexity) Evolving/Changing   Clinical Presentation Rationale PMH and clinical judgement   Clinical Decision Making (Complexity) low complexity   Planned Therapy Interventions (PT) balance training;bed mobility training;gait training;home exercise program;patient/family education;ROM (range of motion);strengthening;stretching;transfer training;progressive activity/exercise   Anticipated Equipment Needs at Discharge (PT) walker, rolling   Risk & Benefits of therapy have been explained evaluation/treatment results reviewed;care plan/treatment goals reviewed;risks/benefits reviewed;current/potential barriers reviewed;participants voiced agreement with care plan;participants included;patient   PT Discharge Planning   PT Discharge Recommendation (DC Rec) Transitional Care Facility   PT Rationale for DC Rec Pt is below baseline of being independent living alone in senior ILF. Currently pt has deficits with funcitonal strength, balance, and ROM that limit mobility, high levels of pain are also limiting activity. Pt was able to ambulate very short distance with FWW and CGA but the above deficits, recent history of falls, and evolving nature of patient's pain make her a fall risk and unsafe to go home alone. Pt would benefit from TCU to progress functional strength, balance, and activity tolerance before safely returning home   PT Brief overview of current status bed mobility SBA, sit<>stand with FWW CGA, ambulate with FWW CGA   Total Evaluation Time   Total Evaluation Time (Minutes) 12   Physical Therapy Goals   PT Frequency 5x/week   PT Predicted Duration/Target Date for Goal Attainment 07/25/22   PT Goals Bed  Mobility;Transfers;Gait   PT: Bed Mobility Independent;Supine to/from sit;Rolling;Bridging   PT: Transfers Modified independent;Sit to/from stand;Assistive device;Bed to/from chair   PT: Gait Modified independent;Rolling walker;50 feet

## 2022-07-22 NOTE — CONSULTS
[facility] Consultation      Mary Jo Mcleod MRN# [id number]   YOB: 1945 Age: 76 year old   Date of Admission: 7/20/2022  Requesting physician: Erika Camacho MD  Reason for consult:  Multiple myeloma with pathologic fracture.           Assessment and Plan:   76 year old female with recent diagnosis of multiple myeloma presents with worsening mid back and right hip pain, now admitted on 7/20/2022.    1.  Multiple myeloma with multiple lytic bone lesions  -Follows with Dr. Hanks at [facility].  -Due to start treatment with Faspro, Revlimid and dexamethasone on 7/25.  -Unable to start this treatment regimen inpatient.  If going to TCU after discharge will reschedule treatment and follow-up with Dr. Hanks.  - If prolonged hospitalization and TCU stay anticipated, could consider starting on bortezomib and dexamethasone inpatient.  -Patient missed staging PET/CT scan.  This can be rescheduled outpatient.    2.  Progressive mid back and right hip pain  3.  L5 compression fracture  4.  Acute nondisplaced fracture through the left parasymphyseal pubic body  5.  New left iliac wing lytic lesion  - MRI lumbar spine findings not available at the time of my visit with the patient.  This shows worsening/progressive myeloma bone lesions.  -Neurosurgery and orthopedics was consulted.  -Corset style brace for L5 compression fracture.  -Orthopedics recommended weightbearing as tolerated and PT/OT.  -Will consider starting Zometa for myeloma bone lesions.    Thank you for the consult.  Will follow with you.    Carmen Brothers MD  Hematology/Oncology  [facility]                 Chief Complaint:   Back Pain (Hip pain/)           History of Present Illness:   This patient is a 76 year old female with recent diagnosis of multiple myeloma presents with worsening mid back and right hip pain, now admitted on 7/20/2022.  She denies any fevers, chills, night sweats.  She follows with   "Demario at [facility] and has not yet started treatment for her multiple myeloma.    Work-up at [facility] includes 7/20 CT lumbar spine without contrast which demonstrated chronic pathologic compression fracture of L5 unchanged since 6/25.  CT pelvis bone without contrast showed innumerable lytic lesions throughout the pelvic bones and proximal femurs, few larger lytic lesions in the left pelvis including the posterior ilium and periacetabular left ischium measuring up to 2.5 cm.  There is a subtrochanteric lesion of the proximal right femur but no associated pathologic fracture with these larger lesions.  There is acute mildly comminuted fracture of the L5 vertebral body with moderate compression and loss of vertebral body height.  There is additional acute nondisplaced fracture throughout the left para symphyseal pubic body in the anterior pelvis.  There is cortical irregularity in the right sacral yariel compatible with age-indeterminate fracture.  There is no evidence of hip fracture.    7/20 labs showed hemoglobin 9.1, stable, WBC 4.8, platelets 221,000, creatinine 1.03, calcium 9.    She was seen by neurosurgery who recommended MRI lumbar spine due to radiculopathy.  She was advised a corset style brace for her L5 fracture.  She was also seen by orthopedics recommendation for conservative management, PT/OT.    7/21 MRI lumbar spine showed L5 compression fracture with slight progression since 5/6/2022.  Progression of myeloma in multifocal areas of the lumbar vertebral bodies, sacrum, iliac bones with largest bony lesion in the left iliac wing measuring 1.8 x 2 cm.  Iliac lesion is new since prior MRI scan.  New sacral insufficiency fracture.    She was due to start multiple myeloma treatment on 7/25 with Faspro, Revlimid and dexamethasone.         Physical Exam:   Vitals were reviewed  Blood pressure 97/49, pulse 92, temperature 99.8  F (37.7  C), temperature source Oral, resp. rate 16, height 1.6 m (5' 3\"), weight 59 " kg (130 lb), last menstrual period 1989, SpO2 96 %.  Temperatures:  Current - Temp: 99.8  F (37.7  C); Max - Temp  Av.5  F (36.9  C)  Min: 97.2  F (36.2  C)  Max: 99.8  F (37.7  C)  Respiration range: Resp  Av.1  Min: 15  Max: 18  Pulse range: Pulse  Av.5  Min: 80  Max: 92  Blood pressure range: Systolic (24hrs), Av , Min:92 , Max:108   ; Diastolic (24hrs), Av, Min:40, Max:58    Pulse oximetry range: SpO2  Av.6 %  Min: 92 %  Max: 96 %    Intake/Output Summary (Last 24 hours) at 2022  Last data filed at 2022 1800  Gross per 24 hour   Intake 1100 ml   Output --   Net 1100 ml       GENERAL: No acute distress.  SKIN: No rashes or jaundice.  HEENT: Normocephalic, atraumatic. Eyes anicteric. Oropharynx is clear.  LYMPH: No palpable lymphadenopathy in the cervical, supraclavicular regions.  HEART: No PMI displacement.  LUNGS: No audible cough or wheezing.  ABDOMEN: Soft, nontender, nondistended with no palpable hepatosplenomegaly.  EXTREMITIES: No clubbing, cyanosis, or edema.  MENTAL: Alert and oriented to person, place, and time.  NEURO: Upper extremity strength normal.  Able to lift extremities to gravity.              Past Medical History:   I have reviewed this patient's past medical history  Past Medical History:   Diagnosis Date     Depressive disorder              Past Surgical History:   I have reviewed this patient's past surgical history  Past Surgical History:   Procedure Laterality Date     BONE MARROW BIOPSY, BONE SPECIMEN, NEEDLE/TROCAR N/A 2021    Procedure: BONE MARROW BIOPSY;  Surgeon: Naomie Saeed MD;  Location:  GI     finger reattachment       TONSILLECTOMY                 Social History:   I have reviewed this patient's social history  Social History     Tobacco Use     Smoking status: Never Smoker     Smokeless tobacco: Never Used   Substance Use Topics     Alcohol use: Not Currently     Alcohol/week: 0.0 - 2.0 standard drinks              Family History:   I have reviewed this patient's family history  Family History   Problem Relation Age of Onset     Myocardial Infarction Father 63     Bipolar Disorder Sister      Chronic Obstructive Pulmonary Disease Brother      Lung Cancer Brother      Suicide Brother      Influenza/Pneumonia Sister      Alcoholism Sister              Allergies:     Allergies   Allergen Reactions     Codeine Difficulty breathing, Other (See Comments) and Rash     Bupropion      Erythromycin      Hydrocodone Other (See Comments) and Itching     Ear ache     Lidocaine Other (See Comments)     Penicillin G Itching     Itching around mouth then heavy breathing     Sulfa Drugs Itching     Itching around the mouth and then heavy breathing     Tetracycline      Trazodone      Does not work for patient     Wellbutrin [Bupropion Hydrobromide]      Codeine Sulfate Rash             Medications:   I have reviewed this patient's current medications  Medications Prior to Admission   Medication Sig Dispense Refill Last Dose     calcium carbonate (TUMS) 500 MG chewable tablet Take 2 chew tab by mouth daily   7/19/2022     citalopram (CELEXA) 40 MG tablet Take 1 tablet (40 mg) by mouth daily 90 tablet 3 7/20/2022 at AM     simvastatin (ZOCOR) 20 MG tablet TAKE 1 TABLET BY MOUTH AT BEDTIME 90 tablet 0 7/19/2022 at hs     sodium chloride 1 GM tablet Take 1 tablet (1 g) by mouth daily 90 tablet 0 7/20/2022 at am     vitamin B-12 (CYANOCOBALAMIN) 1000 MCG tablet Take 1 tablet (1,000 mcg) by mouth daily for 90 days 90 tablet 0 7/20/2022 at am     vitamin D3 (CHOLECALCIFEROL) 50 mcg (2000 units) tablet Take 1 tablet by mouth daily   7/20/2022?     levofloxacin (LEVAQUIN) 250 MG tablet Take 1 tablet (250 mg) by mouth daily 30 tablet 3 NOT TAKING     Current Facility-Administered Medications Ordered in Epic   Medication Dose Route Frequency Last Rate Last Admin     acetaminophen (TYLENOL) tablet 650 mg  650 mg Oral Q6H PRN        Or      acetaminophen (TYLENOL) Suppository 650 mg  650 mg Rectal Q6H PRN         citalopram (celeXA) tablet 40 mg  40 mg Oral Daily   40 mg at 07/21/22 0749     HYDROmorphone (DILAUDID) injection 0.2-0.4 mg  0.2-0.4 mg Intravenous Q2H PRN   0.4 mg at 07/20/22 2133     levofloxacin (LEVAQUIN) tablet 250 mg  250 mg Oral Daily   250 mg at 07/21/22 0749     LORazepam (ATIVAN) tablet 0.5 mg  0.5 mg Oral Once PRN         melatonin tablet 1 mg  1 mg Oral At Bedtime PRN         naloxone (NARCAN) injection 0.2 mg  0.2 mg Intravenous Q2 Min PRN        Or     naloxone (NARCAN) injection 0.4 mg  0.4 mg Intravenous Q2 Min PRN        Or     naloxone (NARCAN) injection 0.2 mg  0.2 mg Intramuscular Q2 Min PRN        Or     naloxone (NARCAN) injection 0.4 mg  0.4 mg Intramuscular Q2 Min PRN         ondansetron (ZOFRAN ODT) ODT tab 4 mg  4 mg Oral Q6H PRN        Or     ondansetron (ZOFRAN) injection 4 mg  4 mg Intravenous Q6H PRN         oxyCODONE IR (ROXICODONE) half-tab 2.5-5 mg  2.5-5 mg Oral Q4H PRN   2.5 mg at 07/21/22 1345     polyethylene glycol (MIRALAX) Packet 17 g  17 g Oral Daily PRN         prochlorperazine (COMPAZINE) injection 5 mg  5 mg Intravenous Q6H PRN        Or     prochlorperazine (COMPAZINE) tablet 5 mg  5 mg Oral Q6H PRN        Or     prochlorperazine (COMPAZINE) suppository 12.5 mg  12.5 mg Rectal Q12H PRN         senna-docusate (SENOKOT-S/PERICOLACE) 8.6-50 MG per tablet 1 tablet  1 tablet Oral BID PRN        Or     senna-docusate (SENOKOT-S/PERICOLACE) 8.6-50 MG per tablet 2 tablet  2 tablet Oral BID PRN         sodium chloride tablet 1 g  1 g Oral Daily   1 g at 07/21/22 0749     No current Epic-ordered outpatient medications on file.             Review of Systems:     The 14 point Review of Systems is negative other than noted in the HPI.            Data:   All laboratory data reviewed  Results for orders placed or performed during the hospital encounter of 07/20/22 (from the past 24 hour(s))   MR Lumbar Spine  w/o Contrast    Narrative    EXAM: MR LUMBAR SPINE W/O CONTRAST  LOCATION: [facility]  DATE/TIME: 7/21/2022 7:59 PM    INDICATION:  Lumbar radiculopathy  COMPARISON: CT scan 7/20/2022. MRI scan 5/6/2022.  TECHNIQUE: Routine Lumbar Spine MRI without IV contrast.    FINDINGS:   Compression fracture deformity of the L5 vertebral body which is progressed when compared to previous MRI scan of 5/6/2022 with loss of approximately 25-30% of vertebral body height. No significant retropulsion of bony elements.    Marrow signal changes with punctate foci of low signal throughout the lumbar vertebrae and portions of the sacrum and iliac bones on the T1-weighted sequences compatible with patient's history of multiple myeloma. Linear low signal changes throughout the   right hemisacrum with corresponding high signal within the right hemisacrum on the STIR sequences. Signal changes within the right hemisacrum are new compared to previous MRI scan of 5/6/2022. New lesion within the left iliac bone which is only   partially imaged on the MRI scan and measures approximately 1.8 x 2 cm in greatest transaxial dimension.    Retroperitoneal structures including visualized portion of the kidneys, abdominal aorta and psoas muscles are clear.    T12-L1: Normal disc height and signal. No herniation. Normal facets. No spinal canal or neural foraminal stenosis. Postoperative changes with laminectomy changes.    L1-L2: DDD change with loss of disc height and signal, generalized disc bulging. Spinal canal is patent. Mild right-sided foraminal narrowing.    L2-L3: DDD change with loss of disc height and signal and generalized disc bulging. Spinal canal and foramen are patent.     L3-L4: DDD change at L3-4 with generalized disc bulging. Spinal canal and right foramen are patent. Left foramen is mildly narrowed.    L4-L5: DDD change with loss of disc height and signal, generalized disc bulging, bilateral facet  degeneration, mild to moderate spinal canal stenosis with bilateral lateral recess stenosis and bilateral foraminal narrowing.    L5-S1: Loss of disc height and signal with minimal disc bulging. Bilateral facet degeneration. Spinal canal is patent. Bilateral foraminal stenosis due to combination of facet degeneration and bulging of the degenerated/desiccated disc margin into the   inferior aspect of the foramen.      Impression    IMPRESSION:  1.  L5 compression fracture deformity which is demonstrated a slight progression and loss of vertebral body height when compared to previous MRI scan performed 5/6/2022.    2.  Multifocal areas of abnormal low signal throughout the lumbar vertebral bodies, sacrum and visualized portion of the iliac bones bilaterally in a pattern compatible with progression of multiple myeloma when compared to 5/6/2022. Largest bony lesion   occurs in the left iliac wing measuring approximately 1.8 cm x 2 cm in greatest transaxial dimension. This iliac lesion is new when compared to previous MRI scan.    3.  New abnormal linear signal throughout the right hemisacrum and a pattern compatible with sacral insufficiency fracture.    4.  Multilevel DDD change most notable at L4-5 and L5-S1. Please see body of report for details at these levels.

## 2022-07-22 NOTE — CONSULTS
77 y/o female with Dx of multiple myeloma admitted with increased bone pain right hip & pelvis.  Reviewed CT pelvis from 7/20/22 showing L5 fracture & left symphysis fracture & right femur subtrochanteric lesion with approximate 50% cortical erosion on my review with radiologist. On exam pt has localized pain along right hip & some discomfort along left symphysis. Minimal discomfort along L5 spine. Based on significant right femur lesion recommend consideration of Orthopedic evaluation of need for orthopedic surgical stabalization of lesion.  No role of XRT treatment at this time.  I discussed with BARI Bravo PA-C neurosurgery & Dr MARIE Franklin in oncology. Will follow. NEENA White MD.

## 2022-07-22 NOTE — PROGRESS NOTES
[facility]    Hospitalist Progress Note    Date of Admission:  7/20/2022    Assessment & Plan     Mary Jo Mcleod is a 76 year old female with a history of multiple myeloma admitted on 7/20/2022 with back pain, inability to ambulate.      Ongoing, worsening mid back and right hip pain  Inability to ambulate 2/2 pain  Acute, mildly comminuted fracture of the L5 vertebral body with moderate compression and loss of vertebral body height.    Acute nondisplaced fracture through the left parasymphyseal pubic body in the anterior pelvis  Cortical irregularity in the right sacral ala, superiorly, compatible with age indeterminant fracture  Sacral insufficiency fracture in the right hemisacrum  Recent falls  Above fractures, all likely pathologic secondary to multiple myeloma  Patient had a fall in end of May and another fall in June.  Was seen in ED on 6/25 at which time CT showed L5 fracture.  Subsequently was seen by orthopedics clinic and had MRI after that.  Unable to view this report.  Over the last few days has had escalating mid back and right hip pain with inability to ambulate and hence presented to ED.  CT findings at presentation as above.  - consult ortho and NSG.  MRI lumbar spine noted progression of multiple myeloma with multiple lytic lesions in lumbar spine and pelvis.  Also demonstrated slight progression of L5 compression fracture which still appears acute and unhealed and also demonstrated a sacral insufficiency fracture in the right hemisacrum..  Corset style brace ordered for support.  Use corset as needed.  -Radiation oncology consult placed to evaluate need for treatment at L5.  -Per orthopedics: Pelvic fractures are nonoperative.  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  Treatment delayed secondary to patient's  falls over last couple of months resulting in persistent right hip pain.  Since Dr. Hanks at [facility]. Plan for PET and to start chemo soon.  -Consult to [place] oncology here and follow up with [facility] after discharge.  If going to TCU after discharge, will need treatment rescheduled.  If prolonged hospitalization and TCU stay anticipated, could consider starting on bortezomib and dexamethasone inpatient.  - start levofloxacin ppx as prescribed  -Oncology will consider starting Zometa for myeloma bone lesions.     MDD  - continue citalopram     HLD  -Continue PTA statin        Diet:  Regular diet  DVT Prophylaxis: Pneumatic Compression Devices  [place] Catheter: Not present  Central Lines: None  Cardiac Monitoring: None  Code Status:   FULL, discussed        Clinically Significant Risk Factors Present on Admission         Disposition: Needs TCU at discharge.  Medically stable to discharge whenever placement available.    Erika Camacho MD  Text Page (7am - 6pm, M-F)  [facility]  Securely message with the Vocera Web Console (learn more here)  Text page via [employer] Paging/Directory      Interval History   Stable overnight.  As long as she is laying still in bed she does not have much pain.  Pain mostly in mid back and right hip, intermittently goes down both legs.  Does have some sensory symptoms in both legs.  Denies any issues with bowel or bladder.  Discussed recommendations from multiple specialists involved in her care.  No BM in 2 days.    -Data reviewed today: I reviewed all new labs and imaging results over the last 24 hours. I personally reviewed CT scan with result as noted above    Physical Exam   Temp: 98.7  F (37.1  C) Temp src: Oral BP: 98/58 Pulse: 95   Resp: 16 SpO2: 96 % O2 Device: None (Room air)    Vitals:    07/20/22 2133   Weight: 59 kg (130 lb)     Vital Signs with Ranges  Temp:  [98.3  F (36.8  C)-99.8  F (37.7  C)] 98.7  F (37.1  C)  Pulse:  [86-95] 95  Resp:  [16-18] 16  BP:  ()/(42-58) 98/58  SpO2:  [92 %-96 %] 96 %  I/O last 3 completed shifts:  In: 800 [P.O.:800]  Out: -     Constitutional: Alert, appears comfortable, in no acute distress  Respiratory: Non labored breathing, clear to auscultation bilaterally, no crackles or wheezes  Cardiovascular: Heart sounds regular rate and rhythm, no murmurs, no leg edema  GI: Abdomen is soft, non distended, non tender. Normal BS  Skin/Integumen: no rashes, no pressure sores, movement of lower extremities limited due to back pain  Neuro: alert, converses appropriately, moving all extremities, fluent speech, no facial asymmetry  Psych: mood and affect appropriate      Medications       citalopram  40 mg Oral Daily     levofloxacin  250 mg Oral Daily     sodium chloride  1 g Oral Daily     zoledronic acid  4 mg Intravenous Once       Data   Recent Labs   Lab 07/20/22  1906   WBC 4.8   HGB 9.1*   MCV 98         POTASSIUM 3.5   CHLORIDE 105   CO2 27   BUN 18   CR 1.03   ANIONGAP 5   ARA 9.0   GLC 99       Imaging  Recent Results (from the past 24 hour(s))   MR Lumbar Spine w/o Contrast    Narrative    EXAM: MR LUMBAR SPINE W/O CONTRAST  LOCATION: [facility]  DATE/TIME: 7/21/2022 7:59 PM    INDICATION:  Lumbar radiculopathy  COMPARISON: CT scan 7/20/2022. MRI scan 5/6/2022.  TECHNIQUE: Routine Lumbar Spine MRI without IV contrast.    FINDINGS:   Compression fracture deformity of the L5 vertebral body which is progressed when compared to previous MRI scan of 5/6/2022 with loss of approximately 25-30% of vertebral body height. No significant retropulsion of bony elements.    Marrow signal changes with punctate foci of low signal throughout the lumbar vertebrae and portions of the sacrum and iliac bones on the T1-weighted sequences compatible with patient's history of multiple myeloma. Linear low signal changes throughout the   right hemisacrum with corresponding high signal within the right hemisacrum on the  STIR sequences. Signal changes within the right hemisacrum are new compared to previous MRI scan of 5/6/2022. New lesion within the left iliac bone which is only   partially imaged on the MRI scan and measures approximately 1.8 x 2 cm in greatest transaxial dimension.    Retroperitoneal structures including visualized portion of the kidneys, abdominal aorta and psoas muscles are clear.    T12-L1: Normal disc height and signal. No herniation. Normal facets. No spinal canal or neural foraminal stenosis. Postoperative changes with laminectomy changes.    L1-L2: DDD change with loss of disc height and signal, generalized disc bulging. Spinal canal is patent. Mild right-sided foraminal narrowing.    L2-L3: DDD change with loss of disc height and signal and generalized disc bulging. Spinal canal and foramen are patent.     L3-L4: DDD change at L3-4 with generalized disc bulging. Spinal canal and right foramen are patent. Left foramen is mildly narrowed.    L4-L5: DDD change with loss of disc height and signal, generalized disc bulging, bilateral facet degeneration, mild to moderate spinal canal stenosis with bilateral lateral recess stenosis and bilateral foraminal narrowing.    L5-S1: Loss of disc height and signal with minimal disc bulging. Bilateral facet degeneration. Spinal canal is patent. Bilateral foraminal stenosis due to combination of facet degeneration and bulging of the degenerated/desiccated disc margin into the   inferior aspect of the foramen.      Impression    IMPRESSION:  1.  L5 compression fracture deformity which is demonstrated a slight progression and loss of vertebral body height when compared to previous MRI scan performed 5/6/2022.    2.  Multifocal areas of abnormal low signal throughout the lumbar vertebral bodies, sacrum and visualized portion of the iliac bones bilaterally in a pattern compatible with progression of multiple myeloma when compared to 5/6/2022. Largest bony lesion   occurs in  the left iliac wing measuring approximately 1.8 cm x 2 cm in greatest transaxial dimension. This iliac lesion is new when compared to previous MRI scan.    3.  New abnormal linear signal throughout the right hemisacrum and a pattern compatible with sacral insufficiency fracture.    4.  Multilevel DDD change most notable at L4-5 and L5-S1. Please see body of report for details at these levels.

## 2022-07-22 NOTE — PROGRESS NOTES
Neurosurgery progress note    Hospital day 2    Patient states she continues to have pain primarily in her right buttock and sacral area and her pelvis.  She denies radiating pain down her legs today.  Denies midline low back pain.  She underwent lumbar MRI last night which demonstrated slight progression of the L5 compression fracture which still appears acute and unhealed, but also demonstrated a sacral insufficiency fracture in the right hemisacrum.  Also progression of multiple myeloma was noted as well.  She has been seen by oncology and was also being followed by orthopedics who will be seeing her later this morning again.    She received her lumbar corset yesterday, she has not yet been up to walk much today.    Exam    Alert and oriented no acute distress  Bilateral lower extremities with 5-5 strength  Lumbar spine nontender to palpation or percussion in the midline  Sacrum is tender to palp patient more to the right  Negative straight leg raise for radiculopathy bilaterally  Patient has pain in her right hip and buttock when she raises her right leg.    Plan    Use lumbar corset as needed  Radiation oncology consult placed to evaluate need for treatment at L5    Defer management of sacral insufficiency fracture to orthopedics    Addendum 5 PM    In discussion with Dr. White there is a right subtrochanteric lesion concerning for a possible source for imminent fracture, Dr. Ny was concerned about the patient's weightbearing status.  I reached out to orthopedic on-call who will be passing the message along to their on-call physician to review this further.  Review is also indicated there is no role for radiation therapy at this time particular for the L5 lesion.  We will continue to follow.    Discussed with Dr. Felipe

## 2022-07-22 NOTE — PROGRESS NOTES
[facility]: Cancer Care                                                                                          Called patient to discuss new chemo.  Patient currently admitted and not answering phone.    Patient discharged to TCU and currently home as of 08/10. I will meet her in clinic to provide handouts and will discuss chemo education in clinic. Education via telephone is probably not a good option for her right now; I am not sure if she is able to manage and take notes regarding chemo on her own.     Signature:  Jm Lyon RN

## 2022-07-22 NOTE — UTILIZATION REVIEW
"  Continued stay status; Secondary Review Determination     Admission Date: 7/20/2022  5:59 PM      Under the authority of the Utilization Management Committee, the utilization review process indicated a secondary review on the above patient.  The review outcome is based on review of the medical records, discussions with staff, and applying clinical experience noted on the date of the review.        (x)      Inpatient Status Appropriate - This patient's medical care is consistent with medical management for inpatient care and reasonable inpatient medical practice.      () Observation Status Appropriate - This patient does not meet hospital inpatient criteria and is placed in observation status. If this patient's primary payer is Medicare and was admitted as an inpatient, Condition Code 44 should be used and patient status changed to \"observation\".   () Admission Status NOT Appropriate - This patient's medical care is not consistent with medical management for Inpatient or Observation Status.          RATIONALE FOR DETERMINATION   Mary Jo Mcleod is a 76 year old female who has history of multiple myeloma with plan to start treatment soon, for falls in the last 2 months.  She presents to the hospital on 7/20/2022 with several days of acute on chronic, progressive mid back and right hip pain.  Imaging shows L5 fracture, multiple lytic lesions in the pelvis, acute left parasymphyseal pubic body, and probable right sacral ala fracture.  She was initially placed in the hospital on observation status  with hope for a short hospital stay.  She has been seen in consultation by orthopedic surgery, neurosurgery, and oncology.  She has been started on levofloxacin.  She is now expected to require a prolonged hospital stay.  Due to this patient's advanced age, complex past medical history, multiple acute fractures, need for multiple consults, need for levofloxacin, and expectation of a prolonged hospital stay, it is " appropriate to have this patient admitted to the hospital as an inpatient.      The severity of illness, intensity of service provided, expected LOS and risk for adverse outcome make the care complex, high risk and appropriate for hospital admission.        The information on this document is developed by the utilization review team in order for the business office to ensure compliance.  This only denotes the appropriateness of proper admission status and does not reflect the quality of care rendered.         The definitions of Inpatient Status and Observation Status used in making the determination above are those provided in the CMS Coverage Manual, Chapter 1 and Chapter 6, section 70.4.      Sincerely,     Anthony Reis D.O.  Utilization Review/ Case Management  [facility].

## 2022-07-22 NOTE — PLAN OF CARE
Goal Outcome Evaluation:   A/OX4, pain managed with ice packs and repositioned. Up with 1 assist/Pivot to BSC. TTWB on right leg.Use lumbar corset as needed. Ortho and oncology following.PRN senna given. Will continue to monitor.

## 2022-07-22 NOTE — PLAN OF CARE
Goal Outcome Evaluation:    .Date/Time 7/22/22 [id number]    Trauma/Ortho/Medical (Choose one) ortho    Diagnosis:Mid back pain , Right Hip pain , L5 fx   POD#:Non surgical  Mental Status:A&OX4  Activity/dangle: [place] with A1 & [place] [facility]  Diet:Reg  Pain:Oxycodone  [place]/Voiding:[facility]  Tele/Restraints/Iso:NA   02/LDA:RA/ SL   D/C Date: Pending   Other Info: Pt Transferred from Obs , VSS except BP soft, RA. Up with A1& walker to [facility]. Pain managed with Oxycodone & Ice pack. Skin looks good. Plan PT & pain control..

## 2022-07-22 NOTE — PROGRESS NOTES
SPIRITUAL HEALTH SERVICES Progress Note  [facility]  Ortho    Saw pt Mary Jo Mcleod per admissions.    Illness Narrative - Mary Jo said that she has been in the hospital a lot and that her current reason for being in the hospital is an issue spanning 6 weeks now according to her. She is also uncertain how she will progress in her health.    Distress - Mary Jo spoke about her isolation through her recent illness. Further she is confronting ideas about her mortality.    Coping - nestor is important to Mary Jo and supports her through this time.    Meaning-Making - NA    Plan - Mary Jo would like a follow-up visit. I or SH will follow up.    I asked reflective questions about her condition, life, and feelings of isolation.     MALICK Silverio  Intern    Phone: [phone]

## 2022-07-23 DIAGNOSIS — C90.00 MULTIPLE MYELOMA NOT HAVING ACHIEVED REMISSION (H): Primary | ICD-10-CM

## 2022-07-23 PROCEDURE — 250N000011 HC RX IP 250 OP 636: Performed by: HOSPITALIST

## 2022-07-23 PROCEDURE — 120N000001 HC R&B MED SURG/OB

## 2022-07-23 PROCEDURE — 250N000013 HC RX MED GY IP 250 OP 250 PS 637: Performed by: HOSPITALIST

## 2022-07-23 PROCEDURE — 99233 SBSQ HOSP IP/OBS HIGH 50: CPT | Performed by: HOSPITALIST

## 2022-07-23 PROCEDURE — 250N000013 HC RX MED GY IP 250 OP 250 PS 637: Performed by: STUDENT IN AN ORGANIZED HEALTH CARE EDUCATION/TRAINING PROGRAM

## 2022-07-23 PROCEDURE — 250N000013 HC RX MED GY IP 250 OP 250 PS 637: Performed by: INTERNAL MEDICINE

## 2022-07-23 RX ORDER — ENOXAPARIN SODIUM 100 MG/ML
40 INJECTION SUBCUTANEOUS EVERY 24 HOURS
Status: DISCONTINUED | OUTPATIENT
Start: 2022-07-23 | End: 2022-07-29 | Stop reason: HOSPADM

## 2022-07-23 RX ORDER — AMOXICILLIN 250 MG
1 CAPSULE ORAL 2 TIMES DAILY
Status: DISCONTINUED | OUTPATIENT
Start: 2022-07-23 | End: 2022-07-27

## 2022-07-23 RX ORDER — METHOCARBAMOL 500 MG/1
500 TABLET, FILM COATED ORAL 2 TIMES DAILY
Status: DISCONTINUED | OUTPATIENT
Start: 2022-07-23 | End: 2022-07-23

## 2022-07-23 RX ORDER — METHOCARBAMOL 500 MG/1
500 TABLET, FILM COATED ORAL 3 TIMES DAILY
Status: DISCONTINUED | OUTPATIENT
Start: 2022-07-24 | End: 2022-07-27

## 2022-07-23 RX ORDER — ASPIRIN 325 MG
325 TABLET ORAL DAILY
Qty: 30 TABLET | Refills: 11 | Status: SHIPPED | OUTPATIENT
Start: 2022-07-23 | End: 2022-07-29

## 2022-07-23 RX ORDER — GABAPENTIN 100 MG/1
100 CAPSULE ORAL 2 TIMES DAILY
Status: DISCONTINUED | OUTPATIENT
Start: 2022-07-23 | End: 2022-07-29 | Stop reason: HOSPADM

## 2022-07-23 RX ADMIN — CITALOPRAM HYDROBROMIDE 40 MG: 20 TABLET ORAL at 08:03

## 2022-07-23 RX ADMIN — GABAPENTIN 100 MG: 100 CAPSULE ORAL at 20:31

## 2022-07-23 RX ADMIN — SENNOSIDES AND DOCUSATE SODIUM 1 TABLET: 50; 8.6 TABLET ORAL at 20:31

## 2022-07-23 RX ADMIN — OXYCODONE HYDROCHLORIDE 2.5 MG: 5 TABLET ORAL at 23:03

## 2022-07-23 RX ADMIN — Medication 1 TABLET: at 17:08

## 2022-07-23 RX ADMIN — SODIUM CHLORIDE TAB 1 GM 1 G: 1 TAB at 08:03

## 2022-07-23 RX ADMIN — ENOXAPARIN SODIUM 40 MG: 40 INJECTION SUBCUTANEOUS at 14:04

## 2022-07-23 RX ADMIN — SENNOSIDES AND DOCUSATE SODIUM 2 TABLET: 50; 8.6 TABLET ORAL at 14:08

## 2022-07-23 RX ADMIN — OXYCODONE HYDROCHLORIDE 2.5 MG: 5 TABLET ORAL at 17:09

## 2022-07-23 RX ADMIN — GABAPENTIN 100 MG: 100 CAPSULE ORAL at 14:04

## 2022-07-23 RX ADMIN — LEVOFLOXACIN 250 MG: 250 TABLET, FILM COATED ORAL at 08:03

## 2022-07-23 RX ADMIN — Medication 1 TABLET: at 14:04

## 2022-07-23 RX ADMIN — OXYCODONE HYDROCHLORIDE 2.5 MG: 5 TABLET ORAL at 06:05

## 2022-07-23 ASSESSMENT — ACTIVITIES OF DAILY LIVING (ADL)
ADLS_ACUITY_SCORE: 39
DEPENDENT_IADLS:: INDEPENDENT
ADLS_ACUITY_SCORE: 39
ADLS_ACUITY_SCORE: 35
ADLS_ACUITY_SCORE: 39
ADLS_ACUITY_SCORE: 35
ADLS_ACUITY_SCORE: 39
ADLS_ACUITY_SCORE: 39
ADLS_ACUITY_SCORE: 35
ADLS_ACUITY_SCORE: 39
ADLS_ACUITY_SCORE: 39
ADLS_ACUITY_SCORE: 35
ADLS_ACUITY_SCORE: 39

## 2022-07-23 NOTE — PROGRESS NOTES
Service Date: 07/22/2022    SUBJECTIVE:  Ms. Mcleod is a 76-year-old female with multiple myeloma.  The patient follows up at [facility].  Plan is to start treatment with daratumumab, Revlimid, and dexamethasone.  She has not yet started the treatment.    The patient was brought to the Emergency Room on 07/20/2022 because of lower back pain.  The patient has chronic pain, but in last 2-3 days the pain progressively got worse to the point that she was unable to walk.    The patient says that pain mainly started after her fall.  She fell Memorial Day weekend and also July 4th weekend.  The patient has been following up with Orthopedics.  In last few days, her pain progressively got worse, and she was unable to ambulate.  She was brought to the Emergency Room and had multiple investigations done:  -CBC reveals anemia with hemoglobin of 9.1.  -Normal BMP.  -CT lumbar spine reveals chronic pathological compression fracture of L5.  -CT pelvis reveals multifocal lytic lesion. There is acute nondisplaced fracture through the left parasymphyseal pubic body in the anterior pelvis.    The patient admitted to the hospital for pain.  She had MRI of the lumbar spine done yesterday, which reveals L5 compression fracture.  There are multifocal lytic bone lesions which have progressed since 05/2022.  There is sacral insufficiency fracture.  There are also degenerative changes.    The patient has been evaluated by Neurosurgery and Orthopedics.  No surgical intervention planned.  The patient is on pain medication.    The patient says that her pain is fairly well controlled when she does not move.  When she moves, she gets more pain.    No headache.  No dizziness.  No chest pain.  No shortness of breath.  No nausea or vomiting.  Some constipation.  No bleeding.    PHYSICAL EXAMINATION:    GENERAL:  She is alert.  Not in any distress.  VITAL SIGNS:  Reviewed.  Rest of systems not examined.    LABS:   Reviewed.    ASSESSMENT:    1.  A 76-year-old female with multiple myeloma.    2.  Lower back pain with radiation to the legs secondary to myeloma lesions. Scans reveal L5 compression fracture, nondisplaced fracture through left parasymphyseal pubic body and sacral insufficiency fracture.  3.  Normocytic anemia secondary to myeloma.    PLAN:    1.  Discussed regarding pain.  This is secondary to myeloma lesions and fractures.  Fractures are likely the result of her fall.    Orthopedics and Neurosurgery following.  No surgical intervention planned.  She will continue on pain medications.    2.  Because of myeloma lesions, discussed regarding bisphosphonate.  I explained to her that this will help to reduce risk of further fracture.  She is agreeable for it.  We will give her Zometa 4 mg.  Side effect of Zometa reviewed. We will also start her on calcium and vitamin D twice a day.    3.  Discussed with her regarding multiple myeloma treatment.  She is supposed to start treatment with daratumumab, Revlimid, and dexamethasone.    The patient will be going to TCU.  She will not be able to get her daratumumab. I have sent a message to her primary hematologist, Dr. Hanks.  I am hoping she will be able to get the patient her Revlimid and dexamethasone while she is in the TCU.  No urgency for starting any treatment in the hospital.    4.  The patient is anemic.  Anemia is due to myeloma.  She should be transfused for hemoglobin below 7 or if she has bleeding.    5.  Radiation Oncology is going to evaluate the patient today.  We will wait for their recommendation.    6.  The patient had multiple questions, which were all answered.  She will follow up in Hematology/Oncology Clinic after discharge.  Please call us with any questions.    TOTAL TIME SPENT:  Forty minutes.  Time was spent in today's visit, review of chart/investigations today and documentation today.    ADDENDUM:  Dr. White from Radiation Oncology called me after  evaluating the patient.  At this time, he does not feel there is any role for radiation.  He reviewed the scans with the radiologist.  There is right femur lesion.  That will put the patient at risk of fracture.  He is going to ask Orthopedics to re-evaluate the patient.    Eugene Franklin MD        D: 2022   T: 2022   MT: [facility]    Name:     ROMA ELLIS  MRN:      [id number]        Account:      [id number]   :      1945           Service Date: 2022       Document: [id number]

## 2022-07-23 NOTE — PLAN OF CARE
Goal Outcome Evaluation:        Pt. Alert & oriented x 4; forgetful at times. Up with assist of 1, gait belt, walker; pivot to bedside commode with R TTWB precautions.  Voiding. Tolerating PO intake.  Pt. Reports pain adequately controlled with PRN oxycodone--see MAR.

## 2022-07-23 NOTE — PLAN OF CARE
Goal Outcome Evaluation:  A/OX4, pain managed with oxycodone, ice packs and repositioning. Up with 1 assist/Pivot to BSC. TTWB on right leg.Use lumbar corset as needed. Ortho and oncology following.PRN senna given today. IV SL,TCU on discharge. Will continue to monitor.

## 2022-07-23 NOTE — PROGRESS NOTES
[facility]  Orthopaedics/Foot and Ankle Surgery Progress Note          Price Tomlin MD   07/23/2022          Assessment and Plan:      76-year-old female with history of multiple myeloma and evidence of multiple metastatic lesions throughout the lumbar spine, pelvis, and right subtrochanteric femur.  Orthopedics was consulted a few days ago for evaluation of a pathologic pelvic fracture at the parasymphyseal body.  Nonoperative management was recommended at that time and the patient was allowed to bear weight as tolerated.      Orthopedics was reconsulted to weigh in on the presence of a presumed myeloma lesion involving the subtrochanteric region of the right femur as noted on recent CT scan.  While there is some endosteal scalloping on CT, at least 50% of the femoral cortex remains preserved, and the patient does not appear to localize significant pain or irritability with weightbearing activity around the proximal thigh.  The patient's persistent pain symptoms radiating throughout both lower extremities would be much more consistent with her current pelvic and L5 pathologic fractures.  There would be no indication for prophylactic nailing of the right femur at this time, though I encouraged the patient to monitor for any progressive discomfort around the right proximal thigh associated with weightbearing activity which may indicate increased stress on the subtrochanteric region of the femur which may warrant prophylactic nailing.  Please feel free to reach out if any further questions or concerns arise with regard to the patient's pathologic pelvic fracture and subtrochanteric lesion.             Interval History:      No acute events.  The patient has been limiting weightbearing activity as a result of persistent pain radiating from the low back and sacrum through the bilateral lower extremities.  The patient has noted slightly more radicular pain in the left lower extremity over the  "past day.  The patient localizes all of her right lower extremity pain through the buttock and posterior thigh, without any significant discomfort localized around the proximal femur.            Physical Exam:      Blood pressure 98/49, pulse 84, temperature 98.3  F (36.8  C), temperature source Oral, resp. rate 17, height 1.6 m (5' 3\"), weight 59 kg (130 lb), last menstrual period 12/04/1989, SpO2 93 %.  Vitals:    07/20/22 2133   Weight: 59 kg (130 lb)     Vital Signs with Ranges  Temp:  [98.3  F (36.8  C)-99.4  F (37.4  C)] 98.3  F (36.8  C)  Pulse:  [84-90] 84  Resp:  [16-17] 17  BP: ()/(43-49) 98/49  SpO2:  [93 %-94 %] 93 %  I/O's Last 24 hours  No intake/output data recorded.    There is no irritability with logroll of the right hip.  The patient denies any significant tenderness with palpation around the anterior or lateral thigh over the subtrochanteric region of the femur.         Medications:          calcium carbonate 600 mg-vitamin D 400 units  1 tablet Oral BID w/meals     citalopram  40 mg Oral Daily     levofloxacin  250 mg Oral Daily     sodium chloride  1 g Oral Daily     PRN Meds: acetaminophen **OR** acetaminophen, HYDROmorphone, LORazepam, melatonin, naloxone **OR** naloxone **OR** naloxone **OR** naloxone, ondansetron **OR** ondansetron, oxyCODONE, polyethylene glycol, prochlorperazine **OR** prochlorperazine **OR** prochlorperazine, senna-docusate **OR** senna-docusate         Data:      All new lab and imaging data was reviewed.  CT scan of the pelvis obtained on 7/20/2022 was personally reviewed and demonstrates extensive metastatic disease throughout the lumbar spine and pelvis.  The previously noted parasymphyseal pathologic fracture is noted.  There is evidence of a suspected myeloma lesion within the subtrochanteric region of the right femur.  Mild endosteal scalloping is noted without significant cortical loss.  There is no evidence of fracture involving the proximal femur.   No " results found for this or any previous visit (from the past 24 hour(s)).

## 2022-07-23 NOTE — PROGRESS NOTES
Alert and oriented  X 4, forgetful at times. CMS intact. VSS. Assist of one, GB/walker, pivot transfer to BSC, instructed on TTWB. Pain manged with Oxycodone and Dilaudid. Ortho/Oncology following.

## 2022-07-23 NOTE — PROGRESS NOTES
Neurosurgery progress note     Hospital day 3     Patient states she continues to have pain primarily in her right buttock and sacral area and her pelvis.  She states the pain is slightly better today she believes because she has not moved..  She underwent lumbar MRI which demonstrated slight progression of the L5 compression fracture which still appears acute and unhealed, but also demonstrated a sacral insufficiency fracture in the right hemisacrum.  Also progression of multiple myeloma was noted as well.  She has been seen by oncology and orthopedics.     She received a lumbar corset to wear when out of bed.     Exam     Alert and oriented no acute distress  Bilateral lower extremities with 5-5 strength  Lumbar spine nontender to palpation or percussion in the midline  Sacrum is tender to palp patient more to the right  Straight leg raise for radiculopathy bilaterally right 15 degrees left 30 degrees     Plan  Continue pain management.  Lumbar corset as needed when out of bed for pain control.    Chaparrita Cortez, DANGELOS  [facility] Neurosurgery  [address] [address]    Tel [phone]  Pager [phone]

## 2022-07-23 NOTE — PROGRESS NOTES
[facility]    Hospitalist Progress Note    Date of Admission:  7/20/2022    Assessment & Plan     Mary Jo Mcleod is a 76 year old female with a history of multiple myeloma admitted on 7/20/2022 with back pain, inability to ambulate.      Ongoing, worsening mid back and right hip pain  Inability to ambulate 2/2 pain  Acute, mildly comminuted fracture of the L5 vertebral body with moderate compression and loss of vertebral body height.    Acute nondisplaced fracture through the left parasymphyseal pubic body in the anterior pelvis  Cortical irregularity in the right sacral ala, superiorly, compatible with age indeterminant fracture  Sacral insufficiency fracture in the right hemisacrum  Recent falls  Above fractures, all likely pathologic secondary to multiple myeloma  Patient had a fall in end of May and another fall in June.  Was seen in ED on 6/25 at which time CT showed L5 fracture.  Subsequently was seen by orthopedics clinic and had MRI after that.  Unable to view this report.  Over the last few days has had escalating mid back and right hip pain with inability to ambulate and hence presented to ED.  CT findings at presentation as above.  - consult ortho and NSG.  MRI lumbar spine noted progression of multiple myeloma with multiple lytic lesions in lumbar spine and pelvis.  Also demonstrated slight progression of L5 compression fracture which still appears acute and unhealed and also demonstrated a sacral insufficiency fracture in the right hemisacrum..  Corset style brace ordered for support.  Use corset as needed.  -Radiation oncology consulted.  No role for radiation treatment at this time.  -Per orthopedics: Pelvic fractures are nonoperative.  Orthopedics also reviewed the right subtrochanteric femur lesion which is presumed to be a myeloma lesion.  No indication for prophylactic nailing of right femur at this time.  - pain control-as needed IV Dilaudid, low-dose oxycodone.  Also  added scheduled Robaxin and gabapentin.  Titrate meds as needed.  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  Treatment delayed secondary to patient's falls over last couple of months resulting in persistent right hip pain.  Since Dr. Hanks at [facility]. Plan for PET and to start chemo soon.  -Consult to [place] oncology here and follow up with [facility] after discharge.  If going to TCU after discharge, will need treatment rescheduled.  If prolonged hospitalization and TCU stay anticipated, could consider starting on bortezomib and dexamethasone inpatient.  - start levofloxacin ppx as prescribed  -Oncology also ordered Zometa to reduce risk of further fractures.     MDD  - continue citalopram     HLD  -Continue PTA statin        Diet:  Regular diet  DVT Prophylaxis: Pneumatic Compression Devices  Astudillo Catheter: Not present  Central Lines: None  Cardiac Monitoring: None  Code Status:   FULL, discussed        Clinically Significant Risk Factors Present on Admission         Disposition: Needs TCU at discharge.  Anticipate being medically stable for discharge in the next day or 2 pending pain control.    Erika Camacho MD  Text Page (7am - 6pm, M-F)  [facility]  Securely message with the Vocera Web Console (learn more here)  Text page via [place] Paging/Directory      Interval History   Stable overnight.  Today she is in quite a bit of pain and is unable to get in a comfortable position in bed.  She is quite restless and keeps moving around.  Tells me she feels no better with pain control than when she came in.  Describes leg spasms and shooting pains down both her legs.    -Data reviewed today: I reviewed all new labs and imaging results over the last 24 hours. I personally reviewed CT scan with result as noted above    Physical Exam   Temp: 98.3   F (36.8  C) Temp src: Oral BP: 98/49 Pulse: 84   Resp: 17 SpO2: 93 % O2 Device: None (Room air)    Vitals:    07/20/22 2133   Weight: 59 kg (130 lb)     Vital Signs with Ranges  Temp:  [98.3  F (36.8  C)-99.4  F (37.4  C)] 98.3  F (36.8  C)  Pulse:  [84-90] 84  Resp:  [16-17] 17  BP: ()/(43-49) 98/49  SpO2:  [93 %-94 %] 93 %  No intake/output data recorded.    Constitutional: Alert, appears uncomfortable, restless, unable to get into a comfortable position  Respiratory: Non labored breathing, clear to auscultation bilaterally, no crackles or wheezes  Cardiovascular: Heart sounds regular rate and rhythm, no murmurs, no leg edema  GI: Abdomen is soft, non distended, non tender. Normal BS  Skin/Integumen: no rashes, no pressure sores  Neuro: alert, converses appropriately, moving all extremities, fluent speech, no facial asymmetry  Psych: Appears anxious    Medications       calcium carbonate 600 mg-vitamin D 400 units  1 tablet Oral BID w/meals     citalopram  40 mg Oral Daily     enoxaparin ANTICOAGULANT  40 mg Subcutaneous Q24H     gabapentin  100 mg Oral BID     levofloxacin  250 mg Oral Daily     [START ON 7/24/2022] methocarbamol  500 mg Oral TID     senna-docusate  1 tablet Oral BID     sodium chloride  1 g Oral Daily       Data   Recent Labs   Lab 07/20/22  1906   WBC 4.8   HGB 9.1*   MCV 98         POTASSIUM 3.5   CHLORIDE 105   CO2 27   BUN 18   CR 1.03   ANIONGAP 5   ARA 9.0   GLC 99       Imaging  No results found for this or any previous visit (from the past 24 hour(s)).

## 2022-07-23 NOTE — CONSULTS
Care Management Initial Consult    General Information  Assessment completed with: Patient,    Type of CM/SW Visit: Initial Assessment    Primary Care Provider verified and updated as needed:     Readmission within the last 30 days:        Reason for Consult: discharge planning  Advance Care Planning: Advance Care Planning Reviewed: present on chart          Communication Assessment  Patient's communication style: spoken language (English or Bilingual)    Hearing Difficulty or Deaf: no   Wear Glasses or Blind: yes    Cognitive  Cognitive/Neuro/Behavioral: WDL                      Living Environment:   People in home: alone     Current living Arrangements: condominium      Able to return to prior arrangements: no       Family/Social Support:  Care provided by: self  Provides care for: no one  Marital Status:   None          Description of Support System: Other (see comments)    Support Assessment: Lacks adequate physical care, Lacks necessary supervision and assistance    Current Resources:   Patient receiving home care services:       Community Resources:    Equipment currently used at home: walker, rolling, cane, straight  Supplies currently used at home:      Employment/Financial:  Employment Status: retired        Financial Concerns:             Lifestyle & Psychosocial Needs:  Social Determinants of Health     Tobacco Use: Low Risk      Smoking Tobacco Use: Never Smoker     Smokeless Tobacco Use: Never Used   Alcohol Use: Not on file   Financial Resource Strain: Not on file   Food Insecurity: Not on file   Transportation Needs: Not on file   Physical Activity: Not on file   Stress: Not on file   Social Connections: Not on file   Intimate Partner Violence: Not on file   Depression: Not at risk     PHQ-2 Score: 0   Housing Stability: Not on file       Functional Status:  Prior to admission patient needed assistance:   Dependent ADLs:: Independent  Dependent IADLs:: Independent  Assesssment of Functional  Status: Needs placement in a SNF/TCF for rehabilitation    Mental Health Status:  Mental Health Status: No Current Concerns       Chemical Dependency Status:  Chemical Dependency Status: No Current Concerns             Values/Beliefs:  Spiritual, Cultural Beliefs, Orthodoxy Practices, Values that affect care:    Description of Beliefs that Will Affect Care: Christianity            Additional Information:  SW met with patient to discuss discharge needs. Patient reports she lives alone in a condominium and was previously independent with ADL/IADls. Reviewed therapy recommendations of TCU and she is in agreement. Education on Medicare.gov provided. Per request, sent referral via DOD, to check bed availability.     TARA Johnson

## 2022-07-24 LAB
CALCIUM SERPL-MCNC: 8.7 MG/DL (ref 8.5–10.1)
CREAT SERPL-MCNC: 1.07 MG/DL (ref 0.52–1.04)
GFR SERPL CREATININE-BSD FRML MDRD: 54 ML/MIN/1.73M2
MAGNESIUM SERPL-MCNC: 1.9 MG/DL (ref 1.6–2.3)
PHOSPHATE SERPL-MCNC: 3.6 MG/DL (ref 2.5–4.5)
PLATELET # BLD AUTO: 214 10E3/UL (ref 150–450)

## 2022-07-24 PROCEDURE — 84165 PROTEIN E-PHORESIS SERUM: CPT | Mod: TC | Performed by: PATHOLOGY

## 2022-07-24 PROCEDURE — 83521 IG LIGHT CHAINS FREE EACH: CPT | Performed by: STUDENT IN AN ORGANIZED HEALTH CARE EDUCATION/TRAINING PROGRAM

## 2022-07-24 PROCEDURE — 99232 SBSQ HOSP IP/OBS MODERATE 35: CPT | Performed by: HOSPITALIST

## 2022-07-24 PROCEDURE — 85049 AUTOMATED PLATELET COUNT: CPT | Performed by: STUDENT IN AN ORGANIZED HEALTH CARE EDUCATION/TRAINING PROGRAM

## 2022-07-24 PROCEDURE — 36415 COLL VENOUS BLD VENIPUNCTURE: CPT

## 2022-07-24 PROCEDURE — 120N000001 HC R&B MED SURG/OB

## 2022-07-24 PROCEDURE — 250N000011 HC RX IP 250 OP 636: Performed by: STUDENT IN AN ORGANIZED HEALTH CARE EDUCATION/TRAINING PROGRAM

## 2022-07-24 PROCEDURE — 250N000013 HC RX MED GY IP 250 OP 250 PS 637: Performed by: HOSPITALIST

## 2022-07-24 PROCEDURE — 250N000011 HC RX IP 250 OP 636: Performed by: HOSPITALIST

## 2022-07-24 PROCEDURE — 82565 ASSAY OF CREATININE: CPT | Performed by: STUDENT IN AN ORGANIZED HEALTH CARE EDUCATION/TRAINING PROGRAM

## 2022-07-24 PROCEDURE — 84155 ASSAY OF PROTEIN SERUM: CPT | Performed by: STUDENT IN AN ORGANIZED HEALTH CARE EDUCATION/TRAINING PROGRAM

## 2022-07-24 PROCEDURE — 86334 IMMUNOFIX E-PHORESIS SERUM: CPT | Performed by: PATHOLOGY

## 2022-07-24 PROCEDURE — 82310 ASSAY OF CALCIUM: CPT

## 2022-07-24 PROCEDURE — 250N000013 HC RX MED GY IP 250 OP 250 PS 637: Performed by: STUDENT IN AN ORGANIZED HEALTH CARE EDUCATION/TRAINING PROGRAM

## 2022-07-24 PROCEDURE — 250N000013 HC RX MED GY IP 250 OP 250 PS 637: Performed by: INTERNAL MEDICINE

## 2022-07-24 PROCEDURE — 84100 ASSAY OF PHOSPHORUS: CPT

## 2022-07-24 PROCEDURE — 82784 ASSAY IGA/IGD/IGG/IGM EACH: CPT | Performed by: STUDENT IN AN ORGANIZED HEALTH CARE EDUCATION/TRAINING PROGRAM

## 2022-07-24 PROCEDURE — 83735 ASSAY OF MAGNESIUM: CPT

## 2022-07-24 RX ADMIN — GABAPENTIN 100 MG: 100 CAPSULE ORAL at 21:09

## 2022-07-24 RX ADMIN — ENOXAPARIN SODIUM 40 MG: 40 INJECTION SUBCUTANEOUS at 13:43

## 2022-07-24 RX ADMIN — HYDROMORPHONE HYDROCHLORIDE 0.2 MG: 0.2 INJECTION, SOLUTION INTRAMUSCULAR; INTRAVENOUS; SUBCUTANEOUS at 22:48

## 2022-07-24 RX ADMIN — SODIUM CHLORIDE TAB 1 GM 1 G: 1 TAB at 08:06

## 2022-07-24 RX ADMIN — CITALOPRAM HYDROBROMIDE 40 MG: 20 TABLET ORAL at 08:06

## 2022-07-24 RX ADMIN — METHOCARBAMOL 500 MG: 500 TABLET ORAL at 08:06

## 2022-07-24 RX ADMIN — GABAPENTIN 100 MG: 100 CAPSULE ORAL at 08:06

## 2022-07-24 RX ADMIN — OXYCODONE HYDROCHLORIDE 5 MG: 5 TABLET ORAL at 21:16

## 2022-07-24 RX ADMIN — SENNOSIDES AND DOCUSATE SODIUM 1 TABLET: 50; 8.6 TABLET ORAL at 08:06

## 2022-07-24 RX ADMIN — Medication 1 TABLET: at 13:43

## 2022-07-24 RX ADMIN — METHOCARBAMOL 500 MG: 500 TABLET ORAL at 17:20

## 2022-07-24 RX ADMIN — OXYCODONE HYDROCHLORIDE 5 MG: 5 TABLET ORAL at 05:54

## 2022-07-24 RX ADMIN — Medication 1 TABLET: at 17:25

## 2022-07-24 RX ADMIN — LEVOFLOXACIN 250 MG: 250 TABLET, FILM COATED ORAL at 08:06

## 2022-07-24 RX ADMIN — METHOCARBAMOL 500 MG: 500 TABLET ORAL at 21:09

## 2022-07-24 RX ADMIN — SENNOSIDES AND DOCUSATE SODIUM 1 TABLET: 50; 8.6 TABLET ORAL at 21:10

## 2022-07-24 RX ADMIN — OXYCODONE HYDROCHLORIDE 5 MG: 5 TABLET ORAL at 17:32

## 2022-07-24 ASSESSMENT — ACTIVITIES OF DAILY LIVING (ADL)
ADLS_ACUITY_SCORE: 36
ADLS_ACUITY_SCORE: 35
ADLS_ACUITY_SCORE: 36
ADLS_ACUITY_SCORE: 35
ADLS_ACUITY_SCORE: 36
ADLS_ACUITY_SCORE: 36
ADLS_ACUITY_SCORE: 35
ADLS_ACUITY_SCORE: 35
ADLS_ACUITY_SCORE: 36

## 2022-07-24 NOTE — PROGRESS NOTES
Alert and oriented x 4, forgetful. VSS. CMS intact, Pain managed with oxycodone. Assist of one to BSC, TTWB to RLE, GB/walker, voiding adequately. Will continue to monitor.

## 2022-07-24 NOTE — PROGRESS NOTES
[facility]    Hospitalist Progress Note    Date of Admission:  7/20/2022    Assessment & Plan     Mary Jo Mcleod is a 76 year old female with a history of multiple myeloma admitted on 7/20/2022 with back pain, inability to ambulate.      Ongoing, worsening mid back and right hip pain  Inability to ambulate 2/2 pain  Acute, mildly comminuted fracture of the L5 vertebral body with moderate compression and loss of vertebral body height.    Acute nondisplaced fracture through the left parasymphyseal pubic body in the anterior pelvis  Cortical irregularity in the right sacral ala, superiorly, compatible with age indeterminant fracture  Sacral insufficiency fracture in the right hemisacrum  Recent falls  Above fractures, all likely pathologic secondary to multiple myeloma  Patient had a fall in end of May and another fall in June.  Was seen in ED on 6/25 at which time CT showed L5 fracture.  Subsequently was seen by orthopedics clinic and had MRI after that.  Unable to view this report.  Over the last few days has had escalating mid back and right hip pain with inability to ambulate and hence presented to ED.  CT findings at presentation as above.  - consult ortho and NSG.  MRI lumbar spine noted progression of multiple myeloma with multiple lytic lesions in lumbar spine and pelvis.  Also demonstrated slight progression of L5 compression fracture which still appears acute and unhealed and also demonstrated a sacral insufficiency fracture in the right hemisacrum..  Corset style brace ordered for support.  Use corset as needed.  -Radiation oncology consulted.  No role for radiation treatment at this time.  -Per orthopedics: Pelvic fractures are nonoperative.  Orthopedics also reviewed the right subtrochanteric femur lesion which is presumed to be a myeloma lesion.  No indication for prophylactic nailing of right femur at this time.  - pain control-as needed IV Dilaudid, low-dose oxycodone.  Also  added scheduled Robaxin and gabapentin.  Titrate meds as needed.  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  Treatment delayed secondary to patient's falls over last couple of months resulting in persistent right hip pain.  Since Dr. Hanks at [facility]. Plan for PET and to start chemo soon.  -Consult to [place] oncology here and follow up with [facility] after discharge.  If going to TCU after discharge, will need treatment rescheduled.  If prolonged hospitalization and TCU stay anticipated, could consider starting on bortezomib and dexamethasone inpatient.  - start levofloxacin ppx as prescribed  -Oncology also ordered Zometa to reduce risk of further fractures.     MDD  - continue citalopram     HLD  -Continue PTA statin        Diet:  Regular diet  DVT Prophylaxis: Pneumatic Compression Devices  Astudillo Catheter: Not present  Central Lines: None  Cardiac Monitoring: None  Code Status:   FULL, discussed        Clinically Significant Risk Factors Present on Admission         Disposition: Medically stable for discharge, pending TCU placement    Erika Camacho MD  Text Page (7am - 6pm, M-F)  [facility]  Securely message with the Vocera Web Console (learn more here)  Text page via [facility] Paging/Directory      Interval History   Stable overnight.  Having better pain control today and is appreciative of addition of Robaxin and gabapentin.  Had hard BM yesterday.    -Data reviewed today: I reviewed all new labs and imaging results over the last 24 hours. I personally reviewed CT scan with result as noted above    Physical Exam   Temp: 99.1  F (37.3  C) Temp src: Oral BP: 96/60 Pulse: 100   Resp: 16 SpO2: 94 % O2 Device: None (Room air)    Vitals:    07/20/22 2133   Weight: 59 kg (130 lb)     Vital Signs with Ranges  Temp:  [98  F  (36.7  C)-99.1  F (37.3  C)] 99.1  F (37.3  C)  Pulse:  [] 100  Resp:  [16-18] 16  BP: ()/(49-60) 96/60  SpO2:  [94 %-96 %] 94 %  I/O last 3 completed shifts:  In: 450 [P.O.:450]  Out: 300 [Urine:300]    Constitutional: Alert, appears more comfortable, laying in bed, in no distress  Respiratory: Non labored breathing, clear to auscultation bilaterally, no crackles or wheezes  Cardiovascular: Heart sounds regular rate and rhythm, no murmurs, no leg edema  GI: Abdomen is soft, non distended, non tender. Normal BS  Skin/Integumen: no rashes, no pressure sores  Neuro: alert, converses appropriately, moving all extremities, fluent speech, no facial asymmetry  Psych: Appears calm    Medications       calcium carbonate 600 mg-vitamin D 400 units  1 tablet Oral BID w/meals     citalopram  40 mg Oral Daily     enoxaparin ANTICOAGULANT  40 mg Subcutaneous Q24H     gabapentin  100 mg Oral BID     levofloxacin  250 mg Oral Daily     methocarbamol  500 mg Oral TID     senna-docusate  1 tablet Oral BID     sodium chloride  1 g Oral Daily       Data   Recent Labs   Lab 07/24/22  0712 07/20/22  1906   WBC  --  4.8   HGB  --  9.1*   MCV  --  98    221   NA  --  137   POTASSIUM  --  3.5   CHLORIDE  --  105   CO2  --  27   BUN  --  18   CR 1.07* 1.03   ANIONGAP  --  5   ARA 8.7 9.0   GLC  --  99       Imaging  No results found for this or any previous visit (from the past 24 hour(s)).

## 2022-07-24 NOTE — PROGRESS NOTES
Alert and oriented x 4. VSS, CMS intact. Pain managed with PRN oxycodone. Assist of one, GB/walker, TTWB on RLE to BSC. Voiding adequately, had a small BM this shift. Will continue to monitor.

## 2022-07-24 NOTE — PLAN OF CARE
Goal Outcome Evaluation:     A/OX4, pain managed with oxycodone, ice packs and repositioning. Up with 1 assist/Pivot to BSC. TTWB on right leg.Use lumbar corset as needed. IV SL,TCU on discharge. Will continue to monitor.

## 2022-07-24 NOTE — PROGRESS NOTES
Neurosurgery progress note     Hospital day 4     Patient states she's feeling better today than yesterday.  She denies any back pain.  Continues to have right hip pain.  No new issues.          Exam     Alert and oriented no acute distress  Bilateral lower extremities with 5-5 strength  Lumbar spine nontender to palpation or percussion in the midline  Sacrum is tender to palp patient more to the right       Plan  Continue pain management.  Lumbar corset as needed when out of bed for pain control.     NORRIS Virgen  [facility] Neurosurgery  [address] [address]     Tel [phone]  Pager [phone]

## 2022-07-25 ENCOUNTER — APPOINTMENT (OUTPATIENT)
Dept: PHYSICAL THERAPY | Facility: CLINIC | Age: 77
DRG: 543 | End: 2022-07-25
Payer: COMMERCIAL

## 2022-07-25 PROCEDURE — 120N000001 HC R&B MED SURG/OB

## 2022-07-25 PROCEDURE — 250N000013 HC RX MED GY IP 250 OP 250 PS 637: Performed by: STUDENT IN AN ORGANIZED HEALTH CARE EDUCATION/TRAINING PROGRAM

## 2022-07-25 PROCEDURE — 250N000013 HC RX MED GY IP 250 OP 250 PS 637: Performed by: HOSPITALIST

## 2022-07-25 PROCEDURE — 250N000011 HC RX IP 250 OP 636: Performed by: HOSPITALIST

## 2022-07-25 PROCEDURE — 97116 GAIT TRAINING THERAPY: CPT | Mod: GP | Performed by: PHYSICAL THERAPIST

## 2022-07-25 PROCEDURE — 84165 PROTEIN E-PHORESIS SERUM: CPT | Mod: 26 | Performed by: PATHOLOGY

## 2022-07-25 PROCEDURE — 86334 IMMUNOFIX E-PHORESIS SERUM: CPT | Mod: 26 | Performed by: PATHOLOGY

## 2022-07-25 PROCEDURE — 97530 THERAPEUTIC ACTIVITIES: CPT | Mod: GP | Performed by: PHYSICAL THERAPIST

## 2022-07-25 PROCEDURE — 99233 SBSQ HOSP IP/OBS HIGH 50: CPT | Performed by: INTERNAL MEDICINE

## 2022-07-25 PROCEDURE — 250N000013 HC RX MED GY IP 250 OP 250 PS 637: Performed by: INTERNAL MEDICINE

## 2022-07-25 PROCEDURE — 99232 SBSQ HOSP IP/OBS MODERATE 35: CPT | Performed by: INTERNAL MEDICINE

## 2022-07-25 RX ORDER — ACETAMINOPHEN 325 MG/1
325 TABLET ORAL DAILY
Status: DISCONTINUED | OUTPATIENT
Start: 2022-07-25 | End: 2022-07-28

## 2022-07-25 RX ORDER — IBUPROFEN 200 MG
200 TABLET ORAL 2 TIMES DAILY
Status: DISCONTINUED | OUTPATIENT
Start: 2022-07-25 | End: 2022-07-29 | Stop reason: HOSPADM

## 2022-07-25 RX ADMIN — OXYCODONE HYDROCHLORIDE 5 MG: 5 TABLET ORAL at 21:37

## 2022-07-25 RX ADMIN — SENNOSIDES AND DOCUSATE SODIUM 1 TABLET: 50; 8.6 TABLET ORAL at 20:03

## 2022-07-25 RX ADMIN — LEVOFLOXACIN 250 MG: 250 TABLET, FILM COATED ORAL at 08:43

## 2022-07-25 RX ADMIN — SENNOSIDES AND DOCUSATE SODIUM 1 TABLET: 50; 8.6 TABLET ORAL at 08:43

## 2022-07-25 RX ADMIN — LORAZEPAM 0.5 MG: 0.5 TABLET ORAL at 13:31

## 2022-07-25 RX ADMIN — METHOCARBAMOL 500 MG: 500 TABLET ORAL at 15:36

## 2022-07-25 RX ADMIN — ENOXAPARIN SODIUM 40 MG: 40 INJECTION SUBCUTANEOUS at 12:27

## 2022-07-25 RX ADMIN — METHOCARBAMOL 500 MG: 500 TABLET ORAL at 08:44

## 2022-07-25 RX ADMIN — Medication 1 TABLET: at 12:27

## 2022-07-25 RX ADMIN — CITALOPRAM HYDROBROMIDE 40 MG: 20 TABLET ORAL at 08:43

## 2022-07-25 RX ADMIN — SODIUM CHLORIDE TAB 1 GM 1 G: 1 TAB at 08:44

## 2022-07-25 RX ADMIN — OXYCODONE HYDROCHLORIDE 5 MG: 5 TABLET ORAL at 15:36

## 2022-07-25 RX ADMIN — GABAPENTIN 100 MG: 100 CAPSULE ORAL at 20:03

## 2022-07-25 RX ADMIN — OXYCODONE HYDROCHLORIDE 5 MG: 5 TABLET ORAL at 01:02

## 2022-07-25 RX ADMIN — METHOCARBAMOL 500 MG: 500 TABLET ORAL at 21:37

## 2022-07-25 RX ADMIN — OXYCODONE HYDROCHLORIDE 2.5 MG: 5 TABLET ORAL at 10:36

## 2022-07-25 RX ADMIN — GABAPENTIN 100 MG: 100 CAPSULE ORAL at 08:43

## 2022-07-25 RX ADMIN — IBUPROFEN 200 MG: 200 TABLET, FILM COATED ORAL at 10:37

## 2022-07-25 RX ADMIN — Medication 1 TABLET: at 18:37

## 2022-07-25 RX ADMIN — IBUPROFEN 200 MG: 200 TABLET, FILM COATED ORAL at 20:03

## 2022-07-25 ASSESSMENT — ACTIVITIES OF DAILY LIVING (ADL)
ADLS_ACUITY_SCORE: 36

## 2022-07-25 NOTE — PLAN OF CARE
VSS, AOX4, CMS intact. Moderate to severe pain overnight controlled with IV dilaudid and oxycodone. Up 1 assist to beside commode, IV saline locked. Discharge pending TCU placement

## 2022-07-25 NOTE — PROGRESS NOTES
Care Management Follow Up    Length of Stay (days): 4    Expected Discharge Date: 07/25/2022     Concerns to be Addressed:       Patient plan of care discussed at interdisciplinary rounds: Yes    Anticipated Discharge Disposition:       Anticipated Discharge Services:    Anticipated Discharge DME:      Patient/family educated on Medicare website which has current facility and service quality ratings:    Education Provided on the Discharge Plan:    Patient/Family in Agreement with the Plan:      Referrals Placed by CM/SW:    Private pay costs discussed: Not applicable    Additional Information:  Calls placed to:    1. [facility]: being reviewed  2. [facility]: message left  3. [facility]: admission pause until 8/1/22  4. [facility]: message left  5. [facility]: being reviewed      Addendum: Call received from [facility] that they will have both a private and shared available Wednesday and possibly a private 7/26/22.  Writer spoke to patient who confirmed that she would prefer a private room. Patient also confirmed that she will need a ride set up at discharge and writer explained cost. Patient is ok with the cost of the ride. Call to [facility] to confirm private room.    TARA Zuñiga

## 2022-07-25 NOTE — PLAN OF CARE
Goal Outcome Evaluation:      Pt is A&Ox4 but forgetful. A1, GB & walker. TTWB on RLE. Up to BSC this shift, tolerated well. Pain managed with PRN oxycodone. IV SL. Discharge pending TCU placement.

## 2022-07-25 NOTE — PROGRESS NOTES
[facility]    HOSPITALIST PROGRESS NOTE :   --------------------------------------------------    Date of Admission:  7/20/2022    Cumulative Summary: Mary Jo Mcleod is a 76 year old female who was admitted on 7/20/2022 with back pain, inability to ambulate.    Assessment & Plan     Ongoing, worsening mid back and right hip pain  Inability to ambulate 2/2 pain  Acute, mildly comminuted fracture of the L5 vertebral body with moderate compression and loss of vertebral body height.    Acute nondisplaced fracture through the left parasymphyseal pubic body in the anterior pelvis  Cortical irregularity in the right sacral ala, superiorly, compatible with age indeterminant fracture  Sacral insufficiency fracture in the right hemisacrum  Recent falls  Above fractures, all likely pathologic secondary to multiple myeloma    Patient had a fall in end of May and another fall in June.  Was seen in ED on 6/25 at which time CT showed L5 fracture.  Subsequently was seen by orthopedics clinic and had MRI after that.  Unable to view this report.  Over the last few days has had escalating mid back and right hip pain with inability to ambulate and hence presented to ED.  CT findings at presentation as above.    -- consult ortho and NSG.  MRI lumbar spine noted progression of multiple myeloma with multiple lytic lesions in lumbar spine and pelvis. Also demonstrated slight progression of L5 compression fracture which still appears acute and unhealed and also demonstrated a sacral insufficiency fracture in the right hemisacrum. Corset style brace ordered for support. Use corset as needed.  --Radiation oncology consulted.  Discussed the case with radiation oncology, recommending short course of radiation while patient is awaiting placement.  --Per orthopedics: Pelvic fractures are nonoperative.  Orthopedics also reviewed the right subtrochanteric femur lesion which is presumed to be a myeloma lesion.  No  indication for prophylactic nailing of right femur at this time.  -- pain control-as needed IV Dilaudid, low-dose oxycodone.  Also added scheduled Robaxin and gabapentin.  Titrate meds as needed.  -- Patient is a still complaining of 10 out of 10 pain when she moves, added Tylenol 3 25 mg once a day as patient is mentioning multiple side effects from Tylenol, will also add ibuprofen 200 mg twice daily after the meal.  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  Treatment delayed secondary to patient's falls over last couple of months resulting in persistent right hip pain.  Since Dr. Hanks at [facility]. Plan for PET and to start chemo soon.    --Consult to [place] oncology here and follow up with [facility] after discharge. If going to TCU after discharge, will need treatment rescheduled.  If prolonged hospitalization and TCU stay anticipated, could consider starting on bortezomib and dexamethasone inpatient.  -- start levofloxacin ppx as prescribed  -- Patient has been evaluated by  ,Oncology also ordered Zometa to reduce risk of further fractures.  --  also discussed with patient regarding treatment of multiple myeloma ,She is supposed to start treatment with daratumumab, Revlimid, and dexamethasone. has sent the message to her primary hematologist and hoping that she can get her Revlimid and dexamethasone while she is in the TCU.  No urgency for starting any treatment in the hospital.  -- Oncology is recommending transfusion if Hgb is less than 7   --As above, discussed the case with radiation oncology, who will start patient on short course of radiation while patient is awaiting placement.     MDD  -- Continue citalopram     HLD  -- Continue PTA statin    Clinically Significant Risk Factors Present on Admission     Diet: Regular Diet Adult     [place] Catheter: Not present  DVT Prophylaxis: Pneumatic Compression Devices  Code Status: Full Code    The patient's care was discussed with the Patient and Radiation oncology Consultant.  35 min were spent in direct patient care today including the floor time , more than 50% of the time was spent in patient care coordination and counseling.    Disposition Plan      Expected Discharge Date: 07/25/2022        Discharge Comments: TCU placement     Entered: Laila Bullard MD 07/24/2022, 11:55 PM       Laila Bullard MD, MD, FACP  Text Page (7am - 6pm)    ----------------------------------------------------------------------------------------------------------------------      Interval History   Patient care was assumed this morning, patient was seen and examined.  Patient is denying any new complaints, pain is relatively controlled when she is not moving but thinks that her pain is a still 10 out of 10 when she moves.  We discussed about importance of using some pain medications at least for the time being due to the presence of pathological fractures, patient showed understanding we went over pain medications and she was in agreement with trying Tylenol once a day while we also added ibuprofen twice a day after the meal.  Plan to continue patient on other pain medications.    -Data reviewed today: I reviewed all new labs and imaging results over the last 24 hours.    I personally reviewed no images or EKG's today.    Physical Exam   Temp: 99.5  F (37.5  C) Temp src: Oral BP: 106/56 Pulse: 96   Resp: 16 SpO2: 92 % O2 Device: None (Room air)    Vitals:    07/20/22 2133   Weight: 59 kg (130 lb)     Vital Signs with Ranges  Temp:  [98.3  F (36.8  C)-99.5  F (37.5  C)] 99.5  F (37.5  C)  Pulse:  [] 96  Resp:  [16-18] 16  BP: ()/(56-60) 106/56  SpO2:  [92 %-94 %] 92 %  I/O last 3 completed shifts:  In: 400 [P.O.:400]  Out: 700 [Urine:700]    GENERAL: Alert , awake and oriented. NAD. Conversational, appropriate.    HEENT: Normocephalic. EOMI. No icterus or injection. Nares normal.   LUNGS: Clear to auscultation. No dyspnea at rest.   HEART: Regular rate. Extremities perfused.   ABDOMEN: Soft, nontender, and nondistended. Positive bowel sounds.   EXTREMITIES: No LE edema noted.   NEUROLOGIC: Moves extremities x4 on command. No acute focal neurologic abnormalities noted.     Medications       calcium carbonate 600 mg-vitamin D 400 units  1 tablet Oral BID w/meals     citalopram  40 mg Oral Daily     enoxaparin ANTICOAGULANT  40 mg Subcutaneous Q24H     gabapentin  100 mg Oral BID     levofloxacin  250 mg Oral Daily     methocarbamol  500 mg Oral TID     senna-docusate  1 tablet Oral BID     sodium chloride  1 g Oral Daily       Data   Recent Labs   Lab 07/24/22  0712 07/20/22  1906   WBC  --  4.8   HGB  --  9.1*   MCV  --  98    221   NA  --  137   POTASSIUM  --  3.5   CHLORIDE  --  105   CO2  --  27   BUN  --  18   CR 1.07* 1.03   ANIONGAP  --  5   ARA 8.7 9.0   GLC  --  99       Imaging:   No results found for this or any previous visit (from the past 24 hour(s)).

## 2022-07-25 NOTE — PROGRESS NOTES
"[facility]    Neurosurgery Progress Note    Date of Service (when I saw the patient): 07/25/2022     Assessment & Plan   76F with a history of multiple myeloma with L5 fracture after mutliple falls with worsening back pain. Today she was seen lying in bed. She reports low back pain. She describes intermittent bilateral leg pain as well. No paresthesias or overt weakness. No bowel/bladder complaints or foot drop. She states pain is improving. She has a corset to be worn when out of bed.    Plan:  -Continue brace when out of bed  -Pain management as needed.   -Follow up in NSG clinic in 6 weeks with lumbar XR prior.  -NSG will sign off, please page with questions    Discussed with Dr. Felipe.    Lisbeth Douglas, MARI  [facility] Neurosurgery  [address] [address]    Tel [phone]  Pager [phone]      Interval History   Stable.    Physical Exam   Temp: 97.5  F (36.4  C) Temp src: Axillary BP: 102/43 Pulse: 78   Resp: 18 SpO2: 96 % O2 Device: None (Room air)    Vitals:    07/20/22 2133   Weight: 130 lb (59 kg)     Vital Signs with Ranges  Temp:  [97.5  F (36.4  C)-99.5  F (37.5  C)] 97.5  F (36.4  C)  Pulse:  [] 78  Resp:  [16-18] 18  BP: ()/(43-63) 102/43  SpO2:  [92 %-96 %] 96 %  I/O last 3 completed shifts:  In: 400 [P.O.:400]  Out: 1000 [Urine:1000]     , Blood pressure 102/43, pulse 78, temperature 97.5  F (36.4  C), temperature source Axillary, resp. rate 18, height 5' 3\" (1.6 m), weight 130 lb (59 kg), last menstrual period 12/04/1989, SpO2 96 %.  130 lbs 0 oz  HEENT:  Normocephalic.  PERRLA.    Heart:  No peripheral edema  Lungs:  No SOB  Skin:  Warm and dry, good capillary refill.  Extremities:  Good radial and dorsalis pedis pulses bilaterally, no edema, cyanosis or clubbing.    NEUROLOGICAL EXAMINATION:   Mental status:  Alert and Oriented x 3, speech is fluent.  Motor:  OH  Sensation:  " intact    Medications       acetaminophen  325 mg Oral Daily     calcium carbonate 600 mg-vitamin D 400 units  1 tablet Oral BID w/meals     citalopram  40 mg Oral Daily     enoxaparin ANTICOAGULANT  40 mg Subcutaneous Q24H     gabapentin  100 mg Oral BID     ibuprofen  200 mg Oral BID     levofloxacin  250 mg Oral Daily     methocarbamol  500 mg Oral TID     senna-docusate  1 tablet Oral BID     sodium chloride  1 g Oral Daily       Data     CBC RESULTS:   Recent Labs   Lab Test 07/24/22  0712 07/20/22  1906   WBC  --  4.8   RBC  --  2.87*   HGB  --  9.1*   HCT  --  28.0*   MCV  --  98   MCH  --  31.7   MCHC  --  32.5   RDW  --  13.8    221     Basic Metabolic Panel:  Lab Results   Component Value Date     07/20/2022     07/08/2022      Lab Results   Component Value Date    POTASSIUM 3.5 07/20/2022    POTASSIUM 3.9 07/08/2022     Lab Results   Component Value Date    CHLORIDE 105 07/20/2022    CHLORIDE 101 07/08/2022     Lab Results   Component Value Date    ARA 8.7 07/24/2022    ARA 9.9 07/08/2022     Lab Results   Component Value Date    CO2 27 07/20/2022    CO2 28 06/23/2021     Lab Results   Component Value Date    BUN 18 07/20/2022    BUN 15 07/08/2022    BUN 12 07/08/2022     Lab Results   Component Value Date    CR 1.07 07/24/2022    CR 1.23 07/08/2022     Lab Results   Component Value Date    GLC 99 07/20/2022    GLC 90 07/08/2022     INR:  No results found for: INR

## 2022-07-25 NOTE — PROGRESS NOTES
Service Date: 07/25/2022    SUBJECTIVE:  Ms. Mcleod is a 76-year-old female with multiple myeloma.  The patient follows up at [facility].  She will be started on treatment with daratumumab, Revlimid, and dexamethasone.  Treatment has not yet been started.    The patient admitted because of worsening lower back pain.  Multiple investigations have been done.  There is pathological compression fracture of L5.  There is a sacral insufficiency fracture.  There is also a nondisplaced fracture through the left pubic body.    Neurosurgery and Orthopedics have seen the patient.  No surgery planned.  The patient is on pain medication including Dilaudid.  She is also on Neurontin and Robaxin.    Radiation oncologist evaluated the patient.  There is a right femur myeloma lesion, which was concerning.  Orthopedics has evaluated.  No prophylactic nailing planned at this time.    I met with the patient.  Pain is fairly well controlled when she is not moving.  Pain is worse when she moves.    No headache or dizziness.  No chest pain or shortness of breath.  No nausea or vomiting.    PHYSICAL EXAMINATION:    GENERAL:  She is alert and oriented.  Not in any distress.  VITAL SIGNS:  Reviewed.  Rest of systems not examined.    ASSESSMENT:    1.  A 76-year-old female with multiple myeloma.  2.  Lower back pain secondary to myeloma lesion and compression fractures.  3.  Normocytic anemia.    PLAN:    1.  Discussed with her regarding myeloma.  Plan is to start her on treatment with daratumumab, Revlimid, and dexamethasone.    I have communicated with her primary hematologist, Dr. Hanks.  Her team is arranging for patient to get Revlimid.  When the patient gets Revlimid, she can be start it along with dexamethasone.    Daratumumab will be added as outpatient.    If there is significant delay in getting Revlimid, one option would be to start her on Velcade while she is inpatient.    2.  For myeloma bone lesion, she has  received Zometa.  That will be continued monthly as outpatient.    3.  Case discussed with Dr. Hager from Radiation Oncology today.  He will evaluate the patient.  If he thinks the patient will benefit from radiation, the radiation may be inpatient or outpatient, depending on the patient disposition.    4.  The patient had a few questions, which were all answered.  Hematology/Oncology will see her intermittently.    TIME SPENT:  Total visit time of 25 minutes.  Time spent in today's visit, review of chart/investigations today, communicating with other providers and documentation today.    Eugene Franklin MD        D: 2022   T: 2022   MT: [id number]    Name:     ROMA ELLIS  MRN:      [id number]        Account:      [id number]   :      1945           Service Date: 2022       Document: [id number]

## 2022-07-26 LAB — GLUCOSE BLDC GLUCOMTR-MCNC: 101 MG/DL (ref 70–99)

## 2022-07-26 PROCEDURE — 250N000013 HC RX MED GY IP 250 OP 250 PS 637: Performed by: INTERNAL MEDICINE

## 2022-07-26 PROCEDURE — 99232 SBSQ HOSP IP/OBS MODERATE 35: CPT | Performed by: INTERNAL MEDICINE

## 2022-07-26 PROCEDURE — 120N000001 HC R&B MED SURG/OB

## 2022-07-26 PROCEDURE — 250N000013 HC RX MED GY IP 250 OP 250 PS 637: Performed by: STUDENT IN AN ORGANIZED HEALTH CARE EDUCATION/TRAINING PROGRAM

## 2022-07-26 PROCEDURE — 250N000011 HC RX IP 250 OP 636: Performed by: HOSPITALIST

## 2022-07-26 PROCEDURE — 250N000013 HC RX MED GY IP 250 OP 250 PS 637: Performed by: HOSPITALIST

## 2022-07-26 PROCEDURE — 99231 SBSQ HOSP IP/OBS SF/LOW 25: CPT | Performed by: INTERNAL MEDICINE

## 2022-07-26 RX ADMIN — GABAPENTIN 100 MG: 100 CAPSULE ORAL at 20:50

## 2022-07-26 RX ADMIN — SODIUM CHLORIDE TAB 1 GM 1 G: 1 TAB at 08:42

## 2022-07-26 RX ADMIN — GABAPENTIN 100 MG: 100 CAPSULE ORAL at 08:43

## 2022-07-26 RX ADMIN — LEVOFLOXACIN 250 MG: 250 TABLET, FILM COATED ORAL at 08:43

## 2022-07-26 RX ADMIN — OXYCODONE HYDROCHLORIDE 5 MG: 5 TABLET ORAL at 18:01

## 2022-07-26 RX ADMIN — IBUPROFEN 200 MG: 200 TABLET, FILM COATED ORAL at 20:50

## 2022-07-26 RX ADMIN — SENNOSIDES AND DOCUSATE SODIUM 1 TABLET: 50; 8.6 TABLET ORAL at 20:50

## 2022-07-26 RX ADMIN — OXYCODONE HYDROCHLORIDE 5 MG: 5 TABLET ORAL at 14:01

## 2022-07-26 RX ADMIN — Medication 1 TABLET: at 12:40

## 2022-07-26 RX ADMIN — SENNOSIDES AND DOCUSATE SODIUM 1 TABLET: 50; 8.6 TABLET ORAL at 08:43

## 2022-07-26 RX ADMIN — ACETAMINOPHEN 650 MG: 325 TABLET ORAL at 16:39

## 2022-07-26 RX ADMIN — OXYCODONE HYDROCHLORIDE 5 MG: 5 TABLET ORAL at 22:09

## 2022-07-26 RX ADMIN — METHOCARBAMOL 500 MG: 500 TABLET ORAL at 08:43

## 2022-07-26 RX ADMIN — OXYCODONE HYDROCHLORIDE 5 MG: 5 TABLET ORAL at 08:56

## 2022-07-26 RX ADMIN — METHOCARBAMOL 500 MG: 500 TABLET ORAL at 22:09

## 2022-07-26 RX ADMIN — IBUPROFEN 200 MG: 200 TABLET, FILM COATED ORAL at 08:43

## 2022-07-26 RX ADMIN — CITALOPRAM HYDROBROMIDE 40 MG: 20 TABLET ORAL at 08:43

## 2022-07-26 RX ADMIN — Medication 1 TABLET: at 18:01

## 2022-07-26 RX ADMIN — METHOCARBAMOL 500 MG: 500 TABLET ORAL at 16:08

## 2022-07-26 RX ADMIN — ENOXAPARIN SODIUM 40 MG: 40 INJECTION SUBCUTANEOUS at 12:40

## 2022-07-26 ASSESSMENT — ACTIVITIES OF DAILY LIVING (ADL)
ADLS_ACUITY_SCORE: 36
ADLS_ACUITY_SCORE: 38
ADLS_ACUITY_SCORE: 36
ADLS_ACUITY_SCORE: 36

## 2022-07-26 NOTE — PROGRESS NOTES
The patient came to radiation therapy for a CT planning session for treatment to the L spine, right femur and left ischium.  The patient received treatment 1 of 4 to the L spine, right femur and left ischium.  A daily dose of 500 cGy at 10 MV photons was given for a total to date dose of 500 cGy.  The patient has 3 treatments remaining.

## 2022-07-26 NOTE — CONSULTS
Consult Date: 07/26/2022    REFERRING PHYSICIANS: Dr. Eugene Franklin, Dr. Anna Hanks hematologist at the [facility].  Also, Dr.Jeffrey Tomlin, orthopedic surgeon and Dr. BAKARI Bullard, hospitalist  at [facility].    Mary Jo Mcleod is a 76-year-old female recently diagnosed as having multiple myeloma involvement.    The patient was admitted to [facility] on 07/20/2022 due to mid back and right hip pain and discomfort.  The patient had been prepared to initiate treatment for her multiple myeloma with Dr. Marquez at the [facility] after admission.  This has been placed on hold.    The patient had been scheduled for PET/CT study, which is yet to be carried out.    CT scan of the pelvis on 07/20/2022 identifies innumerable lytic lesions throughout the pelvic bones with significant left ischium lesion with fracture.  Additional subtrochanteric lesion of the right proximal femur was noted.  Additional lesion of the L5 vertebra with compression fracture at this site is also noted.    The patient has had significant pain and discomfort in these areas.  This has required pain medication.  Orthopedic surgery has evaluated at this time, no surgical intervention is recommended.    Based on her significant pain.  The patient is seen for consideration of radiation treatment to her painful symptomatic areas in the pelvic and right femur areas.    MEDICATIONS: Dilaudid pain medication, oxycodone, ibuprofen as needed.  Lovenox, senna, Compazine medication.    ALLERGIES:  CODEINE, BUPROPION, TRAZODONE, TETRACYCLINE, SULFA, PENICILLIN.    PAST MEDICAL HISTORY:  Significant for tonsillectomy, bone marrow biopsy diagnosing myeloma.    PHYSICAL EXAMINATION:    GENERAL:  Reveals an alert female in no acute distress.  ECOG performance score 2, blood pressure 101/55, pulse is 92 and regular, weight 130 pounds.  Oral cavity shows no mucosal lesions or  ulcerations.  NECK: Exam shows no palpable cervical or supraclavicular lymphadenopathy.  No palpable thyromegaly.  LUNGS:  On auscultation reveals no significant rales or rhonchi.  CARDIAC:  Regular rhythm without significant murmur.  No palpable lymphadenopathy throughout the neck and groin region.  EXTREMITIES:  Are free of edema.  ABDOMEN:  Soft, without organomegaly.  No significant neurological deficits are identified.  MUSCULOSKELETAL:  Identifies moderate discomfort in the right hip.  Lower lumbar spine and left symphysis areas of bony involvement.    ASSESSMENT AND RECOMMENDATIONS:  Mary Jo Mcleod is a 76-year-old female with a diagnosis of multiple myeloma with painful involvement in her bony pelvis region in addition to involvement in the right femur and left ischium bone sites.    Based on the patient's identified painful tumor involvement, I have outlined consideration of external beam radiation treatment to be directed to the bony sites.  I have outlined treatment to a cumulative dose of 2000 cGy administered in four treatment fractions of 500 cGy to these sites.  I have explained the risks, side effects, complications, and beneficial goals of this treatment.  This discussion included, but was not limited to an explanation of the risk of skin irritative reaction and radiation effect on adjacent bowel, bladder, and soft tissues.  The patient seems to understand and has consented to treatment.  We will be carrying out simulation and CT planning this morning and plan to initiate treatment later this afternoon.    I spent 15 minutes in face-to-face time with the patient.  Twelve minutes of which were spent in counseling and coordination of care.  Ten minutes were spent reviewing imaging, test results and other medical records.  I additionally spent 5 minutes in discussion with Dr. Eugene Franklin with Oncology.    Henry White MD        D: 07/26/2022   T: 07/26/2022   MT: [id number]    Name:     CALEB  ROMA  MRN:      [id number]        Account:      [id number]   :      1945           Consult Date: 2022     Document: [id number]

## 2022-07-26 NOTE — PLAN OF CARE
Goal Outcome Evaluation:      Pt is A&O x4. A1, gb and walker. Corset brace in room to use as needed for comfort when oob. Up to BSC, voiding adequately. IV SL. Pain managed with PRN oxycodone and ibuprofen. Pt had first radiation therapy session today. Discharge pending.

## 2022-07-26 NOTE — PROGRESS NOTES
Pt known to me from evaluation/consultation 7/22/22.  Pt with Dx. Of multiple myeloma with right femur, left pelvis & L5 painful bone involvement. No orthopedic surgery recommended.  Discussed with pt XRT treatment to L5, pelvis & right femur painful area.  Outlined XRT treatment to dose of 2000 cGy in 4 rx fractions starting today.  Pt consents.  Will simulate areas this AM & start rx this afternoon. I also discussed with Dr. Franklin in oncology.  Consult dictated.  NEENA White MD.

## 2022-07-26 NOTE — PLAN OF CARE
Goal Outcome Evaluation:    Patient is A&O x4. Up with one assist/gb/walker with corset brace to BSC, voiding. Denies chest pain or SOB. Pain managed with oxycodone and ibuprofen, refused tylenol. Complains of intermittent numbness on RLE. Discharge possibly tomorrow to TCU. Will continue to monitor.

## 2022-07-26 NOTE — PROGRESS NOTES
[facility]    HOSPITALIST PROGRESS NOTE :   --------------------------------------------------    Date of Admission:  7/20/2022    Cumulative Summary: Mary Jo Mcleod is a 76 year old female who was admitted on 7/20/2022 with back pain, inability to ambulate.    Assessment & Plan     Ongoing, worsening mid back and right hip pain  Inability to ambulate 2/2 pain  Acute, mildly comminuted fracture of the L5 vertebral body with moderate compression and loss of vertebral body height.    Acute nondisplaced fracture through the left parasymphyseal pubic body in the anterior pelvis  Cortical irregularity in the right sacral ala, superiorly, compatible with age indeterminant fracture  Sacral insufficiency fracture in the right hemisacrum  Recent falls  Above fractures, all likely pathologic secondary to multiple myeloma    Patient had a fall in end of May and another fall in June.  Was seen in ED on 6/25 at which time CT showed L5 fracture.  Subsequently was seen by orthopedics clinic and had MRI after that.  Unable to view this report.  Over the last few days has had escalating mid back and right hip pain with inability to ambulate and hence presented to ED. CT findings at presentation as above.    -- Patient was seen and examined, her main complaint remains significant lower back pain.  -- During hospitalization , patient was evaluated by orthopedic surgery and neurosurgery.  -- MRI lumbar spine noted progression of multiple myeloma with multiple lytic lesions in lumbar spine and pelvis. Also demonstrated slight progression of L5 compression fracture which still appears acute and unhealed and also demonstrated a sacral insufficiency fracture in the right hemisacrum. Corset style brace ordered for support. Use corset as needed.  --Radiation oncology consulted.  Discussed the case with radiation oncology, recommending short course of radiation, first treatment today, plan for total of 4  treatments.  --Per orthopedics: Pelvic fractures are nonoperative. Orthopedics also reviewed the right subtrochanteric femur lesion which is presumed to be a myeloma lesion.  No indication for prophylactic nailing of right femur at this time.  -- pain control-as needed IV Dilaudid, low-dose oxycodone.  Also added scheduled Robaxin and gabapentin scheduled Tylenol and ibuprofen was also added for better pain control, hoping that radiation treatment will help patient pain control.  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  Treatment delayed secondary to patient's falls over last couple of months resulting in persistent right hip pain.  Since Dr. Hanks at [facility]. Plan for PET and to start chemo soon.    --Consult to [place] oncology here and follow up with [facility] after discharge. If going to TCU after discharge, will need treatment rescheduled.  If prolonged hospitalization and TCU stay anticipated, could consider starting on bortezomib and dexamethasone inpatient.  -- start levofloxacin ppx as prescribed  -- Patient has been evaluated by  ,Oncology also ordered Zometa to reduce risk of further fractures.  --  also discussed with patient regarding treatment of multiple myeloma ,She is supposed to start treatment with daratumumab, Revlimid, and dexamethasone. has sent the message to her primary hematologist and hoping that she can get her Revlimid and dexamethasone while she is in the TCU.  -- Oncology is recommending transfusion if Hgb is less than 7   --As above, discussed the case with radiation oncology, who will start patient on lesion treatment for 4 days.  --Dr. Franklin is following closely, will let him decide if patient will benefit in the meantime from receiving Velcade as she will be planned for discharge on Friday      MDD  --  Continue citalopram     HLD  -- Continue PTA statin    Clinically Significant Risk Factors Present on Admission     Diet: Regular Diet Adult    Astudillo Catheter: Not present  DVT Prophylaxis: Pneumatic Compression Devices  Code Status: Full Code    The patient's care was discussed with the Patient and Radiation oncology Consultant.  Tentative discharge on Friday once patient is done with radiation treatment to TCU    Disposition Plan      Expected Discharge Date: 07/29/2022        Discharge Comments: TCU placement     Entered: Laila Bullard MD 07/26/2022, 3:42 PM       Laila Bullard MD, MD, FACP  Text Page (7am - 6pm)    ----------------------------------------------------------------------------------------------------------------------      Interval History    Patient was seen and examined, feeling okay, her main complaints remain regarding the discomfort in the lower back radiating towards the lower extremities.  So far she has been able to tolerate the oral medications including Tylenol and ibuprofen.  She has been evaluated by radiation oncology and thinks that she would proceed with radiation treatment to help with significant discomfort which she has right now which would be helpful when she goes to the rehab.    -Data reviewed today: I reviewed all new labs and imaging results over the last 24 hours.    I personally reviewed no images or EKG's today.    Physical Exam   Temp: 98.4  F (36.9  C) Temp src: Oral BP: 101/55 Pulse: 92   Resp: 16 SpO2: 95 % O2 Device: None (Room air)    Vitals:    07/20/22 2133   Weight: 59 kg (130 lb)     Vital Signs with Ranges  Temp:  [97.7  F (36.5  C)-98.4  F (36.9  C)] 98.4  F (36.9  C)  Pulse:  [75-92] 92  Resp:  [16] 16  BP: ()/(55-67) 101/55  SpO2:  [94 %-98 %] 95 %  I/O last 3 completed shifts:  In: 1200 [P.O.:1200]  Out: 700 [Urine:700]    GENERAL: Alert , awake and oriented. NAD. Conversational, appropriate.   HEENT: Normocephalic. EOMI. No icterus or injection. Nares  normal.   LUNGS: Clear to auscultation. No dyspnea at rest.   HEART: Regular rate. Extremities perfused.   ABDOMEN: Soft, nontender, and nondistended. Positive bowel sounds.   EXTREMITIES: No LE edema noted.   NEUROLOGIC: Moves extremities x4 on command. No acute focal neurologic abnormalities noted.     Medications       acetaminophen  325 mg Oral Daily     calcium carbonate 600 mg-vitamin D 400 units  1 tablet Oral BID w/meals     citalopram  40 mg Oral Daily     enoxaparin ANTICOAGULANT  40 mg Subcutaneous Q24H     gabapentin  100 mg Oral BID     ibuprofen  200 mg Oral BID     levofloxacin  250 mg Oral Daily     methocarbamol  500 mg Oral TID     senna-docusate  1 tablet Oral BID     sodium chloride  1 g Oral Daily       Data   Recent Labs   Lab 07/26/22  0801 07/24/22  0712 07/20/22  1906   WBC  --   --  4.8   HGB  --   --  9.1*   MCV  --   --  98   PLT  --  214 221   NA  --   --  137   POTASSIUM  --   --  3.5   CHLORIDE  --   --  105   CO2  --   --  27   BUN  --   --  18   CR  --  1.07* 1.03   ANIONGAP  --   --  5   ARA  --  8.7 9.0   *  --  99       Imaging:   No results found for this or any previous visit (from the past 24 hour(s)).

## 2022-07-26 NOTE — PROGRESS NOTES
Care Management Follow Up    Length of Stay (days): 5    Expected Discharge Date: 07/29/2022     Concerns to be Addressed:       Patient plan of care discussed at interdisciplinary rounds: Yes    Anticipated Discharge Disposition: Home     Anticipated Discharge Services:    Anticipated Discharge DME:      Patient/family educated on Medicare website which has current facility and service quality ratings:    Education Provided on the Discharge Plan:    Patient/Family in Agreement with the Plan:      Referrals Placed by CM/SW:    Private pay costs discussed: Not applicable    Additional Information:  Writer updated that patient will be staying until Friday. Call placed to Khoi who will follow up with MILAN on Thursday regarding how patient is doing to determine discharge day.     SW to follow up with Khoi.      TARA Zuñiga

## 2022-07-26 NOTE — PROGRESS NOTES
"Patient complained of severe cramps on Left buttock, radiating to LLE. Very restless, anxious, not able to comfort with ice or reposition. Refused heat application. Given robaxin, no relief. Offered tylenol, pt accepted. After 30 minutes, patient was comfortable when the writer told that patient received tylenol. Patient started to complain of \"light breathing \" O2 sat 98% on RA, given 2LNC for comfort with O2 sat 100%. A&O. Complained of headache, VSS. On cont pulse ox to monitor.     @1800: patient denies SOB or chest pain. On O2 1L for comfort.   "

## 2022-07-27 ENCOUNTER — APPOINTMENT (OUTPATIENT)
Dept: PHYSICAL THERAPY | Facility: CLINIC | Age: 77
DRG: 543 | End: 2022-07-27
Payer: COMMERCIAL

## 2022-07-27 LAB
ANION GAP SERPL CALCULATED.3IONS-SCNC: 2 MMOL/L (ref 3–14)
BUN SERPL-MCNC: 27 MG/DL (ref 7–30)
CALCIUM SERPL-MCNC: 8.6 MG/DL (ref 8.5–10.1)
CHLORIDE BLD-SCNC: 97 MMOL/L (ref 94–109)
CO2 SERPL-SCNC: 30 MMOL/L (ref 20–32)
CREAT SERPL-MCNC: 1.4 MG/DL (ref 0.52–1.04)
ERYTHROCYTE [DISTWIDTH] IN BLOOD BY AUTOMATED COUNT: 13.7 % (ref 10–15)
GFR SERPL CREATININE-BSD FRML MDRD: 39 ML/MIN/1.73M2
GLUCOSE BLD-MCNC: 96 MG/DL (ref 70–99)
HCT VFR BLD AUTO: 28.6 % (ref 35–47)
HGB BLD-MCNC: 9.2 G/DL (ref 11.7–15.7)
MCH RBC QN AUTO: 31.8 PG (ref 26.5–33)
MCHC RBC AUTO-ENTMCNC: 32.2 G/DL (ref 31.5–36.5)
MCV RBC AUTO: 99 FL (ref 78–100)
PLATELET # BLD AUTO: 232 10E3/UL (ref 150–450)
POTASSIUM BLD-SCNC: 4.3 MMOL/L (ref 3.4–5.3)
RBC # BLD AUTO: 2.89 10E6/UL (ref 3.8–5.2)
SODIUM SERPL-SCNC: 129 MMOL/L (ref 133–144)
WBC # BLD AUTO: 3.8 10E3/UL (ref 4–11)

## 2022-07-27 PROCEDURE — 36415 COLL VENOUS BLD VENIPUNCTURE: CPT | Performed by: INTERNAL MEDICINE

## 2022-07-27 PROCEDURE — 97116 GAIT TRAINING THERAPY: CPT | Mod: GP | Performed by: PHYSICAL THERAPIST

## 2022-07-27 PROCEDURE — 99232 SBSQ HOSP IP/OBS MODERATE 35: CPT | Performed by: PHYSICIAN ASSISTANT

## 2022-07-27 PROCEDURE — 250N000013 HC RX MED GY IP 250 OP 250 PS 637: Performed by: STUDENT IN AN ORGANIZED HEALTH CARE EDUCATION/TRAINING PROGRAM

## 2022-07-27 PROCEDURE — 250N000013 HC RX MED GY IP 250 OP 250 PS 637: Performed by: INTERNAL MEDICINE

## 2022-07-27 PROCEDURE — 97530 THERAPEUTIC ACTIVITIES: CPT | Mod: GP | Performed by: PHYSICAL THERAPIST

## 2022-07-27 PROCEDURE — 250N000013 HC RX MED GY IP 250 OP 250 PS 637: Performed by: PHYSICIAN ASSISTANT

## 2022-07-27 PROCEDURE — 250N000013 HC RX MED GY IP 250 OP 250 PS 637: Performed by: HOSPITALIST

## 2022-07-27 PROCEDURE — 80048 BASIC METABOLIC PNL TOTAL CA: CPT | Performed by: INTERNAL MEDICINE

## 2022-07-27 PROCEDURE — 85049 AUTOMATED PLATELET COUNT: CPT | Performed by: INTERNAL MEDICINE

## 2022-07-27 PROCEDURE — 120N000001 HC R&B MED SURG/OB

## 2022-07-27 PROCEDURE — 258N000003 HC RX IP 258 OP 636: Performed by: INTERNAL MEDICINE

## 2022-07-27 PROCEDURE — 250N000011 HC RX IP 250 OP 636: Performed by: HOSPITALIST

## 2022-07-27 RX ORDER — LORAZEPAM 0.5 MG/1
.25-.5 TABLET ORAL EVERY 4 HOURS PRN
Status: DISCONTINUED | OUTPATIENT
Start: 2022-07-27 | End: 2022-07-27

## 2022-07-27 RX ORDER — METHOCARBAMOL 750 MG/1
750 TABLET, FILM COATED ORAL 4 TIMES DAILY
Status: DISCONTINUED | OUTPATIENT
Start: 2022-07-27 | End: 2022-07-29 | Stop reason: HOSPADM

## 2022-07-27 RX ORDER — POLYETHYLENE GLYCOL 3350 17 G/17G
17 POWDER, FOR SOLUTION ORAL 2 TIMES DAILY PRN
Status: DISCONTINUED | OUTPATIENT
Start: 2022-07-27 | End: 2022-07-29 | Stop reason: HOSPADM

## 2022-07-27 RX ORDER — SODIUM CHLORIDE 9 MG/ML
INJECTION, SOLUTION INTRAVENOUS CONTINUOUS
Status: DISCONTINUED | OUTPATIENT
Start: 2022-07-27 | End: 2022-07-29 | Stop reason: HOSPADM

## 2022-07-27 RX ORDER — BISACODYL 10 MG
10 SUPPOSITORY, RECTAL RECTAL DAILY PRN
Status: DISCONTINUED | OUTPATIENT
Start: 2022-07-27 | End: 2022-07-29 | Stop reason: HOSPADM

## 2022-07-27 RX ORDER — SODIUM CHLORIDE 1 G/1
1 TABLET ORAL 2 TIMES DAILY WITH MEALS
Status: DISCONTINUED | OUTPATIENT
Start: 2022-07-27 | End: 2022-07-29 | Stop reason: HOSPADM

## 2022-07-27 RX ORDER — BISACODYL 10 MG
10 SUPPOSITORY, RECTAL RECTAL DAILY PRN
Status: DISCONTINUED | OUTPATIENT
Start: 2022-07-27 | End: 2022-07-28

## 2022-07-27 RX ORDER — AMOXICILLIN 250 MG
2 CAPSULE ORAL 2 TIMES DAILY
Status: DISCONTINUED | OUTPATIENT
Start: 2022-07-27 | End: 2022-07-29 | Stop reason: HOSPADM

## 2022-07-27 RX ORDER — LORAZEPAM 0.5 MG/1
.25-.5 TABLET ORAL EVERY 4 HOURS PRN
Status: DISCONTINUED | OUTPATIENT
Start: 2022-07-27 | End: 2022-07-29 | Stop reason: HOSPADM

## 2022-07-27 RX ORDER — POLYETHYLENE GLYCOL 3350 17 G/17G
17 POWDER, FOR SOLUTION ORAL DAILY
Status: DISCONTINUED | OUTPATIENT
Start: 2022-07-28 | End: 2022-07-27

## 2022-07-27 RX ORDER — LORAZEPAM 2 MG/ML
0.25 INJECTION INTRAMUSCULAR EVERY 4 HOURS PRN
Status: DISCONTINUED | OUTPATIENT
Start: 2022-07-27 | End: 2022-07-27

## 2022-07-27 RX ADMIN — POLYETHYLENE GLYCOL 3350 17 G: 17 POWDER, FOR SOLUTION ORAL at 08:31

## 2022-07-27 RX ADMIN — ENOXAPARIN SODIUM 40 MG: 40 INJECTION SUBCUTANEOUS at 13:59

## 2022-07-27 RX ADMIN — SODIUM CHLORIDE TAB 1 GM 1 G: 1 TAB at 08:28

## 2022-07-27 RX ADMIN — LEVOFLOXACIN 250 MG: 250 TABLET, FILM COATED ORAL at 08:27

## 2022-07-27 RX ADMIN — CITALOPRAM HYDROBROMIDE 40 MG: 20 TABLET ORAL at 08:27

## 2022-07-27 RX ADMIN — SODIUM CHLORIDE, PRESERVATIVE FREE: 5 INJECTION INTRAVENOUS at 14:10

## 2022-07-27 RX ADMIN — METHOCARBAMOL 750 MG: 750 TABLET ORAL at 18:11

## 2022-07-27 RX ADMIN — METHOCARBAMOL 750 MG: 750 TABLET ORAL at 12:23

## 2022-07-27 RX ADMIN — SODIUM CHLORIDE TAB 1 GM 1 G: 1 TAB at 18:11

## 2022-07-27 RX ADMIN — METHOCARBAMOL 500 MG: 500 TABLET ORAL at 08:28

## 2022-07-27 RX ADMIN — SENNOSIDES AND DOCUSATE SODIUM 1 TABLET: 50; 8.6 TABLET ORAL at 08:27

## 2022-07-27 RX ADMIN — SENNOSIDES AND DOCUSATE SODIUM 2 TABLET: 50; 8.6 TABLET ORAL at 20:45

## 2022-07-27 RX ADMIN — OXYCODONE HYDROCHLORIDE 5 MG: 5 TABLET ORAL at 20:45

## 2022-07-27 RX ADMIN — IBUPROFEN 200 MG: 200 TABLET, FILM COATED ORAL at 08:27

## 2022-07-27 RX ADMIN — METHOCARBAMOL 750 MG: 750 TABLET ORAL at 21:53

## 2022-07-27 RX ADMIN — OXYCODONE HYDROCHLORIDE 5 MG: 5 TABLET ORAL at 16:23

## 2022-07-27 RX ADMIN — Medication 1 TABLET: at 18:11

## 2022-07-27 RX ADMIN — OXYCODONE HYDROCHLORIDE 5 MG: 5 TABLET ORAL at 12:23

## 2022-07-27 RX ADMIN — IBUPROFEN 200 MG: 200 TABLET, FILM COATED ORAL at 20:45

## 2022-07-27 RX ADMIN — Medication 1 TABLET: at 13:59

## 2022-07-27 RX ADMIN — LORAZEPAM 0.5 MG: 0.5 TABLET ORAL at 14:15

## 2022-07-27 RX ADMIN — OXYCODONE HYDROCHLORIDE 5 MG: 5 TABLET ORAL at 02:30

## 2022-07-27 RX ADMIN — GABAPENTIN 100 MG: 100 CAPSULE ORAL at 20:44

## 2022-07-27 RX ADMIN — GABAPENTIN 100 MG: 100 CAPSULE ORAL at 08:27

## 2022-07-27 RX ADMIN — SODIUM CHLORIDE, PRESERVATIVE FREE: 5 INJECTION INTRAVENOUS at 14:11

## 2022-07-27 ASSESSMENT — ACTIVITIES OF DAILY LIVING (ADL)
ADLS_ACUITY_SCORE: 37
ADLS_ACUITY_SCORE: 38
ADLS_ACUITY_SCORE: 37
ADLS_ACUITY_SCORE: 38
ADLS_ACUITY_SCORE: 37
ADLS_ACUITY_SCORE: 38
ADLS_ACUITY_SCORE: 37
ADLS_ACUITY_SCORE: 38
ADLS_ACUITY_SCORE: 38
ADLS_ACUITY_SCORE: 37

## 2022-07-27 NOTE — PLAN OF CARE
Goal Outcome Evaluation:    Patient is A&O x4. Up with one assist/gb/walker to BSC, voiding. Denies chest pain or SOB. Pain managed with oxycodone, robaxin and ibuprofen. Second radiation completed today. Will continue to monitor.

## 2022-07-27 NOTE — PROGRESS NOTES
[facility]    Medicine Progress Note - Hospitalist Service    Date of Admission:  7/20/2022    Assessment & Plan        Ms. 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      Ongoing, worsening mid back and right hip pain  Inability to ambulate 2/2 pain, improving  Pathological fractures  Acute, mildly comminuted fracture of the L5 vertebral body with moderate compression and loss of vertebral body height  Acute nondisplaced fracture through the left parasymphyseal pubic body in the anterior pelvis  Cortical irregularity in the right sacral ala, superiorly, compatible with age indeterminant fracture  Sacral insufficiency fracture in the right hemisacrum  Recent frequent falls  Above fractures, all likely pathologic secondary to multiple myeloma  Patient had a fall in end of May and another fall in June.  Was seen in ED on 6/25 at which time CT showed L5 fracture. Subsequently was seen by orthopedics clinic and had MRI after that.  Unable to view this report.  Over the last few days has had escalating mid back and right hip pain with inability to ambulate and hence presented to ED. CT findings at presentation as above. Suspect fractures are pathological per oncology. Continues with low back pain however main complaint is now severe muscle spasms ~4x per day.   * MRI lumbar spine noted progression of multiple myeloma with multiple  lytic lesions in lumbar spine and pelvis. Also demonstrated slight progression of L5 compression fracture which still appears acute and unhealed and also demonstrated a sacral insufficiency fracture in the right hemisacrum.  -- During hospitalization , patient was evaluated by orthopedic surgery and neurosurgery who have not signed off.   - RN to clarify activity orders with NSG/Ortho on 7/27*  -- Corset style brace ordered for support. Use corset as needed.  -- Orthopedics consulted this hospitalization, now signed off    - Pelvic fractures are nonoperative.   - Orthopedics also reviewed the right subtrochanteric femur lesion which is presumed to be a myeloma lesion.  No indication for prophylactic nailing of right femur at this time.  -- Pain control   - Declines APAP, reports allergy/intolerance with bilateral ear itching    - Scheduled Ibuprofen 200mg BID   - Scheduled gabapentin 100mg BID   - 7/27: Increase scheduled Robaxin from 500mg TID to 750mg 4x/day given severe muscle spasms   - 7/27: PRN low dose PO Ativan 0.25-0.5mg for muscle spasms as a rescue medication only, will see if this gives her result, hopeful increased Robaxin will help prevent spasms   - Low dose Oxycodone 2.5-5mg Q 4 hours PRN    - PRN IV Dilaudid, low-dose oxycodone.     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 Treatment delayed secondary to frequent falling over last couple of months resulting in persistent right hip pain. Following with Dr. Hanks at [facility]. Plan for PET and to start chemo soon.  -- [place] oncology consulted, signed off on 7/26   - Zometa to reduce risk of further fractures   -  has sent the  message to her primary hematologist and hoping that she can get her Revlimid and dexamethasone while she is in the TCU.   - Transfusion if Hgb is less than 7   -- Follow up with [facility] after discharge - plan to start daratumumab, Revlimid, and dexamethasone at [facility] next week  -- If going to TCU after discharge, will need treatment rescheduled.     - If prolonged hospitalization and TCU stay anticipated, could consider starting on bortezomib and dexamethasone inpatient.  -- Radiation Oncology consulted 7/26, plan for lesion treatment x4 days to help treat the pain  -- Started on levofloxacin ppx    Chronic kidney disease, stage 3a, stable  Baseline Cr 1-1.2. On admission, 1.03.  - Avoid nephrotoxins - contrast, NSAIDs (ok for low dose ibuprofen, tolerating)  - Renally dose medications as able/needed  - Monitor     Chronic stable diagnoses and other pertinent medical history: Appropriate PTA medications will be resumed  MDD: Continue citalopram  HLD: Continue PTA statin         COVID-19 PCR Results    COVID-19 PCR Results 8/27/21 5/28/22 7/20/22   SARS CoV2 PCR Negative Negative Negative             Diet: Regular Diet Adult    DVT Prophylaxis: Lovenox  Astudillo Catheter: Not present  Central Lines: None  Cardiac Monitoring: None  Code Status: Full Code      Disposition Plan      Expected Discharge Date: 07/29/2022        Discharge Comments: TCU placement        The patient's care was discussed with the Attending Physician, Dr. Bullard, Bedside Nurse and Patient.    Lisbeth Joy PA-C  Hospitalist Service  [facility]  Securely message with the Vocera Web Console (learn more here)  Text page via [employer] Paging/Directory         Clinically Significant Risk Factors Present on Admission                      ______________________________________________________________________    Interval History   Seen and examined.  Patient endorses severe muscle spasms which happen several  times per day.  She tries to change positioning with help of staff when these muscle spasms occur however this is not of a successful and she reports a great deal of pain when they come on.  Agreeable to increasing muscle relaxants and adding a rescue medication.  No nausea or vomiting, she is tolerating p.o. and eating well.  She reports not having a bowel movement since admission, which is now been 7 days.  She does not feel constipated.  She is willing to increase her bowel regimen.  She endorses concern about requiring so many medications. Unclear activity orders, RN to clarify with Ortho/NSG. Questions welcomed and answered to the best of my knowledge.    Data reviewed today: I reviewed all medications, new labs and imaging results over the last 24 hours. I personally reviewed no images or EKG's today.    Physical Exam   Vital Signs: Temp: 98.4  F (36.9  C) Temp src: Oral BP: 96/44 Pulse: 80   Resp: 16 SpO2: 98 % O2 Device: None (Room air)    Weight: 130 lbs 0 oz    Physical Exam    General: Awake, alert, very pleasant woman who appears stated age. Looks comfortable laying in bed. No acute distress.  HEENT: Normocephalic, atraumatic. Extraocular movements intact.  Respiratory: Clear to auscultation bilaterally, no rales, wheezing, or rhonchi.  Cardiovascular: Regular rate and rhythm, +S1 and S2, no murmur auscultated. No peripheral edema.   Gastrointestinal: Soft, non-tender, non-distended. Bowel sounds present.  Skin: Warm, dry. No obvious rashes or lesions on exposed skin. Dorsalis pedis pulses palpable bilaterally.  Musculoskeletal: No joint swelling, erythema or tenderness. Moves all extremities equally.  Dorsi/plantarflexion 5 out of 5 bilaterally.  Raises both legs off the bed without pain.  Neurologic: AAO x3. Cranial nerves 2-12 grossly intact, normal strength and sensation.  Psychiatric: Appropriate mood and affect. No obvious anxiety or depression.      Data   Recent Labs   Lab 07/26/22  0801  07/24/22  0712 07/20/22  1906   WBC  --   --  4.8   HGB  --   --  9.1*   MCV  --   --  98   PLT  --  214 221   NA  --   --  137   POTASSIUM  --   --  3.5   CHLORIDE  --   --  105   CO2  --   --  27   BUN  --   --  18   CR  --  1.07* 1.03   ANIONGAP  --   --  5   ARA  --  8.7 9.0   *  --  99     No results found for this or any previous visit (from the past 24 hour(s)).  Medications       acetaminophen  325 mg Oral Daily     calcium carbonate 600 mg-vitamin D 400 units  1 tablet Oral BID w/meals     citalopram  40 mg Oral Daily     enoxaparin ANTICOAGULANT  40 mg Subcutaneous Q24H     gabapentin  100 mg Oral BID     ibuprofen  200 mg Oral BID     levofloxacin  250 mg Oral Daily     methocarbamol  750 mg Oral 4x Daily     [START ON 7/28/2022] polyethylene glycol  17 g Oral Daily     senna-docusate  2 tablet Oral BID     sodium chloride  1 g Oral Daily

## 2022-07-27 NOTE — PROGRESS NOTES
The patient came to radiation therapy for treatment #2 of 4 to her L-Spine, right femur, and left ischium at a daily dose of 500 cGy using 10 MV photon energy for a total dose to date of 1000 cGy.  The patient has 2 remaining treatments.

## 2022-07-27 NOTE — PROGRESS NOTES
Care Management Follow Up    Length of Stay (days): 6    Expected Discharge Date: 07/29/2022     Concerns to be Addressed:       Patient plan of care discussed at interdisciplinary rounds: Yes    Anticipated Discharge Disposition: Home     Anticipated Discharge Services:    Anticipated Discharge DME:      Patient/family educated on Medicare website which has current facility and service quality ratings:    Education Provided on the Discharge Plan:    Patient/Family in Agreement with the Plan:      Referrals Placed by CM/SW:    Private pay costs discussed: Not applicable    Additional Information:    Heena received call from scar at [facility] regarding concerns about pt receiving cancer treatment while at TCU, stating they cannot accept pt if she is receiving treatments while at TCU.  Heena confirmed with care team that pt is finishing cancer treatment prior to arriving to TCU and no cancer treatments will be scheduled during pt's TCU stay.  Scar at [facility] is checking into bed availability for either a Friday, 7/29, or Saturday, 7/30 admission; Scar is aware that pt is likely ready Friday, 7/29.  Sw to continue to follow for discharge planning.        Trinidad Velazquez, CASSISW

## 2022-07-27 NOTE — PROVIDER NOTIFICATION
Left voicemail for marshall [facility] regarding weight bearing status. Awaiting for call back.     @ 1400: received an order for WBAT on BLE. Order in place by marshall Butler.

## 2022-07-27 NOTE — PROGRESS NOTES
Service Date: 07/26/2022    SUBJECTIVE:  Ms. Mcleod is a 76-year-old female with multiple myeloma.  The patient is scheduled to start treatment with daratumumab, Revlimid and dexamethasone at [facility] next week.    The patient admitted because of worsening back pain.  Multiple imaging studies have been done.  She has pathological compression fracture of L5, sacral insufficiency fracture, and nondisplaced fracture through the left pubic body.  She also has myeloma lesion involving right femur.    Neurosurgeon and Orthopedic Surgery evaluated the patient.  No surgery recommended.  Radiation oncologist has seen the patient.  The patient will be getting radiation to L-spine, right femur, and left ischium starting today.    I met with the patient.  Her pain is fairly well controlled.  No headache or dizziness.  No chest pain or shortness of breath.  No nausea or vomiting.  Appetite is fairly good.    PHYSICAL EXAMINATION:    GENERAL:  She is alert, oriented x 3.  Not in distress.  VITAL SIGNS:  Reviewed.  Rest of systems not examined.    LABS:  Reviewed.    ASSESSMENT:    1.  A 76-year-old female with multiple myeloma.  2.  Back pain secondary to compression fracture and myeloma bone lesion.  3.  Normocytic anemia from myeloma and renal insufficiency.    PLAN:    1.  The patient's overall condition is stable.  Pain is controlled.  The patient is starting radiation today.  That will further help with the pain.    2.  For myeloma, she is scheduled to start treatment at [facility] next week.  3.  For myeloma bone lesion, she received Zometa.  She tolerated it well.  4.  The patient is anemic.  She should be transfused if hemoglobin is below 7 or if she is symptomatic.  5.  The patient had a few questions, which were all answered.  The patient will follow up with her primary hematologist, Dr. Hanks, at [facility].  Hematology/Oncology will sign off.  Please call us with any  questions.    Eugene Franklin MD        D: 2022   T: 2022   MT: ALIYA    Name:     ROMA ELLIS  MRN:      [id number]        Account:      [id number]   :      1945           Service Date: 2022       Document: [id number]

## 2022-07-27 NOTE — PLAN OF CARE
[id number]:  A&OX4, forgetful.  Up with 1 and walker.  Voiding using commode.  IV fluids infusing.  C/o lower back and left leg pain/cramping PRN oxycodone given and scheduled robaxin given.

## 2022-07-27 NOTE — PLAN OF CARE
Goal Outcome Evaluation:        Pt. Alert & oriented x 4; forgetful at times, short-term memory recall. Up to bedside commode with assist of 1, gait belt, walker. Voiding. Tolerating PO. Pt. Reports pain goal met with current PRN meds--see MAR.

## 2022-07-28 ENCOUNTER — APPOINTMENT (OUTPATIENT)
Dept: PHYSICAL THERAPY | Facility: CLINIC | Age: 77
DRG: 543 | End: 2022-07-28
Payer: COMMERCIAL

## 2022-07-28 LAB
ANION GAP SERPL CALCULATED.3IONS-SCNC: 5 MMOL/L (ref 3–14)
BUN SERPL-MCNC: 20 MG/DL (ref 7–30)
CALCIUM SERPL-MCNC: 8.2 MG/DL (ref 8.5–10.1)
CHLORIDE BLD-SCNC: 101 MMOL/L (ref 94–109)
CO2 SERPL-SCNC: 24 MMOL/L (ref 20–32)
CREAT SERPL-MCNC: 0.93 MG/DL (ref 0.52–1.04)
GFR SERPL CREATININE-BSD FRML MDRD: 64 ML/MIN/1.73M2
GLUCOSE BLD-MCNC: 95 MG/DL (ref 70–99)
MAGNESIUM SERPL-MCNC: 1.9 MG/DL (ref 1.6–2.3)
POTASSIUM BLD-SCNC: 4.3 MMOL/L (ref 3.4–5.3)
SODIUM SERPL-SCNC: 130 MMOL/L (ref 133–144)

## 2022-07-28 PROCEDURE — 250N000013 HC RX MED GY IP 250 OP 250 PS 637: Performed by: INTERNAL MEDICINE

## 2022-07-28 PROCEDURE — 250N000011 HC RX IP 250 OP 636: Performed by: HOSPITALIST

## 2022-07-28 PROCEDURE — 250N000013 HC RX MED GY IP 250 OP 250 PS 637: Performed by: PHYSICIAN ASSISTANT

## 2022-07-28 PROCEDURE — 36415 COLL VENOUS BLD VENIPUNCTURE: CPT | Performed by: PHYSICIAN ASSISTANT

## 2022-07-28 PROCEDURE — 250N000013 HC RX MED GY IP 250 OP 250 PS 637: Performed by: HOSPITALIST

## 2022-07-28 PROCEDURE — 99232 SBSQ HOSP IP/OBS MODERATE 35: CPT | Performed by: PHYSICIAN ASSISTANT

## 2022-07-28 PROCEDURE — 80048 BASIC METABOLIC PNL TOTAL CA: CPT | Performed by: PHYSICIAN ASSISTANT

## 2022-07-28 PROCEDURE — 250N000013 HC RX MED GY IP 250 OP 250 PS 637: Performed by: STUDENT IN AN ORGANIZED HEALTH CARE EDUCATION/TRAINING PROGRAM

## 2022-07-28 PROCEDURE — 120N000001 HC R&B MED SURG/OB

## 2022-07-28 PROCEDURE — 97116 GAIT TRAINING THERAPY: CPT | Mod: GP

## 2022-07-28 PROCEDURE — 97530 THERAPEUTIC ACTIVITIES: CPT | Mod: GP

## 2022-07-28 PROCEDURE — 83735 ASSAY OF MAGNESIUM: CPT | Performed by: PHYSICIAN ASSISTANT

## 2022-07-28 PROCEDURE — 258N000003 HC RX IP 258 OP 636: Performed by: INTERNAL MEDICINE

## 2022-07-28 RX ORDER — POLYETHYLENE GLYCOL 3350 17 G/17G
17 POWDER, FOR SOLUTION ORAL DAILY
Status: DISCONTINUED | OUTPATIENT
Start: 2022-07-29 | End: 2022-07-29 | Stop reason: HOSPADM

## 2022-07-28 RX ADMIN — IBUPROFEN 200 MG: 200 TABLET, FILM COATED ORAL at 09:07

## 2022-07-28 RX ADMIN — Medication 1 TABLET: at 17:13

## 2022-07-28 RX ADMIN — OXYCODONE HYDROCHLORIDE 5 MG: 5 TABLET ORAL at 06:27

## 2022-07-28 RX ADMIN — SODIUM CHLORIDE TAB 1 GM 1 G: 1 TAB at 17:13

## 2022-07-28 RX ADMIN — GABAPENTIN 100 MG: 100 CAPSULE ORAL at 21:25

## 2022-07-28 RX ADMIN — POLYETHYLENE GLYCOL 3350 17 G: 17 POWDER, FOR SOLUTION ORAL at 00:17

## 2022-07-28 RX ADMIN — SENNOSIDES AND DOCUSATE SODIUM 2 TABLET: 50; 8.6 TABLET ORAL at 21:25

## 2022-07-28 RX ADMIN — METHOCARBAMOL 750 MG: 750 TABLET ORAL at 12:16

## 2022-07-28 RX ADMIN — SENNOSIDES AND DOCUSATE SODIUM 2 TABLET: 50; 8.6 TABLET ORAL at 09:07

## 2022-07-28 RX ADMIN — METHOCARBAMOL 750 MG: 750 TABLET ORAL at 21:24

## 2022-07-28 RX ADMIN — IBUPROFEN 200 MG: 200 TABLET, FILM COATED ORAL at 21:25

## 2022-07-28 RX ADMIN — ENOXAPARIN SODIUM 40 MG: 40 INJECTION SUBCUTANEOUS at 13:45

## 2022-07-28 RX ADMIN — GABAPENTIN 100 MG: 100 CAPSULE ORAL at 09:07

## 2022-07-28 RX ADMIN — SODIUM CHLORIDE, PRESERVATIVE FREE: 5 INJECTION INTRAVENOUS at 20:03

## 2022-07-28 RX ADMIN — SODIUM CHLORIDE, PRESERVATIVE FREE: 5 INJECTION INTRAVENOUS at 04:10

## 2022-07-28 RX ADMIN — LEVOFLOXACIN 250 MG: 250 TABLET, FILM COATED ORAL at 09:08

## 2022-07-28 RX ADMIN — OXYCODONE HYDROCHLORIDE 5 MG: 5 TABLET ORAL at 15:52

## 2022-07-28 RX ADMIN — SODIUM CHLORIDE TAB 1 GM 1 G: 1 TAB at 09:08

## 2022-07-28 RX ADMIN — METHOCARBAMOL 750 MG: 750 TABLET ORAL at 17:13

## 2022-07-28 RX ADMIN — Medication 1 TABLET: at 12:16

## 2022-07-28 RX ADMIN — METHOCARBAMOL 750 MG: 750 TABLET ORAL at 09:07

## 2022-07-28 RX ADMIN — CITALOPRAM HYDROBROMIDE 40 MG: 20 TABLET ORAL at 09:07

## 2022-07-28 ASSESSMENT — ACTIVITIES OF DAILY LIVING (ADL)
ADLS_ACUITY_SCORE: 38
ADLS_ACUITY_SCORE: 38
ADLS_ACUITY_SCORE: 36
ADLS_ACUITY_SCORE: 36
ADLS_ACUITY_SCORE: 38
ADLS_ACUITY_SCORE: 36
ADLS_ACUITY_SCORE: 38
ADLS_ACUITY_SCORE: 36
ADLS_ACUITY_SCORE: 36
ADLS_ACUITY_SCORE: 38

## 2022-07-28 NOTE — PROGRESS NOTES
Care Management Follow Up    Length of Stay (days): 7    Expected Discharge Date: 07/29/2022     Concerns to be Addressed:       Patient plan of care discussed at interdisciplinary rounds: Yes    Anticipated Discharge Disposition: Home     Anticipated Discharge Services:    Anticipated Discharge DME:      Patient/family educated on Medicare website which has current facility and service quality ratings:    Education Provided on the Discharge Plan:    Patient/Family in Agreement with the Plan:      Referrals Placed by CM/SW:    Private pay costs discussed: transportation costs    Additional Information:  SW confirmed with Naomie, admissions coordinator they can accept the patient on 7/29. Wheelchair ride will be scheduled for 1500.   SW attempted to talk to the patient, but the patient was sleeping. The SW left a voicemail on the patient's friends listed on the contact list.     Addendum 1545: SW talked to the patient, and the patient is in agreement with going to [facility]CU. The patient is agreeable to a wheelchair transport and is aware of the costs.   The SW set up a wheelchair transportation ride for 1500 with [employer]. SW confirmed time with Naomie at [facility].   PAS completed: [id number]. Two copies made and placed on patient's chart.       SW will continue to follow.     TARA Tinajreo

## 2022-07-28 NOTE — PROGRESS NOTES
[facility]    Medicine Progress Note - Hospitalist Service    Date of Admission:  7/20/2022    Assessment & Plan      Ms. 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      Ongoing, worsening mid back and right hip pain, improving  Inability to ambulate 2/2 pain, improving  Pathological fractures  Acute, mildly comminuted fracture of the L5 vertebral body with moderate compression and loss of vertebral body height  Acute nondisplaced fracture through the left parasymphyseal pubic body in the anterior pelvis  Cortical irregularity in the right sacral ala, superiorly, compatible with age indeterminant fracture  Sacral insufficiency fracture in the right hemisacrum  Recent frequent falls  Above fractures, all likely pathologic secondary to multiple myeloma  Patient had a fall in end of May and another fall in June.  Was seen in ED on 6/25 at which time CT showed L5 fracture. Subsequently was seen by orthopedics clinic and had MRI after that.  Unable to view this report.  Over the last few days has had escalating mid back and right hip pain with inability to ambulate and hence presented to ED. CT findings at presentation as above. Suspect fractures are pathological per oncology. Continues with low back pain however main complaint is now severe muscle spasms ~4x per day.   * MRI lumbar spine noted progression of multiple myeloma with  multiple lytic lesions in lumbar spine and pelvis. Also demonstrated slight progression of L5 compression fracture which still appears acute and unhealed and also demonstrated a sacral insufficiency fracture in the right hemisacrum.  -- During hospitalization , patient was evaluated by orthopedic surgery and neurosurgery who have not signed off.   - WBAT to BLE per Ortho  -- Corset style brace ordered for support. Use corset as needed.  -- Orthopedics consulted this hospitalization, now signed off    - Pelvic fractures are nonoperative.   - Orthopedics also reviewed the right subtrochanteric femur lesion which is presumed to be a myeloma lesion.  No indication for prophylactic nailing of right femur at this time.  -- Pain control   - Declines APAP, reports allergy/intolerance with bilateral ear itching    - Scheduled Ibuprofen 200mg BID   - Scheduled gabapentin 100mg BID   - 7/27: Increase scheduled Robaxin from 500mg TID to 750mg 4x/day given severe muscle spasms   - 7/27: PRN low dose PO Ativan 0.25-0.5mg for muscle spasms as a rescue medication only, will see if this gives her result, hopeful increased Robaxin will help prevent spasms   - Low dose Oxycodone 2.5-5mg Q 4 hours PRN    - PRN IV Dilaudid, low-dose oxycodone.     - Hopeful that radiation treatment will help patient pain control  -- Increased bowel regimen 7/27 to Senna S 2 tabs BID, daily Miralax, PRN Suppository  -- PT/SW-needs TCU placement, accepted and likely discharge 7/29 post radiation treatment  -- RD consult for poor oral intake    Muscle cramps/spasms  Reports muscle cramps and spasms, which have been ongoing for several months now. Robaxin increased with good result and ativan added as rescue.  -- Continue increased dose Robaxin 750mg 4x/day, ativan PO for rescue  -- Add on magnesium level due to muscle cramps, replete if low     H/o smoldering myeloma now transformed to active myeloma  Chronic normocytic anemia secondary to multiple myeloma  and CKD  Bone marrow biopsy 5/18/2022 shows 60 to 70% plasma cells. Treatment delayed secondary to frequent falling over last couple of months resulting in persistent right hip pain. Following with Dr. Hanks at [facility]. Plan for PET and to start chemo soon.  -- [facility] consulted, signed off on 7/26   - Zometa to reduce risk of further fractures   -  has sent the message to her primary hematologist and hoping that she can get her Revlimid and dexamethasone while she is in the TCU.   - Transfusion if Hgb is less than 7   -- Follow up with [facility] after discharge - plan to start daratumumab, Revlimid, and dexamethasone at [facility] next week  -- If going to TCU after discharge, will need treatment rescheduled.     - If prolonged hospitalization and TCU stay anticipated, could consider starting on bortezomib and dexamethasone inpatient.  -- Radiation Oncology consulted 7/26, plan for lesion treatment x4 days to help treat the pain  -- Started on levofloxacin ppx    Hyponatremia, improving  History of mild hyponatremia  Suspect related to malignancy, takes salt tabs daily. May/June 2022 Na 127-131. Urine studies at that time unremarkable. Suspected related to hypovolemia at that time. Persisted and started on salt tab 1 gm daily by PCP.  -- Started on NS 75 ml/hr x24 hours 7/27, continue MIVF  -- Salt tablets increased from 1gm daily to 1gm BID  -- Encourage oral intake  -- Recheck labs in AM     Latest Reference Range & Units 07/08/22 13:00 07/08/22 14:52 07/20/22 19:06 07/27/22 11:04   Sodium 133 - 144 mmol/L 134 138 137 129 (L)   (L): Data is abnormally low    Acute on chronic kidney disease, stage 3a, resolved  Baseline Cr 1-1.2. On admission, 1.03. Up to 1.4 on 7/27 with mild hyponatremia as above, resolved with MIVF to 0.93.  -- Avoid nephrotoxins - contrast, NSAIDs (ok for low dose ibuprofen, tolerating)  -- Renally dose medications as able/needed  -- Started on NS 75 ml/hr x24 hours  7/27 - with resolution of Cr to 0.93  -- Low dose gabapentin and ibuprofen ok  -- Monitor    Constipation, resolved  No BM since admission 7/20 per patient on 7/27. Bowel regimen increased with good result and BM on 7/28.     Chronic stable diagnoses and other pertinent medical history: Appropriate PTA medications will be resumed  MDD: Continue citalopram  HLD: Continue PTA statin         COVID-19 PCR Results    COVID-19 PCR Results 8/27/21 5/28/22 7/20/22   SARS CoV2 PCR Negative Negative Negative             Diet: Regular Diet Adult    DVT Prophylaxis: Enoxaparin (Lovenox) SQ  Astudillo Catheter: Not present  Central Lines: None  Cardiac Monitoring: None  Code Status: Full Code      Disposition Plan     Expected Discharge Date: 07/29/2022        Discharge Comments: TCU placement        The patient's care was discussed with the Attending Physician, Dr. Bullard, Bedside Nurse and Patient.    Lisbteh Joy PA-C  Hospitalist Service  [facility]  Securely message with the Vocera Web Console (learn more here)  Text page via Vinted Paging/Directory         Clinically Significant Risk Factors Present on Admission                      ______________________________________________________________________    Interval History   Seen and examined. Muscle spasms and cramping improving, reports mainly [place] [facility] [place] sitting [place] the commode too long. Afterward her spasms are much worse. Tolerated rescue ativan yesterday with good result. Reports +BM this morning after 1 week without. States not eating as well lately. DOROTHEA resolved with IVF, Na+ mildly better. Brighter affect today, appears improving.     Data reviewed today: I reviewed all medications, new labs and imaging results over the last 24 hours. I personally reviewed no images or EKG's today.    Physical Exam   Vital Signs: Temp: 97.8  F (36.6  C) Temp src: Oral BP: 102/58 Pulse: 90   Resp: 16 SpO2: 97 % O2 Device: None (Room air)     Weight: 130 lbs 0 oz    General: Awake, alert, very pleasant woman who appears stated age. Looks comfortable sitting up in bed. No acute distress.  HEENT: Normocephalic, atraumatic. Extraocular movements intact.  Respiratory: Clear to auscultation bilaterally, no rales, wheezing, or rhonchi.  Cardiovascular: Regular rate and rhythm, +S1 and S2, no murmur auscultated. No peripheral edema.   Gastrointestinal: Soft, non-tender, non-distended. Bowel sounds present.  Skin: Warm, dry. No obvious rashes or lesions on exposed skin. Dorsalis pedis pulses palpable bilaterally.  Musculoskeletal: No joint swelling, erythema or tenderness. Moves all extremities equally.  Dorsi/plantarflexion 5 out of 5 bilaterally.  Raises both legs off the bed without pain.  Neurologic: AAO x3. Cranial nerves 2-12 grossly intact, normal strength and sensation.  Psychiatric: Appropriate mood and affect. No obvious anxiety or depression.    Data   Recent Labs   Lab 07/28/22  0932 07/27/22  1104 07/26/22  0801 07/24/22  0712   WBC  --  3.8*  --   --    HGB  --  9.2*  --   --    MCV  --  99  --   --    PLT  --  232  --  214   * 129*  --   --    POTASSIUM 4.3 4.3  --   --    CHLORIDE 101 97  --   --    CO2 24 30  --   --    BUN 20 27  --   --    CR 0.93 1.40*  --  1.07*   ANIONGAP 5 2*  --   --    ARA 8.2* 8.6  --  8.7   GLC 95 96 101*  --      No results found for this or any previous visit (from the past 24 hour(s)).  Medications     sodium chloride 75 mL/hr at 07/28/22 0410       calcium carbonate 600 mg-vitamin D 400 units  1 tablet Oral BID w/meals     citalopram  40 mg Oral Daily     enoxaparin ANTICOAGULANT  40 mg Subcutaneous Q24H     gabapentin  100 mg Oral BID     ibuprofen  200 mg Oral BID     levofloxacin  250 mg Oral Daily     methocarbamol  750 mg Oral 4x Daily     [START ON 7/29/2022] polyethylene glycol  17 g Oral Daily     senna-docusate  2 tablet Oral BID     sodium chloride  1 g Oral BID w/meals

## 2022-07-28 NOTE — CONSULTS
"CLINICAL NUTRITION SERVICES  -  ASSESSMENT NOTE    Recommendations Ordered by Registered Dietitian (RD):   - OK for PRN supplements Ensure and [place]  - Encouraged pt to order TID, protein on each tray, and 3-4 items per tray.    Malnutrition:   % Weight Loss:  > 5% in 1 month (severe malnutrition)  % Intake:  <75% for > 7 days (moderate malnutrition)  Subcutaneous Fat Loss:  Orbital region mild depletion and Upper arm region moderate depletion  Muscle Loss:  Temporal region mild-moderate depletion, Clavicle bone region mild depletion and Dorsal hand region mild depletion  Fluid Retention:  None noted    Malnutrition Diagnosis: Moderate malnutrition  In Context of:  Acute illness or injury     REASON FOR ASSESSMENT  Mary Jo Mcleod is a 76 year old female seen by Registered Dietitian for Provider Order - \"poor oral intake in setting of cancer treatments\"     NUTRITION HISTORY  - Information obtained from chart and patient report.   - Dale reports a poor appetite from end of May through end of June, but since has rebounded and she feels she is eating quite well.   - She did lose 10# in June and is now stable around 130# per her report.     CURRENT NUTRITION ORDERS  Diet Order:     Regular     Current Intake/Tolerance:  Tolerating 50-75% of meals. Orders BID-TID. Pt feels she is ordering really well, has no concerns about her appetite currently. Ordered pasta, a cookie, and hummus w/ veggies for lunch today. She really likes a lot of the menu.     - Meal review shows that meals tend to be on the smaller side, and that pt rarely eats >75%. Meeting <75% est needs on average.     NUTRITION FOCUSED PHYSICAL ASSESSMENT FOR DIAGNOSING MALNUTRITION)  Yes         Observed:    Muscle wasting (refer to documentation in Malnutrition section) and Subcutaneous fat loss (refer to documentation in Malnutrition section)    Obtained from Chart/Interdisciplinary Team:  Muscle spasms     ANTHROPOMETRICS  Height: 5' 3\"  Weight: 130 " lbs 0 oz (59 kg) - admit wt  Body mass index is 23.03 kg/m .  Weight Status:  Normal BMI  IBW: 52.3 kg   % IBW: 113%  Weight History: 7.2% wt loss in <3 months. (140-->130). Pt reported this loss occurred in about 1 month.   Wt Readings from Last 15 Encounters:   07/20/22 59 kg (130 lb)   07/08/22 60.2 kg (132 lb 11.2 oz)   07/08/22 59.4 kg (131 lb)   06/29/22 59.5 kg (131 lb 3.2 oz)   06/25/22 60.3 kg (133 lb)   06/17/22 60.4 kg (133 lb 3.2 oz)   06/03/22 62.1 kg (137 lb)   05/29/22 62.5 kg (137 lb 12.6 oz)   05/18/22 63.5 kg (139 lb 14.4 oz)   05/06/22 63.5 kg (140 lb)   05/05/22 64.4 kg (142 lb)   04/06/22 64.7 kg (142 lb 9.6 oz)   03/30/22 65.3 kg (144 lb)   02/07/22 65 kg (143 lb 6.4 oz)   11/09/21 63.5 kg (140 lb)       LABS  Labs reviewed  Na 130 (H)    MEDICATIONS  Medications reviewed  Calcium carb - vit D  Miralax, senokot   NaCl IVF @ 75 mL/hr     ASSESSED NUTRITION NEEDS PER APPROVED PRACTICE GUIDELINES:  Dosing Weight 59 kg   Estimated Energy Needs: 2276-8823 kcals (25-30 Kcal/Kg)  Justification: maintenance  Estimated Protein Needs: 71-89 grams protein (1.2-1.5 g pro/Kg)  Justification: maintenance and Repletion  Estimated Fluid Needs: 1 mL/kcal   Justification: maintenance    MALNUTRITION:  % Weight Loss:  > 5% in 1 month (severe malnutrition)  % Intake:  <75% for > 7 days (moderate malnutrition)  Subcutaneous Fat Loss:  Orbital region mild depletion and Upper arm region moderate depletion  Muscle Loss:  Temporal region mild-moderate depletion, Clavicle bone region mild depletion and Dorsal hand region mild depletion  Fluid Retention:  None noted    Malnutrition Diagnosis: Moderate malnutrition  In Context of:  Acute illness or injury    NUTRITION DIAGNOSIS:  Malnutrition related to reduced oral intakes as evidenced by unintended weight loss of 10# in 1 month, meal review indicated pt meeting <75% estimated needs on average.       NUTRITION INTERVENTIONS  Recommendations / Nutrition Prescription  - OK  for PRN supplements Ensure and [place]  - Encouraged pt to order TID, protein on each tray, and 3-4 items per tray.     Implementation  Nutrition education: Provided education on see above.   Medical Food Supplement: PRN    Nutrition Goals  Intake of at least 75% meals TID, each with an adequate protein source.     MONITORING AND EVALUATION:  Progress towards goals will be monitored and evaluated per protocol and Practice Guidelines    Chrissy Esparza RD, LD  [facility], [facility], Ortho, Ortho Spine  Pager: [phone]  Weekend Pager: [phone]

## 2022-07-28 NOTE — PROVIDER NOTIFICATION
"Text page sent to on call hospitalist:     \"[phone] SANTI Mcleod Pt. requesting PRN laxative if possible. Having hard stools and already on stool softener daily.  Thank you! Lore SAUCEDA [phone]\"  "

## 2022-07-28 NOTE — PLAN OF CARE
Goal Outcome Evaluation:    Plan of Care Reviewed With: patient     Overall Patient Progress: no change    Outcome Evaluation: tolerating 50-75% smaller meals. Encouraged protein on each tray, 3-4 items per tray, and suggested supplements PRN.

## 2022-07-28 NOTE — PROGRESS NOTES
Radiation therapy treatment #3 of 4. Pt received a daily dose of 500 cGy each to the  Lumbar spine, right femur and left ischium with 10 MV photon radiation for a total dose of 1500 cGy given to date. Plan for 1 more radiation treatment to each area.

## 2022-07-28 NOTE — PLAN OF CARE
Goal Outcome Evaluation:        Pt. Alert & oriented x 4; forgetful at times, short-term loss. Up with assist of 1, gait belt walker; WBAT. Voiding. Tolerating PO. Requested to sleep through the night, denied pain medication when offered. Order received and implemented for PRN meds for constipation: multiple small, hard stools over course of shift.  PRN oxycodone given x 1 this shift--see MAR.

## 2022-07-29 ENCOUNTER — APPOINTMENT (OUTPATIENT)
Dept: PHYSICAL THERAPY | Facility: CLINIC | Age: 77
DRG: 543 | End: 2022-07-29
Payer: COMMERCIAL

## 2022-07-29 VITALS
DIASTOLIC BLOOD PRESSURE: 41 MMHG | BODY MASS INDEX: 23.04 KG/M2 | SYSTOLIC BLOOD PRESSURE: 90 MMHG | TEMPERATURE: 98.2 F | RESPIRATION RATE: 16 BRPM | OXYGEN SATURATION: 95 % | WEIGHT: 130 LBS | HEART RATE: 74 BPM | HEIGHT: 63 IN

## 2022-07-29 DIAGNOSIS — C90.00 MULTIPLE MYELOMA NOT HAVING ACHIEVED REMISSION (H): Primary | ICD-10-CM

## 2022-07-29 LAB
ANION GAP SERPL CALCULATED.3IONS-SCNC: 3 MMOL/L (ref 3–14)
BUN SERPL-MCNC: 13 MG/DL (ref 7–30)
CALCIUM SERPL-MCNC: 8.1 MG/DL (ref 8.5–10.1)
CHLORIDE BLD-SCNC: 108 MMOL/L (ref 94–109)
CO2 SERPL-SCNC: 24 MMOL/L (ref 20–32)
CREAT SERPL-MCNC: 0.83 MG/DL (ref 0.52–1.04)
ERYTHROCYTE [DISTWIDTH] IN BLOOD BY AUTOMATED COUNT: 13.9 % (ref 10–15)
GFR SERPL CREATININE-BSD FRML MDRD: 73 ML/MIN/1.73M2
GLUCOSE BLD-MCNC: 91 MG/DL (ref 70–99)
HCT VFR BLD AUTO: 26.7 % (ref 35–47)
HGB BLD-MCNC: 8.5 G/DL (ref 11.7–15.7)
MCH RBC QN AUTO: 31.6 PG (ref 26.5–33)
MCHC RBC AUTO-ENTMCNC: 31.8 G/DL (ref 31.5–36.5)
MCV RBC AUTO: 99 FL (ref 78–100)
PLATELET # BLD AUTO: 200 10E3/UL (ref 150–450)
POTASSIUM BLD-SCNC: 4.1 MMOL/L (ref 3.4–5.3)
RBC # BLD AUTO: 2.69 10E6/UL (ref 3.8–5.2)
SODIUM SERPL-SCNC: 135 MMOL/L (ref 133–144)
WBC # BLD AUTO: 2.4 10E3/UL (ref 4–11)

## 2022-07-29 PROCEDURE — 97116 GAIT TRAINING THERAPY: CPT | Mod: GP

## 2022-07-29 PROCEDURE — 250N000011 HC RX IP 250 OP 636: Performed by: HOSPITALIST

## 2022-07-29 PROCEDURE — 85027 COMPLETE CBC AUTOMATED: CPT | Performed by: PHYSICIAN ASSISTANT

## 2022-07-29 PROCEDURE — 250N000013 HC RX MED GY IP 250 OP 250 PS 637: Performed by: HOSPITALIST

## 2022-07-29 PROCEDURE — 250N000013 HC RX MED GY IP 250 OP 250 PS 637: Performed by: INTERNAL MEDICINE

## 2022-07-29 PROCEDURE — 97530 THERAPEUTIC ACTIVITIES: CPT | Mod: GP

## 2022-07-29 PROCEDURE — 250N000013 HC RX MED GY IP 250 OP 250 PS 637: Performed by: PHYSICIAN ASSISTANT

## 2022-07-29 PROCEDURE — 99239 HOSP IP/OBS DSCHRG MGMT >30: CPT | Performed by: INTERNAL MEDICINE

## 2022-07-29 PROCEDURE — 258N000003 HC RX IP 258 OP 636: Performed by: INTERNAL MEDICINE

## 2022-07-29 PROCEDURE — 80048 BASIC METABOLIC PNL TOTAL CA: CPT | Performed by: PHYSICIAN ASSISTANT

## 2022-07-29 PROCEDURE — 36415 COLL VENOUS BLD VENIPUNCTURE: CPT | Performed by: PHYSICIAN ASSISTANT

## 2022-07-29 PROCEDURE — 250N000013 HC RX MED GY IP 250 OP 250 PS 637: Performed by: STUDENT IN AN ORGANIZED HEALTH CARE EDUCATION/TRAINING PROGRAM

## 2022-07-29 RX ORDER — POLYETHYLENE GLYCOL 3350 17 G/17G
17 POWDER, FOR SOLUTION ORAL DAILY
DISCHARGE
Start: 2022-07-30 | End: 2023-09-01

## 2022-07-29 RX ORDER — LORAZEPAM 0.5 MG/1
0.25 TABLET ORAL EVERY 4 HOURS PRN
Qty: 12 TABLET | Refills: 0 | Status: SHIPPED | OUTPATIENT
Start: 2022-07-29 | End: 2023-09-01

## 2022-07-29 RX ORDER — METHOCARBAMOL 750 MG/1
750 TABLET, FILM COATED ORAL 4 TIMES DAILY
Qty: 120 TABLET | Refills: 0 | Status: SHIPPED | OUTPATIENT
Start: 2022-07-29 | End: 2022-11-15

## 2022-07-29 RX ORDER — GABAPENTIN 100 MG/1
100 CAPSULE ORAL 2 TIMES DAILY
DISCHARGE
Start: 2022-07-29 | End: 2022-08-01 | Stop reason: SINTOL

## 2022-07-29 RX ORDER — METHOCARBAMOL 750 MG/1
750 TABLET, FILM COATED ORAL 4 TIMES DAILY
Qty: 120 TABLET | Refills: 0 | DISCHARGE
Start: 2022-07-29 | End: 2022-07-29

## 2022-07-29 RX ORDER — IBUPROFEN 200 MG
200 TABLET ORAL 2 TIMES DAILY
DISCHARGE
Start: 2022-07-29 | End: 2022-08-03

## 2022-07-29 RX ORDER — OXYCODONE HYDROCHLORIDE 5 MG/1
5 TABLET ORAL EVERY 6 HOURS PRN
Qty: 30 TABLET | Refills: 0 | Status: SHIPPED | OUTPATIENT
Start: 2022-07-29 | End: 2022-08-04

## 2022-07-29 RX ORDER — ONDANSETRON 4 MG/1
4 TABLET, ORALLY DISINTEGRATING ORAL EVERY 6 HOURS PRN
DISCHARGE
Start: 2022-07-29 | End: 2022-08-04

## 2022-07-29 RX ORDER — AMOXICILLIN 250 MG
1 CAPSULE ORAL 2 TIMES DAILY PRN
DISCHARGE
Start: 2022-07-29 | End: 2023-09-01

## 2022-07-29 RX ADMIN — IBUPROFEN 200 MG: 200 TABLET, FILM COATED ORAL at 08:07

## 2022-07-29 RX ADMIN — LEVOFLOXACIN 250 MG: 250 TABLET, FILM COATED ORAL at 08:06

## 2022-07-29 RX ADMIN — ENOXAPARIN SODIUM 40 MG: 40 INJECTION SUBCUTANEOUS at 12:58

## 2022-07-29 RX ADMIN — POLYETHYLENE GLYCOL 3350 17 G: 17 POWDER, FOR SOLUTION ORAL at 08:07

## 2022-07-29 RX ADMIN — OXYCODONE HYDROCHLORIDE 5 MG: 5 TABLET ORAL at 00:36

## 2022-07-29 RX ADMIN — SENNOSIDES AND DOCUSATE SODIUM 2 TABLET: 50; 8.6 TABLET ORAL at 08:06

## 2022-07-29 RX ADMIN — SODIUM CHLORIDE, PRESERVATIVE FREE: 5 INJECTION INTRAVENOUS at 08:23

## 2022-07-29 RX ADMIN — METHOCARBAMOL 750 MG: 750 TABLET ORAL at 08:06

## 2022-07-29 RX ADMIN — SODIUM CHLORIDE TAB 1 GM 1 G: 1 TAB at 08:06

## 2022-07-29 RX ADMIN — METHOCARBAMOL 750 MG: 750 TABLET ORAL at 12:58

## 2022-07-29 RX ADMIN — CITALOPRAM HYDROBROMIDE 40 MG: 20 TABLET ORAL at 08:06

## 2022-07-29 RX ADMIN — GABAPENTIN 100 MG: 100 CAPSULE ORAL at 08:06

## 2022-07-29 RX ADMIN — Medication 1 TABLET: at 12:58

## 2022-07-29 ASSESSMENT — ACTIVITIES OF DAILY LIVING (ADL)
ADLS_ACUITY_SCORE: 37
ADLS_ACUITY_SCORE: 36
ADLS_ACUITY_SCORE: 38
ADLS_ACUITY_SCORE: 37
ADLS_ACUITY_SCORE: 36
ADLS_ACUITY_SCORE: 36

## 2022-07-29 NOTE — PROGRESS NOTES
Care Management Discharge Note    Discharge Date: 07/29/2022       Discharge Disposition: Home    Discharge Services:      Discharge DME:      Discharge Transportation: car, drives self, family or friend will provide    Private pay costs discussed: transportation costs    PAS Confirmation Code:    Patient/family educated on Medicare website which has current facility and service quality ratings:      Education Provided on the Discharge Plan:    Persons Notified of Discharge Plans: yes  Patient/Family in Agreement with the Plan:      Handoff Referral Completed: No    Additional Information:  Writer called [facility] to confirm that patient has a 1500 wheelchair ride today. [facility] is in agreement with this time. Writer will send order when discharge orders are available.    Addendum: Patient's radiation was moved back so transport time was rescheduled to 1730. Facility updated and in agreement.     Call to [facility] for [employer] [employer] authorization. Pending case # [id number].     Authorization obtained: [id number]  Dates: 7/29/22-8/4/22.     Writer called [facility] to update that auth was received.             TARA Zuñiga

## 2022-07-29 NOTE — PROGRESS NOTES
The patient received radiation treatment #4 of 4 to the Lspine, right femur and left ischium with a daily dose of 500cGy at 10MV photons, a total to date dose of 2000cGy.

## 2022-07-29 NOTE — PLAN OF CARE
Goal Outcome Evaluation:    Patient is A&O x4. Up with one assist/gb/walker to bathroom, voiding. Denies chest pain or SOB. Intermittent spasm/cramps on BLE. Pain managed with oxycodone, scheduled ibuprofen and robaxin. Had few hard BM today. IVF infusing. Discharging to TCU tomorrow. Will continue to monitor.

## 2022-07-29 NOTE — PROGRESS NOTES
Date/Time:07/29 ,[id number]    Trauma/Ortho/Medical (Choose one) :Ortho    Diagnosis:R hip pain  POD#:Non surgical  Mental Status:A&O x 4  with forgetfulness  Activity/dangle;A-1 walker GB  Diet:Reg  Pain:Managed with PRN oxycodone,Tylenol   Astudillo/Voiding:BR  Tele/Restraints/Iso:None  02/LDA:RA  D/C Date:[facility] @ 1500  Other Info:NS infusing @ 75 ml/hr.

## 2022-07-29 NOTE — PLAN OF CARE
Soft BP,asymptomatic. Other vitals stable. CMS intact. Up with 1 and walker. Pain managed with oxycodone, tylenol and robaxin. A&OX4. Discharge to TCU at 1730.

## 2022-07-29 NOTE — PLAN OF CARE
Goal Outcome Evaluation:    Plan of Care Reviewed With: patient     Overall Patient Progress: improving    Outcome Evaluation: [facility] 07/29/2022 @27:28.    Reviewed discharge instructions and medications with patient:NO, discharging to TCU.  EMS given discharge instructions to give to TCU.  Questions answered:YES  Patient discharged to:[facility]  All belongs discharged with patient:YES    A&O x4 w/ forgetfulness.   CMS Intact except baseline Left foot/ankle numbness/tingling.   VSS on RA.   Up with Ax1 GB and walker.   Voiding in BR.   Pain controlled with Oxycodone, Ibuprofen, Tylenol.   [facility] 07/29/2022 @ 27:28.

## 2022-07-29 NOTE — DISCHARGE SUMMARY
[facility]  Hospitalist Discharge Summary      Date of Admission:  7/20/2022  Date of Discharge:  7/29/2022  Discharging Provider: Laila Bullard MD, FACP  Discharge Service: Hospitalist Service    Discharge Diagnoses        Multiple Pathological fractures, sec to multiple melomas  Ongoing, worsening mid back and right hip pain, improving  Inability to ambulate 2/2 pain, improving  Acute, mildly comminuted fracture of the L5 vertebral body with moderate compression and loss of vertebral body height  Acute nondisplaced fracture through the left parasymphyseal pubic body in the anterior pelvis  Cortical irregularity in the right sacral ala, superiorly, compatible with age indeterminant fracture  Sacral insufficiency fracture in the right hemisacrum  Recent frequent falls  Muscle cramps/spasms  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  The following labs/tests are   recommended: BMP and CBC.  Follow-up with oncology as recommended.         Follow-up and recommended labs and tests       Please follow up at [facility] in 6 weeks with a   lumbar xray prior.  Please call the clinic at [phone] to schedule   your appointment.             Unresulted Labs Ordered in the Past 30 Days of this Admission     No orders found from 6/20/2022 to 7/21/2022.          Discharge Disposition   Discharged to short-term care facility  Condition at discharge: Stable      Hospital Course   Ms. Mary Jo Mcleod is a 76 year old female with PMH significant for  smoldering myeloma now transformed to active myeloma with frequent fall, chronic anemia secondary to multiple myeloma, MDD, HLD, CKD who was admitted on 7/20/2022 with back and hip pain, and inability to ambulate after recent history of falling, and found to have multiple acute fractures likely pathological in nature, including mildly comminuted fracture of L5 vertebral body and nondisplaced fracture through left para symphyseal pubic body, sacral insufficiency fracture of right hemisacrum, and age indeterminant fracture of right sacral ala.   Here are further details regarding her current hospitalization      Multiple Pathological fractures, sec to multiple melomas  Ongoing, worsening mid back and right hip pain, improving  Inability to ambulate 2/2 pain, improving  Acute, mildly comminuted fracture of the L5 vertebral body with moderate compression and loss of vertebral body height  Acute nondisplaced fracture through the left parasymphyseal pubic body in the anterior pelvis  Cortical irregularity in the right sacral ala, superiorly, compatible with age indeterminant fracture  Sacral insufficiency fracture in the right hemisacrum  Recent frequent falls  Above fractures, all likely pathologic secondary to multiple myeloma    Patient had a fall in end of May and another fall in June.  Was seen in ED on 6/25 at which time CT showed L5 fracture. Subsequently was seen by orthopedics clinic and had MRI after that. Unable to view this report. Over the last few days has had escalating mid back and right hip pain with inability to ambulate and hence presented to ED. CT findings at presentation as above. Suspect fractures are pathological per oncology. Continues with low back pain however main complaint is now severe muscle spasms ~4x per day.     * MRI lumbar spine noted progression of multiple myeloma with multiple lytic lesions in lumbar spine and pelvis. Also demonstrated slight progression of L5 compression fracture  which still appears acute and unhealed and also demonstrated a sacral insufficiency fracture in the right hemisacrum.  -- During hospitalization , patient was evaluated by orthopedic surgery and neurosurgery who have not signed off.              - WBAT to BLE per Ortho  -- Corset style brace ordered for support. Use corset as needed.  -- Orthopedics consulted this hospitalization, now signed off               - Pelvic fractures are nonoperative.              - Orthopedics also reviewed the right subtrochanteric femur lesion which is presumed to be a myeloma lesion.  No indication for prophylactic nailing of right femur at this time.  -- Pain control              - Declines APAP, reports allergy/intolerance with bilateral ear itching               - Scheduled Ibuprofen 200mg BID, ordered for 5 more days              - Scheduled gabapentin 100mg BID              - 7/27: Increase scheduled Robaxin from 500mg TID to 750mg 4x/day given severe muscle spasms ( script given )               - 7/27: PRN low dose PO Ativan 0.25-0.5mg for muscle spasms as a rescue medication only, will see if this gives her result, hopeful increased Robaxin will help prevent spasms( script given )               - Low dose Oxycodone 2.5-5mg Q 4 hours PRN ( script given )               - PRN IV Dilaudid, low-dose oxycodone.                - Hopeful that radiation treatment will help patient pain control, receiving last radiation treatment this afternoon   -- Increased bowel regimen 7/27 to Senna S 2 tabs BID, daily Miralax, PRN Suppository  -- PT/SW-needs TCU placement, accepted and plan for discharge later today post radiation treatment  -- RD consult for poor oral intake     Muscle cramps/spasms  Reports muscle cramps and spasms, which have been ongoing for several months now. Robaxin increased with good result and ativan added as rescue.  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 Treatment delayed secondary to frequent falling over last couple of months resulting in persistent right hip pain. Following with Dr. Hanks at [facility]. Plan for PET and to start chemo soon.    -- [facility] consulted, signed off on 7/26              - Zometa to reduce risk of further fractures              -  has sent the message to her primary hematologist and hoping that she can get her Revlimid and dexamethasone while she is in the TCU.              - Transfusion if Hgb is less than 7   -- Follow up with [facility] after discharge - plan to start daratumumab, Revlimid, and dexamethasone at [facility] next week  -- If going to TCU after discharge, will need treatment rescheduled.          -- Radiation Oncology consulted 7/26, plan for lesion treatment x4 days to help treat the pain  -- Started on levofloxacin ppx     Hyponatremia, improving  History of mild hyponatremia  Suspect related to malignancy, takes salt tabs daily. May/June 2022 Na 127-131. Urine studies at that time unremarkable. Suspected related to hypovolemia at that time. Persisted and started on salt tab 1 gm daily by PCP.    -- Received NS  -- Salt tablets will be continued to at 1gm daily once a day   -- Encourage oral intake  -- will recommend checking BMP next week and increasing the dose to BID if needed    Moderate malnutrition in Context of:  Acute illness or injury  % Weight Loss:  > 5% in 1 month (severe malnutrition)  % Intake:  <75% for > 7 days (moderate malnutrition)  Subcutaneous Fat Loss:  Orbital region mild depletion and Upper arm region moderate depletion  Muscle Loss:  Temporal region mild-moderate depletion, Clavicle bone region mild depletion and Dorsal hand region mild depletion  Fluid  Retention:  None noted     Acute on chronic kidney disease, stage 3A, resolved  Baseline Cr 1-1.2. On admission, 1.03. Up to 1.4 on 7/27 with mild hyponatremia as above, resolved with MIVF to 0.93.  -- Avoid nephrotoxins - contrast, NSAIDs (ok for low dose ibuprofen, tolerating)  -- Renally dose medications as able/needed  -- Started on NS 75 ml/hr x24 hours 7/27 - with resolution of Cr to 0.93  -- Low dose gabapentin and ibuprofen ok  -- Monitor     Constipation, resolved  No BM since admission 7/20 per patient on 7/27. Bowel regimen increased with good result and BM on 7/28.      Chronic stable diagnoses and other pertinent medical history: Appropriate PTA medications will be resumed  MDD: Continue citalopram  HLD: Continue PTA statin     COVID 19 results were negative when checked during the hospitalization              Diet: Regular Diet Adult    DVT Prophylaxis: Enoxaparin (Lovenox) SQ  Astudillo Catheter: Not present  Central Lines: None  Cardiac Monitoring: None  Code Status: Full Code       Patient was seen and examined on the day of discharge ,she is feeling well, does not have any complaints , I did review the discharge medications and instructions with the patient and plan for her to follow up with the PCP after the hospitalization .patient was in agreement , she is discharged in stable condition to TCU.    Consultations This Hospital Stay   PHYSICAL THERAPY ADULT IP CONSULT  CARE MANAGEMENT / SOCIAL WORK IP CONSULT  ORTHOPEDIC SURGERY IP CONSULT  NEUROSURGERY IP CONSULT  HEMATOLOGY & ONCOLOGY IP CONSULT  HEMATOLOGY & ONCOLOGY IP CONSULT  ORTHOSIS SPINAL IP CONSULT  RADIATION ONCOLOGY IP CONSULT  NUTRITION SERVICES ADULT IP CONSULT  PHYSICAL THERAPY ADULT IP CONSULT  OCCUPATIONAL THERAPY ADULT IP CONSULT    Code Status   Full Code    Time Spent on this Encounter   ILaila MD, personally saw the patient today and spent greater than 30 minutes discharging this patient.     Laila Bullard MD, FACP  M  [facility]  [address] [place] [address]  Phone: [phone]  Fax: [phone]  ______________________________________________________________________    Physical Exam   Vital Signs: Temp: 98.2  F (36.8  C) Temp src: Oral BP: 90/41 Pulse: 74   Resp: 16 SpO2: 95 % O2 Device: None (Room air)    Weight: 130 lbs 0 oz    Physical Exam  Vitals and nursing note reviewed.   Constitutional:       Appearance: She is well-developed.   HENT:      Head: Normocephalic and atraumatic.   Eyes:      Conjunctiva/sclera: Conjunctivae normal.      Pupils: Pupils are equal, round, and reactive to light.   Neck:      Thyroid: No thyromegaly.   Cardiovascular:      Rate and Rhythm: Normal rate and regular rhythm.      Heart sounds: Normal heart sounds. No murmur heard.  Pulmonary:      Effort: Pulmonary effort is normal. No respiratory distress.      Breath sounds: Normal breath sounds. No wheezing.   Abdominal:      General: Bowel sounds are normal.      Palpations: Abdomen is soft.      Tenderness: There is no abdominal tenderness. There is no guarding or rebound.   Musculoskeletal:         General: No deformity. Normal range of motion.      Cervical back: Normal range of motion and neck supple.      Comments: Ongoing lower back pain causing some decreased ROM   Skin:     General: Skin is warm and dry.   Neurological:      Mental Status: She is alert and oriented to person, place, and time.   Psychiatric:         Behavior: Behavior normal.          Primary Care Physician   Levi Mcdonnell    Discharge Orders      XR Lumbar Spine 2/3 Views     Follow-up and recommended labs and tests     Please follow up at [facility] in 6 weeks with a lumbar xray prior.  Please call the clinic at [phone] to schedule your appointment.     General info for SNF    Length of Stay Estimate: Short Term Care: Estimated # of Days <30  Condition at Discharge: Improving  Level of care:skilled    Rehabilitation Potential: Good  Admission H&P remains valid and up-to-date: Yes  Recent Chemotherapy: N/A  Use Nursing Home Standing Orders: Yes     Mantoux instructions    Give two-step Mantoux (PPD) Per Facility Policy Yes     Follow Up and recommended labs and tests    Follow up with senior living physician.  The following labs/tests are recommended: BMP and CBC.  Follow-up with oncology as recommended.     Activity - Up with nursing assistance     Reason for your hospital stay    You were admitted to the hospital secondary to multiple pathological fractures from underlying multiple myeloma.  You have undergone radiation treatment, you were also seen by oncology team and your pain medications have been adjusted.     Additional Discharge Instructions    You were admitted to the hospital secondary to multiple pathological fractures from underlying multiple myeloma.  You have been evaluated by oncology team, and has undergone 4 radiation treatments.  You were also started on Roxicodone, your muscle relaxant dosage is increased to 750 mg 4 times a day.  Ativan is also available as needed for muscle spasm.  As you were not able to tolerate Tylenol it has not been continued on the discharge.  Ibuprofen is also ordered for the next 5 days.  As you have been on NSAIDs for the past few days and will also be using it for the next 5 days I have changed your full dose aspirin to 81 mg daily.  You will have follow-up with oncology after the rehab.     Full Code     Physical Therapy Adult Consult    Evaluate and treat as clinically indicated.    Reason: Acute illness debilitation from her underlying malignancy and pathological fractures     Occupational Therapy Adult Consult    Evaluate and treat as clinically indicated.    Reason: Acute illness debilitation from underlying malignancy and pathological fractures.     Fall precautions     Advance Diet as Tolerated    Follow this diet upon discharge: Orders Placed This  Encounter      Regular Diet Adult         Significant Results and Procedures   Results for orders placed or performed during the hospital encounter of 07/20/22   CT Pelvis Bone wo Contrast    Narrative    EXAM: CT PELVIS BONE WO CONTRAST  LOCATION: [facility]  DATE/TIME: 7/20/2022 7:47 PM    INDICATION: Low back, pelvic, hip pain, multiple myeloma.  COMPARISON: CT 06/25/2022.  TECHNIQUE: CT scan of the pelvis was performed without IV contrast. Multiplanar reformats were obtained. Dose reduction techniques were used.  CONTRAST: None.    FINDINGS:    Generalized demineralization. Innumerable small round lytic lesions throughout the pelvic bones (best visible on the coronal images). There are a few more focal lytic lesions in the pelvis, such as in the posterior ilium (coronal image 70, axial image   44),, and in the left ischium, measuring up to 2.5 cm in largest diameter. The more prominent lesion in the subtrochanteric femur is present within the medullary canal, partially imaged. There is mild associated endosteal erosion/scalloping.    Acute, mildly comminuted fracture of the L5 vertebral body, including a transverse fracture line, and vertical fracture line extending to both endplates. There is approximately 50% loss of vertebral body height. No significant central canal stenosis.    Additional nondisplaced fracture in the left parasymphyseal pubic body (coronal images 35-49). Question age indeterminant right sacral alar fracture (axial series 3 images 25-44).    Muscles are within normal limits by CT. No evidence of acute tendon tear.    Moderate aortoiliac atherosclerotic calcification.    Numerous calcified uterine fibroids.      Impression    IMPRESSION:  1.  Innumerable lytic lesions throughout the pelvic bones and proximal femurs, compatible with the patient's known multiple myeloma. The bones are diffusely demineralized.    2.  A few larger lytic lesions are visualized in the left  pelvis, including the posterior ilium and the periacetabular left ischium, measuring up to 2.5 cm. There is a subtrochanteric lesion in the proximal right femur which has mild associated endosteal   cortical scalloping. No pathologic fracture identified with these larger lesions.    3.  Acute, mildly comminuted fracture of the L5 vertebral body with moderate compression and loss of vertebral body height. Please see the separate lumbar spine report for full details.    4.  Additional acute nondisplaced fracture through the left parasymphyseal pubic body in the anterior pelvis.    5.  Cortical irregularity in the right sacral ala, superiorly, compatible with age indeterminant fracture.    6.  No evidence of hip fracture.    7.  No significant musculoskeletal soft tissue abnormality. No hematoma or focal soft tissue edema.   CT Lumbar Spine w/o Contrast    Narrative    EXAM: CT LUMBAR SPINE W/O CONTRAST  LOCATION: [facility]  DATE/TIME: 7/20/2022 7:47 PM    INDICATION: h o multiple myeloma and low back pain  COMPARISON: MRI lumbar spine 05/06/2022, CT abdomen pelvis 6/25/2022.  TECHNIQUE: Routine CT Lumbar Spine without IV contrast. Multiplanar reformats. Dose reduction techniques were used.     FINDINGS:  VERTEBRA: Chronic pathologic compression fracture inferior endplate of L5 with 40% loss of height, unchanged from prior from 6/25/2022. Slight, 3 mm posterior retropulsion. No new fractures. Osteopenic bones.     CANAL/FORAMINA: No high grade canal or neural foraminal stenosis. Moderate degenerative changes at L4-L5.    PARASPINAL: No extraspinal abnormality.      Impression    IMPRESSION:  1.  Chronic pathologic compression fracture L5 without change from CT of 06/25/2022.  2.  Unchanged study when compared to prior of 6/25/2022.   MR Lumbar Spine w/o Contrast    Narrative    EXAM: MR LUMBAR SPINE W/O CONTRAST  LOCATION: [facility]  DATE/TIME: 7/21/2022 7:59  PM    INDICATION:  Lumbar radiculopathy  COMPARISON: CT scan 7/20/2022. MRI scan 5/6/2022.  TECHNIQUE: Routine Lumbar Spine MRI without IV contrast.    FINDINGS:   Compression fracture deformity of the L5 vertebral body which is progressed when compared to previous MRI scan of 5/6/2022 with loss of approximately 25-30% of vertebral body height. No significant retropulsion of bony elements.    Marrow signal changes with punctate foci of low signal throughout the lumbar vertebrae and portions of the sacrum and iliac bones on the T1-weighted sequences compatible with patient's history of multiple myeloma. Linear low signal changes throughout the   right hemisacrum with corresponding high signal within the right hemisacrum on the STIR sequences. Signal changes within the right hemisacrum are new compared to previous MRI scan of 5/6/2022. New lesion within the left iliac bone which is only   partially imaged on the MRI scan and measures approximately 1.8 x 2 cm in greatest transaxial dimension.    Retroperitoneal structures including visualized portion of the kidneys, abdominal aorta and psoas muscles are clear.    T12-L1: Normal disc height and signal. No herniation. Normal facets. No spinal canal or neural foraminal stenosis. Postoperative changes with laminectomy changes.    L1-L2: DDD change with loss of disc height and signal, generalized disc bulging. Spinal canal is patent. Mild right-sided foraminal narrowing.    L2-L3: DDD change with loss of disc height and signal and generalized disc bulging. Spinal canal and foramen are patent.     L3-L4: DDD change at L3-4 with generalized disc bulging. Spinal canal and right foramen are patent. Left foramen is mildly narrowed.    L4-L5: DDD change with loss of disc height and signal, generalized disc bulging, bilateral facet degeneration, mild to moderate spinal canal stenosis with bilateral lateral recess stenosis and bilateral foraminal narrowing.    L5-S1: Loss of disc  height and signal with minimal disc bulging. Bilateral facet degeneration. Spinal canal is patent. Bilateral foraminal stenosis due to combination of facet degeneration and bulging of the degenerated/desiccated disc margin into the   inferior aspect of the foramen.      Impression    IMPRESSION:  1.  L5 compression fracture deformity which is demonstrated a slight progression and loss of vertebral body height when compared to previous MRI scan performed 5/6/2022.    2.  Multifocal areas of abnormal low signal throughout the lumbar vertebral bodies, sacrum and visualized portion of the iliac bones bilaterally in a pattern compatible with progression of multiple myeloma when compared to 5/6/2022. Largest bony lesion   occurs in the left iliac wing measuring approximately 1.8 cm x 2 cm in greatest transaxial dimension. This iliac lesion is new when compared to previous MRI scan.    3.  New abnormal linear signal throughout the right hemisacrum and a pattern compatible with sacral insufficiency fracture.    4.  Multilevel DDD change most notable at L4-5 and L5-S1. Please see body of report for details at these levels.       Discharge Medications   Current Discharge Medication List      START taking these medications    Details   aspirin (ASA) 81 MG EC tablet Take 1 tablet (81 mg) by mouth daily    Associated Diagnoses: Pathological fracture of pelvis, unspecified pathological cause, initial encounter; Lumbar radiculopathy      gabapentin (NEURONTIN) 100 MG capsule Take 1 capsule (100 mg) by mouth 2 times daily    Associated Diagnoses: Pathological fracture of pelvis, unspecified pathological cause, initial encounter; Lumbar radiculopathy      ibuprofen (ADVIL/MOTRIN) 200 MG tablet Take 1 tablet (200 mg) by mouth 2 times daily for 5 days    Comments: Take after food  Associated Diagnoses: Pathological fracture of pelvis, unspecified pathological cause, initial encounter; Lumbar radiculopathy      LORazepam (ATIVAN) 0.5  MG tablet Take 0.5 tablets (0.25 mg) by mouth every 4 hours as needed for muscle spasms  Qty: 12 tablet, Refills: 0    Comments: Future refills by PCP Dr. Levi Mcdonnell with phone number [phone].  Associated Diagnoses: Pathological fracture of pelvis, unspecified pathological cause, initial encounter; Lumbar radiculopathy      methocarbamol (ROBAXIN) 750 MG tablet Take 1 tablet (750 mg) by mouth 4 times daily  Qty: 120 tablet, Refills: 0    Comments: Future refills by PCP Dr. Levi Mcdonnell with phone number [phone].  Associated Diagnoses: Pathological fracture of pelvis, unspecified pathological cause, initial encounter; Lumbar radiculopathy      ondansetron (ZOFRAN ODT) 4 MG ODT tab Take 1 tablet (4 mg) by mouth every 6 hours as needed for nausea or vomiting    Associated Diagnoses: Pathological fracture of pelvis, unspecified pathological cause, initial encounter; Lumbar radiculopathy      oxyCODONE (ROXICODONE) 5 MG tablet Take 1 tablet (5 mg) by mouth every 6 hours as needed for moderate to severe pain  Qty: 30 tablet, Refills: 0    Associated Diagnoses: Pathological fracture of pelvis, unspecified pathological cause, initial encounter      polyethylene glycol (MIRALAX) 17 g packet Take 17 g by mouth daily    Associated Diagnoses: Pathological fracture of pelvis, unspecified pathological cause, initial encounter; Lumbar radiculopathy      senna-docusate (SENOKOT-S/PERICOLACE) 8.6-50 MG tablet Take 1 tablet by mouth 2 times daily as needed for constipation    Associated Diagnoses: Pathological fracture of pelvis, unspecified pathological cause, initial encounter; Lumbar radiculopathy         CONTINUE these medications which have NOT CHANGED    Details   calcium carbonate (TUMS) 500 MG chewable tablet Take 2 chew tab by mouth daily      citalopram (CELEXA) 40 MG tablet Take 1 tablet (40 mg) by mouth daily  Qty: 90 tablet, Refills: 3    Associated Diagnoses: Moderate episode of recurrent major  depressive disorder (H); PTSD (post-traumatic stress disorder); GERSON (generalized anxiety disorder)      simvastatin (ZOCOR) 20 MG tablet TAKE 1 TABLET BY MOUTH AT BEDTIME  Qty: 90 tablet, Refills: 0    Comments: Reminder needs fasting lipid blood test  Associated Diagnoses: Hypercholesterolemia      sodium chloride 1 GM tablet Take 1 tablet (1 g) by mouth daily  Qty: 90 tablet, Refills: 0    Associated Diagnoses: Hyponatremia      vitamin B-12 (CYANOCOBALAMIN) 1000 MCG tablet Take 1 tablet (1,000 mcg) by mouth daily for 90 days  Qty: 90 tablet, Refills: 0    Associated Diagnoses: B12 deficiency; Anemia due to vitamin B12 deficiency, unspecified B12 deficiency type      vitamin D3 (CHOLECALCIFEROL) 50 mcg (2000 units) tablet Take 1 tablet by mouth daily      acyclovir (ZOVIRAX) 400 MG tablet Take 1 tablet (400 mg) by mouth 2 times daily for 30 days Start 1 week prior to daratumumab and continue for 3 months after treatment is complete.  Qty: 60 tablet, Refills: 11    Associated Diagnoses: Multiple myeloma not having achieved remission (H)      levofloxacin (LEVAQUIN) 250 MG tablet Take 1 tablet (250 mg) by mouth daily  Qty: 30 tablet, Refills: 3    Associated Diagnoses: Multiple myeloma not having achieved remission (H)      prochlorperazine (COMPAZINE) 10 MG tablet Take 1 tablet (10 mg) by mouth every 6 hours as needed for nausea or vomiting  Qty: 30 tablet, Refills: 2    Associated Diagnoses: Multiple myeloma not having achieved remission (H)           Allergies   Allergies   Allergen Reactions     Codeine Difficulty breathing, Other (See Comments) and Rash     Bupropion      Erythromycin      Hydrocodone Other (See Comments) and Itching     Ear ache     Lidocaine Other (See Comments)     Penicillin G Itching     Itching around mouth then heavy breathing     Sulfa Drugs Itching     Itching around the mouth and then heavy breathing     Tetracycline      Trazodone      Does not work for patient     Wellbutrin  [Bupropion Hydrobromide]      Codeine Sulfate Rash

## 2022-07-30 ENCOUNTER — TELEPHONE (OUTPATIENT)
Dept: GERIATRICS | Facility: CLINIC | Age: 77
End: 2022-07-30

## 2022-07-31 NOTE — TELEPHONE ENCOUNTER
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 She reports with being on higher dose of it, it is causing her these complaints. However she is unsure if she wants to stop it altogether    ASSESSMENT/PLAN      Change Robaxin to 500mg QID PRN for now    Electronically signed by:   MARLON Leon CNP

## 2022-08-01 ENCOUNTER — TRANSITIONAL CARE UNIT VISIT (OUTPATIENT)
Dept: GERIATRICS | Facility: CLINIC | Age: 77
End: 2022-08-01
Payer: COMMERCIAL

## 2022-08-01 ENCOUNTER — TELEPHONE (OUTPATIENT)
Dept: ONCOLOGY | Facility: CLINIC | Age: 77
End: 2022-08-01

## 2022-08-01 VITALS
DIASTOLIC BLOOD PRESSURE: 57 MMHG | OXYGEN SATURATION: 96 % | HEIGHT: 63 IN | SYSTOLIC BLOOD PRESSURE: 96 MMHG | HEART RATE: 102 BPM | WEIGHT: 132.5 LBS | TEMPERATURE: 98.6 F | RESPIRATION RATE: 18 BRPM | BODY MASS INDEX: 23.48 KG/M2

## 2022-08-01 DIAGNOSIS — C90.00 MULTIPLE MYELOMA NOT HAVING ACHIEVED REMISSION (H): ICD-10-CM

## 2022-08-01 DIAGNOSIS — M84.48XA PATHOLOGICAL FRACTURE OF LUMBAR VERTEBRA, INITIAL ENCOUNTER: Primary | ICD-10-CM

## 2022-08-01 DIAGNOSIS — E87.1 HYPONATREMIA: ICD-10-CM

## 2022-08-01 DIAGNOSIS — N18.31 ANEMIA DUE TO STAGE 3A CHRONIC KIDNEY DISEASE (H): ICD-10-CM

## 2022-08-01 DIAGNOSIS — M84.550A PATHOLOGICAL FRACTURE OF PELVIS DUE TO NEOPLASTIC DISEASE, INITIAL ENCOUNTER: ICD-10-CM

## 2022-08-01 DIAGNOSIS — D63.1 ANEMIA DUE TO STAGE 3A CHRONIC KIDNEY DISEASE (H): ICD-10-CM

## 2022-08-01 PROCEDURE — 99310 SBSQ NF CARE HIGH MDM 45: CPT | Performed by: NURSE PRACTITIONER

## 2022-08-01 RX ORDER — METHOCARBAMOL 500 MG/1
500 TABLET, FILM COATED ORAL 4 TIMES DAILY PRN
COMMUNITY
End: 2022-11-15

## 2022-08-01 NOTE — TELEPHONE ENCOUNTER
Oral Chemotherapy Monitoring Program     Placed call to Mary Jo Mcleod in follow up of lenalidomide (Revlimid) oral chemotherapy. This was for an initial teaching.     Left a message requesting a call back. No drug names were mentioned in the voicemail. Will update when response is received.      Tonya Lanza, PharmD, BCACP  Oral Chemotherapy Monitoring Program  [facility]  [phone]  August 1, 2022

## 2022-08-01 NOTE — PROGRESS NOTES
[facility] GERIATRICS    PRIMARY CARE PROVIDER AND CLINIC:  Levi Mcdonnell MD, [address] / [facility] [id number]  Chief Complaint   Patient presents with     Hospital F/U      [facility] Medical Record Number:  [id number]  Place of Service where encounter took place:  [facility] (TCU) [25]    Mary Jo Mcleod  is a 76 year old  (1945), admitted to the above facility from  [facility]. Hospital stay 7/20/22 through 7/29/22..   HPI:    Ms. 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     During patient's hospitalization, she was evaluated by oncology with noted plans to proceed with treatment beginning as early as this week as an outpatient with primary oncologist through the [facility].  She was noted to have an DOROTHEA which resolved with IV fluids.  Patient also received 4 doses of radiation therapy to her L5 fracture in an attempt to assist with pain control.  She was evaluated by physical therapy and referred for U, ultimately discharged to [facility] with pain regimen of scheduled ibuprofen 200 mg twice daily, gabapentin 100 mg twice daily, Robaxin 750 mg 4 times daily, Ativan 0.25 mg every 4 hours as needed muscle spasm, as well as low-dose oxycodone as needed for pain.    Patient was seen as an initial visit at [facility] today.  She reports difficulties over the weekend related to  medications, stating each time she takes her morning and evening medication she has a 2 to 3-hour time period of dizziness, lightheadedness, and generalized fatigue.  She is unable to do any form of activity during this time period. For this reason, she declined all of her morning medications today.  She is now seen sitting up in a wheelchair at her bedside moving around her room well using her feet, currently denies any pain although does admit to significant discomfort in the evening and overnight hours which have limited her ability to sleep.  She would like to review all of her medications that she is receiving here at [facility] and compare to her home notes. She otherwise denies urinary symptoms, constipation, chest pain, sob, abdominal pain, n/v.     CODE STATUS/ADVANCE DIRECTIVES DISCUSSION:  Full Code  CPR/Full code   ALLERGIES:   Allergies   Allergen Reactions     Codeine Difficulty breathing, Other (See Comments) and Rash     Bupropion      Erythromycin      Hydrocodone Other (See Comments) and Itching     Ear ache     Lidocaine Other (See Comments)     Penicillin G Itching     Itching around mouth then heavy breathing     Sulfa Drugs Itching     Itching around the mouth and then heavy breathing     Tetracycline      Trazodone      Does not work for patient     Wellbutrin [Bupropion Hydrobromide]      Codeine Sulfate Rash      PAST MEDICAL HISTORY:   Past Medical History:   Diagnosis Date     Depressive disorder       PAST SURGICAL HISTORY:   has a past surgical history that includes tonsillectomy; finger reattachment; and Bone marrow biopsy, bone specimen, needle/trocar (N/A, 2/8/2021).  FAMILY HISTORY: family history includes Alcoholism in her sister; Bipolar Disorder in her sister; Chronic Obstructive Pulmonary Disease in her brother; Influenza/Pneumonia in her sister; Lung Cancer in her brother; Myocardial Infarction (age of onset: 63) in her father; Suicide in her brother.  SOCIAL HISTORY:   reports  that she has never smoked. She has never used smokeless tobacco. She reports previous alcohol use. She reports that she does not use drugs.  Patient's living condition: lives alone    Post Discharge Medication Reconciliation Status: discharge medications reconciled and changed, per note/orders  Current Outpatient Medications   Medication Sig     aspirin (ASA) 81 MG EC tablet Take 1 tablet (81 mg) by mouth daily     calcium carbonate (TUMS) 500 MG chewable tablet Take 2 chew tab by mouth daily     citalopram (CELEXA) 40 MG tablet Take 1 tablet (40 mg) by mouth daily     gabapentin (NEURONTIN) 100 MG capsule Take 1 capsule (100 mg) by mouth 2 times daily     ibuprofen (ADVIL/MOTRIN) 200 MG tablet Take 1 tablet (200 mg) by mouth 2 times daily for 5 days     levofloxacin (LEVAQUIN) 250 MG tablet Take 1 tablet (250 mg) by mouth daily     LORazepam (ATIVAN) 0.5 MG tablet Take 0.5 tablets (0.25 mg) by mouth every 4 hours as needed for muscle spasms     methocarbamol (ROBAXIN) 500 MG tablet Take 500 mg by mouth 4 times daily as needed for muscle spasms     ondansetron (ZOFRAN ODT) 4 MG ODT tab Take 1 tablet (4 mg) by mouth every 6 hours as needed for nausea or vomiting     oxyCODONE (ROXICODONE) 5 MG tablet Take 1 tablet (5 mg) by mouth every 6 hours as needed for moderate to severe pain     polyethylene glycol (MIRALAX) 17 g packet Take 17 g by mouth daily     prochlorperazine (COMPAZINE) 10 MG tablet Take 1 tablet (10 mg) by mouth every 6 hours as needed for nausea or vomiting     senna-docusate (SENOKOT-S/PERICOLACE) 8.6-50 MG tablet Take 1 tablet by mouth 2 times daily as needed for constipation     simvastatin (ZOCOR) 20 MG tablet TAKE 1 TABLET BY MOUTH AT BEDTIME     sodium chloride 1 GM tablet Take 1 tablet (1 g) by mouth daily     vitamin B-12 (CYANOCOBALAMIN) 1000 MCG tablet Take 1 tablet (1,000 mcg) by mouth daily for 90 days     vitamin D3 (CHOLECALCIFEROL) 50 mcg (2000 units) tablet Take 1 tablet by mouth daily  "    acyclovir (ZOVIRAX) 400 MG tablet Take 1 tablet (400 mg) by mouth 2 times daily for 30 days Start 1 week prior to daratumumab and continue for 3 months after treatment is complete. (Patient not taking: Reported on 8/1/2022)     methocarbamol (ROBAXIN) 750 MG tablet Take 1 tablet (750 mg) by mouth 4 times daily (Patient not taking: Reported on 8/1/2022)     No current facility-administered medications for this visit.       ROS:  10 point ROS of systems including Constitutional, Eyes, Respiratory, Cardiovascular, Gastroenterology, Genitourinary, Integumentary, Musculoskeletal, Psychiatric were all negative except for pertinent positives noted in my HPI.    Vitals:  BP 96/57   Pulse 102   Temp 98.6  F (37  C)   Resp 18   Ht 1.6 m (5' 3\")   Wt 60.1 kg (132 lb 8 oz)   LMP 12/04/1989   SpO2 96%   BMI 23.47 kg/m    Exam:  GENERAL APPEARANCE:  Alert, in no distress, appears healthy, oriented  ENT:  normal hearing acuity, oral mucous membranes moist  EYES:  EOM, conjunctivae, lids, pupils and irises normal  RESP:  respiratory effort and palpation of chest normal, lungs clear to auscultation , no respiratory distress  CV:  Palpation and auscultation of heart done , regular rate and rhythm, no murmur, rub, or gallop, no edema  ABDOMEN:  normal bowel sounds, soft, nontender, no hepatosplenomegaly or other masses  M/S:   moving all extremities spontaneously, symmetrically; sitting in wheelchair and repositioning legs, performs lateral twists of trunk with minimal to no discomfort  NEURO:   CN II-XII grossly intact  PSYCH:  oriented X 3    Lab/Diagnostic data:  Recent Results (from the past 240 hour(s))   Kappa and lambda light chain    Collection Time: 07/24/22  7:12 AM   Result Value Ref Range    Kappa Free Light Chains 19.14 (H) 0.33 - 1.94 mg/dL    Lambda Free Light Chains 0.78 0.57 - 2.63 mg/dL    Kappa /Lambda Ratio 24.54 (H) 0.26 - 1.65   Protein Immunofixation Serum    Collection Time: 07/24/22  7:12 AM "   Result Value Ref Range    Immunofixation ELP       Monoclonal IgG immunoglobulin of kappa light chain type. Monoclonal antibody therapeutics (e.g. Daratumumab) may appear as monoclonal proteins on serum electrophoresis and immunofixation, but are almost always only very small IgG kappa monoclonals.  Nevertheless, results should be interpreted with caution if this patient is on a monoclonal therapeutic agent.  Pathologic significance requires clinical correlation.  MYNOR Mello M.D., Ph.D., Pathologist ()   Immunoglobulins A G and M    Collection Time: 07/24/22  7:12 AM   Result Value Ref Range    Immunoglobulin G 5,580 (H) 610 - 1,616 mg/dL    Immunoglobulin A 16 (L) 84 - 499 mg/dL    Immunoglobulin M 15 (L) 35 - 242 mg/dL   Calcium    Collection Time: 07/24/22  7:12 AM   Result Value Ref Range    Calcium 8.7 8.5 - 10.1 mg/dL   Magnesium    Collection Time: 07/24/22  7:12 AM   Result Value Ref Range    Magnesium 1.9 1.6 - 2.3 mg/dL   Phosphorus    Collection Time: 07/24/22  7:12 AM   Result Value Ref Range    Phosphorus 3.6 2.5 - 4.5 mg/dL   Platelet count    Collection Time: 07/24/22  7:12 AM   Result Value Ref Range    Platelet Count 214 150 - 450 10e3/uL   Creatinine    Collection Time: 07/24/22  7:12 AM   Result Value Ref Range    Creatinine 1.07 (H) 0.52 - 1.04 mg/dL    GFR Estimate 54 (L) >60 mL/min/1.73m2   Total Protein, Serum for ELP    Collection Time: 07/24/22  7:12 AM   Result Value Ref Range    Total Protein Serum for ELP 9.6 (H) 6.4 - 8.3 g/dL   Protein Electrophoresis, Serum    Collection Time: 07/24/22  7:12 AM   Result Value Ref Range    Albumin 3.5 (L) 3.7 - 5.1 g/dL    Alpha 1 0.4 0.2 - 0.4 g/dL    Alpha 2 0.8 0.5 - 0.9 g/dL    Beta Globulin 0.7 0.6 - 1.0 g/dL    Gamma Globulin 4.3 (H) 0.7 - 1.6 g/dL    Monoclonal Peak 4.0 (H) <=0.0 g/dL    ELP Interpretation       Large monoclonal protein (about 4.0 g/dL) seen in the gamma fraction. See immunofixation report on same specimen.  Slight hypoalbuminemia. Pathologic significance requires clinical correlation. MYNOR Mello M.D., Ph.D., Pathologist ().   Glucose by meter    Collection Time: 07/26/22  8:01 AM   Result Value Ref Range    GLUCOSE BY METER POCT 101 (H) 70 - 99 mg/dL   Basic metabolic panel    Collection Time: 07/27/22 11:04 AM   Result Value Ref Range    Sodium 129 (L) 133 - 144 mmol/L    Potassium 4.3 3.4 - 5.3 mmol/L    Chloride 97 94 - 109 mmol/L    Carbon Dioxide (CO2) 30 20 - 32 mmol/L    Anion Gap 2 (L) 3 - 14 mmol/L    Urea Nitrogen 27 7 - 30 mg/dL    Creatinine 1.40 (H) 0.52 - 1.04 mg/dL    Calcium 8.6 8.5 - 10.1 mg/dL    Glucose 96 70 - 99 mg/dL    GFR Estimate 39 (L) >60 mL/min/1.73m2   CBC with platelets    Collection Time: 07/27/22 11:04 AM   Result Value Ref Range    WBC Count 3.8 (L) 4.0 - 11.0 10e3/uL    RBC Count 2.89 (L) 3.80 - 5.20 10e6/uL    Hemoglobin 9.2 (L) 11.7 - 15.7 g/dL    Hematocrit 28.6 (L) 35.0 - 47.0 %    MCV 99 78 - 100 fL    MCH 31.8 26.5 - 33.0 pg    MCHC 32.2 31.5 - 36.5 g/dL    RDW 13.7 10.0 - 15.0 %    Platelet Count 232 150 - 450 10e3/uL   Basic metabolic panel    Collection Time: 07/28/22  9:32 AM   Result Value Ref Range    Sodium 130 (L) 133 - 144 mmol/L    Potassium 4.3 3.4 - 5.3 mmol/L    Chloride 101 94 - 109 mmol/L    Carbon Dioxide (CO2) 24 20 - 32 mmol/L    Anion Gap 5 3 - 14 mmol/L    Urea Nitrogen 20 7 - 30 mg/dL    Creatinine 0.93 0.52 - 1.04 mg/dL    Calcium 8.2 (L) 8.5 - 10.1 mg/dL    Glucose 95 70 - 99 mg/dL    GFR Estimate 64 >60 mL/min/1.73m2   Magnesium    Collection Time: 07/28/22  9:32 AM   Result Value Ref Range    Magnesium 1.9 1.6 - 2.3 mg/dL   Basic metabolic panel    Collection Time: 07/29/22  7:36 AM   Result Value Ref Range    Sodium 135 133 - 144 mmol/L    Potassium 4.1 3.4 - 5.3 mmol/L    Chloride 108 94 - 109 mmol/L    Carbon Dioxide (CO2) 24 20 - 32 mmol/L    Anion Gap 3 3 - 14 mmol/L    Urea Nitrogen 13 7 - 30 mg/dL    Creatinine 0.83 0.52 - 1.04  mg/dL    Calcium 8.1 (L) 8.5 - 10.1 mg/dL    Glucose 91 70 - 99 mg/dL    GFR Estimate 73 >60 mL/min/1.73m2   CBC with platelets    Collection Time: 07/29/22  7:36 AM   Result Value Ref Range    WBC Count 2.4 (L) 4.0 - 11.0 10e3/uL    RBC Count 2.69 (L) 3.80 - 5.20 10e6/uL    Hemoglobin 8.5 (L) 11.7 - 15.7 g/dL    Hematocrit 26.7 (L) 35.0 - 47.0 %    MCV 99 78 - 100 fL    MCH 31.6 26.5 - 33.0 pg    MCHC 31.8 31.5 - 36.5 g/dL    RDW 13.9 10.0 - 15.0 %    Platelet Count 200 150 - 450 10e3/uL       Pathological fractures 2/2 acute multiple myeloma, including:  L5 vertebral body  L parasympyhseal pubic body  R sacral ala  Sacral insufficiency fracture (R hemisacrum)  Patient with frequent falls beginning in May, presented to ED and was admitted due to intractable back/pelvic pain and inability to ambulate. Admission CT with above fractures, all felt to be pathologic related to recent transition to active multiple myeloma.   *MRI lumbar spine noted progression of multiple myeloma with multiple lytic lesions in lumbar spine and pelvis. Also demonstrated slight progression of L5 compression fracture which still appears acute and unhealed and also demonstrated a sacral insufficiency fracture in the right hemisacrum.  * Evaluated by orthopedics, nonop fx management. No indication for prophylactic femur nailing  * Received 4 days of radiation tx to spine to assist with pain, final day 7/30  - WBAT to BLE  - Corset style brace for support, use PRN  - Discontinue neurontin  - Change Robaxin to 500mg QID PRN  - Continue ibuprofen 200mg BID x5d (allergy/intolerant of APAP reporting bilat ear itching), PRN PO ativan 0.25mg for rescue muscle spasm, and oxycodone 2.5-5mg q4h PRN    Dizziness  Since arriving at TCU, pt reports medication intolerance. She describes a 2-3 hour period of dizziness, fatigue, and headache after taking both morning and afternoon medications. Unclear cause as medication list has been verified with facility  "and hospitalization, no changes have been made at time of visit. Patient declined all medications this morning and reports she is feeling \"wonderful,\" denies pain and reports she has been able to work with PT. Vitals are wnl. Med changes were made as noted above.  - Monitor symptoms with change in medications     H/o smoldering myeloma now transformed to active myeloma  Chronic normocytic anemia secondary to multiple myeloma and CKD  Bone marrow biopsy 5/18/2022 shows 60 to 70% plasma cells. Treatment delayed secondary to frequent falling over last couple of months resulting in persistent right hip pain. Following with Dr. Hanks at [facility]. Plan for PET and to start chemo soon.   *Received 4 days of radiation tx to spine to assist with px control  - Continue Zometa for fracture prevention  - Continue levaquin 250mg daily for ppx  - Follow-up scheduled for 8/9, per notes plan to start daratumumab, revlimid, and dexamethasone   - CBC this week, transfuse for Hgb < 7     Hyponatremia  Hx mild hyponatremia, values 127-131. uspect related to malignancy, takes salt tabs daily. Urine studies in May/[place] unremarkable, persisted and started on salt tab 1 gm daily by PCP.  - Continue salt tabs 1gm daily  - BMP this week     Chronic kidney disease, stage 3a  Baseline Cr 1-1.2. Mild DOROTHEA while in hospital, discharge value 0.93.  - BMP this week  - Discontinue ibuprofen after 5d     Constipation  Stable, reports daily BMs but have been loose lately. Discharged on Miralax daily, Senna 2tabs BID  - Continue miralax daily, decrease senna to 1tab BID     MDD: Continue citalopram    HLD: Continue PTA statin    Orders:  1. Discontinued neurontin, compazine  2. Changed robaxin to 500mg QID PRN muscle spasms/pain  3. CBC, BMP on 8/3  4. End date to ibuprofen added (8/2)    Total time spent with patient visit at the skilled nursing facility was 35 min including patient visit and review of past records. Greater than 50% of total time spent " with counseling and coordinating care due to medical complexity.     Electronically signed by:  Max Her PA-C

## 2022-08-01 NOTE — LETTER
8/1/2022        RE: Mary Jo Mcleod  [address]  [facility] [address]        [facility] GERIATRICS    PRIMARY CARE PROVIDER AND CLINIC:  Levi Mcdonnell MD, [address] / [facility] [address]  Chief Complaint   Patient presents with     Hospital F/U      [facility] Medical Record Number:  [id number]  Place of Service where encounter took place:  [facility] (TCU) [25]    aMry Jo Mcleod  is a 76 year old  (1945), admitted to the above facility from  [facility]. Hospital stay 7/20/22 through 7/29/22..   HPI:    Ms. 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     During patient's hospitalization, she was evaluated by oncology with noted plans to proceed with treatment beginning as early as this week as an outpatient with primary oncologist through the [facility].  She was noted to have an DOROTHEA which resolved with IV fluids.  Patient also received 4 doses of radiation therapy to her L5 fracture in an attempt to assist with pain control.  She was evaluated by physical therapy and referred for TCU, ultimately discharged to [facility] with pain regimen of scheduled ibuprofen 200 mg twice daily, gabapentin 100 mg twice daily, Robaxin 750 mg 4 times daily, Ativan 0.25 mg every 4 hours as needed muscle spasm, as well as low-dose oxycodone as needed for pain.    Patient was seen as an  initial visit at [facility] care facility today.  She reports difficulties over the weekend related to medications, stating each time she takes her morning and evening medication she has a 2 to 3-hour time period of dizziness, lightheadedness, and generalized fatigue.  She is unable to do any form of activity during this time period. For this reason, she declined all of her morning medications today.  She is now seen sitting up in a wheelchair at her bedside moving around her room well using her feet, currently denies any pain although does admit to significant discomfort in the evening and overnight hours which have limited her ability to sleep.  She would like to review all of her medications that she is receiving here at [facility] and compare to her home notes. She otherwise denies urinary symptoms, constipation, chest pain, sob, abdominal pain, n/v.     CODE STATUS/ADVANCE DIRECTIVES DISCUSSION:  Full Code  CPR/Full code   ALLERGIES:   Allergies   Allergen Reactions     Codeine Difficulty breathing, Other (See Comments) and Rash     Bupropion      Erythromycin      Hydrocodone Other (See Comments) and Itching     Ear ache     Lidocaine Other (See Comments)     Penicillin G Itching     Itching around mouth then heavy breathing     Sulfa Drugs Itching     Itching around the mouth and then heavy breathing     Tetracycline      Trazodone      Does not work for patient     Wellbutrin [Bupropion Hydrobromide]      Codeine Sulfate Rash      PAST MEDICAL HISTORY:   Past Medical History:   Diagnosis Date     Depressive disorder       PAST SURGICAL HISTORY:   has a past surgical history that includes tonsillectomy; finger reattachment; and Bone marrow biopsy, bone specimen, needle/trocar (N/A, 2/8/2021).  FAMILY HISTORY: family history includes Alcoholism in her sister; Bipolar Disorder in her sister; Chronic Obstructive Pulmonary Disease in her brother; Influenza/Pneumonia in her sister; Lung Cancer in her brother; Myocardial  Infarction (age of onset: 63) in her father; Suicide in her brother.  SOCIAL HISTORY:   reports that she has never smoked. She has never used smokeless tobacco. She reports previous alcohol use. She reports that she does not use drugs.  Patient's living condition: lives alone    Post Discharge Medication Reconciliation Status: discharge medications reconciled and changed, per note/orders  Current Outpatient Medications   Medication Sig     aspirin (ASA) 81 MG EC tablet Take 1 tablet (81 mg) by mouth daily     calcium carbonate (TUMS) 500 MG chewable tablet Take 2 chew tab by mouth daily     citalopram (CELEXA) 40 MG tablet Take 1 tablet (40 mg) by mouth daily     gabapentin (NEURONTIN) 100 MG capsule Take 1 capsule (100 mg) by mouth 2 times daily     ibuprofen (ADVIL/MOTRIN) 200 MG tablet Take 1 tablet (200 mg) by mouth 2 times daily for 5 days     levofloxacin (LEVAQUIN) 250 MG tablet Take 1 tablet (250 mg) by mouth daily     LORazepam (ATIVAN) 0.5 MG tablet Take 0.5 tablets (0.25 mg) by mouth every 4 hours as needed for muscle spasms     methocarbamol (ROBAXIN) 500 MG tablet Take 500 mg by mouth 4 times daily as needed for muscle spasms     ondansetron (ZOFRAN ODT) 4 MG ODT tab Take 1 tablet (4 mg) by mouth every 6 hours as needed for nausea or vomiting     oxyCODONE (ROXICODONE) 5 MG tablet Take 1 tablet (5 mg) by mouth every 6 hours as needed for moderate to severe pain     polyethylene glycol (MIRALAX) 17 g packet Take 17 g by mouth daily     prochlorperazine (COMPAZINE) 10 MG tablet Take 1 tablet (10 mg) by mouth every 6 hours as needed for nausea or vomiting     senna-docusate (SENOKOT-S/PERICOLACE) 8.6-50 MG tablet Take 1 tablet by mouth 2 times daily as needed for constipation     simvastatin (ZOCOR) 20 MG tablet TAKE 1 TABLET BY MOUTH AT BEDTIME     sodium chloride 1 GM tablet Take 1 tablet (1 g) by mouth daily     vitamin B-12 (CYANOCOBALAMIN) 1000 MCG tablet Take 1 tablet (1,000 mcg) by mouth daily for  "90 days     vitamin D3 (CHOLECALCIFEROL) 50 mcg (2000 units) tablet Take 1 tablet by mouth daily     acyclovir (ZOVIRAX) 400 MG tablet Take 1 tablet (400 mg) by mouth 2 times daily for 30 days Start 1 week prior to daratumumab and continue for 3 months after treatment is complete. (Patient not taking: Reported on 8/1/2022)     methocarbamol (ROBAXIN) 750 MG tablet Take 1 tablet (750 mg) by mouth 4 times daily (Patient not taking: Reported on 8/1/2022)     No current facility-administered medications for this visit.       ROS:  10 point ROS of systems including Constitutional, Eyes, Respiratory, Cardiovascular, Gastroenterology, Genitourinary, Integumentary, Musculoskeletal, Psychiatric were all negative except for pertinent positives noted in my HPI.    Vitals:  BP 96/57   Pulse 102   Temp 98.6  F (37  C)   Resp 18   Ht 1.6 m (5' 3\")   Wt 60.1 kg (132 lb 8 oz)   LMP 12/04/1989   SpO2 96%   BMI 23.47 kg/m    Exam:  GENERAL APPEARANCE:  Alert, in no distress, appears healthy, oriented  ENT:  normal hearing acuity, oral mucous membranes moist  EYES:  EOM, conjunctivae, lids, pupils and irises normal  RESP:  respiratory effort and palpation of chest normal, lungs clear to auscultation , no respiratory distress  CV:  Palpation and auscultation of heart done , regular rate and rhythm, no murmur, rub, or gallop, no edema  ABDOMEN:  normal bowel sounds, soft, nontender, no hepatosplenomegaly or other masses  M/S:   moving all extremities spontaneously, symmetrically; sitting in wheelchair and repositioning legs, performs lateral twists of trunk with minimal to no discomfort  NEURO:   CN II-XII grossly intact  PSYCH:  oriented X 3    Lab/Diagnostic data:  Recent Results (from the past 240 hour(s))   Kappa and lambda light chain    Collection Time: 07/24/22  7:12 AM   Result Value Ref Range    Kappa Free Light Chains 19.14 (H) 0.33 - 1.94 mg/dL    Lambda Free Light Chains 0.78 0.57 - 2.63 mg/dL    Kappa /Lambda Ratio " 24.54 (H) 0.26 - 1.65   Protein Immunofixation Serum    Collection Time: 07/24/22  7:12 AM   Result Value Ref Range    Immunofixation ELP       Monoclonal IgG immunoglobulin of kappa light chain type. Monoclonal antibody therapeutics (e.g. Daratumumab) may appear as monoclonal proteins on serum electrophoresis and immunofixation, but are almost always only very small IgG kappa monoclonals.  Nevertheless, results should be interpreted with caution if this patient is on a monoclonal therapeutic agent.  Pathologic significance requires clinical correlation.  MYNOR Mello M.D., Ph.D., Pathologist ()   Immunoglobulins A G and M    Collection Time: 07/24/22  7:12 AM   Result Value Ref Range    Immunoglobulin G 5,580 (H) 610 - 1,616 mg/dL    Immunoglobulin A 16 (L) 84 - 499 mg/dL    Immunoglobulin M 15 (L) 35 - 242 mg/dL   Calcium    Collection Time: 07/24/22  7:12 AM   Result Value Ref Range    Calcium 8.7 8.5 - 10.1 mg/dL   Magnesium    Collection Time: 07/24/22  7:12 AM   Result Value Ref Range    Magnesium 1.9 1.6 - 2.3 mg/dL   Phosphorus    Collection Time: 07/24/22  7:12 AM   Result Value Ref Range    Phosphorus 3.6 2.5 - 4.5 mg/dL   Platelet count    Collection Time: 07/24/22  7:12 AM   Result Value Ref Range    Platelet Count 214 150 - 450 10e3/uL   Creatinine    Collection Time: 07/24/22  7:12 AM   Result Value Ref Range    Creatinine 1.07 (H) 0.52 - 1.04 mg/dL    GFR Estimate 54 (L) >60 mL/min/1.73m2   Total Protein, Serum for ELP    Collection Time: 07/24/22  7:12 AM   Result Value Ref Range    Total Protein Serum for ELP 9.6 (H) 6.4 - 8.3 g/dL   Protein Electrophoresis, Serum    Collection Time: 07/24/22  7:12 AM   Result Value Ref Range    Albumin 3.5 (L) 3.7 - 5.1 g/dL    Alpha 1 0.4 0.2 - 0.4 g/dL    Alpha 2 0.8 0.5 - 0.9 g/dL    Beta Globulin 0.7 0.6 - 1.0 g/dL    Gamma Globulin 4.3 (H) 0.7 - 1.6 g/dL    Monoclonal Peak 4.0 (H) <=0.0 g/dL    ELP Interpretation       Large monoclonal protein  (about 4.0 g/dL) seen in the gamma fraction. See immunofixation report on same specimen. Slight hypoalbuminemia. Pathologic significance requires clinical correlation. MYNOR Mello M.D., Ph.D., Pathologist ().   Glucose by meter    Collection Time: 07/26/22  8:01 AM   Result Value Ref Range    GLUCOSE BY METER POCT 101 (H) 70 - 99 mg/dL   Basic metabolic panel    Collection Time: 07/27/22 11:04 AM   Result Value Ref Range    Sodium 129 (L) 133 - 144 mmol/L    Potassium 4.3 3.4 - 5.3 mmol/L    Chloride 97 94 - 109 mmol/L    Carbon Dioxide (CO2) 30 20 - 32 mmol/L    Anion Gap 2 (L) 3 - 14 mmol/L    Urea Nitrogen 27 7 - 30 mg/dL    Creatinine 1.40 (H) 0.52 - 1.04 mg/dL    Calcium 8.6 8.5 - 10.1 mg/dL    Glucose 96 70 - 99 mg/dL    GFR Estimate 39 (L) >60 mL/min/1.73m2   CBC with platelets    Collection Time: 07/27/22 11:04 AM   Result Value Ref Range    WBC Count 3.8 (L) 4.0 - 11.0 10e3/uL    RBC Count 2.89 (L) 3.80 - 5.20 10e6/uL    Hemoglobin 9.2 (L) 11.7 - 15.7 g/dL    Hematocrit 28.6 (L) 35.0 - 47.0 %    MCV 99 78 - 100 fL    MCH 31.8 26.5 - 33.0 pg    MCHC 32.2 31.5 - 36.5 g/dL    RDW 13.7 10.0 - 15.0 %    Platelet Count 232 150 - 450 10e3/uL   Basic metabolic panel    Collection Time: 07/28/22  9:32 AM   Result Value Ref Range    Sodium 130 (L) 133 - 144 mmol/L    Potassium 4.3 3.4 - 5.3 mmol/L    Chloride 101 94 - 109 mmol/L    Carbon Dioxide (CO2) 24 20 - 32 mmol/L    Anion Gap 5 3 - 14 mmol/L    Urea Nitrogen 20 7 - 30 mg/dL    Creatinine 0.93 0.52 - 1.04 mg/dL    Calcium 8.2 (L) 8.5 - 10.1 mg/dL    Glucose 95 70 - 99 mg/dL    GFR Estimate 64 >60 mL/min/1.73m2   Magnesium    Collection Time: 07/28/22  9:32 AM   Result Value Ref Range    Magnesium 1.9 1.6 - 2.3 mg/dL   Basic metabolic panel    Collection Time: 07/29/22  7:36 AM   Result Value Ref Range    Sodium 135 133 - 144 mmol/L    Potassium 4.1 3.4 - 5.3 mmol/L    Chloride 108 94 - 109 mmol/L    Carbon Dioxide (CO2) 24 20 - 32 mmol/L    Anion  Gap 3 3 - 14 mmol/L    Urea Nitrogen 13 7 - 30 mg/dL    Creatinine 0.83 0.52 - 1.04 mg/dL    Calcium 8.1 (L) 8.5 - 10.1 mg/dL    Glucose 91 70 - 99 mg/dL    GFR Estimate 73 >60 mL/min/1.73m2   CBC with platelets    Collection Time: 07/29/22  7:36 AM   Result Value Ref Range    WBC Count 2.4 (L) 4.0 - 11.0 10e3/uL    RBC Count 2.69 (L) 3.80 - 5.20 10e6/uL    Hemoglobin 8.5 (L) 11.7 - 15.7 g/dL    Hematocrit 26.7 (L) 35.0 - 47.0 %    MCV 99 78 - 100 fL    MCH 31.6 26.5 - 33.0 pg    MCHC 31.8 31.5 - 36.5 g/dL    RDW 13.9 10.0 - 15.0 %    Platelet Count 200 150 - 450 10e3/uL       Pathological fractures 2/2 acute multiple myeloma, including:  L5 vertebral body  L parasympyhseal pubic body  R sacral ala  Sacral insufficiency fracture (R hemisacrum)  Patient with frequent falls beginning in May, presented to ED and was admitted due to intractable back/pelvic pain and inability to ambulate. Admission CT with above fractures, all felt to be pathologic related to recent transition to active multiple myeloma.   *MRI lumbar spine noted progression of multiple myeloma with multiple lytic lesions in lumbar spine and pelvis. Also demonstrated slight progression of L5 compression fracture which still appears acute and unhealed and also demonstrated a sacral insufficiency fracture in the right hemisacrum.  * Evaluated by orthopedics, nonop fx management. No indication for prophylactic femur nailing  * Received 4 days of radiation tx to spine to assist with pain, final day 7/30  - WBAT to BLE  - Corset style brace for support, use PRN  - Discontinue neurontin  - Change Robaxin to 500mg QID PRN  - Continue ibuprofen 200mg BID x5d (allergy/intolerant of APAP reporting bilat ear itching), PRN PO ativan 0.25mg for rescue muscle spasm, and oxycodone 2.5-5mg q4h PRN    Dizziness  Since arriving at TCU, pt reports medication intolerance. She describes a 2-3 hour period of dizziness, fatigue, and headache after taking both morning and  "afternoon medications. Unclear cause as medication list has been verified with facility and hospitalization, no changes have been made at time of visit. Patient declined all medications this morning and reports she is feeling \"wonderful,\" denies pain and reports she has been able to work with PT. Vitals are wnl. Med changes were made as noted above.  - Monitor symptoms with change in medications     H/o smoldering myeloma now transformed to active myeloma  Chronic normocytic anemia secondary to multiple myeloma and CKD  Bone marrow biopsy 5/18/2022 shows 60 to 70% plasma cells. Treatment delayed secondary to frequent falling over last couple of months resulting in persistent right hip pain. Following with Dr. Hanks at [facility]. Plan for PET and to start chemo soon.   *Received 4 days of radiation tx to spine to assist with px control  - Continue Zometa for fracture prevention  - Continue levaquin 250mg daily for ppx  - Follow-up scheduled for 8/9, per notes plan to start daratumumab, revlimid, and dexamethasone   - CBC this week, transfuse for Hgb < 7     Hyponatremia  Hx mild hyponatremia, values 127-131. uspect related to malignancy, takes salt tabs daily. Urine studies in May/Nella unremarkable, persisted and started on salt tab 1 gm daily by PCP.  - Continue salt tabs 1gm daily  - BMP this week     Chronic kidney disease, stage 3a  Baseline Cr 1-1.2. Mild DOROTHEA while in hospital, discharge value 0.93.  - BMP this week  - Discontinue ibuprofen after 5d     Constipation  Stable, reports daily BMs but have been loose lately. Discharged on Miralax daily, Senna 2tabs BID  - Continue miralax daily, decrease senna to 1tab BID     MDD: Continue citalopram    HLD: Continue PTA statin    Orders:  1. Discontinued neurontin, compazine  2. Changed robaxin to 500mg QID PRN muscle spasms/pain  3. CBC, BMP on 8/3  4. End date to ibuprofen added (8/2)    Total time spent with patient visit at the skilled nursing facility was 35 min " including patient visit and review of past records. Greater than 50% of total time spent with counseling and coordinating care due to medical complexity.     Electronically signed by:  Max Her PA-C                     Sincerely,        Tonya Lynn Haase, APRN CNP

## 2022-08-02 ENCOUNTER — PATIENT OUTREACH (OUTPATIENT)
Dept: ONCOLOGY | Facility: CLINIC | Age: 77
End: 2022-08-02

## 2022-08-02 ENCOUNTER — TELEPHONE (OUTPATIENT)
Dept: ONCOLOGY | Facility: CLINIC | Age: 77
End: 2022-08-02

## 2022-08-02 ENCOUNTER — PATIENT OUTREACH (OUTPATIENT)
Dept: CARE COORDINATION | Facility: CLINIC | Age: 77
End: 2022-08-02

## 2022-08-02 DIAGNOSIS — C90.00 MYELOMA (H): Primary | ICD-10-CM

## 2022-08-02 NOTE — ORAL ONC MGMT
Oral Chemotherapy Monitoring Program      Placed second call to Mary Jo Mcleod in follow up of lenalidomide (Revlimid) oral chemotherapy. This was for an initial teaching. Left a message requesting a call back. No drug names were mentioned in the voicemail. Will update when response is received.    Placed first call to Carrie Montalvo (TCU care coordinator) in follow up of lenalidomide (Revlimid) oral chemotherapy. This was for an initial teaching. Left a message requesting a call back. No drug names were mentioned in the voicemail. Will update when response is received.    Sarabjit Dillard, PharmD  Hematology/Oncology Clinical Pharmacist  [facility]  [facility]

## 2022-08-02 NOTE — PROGRESS NOTES
[facility]/Voicemail    Clinical Data: Care Coordinator Outreach    Outreach attempted x 1.  Left message on Carrie ([facility] Care Coordinator) voicemail with call back information and requested return call.    Plan: Care Coordinator will wait for call back.    Weston Mayo DNP, RN, OCN  RN Care Coordinator  [facility]

## 2022-08-02 NOTE — PROGRESS NOTES
[facility]: Cancer Care                                                                                          I received a call back from Carrie at [facility]. She tells me that they will be having a care conference to determine a plan of care for the patient. That will happen this Friday (8/5).    I let Carrie know that the patient called us and requested to postpone all visits and treatment for her myeloma until after she is discharged from the TCU. I am waiting to hear back from Dr. Hanks on her opinion on this before canceling her appointments. In the meantime, I asked Carrie if there would be insurance barriers if we did move forward with therapy while she is in the TCU (explained that she would need therapy administered at an infusion center as well as oral therapy to be taken at home). She is not sure but will look into it and let me know.    No other questions or concerns at this time. I will continue to follow up.    Signature:  Weston Mayo DNP, RN, OCN  RN Care Coordinator  [facility]

## 2022-08-02 NOTE — PROGRESS NOTES
Clinic Care Coordination Contact  Care Coordination Transition Communication         Clinical Data: Patient was hospitalized at [facility] from 7/20 to 7/29 with diagnosis of active myeloma.     Transition to Facility:              Facility Name: [facility]              Contact name and phone number/fax: Carrie Joseph ( -W) [phone] or Stephanie ( -) [phone]    Plan: RN/SW Care Coordinator will await notification from facility staff informing RN/SW Care Coordinator of patient's discharge plans/needs. RN/SW Care Coordinator will review chart and outreach to facility staff every 4 weeks and as needed.

## 2022-08-03 ENCOUNTER — TELEPHONE (OUTPATIENT)
Dept: ONCOLOGY | Facility: CLINIC | Age: 77
End: 2022-08-03

## 2022-08-03 ENCOUNTER — TRANSITIONAL CARE UNIT VISIT (OUTPATIENT)
Dept: GERIATRICS | Facility: CLINIC | Age: 77
End: 2022-08-03
Payer: COMMERCIAL

## 2022-08-03 ENCOUNTER — TELEPHONE (OUTPATIENT)
Dept: NEUROSURGERY | Facility: CLINIC | Age: 77
End: 2022-08-03

## 2022-08-03 ENCOUNTER — TELEPHONE (OUTPATIENT)
Dept: GERIATRICS | Facility: CLINIC | Age: 77
End: 2022-08-03

## 2022-08-03 VITALS
HEART RATE: 82 BPM | TEMPERATURE: 98.2 F | SYSTOLIC BLOOD PRESSURE: 136 MMHG | RESPIRATION RATE: 18 BRPM | HEIGHT: 63 IN | DIASTOLIC BLOOD PRESSURE: 73 MMHG | WEIGHT: 133.2 LBS | OXYGEN SATURATION: 97 % | BODY MASS INDEX: 23.6 KG/M2

## 2022-08-03 DIAGNOSIS — F41.1 GAD (GENERALIZED ANXIETY DISORDER): ICD-10-CM

## 2022-08-03 DIAGNOSIS — M54.16 LUMBAR RADICULOPATHY: ICD-10-CM

## 2022-08-03 DIAGNOSIS — C90.00 MULTIPLE MYELOMA NOT HAVING ACHIEVED REMISSION (H): Primary | ICD-10-CM

## 2022-08-03 DIAGNOSIS — D63.1 ANEMIA DUE TO STAGE 3A CHRONIC KIDNEY DISEASE (H): ICD-10-CM

## 2022-08-03 DIAGNOSIS — E53.8 B12 DEFICIENCY: ICD-10-CM

## 2022-08-03 DIAGNOSIS — M84.48XD PATHOLOGICAL FRACTURE OF LUMBAR VERTEBRA WITH ROUTINE HEALING, SUBSEQUENT ENCOUNTER: ICD-10-CM

## 2022-08-03 DIAGNOSIS — M84.454A PATHOLOGICAL FRACTURE OF PELVIS, UNSPECIFIED PATHOLOGICAL CAUSE, INITIAL ENCOUNTER: ICD-10-CM

## 2022-08-03 DIAGNOSIS — R53.81 PHYSICAL DECONDITIONING: ICD-10-CM

## 2022-08-03 DIAGNOSIS — N18.31 ANEMIA DUE TO STAGE 3A CHRONIC KIDNEY DISEASE (H): ICD-10-CM

## 2022-08-03 DIAGNOSIS — N18.31 CHRONIC KIDNEY DISEASE, STAGE 3A (H): ICD-10-CM

## 2022-08-03 DIAGNOSIS — E87.1 HYPONATREMIA: ICD-10-CM

## 2022-08-03 PROCEDURE — 99310 SBSQ NF CARE HIGH MDM 45: CPT | Performed by: NURSE PRACTITIONER

## 2022-08-03 RX ORDER — IBUPROFEN 200 MG
200 TABLET ORAL 2 TIMES DAILY PRN
Status: ON HOLD
Start: 2022-08-03 | End: 2023-12-13

## 2022-08-03 RX ORDER — LENALIDOMIDE 15 MG/1
15 CAPSULE ORAL DAILY
Qty: 21 CAPSULE | Refills: 0 | Status: SHIPPED | OUTPATIENT
Start: 2022-08-03 | End: 2022-08-25

## 2022-08-03 NOTE — ORAL ONC MGMT
Oral Chemotherapy Monitoring Program      Placed third call to Mary Jo Mcleod in follow up of lenalidomide (Revlimid) oral chemotherapy. This was for an initial teaching. Left a message requesting a call back. No drug names were mentioned in the voicemail. Will update when response is received.     Sarabjit Dillard, PharmD  Hematology/Oncology Clinical Pharmacist  [facility]  [facility]

## 2022-08-03 NOTE — TELEPHONE ENCOUNTER
"Oral Chemotherapy Monitoring Program    Subjective/Objective:  Mary Jo Mcleod is a 76 year old female contacted by phone for an initial visit for oral chemotherapy education.      ORAL CHEMOTHERAPY 3/30/2021 4/2/2021 8/1/2022 8/2/2022 8/2/2022 8/3/2022   Assessment Type Other New Teach Left Voicemail Left Voicemail Left Voicemail Left Voicemail   Diagnosis Code Other - - Multiple Myeloma Multiple Myeloma Multiple Myeloma   Other Smoldering myeloma Smoldering myeloma - - - -   Providers Dr. Demario Hanks   [facility]   Drug Name Revlimid (lenalidomide) Revlimid (lenalidomide) Revlimid (lenalidomide) Revlimid (lenalidomide) Revlimid (lenalidomide) Revlimid (lenalidomide)   Dose 25 mg 10 mg 15 mg 15 mg 15 mg 15 mg   Current Schedule Daily Daily Daily Daily Daily Daily   Cycle Details 3 weeks on, 1 week off 3 weeks on, 1 week off 3 weeks on, 1 week off 3 weeks on, 1 week off 3 weeks on, 1 week off 3 weeks on, 1 week off       Last PHQ-2 Score on record:   PHQ-2 ( 1999 [employer]) 6/3/2022 11/9/2021   Q1: Little interest or pleasure in doing things 0 0   Q2: Feeling down, depressed or hopeless 0 2   PHQ-2 Score 0 2   PHQ-2 Total Score (12-17 Years)- Positive if 3 or more points; Administer PHQ-A if positive - -       Vitals:  BP:   BP Readings from Last 1 Encounters:   08/02/22 136/73     Wt Readings from Last 1 Encounters:   08/01/22 60.4 kg (133 lb 3.2 oz)     Estimated body surface area is 1.64 meters squared as calculated from the following:    Height as of 8/1/22: 1.6 m (5' 3\").    Weight as of 8/1/22: 60.4 kg (133 lb 3.2 oz).    Labs:  _  Result Component Current Result Ref Range   Sodium 135 (7/29/2022) 133 - 144 mmol/L     _  Result Component Current Result Ref Range   Potassium 4.1 (7/29/2022) 3.4 - 5.3 mmol/L     _  Result Component Current Result Ref Range   Calcium 8.1 (L) (7/29/2022) 8.5 - 10.1 mg/dL "     _  Result Component Current Result Ref Range   Magnesium 1.9 (7/28/2022) 1.6 - 2.3 mg/dL     _  Result Component Current Result Ref Range   Phosphorus 3.6 (7/24/2022) 2.5 - 4.5 mg/dL     _  Result Component Current Result Ref Range   Albumin 2.8 (L) (7/8/2022) 3.4 - 5.0 g/dL     _  Result Component Current Result Ref Range   Urea Nitrogen 13 (7/29/2022) 7 - 30 mg/dL     _  Result Component Current Result Ref Range   Creatinine 0.83 (7/29/2022) 0.52 - 1.04 mg/dL     _  Result Component Current Result Ref Range   AST 21 (7/8/2022) 0 - 45 U/L     _  Result Component Current Result Ref Range   ALT 14 (7/8/2022) 0 - 50 U/L     _  Result Component Current Result Ref Range   Bilirubin Total 0.4 (7/8/2022) 0.2 - 1.3 mg/dL     _  Result Component Current Result Ref Range   WBC Count 2.4 (L) (7/29/2022) 4.0 - 11.0 10e3/uL     _  Result Component Current Result Ref Range   Hemoglobin 8.5 (L) (7/29/2022) 11.7 - 15.7 g/dL     _  Result Component Current Result Ref Range   Platelet Count 200 (7/29/2022) 150 - 450 10e3/uL     No results found for ANC within last 30 days.       Assessment:  Patient is appropriate to start therapy.    Plan:  Basic chemotherapy teaching was reviewed with the patient including indication, start date of therapy, dose, administration, adverse effects, missed doses, food and drug interactions, monitoring, side effect management, office contact information, and safe handling. Written materials were provided and all questions answered.    Follow-Up:  1 week following initial start of therapy    Tonya Lanza, Nicolas, BCACP  Oral Chemotherapy Monitoring Program  [facility]  [phone]  August 3, 2022

## 2022-08-03 NOTE — LETTER
"    8/3/2022        RE: Mary Jo Mcleod  [address]  [facility] [address]        [facility] GERIATRICS    Chief Complaint   Patient presents with     Nursing Home Acute     HPI:  Mary Jo Mcleod is a 76 year old  (1945), who is being seen today for an episodic care visit at: [facility]) [25]. Today's concern is:   Multiple myeloma with pathologic fracture of lumbar vertebra: on exam today patient resting in bed with feet up on a pillow, states pain in low back is rated 2/10, pain radiates to left groin and legs bilaterally, difficult to assess pain due to patient vague responses. In therapy patient is walking up to 600 feet using a RW with SBA, on admit patient has c/o dizziness when walking, today patient states she did not notice any dizziness.   Patient continues to refuse multiple medications, states she is taking her celexa and simvastatin, discussed other medications such as salt tabs, Calcium, vitamin B12 and vitamin D, patient states she has not been offered those medications, Nurse manager notes that nursing have offered all medications that are ordered and patient has refused.   Hyponatremia: see labs  CKD: see labs  Anemia See labs    Allergies, and PMH/PSH reviewed in EPIC today.  REVIEW OF SYSTEMS:  10 point ROS of systems including Constitutional, Eyes, Respiratory, Cardiovascular, Gastroenterology, Genitourinary, Integumentary, Musculoskeletal, Psychiatric were all negative except for pertinent positives noted in my HPI.    Objective:   /73   Pulse 82   Temp 98.2  F (36.8  C)   Resp 18   Ht 1.6 m (5' 3\")   Wt 60.4 kg (133 lb 3.2 oz)   LMP 12/04/1989   SpO2 97%   BMI 23.60 kg/m    GENERAL APPEARANCE:  Alert, in no distress, thin  ENT:  Mouth and posterior oropharynx normal, moist mucous membranes, normal hearing acuity  EYES:  EOM, conjunctivae, lids, pupils and irises normal, PERRL  RESP:  respiratory effort and palpation of chest normal, lungs " "clear to auscultation , no respiratory distress  CV:  Palpation and auscultation of heart done , regular rate and rhythm, no murmur, rub, or gallop, no edema  ABDOMEN:  normal bowel sounds, soft, nontender, no hepatosplenomegaly or other masses  M/S:   patient resting in bed  SKIN:  Inspection of skin and subcutaneous tissue baseline  NEURO:   speech wnl  PSYCH:  affect and mood normal    Recent labs in [facility] reviewed by me today.  and   Most Recent 3 CBC's:Recent Labs   Lab Test 07/29/22  0736 07/27/22  1104 07/24/22  0712 07/20/22  1906   WBC 2.4* 3.8*  --  4.8   HGB 8.5* 9.2*  --  9.1*   MCV 99 99  --  98    232 214 221     Most Recent 3 BMP's:Recent Labs   Lab Test 07/29/22  0736 07/28/22  0932 07/27/22  1104    130* 129*   POTASSIUM 4.1 4.3 4.3   CHLORIDE 108 101 97   CO2 24 24 30   BUN 13 20 27   CR 0.83 0.93 1.40*   ANIONGAP 3 5 2*   ARA 8.1* 8.2* 8.6   GLC 91 95 96       Assessment/Plan:  (C90.00) Multiple myeloma not having achieved remission (H)  (primary encounter diagnosis)  Comment: acute/ongoing  Plan: f/u with oncology as OP, received  4 days of radiation Tx to spine final day 7/30/22    (M84.48XD) Pathological fracture of lumbar vertebra with routine healing, subsequent encounter  (R53.81) Physical deconditioning  Comment: acute/ongoing  L5 vertebral body Fx, Left parasymphyseal pubic body Fx, sacral insuffiiciency Fx, ortho consulted, non operative management  Plan: PT and OT, corset brace for support, continue ibuprofen 200mg BID prn (per patient request), robaxin 500mg QID prn, oxycodone 5mg q 6 hours prn, patient states she has an allergy to tylenol states \"it makes me loopy\"     (F41.1) GERSON (generalized anxiety disorder)  Comment: ongoing  Plan: continue celexa 40mg QD, ativan 0.25mg q 4 hours prn    (E53.8) B12 deficiency  Comment: ongoing  Plan: continue vitamin B12 1000mcg QD (patient refusing)    (E87.1) Hyponatremia  Comment: ongoing  PlaN: continue NaCL tabs 1g QD (patient " refusing) BMP follow    (N18.31) Chronic kidney disease, stage 3a (H)  Comment: ongoing  Plan: BMP follow, avoid nephrotoxic agents    (N18.31,  D63.1) Anemia due to stage 3a chronic kidney disease (H)  Comment: ongoing, Hgb at discharge 8.5  Plan: Hgb follow    Orders:  ibuprofen 200mg BID prn      Electronically signed by: Tonya Lynn Haase, APRN CNP             Sincerely,        Tonya Lynn Haase, APRN CNP

## 2022-08-03 NOTE — TELEPHONE ENCOUNTER
Spoke to the patient and offered to schedule the follow up appointment in 6 weeks from 7/25/22, with an Xray prior. The patient declined and will give us a call should she choose to schedule in the future.

## 2022-08-03 NOTE — PROGRESS NOTES
TORRES Soni at [facility] stating Mary Jo will be discharged home on Monday with home cares from [facility], PT, OT and HH.

## 2022-08-03 NOTE — TELEPHONE ENCOUNTER
[facility] Geriatrics Lab Note     Provider: Tonya Haase, APRN CNP  Facility: [facility] Facility Type:  TCU    Allergies   Allergen Reactions     Codeine Difficulty breathing, Other (See Comments) and Rash     Bupropion      Erythromycin      Hydrocodone Other (See Comments) and Itching     Ear ache     Lidocaine Other (See Comments)     Penicillin G Itching     Itching around mouth then heavy breathing     Sulfa Drugs Itching     Itching around the mouth and then heavy breathing     Tetracycline      Trazodone      Does not work for patient     Wellbutrin [Bupropion Hydrobromide]      Codeine Sulfate Rash       Labs Reviewed by provider:        Verbal Order/Direction given by Provider: NNO    Provider giving Order:  Tonya Haase, APRN CNP    Verbal Order given to: Noemy Rouse RN

## 2022-08-03 NOTE — PROGRESS NOTES
"[facility] GERIATRICS    Chief Complaint   Patient presents with     Nursing Home Acute     HPI:  Mary Jo Mcleod is a 76 year old  (1945), who is being seen today for an episodic care visit at: [facility] [25]. Today's concern is:   Multiple myeloma with pathologic fracture of lumbar vertebra: on exam today patient resting in bed with feet up on a pillow, states pain in low back is rated 2/10, pain radiates to left groin and legs bilaterally, difficult to assess pain due to patient vague responses. In therapy patient is walking up to 600 feet using a RW with SBA, on admit patient has c/o dizziness when walking, today patient states she did not notice any dizziness.   Patient continues to refuse multiple medications, states she is taking her celexa and simvastatin, discussed other medications such as salt tabs, Calcium, vitamin B12 and vitamin D, patient states she has not been offered those medications, Nurse manager notes that nursing have offered all medications that are ordered and patient has refused.   Hyponatremia: see labs  CKD: see labs  Anemia See labs    Allergies, and PMH/PSH reviewed in [email or web address] today.  REVIEW OF SYSTEMS:  10 point ROS of systems including Constitutional, Eyes, Respiratory, Cardiovascular, Gastroenterology, Genitourinary, Integumentary, Musculoskeletal, Psychiatric were all negative except for pertinent positives noted in my HPI.    Objective:   /73   Pulse 82   Temp 98.2  F (36.8  C)   Resp 18   Ht 1.6 m (5' 3\")   Wt 60.4 kg (133 lb 3.2 oz)   LMP 12/04/1989   SpO2 97%   BMI 23.60 kg/m    GENERAL APPEARANCE:  Alert, in no distress, thin  ENT:  Mouth and posterior oropharynx normal, moist mucous membranes, normal hearing acuity  EYES:  EOM, conjunctivae, lids, pupils and irises normal, PERRL  RESP:  respiratory effort and palpation of chest normal, lungs clear to auscultation , no respiratory distress  CV:  Palpation and auscultation of heart done , " "regular rate and rhythm, no murmur, rub, or gallop, no edema  ABDOMEN:  normal bowel sounds, soft, nontender, no hepatosplenomegaly or other masses  M/S:   patient resting in bed  SKIN:  Inspection of skin and subcutaneous tissue baseline  NEURO:   speech wnl  PSYCH:  affect and mood normal    Recent labs in [facility] reviewed by me today.  and   Most Recent 3 CBC's:Recent Labs   Lab Test 07/29/22  0736 07/27/22  1104 07/24/22  0712 07/20/22  1906   WBC 2.4* 3.8*  --  4.8   HGB 8.5* 9.2*  --  9.1*   MCV 99 99  --  98    232 214 221     Most Recent 3 BMP's:Recent Labs   Lab Test 07/29/22  0736 07/28/22  0932 07/27/22  1104    130* 129*   POTASSIUM 4.1 4.3 4.3   CHLORIDE 108 101 97   CO2 24 24 30   BUN 13 20 27   CR 0.83 0.93 1.40*   ANIONGAP 3 5 2*   ARA 8.1* 8.2* 8.6   GLC 91 95 96       Assessment/Plan:  (C90.00) Multiple myeloma not having achieved remission (H)  (primary encounter diagnosis)  Comment: acute/ongoing  Plan: f/u with oncology as OP, received  4 days of radiation Tx to spine final day 7/30/22    (M84.48XD) Pathological fracture of lumbar vertebra with routine healing, subsequent encounter  (R53.81) Physical deconditioning  Comment: acute/ongoing  L5 vertebral body Fx, Left parasymphyseal pubic body Fx, sacral insuffiiciency Fx, ortho consulted, non operative management  Plan: PT and OT, corset brace for support, continue ibuprofen 200mg BID prn (per patient request), robaxin 500mg QID prn, oxycodone 5mg q 6 hours prn, patient states she has an allergy to tylenol states \"it makes me loopy\"     (F41.1) GERSON (generalized anxiety disorder)  Comment: ongoing  Plan: continue celexa 40mg QD, ativan 0.25mg q 4 hours prn    (E53.8) B12 deficiency  Comment: ongoing  Plan: continue vitamin B12 1000mcg QD (patient refusing)    (E87.1) Hyponatremia  Comment: ongoing  PlaN: continue NaCL tabs 1g QD (patient refusing) BMP follow    (N18.31) Chronic kidney disease, stage 3a (H)  Comment: ongoing  Plan: BMP " follow, avoid nephrotoxic agents    (N18.31,  D63.1) Anemia due to stage 3a chronic kidney disease (H)  Comment: ongoing, Hgb at discharge 8.5  Plan: Hgb follow    Orders:  ibuprofen 200mg BID prn      Electronically signed by: Tonya Lynn Haase, APRN CNP

## 2022-08-04 ENCOUNTER — DISCHARGE SUMMARY NURSING HOME (OUTPATIENT)
Dept: GERIATRICS | Facility: CLINIC | Age: 77
End: 2022-08-04

## 2022-08-04 ENCOUNTER — TELEPHONE (OUTPATIENT)
Dept: ONCOLOGY | Facility: CLINIC | Age: 77
End: 2022-08-04

## 2022-08-04 VITALS
TEMPERATURE: 98.5 F | DIASTOLIC BLOOD PRESSURE: 52 MMHG | HEART RATE: 103 BPM | WEIGHT: 133.2 LBS | RESPIRATION RATE: 18 BRPM | BODY MASS INDEX: 23.6 KG/M2 | SYSTOLIC BLOOD PRESSURE: 97 MMHG | OXYGEN SATURATION: 99 % | HEIGHT: 63 IN

## 2022-08-04 DIAGNOSIS — N18.31 CHRONIC KIDNEY DISEASE, STAGE 3A (H): ICD-10-CM

## 2022-08-04 DIAGNOSIS — E53.8 B12 DEFICIENCY: ICD-10-CM

## 2022-08-04 DIAGNOSIS — F41.1 GAD (GENERALIZED ANXIETY DISORDER): ICD-10-CM

## 2022-08-04 DIAGNOSIS — E87.1 HYPONATREMIA: ICD-10-CM

## 2022-08-04 DIAGNOSIS — C90.00 MULTIPLE MYELOMA NOT HAVING ACHIEVED REMISSION (H): Primary | ICD-10-CM

## 2022-08-04 DIAGNOSIS — R53.81 PHYSICAL DECONDITIONING: ICD-10-CM

## 2022-08-04 DIAGNOSIS — N18.31 ANEMIA DUE TO STAGE 3A CHRONIC KIDNEY DISEASE (H): ICD-10-CM

## 2022-08-04 DIAGNOSIS — M84.48XD PATHOLOGICAL FRACTURE OF LUMBAR VERTEBRA WITH ROUTINE HEALING, SUBSEQUENT ENCOUNTER: ICD-10-CM

## 2022-08-04 DIAGNOSIS — D63.1 ANEMIA DUE TO STAGE 3A CHRONIC KIDNEY DISEASE (H): ICD-10-CM

## 2022-08-04 PROCEDURE — 99316 NF DSCHRG MGMT 30 MIN+: CPT | Performed by: NURSE PRACTITIONER

## 2022-08-04 RX ORDER — DEXAMETHASONE 4 MG/1
20 TABLET ORAL WEEKLY
Qty: 20 TABLET | Refills: 0 | Status: SHIPPED | OUTPATIENT
Start: 2022-08-04 | End: 2022-09-30

## 2022-08-04 NOTE — LETTER
8/4/2022        RE: Mary Jo Mcleod  [address]  [facility] [address]        [facility] GERIATRICS DISCHARGE SUMMARY  PATIENT'S NAME: Mary Jo Mcleod  YOB: 1945  MEDICAL RECORD NUMBER:  [id number]  Place of Service where encounter took place:  [facility]) [25]    PRIMARY CARE PROVIDER AND CLINIC RESPONSIBLE AFTER TRANSFER:   Levi Mcdonnell MD, [address] / [facility] [address]    [facility] [place]     Transferring providers: Tonya Lynn Haase, APRN CNP, Dr. Luis Daniel Spear MD  Recent Hospitalization/ED:  [facility] stay 7/20/22 to 7/29/22.  Date of SNF Admission: July 29, 2022  Date of SNF (anticipated) Discharge: August 04, 2022  Discharged to: previous independent home  Cognitive Scores: BIMS: 15/15 and Short blessed: )/28  Physical Function: Ambulating 400 ft with RW  DME: [facility]    CODE STATUS/ADVANCE DIRECTIVES DISCUSSION:  Full Code   ALLERGIES: Codeine, Bupropion, Erythromycin, Hydrocodone, Lidocaine, Penicillin g, Sulfa drugs, Tetracycline, Trazodone, Wellbutrin [bupropion hydrobromide], and Codeine sulfate    NURSING FACILITY COURSE   Medication Changes/Rationale:     See assessment and plan    Summary of nursing facility stay:   Patient progressed in therapy to walking > 400 feet using a RW with SBA, SBA with ADL's, patient requesting to return home today so she can heal. Will have home PT, OT, RN and HHA through [facility]    Discharge Medications:    Current Outpatient Medications   Medication Sig Dispense Refill     aspirin (ASA) 81 MG EC tablet Take 1 tablet (81 mg) by mouth daily       calcium carbonate (TUMS) 500 MG chewable tablet Take 2 chew tab by mouth daily       citalopram (CELEXA) 40 MG tablet Take 1 tablet (40 mg) by mouth daily 90 tablet 3     dexamethasone (DECADRON) 4 MG tablet Take 5 tablets (20 mg) by mouth once a week for 30 days 20 tablet 0     ibuprofen (ADVIL/MOTRIN) 200 MG tablet  Take 1 tablet (200 mg) by mouth 2 times daily as needed for mild pain       LENalidomide (REVLIMID) 15 MG CAPS capsule Take 1 capsule (15 mg) by mouth daily for 21 days Days 1 through 21. 21 capsule 0     levofloxacin (LEVAQUIN) 250 MG tablet Take 1 tablet (250 mg) by mouth daily 30 tablet 3     LORazepam (ATIVAN) 0.5 MG tablet Take 0.5 tablets (0.25 mg) by mouth every 4 hours as needed for muscle spasms 12 tablet 0     methocarbamol (ROBAXIN) 500 MG tablet Take 500 mg by mouth 4 times daily as needed for muscle spasms       ondansetron (ZOFRAN ODT) 4 MG ODT tab Take 1 tablet (4 mg) by mouth every 6 hours as needed for nausea or vomiting       oxyCODONE (ROXICODONE) 5 MG tablet Take 1 tablet (5 mg) by mouth every 6 hours as needed for moderate to severe pain 30 tablet 0     polyethylene glycol (MIRALAX) 17 g packet Take 17 g by mouth daily       senna-docusate (SENOKOT-S/PERICOLACE) 8.6-50 MG tablet Take 1 tablet by mouth 2 times daily as needed for constipation       simvastatin (ZOCOR) 20 MG tablet TAKE 1 TABLET BY MOUTH AT BEDTIME 90 tablet 0     sodium chloride 1 GM tablet Take 1 tablet (1 g) by mouth daily 90 tablet 0     vitamin B-12 (CYANOCOBALAMIN) 1000 MCG tablet Take 1 tablet (1,000 mcg) by mouth daily for 90 days 90 tablet 0     vitamin D3 (CHOLECALCIFEROL) 50 mcg (2000 units) tablet Take 1 tablet by mouth daily       acyclovir (ZOVIRAX) 400 MG tablet Take 1 tablet (400 mg) by mouth 2 times daily for 30 days Start 1 week prior to daratumumab and continue for 3 months after treatment is complete. (Patient not taking: No sig reported) 60 tablet 11     methocarbamol (ROBAXIN) 750 MG tablet Take 1 tablet (750 mg) by mouth 4 times daily (Patient not taking: Reported on 8/1/2022) 120 tablet 0        Post Medication Reconciliation Status:      Controlled medications:   not applicable/none     Past Medical History:   Past Medical History:   Diagnosis Date     Depressive disorder      Physical Exam:   Vitals: BP  "97/52   Pulse 103   Temp 98.5  F (36.9  C)   Resp 18   Ht 1.6 m (5' 3\")   Wt 60.4 kg (133 lb 3.2 oz)   LMP 12/04/1989   SpO2 99%   BMI 23.60 kg/m    BMI: Body mass index is 23.6 kg/m .  GENERAL APPEARANCE:  Alert, in no distress  ENT:  Mouth and posterior oropharynx normal, moist mucous membranes, normal hearing acuity  EYES:  EOM, conjunctivae, lids, pupils and irises normal, PERRL  RESP:  no respiratory distress  CV:  no edema  ABDOMEN:  abdomen round  M/S:   patient sitting up in bed  SKIN:  Inspection of skin and subcutaneous tissue baseline  NEURO:   speech wnl  PSYCH:  affect and mood normal     SNF labs: Recent labs in [facility] reviewed by me today.  and   Most Recent 3 CBC's:Recent Labs   Lab Test 07/29/22  0736 07/27/22  1104 07/24/22  0712 07/20/22  1906   WBC 2.4* 3.8*  --  4.8   HGB 8.5* 9.2*  --  9.1*   MCV 99 99  --  98    232 214 221     Most Recent 3 BMP's:Recent Labs   Lab Test 07/29/22  0736 07/28/22  0932 07/27/22  1104    130* 129*   POTASSIUM 4.1 4.3 4.3   CHLORIDE 108 101 97   CO2 24 24 30   BUN 13 20 27   CR 0.83 0.93 1.40*   ANIONGAP 3 5 2*   ARA 8.1* 8.2* 8.6   GLC 91 95 96     Assessment/Plan:  (C90.00) Multiple myeloma not having achieved remission (H)  (primary encounter diagnosis)  Comment: acute/ongoing  Plan: f/u with oncology as OP, received  4 days of radiation Tx to spine final day 7/30/22  Will be starting chemotherapy medication at home ordered by oncology     (M84.48XD) Pathological fracture of lumbar vertebra with routine healing, subsequent encounter  (R53.81) Physical deconditioning  Comment: acute/ongoing  L5 vertebral body Fx, Left parasymphyseal pubic body Fx, sacral insuffiiciency Fx, ortho consulted, non operative management  Plan: DC to home with home PT, OT, RN and HHA through [facility], continue  corset brace for support, continue ibuprofen 200mg BID prn (per patient request), robaxin 500mg QID prn (15 tabs called in)  DC oxycodone at " discharge as patient has not been taking      (F41.1) GERSON (generalized anxiety disorder)  Comment: ongoing  Plan: continue celexa 40mg QD, ativan 0.25mg q 4 hours prn     (E53.8) B12 deficiency  Comment: ongoing  Plan: continue vitamin B12 1000mcg QD (patient refusing in TCU)     (E87.1) Hyponatremia  Comment: ongoing  PlaN: continue NaCL tabs 1g QD (patient refusing) f/u with PCP     (N18.31) Chronic kidney disease, stage 3a (H)  Comment: ongoing  Plan: avoid nephrotoxic agents, f/u with PCP     (N18.31,  D63.1) Anemia due to stage 3a chronic kidney disease (H)  Comment: ongoing, Hgb at discharge 8.5  Plan: f/u with PCP, Hgb in TCU was 8.6     DISCHARGE PLAN:    Follow up labs: No labs orders/due    Medical Follow Up:      Follow up with primary care provider in 1 weeks    Current [place] scheduled appointments:     Future Appointments   Date Time Provider Department Center   No future appts.      Discharge Services: Home Care:  Occupational Therapy, Physical Therapy, Registered Nurse and Home Health Aide    Discharge Instructions Verbalized to Patient at Discharge:     None    TOTAL DISCHARGE TIME:   Greater than 30 minutes  Electronically signed by:  Tonya Lynn Haase, APRN [facility]     Home care Face to Face documentation done in [facility] attached to Home care orders for [facility]. , Documentation of Face to Face and Certification for Home Health Services    I certify that patient: Mary Jo Mcleod is under my care and that I, or a nurse practitioner or physician's assistant working with me, had a face-to-face encounter that meets the physician face-to-face encounter requirements with this patient on: 8/4/2022.    This encounter with the patient was in whole, or in part, for the following medical condition, which is the primary reason for home health care: multiple myeloma, pathologic fracture of lumbar vertebra.    I certify that, based on my findings, the following services are medically necessary home health  services: Nursing, Occupational Therapy and Physical Therapy.    My clinical findings support the need for the above services because: Nurse is needed: To assess pain, vitals, medication management after changes in medications or other medical regimen.., Occupational Therapy Services are needed to assess and treat cognitive ability and address ADL safety due to impairment in ADL training, home safety. and Physical Therapy Services are needed to assess and treat the following functional impairments: strengthening, endurance, home safety.    Further, I certify that my clinical findings support that this patient is homebound (i.e. absences from home require considerable and taxing effort and are for medical reasons or [employer] services or infrequently or of short duration when for other reasons) because: Requires assistance of another person or specialized equipment to access medical services because patient: Requires supervision of another for safe transfer...    Based on the above findings. I certify that this patient is confined to the home and needs intermittent skilled nursing care, physical therapy and/or speech therapy.  The patient is under my care, and I have initiated the establishment of the plan of care.  This patient will be followed by a physician who will periodically review the plan of care.  Physician/Provider to provide follow up care: Levi Mcdonnell    Attending hospital physician (the Medicare certified [facility] provider): Tonya Lynn Haase, APRN CNP  Physician Signature: See electronic signature associated with these discharge orders.  Date: 8/4/2022                Sincerely,        Tonya Lynn Haase, APRN CNP

## 2022-08-04 NOTE — TELEPHONE ENCOUNTER
Oral Chemotherapy Monitoring Program     Received call from Tiana, RN Manager at [facility] stating pt will be discharged home today at noon. Discussed with Tiana to relay the message to pt that we will have the revlimid and dexamethasone delivered to her home and that she will need to confirm with [facility] on delivery. No further questions or concerns.       Tonya Lanza, PharmD, BCACP  Oral Chemotherapy Monitoring Program  [facility]  [phone]  August 4, 2022

## 2022-08-04 NOTE — PROGRESS NOTES
[facility] GERIATRICS DISCHARGE SUMMARY  PATIENT'S NAME: Mary Jo Mcleod  YOB: 1945  MEDICAL RECORD NUMBER:  [id number]  Place of Service where encounter took place:  [facility]) [25]    PRIMARY CARE PROVIDER AND CLINIC RESPONSIBLE AFTER TRANSFER:   Levi Mcdonnell MD, [address] / [facility] [id number]    [facility] Provider     Transferring providers: Tonya Lynn Haase, APRN CNP, Dr. Luis Daniel Spear MD  Recent Hospitalization/ED:  [facility] stay 7/20/22 to 7/29/22.  Date of SNF Admission: July 29, 2022  Date of SNF (anticipated) Discharge: August 04, 2022  Discharged to: previous independent home  Cognitive Scores: BIMS: 15/15 and Short blessed: )/28  Physical Function: Ambulating 400 ft with RW  DME: Walker    CODE STATUS/ADVANCE DIRECTIVES DISCUSSION:  Full Code   ALLERGIES: Codeine, Bupropion, Erythromycin, Hydrocodone, Lidocaine, Penicillin g, Sulfa drugs, Tetracycline, Trazodone, Wellbutrin [bupropion hydrobromide], and Codeine sulfate    NURSING FACILITY COURSE   Medication Changes/Rationale:     See assessment and plan    Summary of nursing facility stay:   Patient progressed in therapy to walking > 400 feet using a RW with SBA, SBA with ADL's, patient requesting to return home today so she can heal. Will have home PT, OT, RN and HHA through [facility]    Discharge Medications:    Current Outpatient Medications   Medication Sig Dispense Refill     aspirin (ASA) 81 MG EC tablet Take 1 tablet (81 mg) by mouth daily       calcium carbonate (TUMS) 500 MG chewable tablet Take 2 chew tab by mouth daily       citalopram (CELEXA) 40 MG tablet Take 1 tablet (40 mg) by mouth daily 90 tablet 3     dexamethasone (DECADRON) 4 MG tablet Take 5 tablets (20 mg) by mouth once a week for 30 days 20 tablet 0     ibuprofen (ADVIL/MOTRIN) 200 MG tablet Take 1 tablet (200 mg) by mouth 2 times daily as needed for mild pain       LENalidomide (REVLIMID) 15  "MG CAPS capsule Take 1 capsule (15 mg) by mouth daily for 21 days Days 1 through 21. 21 capsule 0     levofloxacin (LEVAQUIN) 250 MG tablet Take 1 tablet (250 mg) by mouth daily 30 tablet 3     LORazepam (ATIVAN) 0.5 MG tablet Take 0.5 tablets (0.25 mg) by mouth every 4 hours as needed for muscle spasms 12 tablet 0     methocarbamol (ROBAXIN) 500 MG tablet Take 500 mg by mouth 4 times daily as needed for muscle spasms       ondansetron (ZOFRAN ODT) 4 MG ODT tab Take 1 tablet (4 mg) by mouth every 6 hours as needed for nausea or vomiting       oxyCODONE (ROXICODONE) 5 MG tablet Take 1 tablet (5 mg) by mouth every 6 hours as needed for moderate to severe pain 30 tablet 0     polyethylene glycol (MIRALAX) 17 g packet Take 17 g by mouth daily       senna-docusate (SENOKOT-S/PERICOLACE) 8.6-50 MG tablet Take 1 tablet by mouth 2 times daily as needed for constipation       simvastatin (ZOCOR) 20 MG tablet TAKE 1 TABLET BY MOUTH AT BEDTIME 90 tablet 0     sodium chloride 1 GM tablet Take 1 tablet (1 g) by mouth daily 90 tablet 0     vitamin B-12 (CYANOCOBALAMIN) 1000 MCG tablet Take 1 tablet (1,000 mcg) by mouth daily for 90 days 90 tablet 0     vitamin D3 (CHOLECALCIFEROL) 50 mcg (2000 units) tablet Take 1 tablet by mouth daily       acyclovir (ZOVIRAX) 400 MG tablet Take 1 tablet (400 mg) by mouth 2 times daily for 30 days Start 1 week prior to daratumumab and continue for 3 months after treatment is complete. (Patient not taking: No sig reported) 60 tablet 11     methocarbamol (ROBAXIN) 750 MG tablet Take 1 tablet (750 mg) by mouth 4 times daily (Patient not taking: Reported on 8/1/2022) 120 tablet 0        Post Medication Reconciliation Status:      Controlled medications:   not applicable/none     Past Medical History:   Past Medical History:   Diagnosis Date     Depressive disorder      Physical Exam:   Vitals: BP 97/52   Pulse 103   Temp 98.5  F (36.9  C)   Resp 18   Ht 1.6 m (5' 3\")   Wt 60.4 kg (133 lb 3.2 " oz)   LMP 12/04/1989   SpO2 99%   BMI 23.60 kg/m    BMI: Body mass index is 23.6 kg/m .  GENERAL APPEARANCE:  Alert, in no distress  ENT:  Mouth and posterior oropharynx normal, moist mucous membranes, normal hearing acuity  EYES:  EOM, conjunctivae, lids, pupils and irises normal, PERRL  RESP:  no respiratory distress  CV:  no edema  ABDOMEN:  abdomen round  M/S:   patient sitting up in bed  SKIN:  Inspection of skin and subcutaneous tissue baseline  NEURO:   speech wnl  PSYCH:  affect and mood normal     SNF labs: Recent labs in [facility] reviewed by me today.  and   Most Recent 3 CBC's:Recent Labs   Lab Test 07/29/22  0736 07/27/22  1104 07/24/22  0712 07/20/22  1906   WBC 2.4* 3.8*  --  4.8   HGB 8.5* 9.2*  --  9.1*   MCV 99 99  --  98    232 214 221     Most Recent 3 BMP's:Recent Labs   Lab Test 07/29/22  0736 07/28/22  0932 07/27/22  1104    130* 129*   POTASSIUM 4.1 4.3 4.3   CHLORIDE 108 101 97   CO2 24 24 30   BUN 13 20 27   CR 0.83 0.93 1.40*   ANIONGAP 3 5 2*   ARA 8.1* 8.2* 8.6   GLC 91 95 96     Assessment/Plan:  (C90.00) Multiple myeloma not having achieved remission (H)  (primary encounter diagnosis)  Comment: acute/ongoing  Plan: f/u with oncology as OP, received  4 days of radiation Tx to spine final day 7/30/22  Will be starting chemotherapy medication at home ordered by oncology     (M84.48XD) Pathological fracture of lumbar vertebra with routine healing, subsequent encounter  (R53.81) Physical deconditioning  Comment: acute/ongoing  L5 vertebral body Fx, Left parasymphyseal pubic body Fx, sacral insuffiiciency Fx, ortho consulted, non operative management  Plan: DC to home with home PT, OT, RN and HHA through [facility], continue  corset brace for support, continue ibuprofen 200mg BID prn (per patient request), robaxin 500mg QID prn (15 tabs called in)  DC oxycodone at discharge as patient has not been taking      (F41.1) GERSON (generalized anxiety disorder)  Comment:  ongoing  Plan: continue celexa 40mg QD, ativan 0.25mg q 4 hours prn     (E53.8) B12 deficiency  Comment: ongoing  Plan: continue vitamin B12 1000mcg QD (patient refusing in TCU)     (E87.1) Hyponatremia  Comment: ongoing  PlaN: continue NaCL tabs 1g QD (patient refusing) f/u with PCP     (N18.31) Chronic kidney disease, stage 3a (H)  Comment: ongoing  Plan: avoid nephrotoxic agents, f/u with PCP     (N18.31,  D63.1) Anemia due to stage 3a chronic kidney disease (H)  Comment: ongoing, Hgb at discharge 8.5  Plan: f/u with PCP, Hgb in TCU was 8.6     DISCHARGE PLAN:    Follow up labs: No labs orders/due    Medical Follow Up:      Follow up with primary care provider in 1 weeks    Current [place] scheduled appointments:     Future Appointments   Date Time Provider Department Center   No future appts.      Discharge Services: Home Care:  Occupational Therapy, Physical Therapy, Registered Nurse and Home Health Aide    Discharge Instructions Verbalized to Patient at Discharge:     None    TOTAL DISCHARGE TIME:   Greater than 30 minutes  Electronically signed by:  Tonya Lynn Haase, APRN CNP     Home care Face to Face documentation done in EPIC attached to Home care orders for [facility]. , Documentation of Face to Face and Certification for Home Health Services    I certify that patient: Mary Jo Mcleod is under my care and that I, or a nurse practitioner or physician's assistant working with me, had a face-to-face encounter that meets the physician face-to-face encounter requirements with this patient on: 8/4/2022.    This encounter with the patient was in whole, or in part, for the following medical condition, which is the primary reason for home health care: multiple myeloma, pathologic fracture of lumbar vertebra.    I certify that, based on my findings, the following services are medically necessary home health services: Nursing, Occupational Therapy and Physical Therapy.    My clinical findings support the  need for the above services because: Nurse is needed: To assess pain, vitals, medication management after changes in medications or other medical regimen.., Occupational Therapy Services are needed to assess and treat cognitive ability and address ADL safety due to impairment in ADL training, home safety. and Physical Therapy Services are needed to assess and treat the following functional impairments: strengthening, endurance, home safety.    Further, I certify that my clinical findings support that this patient is homebound (i.e. absences from home require considerable and taxing effort and are for medical reasons or Sh[employer] services or infrequently or of short duration when for other reasons) because: Requires assistance of another person or specialized equipment to access medical services because patient: Requires supervision of another for safe transfer...    Based on the above findings. I certify that this patient is confined to the home and needs intermittent skilled nursing care, physical therapy and/or speech therapy.  The patient is under my care, and I have initiated the establishment of the plan of care.  This patient will be followed by a physician who will periodically review the plan of care.  Physician/Provider to provide follow up care: Levi Mcdonnell    Attending hospital physician (the Medicare certified PECOS provider): Tonya Lynn Haase, APRN CNP  Physician Signature: See electronic signature associated with these discharge orders.  Date: 8/4/2022

## 2022-08-04 NOTE — TELEPHONE ENCOUNTER
Oral Chemotherapy Monitoring Program   Medication: Revlimid  Rx: 15mg PO daily on days 1 through 21 of 28 day cycle   Auth #: [id number]   Risk Category: Adult Female NORP  Routine survey questions reviewed.   Rx to be Escribed to [facility]    Etta Alva  [facility]  Oncology Pharmacy Liaison   [email or web address]  [phone] (phone)  [phone] (fax)

## 2022-08-05 NOTE — PROGRESS NOTES
[facility]: Cancer Care                                                                                          The patient was discharged from the TCU on 8/4. She will be starting Revlimid and Dexamethasone (see Oral Chemo Pharmacy notes for details).    Dr. Hanks would like for her to be seen for follow up since starting her treatment.    I left a message for the patient to explain the need for follow up. She did not answer. A message was left on her voicemail to call back if she has questions.     Our scheduling team is working on her follow up appointment.    Signature:  Weston Mayo DNP, RN, OCN  RN Care Coordinator  [facility]

## 2022-08-10 ENCOUNTER — PATIENT OUTREACH (OUTPATIENT)
Dept: ONCOLOGY | Facility: CLINIC | Age: 77
End: 2022-08-10

## 2022-08-10 DIAGNOSIS — M54.16 LUMBAR RADICULOPATHY: ICD-10-CM

## 2022-08-10 DIAGNOSIS — M84.454A PATHOLOGICAL FRACTURE OF PELVIS, UNSPECIFIED PATHOLOGICAL CAUSE, INITIAL ENCOUNTER: ICD-10-CM

## 2022-08-10 NOTE — PROGRESS NOTES
[facility]: Cancer Care Plan of Care Education Note                                    Discussion with Patient:                                                      Plan of care:  Start taking dexamethasone 20mg weekly for 1 month  Start taking revlimid daily  Take aspirin 325mg daily, do not take NSAID's on top of this  Follow up on August 19 with [facility]     Education concerns: concerned patient is unable to manage her appointments and medication compliance       Assessment:                                                      Unsure of patient's cognitive status as she has missed several appointments / cancelled appointments that were very important for her cancer care. When asked why appointments were missed, she stated she was not aware of the appointments or did not get the voice messages regarding the appointments.     Intervention/Education provided during outreach:                                                       Relayed and reiterated plan of care    Patient to follow up as scheduled at next appt 8/19. Will plan to follow up with patient again after clinic visit to reiterate plan of care.     Signature:  Jm Lyon RN

## 2022-08-11 ENCOUNTER — TELEPHONE (OUTPATIENT)
Dept: ONCOLOGY | Facility: CLINIC | Age: 77
End: 2022-08-11

## 2022-08-11 RX ORDER — ASPIRIN 325 MG
325 TABLET, DELAYED RELEASE (ENTERIC COATED) ORAL DAILY
Qty: 30 TABLET | Refills: 11 | Status: ON HOLD | OUTPATIENT
Start: 2022-08-11 | End: 2023-12-13

## 2022-08-11 NOTE — ORAL ONC MGMT
Oral Chemotherapy Monitoring Program     Placed call to Mary Jomarianne Mcleod in follow up of Revlimid oral chemotherapy. This was to see if Mary Jo has started Revlimid therapy yet and if so to complete an initial assessment.   There was no answer and no option to leave a voicemail at this time. Will try Mary Jo again in a few days.    Cheri Virk, PharmD  Hematology/Oncology Clinical Pharmacist  Oral Chemotherapy Monitoring Program  [facility]  [phone]  August 11, 2022

## 2022-08-15 DIAGNOSIS — E78.00 HYPERCHOLESTEROLEMIA: ICD-10-CM

## 2022-08-15 NOTE — TELEPHONE ENCOUNTER
Simvastatin. Last addressed AWV 11/9/21.        Hypercholesterolemia  Cont statin.  Repeat lipids felt to be unnecessary     Cholesterol   Date Value Ref Range Status   11/20/2020 178 100 - 199 mg/dL Final   12/04/2018 188 100 - 199 mg/dL Final     HDL Cholesterol   Date Value Ref Range Status   11/20/2020 41 >39 mg/dL Final   12/04/2018 43 >39 mg/dL Final     LDL Cholesterol Calculated   Date Value Ref Range Status   11/20/2020 112 (H) 0 - 99 mg/dL Final   12/04/2018 118 (H) 0 - 99 mg/dL Final     Triglycerides   Date Value Ref Range Status   11/20/2020 138 0 - 149 mg/dL Final   12/04/2018 136 0 - 149 mg/dL Final     No results found for: CHOLMUKUL

## 2022-08-16 ENCOUNTER — MEDICAL CORRESPONDENCE (OUTPATIENT)
Dept: FAMILY MEDICINE | Facility: CLINIC | Age: 77
End: 2022-08-16

## 2022-08-16 RX ORDER — SIMVASTATIN 20 MG
TABLET ORAL
Qty: 90 TABLET | Refills: 0 | Status: SHIPPED | OUTPATIENT
Start: 2022-08-16 | End: 2022-08-25

## 2022-08-21 DIAGNOSIS — C90.00 MULTIPLE MYELOMA NOT HAVING ACHIEVED REMISSION (H): Primary | ICD-10-CM

## 2022-08-21 RX ORDER — EPINEPHRINE 1 MG/ML
0.3 INJECTION, SOLUTION INTRAMUSCULAR; SUBCUTANEOUS EVERY 5 MIN PRN
Status: CANCELLED | OUTPATIENT
Start: 2022-08-21

## 2022-08-21 RX ORDER — DIPHENHYDRAMINE HYDROCHLORIDE 50 MG/ML
50 INJECTION INTRAMUSCULAR; INTRAVENOUS
Status: CANCELLED
Start: 2022-08-22

## 2022-08-21 RX ORDER — DEXAMETHASONE 4 MG/1
20 TABLET ORAL ONCE
Status: CANCELLED | OUTPATIENT
Start: 2022-08-22

## 2022-08-21 RX ORDER — MEPERIDINE HYDROCHLORIDE 25 MG/ML
25 INJECTION INTRAMUSCULAR; INTRAVENOUS; SUBCUTANEOUS EVERY 30 MIN PRN
Status: CANCELLED | OUTPATIENT
Start: 2022-08-21

## 2022-08-21 RX ORDER — HEPARIN SODIUM (PORCINE) LOCK FLUSH IV SOLN 100 UNIT/ML 100 UNIT/ML
5 SOLUTION INTRAVENOUS
Status: CANCELLED | OUTPATIENT
Start: 2022-08-29

## 2022-08-21 RX ORDER — ALBUTEROL SULFATE 0.83 MG/ML
2.5 SOLUTION RESPIRATORY (INHALATION)
Status: CANCELLED | OUTPATIENT
Start: 2022-08-22

## 2022-08-21 RX ORDER — LORAZEPAM 2 MG/ML
0.5 INJECTION INTRAMUSCULAR EVERY 4 HOURS PRN
Status: CANCELLED | OUTPATIENT
Start: 2022-08-21

## 2022-08-21 RX ORDER — ALBUTEROL SULFATE 0.83 MG/ML
2.5 SOLUTION RESPIRATORY (INHALATION)
Status: CANCELLED | OUTPATIENT
Start: 2022-08-21

## 2022-08-21 RX ORDER — DEXAMETHASONE 4 MG/1
20 TABLET ORAL ONCE
Status: CANCELLED | OUTPATIENT
Start: 2022-08-29

## 2022-08-21 RX ORDER — DIPHENHYDRAMINE HCL 25 MG
50 CAPSULE ORAL
Status: CANCELLED | OUTPATIENT
Start: 2022-08-22

## 2022-08-21 RX ORDER — ACETAMINOPHEN 325 MG/1
650 TABLET ORAL
Status: CANCELLED | OUTPATIENT
Start: 2022-08-21

## 2022-08-21 RX ORDER — ALBUTEROL SULFATE 0.83 MG/ML
2.5 SOLUTION RESPIRATORY (INHALATION)
Status: CANCELLED | OUTPATIENT
Start: 2022-08-29

## 2022-08-21 RX ORDER — ALBUTEROL SULFATE 90 UG/1
1-2 AEROSOL, METERED RESPIRATORY (INHALATION)
Status: CANCELLED
Start: 2022-08-22

## 2022-08-21 RX ORDER — MEPERIDINE HYDROCHLORIDE 25 MG/ML
25 INJECTION INTRAMUSCULAR; INTRAVENOUS; SUBCUTANEOUS EVERY 30 MIN PRN
Status: CANCELLED | OUTPATIENT
Start: 2022-08-22

## 2022-08-21 RX ORDER — ACETAMINOPHEN 325 MG/1
650 TABLET ORAL
Status: CANCELLED | OUTPATIENT
Start: 2022-08-29

## 2022-08-21 RX ORDER — METHYLPREDNISOLONE SODIUM SUCCINATE 125 MG/2ML
125 INJECTION, POWDER, LYOPHILIZED, FOR SOLUTION INTRAMUSCULAR; INTRAVENOUS
Status: CANCELLED
Start: 2022-08-29

## 2022-08-21 RX ORDER — DIPHENHYDRAMINE HYDROCHLORIDE 50 MG/ML
50 INJECTION INTRAMUSCULAR; INTRAVENOUS
Status: CANCELLED
Start: 2022-08-21

## 2022-08-21 RX ORDER — LORAZEPAM 2 MG/ML
0.5 INJECTION INTRAMUSCULAR EVERY 4 HOURS PRN
Status: CANCELLED | OUTPATIENT
Start: 2022-08-22

## 2022-08-21 RX ORDER — METHYLPREDNISOLONE SODIUM SUCCINATE 125 MG/2ML
125 INJECTION, POWDER, LYOPHILIZED, FOR SOLUTION INTRAMUSCULAR; INTRAVENOUS
Status: CANCELLED
Start: 2022-08-22

## 2022-08-21 RX ORDER — HEPARIN SODIUM (PORCINE) LOCK FLUSH IV SOLN 100 UNIT/ML 100 UNIT/ML
5 SOLUTION INTRAVENOUS
Status: CANCELLED | OUTPATIENT
Start: 2022-08-22

## 2022-08-21 RX ORDER — ALBUTEROL SULFATE 90 UG/1
1-2 AEROSOL, METERED RESPIRATORY (INHALATION)
Status: CANCELLED
Start: 2022-08-29

## 2022-08-21 RX ORDER — DIPHENHYDRAMINE HYDROCHLORIDE 50 MG/ML
50 INJECTION INTRAMUSCULAR; INTRAVENOUS
Status: CANCELLED
Start: 2022-08-29

## 2022-08-21 RX ORDER — HEPARIN SODIUM,PORCINE 10 UNIT/ML
5 VIAL (ML) INTRAVENOUS
Status: CANCELLED | OUTPATIENT
Start: 2022-08-21

## 2022-08-21 RX ORDER — METHYLPREDNISOLONE SODIUM SUCCINATE 125 MG/2ML
125 INJECTION, POWDER, LYOPHILIZED, FOR SOLUTION INTRAMUSCULAR; INTRAVENOUS
Status: CANCELLED
Start: 2022-08-21

## 2022-08-21 RX ORDER — ACETAMINOPHEN 325 MG/1
650 TABLET ORAL
Status: CANCELLED | OUTPATIENT
Start: 2022-08-22

## 2022-08-21 RX ORDER — HEPARIN SODIUM,PORCINE 10 UNIT/ML
5 VIAL (ML) INTRAVENOUS
Status: CANCELLED | OUTPATIENT
Start: 2022-08-29

## 2022-08-21 RX ORDER — EPINEPHRINE 1 MG/ML
0.3 INJECTION, SOLUTION INTRAMUSCULAR; SUBCUTANEOUS EVERY 5 MIN PRN
Status: CANCELLED | OUTPATIENT
Start: 2022-08-29

## 2022-08-21 RX ORDER — ALBUTEROL SULFATE 90 UG/1
1-2 AEROSOL, METERED RESPIRATORY (INHALATION)
Status: CANCELLED
Start: 2022-08-21

## 2022-08-21 RX ORDER — HEPARIN SODIUM (PORCINE) LOCK FLUSH IV SOLN 100 UNIT/ML 100 UNIT/ML
5 SOLUTION INTRAVENOUS
Status: CANCELLED | OUTPATIENT
Start: 2022-08-21

## 2022-08-21 RX ORDER — MEPERIDINE HYDROCHLORIDE 25 MG/ML
25 INJECTION INTRAMUSCULAR; INTRAVENOUS; SUBCUTANEOUS EVERY 30 MIN PRN
Status: CANCELLED | OUTPATIENT
Start: 2022-08-29

## 2022-08-21 RX ORDER — DEXAMETHASONE 4 MG/1
20 TABLET ORAL ONCE
Status: CANCELLED | OUTPATIENT
Start: 2022-08-21

## 2022-08-21 RX ORDER — LORAZEPAM 2 MG/ML
0.5 INJECTION INTRAMUSCULAR EVERY 4 HOURS PRN
Status: CANCELLED | OUTPATIENT
Start: 2022-08-29

## 2022-08-21 RX ORDER — DIPHENHYDRAMINE HCL 25 MG
50 CAPSULE ORAL
Status: CANCELLED | OUTPATIENT
Start: 2022-08-29

## 2022-08-21 RX ORDER — DIPHENHYDRAMINE HCL 25 MG
50 CAPSULE ORAL
Status: CANCELLED | OUTPATIENT
Start: 2022-08-21

## 2022-08-21 RX ORDER — HEPARIN SODIUM,PORCINE 10 UNIT/ML
5 VIAL (ML) INTRAVENOUS
Status: CANCELLED | OUTPATIENT
Start: 2022-08-22

## 2022-08-21 RX ORDER — EPINEPHRINE 1 MG/ML
0.3 INJECTION, SOLUTION INTRAMUSCULAR; SUBCUTANEOUS EVERY 5 MIN PRN
Status: CANCELLED | OUTPATIENT
Start: 2022-08-22

## 2022-08-22 ENCOUNTER — MEDICAL CORRESPONDENCE (OUTPATIENT)
Dept: FAMILY MEDICINE | Facility: CLINIC | Age: 77
End: 2022-08-22

## 2022-08-22 ENCOUNTER — TELEPHONE (OUTPATIENT)
Dept: ONCOLOGY | Facility: CLINIC | Age: 77
End: 2022-08-22

## 2022-08-22 NOTE — TELEPHONE ENCOUNTER
Oral Chemotherapy Monitoring Program    Placed call to patient in follow up of lenalidomide therapy. Patient reported beginning therapy 8/21/2022. Patient reports she does not have any provider follow-ups currently scheduled and may seek care elsewhere. In the mean time, sent message to care team to determine lab frequency. Plan to communicate lab schedule to patient and schedule an initial follow-up over the phone.    Erich Fried, Pharmacy Intern  Oral Chemotherapy Monitoring Program  [phone]

## 2022-08-22 NOTE — TELEPHONE ENCOUNTER
Oral Chemotherapy Monitoring Program     Placed call to patient in follow up of oral chemotherapy monitoring lab frequency. Left message requesting call back. No drug names were mentioned. Will update when response received.     Grace Myhre, PharmD  Hematology/Oncology Clinical Pharmacist  [facility]  [phone]

## 2022-08-23 ENCOUNTER — TELEPHONE (OUTPATIENT)
Dept: ONCOLOGY | Facility: CLINIC | Age: 77
End: 2022-08-23

## 2022-08-23 DIAGNOSIS — C90.00 MULTIPLE MYELOMA NOT HAVING ACHIEVED REMISSION (H): Primary | ICD-10-CM

## 2022-08-23 RX ORDER — LENALIDOMIDE 15 MG/1
15 CAPSULE ORAL DAILY
Qty: 21 CAPSULE | Refills: 0 | Status: SHIPPED | OUTPATIENT
Start: 2022-08-23 | End: 2022-09-30

## 2022-08-23 NOTE — TELEPHONE ENCOUNTER
Oral Chemotherapy Monitoring Program     I just spoke to pt to update her on Dr. Hanks's lab plan and recommendations. She has told me that she will in fact be switching her care over to [facility]. She wanted to acknowledge that she appreciated everything we have done for her up to this point. I did encourage her to call us if she had any issues getting care at [facility] and to let us know, but wished her the best.    Tonya Lanza, PharmD, BCACP  Oral Chemotherapy Monitoring Program  [facility]  [phone]  August 23, 2022

## 2022-08-23 NOTE — TELEPHONE ENCOUNTER
[facility] Cancer Care Oral Chemotherapy Monitoring Program    Thank you for the opportunity to be a part in the care of this patient's oral chemotherapy. The oncology pharmacy will no longer be following this patient for oral chemotherapy. If there are any questions or the plan changes, feel free to contact us.    ORAL CHEMOTHERAPY 8/2/2022 8/3/2022 8/11/2022 8/22/2022 8/22/2022 8/23/2022 8/23/2022   Assessment Type Left Voicemail Left Voicemail Left Voicemail Initial Follow up Left Voicemail Left Voicemail Discontinuation   Stop Date - - - - - - 8/23/2022   Reason for Discontinuation - - - - - - Patient seeking care elsewhere   Diagnosis Code Multiple Myeloma Multiple Myeloma Multiple Myeloma Multiple Myeloma Multiple Myeloma Multiple Myeloma Multiple Myeloma   Other - - - - - - -   Providers Dr. Demario Hanks   [facility]   Drug Name Revlimid (lenalidomide) Revlimid (lenalidomide) Revlimid (lenalidomide) Revlimid (lenalidomide) Revlimid (lenalidomide) Revlimid (lenalidomide) Revlimid (lenalidomide)   Dose 15 mg 15 mg 15 mg 15 mg 15 mg 15 mg -   Current Schedule Daily Daily Daily Daily Daily Daily -   Cycle Details 3 weeks on, 1 week off 3 weeks on, 1 week off 3 weeks on, 1 week off 3 weeks on, 1 week off 3 weeks on, 1 week off 3 weeks on, 1 week off -   Start Date of Last Cycle - - - 8/21/2022 - - -       Tonya Lanza, PharmD, BCACP  Oral Chemotherapy Monitoring Program  [facility]  [phone]  August 23, 2022

## 2022-08-23 NOTE — TELEPHONE ENCOUNTER
"Oral Chemotherapy Monitoring Program     Placed call to patient in follow up of oral chemotherapy monitoring lab frequency. Per Dr. Hanks \"Monthly labs please. She would probably be better served at [facility] since she often misses appts and needs to be rescheduled last minute and I only have 1 clinic weekly.\" Left message requesting call back. No drug names were mentioned. Will update when response received.     Tonya Lanza, PharmD, BCACP  Oral Chemotherapy Monitoring Program  [facility]  [phone]  August 23, 2022    "

## 2022-08-25 ENCOUNTER — PATIENT OUTREACH (OUTPATIENT)
Dept: ONCOLOGY | Facility: CLINIC | Age: 77
End: 2022-08-25

## 2022-08-25 ENCOUNTER — OFFICE VISIT (OUTPATIENT)
Dept: FAMILY MEDICINE | Facility: CLINIC | Age: 77
End: 2022-08-25

## 2022-08-25 VITALS
BODY MASS INDEX: 22.14 KG/M2 | OXYGEN SATURATION: 98 % | DIASTOLIC BLOOD PRESSURE: 68 MMHG | HEART RATE: 92 BPM | WEIGHT: 125 LBS | SYSTOLIC BLOOD PRESSURE: 112 MMHG

## 2022-08-25 DIAGNOSIS — R73.03 PREDIABETES: ICD-10-CM

## 2022-08-25 DIAGNOSIS — L30.9 DERMATITIS: ICD-10-CM

## 2022-08-25 DIAGNOSIS — L89.91 HEALING PRESSURE INJURY, STAGE 1: Primary | ICD-10-CM

## 2022-08-25 PROCEDURE — 99213 OFFICE O/P EST LOW 20 MIN: CPT | Performed by: FAMILY MEDICINE

## 2022-08-25 RX ORDER — TRIAMCINOLONE ACETONIDE 1 MG/G
CREAM TOPICAL 2 TIMES DAILY
Qty: 80 G | Refills: 0 | Status: SHIPPED | OUTPATIENT
Start: 2022-08-25 | End: 2022-08-26

## 2022-08-25 NOTE — PROGRESS NOTES
[facility]: Cancer Care Plan of Care Education Note                                    Discussion with Patient:                                                      Call placed to Mary Jo to follow up in regard to her cancelled appointment on Friday August 19,2022.  I introduced myself as her RN Care Coordinator.  Mary Jo reports that she has a fractured hip and it makes it difficult for her to sit.  She is able to go to [facility] for labs and we discussed coordinating a video visit with an VINAY to check in on her.    She was asked about transferring care to [facility] and she said she has not done that yet.    I reinforced the importance of being evaluated by a provider.      Assessment:                                                      She was seen by Dr. Melquiades Tariq with [facility] on 8/20/22 in regard to pain with her multiple pelvis fractures.      Mary Jo said she is on day 5 of Revlimid.  She reports no concerns including nausea.  She understands to call if she develops any symptoms    She said she is able to drive and has a friend who can help with any needs.      Intervention/Education provided during outreach:                                                       Mary Jo was informed that our  will be in contact for arranging labs and a video visit    She was provided with our clinic contact number.      Signature:  Linda Vu RN

## 2022-08-25 NOTE — PROGRESS NOTES
SUBJECTIVE:    Mary Jo Mcleod, is a 76 year old female with multiple myeloma presenting for the below:     1. Rash and irritation to edilia cleft. First noted irritation to the area towards the end of her TCU stay, approximately 3 weeks ago.  Notes she was less ambulatory during that time and spent more time than usual lying in bed. Since discharge from TCU she has been applying rubbing alcohol to the area with no improvement. Has not tried any other OTC remedies. Denies h/o psoriasis. Area has not been weeping.     OBJECTIVE:  Vitals:    22 1411   BP: 112/68   Pulse: 92   SpO2: 98%   Weight: 56.7 kg (125 lb)    Body mass index is 22.14 kg/m .  General: no acute distress, cooperative with exam.  Skin:  cleft with erythematous dry patches in bilateral symmetrical pattern. Very dry to the touch. Overlying scale in places. Blanches on pressure.             ASSESSMENT / PLAN:      Healing pressure injury, stage 1  Dermatitis  Prediabetes  In keeping with pressure injury with delayed healing as patient has been applying rubbing alcohol to the area. Skin very dry, eczematous. No breaks. She is now mobile and completing PT / ambulating with a front wheeled walker.   -stop application of rubbing alcohol  -discussed application BID of moderate intensity steroid cream   -close follow up early new week to ensure skin breakdown is not occurring  -Patient expressed understanding and agreement with the plan.     -     triamcinolone (KENALOG) 0.1 % external cream; Apply topically 2 times daily for 14 days      Appointments in Next Year    Aug 30, 2022  9:15 AM  SHORT with Chetna Jacobson MD  [facility] ([facility] ) [phone]

## 2022-08-26 RX ORDER — TRIAMCINOLONE ACETONIDE 1 MG/G
CREAM TOPICAL 2 TIMES DAILY
Qty: 80 G | Refills: 0 | Status: SHIPPED | OUTPATIENT
Start: 2022-08-26

## 2022-08-29 ENCOUNTER — PATIENT OUTREACH (OUTPATIENT)
Dept: CARE COORDINATION | Facility: CLINIC | Age: 77
End: 2022-08-29

## 2022-08-29 ENCOUNTER — TELEPHONE (OUTPATIENT)
Dept: FAMILY MEDICINE | Facility: CLINIC | Age: 77
End: 2022-08-29

## 2022-08-29 ASSESSMENT — ACTIVITIES OF DAILY LIVING (ADL): DEPENDENT_IADLS:: INDEPENDENT

## 2022-08-29 NOTE — PROGRESS NOTES
Clinic Care Coordination Contact    Situation: Patient chart reviewed by care coordinator.    Background: Pt recently discharged from TCU with the following issues:   Multiple myeloma not having achieved remission (H)     Pathological fracture of lumbar vertebra with routine healing, subsequent encounter     Physical deconditioning     GERSON (generalized anxiety disorder)     B12 deficiency     Hyponatremia     Chronic kidney disease, stage 3a (H)     Anemia due to stage 3a chronic kidney disease (H)        Assessment: Pt discontinued from home care per chart review    Plan/Recommendations: pt to follow with pcp on 8/30, [facility] to outreach within 5-7 days.

## 2022-08-29 NOTE — TELEPHONE ENCOUNTER
08/29/22 Roxie - OT with [facility] - calls reporting they are discharging patient from home care services due to patient not homebound. Roxie states patient reports to her that she is going out at least once per day for shopping or eating out.     Roxie recommends patient continue OT and PT with [facility] in [place] as patient is familiar with this agency.   Patient has appt with Dr. Jacobson in our office tomorrow 8/30.     Carla Hua RN

## 2022-08-30 ENCOUNTER — OFFICE VISIT (OUTPATIENT)
Dept: FAMILY MEDICINE | Facility: CLINIC | Age: 77
End: 2022-08-30

## 2022-08-30 VITALS
OXYGEN SATURATION: 96 % | TEMPERATURE: 97.5 F | HEART RATE: 59 BPM | SYSTOLIC BLOOD PRESSURE: 104 MMHG | BODY MASS INDEX: 22.14 KG/M2 | WEIGHT: 125 LBS | DIASTOLIC BLOOD PRESSURE: 62 MMHG | RESPIRATION RATE: 16 BRPM

## 2022-08-30 DIAGNOSIS — Z74.09 MOBILITY POOR: ICD-10-CM

## 2022-08-30 DIAGNOSIS — S32.050S COMPRESSION FRACTURE OF L5 VERTEBRA, SEQUELA: ICD-10-CM

## 2022-08-30 DIAGNOSIS — L89.91 HEALING PRESSURE INJURY, STAGE 1: ICD-10-CM

## 2022-08-30 DIAGNOSIS — C90.00 MULTIPLE MYELOMA NOT HAVING ACHIEVED REMISSION (H): Primary | ICD-10-CM

## 2022-08-30 DIAGNOSIS — R73.03 PREDIABETES: ICD-10-CM

## 2022-08-30 DIAGNOSIS — M89.8X5 LYTIC BONE LESION OF HIP: ICD-10-CM

## 2022-08-30 PROCEDURE — 99213 OFFICE O/P EST LOW 20 MIN: CPT | Performed by: FAMILY MEDICINE

## 2022-08-30 NOTE — PROGRESS NOTES
SUBJECTIVE:    Mary Jo Mcleod, is a 76 year old female presenting for the below:     1. Pressure injury with delayed healing as patient has been applying rubbing alcohol to the area (see note dated  for full details). Has been applying triamcinolone cream and abstaining for applying rubbing alcohol for 5 days now. Feels more comfortable.     2. OT with [facility] has reported she is discharging  from home care services due to patient not being homebound ( going out at least once per day for shopping or eating out).        OBJECTIVE:  Vitals:    22 0930   BP: 104/62   Pulse: 59   Resp: 16   Temp: 97.5  F (36.4  C)   SpO2: 96%   Weight: 56.7 kg (125 lb)    Body mass index is 22.14 kg/m .    General: no acute distress, cooperative with exam.  Skin:  cleft with mildly erythematous  patches in bilateral symmetrical pattern. Less dry to the touch than prior.  Overlying scale in places is improving. Blanches on pressure. Some areas of post inflammatory hyperpigmentation.              ASSESSMENT / PLAN:          Healing pressure injury, stage 1  Prediabetes  In keeping with stage 1 pressure injury with delayed healing as patient has been applying rubbing alcohol to the area. Interval improvement with steroid cream.   -continue applying triamcinolone 0.1% external cream for a further 5 days (10 total), then stop  -can continue application of moisturizer after that.   -follow up as needed. Reasons to present to clinic discussed.     Mobility poor  Lytic bone lesion of hip  Multiple myeloma not having achieved remission (H)  Compression fracture of L5 vertebra, sequela  Home OT have recommended patient continue OT and PT with [facility] in [place] as patient is familiar with this agency. Referral made. Patient in agreement.     -     Physical Therapy Referral; Future

## 2022-08-31 NOTE — PROGRESS NOTES
[facility]: Cancer Care                                                                                          Oncology  contacted Mary Jo on 8/29/2022 regarding scheduling of labs and appointments.  Mary Jo informed her that she will call back and make her appointment when she learns how to do a video.  Mary Jo was offered an in person visit and declined.    I will call back to offer assistance with a video visit.    Signature:  Linda Vu RN

## 2022-09-08 ENCOUNTER — PATIENT OUTREACH (OUTPATIENT)
Dept: CARE COORDINATION | Facility: CLINIC | Age: 77
End: 2022-09-08

## 2022-09-08 ASSESSMENT — ACTIVITIES OF DAILY LIVING (ADL): DEPENDENT_IADLS:: INDEPENDENT

## 2022-09-08 NOTE — PROGRESS NOTES
[facility]: Cancer Care                                                                                          Left message for Mary Jo to check on how she is doing and offer assistance with coordinating a video visit.  Waiting for call back.    Signature:  Linda Vu RN

## 2022-09-08 NOTE — PROGRESS NOTES
"Clinic Care Coordination Contact    Clinic Care Coordination Contact  OUTREACH    Referral Information:  Referral Source: SNF/TCU         Chief Complaint   Patient presents with     Clinic Care Coordination - Initial        Universal Utilization:      Utilization    Hospital Admissions  2             ED Visits  5             No Show Count (past year)  6                Current as of: 9/3/2022  8:50 AM              Clinical Concerns:   Current Medical Concerns:  Multiple myeloma not having achieved remission (H)     Lytic bone lesion of hip     Compression fracture of L5 vertebra, sequela     Mobility poor     Healing pressure injury, stage 1     Prediabetes      Current Behavioral Concerns:None noted  Education Provided to patient: introduced CC      Health Maintenance Reviewed:    Clinical Pathway: None    Medication Management:  Medication review status: Medications reviewed and no changes reported per patient.             Functional Status:  Dependent ADLs:: Independent  Dependent IADLs:: Independent  Bed or wheelchair confined:: No  Mobility Status: Independent w/Device  Fallen 2 or more times in the past year?: No  Any fall with injury in the past year?: No    Living Situation:  Current living arrangement:: I live in a private home  Type of residence:: Apartment    Lifestyle & Psychosocial Needs:   Pt lives alone in senior housing. Uses a walker to ambulate. Pt drives self to appointments and errands and not eligible for home care services as she is not \"home bound.\"  Pt attends PT at [facility]. She reports wound on bottom is healing as she continues to put cream on it.  Pt denies any social supports however does have some acquaintances in her apartment building.   Pt states she will refuse anyone coming into her home to offer care as she has had a bad experience with that. Pt declined [facility] support but said shewould call clinic if she ever felt she needed  support.    Social Determinants of Health "     Tobacco Use: Low Risk      Smoking Tobacco Use: Never Smoker     Smokeless Tobacco Use: Never Used   Alcohol Use: Not on file   Financial Resource Strain: Not on file   Food Insecurity: Not on file   Transportation Needs: Not on file   Physical Activity: Not on file   Stress: Not on file   Social Connections: Not on file   Intimate Partner Violence: Not on file   Depression: Not at risk     PHQ-2 Score: 0   Housing Stability: Not on file     Diet:: Regular  Tube Feeding: No        Denominational or spiritual beliefs that impact treatment:: No  Mental health DX:: No  Chemical Dependency Status: No Current Concerns  Informal Support system:: None             Resources and Interventions:  Current Resources:      Community Resources: None  Supplies Currently Used at Home: None  Equipment Currently Used at Home: walker, rolling, cane, straight  Employment Status: retired         Advance Care Plan/Directive  Advanced Care Plans/Directives on file:: Yes    Referrals Placed: None         Care Plan:      Patient/Caregiver understanding:            Plan: CC to close out CC

## 2022-09-23 ENCOUNTER — MEDICAL CORRESPONDENCE (OUTPATIENT)
Dept: FAMILY MEDICINE | Facility: CLINIC | Age: 77
End: 2022-09-23

## 2022-09-30 ENCOUNTER — OFFICE VISIT (OUTPATIENT)
Dept: FAMILY MEDICINE | Facility: CLINIC | Age: 77
End: 2022-09-30

## 2022-09-30 VITALS
DIASTOLIC BLOOD PRESSURE: 64 MMHG | WEIGHT: 129 LBS | SYSTOLIC BLOOD PRESSURE: 104 MMHG | HEART RATE: 81 BPM | BODY MASS INDEX: 22.85 KG/M2 | OXYGEN SATURATION: 99 %

## 2022-09-30 DIAGNOSIS — M25.471 SWELLING OF BOTH ANKLES: Primary | ICD-10-CM

## 2022-09-30 DIAGNOSIS — M25.472 SWELLING OF BOTH ANKLES: Primary | ICD-10-CM

## 2022-09-30 DIAGNOSIS — C90.00 MULTIPLE MYELOMA NOT HAVING ACHIEVED REMISSION (H): ICD-10-CM

## 2022-09-30 DIAGNOSIS — S32.050S COMPRESSION FRACTURE OF L5 VERTEBRA, SEQUELA: ICD-10-CM

## 2022-09-30 PROCEDURE — 99213 OFFICE O/P EST LOW 20 MIN: CPT | Performed by: NURSE PRACTITIONER

## 2022-09-30 RX ORDER — OXYCODONE AND ACETAMINOPHEN 5; 325 MG/1; MG/1
1 TABLET ORAL DAILY PRN
COMMUNITY
Start: 2022-09-29

## 2022-09-30 RX ORDER — ACYCLOVIR 400 MG/1
TABLET ORAL
COMMUNITY
Start: 2022-09-15 | End: 2022-11-15

## 2022-09-30 RX ORDER — LENALIDOMIDE 10 MG/1
CAPSULE ORAL
COMMUNITY
Start: 2022-09-20 | End: 2023-09-01

## 2022-09-30 RX ORDER — HYDROCODONE BITARTRATE AND ACETAMINOPHEN 5; 325 MG/1; MG/1
1 TABLET ORAL EVERY 6 HOURS PRN
COMMUNITY
End: 2023-09-01

## 2022-09-30 ASSESSMENT — PATIENT HEALTH QUESTIONNAIRE - PHQ9
5. POOR APPETITE OR OVEREATING: NOT AT ALL
SUM OF ALL RESPONSES TO PHQ QUESTIONS 1-9: 13

## 2022-09-30 ASSESSMENT — ANXIETY QUESTIONNAIRES
5. BEING SO RESTLESS THAT IT IS HARD TO SIT STILL: NOT AT ALL
2. NOT BEING ABLE TO STOP OR CONTROL WORRYING: NOT AT ALL
3. WORRYING TOO MUCH ABOUT DIFFERENT THINGS: NOT AT ALL
7. FEELING AFRAID AS IF SOMETHING AWFUL MIGHT HAPPEN: NOT AT ALL
6. BECOMING EASILY ANNOYED OR IRRITABLE: NOT AT ALL
GAD7 TOTAL SCORE: 0
GAD7 TOTAL SCORE: 0
1. FEELING NERVOUS, ANXIOUS, OR ON EDGE: NOT AT ALL

## 2022-09-30 NOTE — PROGRESS NOTES
Problem(s) Oriented visit        SUBJECTIVE:                                                    Mary Jo Mcleod is a 77 year old female who presents to clinic today for the following health issues :    Bilateral lower leg, ankle, foot swelling noticed today.   Broke right hip in July. Chemo induction yesterday. Had radiation treatments this summer.   Sees Dr. Dreyer @ [facility].   Taking opioid pain medication at bedtime for pain and worried she doesn't have enough    Problem list, Medication list, Allergies, and Medical/Social/Surgical histories reviewed in [facility] and updated as appropriate.   Additional history: as documented    ROS:  5 point ROS completed and negative except noted above, including Gen, CV, Resp, MS, Neuro    OBJECTIVE:                                                    /64   Pulse 81   Wt 58.5 kg (129 lb)   LMP 12/04/1989   SpO2 99%   BMI 22.85 kg/m    Body mass index is 22.85 kg/m .   GENERAL: healthy, alert and no distress  RESP: lungs clear to auscultation - no rales, rhonchi or wheezes  CV: regular rates and rhythm, normal S1 S2, no S3 or S4, no murmur, click or rub, peripheral pulses strong and 2+ bilateral lower extremity pitting edema to legs, ankle, feet    MS: extremities normal- no gross deformities noted  PSYCH: normal affect & mood     ASSESSMENT/PLAN:                                                      Mary Jo was seen today for recheck medication and swelling.    Diagnoses and all orders for this visit:    Swelling of both ankles    Compression fracture of L5 vertebra, sequela    Multiple myeloma not having achieved remission (H)    Suspect bilateral lower extremity edema due to fluids and chemotherapy yesterday. Recommend elevation, decreasing sodium intake and compression stockings to help with swelling. Concerning symptoms that could indicate possible DVT discussed with patient. Patient to address pain medication with oncology.     See Patient Instructions  Patient  Instructions   For leg swelling:  - Elevate legs preferably above level of your heart when able  - Keep sodium (salt) intake down.   - Drink plenty of water    Things to watch for:  - redness or pain of a leg - this could be a sign of a blood clot that we would want evaluated ASAP  - any shortness of breath or breathing difficulties    Reach out to Dr. Dreyer Monday regarding Oxycodone-Acetaminophen (Percocet) medication refills      MARLON Barton CNP  [facility]  Family Practice  [facility]  [phone]    For any issues my office # is [phone]

## 2022-09-30 NOTE — PATIENT INSTRUCTIONS
For leg swelling:  - Elevate legs preferably above level of your heart when able  - Keep sodium (salt) intake down.   - Drink plenty of water    Things to watch for:  - redness or pain of a leg - this could be a sign of a blood clot that we would want evaluated ASAP  - any shortness of breath or breathing difficulties    Reach out to Dr. Dreyer Monday regarding Oxycodone-Acetaminophen (Percocet) medication refills

## 2022-10-05 ENCOUNTER — PATIENT OUTREACH (OUTPATIENT)
Dept: ONCOLOGY | Facility: CLINIC | Age: 77
End: 2022-10-05

## 2022-10-05 NOTE — PROGRESS NOTES
[facility]: Cancer Care                                                                                          Mary Jo was a NO SHOW for her appointment on 10/4/2022 with MARLON Joshi CNP.  Per note from MARLON Barton CNP Family practice Mary Jo informed her that she is seeing Dr. Dreyer with [facility]    I contacted [facility] and they confirm that Mary Jo was seen at their clinic on 8/29/2022    A letter will be mailed to her home to address her missed appointments     Signature:  Linda Vu RN

## 2022-10-05 NOTE — LETTER
October 5, 2022      TO: Mary Jo Mcleod  [address]  [place] [id number]         Dear Mary Jo,    We have attempted multiple times to schedule you for appointments with our oncology team and you have not attended these appointments.  We want to ensure that you are following up and receiving the care that you need in regard to your multiple myeloma diagnosis.     It is our understanding that you have transferred care to [facility] and are seeing Dr. Patrick Dreyer.  If this is not the case please contact our office at  to coordinate an appointment       Sincerely,      Ale Hanks MD, PhD

## 2022-10-12 ENCOUNTER — TRANSFERRED RECORDS (OUTPATIENT)
Dept: FAMILY MEDICINE | Facility: CLINIC | Age: 77
End: 2022-10-12

## 2022-10-15 ENCOUNTER — HEALTH MAINTENANCE LETTER (OUTPATIENT)
Age: 77
End: 2022-10-15

## 2022-11-15 ENCOUNTER — OFFICE VISIT (OUTPATIENT)
Dept: FAMILY MEDICINE | Facility: CLINIC | Age: 77
End: 2022-11-15

## 2022-11-15 VITALS
SYSTOLIC BLOOD PRESSURE: 108 MMHG | BODY MASS INDEX: 21.36 KG/M2 | WEIGHT: 120.6 LBS | DIASTOLIC BLOOD PRESSURE: 72 MMHG | HEART RATE: 115 BPM | TEMPERATURE: 97.9 F | OXYGEN SATURATION: 97 %

## 2022-11-15 DIAGNOSIS — R05.1 ACUTE COUGH: ICD-10-CM

## 2022-11-15 DIAGNOSIS — J06.9 UPPER RESPIRATORY TRACT INFECTION, UNSPECIFIED TYPE: Primary | ICD-10-CM

## 2022-11-15 DIAGNOSIS — Z86.19 HISTORY OF HERPES SIMPLEX INFECTION: ICD-10-CM

## 2022-11-15 LAB
FLUAV AG UPPER RESP QL IA.RAPID: NEGATIVE
FLUBV AG UPPER RESP QL IA.RAPID: NEGATIVE

## 2022-11-15 PROCEDURE — 99213 OFFICE O/P EST LOW 20 MIN: CPT | Performed by: FAMILY MEDICINE

## 2022-11-15 PROCEDURE — 87804 INFLUENZA ASSAY W/OPTIC: CPT | Mod: QW | Performed by: FAMILY MEDICINE

## 2022-11-15 RX ORDER — ACYCLOVIR 400 MG/1
TABLET ORAL
Status: CANCELLED | OUTPATIENT
Start: 2022-11-15

## 2022-11-15 RX ORDER — ACYCLOVIR 400 MG/1
TABLET ORAL
Qty: 30 TABLET | Refills: 1 | Status: SHIPPED | OUTPATIENT
Start: 2022-11-15 | End: 2023-12-06

## 2022-11-15 NOTE — PROGRESS NOTES
History of Present Illness:  Mary Jo Mcleod is a 77 year old female here for evaluation.  Ill for about 9 days.  Sore throat, congestion, slight SOB and weakness.  Had night sweats but resolved.  Sx have improved.  No chest pain.  No wheezing. Has not taken covid test.  Mild body aches.        Past Medical History:   Patient Active Problem List   Diagnosis     Incontinence of feces with fecal urgency     Hypercholesterolemia     Moderate episode of recurrent major depressive disorder (H)     PTSD (post-traumatic stress disorder)     Osteopenia of multiple sites     GERSON (generalized anxiety disorder)     Prediabetes     Plasma cell dyscrasia     Smoldering multiple myeloma     Chronic kidney disease, stage 3a (H)     Suicidal ideation     Hyponatremia     Generalized muscle weakness     Chest pain, unspecified type     B12 deficiency     Multiple myeloma not having achieved remission (H)     Hip pain, right     Acute bilateral low back pain, unspecified whether sciatica present     Lumbar radiculopathy     Lytic bone lesion of hip     Anemia, unspecified type     Pathological fracture of pelvis, unspecified pathological cause, initial encounter     Lytic bone lesion of femur     Compression fracture of L5 vertebra, sequela         Adverse Drug Reactions: Codeine, Bupropion, Erythromycin, Hydrocodone, Lidocaine, Penicillin g, Sulfa drugs, Tetracycline, Trazodone, Wellbutrin [bupropion hydrobromide], and Codeine sulfate      Medications: HYDROcodone-acetaminophen, LENalidomide, LORazepam, acyclovir, aspirin, calcium carbonate, citalopram, ibuprofen, levofloxacin, oxyCODONE-acetaminophen, polyethylene glycol, senna-docusate, sodium chloride, triamcinolone, and vitamin D3       Family History:   Family History   Problem Relation Age of Onset     Myocardial Infarction Father 63     Bipolar Disorder Sister      Chronic Obstructive Pulmonary Disease Brother      Lung Cancer Brother      Suicide Brother       Influenza/Pneumonia Sister      Alcoholism Sister         Social History:   Social History     Tobacco Use     Smoking status: Never     Smokeless tobacco: Never   Substance Use Topics     Alcohol use: Not Currently     Alcohol/week: 0.0 - 2.0 standard drinks     Drug use: No              Review of Systems:    A 10 point ROS was completed and is as noted in HPI and otherwise negative    Objective     Physical Exam:  Vital Signs:  /72   Pulse 115   Temp 97.9  F (36.6  C)   Wt 54.7 kg (120 lb 9.6 oz)   LMP 12/04/1989   SpO2 97%   BMI 21.36 kg/m      General: Appears mildly ill,  She appears non toxic.  HEENT: Head normocephalic and atraumatic  Eyes Normal, conjunctiva normal without injection.   Ears: Canals clear bilaterally. Right TM: clear   Left TM: clear  Nose: Nares congested. No sinus tenderness  Throat:  Clear no peritonsillar masses or swelling.  Neck: Supple, no cervical lymphadenopathy, no meningeal signs.  Chest: Lungs clear to auscultation bilaterally without wheezes, rhonchi or crackles  Heart: normal S1, S2.  No murmurs, rubs or gallops  Skin appears normal without rashes.        Assessment     Impression:    1. Upper respiratory tract infection, unspecified type    2. Acute cough    3. History of herpes simplex infection        Plan     Flu test negative.  Her clinical appearance, vitals and exam are reassuring.  Recommend home covid test.  We discussed that nearly all upper respiratory infections are viral and that antibiotics generally do not improve outcomes.  Supportive care including fluids, cool mist humidifier, OTC analgesics, nasal irrigation, zinc acetate lozenges and honey discussed.  Reasons to follow up discussed and understood.

## 2022-11-25 ENCOUNTER — TRANSFERRED RECORDS (OUTPATIENT)
Dept: FAMILY MEDICINE | Facility: CLINIC | Age: 77
End: 2022-11-25

## 2022-12-06 ENCOUNTER — OFFICE VISIT (OUTPATIENT)
Dept: FAMILY MEDICINE | Facility: CLINIC | Age: 77
End: 2022-12-06

## 2022-12-06 VITALS
BODY MASS INDEX: 21.93 KG/M2 | OXYGEN SATURATION: 98 % | DIASTOLIC BLOOD PRESSURE: 60 MMHG | SYSTOLIC BLOOD PRESSURE: 103 MMHG | TEMPERATURE: 97.8 F | HEART RATE: 78 BPM | WEIGHT: 123.8 LBS

## 2022-12-06 DIAGNOSIS — C90.00 MULTIPLE MYELOMA NOT HAVING ACHIEVED REMISSION (H): ICD-10-CM

## 2022-12-06 DIAGNOSIS — T45.1X5A ANEMIA DUE TO CHEMOTHERAPY: ICD-10-CM

## 2022-12-06 DIAGNOSIS — R06.00 DYSPNEA, UNSPECIFIED TYPE: Primary | ICD-10-CM

## 2022-12-06 DIAGNOSIS — D64.81 ANEMIA DUE TO CHEMOTHERAPY: ICD-10-CM

## 2022-12-06 LAB
% GRANULOCYTES: 71.8 % (ref 42.2–75.2)
HCT VFR BLD AUTO: 23.9 % (ref 35–46)
HEMOGLOBIN: 8.3 G/DL (ref 11.8–15.5)
LYMPHOCYTES NFR BLD AUTO: 19.9 % (ref 20.5–51.1)
MCH RBC QN AUTO: 30.5 PG (ref 27–31)
MCHC RBC AUTO-ENTMCNC: 34.9 G/DL (ref 33–37)
MCV RBC AUTO: 87.3 FL (ref 80–100)
MONOCYTES NFR BLD AUTO: 8.3 % (ref 1.7–9.3)
PLATELET # BLD AUTO: 178 K/UL (ref 140–450)
RBC # BLD AUTO: 2.73 X10/CMM (ref 3.7–5.2)
WBC # BLD AUTO: 3.1 X10/CMM (ref 3.8–11)

## 2022-12-06 PROCEDURE — 85025 COMPLETE CBC W/AUTO DIFF WBC: CPT | Performed by: FAMILY MEDICINE

## 2022-12-06 PROCEDURE — 99214 OFFICE O/P EST MOD 30 MIN: CPT | Performed by: FAMILY MEDICINE

## 2022-12-06 PROCEDURE — 93000 ELECTROCARDIOGRAM COMPLETE: CPT | Performed by: FAMILY MEDICINE

## 2022-12-06 PROCEDURE — 36415 COLL VENOUS BLD VENIPUNCTURE: CPT | Performed by: FAMILY MEDICINE

## 2022-12-06 NOTE — PROGRESS NOTES
rGace Camargo is a 77 year old patient who presents to clinic for evaluation.  Has noted more dyspnea lately.  Occurs with brief walks to the car, unpacking things, and then today even just praying in the morning felt short of breath.  No chest pain, heaviness, nausea.  She is undergoing chemo for MM and has anemia related to this.  No overt bleeding.  No cough.  No other new concerns.          Review of Systems   Constitutional, HEENT, cardiovascular, pulmonary, GI, , musculoskeletal, neuro, skin, endocrine and psych systems are negative, except as otherwise noted.      Objective    /60   Pulse 78   Temp 97.8  F (36.6  C)   Wt 56.2 kg (123 lb 12.8 oz)   LMP 12/04/1989   SpO2 98%   BMI 21.93 kg/m      General: Well appearing, NAD  HEENT: clear  Heart: RRR, no murmur  Chest: Lungs CTAB  Skin: slight pallor  Psych: normal mood and affect        EKG - Reviewed and interpreted by me appears normal, NSR, normal axis, normal intervals, no acute ST/T changes c/w ischemia, no LVH by voltage criteria, unchanged from previous tracings    Dyspnea, unspecified type  Suspect may be related to chemo and related anemia.  Suggest she call Dr Dreyer today to discuss.  If he feels this is unlikely may consider further cardiac workup with stress testing.  Very close follow up planned  - CBC with Diff/Plt ([facility])  - EKG 12-lead complete w/read - Clinics  - VENOUS COLLECTION    Multiple myeloma not having achieved remission (H)  See above  - CBC with Diff/Plt ([facility])  - VENOUS COLLECTION    Anemia due to chemotherapy  See above  - CBC with Diff/Plt ([facility])  - VENOUS COLLECTION    Patient is agreement with the assessment and plan as outlined above.  All questions answered.  Red flag symptoms that should prompt emergent evaluation discussed and understood.

## 2023-01-19 ENCOUNTER — TRANSFERRED RECORDS (OUTPATIENT)
Dept: FAMILY MEDICINE | Facility: CLINIC | Age: 78
End: 2023-01-19

## 2023-02-02 ENCOUNTER — ANCILLARY PROCEDURE (OUTPATIENT)
Dept: ULTRASOUND IMAGING | Facility: CLINIC | Age: 78
End: 2023-02-02
Attending: NURSE PRACTITIONER
Payer: COMMERCIAL

## 2023-02-02 DIAGNOSIS — R60.0 LOWER EXTREMITY EDEMA: ICD-10-CM

## 2023-02-02 DIAGNOSIS — C90.00 MULTIPLE MYELOMA, REMISSION STATUS UNSPECIFIED (H): ICD-10-CM

## 2023-02-02 PROCEDURE — 93971 EXTREMITY STUDY: CPT | Mod: RT

## 2023-02-16 ENCOUNTER — TRANSFERRED RECORDS (OUTPATIENT)
Dept: FAMILY MEDICINE | Facility: CLINIC | Age: 78
End: 2023-02-16

## 2023-02-24 NOTE — ADDENDUM NOTE
Encounter addended by: Tiffanie Bender, PT on: 2/24/2023 2:50 PM   Actions taken: Clinical Note Signed, Episode resolved

## 2023-03-26 ENCOUNTER — HEALTH MAINTENANCE LETTER (OUTPATIENT)
Age: 78
End: 2023-03-26

## 2023-04-15 DIAGNOSIS — F33.1 MODERATE EPISODE OF RECURRENT MAJOR DEPRESSIVE DISORDER (H): ICD-10-CM

## 2023-04-15 DIAGNOSIS — F41.1 GAD (GENERALIZED ANXIETY DISORDER): ICD-10-CM

## 2023-04-15 DIAGNOSIS — F43.10 PTSD (POST-TRAUMATIC STRESS DISORDER): ICD-10-CM

## 2023-04-16 RX ORDER — CITALOPRAM HYDROBROMIDE 40 MG/1
TABLET ORAL
Qty: 90 TABLET | Refills: 0 | Status: SHIPPED | OUTPATIENT
Start: 2023-04-16 | End: 2023-07-07

## 2023-05-11 ENCOUNTER — TRANSFERRED RECORDS (OUTPATIENT)
Dept: FAMILY MEDICINE | Facility: CLINIC | Age: 78
End: 2023-05-11

## 2023-06-08 ENCOUNTER — TRANSFERRED RECORDS (OUTPATIENT)
Dept: FAMILY MEDICINE | Facility: CLINIC | Age: 78
End: 2023-06-08

## 2023-06-12 ENCOUNTER — PATIENT OUTREACH (OUTPATIENT)
Dept: FAMILY MEDICINE | Facility: CLINIC | Age: 78
End: 2023-06-12

## 2023-06-12 NOTE — TELEPHONE ENCOUNTER
Voicemail      [facility] called patient    Left a voicemail for a call back.      CC will outreach again in 2-3 days    TARA Munoz  , Care Coordination  [facility]  [phone]  [email or web address]

## 2023-06-14 NOTE — TELEPHONE ENCOUNTER
Voicemail      [facility] called patient    Left a voicemail for to call clinic with any concerns or needs.    MELISSA Munoz  , Care Coordination  [facility]  [phone]  [email or web address]

## 2023-07-06 ENCOUNTER — TRANSFERRED RECORDS (OUTPATIENT)
Dept: FAMILY MEDICINE | Facility: CLINIC | Age: 78
End: 2023-07-06

## 2023-07-07 DIAGNOSIS — F41.1 GAD (GENERALIZED ANXIETY DISORDER): ICD-10-CM

## 2023-07-07 DIAGNOSIS — F33.1 MODERATE EPISODE OF RECURRENT MAJOR DEPRESSIVE DISORDER (H): ICD-10-CM

## 2023-07-07 DIAGNOSIS — F43.10 PTSD (POST-TRAUMATIC STRESS DISORDER): ICD-10-CM

## 2023-07-07 RX ORDER — CITALOPRAM HYDROBROMIDE 40 MG/1
TABLET ORAL
Qty: 90 TABLET | Refills: 0 | Status: SHIPPED | OUTPATIENT
Start: 2023-07-07 | End: 2023-12-27

## 2023-07-07 NOTE — TELEPHONE ENCOUNTER
Regional Procedure    Patient Preparation      Regional Basics    Block Details    Intrathecal Narcotics    Labor Regional Notes    Regional Anesthesia Note  Regional Procedure  Technique:  Epidural  Labor and Delivery Epidural:  Yes    Patient Preparation  Location: Labor and Delivery  Preanesthetic Checklist:  Patient Identified, Risks and Benefits Discussed, IV Bolus -1 liter Crystalloid, Patient & Fetus Hemodynamically Stable, Timeout Performed and Monitors and Equipment Checked  Patient Position:  Sitting    Monitors Used:  NIBP, Pulse Oximetry, FHT's and Tocodynamometer  Prep:  Betadine and Sterile Gloves    Regional Basics  Local Infiltration:  1% Lidocaine  Local Infiltration Volume:  3mL  Approach:  Midline    Block Details  Technique:  Continuous  Interspace:  L3-4  Ultrasound Used:  No  X-Ray Used:  No  Loss of resistance with air technique , Epidural space reached at 5 cm on the first attempt, Epidural catheter advanced 5 CM without paresthesias  Epidural needle type:  Tuohy  Epidural needle gauge:  17  Epidural needle length:  9 cm.  CSF was not obtained.  Catheter:  Single Orifice  Catheter gauge:  19  Catheter Placement:  Easy  Catheter Fluid Return:  None  Test Dose:  Lidocaine 1.5% w/Epi  Test Dose Volume:  3 mL  Test Dose Result:  Negative.  Initial Local Loading Anesthetic:  0.2 % ropivacaine with 2 mcg/cc fentanyl    Initial Local Loading Volume:  10 mL      Labor Regional Notes  Inadvertent Dural Puncture:  No    Regional Anesthesia Note  During epidural catheter placement, vitals and fetal monitoring remained stable . Relief obtained : Good         citalopram (CELEXA) 40 MG     LOV 12/6/22  No future appt         9/28/2021     4:22 PM 6/3/2022     1:22 PM 9/30/2022     4:40 PM   PHQ   PHQ-9 Total Score 11 4 13   Q9: Thoughts of better off dead/self-harm past 2 weeks Several days More than half the days Nearly every day     Left message to call [facility] for appt soon

## 2023-07-10 ENCOUNTER — PATIENT OUTREACH (OUTPATIENT)
Dept: FAMILY MEDICINE | Facility: CLINIC | Age: 78
End: 2023-07-10

## 2023-07-10 NOTE — TELEPHONE ENCOUNTER
Voicemail      [facility] called patient    Left a voicemail for a call back.      CC will outreach again in 2-3 days    TARA Munoz  , Care Coordination  [facility]  [phone]  [email or web address]

## 2023-07-12 ENCOUNTER — PATIENT OUTREACH (OUTPATIENT)
Dept: FAMILY MEDICINE | Facility: CLINIC | Age: 78
End: 2023-07-12

## 2023-07-12 NOTE — TELEPHONE ENCOUNTER
Voicemail      CC called patient  Left a voicemail for a call back.(x3)      [facility] will do no further outreaches.    MELISSA Munoz  , Care Coordination  [facility]  [phone]  [email or web address]

## 2023-07-20 ENCOUNTER — PATIENT OUTREACH (OUTPATIENT)
Dept: ONCOLOGY | Facility: CLINIC | Age: 78
End: 2023-07-20
Payer: COMMERCIAL

## 2023-07-20 NOTE — PROGRESS NOTES
[facility]: Cancer Care                                                                                          Tayloranabelle Gomez from [employer] called and left a message asking to get information about Mary Jo Mcleod.  I called Taylor back at  and informed her that our oncology team has not seen Mary Jo since 7/8/2022.  I also informed her that I last spoke with the patient on 8/25/2022 and explained that on 10/5/2022 I confirmed that Mary Jo had transferred care to [facility].  I provided Taylor with the phone number for Mary Jo's PCP Dr. Levi Mcdonnell    Signature:  Linda Vu RN

## 2023-08-03 ENCOUNTER — TRANSFERRED RECORDS (OUTPATIENT)
Dept: FAMILY MEDICINE | Facility: CLINIC | Age: 78
End: 2023-08-03

## 2023-08-10 ENCOUNTER — HOSPITAL ENCOUNTER (OUTPATIENT)
Dept: BONE DENSITY | Facility: CLINIC | Age: 78
Discharge: HOME | End: 2023-08-10
Attending: INTERNAL MEDICINE | Admitting: INTERNAL MEDICINE
Payer: COMMERCIAL

## 2023-08-10 DIAGNOSIS — Z78.0 MENOPAUSE: ICD-10-CM

## 2023-08-10 DIAGNOSIS — C90.00 MYELOMA (H): ICD-10-CM

## 2023-08-10 DIAGNOSIS — M48.56XD: ICD-10-CM

## 2023-08-10 PROCEDURE — 77080 DXA BONE DENSITY AXIAL: CPT

## 2023-08-21 ENCOUNTER — TRANSFERRED RECORDS (OUTPATIENT)
Dept: FAMILY MEDICINE | Facility: CLINIC | Age: 78
End: 2023-08-21

## 2023-09-01 ENCOUNTER — OFFICE VISIT (OUTPATIENT)
Dept: FAMILY MEDICINE | Facility: CLINIC | Age: 78
End: 2023-09-01

## 2023-09-01 VITALS
OXYGEN SATURATION: 98 % | WEIGHT: 132.6 LBS | SYSTOLIC BLOOD PRESSURE: 116 MMHG | BODY MASS INDEX: 23.49 KG/M2 | HEART RATE: 94 BPM | DIASTOLIC BLOOD PRESSURE: 62 MMHG

## 2023-09-01 DIAGNOSIS — R20.0 NUMBNESS AND TINGLING OF BOTH LOWER EXTREMITIES: Primary | ICD-10-CM

## 2023-09-01 DIAGNOSIS — F33.1 MODERATE EPISODE OF RECURRENT MAJOR DEPRESSIVE DISORDER (H): ICD-10-CM

## 2023-09-01 DIAGNOSIS — R20.2 NUMBNESS AND TINGLING OF BOTH LOWER EXTREMITIES: Primary | ICD-10-CM

## 2023-09-01 DIAGNOSIS — N18.31 CHRONIC KIDNEY DISEASE, STAGE 3A (H): ICD-10-CM

## 2023-09-01 DIAGNOSIS — C90.00 MULTIPLE MYELOMA NOT HAVING ACHIEVED REMISSION (H): ICD-10-CM

## 2023-09-01 DIAGNOSIS — D64.9 ANEMIA, UNSPECIFIED TYPE: ICD-10-CM

## 2023-09-01 LAB
TSH SERPL DL<=0.005 MIU/L-ACNC: 1.13 UIU/ML (ref 0.3–4.2)
VIT B12 SERPL-MCNC: 840 PG/ML (ref 232–1245)

## 2023-09-01 PROCEDURE — 82607 VITAMIN B-12: CPT | Performed by: FAMILY MEDICINE

## 2023-09-01 PROCEDURE — 84443 ASSAY THYROID STIM HORMONE: CPT | Performed by: FAMILY MEDICINE

## 2023-09-01 PROCEDURE — 36415 COLL VENOUS BLD VENIPUNCTURE: CPT | Performed by: FAMILY MEDICINE

## 2023-09-01 PROCEDURE — 99214 OFFICE O/P EST MOD 30 MIN: CPT | Performed by: FAMILY MEDICINE

## 2023-09-01 ASSESSMENT — ANXIETY QUESTIONNAIRES
2. NOT BEING ABLE TO STOP OR CONTROL WORRYING: NOT AT ALL
3. WORRYING TOO MUCH ABOUT DIFFERENT THINGS: NOT AT ALL
GAD7 TOTAL SCORE: 3
GAD7 TOTAL SCORE: 3
7. FEELING AFRAID AS IF SOMETHING AWFUL MIGHT HAPPEN: SEVERAL DAYS
6. BECOMING EASILY ANNOYED OR IRRITABLE: SEVERAL DAYS
5. BEING SO RESTLESS THAT IT IS HARD TO SIT STILL: NOT AT ALL
IF YOU CHECKED OFF ANY PROBLEMS ON THIS QUESTIONNAIRE, HOW DIFFICULT HAVE THESE PROBLEMS MADE IT FOR YOU TO DO YOUR WORK, TAKE CARE OF THINGS AT HOME, OR GET ALONG WITH OTHER PEOPLE: NOT DIFFICULT AT ALL
1. FEELING NERVOUS, ANXIOUS, OR ON EDGE: NOT AT ALL

## 2023-09-01 ASSESSMENT — PATIENT HEALTH QUESTIONNAIRE - PHQ9
5. POOR APPETITE OR OVEREATING: SEVERAL DAYS
SUM OF ALL RESPONSES TO PHQ QUESTIONS 1-9: 5

## 2023-09-01 NOTE — PROGRESS NOTES
Grace Camargo is a 77 year old patient who presents to clinic for evaluation.    Reports bilateral leg numbness from knees to feet while watching tv last night, occurred again today.  Very mild foot numbness x 6 months.  No pain.  Denies fever, radicular symptoms, urinary incontinence of retention, stool changes or saddle anesthesia.    MDD: stable on citalopram     Anxiety: stable on citalopram     MM: follows with Dr Dreyer, treated with Darzalex.  Doing well overall.     Osteopenia with history of insufficiency fractures, ?on treatment     CKD3a: presumably secondary to MM.       HLD: on simva, tolerating well    Anemia: likely multifactorial (MM, chemo, B12 def)        Review of Systems   Constitutional, HEENT, cardiovascular, pulmonary, GI, , musculoskeletal, neuro, skin, endocrine and psych systems are negative, except as otherwise noted.      Objective    /62   Pulse 94   Wt 60.1 kg (132 lb 9.6 oz)   LMP 12/04/1989   SpO2 98%   BMI 23.49 kg/m      General: Well appearing, NAD  Skin: normal appearance  Ext: normal pulses and color  Psych: normal mood and affect        No results found for this or any previous visit (from the past 24 hour(s)).    Numbness and tingling of both lower extremities  Likely multifactorial.  Possibly related to MM, treatment of MM, B12 deficiency.  Will check B12 and advised discussing further with Dr Dreyer  - Vitamin B12; Future  - TSH with free T4 reflex; Future  - Vitamin B12  - TSH with free T4 reflex  - VENOUS COLLECTION    Multiple myeloma not having achieved remission (H)  stable/controlled. Cont current medication(s) and treatment and specialist follow up  - VENOUS COLLECTION    Moderate episode of recurrent major depressive disorder (H)  stable/controlled. Cont current medication(s) and treatment  Cont citalopram  - VENOUS COLLECTION    Chronic kidney disease, stage 3a (H)  Likely secondary to MM.  Recheck BMP.  Blood pressure control, blood sugar  control, vitamin D supplementation and avoidance of NSAIDs and other nephrotoxic medications discussed.      - VENOUS COLLECTION    Anemia, unspecified type  Likely multifactorial, cont to monitor    Follow up in 2 months for AWV

## 2023-09-01 NOTE — NURSING NOTE
Extra tube saved in yamila colored container for extra if needed for folate testing  Monica Christian MA September 1, 2023 4:23 PM

## 2023-10-23 NOTE — PATIENT INSTRUCTIONS
Patient Education   Personalized Prevention Plan  You are due for the preventive services outlined below.  Your care team is available to assist you in scheduling these services.  If you have already completed any of these items, please share that information with your care team to update in your medical record.  Health Maintenance Due   Topic Date Due     Kidney Microalbumin Urine Test  Never done     Urine Test  Never done     RSV VACCINE 60+ (1 - 1-dose 60+ series) Never done     Cholesterol Lab  11/20/2021     Basic Metabolic Panel  07/29/2023     Flu Vaccine (1) 09/01/2023     COVID-19 Vaccine (5 - 2023-24 season) 09/01/2023     FALL RISK ASSESSMENT  09/30/2023     Zoster (Shingles) Vaccine (2 of 2) 06/13/2023     Hemoglobin  12/06/2023
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
## 2023-10-24 ENCOUNTER — OFFICE VISIT (OUTPATIENT)
Dept: FAMILY MEDICINE | Facility: CLINIC | Age: 78
End: 2023-10-24

## 2023-10-24 VITALS
DIASTOLIC BLOOD PRESSURE: 59 MMHG | WEIGHT: 133.4 LBS | OXYGEN SATURATION: 98 % | HEART RATE: 92 BPM | HEIGHT: 63 IN | SYSTOLIC BLOOD PRESSURE: 107 MMHG | BODY MASS INDEX: 23.64 KG/M2

## 2023-10-24 DIAGNOSIS — C80.1: ICD-10-CM

## 2023-10-24 DIAGNOSIS — E53.8 B12 DEFICIENCY: ICD-10-CM

## 2023-10-24 DIAGNOSIS — N18.31 CHRONIC KIDNEY DISEASE, STAGE 3A (H): ICD-10-CM

## 2023-10-24 DIAGNOSIS — F33.1 MODERATE EPISODE OF RECURRENT MAJOR DEPRESSIVE DISORDER (H): ICD-10-CM

## 2023-10-24 DIAGNOSIS — F41.1 GAD (GENERALIZED ANXIETY DISORDER): ICD-10-CM

## 2023-10-24 DIAGNOSIS — G63: ICD-10-CM

## 2023-10-24 DIAGNOSIS — H91.92 HEARING LOSS OF LEFT EAR, UNSPECIFIED HEARING LOSS TYPE: ICD-10-CM

## 2023-10-24 DIAGNOSIS — G47.00 INSOMNIA, UNSPECIFIED TYPE: ICD-10-CM

## 2023-10-24 DIAGNOSIS — D84.821 DRUG-INDUCED IMMUNODEFICIENCY (H): ICD-10-CM

## 2023-10-24 DIAGNOSIS — I70.0 CALCIFICATION OF ABDOMINAL AORTA (H): ICD-10-CM

## 2023-10-24 DIAGNOSIS — R73.03 PREDIABETES: ICD-10-CM

## 2023-10-24 DIAGNOSIS — Z79.620 LONG-TERM CURRENT USE OF IMMUNOSUPPRESSIVE BIOLOGIC AGENT: ICD-10-CM

## 2023-10-24 DIAGNOSIS — Z00.00 ENCOUNTER FOR MEDICARE ANNUAL WELLNESS EXAM: Primary | ICD-10-CM

## 2023-10-24 DIAGNOSIS — Z79.899 DRUG-INDUCED IMMUNODEFICIENCY (H): ICD-10-CM

## 2023-10-24 DIAGNOSIS — F43.10 PTSD (POST-TRAUMATIC STRESS DISORDER): ICD-10-CM

## 2023-10-24 DIAGNOSIS — C90.00 MULTIPLE MYELOMA NOT HAVING ACHIEVED REMISSION (H): ICD-10-CM

## 2023-10-24 LAB
ANION GAP SERPL CALCULATED.3IONS-SCNC: 10 MMOL/L (ref 7–15)
BUN SERPL-MCNC: 19.4 MG/DL (ref 8–23)
CALCIUM SERPL-MCNC: 9.2 MG/DL (ref 8.8–10.2)
CHLORIDE SERPL-SCNC: 103 MMOL/L (ref 98–107)
CHOLESTEROL: 218 MG/DL (ref 100–199)
CREAT SERPL-MCNC: 0.98 MG/DL (ref 0.51–0.95)
DEPRECATED HCO3 PLAS-SCNC: 29 MMOL/L (ref 22–29)
EGFRCR SERPLBLD CKD-EPI 2021: 59 ML/MIN/1.73M2
FASTING?: NO
GLUCOSE SERPL-MCNC: 100 MG/DL (ref 70–99)
HBA1C MFR BLD: 5.7 %
HDL (RMG): 49 MG/DL (ref 40–?)
LDL CALCULATED (RMG): 147 MG/DL (ref 0–130)
POTASSIUM SERPL-SCNC: 3.9 MMOL/L (ref 3.4–5.3)
SODIUM SERPL-SCNC: 142 MMOL/L (ref 135–145)
TRIGLYCERIDES (RMG): 113 MG/DL (ref 0–149)

## 2023-10-24 PROCEDURE — 80048 BASIC METABOLIC PNL TOTAL CA: CPT | Performed by: FAMILY MEDICINE

## 2023-10-24 PROCEDURE — 69209 REMOVE IMPACTED EAR WAX UNI: CPT | Mod: LT | Performed by: FAMILY MEDICINE

## 2023-10-24 PROCEDURE — 36415 COLL VENOUS BLD VENIPUNCTURE: CPT | Performed by: FAMILY MEDICINE

## 2023-10-24 PROCEDURE — 80061 LIPID PANEL: CPT | Mod: QW | Performed by: FAMILY MEDICINE

## 2023-10-24 PROCEDURE — 99214 OFFICE O/P EST MOD 30 MIN: CPT | Mod: 25 | Performed by: FAMILY MEDICINE

## 2023-10-24 PROCEDURE — 83036 HEMOGLOBIN GLYCOSYLATED A1C: CPT | Performed by: FAMILY MEDICINE

## 2023-10-24 PROCEDURE — G0439 PPPS, SUBSEQ VISIT: HCPCS | Performed by: FAMILY MEDICINE

## 2023-10-24 RX ORDER — HYDROXYZINE HYDROCHLORIDE 25 MG/1
TABLET, FILM COATED ORAL
Qty: 90 TABLET | Refills: 0 | Status: SHIPPED | OUTPATIENT
Start: 2023-10-24

## 2023-10-24 RX ORDER — ROSUVASTATIN CALCIUM 10 MG/1
10 TABLET, COATED ORAL DAILY
Qty: 90 TABLET | Refills: 3 | Status: SHIPPED | OUTPATIENT
Start: 2023-10-24

## 2023-10-24 RX ORDER — CITALOPRAM HYDROBROMIDE 40 MG/1
40 TABLET ORAL DAILY
Qty: 90 TABLET | Refills: 0 | Status: CANCELLED | OUTPATIENT
Start: 2023-10-24

## 2023-11-10 ENCOUNTER — TRANSFERRED RECORDS (OUTPATIENT)
Dept: FAMILY MEDICINE | Facility: CLINIC | Age: 78
End: 2023-11-10

## 2023-12-06 ENCOUNTER — TRANSFERRED RECORDS (OUTPATIENT)
Dept: HEALTH INFORMATION MANAGEMENT | Facility: CLINIC | Age: 78
End: 2023-12-06

## 2023-12-06 ENCOUNTER — HOSPITAL ENCOUNTER (INPATIENT)
Facility: CLINIC | Age: 78
LOS: 15 days | Discharge: HOME HEALTH | DRG: 371 | End: 2023-12-21
Attending: EMERGENCY MEDICINE | Admitting: INTERNAL MEDICINE
Payer: COMMERCIAL

## 2023-12-06 ENCOUNTER — APPOINTMENT (OUTPATIENT)
Dept: CT IMAGING | Facility: CLINIC | Age: 78
DRG: 371 | End: 2023-12-06
Attending: EMERGENCY MEDICINE
Payer: COMMERCIAL

## 2023-12-06 DIAGNOSIS — A04.72 COLITIS DUE TO CLOSTRIDIUM DIFFICILE: ICD-10-CM

## 2023-12-06 DIAGNOSIS — K62.5 RECTAL BLEEDING: ICD-10-CM

## 2023-12-06 DIAGNOSIS — K52.9 COLITIS: ICD-10-CM

## 2023-12-06 DIAGNOSIS — Z71.89 OSTOMY NURSE CONSULTATION: Primary | ICD-10-CM

## 2023-12-06 DIAGNOSIS — R10.84 GENERALIZED ABDOMINAL PAIN: ICD-10-CM

## 2023-12-06 DIAGNOSIS — B95.61 MSSA BACTEREMIA: ICD-10-CM

## 2023-12-06 DIAGNOSIS — R78.81 MSSA BACTEREMIA: ICD-10-CM

## 2023-12-06 DIAGNOSIS — C80.1: ICD-10-CM

## 2023-12-06 DIAGNOSIS — G63: ICD-10-CM

## 2023-12-06 LAB
ABO/RH(D): ABNORMAL
ALBUMIN SERPL BCG-MCNC: 3.9 G/DL (ref 3.5–5.2)
ALP SERPL-CCNC: 71 U/L (ref 40–150)
ALT SERPL W P-5'-P-CCNC: 12 U/L (ref 0–50)
ANION GAP SERPL CALCULATED.3IONS-SCNC: 11 MMOL/L (ref 7–15)
ANTIBODY SCREEN: POSITIVE
ANTIBODY UNIDENTIFIED: NORMAL
AST SERPL W P-5'-P-CCNC: 22 U/L (ref 0–45)
ATRIAL RATE - MUSE: 97 BPM
BASOPHILS # BLD AUTO: 0 10E3/UL (ref 0–0.2)
BASOPHILS NFR BLD AUTO: 0 %
BILIRUB SERPL-MCNC: 0.3 MG/DL
BUN SERPL-MCNC: 18.5 MG/DL (ref 8–23)
CALCIUM SERPL-MCNC: 8.3 MG/DL (ref 8.8–10.2)
CHLORIDE SERPL-SCNC: 97 MMOL/L (ref 98–107)
CREAT SERPL-MCNC: 0.86 MG/DL (ref 0.51–0.95)
DEPRECATED HCO3 PLAS-SCNC: 27 MMOL/L (ref 22–29)
DIASTOLIC BLOOD PRESSURE - MUSE: NORMAL MMHG
EGFRCR SERPLBLD CKD-EPI 2021: 69 ML/MIN/1.73M2
EOSINOPHIL # BLD AUTO: 0 10E3/UL (ref 0–0.7)
EOSINOPHIL NFR BLD AUTO: 0 %
ERYTHROCYTE [DISTWIDTH] IN BLOOD BY AUTOMATED COUNT: 12.7 % (ref 10–15)
GLUCOSE SERPL-MCNC: 181 MG/DL (ref 70–99)
HCT VFR BLD AUTO: 35.7 % (ref 35–47)
HGB BLD-MCNC: 12 G/DL (ref 11.7–15.7)
HGB BLD-MCNC: 12 G/DL (ref 11.7–15.7)
IMM GRANULOCYTES # BLD: 0 10E3/UL
IMM GRANULOCYTES NFR BLD: 0 %
INTERPRETATION ECG - MUSE: NORMAL
LIPASE SERPL-CCNC: 37 U/L (ref 13–60)
LYMPHOCYTES # BLD AUTO: 0.4 10E3/UL (ref 0.8–5.3)
LYMPHOCYTES NFR BLD AUTO: 3 %
MCH RBC QN AUTO: 34.1 PG (ref 26.5–33)
MCHC RBC AUTO-ENTMCNC: 33.6 G/DL (ref 31.5–36.5)
MCV RBC AUTO: 101 FL (ref 78–100)
MONOCYTES # BLD AUTO: 1 10E3/UL (ref 0–1.3)
MONOCYTES NFR BLD AUTO: 8 %
NEUTROPHILS # BLD AUTO: 11.7 10E3/UL (ref 1.6–8.3)
NEUTROPHILS NFR BLD AUTO: 89 %
NRBC # BLD AUTO: 0 10E3/UL
NRBC BLD AUTO-RTO: 0 /100
P AXIS - MUSE: 82 DEGREES
PLATELET # BLD AUTO: 210 10E3/UL (ref 150–450)
POTASSIUM SERPL-SCNC: 3.6 MMOL/L (ref 3.4–5.3)
PR INTERVAL - MUSE: 138 MS
PROT SERPL-MCNC: 5.8 G/DL (ref 6.4–8.3)
QRS DURATION - MUSE: 74 MS
QT - MUSE: 410 MS
QTC - MUSE: 520 MS
R AXIS - MUSE: 74 DEGREES
RBC # BLD AUTO: 3.52 10E6/UL (ref 3.8–5.2)
SODIUM SERPL-SCNC: 135 MMOL/L (ref 135–145)
SPECIMEN EXPIRATION DATE: ABNORMAL
SPECIMEN EXPIRATION DATE: NORMAL
SYSTOLIC BLOOD PRESSURE - MUSE: NORMAL MMHG
T AXIS - MUSE: 82 DEGREES
VENTRICULAR RATE- MUSE: 97 BPM
WBC # BLD AUTO: 13.2 10E3/UL (ref 4–11)

## 2023-12-06 PROCEDURE — 86970 RBC PRETX INCUBATJ W/CHEMICL: CPT | Performed by: EMERGENCY MEDICINE

## 2023-12-06 PROCEDURE — 250N000013 HC RX MED GY IP 250 OP 250 PS 637: Performed by: INTERNAL MEDICINE

## 2023-12-06 PROCEDURE — 85018 HEMOGLOBIN: CPT | Performed by: INTERNAL MEDICINE

## 2023-12-06 PROCEDURE — 250N000009 HC RX 250: Performed by: EMERGENCY MEDICINE

## 2023-12-06 PROCEDURE — 120N000001 HC R&B MED SURG/OB

## 2023-12-06 PROCEDURE — 250N000013 HC RX MED GY IP 250 OP 250 PS 637: Performed by: PHYSICIAN ASSISTANT

## 2023-12-06 PROCEDURE — 36415 COLL VENOUS BLD VENIPUNCTURE: CPT | Performed by: EMERGENCY MEDICINE

## 2023-12-06 PROCEDURE — 258N000003 HC RX IP 258 OP 636: Performed by: INTERNAL MEDICINE

## 2023-12-06 PROCEDURE — 86880 COOMBS TEST DIRECT: CPT | Performed by: EMERGENCY MEDICINE

## 2023-12-06 PROCEDURE — 86901 BLOOD TYPING SEROLOGIC RH(D): CPT | Performed by: EMERGENCY MEDICINE

## 2023-12-06 PROCEDURE — 86900 BLOOD TYPING SEROLOGIC ABO: CPT | Performed by: EMERGENCY MEDICINE

## 2023-12-06 PROCEDURE — 85025 COMPLETE CBC W/AUTO DIFF WBC: CPT | Performed by: EMERGENCY MEDICINE

## 2023-12-06 PROCEDURE — 74177 CT ABD & PELVIS W/CONTRAST: CPT

## 2023-12-06 PROCEDURE — 99285 EMERGENCY DEPT VISIT HI MDM: CPT | Mod: 25

## 2023-12-06 PROCEDURE — 80053 COMPREHEN METABOLIC PANEL: CPT | Performed by: EMERGENCY MEDICINE

## 2023-12-06 PROCEDURE — 86870 RBC ANTIBODY IDENTIFICATION: CPT | Performed by: EMERGENCY MEDICINE

## 2023-12-06 PROCEDURE — 81403 MOPATH PROCEDURE LEVEL 4: CPT | Performed by: EMERGENCY MEDICINE

## 2023-12-06 PROCEDURE — 250N000011 HC RX IP 250 OP 636: Mod: JZ | Performed by: INTERNAL MEDICINE

## 2023-12-06 PROCEDURE — 83690 ASSAY OF LIPASE: CPT | Performed by: EMERGENCY MEDICINE

## 2023-12-06 PROCEDURE — 250N000013 HC RX MED GY IP 250 OP 250 PS 637: Performed by: EMERGENCY MEDICINE

## 2023-12-06 PROCEDURE — 36415 COLL VENOUS BLD VENIPUNCTURE: CPT | Performed by: INTERNAL MEDICINE

## 2023-12-06 PROCEDURE — 84999 UNLISTED CHEMISTRY PROCEDURE: CPT | Performed by: EMERGENCY MEDICINE

## 2023-12-06 PROCEDURE — 93005 ELECTROCARDIOGRAM TRACING: CPT

## 2023-12-06 PROCEDURE — 99207 PR APP CREDIT; MD BILLING SHARED VISIT: CPT | Performed by: INTERNAL MEDICINE

## 2023-12-06 PROCEDURE — 99222 1ST HOSP IP/OBS MODERATE 55: CPT | Mod: AI | Performed by: INTERNAL MEDICINE

## 2023-12-06 PROCEDURE — 250N000011 HC RX IP 250 OP 636: Performed by: EMERGENCY MEDICINE

## 2023-12-06 RX ORDER — OXYCODONE HYDROCHLORIDE 5 MG/1
5 TABLET ORAL ONCE
Status: COMPLETED | OUTPATIENT
Start: 2023-12-06 | End: 2023-12-06

## 2023-12-06 RX ORDER — OXYCODONE HYDROCHLORIDE 5 MG/1
5 TABLET ORAL EVERY 4 HOURS PRN
Status: DISCONTINUED | OUTPATIENT
Start: 2023-12-06 | End: 2023-12-22 | Stop reason: HOSPADM

## 2023-12-06 RX ORDER — IOPAMIDOL 755 MG/ML
66 INJECTION, SOLUTION INTRAVASCULAR ONCE
Status: COMPLETED | OUTPATIENT
Start: 2023-12-06 | End: 2023-12-06

## 2023-12-06 RX ORDER — SODIUM CHLORIDE 9 MG/ML
INJECTION, SOLUTION INTRAVENOUS CONTINUOUS
Status: DISCONTINUED | OUTPATIENT
Start: 2023-12-06 | End: 2023-12-07

## 2023-12-06 RX ORDER — VALACYCLOVIR HYDROCHLORIDE 500 MG/1
1000 TABLET, FILM COATED ORAL DAILY
COMMUNITY

## 2023-12-06 RX ORDER — CITALOPRAM HYDROBROMIDE 40 MG/1
40 TABLET ORAL DAILY
Status: DISCONTINUED | OUTPATIENT
Start: 2023-12-07 | End: 2023-12-22 | Stop reason: HOSPADM

## 2023-12-06 RX ORDER — VALACYCLOVIR HYDROCHLORIDE 1 G/1
1000 TABLET, FILM COATED ORAL DAILY
Status: DISCONTINUED | OUTPATIENT
Start: 2023-12-06 | End: 2023-12-22 | Stop reason: HOSPADM

## 2023-12-06 RX ORDER — PROCHLORPERAZINE 25 MG
12.5 SUPPOSITORY, RECTAL RECTAL EVERY 12 HOURS PRN
Status: DISCONTINUED | OUTPATIENT
Start: 2023-12-06 | End: 2023-12-22 | Stop reason: HOSPADM

## 2023-12-06 RX ORDER — ONDANSETRON 4 MG/1
4 TABLET, ORALLY DISINTEGRATING ORAL EVERY 6 HOURS PRN
Status: DISCONTINUED | OUTPATIENT
Start: 2023-12-06 | End: 2023-12-07

## 2023-12-06 RX ORDER — BISACODYL 5 MG
10 TABLET, DELAYED RELEASE (ENTERIC COATED) ORAL ONCE
Status: COMPLETED | OUTPATIENT
Start: 2023-12-06 | End: 2023-12-06

## 2023-12-06 RX ORDER — PROCHLORPERAZINE MALEATE 5 MG
5 TABLET ORAL EVERY 6 HOURS PRN
Status: DISCONTINUED | OUTPATIENT
Start: 2023-12-06 | End: 2023-12-22 | Stop reason: HOSPADM

## 2023-12-06 RX ORDER — ONDANSETRON 2 MG/ML
4 INJECTION INTRAMUSCULAR; INTRAVENOUS EVERY 6 HOURS PRN
Status: DISCONTINUED | OUTPATIENT
Start: 2023-12-06 | End: 2023-12-07

## 2023-12-06 RX ORDER — POLYETHYLENE GLYCOL 3350 17 G/17G
238 POWDER, FOR SOLUTION ORAL ONCE
Status: COMPLETED | OUTPATIENT
Start: 2023-12-06 | End: 2023-12-06

## 2023-12-06 RX ORDER — CALCIUM CARBONATE/VITAMIN D3 600 MG-10
1000 TABLET ORAL DAILY
COMMUNITY

## 2023-12-06 RX ORDER — LIDOCAINE 40 MG/G
CREAM TOPICAL
Status: DISCONTINUED | OUTPATIENT
Start: 2023-12-06 | End: 2023-12-22 | Stop reason: HOSPADM

## 2023-12-06 RX ORDER — AMOXICILLIN 250 MG
2 CAPSULE ORAL 2 TIMES DAILY PRN
Status: DISCONTINUED | OUTPATIENT
Start: 2023-12-06 | End: 2023-12-08

## 2023-12-06 RX ORDER — DICYCLOMINE HYDROCHLORIDE 10 MG/1
20 CAPSULE ORAL 4 TIMES DAILY PRN
Status: DISCONTINUED | OUTPATIENT
Start: 2023-12-06 | End: 2023-12-22 | Stop reason: HOSPADM

## 2023-12-06 RX ORDER — AMOXICILLIN 250 MG
1 CAPSULE ORAL 2 TIMES DAILY PRN
Status: DISCONTINUED | OUTPATIENT
Start: 2023-12-06 | End: 2023-12-08

## 2023-12-06 RX ORDER — ONDANSETRON 2 MG/ML
4 INJECTION INTRAMUSCULAR; INTRAVENOUS
Status: CANCELLED | OUTPATIENT
Start: 2023-12-06

## 2023-12-06 RX ORDER — CALCIUM CARBONATE 500 MG/1
1000 TABLET, CHEWABLE ORAL 4 TIMES DAILY PRN
Status: DISCONTINUED | OUTPATIENT
Start: 2023-12-06 | End: 2023-12-07

## 2023-12-06 RX ADMIN — Medication 2.5 MG: at 12:35

## 2023-12-06 RX ADMIN — BISACODYL 10 MG: 5 TABLET, COATED ORAL at 20:15

## 2023-12-06 RX ADMIN — SODIUM CHLORIDE 60 ML: 9 INJECTION, SOLUTION INTRAVENOUS at 03:47

## 2023-12-06 RX ADMIN — DICYCLOMINE HYDROCHLORIDE 20 MG: 10 CAPSULE ORAL at 09:56

## 2023-12-06 RX ADMIN — POLYETHYLENE GLYCOL 3350 238 G: 17 POWDER, FOR SOLUTION ORAL at 20:15

## 2023-12-06 RX ADMIN — SODIUM CHLORIDE: 9 INJECTION, SOLUTION INTRAVENOUS at 09:57

## 2023-12-06 RX ADMIN — SODIUM CHLORIDE: 9 INJECTION, SOLUTION INTRAVENOUS at 19:41

## 2023-12-06 RX ADMIN — OXYCODONE HYDROCHLORIDE 5 MG: 5 TABLET ORAL at 05:30

## 2023-12-06 RX ADMIN — IOPAMIDOL 66 ML: 755 INJECTION, SOLUTION INTRAVENOUS at 03:47

## 2023-12-06 RX ADMIN — ONDANSETRON 4 MG: 2 INJECTION INTRAMUSCULAR; INTRAVENOUS at 21:59

## 2023-12-06 ASSESSMENT — ACTIVITIES OF DAILY LIVING (ADL)
ADLS_ACUITY_SCORE: 35
ADLS_ACUITY_SCORE: 18
ADLS_ACUITY_SCORE: 35
ADLS_ACUITY_SCORE: 18
ADLS_ACUITY_SCORE: 35
ADLS_ACUITY_SCORE: 18

## 2023-12-06 NOTE — ED PROVIDER NOTES
History     Chief Complaint:  Rectal Bleeding     HPI   Mary Jo Mcleod is a 78 year old female with history of multiple myeloma, stage 3 CKD, rheumatoid arthritis, and osteopenia who presents via EMS for evaluation of rectal bleeding. Per EMS, the patient began having bright red blood per rectum and lower abdominal cramping after having a bowel movement at around 1900 yesterday. Adds that she was nauseous. En route, the patient's blood pressure was 150/80 and she was mildly tachycardic. After receiving 4 mg Zofran and 300 ml NS, her blood pressure was 130/80.    Independent Historian:   EMS - They report as noted above.    Review of External Notes:   I reviewed [facility] note from 11/10/23. I also see an order for MR due to rectal mass but no other information is available.     Medications:    Aspirin 325 MG  Citalopram  Hydroxyzine  Rosuvastatin  Denosumab  Percocet     Past Medical History:    Depression   PTSD  Osteopenia  GERSON  Hypercholesterolemia  Prediabetes  Plasma cell dyscrasia  Smoldering multiple myeloma  Stage 3 CKD  Suicidal ideation  Vitamin B 12 deficiency   Lumbar radiculopathy  Anemia  Compression fracture L5  Calcification of abdominal aorta  Uterine myoma  Rheumatoid arthritis     Past Surgical History:    Tonsillectomy  Bone marrow biopsy  Finger reattachment      Physical Exam   Patient Vitals for the past 24 hrs:   BP Temp Temp src Pulse Resp SpO2   12/06/23 0530 (!) 148/79 -- -- 87 18 98 %   12/06/23 0330 (!) 150/69 -- -- 90 18 96 %   12/06/23 0304 (!) 153/74 98.7  F (37.1  C) Oral 72 18 97 %      Physical Exam  General: Well-nourished, appears to be in pain  Eyes: PERRL, conjunctivae pink no scleral icterus or conjunctival injection  ENT:  Moist mucus membranes, posterior oropharynx clear without erythema or exudates  Respiratory:  Lungs clear to auscultation bilaterally, no crackles/rubs/wheezes.  Good air movement  CV: Normal rate and rhythm, no murmurs/rubs/gallops  GI:   Abdomen soft and non-distended.  Normoactive BS.  Diffuse generalized tenderness, no guarding or rebound  Rectal: Normal tone, bright red blood and mucus, no visible hemorrhoidal bleeding  Skin: Warm, dry.  No rashes or petechiae  Musculoskeletal: No peripheral edema or calf tenderness  Neuro: Alert and oriented to person/place/time  Psychiatric: Normal affect    Emergency Department Course   ECG  ECG taken at 0318, ECG read at 0332  Sinus rhythm with frequent premature ventricular complexes  Nonspecific ST abnormality  Prolonged QT   Rate 97 bpm. NY interval 138 ms. QRS duration 74 ms. QT/QTc 410/520 ms. P-R-T axes 82 74 82.     Imaging:  CT Abdomen Pelvis w IV Contrast   Final Result   IMPRESSION:    Colitis. An infectious or inflammatory etiology is favored.         Laboratory:  Labs Ordered and Resulted from Time of ED Arrival to Time of ED Departure   COMPREHENSIVE METABOLIC PANEL - Abnormal       Result Value    Sodium 135      Potassium 3.6      Carbon Dioxide (CO2) 27      Anion Gap 11      Urea Nitrogen 18.5      Creatinine 0.86      GFR Estimate 69      Calcium 8.3 (*)     Chloride 97 (*)     Glucose 181 (*)     Alkaline Phosphatase 71      AST 22      ALT 12      Protein Total 5.8 (*)     Albumin 3.9      Bilirubin Total 0.3     CBC WITH PLATELETS AND DIFFERENTIAL - Abnormal    WBC Count 13.2 (*)     RBC Count 3.52 (*)     Hemoglobin 12.0      Hematocrit 35.7       (*)     MCH 34.1 (*)     MCHC 33.6      RDW 12.7      Platelet Count 210      % Neutrophils 89      % Lymphocytes 3      % Monocytes 8      % Eosinophils 0      % Basophils 0      % Immature Granulocytes 0      NRBCs per 100 WBC 0      Absolute Neutrophils 11.7 (*)     Absolute Lymphocytes 0.4 (*)     Absolute Monocytes 1.0      Absolute Eosinophils 0.0      Absolute Basophils 0.0      Absolute Immature Granulocytes 0.0      Absolute NRBCs 0.0     TYPE AND SCREEN, ADULT - Abnormal    ABO/RH(D) O POS      Antibody Screen Positive (*)      SPECIMEN EXPIRATION DATE [id number]     LIPASE - Normal    Lipase 37     LABORATORY MISCELLANEOUS ORDER   GENOTYPE RED BLOOD CELL   ENTERIC BACTERIA AND VIRUS PANEL BY PCR   C. DIFFICILE TOXIN B PCR WITH REFLEX TO C. DIFFICILE ANTIGEN AND TOXINS A/B EIA   ABO/RH TYPE AND SCREEN      Emergency Department Course & Assessments:     Interventions:  Medications   calcium carbonate (TUMS) chewable tablet 1,000 mg (has no administration in time range)   sodium chloride 0.9 % infusion (has no administration in time range)   oxyCODONE IR (ROXICODONE) half-tab 2.5 mg (has no administration in time range)   oxyCODONE (ROXICODONE) tablet 5 mg (has no administration in time range)   ondansetron (ZOFRAN ODT) ODT tab 4 mg (has no administration in time range)     Or   ondansetron (ZOFRAN) injection 4 mg (has no administration in time range)   Saline (60 mLs As instructed $Given 12/6/23 0347)   iopamidol (ISOVUE-370) solution 66 mL (66 mLs Intravenous $Given 12/6/23 0347)   oxyCODONE (ROXICODONE) tablet 5 mg (5 mg Oral $Given 12/6/23 0530)      Assessments:  0238 I obtained history and examined the patient as noted above.   0500 I rechecked and updated the patient.     Independent Interpretation (X-rays, CTs, rhythm strip):  None    Consultations/Discussion of Management or Tests:  0507 I spoke with Dr. Oh of the hospitalist service regarding admission.        Social Determinants of Health affecting care:   None    Disposition:  The patient was admitted to the hospital under the care of Dr. Oh.     Impression & Plan    Medical Decision Making:  Mary Jo Mcleod is a 78 year old female who comes with abdominal cramping and bright red blood per rectum.  Fortunately, hemoglobin is normal as are her vital signs.  She is not on any blood thinners.  She is afebrile and I doubt sepsis.  CT scan was obtained and shows findings of colitis which likely account for her bleeding.  She had 1 episode of diarrhea.  Of note, the  patient apparently also has some sort of rectal mass and was scheduled to undergo MRI imaging of the pelvis that was ordered by her oncologist [facility].  She is unaware of this or the reason for it.  It certainly possible this is malignancy causing her bleeding and/or pain.  She continues to be in pain and does not feel that she can manage as an outpatient without nursing assistance.  We will admit her to the hospital for serial hemoglobins, pain management and nursing cares.  Dr. Oh graciously agreed to admit the patient.    Diagnosis:    ICD-10-CM    1. Rectal bleeding  K62.5       2. Colitis  K52.9       3. Generalized abdominal pain  R10.84             Scribe Disclosure:  Shefali SOUZA, am serving as a scribe at 2:42 AM on 12/6/2023 to document services personally performed by Viki Mendez MD based on my observations and the provider's statements to me.     12/6/2023   Viki Mendez MD Cho, Amy C, MD  12/06/23 0615

## 2023-12-06 NOTE — CONSULTS
[facility]  Gastroenterology Consultation         Mary Jo Mcleod  [address]  [facility] [id number]  78 year old female    Admission Date/Time: 12/6/2023  Primary Care Provider: Levi Mcdonnell  Referring / Attending Physician:  Dr. Aleksander Oh    We were asked to see the patient in consultation by Dr. Aleksander Oh for evaluation of colitis.      CC: abdominal pain and BRBPR    HPI: Mary Jo Mcleod is a 78 year old female whom has a history of multiple myeloma and currently undergoing radiation for lytic lesions and receiving chemotherapy (unsure name), from chart review follows with Dr. Dreyer and on Darzalex and patient reports additional oral chemotherapy- could not find documentation on this,  whom presented to ED with abdominal pain and bright red blood per rectum. She reports no new meds. Her chemotherapy has been constant and unchanged for 4 months. She at dinner last evening and an hour later she had onset of severe lower abdominal pain.  Described as cramping and intermittent.  She then had a solid bowel movement followed by liquid stools then blood.  Since presentation diarrhea and bright red blood per rectum have improved but she still has lower abdominal pain.  Denies any fevers or chills and no recent travel.  No chest pain or shortness of breath.      ROS: A comprehensive ten point review of systems was negative aside from those in mentioned in the HPI.      PAST MED HX:  I have reviewed this patient's medical history and updated it with pertinent information if needed.   Past Medical History:   Diagnosis Date    Depressive disorder        MEDICATIONS:   Prior to Admission Medications   Prescriptions Last Dose Informant Patient Reported? Taking?   acyclovir (ZOVIRAX) 400 MG tablet   No No   Sig: TAKE 1 TABLET BY MOUTH TWICE DAILY FOR INFECTION PROPHYLAXIS Strength: 400 mg   Patient not taking: Reported on 10/24/2023   aspirin (ASA) 325 MG EC  tablet   No No   Sig: Take 1 tablet (325 mg) by mouth daily   calcium carbonate (TUMS) 500 MG chewable tablet   Yes No   Sig: Take 2 chew tab by mouth daily   citalopram (CELEXA) 40 MG tablet   No No   Sig: Take 1 tablet by mouth once daily   denosumab (PROLIA) 60 MG/ML SOSY injection   Yes No   Sig: Inject 1 mL (60 mg) Subcutaneous   hydrOXYzine (ATARAX) 25 MG tablet   No No   Sig: TAKE UP TO 3 TABLETS PRN BEDTIME FOR INSOMNIA   ibuprofen (ADVIL/MOTRIN) 200 MG tablet   No No   Sig: Take 1 tablet (200 mg) by mouth 2 times daily as needed for mild pain   levofloxacin (LEVAQUIN) 250 MG tablet   No No   Sig: Take 1 tablet (250 mg) by mouth daily   Patient not taking: Reported on 9/1/2023   oxyCODONE-acetaminophen (PERCOCET) 5-325 MG tablet   Yes No   Sig: every other day   rosuvastatin (CRESTOR) 10 MG tablet   No No   Sig: Take 1 tablet (10 mg) by mouth daily   sodium chloride 1 GM tablet   No No   Sig: Take 1 tablet (1 g) by mouth daily   Patient not taking: Reported on 9/1/2023   triamcinolone (KENALOG) 0.1 % external cream   No No   Sig: Apply topically 2 times daily   vitamin D3 (CHOLECALCIFEROL) 50 mcg (2000 units) tablet   Yes No   Sig: Take 1 tablet by mouth daily      Facility-Administered Medications: None       ALLERGIES:   Allergies   Allergen Reactions    Acetaminophen Shortness Of Breath    Codeine Difficulty breathing, Other (See Comments) and Rash    Bupropion     Erythromycin     Hydrocodone Other (See Comments) and Itching     Ear ache    Lidocaine Other (See Comments)    Penicillin G Itching     Itching around mouth then heavy breathing    Revlimid [Lenalidomide]     Sulfa Antibiotics Itching     Itching around the mouth and then heavy breathing    Tetracycline     Trazodone      Does not work for patient    Wellbutrin [Bupropion Hydrobromide]     Codeine Sulfate Rash    Conjugated Estrogens Dermatitis       SOCIAL HISTORY:  Social History     Tobacco Use    Smoking status: Never    Smokeless tobacco:  Never   Substance Use Topics    Alcohol use: Not Currently     Alcohol/week: 0.0 - 2.0 standard drinks of alcohol    Drug use: No       FAMILY HISTORY:  Family History   Problem Relation Age of Onset    Myocardial Infarction Father 63    Bipolar Disorder Sister     Chronic Obstructive Pulmonary Disease Brother     Lung Cancer Brother     Suicide Brother     Influenza/Pneumonia Sister     Alcoholism Sister        PHYSICAL EXAM:   General  alert, oriented and comfortable  Vital Signs with Ranges  Temp: 98.7  F (37.1  C) Temp src: Oral BP: 139/69 Pulse: 81   Resp: 18 SpO2: 98 % O2 Device: None (Room air)    No intake/output data recorded.    Constitutional: healthy, alert, and no distress   Cardiovascular: negative, PMI normal. No lifts, heaves, or thrills. RRR. No murmurs, clicks gallops or rub  Respiratory: negative, Percussion normal. Good diaphragmatic excursion. Lungs clear  Abdomen: Abdomen soft, bilateral lower tenderness. BS normal. No masses, organomegaly          ADDITIONAL COMMENTS:   I reviewed the patient's new clinical lab test results.   Recent Labs   Lab Test 12/06/23  0256 12/06/22  1141 07/29/22  0736   WBC 13.2* 3.1* 2.4*   HGB 12.0 8.3* 8.5*   * 87.3 99    178 200     Recent Labs   Lab Test 12/06/23  0256 10/24/23  1334 07/29/22  0736   POTASSIUM 3.6 3.9 4.1   CHLORIDE 97* 103 108   CO2 27 29 24   BUN 18.5 19.4 13   ANIONGAP 11 10 3     Recent Labs   Lab Test 12/06/23  0256 07/08/22  1452 06/25/22  1851   ALBUMIN 3.9 2.8* 2.9*   BILITOTAL 0.3 0.4 0.7   ALT 12 14 19   AST 22 21 22   LIPASE 37  --  362       I reviewed the patient's new imaging results.        CONSULTATION ASSESSMENT AND PLAN:    Mary Jo Mcleod is a 78 year old female admitted on 12/6/2023 with abdominal pain and BRBPR     Generalized abdominal pain  Colitis  Rectal bleeding  CT A/P w/notes colitis- with wall thickening and stranding  Hemoglobin 12, WBC 13.2  Last colonoscopy 2010 with diverticulosis and other  unremarkable  Enteric pathogens and C difficile ordered  Suspect chemotherapy induced colitis, infectious colitis vs ischemic colitis    - await stool pathogens  - daily CBC  - start dicyclomine 20 mg QID prn for cramping  - clear liquid diet  - could consider colonoscopy tomorrow/Friday pending results from stools              [facility].  Office: [phone]  Cell : [phone] (Dr. Castillo)  Cell: [phone] (My Davis PA-C)

## 2023-12-06 NOTE — ED NOTES
Bed: ED11  Expected date: 12/6/23  Expected time: 2:35 AM  Means of arrival: Ambulance  Comments:  HEMS 429 78F rectal bleed

## 2023-12-06 NOTE — ED NOTES
[facility]  ED Nurse Handoff Report    ED Chief complaint: Rectal Bleeding      ED Diagnosis:   Final diagnoses:   Rectal bleeding   Colitis   Generalized abdominal pain       Code Status: to be addressed    Allergies:   Allergies   Allergen Reactions    Acetaminophen Shortness Of Breath    Codeine Difficulty breathing, Other (See Comments) and Rash    Bupropion     Erythromycin     Hydrocodone Other (See Comments) and Itching     Ear ache    Lidocaine Other (See Comments)    Penicillin G Itching     Itching around mouth then heavy breathing    Revlimid [Lenalidomide]     Sulfa Antibiotics Itching     Itching around the mouth and then heavy breathing    Tetracycline     Trazodone      Does not work for patient    Wellbutrin [Bupropion Hydrobromide]     Codeine Sulfate Rash    Conjugated Estrogens Dermatitis       Patient Story:Mary Jo Mcleod is a 78 year old female with history of multiple myeloma, stage 3 CKD, rheumatoid arthritis, and osteopenia who presents via EMS for evaluation of rectal bleeding. Per EMS, the patient began having bright red blood per rectum and lower abdominal cramping after having a bowel movement at around 1900 yesterday. Adds that she was nauseous. En route, the patient's blood pressure was 150/80 and she was mildly tachycardic. After receiving 4 mg Zofran and 300 ml NS, her blood pressure was 130/80.    Focused Assessment:  abdominal pain and  rectal bleeding    Treatments and/or interventions provided: labs ,ct  Patient's response to treatments and/or interventions: ongoing    To be done/followed up on inpatient unit:  .    Does this patient have any cognitive concerns?:  none    Activity level - Baseline/Home:  Cane and Wheelchair  Activity Level - Current:    uses commode at bedside    Patient's Preferred language: English   Needed?: No    Isolation: None  Infection: Not Applicable  Patient tested for COVID 19 prior to admission: NO  Bariatric?: No    Vital  Signs:   Vitals:    12/06/23 0304 12/06/23 0330   BP: (!) 153/74 (!) 150/69   Pulse: 72 90   Resp: 18 18   Temp: 98.7  F (37.1  C)    TempSrc: Oral    SpO2: 97% 96%       Cardiac Rhythm:     Was the PSS-3 completed:   Yes  What interventions are required if any?  none             Family Comments: .  OBS brochure/video discussed/provided to patient/family: No              Name of person given brochure if not patient: .              Relationship to patient: .    For the majority of the shift this patient's behavior was Green.   Behavioral interventions performed were none.    ED NURSE PHONE NUMBER: [phone]

## 2023-12-06 NOTE — ED TRIAGE NOTES
Patient had a BM around 1900 and then experienced some rectal bleeding afterwards with cramping.

## 2023-12-06 NOTE — PHARMACY-ADMISSION MEDICATION HISTORY
Pharmacist Admission Medication History    Admission medication history is complete. The information provided in this note is only as accurate as the sources available at the time of the update.    Information Source(s): Patient, Hospital records, and CareEverywhere/SureScripts via in-person    Pertinent Information: None    Changes made to PTA medication list:  Added: Valtrex, Vit B  Deleted: Acyclovir, Levofloxacin  Changed: Percocet    Medication Affordability:  Not including over the counter (OTC) medications, was there a time in the past 3 months when you did not take your medications as prescribed because of cost?: No      Medication History Completed By: Leonidas Ramos [facility] 12/6/2023 9:26 AM    PTA Med List   Medication Sig Last Dose    aspirin (ASA) 325 MG EC tablet Take 1 tablet (325 mg) by mouth daily 12/5/2023    calcium carbonate (TUMS) 500 MG chewable tablet Take 2 chew tab by mouth daily 12/5/2023    citalopram (CELEXA) 40 MG tablet Take 1 tablet by mouth once daily 12/5/2023    denosumab (PROLIA) 60 MG/ML SOSY injection Inject 1 mL (60 mg) Subcutaneous Unknown    hydrOXYzine (ATARAX) 25 MG tablet TAKE UP TO 3 TABLETS PRN BEDTIME FOR INSOMNIA Unknown at prn    ibuprofen (ADVIL/MOTRIN) 200 MG tablet Take 1 tablet (200 mg) by mouth 2 times daily as needed for mild pain Unknown at prn    oxyCODONE-acetaminophen (PERCOCET) 5-325 MG tablet Take 1 tablet by mouth daily as needed for pain Past Month    rosuvastatin (CRESTOR) 10 MG tablet Take 1 tablet (10 mg) by mouth daily 12/5/2023    triamcinolone (KENALOG) 0.1 % external cream Apply topically 2 times daily (Patient taking differently: Apply topically 2 times daily as needed for irritation) Unknown    valACYclovir (VALTREX) 500 MG tablet Take 1,000 mg by mouth daily 12/5/2023    vitamin B-12 (CYANOCOBALAMIN) 250 MCG tablet Take 1,000 mcg by mouth daily 12/5/2023    vitamin D3 (CHOLECALCIFEROL) 50 mcg (2000 units) tablet Take 1 tablet by mouth daily  12/5/2023

## 2023-12-06 NOTE — PROGRESS NOTES
[facility]    Medicine Progress Note - Hospitalist Service    Date of Admission:  12/6/2023    Interval History   Patient is comfortable.  States that she has had no further bloody stools today.  Feeling better.  Has allergies to multiple medications based on list 14.      Assessment & Plan   Mary Jo Mcleod is a 78 year old female with a history of multiple myeloma, stage III CKD, rheumatoid arthritis, osteopenia admitted on 12/6/2023 with abdominal pain, BRBPR and imaging showing colitis of transverse and descending colon    Transverse and descending colon wall thickening and inflammatory stranding  Colitis  On evening of admission 12/5 patient described lower abdominal cramping and bright red blood per rectum.  Patient had associated nausea.  Had eaten egg salad 1 hour before.  In ED AFVSS. Hgb 12. WBC 13.2.   12/6 CT a/p w/ colitis (raj transverse and descending colon), infectious/ inflammatory favored.   Currently on clear liquid diet.  GI consult  12/6 Stool culture still requested and pending including enteric bacteria and C. difficile toxin  Describes no bloody stools today while in the ER     Multiple myeloma  IgG Kappa multiple myeloma   Follows with Dr Dreyer w/ [facility]. Hx multiple pathologic fractures.   Of note plans for MR pelvis by [facility](to compare w/ CT 5/4/23 from [place] w/ multiple abnormalities).  Patient by last note with [facility] had MRI with TRIA   Consider discussion with [facility]  On prophylactic valtrex   Patient on maintenance bortezomib  Consult [facility] ? SE of treatment or need to change.      Polyneuropathy  Patient had developed a sensory neuropathy over the last 6 months.  Occurs mainly at nighttime    HLD  resume crestor with rec     MDD  Anxiety  PTSD  - Citalopram 40 mg daily with rec        Diet: NPO for Medical/Clinical Reasons Except for: No Exceptions    DVT Prophylaxis: Pneumatic Compression Devices  Astudillo Catheter: Not  present  Lines: None     Cardiac Monitoring: None  Code Status:  Full       Clinically Significant Risk Factors Present on Admission          # Hypocalcemia: Lowest Ca = 8.3 mg/dL in last 2 days, will monitor and replace as appropriate       # Drug Induced Platelet Defect: home medication list includes an antiplatelet medication            # Support System: poor social support noted in nursing assessment         Disposition Plan      Expected Discharge Date: 12/08/2023                    TAIWO GRIMALDO MD  Hospitalist Service  [facility]  Securely message with [employer] (more info)  Text page via [employer] Paging/Directory   ______________________________________________________________________  Physical Exam   Vital Signs: Temp: 98.7  F (37.1  C) Temp src: Oral BP: (!) 147/80 Pulse: 81   Resp: 18 SpO2: 98 % O2 Device: None (Room air)    Weight: 0 lbs 0 oz    General Appearance: Patient is awake and alert  Respiratory: Lungs are clear to auscultation bilaterally without wheezes or rhonchi  Cardiovascular: Regular rate and rhythm without gallop rub normal S1-S2  GI: Positive bowel sounds soft rebound guarding tender  Skin: No edema      Medical Decision Making       45 MINUTES SPENT BY ME on the date of service doing chart review, history, exam, documentation & further activities per the note.      Data     I have personally reviewed the following data over the past 24 hrs:    13.2 (H)  \   12.0   / 210     135 97 (L) 18.5 /  181 (H)   3.6 27 0.86 \     ALT: 12 AST: 22 AP: 71 TBILI: 0.3   ALB: 3.9 TOT PROTEIN: 5.8 (L) LIPASE: 37       Imaging results reviewed over the past 24 hrs:   Recent Results (from the past 24 hour(s))   CT Abdomen Pelvis w IV Contrast    Narrative    EXAM: CT ABDOMEN PELVIS W CONTRAST  LOCATION: [facility]  DATE: 12/6/2023    INDICATION: bright red blood per rectum, abdominal cramping  COMPARISON: PET/CT 08/14/2023. CT abdomen and pelvis  06/25/2022.  TECHNIQUE: CT scan of the abdomen and pelvis was performed following injection of IV contrast. Multiplanar reformats were obtained. Dose reduction techniques were used.  CONTRAST: 66mL Isovue 370    FINDINGS:   LOWER CHEST: Normal.    HEPATOBILIARY: Normal.    PANCREAS: Normal.    SPLEEN: Normal.    ADRENAL GLANDS: No change in 10 mm bilateral adrenal nodules are indeterminate by density measurements.    KIDNEYS/BLADDER: Tiny bilateral probable benign renal cysts do not warrant follow-up.    BOWEL: Wall thickening and inflammatory stranding of the colon, particularly the transverse and descending colon, consistent with colitis. No pneumatosis, perforation or abscess. Colonic diverticulosis without evidence for diverticulitis. Normal   appendix.    LYMPH NODES: Normal.    VASCULATURE: Mild to moderate aortoiliac atherosclerosis. No aneurysm.    PELVIC ORGANS: Multiple calcified uterine fibroids.    MUSCULOSKELETAL: Chronic fracture deformities of the right sacrum and left pubic bone. Chronic moderate compression deformity of the L5 vertebral body. Chronic 4 cm calcified structure medial to the left inferior pubic ramus is unchanged from PET/CT   08/14/2023. This is suggestive of an old hematoma.      Impression    IMPRESSION:   Colitis. An infectious or inflammatory etiology is favored.

## 2023-12-06 NOTE — H&P
[facility]    History and Physical - Hospitalist Service       Date of Admission:  12/6/2023    Assessment & Plan      Mary Jo Mcleod is a 78 year old female admitted on 12/6/2023. She presents with abdominal pain, BRBPR    Colitis  This evening 1900 w/ lower abdominal cramping and BRBPR. Some nausea. Had egg salad about an hour before. In ED AFVSS. Hgb 12. WBC 13.2. CT a/p w/ colitis (raj transverse and descending colon), infectious/ inflammatory favored.   - clear liquid diet  - IV fluids  - repeat hgb at noon  - GI consult  - enteric panel, c diff  - prn analgesics  - hold abx pending GI consult    Multiple myeloma  Polyneuropathy  Needs rec- acyclovir  Follows with Dr Dreyer w/ [facility]. Hx multiple pathologic fractures. Of note plans for MR pelvis by [facility] (to compare w/ CT 5/4/23 from [place] w/ multiple abnormalities).  - consider oncology involvement pending clinical course    HLD  - resume crestor with rec    MDD  Anxiety  PTSD  - resume citalopram 40 mg daily with rec        Diet: NPO for Medical/Clinical Reasons Except for: No Exceptions    DVT Prophylaxis: Pneumatic Compression Devices  Astudillo Catheter: Not present  Lines: None     Cardiac Monitoring: None  Code Status:  Full    Clinically Significant Risk Factors Present on Admission          # Hypocalcemia: Lowest Ca = 8.3 mg/dL in last 2 days, will monitor and replace as appropriate       # Drug Induced Platelet Defect: home medication list includes an antiplatelet medication            # Support System: poor social support noted in nursing assessment         Disposition Plan      Expected Discharge Date: 12/08/2023                  Aleksander Oh MD  Hospitalist Service  [facility]  Securely message with [employer] (more info)  Text page via [facility] Paging/Directory     ______________________________________________________________________    Chief Complaint   BRBPR, abdominal pain    History is  obtained from the patient, electronic health record, and emergency department physician    History of Present Illness   Mary Jo Mcleod is a 78 year old female who presents with abdominal pain and bright red blood per rectum.  Patient has a history of multiple myeloma and is currently being treated by oncology.  She is never had colitis before.  She notes that about 1800 this evening she was eating an egg salad sandwich.  Then about an hour later she had onset of pretty severe lower abdominal pain.  It was crampy in nature.  She then had a solid bowel movement followed by liquid stools then blood.  She notes diarrhea was watery.  The diarrhea and bright red blood per rectum have improved but she still has lower abdominal pain.  Denies any fevers or chills.  She has no recent travel.  No chest pain or shortness of breath.  She notes that she did have a cold/URI yesterday but she feels better today.  She currently is still getting treatment for multiple myeloma although not sure which agent.  She is not currently getting radiation for lytic lesions.      Past Medical History    Past Medical History:   Diagnosis Date    Depressive disorder        Past Surgical History   Past Surgical History:   Procedure Laterality Date    BONE MARROW BIOPSY, BONE SPECIMEN, NEEDLE/TROCAR N/A 2/8/2021    Procedure: BONE MARROW BIOPSY;  Surgeon: Naomie Saeed MD;  Location:  GI    finger reattachment      TONSILLECTOMY         Prior to Admission Medications   Prior to Admission Medications   Prescriptions Last Dose Informant Patient Reported? Taking?   acyclovir (ZOVIRAX) 400 MG tablet   No No   Sig: TAKE 1 TABLET BY MOUTH TWICE DAILY FOR INFECTION PROPHYLAXIS Strength: 400 mg   Patient not taking: Reported on 10/24/2023   aspirin (ASA) 325 MG EC tablet   No No   Sig: Take 1 tablet (325 mg) by mouth daily   calcium carbonate (TUMS) 500 MG chewable tablet   Yes No   Sig: Take 2 chew tab by mouth daily   citalopram (CELEXA)  40 MG tablet   No No   Sig: Take 1 tablet by mouth once daily   denosumab (PROLIA) 60 MG/ML SOSY injection   Yes No   Sig: Inject 1 mL (60 mg) Subcutaneous   hydrOXYzine (ATARAX) 25 MG tablet   No No   Sig: TAKE UP TO 3 TABLETS PRN BEDTIME FOR INSOMNIA   ibuprofen (ADVIL/MOTRIN) 200 MG tablet   No No   Sig: Take 1 tablet (200 mg) by mouth 2 times daily as needed for mild pain   levofloxacin (LEVAQUIN) 250 MG tablet   No No   Sig: Take 1 tablet (250 mg) by mouth daily   Patient not taking: Reported on 9/1/2023   oxyCODONE-acetaminophen (PERCOCET) 5-325 MG tablet   Yes No   Sig: every other day   rosuvastatin (CRESTOR) 10 MG tablet   No No   Sig: Take 1 tablet (10 mg) by mouth daily   sodium chloride 1 GM tablet   No No   Sig: Take 1 tablet (1 g) by mouth daily   Patient not taking: Reported on 9/1/2023   triamcinolone (KENALOG) 0.1 % external cream   No No   Sig: Apply topically 2 times daily   vitamin D3 (CHOLECALCIFEROL) 50 mcg (2000 units) tablet   Yes No   Sig: Take 1 tablet by mouth daily      Facility-Administered Medications: None        Review of Systems    The 10 point Review of Systems is negative other than noted in the HPI or here.     Social History   I have reviewed this patient's social history and updated it with pertinent information if needed.  Social History     Tobacco Use    Smoking status: Never    Smokeless tobacco: Never   Substance Use Topics    Alcohol use: Not Currently     Alcohol/week: 0.0 - 2.0 standard drinks of alcohol    Drug use: No        Physical Exam   Vital Signs: Temp: 98.7  F (37.1  C) Temp src: Oral BP: (!) 150/69 Pulse: 90   Resp: 18 SpO2: 96 % O2 Device: None (Room air)    Weight: 0 lbs 0 oz    General Appearance: Alert, pleasant, no distress  Respiratory: CTA B  Cardiovascular: RRR, no murmur. No edema.  GI: soft, tender in lower quadrants/ suprapubic area. No g/r  Skin: no rashes or lesions grossly    Other: CN grossly intact, OH      Medical Decision Making       60  MINUTES SPENT BY ME on the date of service doing chart review, history, exam, documentation & further activities per the note.      Data     I have personally reviewed the following data over the past 24 hrs:    13.2 (H)  \   12.0   / 210     135 97 (L) 18.5 /  181 (H)   3.6 27 0.86 \     ALT: 12 AST: 22 AP: 71 TBILI: 0.3   ALB: 3.9 TOT PROTEIN: 5.8 (L) LIPASE: 37       Imaging results reviewed over the past 24 hrs:   Recent Results (from the past 24 hour(s))   CT Abdomen Pelvis w IV Contrast    Narrative    EXAM: CT ABDOMEN PELVIS W CONTRAST  LOCATION: [facility]  DATE: 12/6/2023    INDICATION: bright red blood per rectum, abdominal cramping  COMPARISON: PET/CT 08/14/2023. CT abdomen and pelvis 06/25/2022.  TECHNIQUE: CT scan of the abdomen and pelvis was performed following injection of IV contrast. Multiplanar reformats were obtained. Dose reduction techniques were used.  CONTRAST: 66mL Isovue 370    FINDINGS:   LOWER CHEST: Normal.    HEPATOBILIARY: Normal.    PANCREAS: Normal.    SPLEEN: Normal.    ADRENAL GLANDS: No change in 10 mm bilateral adrenal nodules are indeterminate by density measurements.    KIDNEYS/BLADDER: Tiny bilateral probable benign renal cysts do not warrant follow-up.    BOWEL: Wall thickening and inflammatory stranding of the colon, particularly the transverse and descending colon, consistent with colitis. No pneumatosis, perforation or abscess. Colonic diverticulosis without evidence for diverticulitis. Normal   appendix.    LYMPH NODES: Normal.    VASCULATURE: Mild to moderate aortoiliac atherosclerosis. No aneurysm.    PELVIC ORGANS: Multiple calcified uterine fibroids.    MUSCULOSKELETAL: Chronic fracture deformities of the right sacrum and left pubic bone. Chronic moderate compression deformity of the L5 vertebral body. Chronic 4 cm calcified structure medial to the left inferior pubic ramus is unchanged from PET/CT   08/14/2023. This is suggestive of an old  hematoma.      Impression    IMPRESSION:   Colitis. An infectious or inflammatory etiology is favored.

## 2023-12-07 ENCOUNTER — APPOINTMENT (OUTPATIENT)
Dept: GENERAL RADIOLOGY | Facility: CLINIC | Age: 78
DRG: 371 | End: 2023-12-07
Attending: INTERNAL MEDICINE
Payer: COMMERCIAL

## 2023-12-07 ENCOUNTER — APPOINTMENT (OUTPATIENT)
Dept: CT IMAGING | Facility: CLINIC | Age: 78
DRG: 371 | End: 2023-12-07
Attending: INTERNAL MEDICINE
Payer: COMMERCIAL

## 2023-12-07 LAB
ALBUMIN UR-MCNC: 20 MG/DL
ANION GAP SERPL CALCULATED.3IONS-SCNC: 11 MMOL/L (ref 7–15)
ANION GAP SERPL CALCULATED.3IONS-SCNC: 6 MMOL/L (ref 7–15)
APPEARANCE UR: CLEAR
BASE EXCESS BLDV CALC-SCNC: 2.3 MMOL/L (ref -7.7–1.9)
BILIRUB UR QL STRIP: NEGATIVE
BLD PROD TYP BPU: NORMAL
BLD PROD TYP BPU: NORMAL
BLOOD COMPONENT TYPE: NORMAL
BLOOD COMPONENT TYPE: NORMAL
BUN SERPL-MCNC: 6.4 MG/DL (ref 8–23)
BUN SERPL-MCNC: 7.4 MG/DL (ref 8–23)
CALCIUM SERPL-MCNC: 6.6 MG/DL (ref 8.8–10.2)
CALCIUM SERPL-MCNC: 7.2 MG/DL (ref 8.8–10.2)
CHLORIDE SERPL-SCNC: 104 MMOL/L (ref 98–107)
CHLORIDE SERPL-SCNC: 104 MMOL/L (ref 98–107)
CODING SYSTEM: NORMAL
CODING SYSTEM: NORMAL
COLONOSCOPY: NORMAL
COLOR UR AUTO: ABNORMAL
CREAT SERPL-MCNC: 0.68 MG/DL (ref 0.51–0.95)
CREAT SERPL-MCNC: 0.77 MG/DL (ref 0.51–0.95)
CROSSMATCH: NORMAL
CROSSMATCH: NORMAL
DEPRECATED HCO3 PLAS-SCNC: 21 MMOL/L (ref 22–29)
DEPRECATED HCO3 PLAS-SCNC: 25 MMOL/L (ref 22–29)
EGFRCR SERPLBLD CKD-EPI 2021: 79 ML/MIN/1.73M2
EGFRCR SERPLBLD CKD-EPI 2021: 89 ML/MIN/1.73M2
ERYTHROCYTE [DISTWIDTH] IN BLOOD BY AUTOMATED COUNT: 13.1 % (ref 10–15)
FLUAV RNA SPEC QL NAA+PROBE: NEGATIVE
FLUBV RNA RESP QL NAA+PROBE: NEGATIVE
GLUCOSE SERPL-MCNC: 103 MG/DL (ref 70–99)
GLUCOSE SERPL-MCNC: 110 MG/DL (ref 70–99)
GLUCOSE UR STRIP-MCNC: NEGATIVE MG/DL
HCO3 BLDV-SCNC: 28 MMOL/L (ref 21–28)
HCT VFR BLD AUTO: 32.1 % (ref 35–47)
HGB BLD-MCNC: 10.7 G/DL (ref 11.7–15.7)
HGB BLD-MCNC: 10.7 G/DL (ref 11.7–15.7)
HGB BLD-MCNC: 9.6 G/DL (ref 11.7–15.7)
HGB UR QL STRIP: ABNORMAL
KETONES UR STRIP-MCNC: 40 MG/DL
LACTATE SERPL-SCNC: 0.5 MMOL/L (ref 0.7–2)
LACTATE SERPL-SCNC: 0.8 MMOL/L (ref 0.7–2)
LEUKOCYTE ESTERASE UR QL STRIP: NEGATIVE
MCH RBC QN AUTO: 34.3 PG (ref 26.5–33)
MCHC RBC AUTO-ENTMCNC: 33.3 G/DL (ref 31.5–36.5)
MCV RBC AUTO: 103 FL (ref 78–100)
MUCOUS THREADS #/AREA URNS LPF: PRESENT /LPF
NITRATE UR QL: NEGATIVE
O2/TOTAL GAS SETTING VFR VENT: 25 %
PCO2 BLDV: 47 MM HG (ref 40–50)
PH BLDV: 7.38 [PH] (ref 7.32–7.43)
PH UR STRIP: 6 [PH] (ref 5–7)
PLATELET # BLD AUTO: 187 10E3/UL (ref 150–450)
PO2 BLDV: 20 MM HG (ref 25–47)
POTASSIUM SERPL-SCNC: 2.9 MMOL/L (ref 3.4–5.3)
POTASSIUM SERPL-SCNC: 3.5 MMOL/L (ref 3.4–5.3)
PROCALCITONIN SERPL IA-MCNC: 0.14 NG/ML
RBC # BLD AUTO: 3.12 10E6/UL (ref 3.8–5.2)
RBC URINE: 1 /HPF
RSV RNA SPEC NAA+PROBE: NEGATIVE
SARS-COV-2 RNA RESP QL NAA+PROBE: NEGATIVE
SODIUM SERPL-SCNC: 135 MMOL/L (ref 135–145)
SODIUM SERPL-SCNC: 136 MMOL/L (ref 135–145)
SP GR UR STRIP: 1.01 (ref 1–1.03)
SQUAMOUS EPITHELIAL: <1 /HPF
UNIT ABO/RH: NORMAL
UNIT ABO/RH: NORMAL
UNIT NUMBER: NORMAL
UNIT NUMBER: NORMAL
UNIT STATUS: NORMAL
UNIT STATUS: NORMAL
UNIT TYPE ISBT: 5100
UNIT TYPE ISBT: 5100
UROBILINOGEN UR STRIP-MCNC: NORMAL MG/DL
WBC # BLD AUTO: 16.5 10E3/UL (ref 4–11)
WBC URINE: 1 /HPF

## 2023-12-07 PROCEDURE — 87086 URINE CULTURE/COLONY COUNT: CPT | Performed by: INTERNAL MEDICINE

## 2023-12-07 PROCEDURE — 999N000040 HC STATISTIC CONSULT NO CHARGE VASC ACCESS

## 2023-12-07 PROCEDURE — 250N000011 HC RX IP 250 OP 636: Performed by: INTERNAL MEDICINE

## 2023-12-07 PROCEDURE — 93010 ELECTROCARDIOGRAM REPORT: CPT | Performed by: INTERNAL MEDICINE

## 2023-12-07 PROCEDURE — 999N000127 HC STATISTIC PERIPHERAL IV START W US GUIDANCE

## 2023-12-07 PROCEDURE — 85018 HEMOGLOBIN: CPT | Performed by: INTERNAL MEDICINE

## 2023-12-07 PROCEDURE — 88305 TISSUE EXAM BY PATHOLOGIST: CPT | Mod: TC | Performed by: INTERNAL MEDICINE

## 2023-12-07 PROCEDURE — 250N000013 HC RX MED GY IP 250 OP 250 PS 637: Performed by: INTERNAL MEDICINE

## 2023-12-07 PROCEDURE — 85027 COMPLETE CBC AUTOMATED: CPT | Performed by: PHYSICIAN ASSISTANT

## 2023-12-07 PROCEDURE — G0500 MOD SEDAT ENDO SERVICE >5YRS: HCPCS | Performed by: INTERNAL MEDICINE

## 2023-12-07 PROCEDURE — 83605 ASSAY OF LACTIC ACID: CPT | Performed by: INTERNAL MEDICINE

## 2023-12-07 PROCEDURE — 82803 BLOOD GASES ANY COMBINATION: CPT | Performed by: INTERNAL MEDICINE

## 2023-12-07 PROCEDURE — 258N000003 HC RX IP 258 OP 636: Performed by: INTERNAL MEDICINE

## 2023-12-07 PROCEDURE — 87149 DNA/RNA DIRECT PROBE: CPT | Performed by: INTERNAL MEDICINE

## 2023-12-07 PROCEDURE — 999N000128 HC STATISTIC PERIPHERAL IV START W/O US GUIDANCE

## 2023-12-07 PROCEDURE — 71045 X-RAY EXAM CHEST 1 VIEW: CPT

## 2023-12-07 PROCEDURE — 88305 TISSUE EXAM BY PATHOLOGIST: CPT | Mod: 26 | Performed by: PATHOLOGY

## 2023-12-07 PROCEDURE — 93005 ELECTROCARDIOGRAM TRACING: CPT

## 2023-12-07 PROCEDURE — 0DBL8ZX EXCISION OF TRANSVERSE COLON, VIA NATURAL OR ARTIFICIAL OPENING ENDOSCOPIC, DIAGNOSTIC: ICD-10-PCS | Performed by: INTERNAL MEDICINE

## 2023-12-07 PROCEDURE — 36415 COLL VENOUS BLD VENIPUNCTURE: CPT | Performed by: PHYSICIAN ASSISTANT

## 2023-12-07 PROCEDURE — 80048 BASIC METABOLIC PNL TOTAL CA: CPT | Performed by: INTERNAL MEDICINE

## 2023-12-07 PROCEDURE — 99232 SBSQ HOSP IP/OBS MODERATE 35: CPT | Performed by: INTERNAL MEDICINE

## 2023-12-07 PROCEDURE — 45380 COLONOSCOPY AND BIOPSY: CPT | Performed by: INTERNAL MEDICINE

## 2023-12-07 PROCEDURE — 84145 PROCALCITONIN (PCT): CPT | Performed by: INTERNAL MEDICINE

## 2023-12-07 PROCEDURE — 120N000001 HC R&B MED SURG/OB

## 2023-12-07 PROCEDURE — 36415 COLL VENOUS BLD VENIPUNCTURE: CPT | Performed by: INTERNAL MEDICINE

## 2023-12-07 PROCEDURE — 87637 SARSCOV2&INF A&B&RSV AMP PRB: CPT | Performed by: INTERNAL MEDICINE

## 2023-12-07 PROCEDURE — 81001 URINALYSIS AUTO W/SCOPE: CPT | Performed by: INTERNAL MEDICINE

## 2023-12-07 PROCEDURE — 87186 SC STD MICRODIL/AGAR DIL: CPT | Performed by: INTERNAL MEDICINE

## 2023-12-07 PROCEDURE — 74176 CT ABD & PELVIS W/O CONTRAST: CPT

## 2023-12-07 RX ORDER — METRONIDAZOLE 500 MG/100ML
500 INJECTION, SOLUTION INTRAVENOUS EVERY 12 HOURS
Status: DISCONTINUED | OUTPATIENT
Start: 2023-12-07 | End: 2023-12-11

## 2023-12-07 RX ORDER — NALOXONE HYDROCHLORIDE 0.4 MG/ML
0.4 INJECTION, SOLUTION INTRAMUSCULAR; INTRAVENOUS; SUBCUTANEOUS
Status: CANCELLED | OUTPATIENT
Start: 2023-12-07

## 2023-12-07 RX ORDER — NALOXONE HYDROCHLORIDE 0.4 MG/ML
0.2 INJECTION, SOLUTION INTRAMUSCULAR; INTRAVENOUS; SUBCUTANEOUS
Status: CANCELLED | OUTPATIENT
Start: 2023-12-07

## 2023-12-07 RX ORDER — NALOXONE HYDROCHLORIDE 0.4 MG/ML
0.4 INJECTION, SOLUTION INTRAMUSCULAR; INTRAVENOUS; SUBCUTANEOUS
Status: DISCONTINUED | OUTPATIENT
Start: 2023-12-07 | End: 2023-12-22 | Stop reason: HOSPADM

## 2023-12-07 RX ORDER — FLUMAZENIL 0.1 MG/ML
0.2 INJECTION, SOLUTION INTRAVENOUS
Status: CANCELLED | OUTPATIENT
Start: 2023-12-07 | End: 2023-12-07

## 2023-12-07 RX ORDER — CEFEPIME HYDROCHLORIDE 1 G/1
1 INJECTION, POWDER, FOR SOLUTION INTRAMUSCULAR; INTRAVENOUS EVERY 8 HOURS
Status: DISCONTINUED | OUTPATIENT
Start: 2023-12-07 | End: 2023-12-07

## 2023-12-07 RX ORDER — CEFEPIME HYDROCHLORIDE 1 G/1
1 INJECTION, POWDER, FOR SOLUTION INTRAMUSCULAR; INTRAVENOUS EVERY 12 HOURS
Status: DISCONTINUED | OUTPATIENT
Start: 2023-12-08 | End: 2023-12-08

## 2023-12-07 RX ORDER — SODIUM CHLORIDE, SODIUM LACTATE, POTASSIUM CHLORIDE, CALCIUM CHLORIDE 600; 310; 30; 20 MG/100ML; MG/100ML; MG/100ML; MG/100ML
INJECTION, SOLUTION INTRAVENOUS CONTINUOUS
Status: DISCONTINUED | OUTPATIENT
Start: 2023-12-07 | End: 2023-12-10

## 2023-12-07 RX ORDER — NALOXONE HYDROCHLORIDE 0.4 MG/ML
0.2 INJECTION, SOLUTION INTRAMUSCULAR; INTRAVENOUS; SUBCUTANEOUS
Status: DISCONTINUED | OUTPATIENT
Start: 2023-12-07 | End: 2023-12-22 | Stop reason: HOSPADM

## 2023-12-07 RX ORDER — CEFEPIME HYDROCHLORIDE 1 G/1
1 INJECTION, POWDER, FOR SOLUTION INTRAMUSCULAR; INTRAVENOUS EVERY 24 HOURS
Status: DISCONTINUED | OUTPATIENT
Start: 2023-12-08 | End: 2023-12-07

## 2023-12-07 RX ORDER — FENTANYL CITRATE 50 UG/ML
INJECTION, SOLUTION INTRAMUSCULAR; INTRAVENOUS PRN
Status: DISCONTINUED | OUTPATIENT
Start: 2023-12-07 | End: 2023-12-07 | Stop reason: HOSPADM

## 2023-12-07 RX ADMIN — SODIUM CHLORIDE: 9 INJECTION, SOLUTION INTRAVENOUS at 18:01

## 2023-12-07 RX ADMIN — SODIUM CHLORIDE 1000 ML: 9 INJECTION, SOLUTION INTRAVENOUS at 16:57

## 2023-12-07 RX ADMIN — DOCUSATE SODIUM 1 ENEMA: 283 LIQUID RECTAL at 00:04

## 2023-12-07 RX ADMIN — Medication 2.5 MG: at 00:45

## 2023-12-07 RX ADMIN — SODIUM CHLORIDE 500 ML: 9 INJECTION, SOLUTION INTRAVENOUS at 21:51

## 2023-12-07 RX ADMIN — SODIUM CHLORIDE: 9 INJECTION, SOLUTION INTRAVENOUS at 05:57

## 2023-12-07 RX ADMIN — CITALOPRAM HYDROBROMIDE 40 MG: 40 TABLET ORAL at 08:10

## 2023-12-07 RX ADMIN — SODIUM CHLORIDE, POTASSIUM CHLORIDE, SODIUM LACTATE AND CALCIUM CHLORIDE: 600; 310; 30; 20 INJECTION, SOLUTION INTRAVENOUS at 23:55

## 2023-12-07 RX ADMIN — VALACYCLOVIR HYDROCHLORIDE 1000 MG: 1 TABLET, FILM COATED ORAL at 08:10

## 2023-12-07 RX ADMIN — METRONIDAZOLE 500 MG: 500 INJECTION, SOLUTION INTRAVENOUS at 17:56

## 2023-12-07 RX ADMIN — CEFEPIME HYDROCHLORIDE 1 G: 1 INJECTION, POWDER, FOR SOLUTION INTRAMUSCULAR; INTRAVENOUS at 17:02

## 2023-12-07 ASSESSMENT — ACTIVITIES OF DAILY LIVING (ADL)
ADLS_ACUITY_SCORE: 20
ADLS_ACUITY_SCORE: 22
DEPENDENT_IADLS:: INDEPENDENT
ADLS_ACUITY_SCORE: 22

## 2023-12-07 NOTE — PROGRESS NOTES
[facility]    Medicine Progress Note - Hospitalist Service    Date of Admission:  12/6/2023    Interval History   Patient reports that she is feeling so much better today.  She has not had a bloody stool in the last 24 hours.  Colonoscopy with ischemic colitis noted on imaging.  Patient will begin MiraLAX daily basis  Seen by heme-onc and does not appear to have anything related to her chemotherapy or cancer.   Noted to have tachycardia on vital signs.      Assessment & Plan   Mary Jo Mcleod is a 78 year old female with a history of multiple myeloma, stage III CKD, rheumatoid arthritis, osteopenia admitted on 12/6/2023 with abdominal pain, BRBPR and imaging showing colitis of transverse and descending colon    Transverse and descending colon wall thickening and inflammatory stranding  Colitis  12/7/2023: Colonoscopy with ischemic colitis  On evening of admission 12/5 patient described lower abdominal cramping and bright red blood per rectum.  Patient had associated nausea.  Had eaten egg salad 1 hour before.  In ED AFVSS. Hgb 12. WBC 13.2.   12/6 CT a/p w/ colitis (raj transverse and descending colon), infectious/ inflammatory favored.   Currently on clear liquid diet.  GI consult  12/7 Stool culture still requested but unable to complete enteric bacteria and C. difficile toxin as no stools   12/7 no bloody stools now for almost 48 hours  Will monitor hemoglobin.  Electrolytes.  Potentially discharge on 12/8 of stay     Tachycardia:   Noted after rounding patient has tachycardia  Check EKG to ensure sinus rhythm.  Placed on telemetry    Multiple myeloma  IgG Kappa multiple myeloma   Follows with Dr Dreyer w/ [facility]. Hx multiple pathologic fractures.   Of note plans for MR pelvis by [facility](to compare w/ CT 5/4/23 from [place] w/ multiple abnormalities).  Patient by last note with [facility] had MRI with TRIA   Consider discussion with [facility]  On prophylactic valtrex    Patient on maintenance bortezomib  12/7 Consult [facility] ? SE of treatment or need to change.   12/7 as per oncology visit.  Not likely myeloma or myeloma treatment associated.  Patient will follow-up in the clinic for bortezomib>> moved out 2-week     Polyneuropathy  Patient had developed a sensory neuropathy over the last 6 months.  Occurs mainly at nighttime    HLD  resume crestor with rec     MDD  Anxiety  PTSD  - Citalopram 40 mg daily with rec        Diet: NPO for Medical/Clinical Reasons Except for: No Exceptions    DVT Prophylaxis: Pneumatic Compression Devices  Astudillo Catheter: Not present  Lines: None     Cardiac Monitoring: None  Code Status:  Full       Clinically Significant Risk Factors          # Hypocalcemia: Lowest Ca = 8.3 mg/dL in last 2 days, will monitor and replace as appropriate                      # Financial/Environmental Concerns: none         Disposition Plan      Expected Discharge Date: 12/07/2023, 10:36 AM    Destination: home              TAIWO GRIMALDO MD  Hospitalist Service  [facility]  Securely message with [employer] (more info)  Text page via [employer] Paging/Directory   ______________________________________________________________________  Physical Exam   Vital Signs: Temp: 100  F (37.8  C) Temp src: Oral BP: 107/51 Pulse: 110   Resp: 17 SpO2: 94 % O2 Device: None (Room air)    Weight: 130 lbs 0 oz    General Appearance: Patient is awake and alert  Respiratory: Lungs are clear to auscultation bilaterally without wheezes or rhonchi  Cardiovascular: Regular rate and rhythm without gallop rub normal S1-S2  GI: Positive bowel sounds soft rebound guarding tender  Skin: No edema      Medical Decision Making       45 MINUTES SPENT BY ME on the date of service doing chart review, history, exam, documentation & further activities per the note.      Data     I have personally reviewed the following data over the past 24 hrs:    N/A  \   10.7 (L)   / N/A     N/A N/A  N/A /  N/A   N/A N/A N/A \       Imaging results reviewed over the past 24 hrs:   No results found for this or any previous visit (from the past 24 hour(s)).

## 2023-12-07 NOTE — CONSULTS
Consultation    Mary Jo Mcleod MRN# [id number]   YOB: 1945 Age: 78 year old   Date of Admission: 12/6/2023  Requesting physician: Dr. Koch  Reason for consult: Myeloma           Assessment and Plan:     Dale is a 78 year old woman admitted with abdominal cramping and rectal bleeding    Abdominal pain  Rectal bleeding  - Started after egg salad sandwich  - GI consulted  - Colonoscopy 12/7 with findings consistent with ischemic colitis  - Hemoglobin is stable  - Not likely related to myeloma or myeloma treatment    Myeloma  - Followed by Dr. Dreyer  - Next follow up in clinic for bortezomib is on 12/8, but will move it out 2 weeks  - I will have the clinic reach out to schedule 12/22 when she is next due for treatment    Adelso MASON, CNP  [facility]  [phone] (office)              Chief Complaint:   Rectal Bleeding         History of Present Illness:   Dale is a 78 year old woman admitted with abdominal cramping and rectal bleeding    In the day prior to admission she ate egg salad at home. She developed acute cramping and diarrhea, then dry heaves and ultimately rectal bleeding.     Colonoscopy this AM showed ischemic colitis.    She is feeling better and hopeful to go home soon.    From a myeloma standpoint, she is on maintenance therapy with single agent bortezomib. She is due for bortezomib 12/8, but will reschedule for 12/22.    ONCOLOGY HISTORY:  -IgG [place] [facility] previously seen by Dr. Mina at [facility] and then Dr. Hanks at [facility]. Dx in Nov 2020 on routine blood work which showed elevated globulin level and M spike of 2.8. At that time her her Cr was 1.2, calcium WNL, and Hgb of 11.7. Serum kappa FLC was elevated at 3.3. PET/CT 1/6/2021 without disease. She underwent BM bx on 2/8/2021 showing 24% plasma cells with gains of chromosomes 5,9, and 15.  - Offered rafael 25 mg every day but declined.   - BMBx 5/18/22 with 60-70% kappa-monotypic plasma cells, now qualifying her  disease as active myeloma.  Monoclonal peak 3.9, kappa FLC 7.03. MRI 5/6/22 with mild to moderate compression deformity of L5 and numerous small lesions.  She cancelled her PET and follow-up appointment with Dr. Hanks.    Bone Marrow Biopsy 5/18/22  -  There is no evidence of amyloid deposition on Congo red stain, which was performed on the marrow core biopsy with appropriate controls.    Final Diagnosis  Recurrent/persistent plasma cell myeloma with the following features:       Slightly hypercellular marrow for age (cellularity estimated at 30-40%) with trilineage hematopoietic maturation and 60-70% kappa-monotypic plasma cells       Peripheral blood showing slight normochromic, macrocytic anemia; increased rouleaux formation       See comment   The findings are consistent with recurrence/persistence of the patient's previously diagnosed plasma cell neoplasm, and are diagnostic of plasma cell myeloma according to the 2017 WHO. A Congo red stain is in process to evaluate for amyloid deposition; the results will be reported in an addendum. Concurrent flow cytometry ([id number]) showed kappa monotypic plasma cells and polytypic B cells. Please correlate these findings with the results of other ancillary studies and the clinical presentation    FISH 5/18/22:    RESULTS:    ABNORMAL: HIGH-RISK  - No high risk abnormality detected     ABNORMAL: OTHER  - Gain of chromosomes 5,9 and 15 (95%)  - MYC rearrangement (93.5%)    PET-CT 10/11/22 Impression.  There is diffuse bony demineralization with scattered tiny lytic lesions throughout the skeleton.  There is a new fracture of the left symphysis pubis extending into the left inferior pubic ramus.  There may be an associated lytic defect in this area as well.  There are also fractures of the bilateral sacral yariel regions, right greater than left, and approximately 50% compression fracture of L5 and a new healing fracture of the right fifth anterior rib.    We discussed the  pros and cons to autologous BMT. Patient has declined multiple times after extensive discussion.     Treatment history:   Revlimid single agent 8/2022 1 cycle given   9/29/22 Humera-RD started with Revlimid dosed 10mg due to renal function   10/27/22 Cycle 2 Humera-RD w Zometa   11/25/22 Cycle 3 Humera-RD Zometa held 2/2 DOROTHEA   12/22/22 Cycle 4 Humera-RD with Denosumab (revlimid at 10mg currently)   1/5/23 Cycle 5 Humera-RD (not tolerating Revlimid due to cramping and pruritus)   1/19/22 C1 Humera-Velcade-Dex     5/4/23 CT pelvis IMPRESSION: 1. New vertical insufficiency fracture of the sacral alar with surrounding sclerosis and partial healing at its inferior aspect. 2. New sclerosis and partial lucency, inferior anterior left sacral ala compatible with healed/healing insufficiency fracture. 3. Progressive displaced comminution/fragmentation and deformity of the left pubic symphysis and superior ramus fracture, nonunited. 4. Moderate inferior L5 endplate fracture is similar to previous, views not directly comparable. Fracture appears to be healed/healing. 5. Lytic lesion left iliac bone series 3 image 36, 1.7 x 1.4 cm, previously 2.1 x 1.7 cm, with increased fatty marrow space about the lesion. Left ischial lesion image 61, 1.4 x 1.4 cm previously 1.7 x 1.7 cm. 6. Other lytic lesions previously of soft tissue density now demonstrate fatty replacement, for example right iliac bone series 2 image 45, compatible with involution. 7. Rim- and centrally partially calcified lesion centered over the left levator anus muscle extending into the ischial rectal fossa, 4.2 x 3.1 x 3.3 cm, new and of uncertain etiology. 8. Degenerative changes in the hips. Moderate right hip effusion. Calcified myomatous uterus.    8/14/23 PET: Subtle uptake within a lucent lesion in the left iliac bone, suspicious for an early site of myelomatous involvement.    8/31/23, began maintenance bortezomib         Physical Exam:   Vitals were reviewed  Blood  "pressure 107/51, pulse 110, temperature 100  F (37.8  C), temperature source Oral, resp. rate 17, height 1.6 m (5' 3\"), weight 59 kg (130 lb), last menstrual period 1989, SpO2 94%.  Temperatures:  Current - Temp: 100  F (37.8  C); Max - Temp  Av  F (37.2  C)  Min: 98.2  F (36.8  C)  Max: 100  F (37.8  C)  Respiration range: Resp  Av.9  Min: 8  Max: 24  Pulse range: Pulse  Av.3  Min: 66  Max: 120  Blood pressure range: Systolic (24hrs), Av , Min:107 , Max:151   ; Diastolic (24hrs), Av, Min:51, Max:80    Pulse oximetry range: SpO2  Av.2 %  Min: 91 %  Max: 100 %    Intake/Output Summary (Last 24 hours) at 2023 1131  Last data filed at 2023 0018  Gross per 24 hour   Intake --   Output 450 ml   Net -450 ml       GENERAL: No acute distress.  MENTAL: Alert and oriented to person, place, and time.  NEURO: Cranial nerves II through XII grossly intact with no focal motor or sensory deficits.              Past Medical History:   I have reviewed this patient's past medical history  Past Medical History:   Diagnosis Date    Depressive disorder              Past Surgical History:   I have reviewed this patient's past surgical history  Past Surgical History:   Procedure Laterality Date    BONE MARROW BIOPSY, BONE SPECIMEN, NEEDLE/TROCAR N/A 2021    Procedure: BONE MARROW BIOPSY;  Surgeon: Naomie Saeed MD;  Location: [facility]    finger reattachment      TONSILLECTOMY                 Social History:   I have reviewed this patient's social history  Social History     Tobacco Use    Smoking status: Never    Smokeless tobacco: Never   Substance Use Topics    Alcohol use: Not Currently     Alcohol/week: 0.0 - 2.0 standard drinks of alcohol             Family History:   I have reviewed this patient's family history  Family History   Problem Relation Age of Onset    Myocardial Infarction Father 63    Bipolar Disorder Sister     Chronic Obstructive Pulmonary Disease Brother     " Lung Cancer Brother     Suicide Brother     Influenza/Pneumonia Sister     Alcoholism Sister              Allergies:     Allergies   Allergen Reactions    Acetaminophen Shortness Of Breath    Codeine Difficulty breathing, Other (See Comments) and Rash    Bupropion     Erythromycin     Hydrocodone Other (See Comments) and Itching     Ear ache    Lidocaine Other (See Comments)    Penicillin G Itching     Itching around mouth then heavy breathing    Revlimid [Lenalidomide]     Sulfa Antibiotics Itching     Itching around the mouth and then heavy breathing    Tetracycline     Trazodone      Does not work for patient    Conjugated Estrogens Dermatitis             Medications:   I have reviewed this patient's current medications  Medications Prior to Admission   Medication Sig Dispense Refill Last Dose    aspirin (ASA) 325 MG EC tablet Take 1 tablet (325 mg) by mouth daily 30 tablet 11 12/5/2023    calcium carbonate (TUMS) 500 MG chewable tablet Take 2 chew tab by mouth daily   12/5/2023    citalopram (CELEXA) 40 MG tablet Take 1 tablet by mouth once daily 90 tablet 0 12/5/2023    denosumab (PROLIA) 60 MG/ML SOSY injection Inject 1 mL (60 mg) Subcutaneous   Unknown    hydrOXYzine (ATARAX) 25 MG tablet TAKE UP TO 3 TABLETS PRN BEDTIME FOR INSOMNIA 90 tablet 0 Unknown at prn    ibuprofen (ADVIL/MOTRIN) 200 MG tablet Take 1 tablet (200 mg) by mouth 2 times daily as needed for mild pain   Unknown at prn    oxyCODONE-acetaminophen (PERCOCET) 5-325 MG tablet Take 1 tablet by mouth daily as needed for pain   Past Month    rosuvastatin (CRESTOR) 10 MG tablet Take 1 tablet (10 mg) by mouth daily 90 tablet 3 12/5/2023    triamcinolone (KENALOG) 0.1 % external cream Apply topically 2 times daily (Patient taking differently: Apply topically 2 times daily as needed for irritation) 80 g 0 Unknown    valACYclovir (VALTREX) 500 MG tablet Take 1,000 mg by mouth daily   12/5/2023    vitamin B-12 (CYANOCOBALAMIN) 250 MCG tablet Take 1,000  mcg by mouth daily   12/5/2023    vitamin D3 (CHOLECALCIFEROL) 50 mcg (2000 units) tablet Take 1 tablet by mouth daily   12/5/2023    sodium chloride 1 GM tablet Take 1 tablet (1 g) by mouth daily (Patient not taking: Reported on 12/6/2023) 90 tablet 0 Not Taking     Current Facility-Administered Medications Ordered in Epic   Medication Dose Route Frequency Last Rate Last Admin    calcium carbonate (TUMS) chewable tablet 1,000 mg  1,000 mg Oral 4x Daily PRN        citalopram (celeXA) tablet 40 mg  40 mg Oral Daily   40 mg at 12/07/23 0810    dicyclomine (BENTYL) capsule 20 mg  20 mg Oral 4x Daily PRN   20 mg at 12/06/23 0956    lidocaine (LMX4) cream   Topical Q1H PRN        lidocaine 1 % 0.1-1 mL  0.1-1 mL Other Q1H PRN        naloxone (NARCAN) injection 0.2 mg  0.2 mg Intravenous Q2 Min PRN        Or    naloxone (NARCAN) injection 0.4 mg  0.4 mg Intravenous Q2 Min PRN        Or    naloxone (NARCAN) injection 0.2 mg  0.2 mg Intramuscular Q2 Min PRN        Or    naloxone (NARCAN) injection 0.4 mg  0.4 mg Intramuscular Q2 Min PRN        oxyCODONE (ROXICODONE) tablet 5 mg  5 mg Oral Q4H PRN        oxyCODONE IR (ROXICODONE) half-tab 2.5 mg  2.5 mg Oral Q4H PRN   2.5 mg at 12/07/23 0045    prochlorperazine (COMPAZINE) injection 5 mg  5 mg Intravenous Q6H PRN        Or    prochlorperazine (COMPAZINE) tablet 5 mg  5 mg Oral Q6H PRN        Or    prochlorperazine (COMPAZINE) suppository 12.5 mg  12.5 mg Rectal Q12H PRN        senna-docusate (SENOKOT-S/PERICOLACE) 8.6-50 MG per tablet 1 tablet  1 tablet Oral BID PRN        Or    senna-docusate (SENOKOT-S/PERICOLACE) 8.6-50 MG per tablet 2 tablet  2 tablet Oral BID PRN        sodium chloride (PF) 0.9% PF flush 3 mL  3 mL Intracatheter Q8H        sodium chloride (PF) 0.9% PF flush 3 mL  3 mL Intracatheter q1 min prn        sodium chloride 0.9 % infusion   Intravenous Continuous 100 mL/hr at 12/07/23 0811 Restarted at 12/07/23 0811    valACYclovir (VALTREX) tablet 1,000 mg   1,000 mg Oral Daily   1,000 mg at 12/07/23 0810     No current Epic-ordered outpatient medications on file.             Review of Systems:     The 10 point Review of Systems is negative other than noted in the HPI.            Data:   Data   Results for orders placed or performed during the hospital encounter of 12/06/23 (from the past 24 hour(s))   Hemoglobin   Result Value Ref Range    Hemoglobin 12.0 11.7 - 15.7 g/dL   Prepare red blood cells (unit)   Result Value Ref Range    Blood Component Type Red Blood Cells     Product Code T3754W17     Unit Status Ready for issue     Unit Number [id number]     CROSSMATCH INCOMPATIBLE     CODING SYSTEM KHEC352    Prepare red blood cells (unit)   Result Value Ref Range    Blood Component Type Red Blood Cells     Product Code G4585D94     Unit Status Ready for issue     Unit Number [id number]     CROSSMATCH INCOMPATIBLE     CODING SYSTEM TNBK015    COLONOSCOPY   Result Value Ref Range    COLONOSCOPY       [address] [address] [place]  [address]  _______________________________________________________________________________  Patient Name: Mary Jo Mcleod        Procedure Date: 12/7/2023 9:39 AM  MRN: [id number]                       Account Number: [id number]  YOB: 1945              Admit Type: Inpatient  Age: 78                               Room: [place] [address]  Note Status: Finalized                Attending MD: YAMIL WEAVER MD,   Instrument Name: 521 [id number] Peds Colon   _______________________________________________________________________________     Procedure:                Colonoscopy  Indications:              Rectal bleeding, Abnormal CT of the GI tract  Providers:                YAMIL WEAVER MD, Nancy Gonzalez RN  Referring MD:               Medicines:                Fentanyl 100 micrograms IV, Midazolam 4 mg IV  Complications:            No immediate  complications.  ________________________________________________________ _______________________  Procedure:                Pre-Anesthesia Assessment:                            - Prior to the procedure, a History and Physical                             was performed, and patient medications and                             allergies were reviewed. The risks and benefits of                             the procedure and the sedation options and risks                             were discussed with the patient. All questions were                             answered and informed consent was obtained. Patient                             identification and proposed procedure were verified                             by the physician. Airway Examination: normal                             oropharyngeal airway and neck mobility. Respiratory                             Examination: clear to auscultation. Prophylactic                             Antibiotics: The patient does not require                             prophylactic antibiotics. Prior Anticoagulants: The                              patient has taken no anticoagulant or antiplatelet                             agents. After reviewing the risks and benefits, the                             patient was deemed in satisfactory condition to                             undergo the procedure. The anesthesia plan was to                             use minimal sedation / analgesia (anxiolysis).                             Immediately prior to administration of medications,                             the patient was re-assessed for adequacy to receive                             sedatives. The heart rate, respiratory rate, oxygen                             saturations, blood pressure, adequacy of pulmonary                             ventilation, and response to care were monitored                             throughout the procedure. The physical status of                              the patient was re-assessed after the procedure.                            After obtaining informed consent,  the colonoscope                             was passed under direct vision. Throughout the                             procedure, the patient's blood pressure, pulse, and                             oxygen saturations were monitored continuously. The                             peds colonoscope 521 was introduced through the                             anus and advanced to the terminal ileum. The                             ileocecal valve, appendiceal orifice, and rectum                             were photographed.                                                                                   Findings:       The perianal and digital rectal examinations were normal.       The terminal ileum appeared normal.       A scattered area of moderately congested, erythematous, eroded        (linear-pattern), friable (with contact bleeding), inflamed and        ulcerated mucosa was found in the descending colon, at the splenic        flexure and in the transverse colon. Biopsies were taken  with a cold        forceps for histology.       Multiple small and large-mouthed diverticula were found in the sigmoid        colon.       Extensive amounts of semi-liquid semi-solid solid stool was found in the        entire colon, interfering with visualization. Lavage of the area was        performed using copious amounts of sterile water, resulting in clearance        with good visualization.                                                                                   Impression:               - The examined portion of the ileum was normal.                            - Congested, erythematous, eroded (linear-pattern),                             friable (with contact bleeding), inflamed and                             ulcerated mucosa in the descending colon, at the                             splenic  flexure and in the transverse colon.                             Biopsied.                            - Diverticulosis in the sigmoid colon.                            - Stool in  the entire examined colon.  Recommendation:           - I will contact patient with Biopsy result in 1-2                             weeks. Please contact our Office at  at                             [facility] if ther is any questions,                            - Return patient to [facility] for ongoing care.                            - Mechanical soft diet.                            - Repeat colonoscopy in 6 months to check healing.                                                                                   Procedure Code(s):       --- Professional ---       02242, Colonoscopy, flexible; with biopsy, single or multiple  Diagnosis Code(s):       --- Professional ---       K63.89, Other specified diseases of intestine       K92.2, Gastrointestinal hemorrhage, unspecified       K52.9, Noninfective gastroenteritis and colitis, unspecified       K63.3, Ulcer of intestine       K62.5, Hemorrhage of anus and rectum       K57.30, Diverticulosis of large  intestine without perforation or abscess        without bleeding       R93.3, Abnormal findings on diagnostic imaging of other parts of        digestive tract    CPT copyright 2022 [employer] Association. All rights reserved.    The codes documented in this report are preliminary and upon  review may   be revised to meet current compliance requirements.    Electronically Signed by Pankaj Arcos  ________________________  PANKAJ WEAVER MD  12/7/2023 10:19:16 AM  I was physically present for the entire viewing portion of the exam.  PANKAJ WEAVER MD  Number of Addenda: 0    Note Initiated On: 12/7/2023 9:39 AM  Scope Withdrawal Time: 0 hours 3 minutes 31 seconds   Total Procedure Duration: 0 hours 8 minutes 16 seconds   Scope In: 9:51:28  AM  Scope Out: 9:59:44 AM

## 2023-12-07 NOTE — PROGRESS NOTES
[facility]  Gastroenterology Progress Note     Mary Jo Mcleod MRN# [id number]   YOB: 1945 Age: 78 year old          Assessment and Plan:     Mary Jo Mcleod is a 78 year old female admitted on 12/6/2023 with abdominal pain and BRBPR     Generalized abdominal pain  Colitis  Rectal bleeding  CT A/P w/notes colitis- with wall thickening and stranding  Hemoglobin 12, WBC 13.2- no repeat  Last colonoscopy 2010 with diverticulosis and other unremarkable  Enteric pathogens and C difficile ordered- not collected  Suspect chemotherapy induced colitis, infectious colitis vs ischemic colitis     - await stool pathogens  - daily CBC  - dicyclomine 20 mg QID prn for cramping  - NPO  - could today                  Interval History:     no new complaints, doing well, and doing well; no cp, sob. C/o nausea. Drank half of prep. Stools all bloody no longer brown.              Review of Systems:     C: NEGATIVE for fever, chills, change in weight  E/M: NEGATIVE for ear, mouth and throat problems  R: NEGATIVE for significant cough or SOB  CV: NEGATIVE for chest pain, palpitations or peripheral edema             Medications:   I have reviewed this patient's current medications   citalopram  40 mg Oral Daily    sodium chloride (PF)  3 mL Intracatheter Q8H    valACYclovir  1,000 mg Oral Daily                  Physical Exam:   Vitals were reviewed  Vital Signs with Ranges  Temp:  [98.2  F (36.8  C)-98.7  F (37.1  C)] 98.7  F (37.1  C)  Pulse:  [] 108  Resp:  [18] 18  BP: (118-147)/(69-80) 119/69  SpO2:  [96 %-98 %] 96 %  I/O last 3 completed shifts:  In: -   Out: 450 [Urine:450]  Constitutional: healthy, alert, and no distress   Cardiovascular: negative, PMI normal. No lifts, heaves, or thrills. RRR. No murmurs, clicks gallops or rub  Respiratory: negative, Percussion normal. Good diaphragmatic excursion. Lungs clear  Abdomen: Abdomen soft, tender. BS normal. No masses, organomegaly            Data:   I reviewed the patient's new clinical lab test results.   Recent Labs   Lab Test 12/06/23  1314 12/06/23  0256 12/06/22  1141 07/29/22  0736   WBC  --  13.2* 3.1* 2.4*   HGB 12.0 12.0 8.3* 8.5*   MCV  --  101* 87.3 99   PLT  --  210 178 200     Recent Labs   Lab Test 12/06/23  0256 10/24/23  1334 07/29/22  0736   POTASSIUM 3.6 3.9 4.1   CHLORIDE 97* 103 108   CO2 27 29 24   BUN 18.5 19.4 13   ANIONGAP 11 10 3     Recent Labs   Lab Test 12/06/23  0256 07/08/22  1452 06/25/22  1851   ALBUMIN 3.9 2.8* 2.9*   BILITOTAL 0.3 0.4 0.7   ALT 12 14 19   AST 22 21 22   LIPASE 37  --  362       I reviewed the patient's new imaging results.    All laboratory data reviewed  All imaging studies reviewed by me.    My Davis PA-C,  12/7/2023  [facility]  Office : [phone]  Cell: [phone] (Dr. Castillo)  Cell: [phone] (My Davis PA-C)

## 2023-12-07 NOTE — CONSULTS
Care Management Initial Consult    General Information  Assessment completed with: Mary Jo Fields  Type of CM/SW Visit: Initial Assessment    Primary Care Provider verified and updated as needed: Yes   Readmission within the last 30 days:        Reason for Consult: discharge planning, other (see comments) (Elevated risk score)  Advance Care Planning: Advance Care Planning Reviewed: present on chart          Communication Assessment  Patient's communication style: spoken language (English or Bilingual)    Hearing Difficulty or Deaf: no   Wear Glasses or Blind: no    Cognitive  Cognitive/Neuro/Behavioral: WDL                      Living Environment:   People in home: alone     Current living Arrangements: condominium      Able to return to prior arrangements: no       Family/Social Support:  Care provided by: self  Provides care for: no one  Marital Status:   Other (specify) (Friend-Sudha)          Description of Support System: Supportive, Involved    Support Assessment: Adequate social supports    Current Resources:   Patient receiving home care services: No     Community Resources: None  Equipment currently used at home: cane, straight  Supplies currently used at home: None    Employment/Financial:  Employment Status: retired        Financial Concerns: none   Referral to Financial Worker: No       Does the patient's insurance plan have a 3 day qualifying hospital stay waiver?  No    Lifestyle & Psychosocial Needs:  Social Determinants of Health     Food Insecurity: Not on file   Depression: Not at risk (10/24/2023)    PHQ-2     PHQ-2 Score: 1   Housing Stability: Not on file   Tobacco Use: Low Risk  (12/7/2023)    Patient History     Smoking Tobacco Use: Never     Smokeless Tobacco Use: Never     Passive Exposure: Not on file   Financial Resource Strain: Not on file   Alcohol Use: Not on file   Transportation Needs: Not on file   Physical Activity: Not on file   Interpersonal Safety: Not on file   Stress:  Not on file   Social Connections: Not on file       Functional Status:  Prior to admission patient needed assistance:   Dependent ADLs:: Independent  Dependent IADLs:: Independent       Mental Health Status:          Chemical Dependency Status:                Values/Beliefs:  Spiritual, Cultural Beliefs, Roman [employer] Practices, Values that affect care: no               Additional Information:  Per consult for elevated risk of readmission and discharge planning, writer met with Pt to discuss discharge plan. Per Pt prior to admit, Pt was independent with all ADL's and was still driving. Pt lives in and Apartment, alone. Pt wasn't receiving any [facility] or [facility] services. Pt uses a cane every once in a while d/t her recent fracture to her hip. Pt denies any financial concerns. Pt did express that she feels that she wouldn't be safe to return home by herself and would like to go to TCU for therapy prior to returning home. Writer also informed Pt that she would have to have a need for the recommendation for TCU. Writer will talk to bedside RN and see if we can order PT/OT evals. Pt stated that she would just take a cab home.     Pt also stated that she has been to [facility] and [facility]    ADDM: 1552- (Tanya [phone]) [facility] came out to see Pt to see if she would want to enroll in their [facility] program. [facility] RN would be available to her and they would also be able to set her up with more resourses, I.e groceries delivery, cleaning ect. Also questionsing needing OT SLUMS eval. Writer talked to bedside RN about ordering eval and mobility eval.      Inpatient Care Coordinator will continue to follow for discharge needs.          Kim Nunez, RN, BSN, Care Coordinator

## 2023-12-07 NOTE — PROVIDER NOTIFICATION
MD Notification    Notified Person: MD Koch    Notified Person Name:    Notification Date/Time:    Notification Interaction:    Purpose of Notification:why is NPO order in on pt?  Do you still want a stool sample?     Orders Received:    Comments:

## 2023-12-07 NOTE — PROVIDER NOTIFICATION
MD Notification    Notified Person: MD    Notified Person Name: Chel    Notification Date/Time: 12/06/23 10:25 PM    Notification Interaction: Galloelier     Purpose of Notification: Pt came in w/ rectal bleeding, suspected chemo induced colitis. GI consulted and planned for colonoscopy tomorrow. Bowel prep started tonight w/ dulcolax and miralax. Pt was only able to consume about half of the miralax. Pt became nausea and vomited and says she can't finish the prep. Please advise, thanks.     Orders Received:    Comments:

## 2023-12-07 NOTE — PROVIDER NOTIFICATION
12/7/2023   Care update.   Patient mildly tachycardic this afternoon.   EKG with Sinus and NSST changes.     Review of results noted that temp 100.9   Workup started for sepsis. Spoke with GI and colon very dark in appearance with ischemia.     She has multiple myeloma. Spoke with oncology as well. Immune compromised with multiple myeloma.     Has multiple allergies.    Patient is comfortable. Reports significant decrease in abdominal discomfort      Sepsis workup and treatment started.   BSA with cefepime and flagyl (14 allergies)   BMP stable, lactate normal   CXR negative.   VBG 7.38/47/29   WBC increased to 16,000    Given 1 liter of NS bolus. Increase in fluids to 150/hour.   CT ordered of abdomen: STAT ordered. History of ischemic colitis and Dr. Castillo noted the colon lining was dark,dusky lining.   SWAB for covid, RSV, influenza     Will follow up after results are back.   Await CT results.     Monitor closely for response to fluids and sepsis. At risk of infection with MM and has impressive ischemic colitis. Re evaluate with CT.   She actually feels better.   Stool cultures if able   August

## 2023-12-07 NOTE — PROGRESS NOTES
07 1530  Orientation: A&Ox4  Activity: Ax1 + walker  Diet/BS Checks: Clears. NPO since midnight  Tele: N/A  IV Access/Drains: L PIV infusing NS @ 125 /hr.  Pain Management: minimal pain since colonoscopy got versed fentenal then  Abnormal VS/Results: hgb 10.7 every 12 hrs  Bowel/Bladder: Continent. Rectal bleeding.   Skin/Wounds: Blanchable redness to sacrum  Consults: GI  D/C Disposition: Plan pending  Other Info: colonoscopy done on tele st EKG done.

## 2023-12-07 NOTE — PLAN OF CARE
19:00-07:30  Orientation: A&Ox4  Activity: Ax1 + walker  Diet/BS Checks: Clears. NPO since midnight  Tele: N/A  IV Access/Drains: L PIV infusing NS @ 100mL/hr.  Pain Management: PO oxycodone  Abnormal VS/Results: ANC: 11.7  Bowel/Bladder: Continent. Rectal bleeding. Bowel prep started, Pt unable to finish due to nausea/vomiting. One-time enema given. Little results, unable to get stool sample.  Skin/Wounds: Blanchable redness to sacrum  Consults: GI  D/C Disposition: Plan pending  Other Info: Possible colonoscopy today or tomorrow.

## 2023-12-08 LAB
ANION GAP SERPL CALCULATED.3IONS-SCNC: 10 MMOL/L (ref 7–15)
ANION GAP SERPL CALCULATED.3IONS-SCNC: 12 MMOL/L (ref 7–15)
BACTERIA UR CULT: NORMAL
BUN SERPL-MCNC: 6.4 MG/DL (ref 8–23)
BUN SERPL-MCNC: 7.1 MG/DL (ref 8–23)
CALCIUM SERPL-MCNC: 6.8 MG/DL (ref 8.8–10.2)
CALCIUM SERPL-MCNC: 6.9 MG/DL (ref 8.8–10.2)
CHLORIDE SERPL-SCNC: 105 MMOL/L (ref 98–107)
CHLORIDE SERPL-SCNC: 106 MMOL/L (ref 98–107)
CREAT SERPL-MCNC: 0.68 MG/DL (ref 0.51–0.95)
CREAT SERPL-MCNC: 0.7 MG/DL (ref 0.51–0.95)
DEPRECATED HCO3 PLAS-SCNC: 20 MMOL/L (ref 22–29)
DEPRECATED HCO3 PLAS-SCNC: 22 MMOL/L (ref 22–29)
EGFRCR SERPLBLD CKD-EPI 2021: 89 ML/MIN/1.73M2
EGFRCR SERPLBLD CKD-EPI 2021: 89 ML/MIN/1.73M2
ENTEROCOCCUS FAECALIS: NOT DETECTED
ENTEROCOCCUS FAECIUM: NOT DETECTED
ERYTHROCYTE [DISTWIDTH] IN BLOOD BY AUTOMATED COUNT: 13.2 % (ref 10–15)
GLUCOSE SERPL-MCNC: 77 MG/DL (ref 70–99)
GLUCOSE SERPL-MCNC: 79 MG/DL (ref 70–99)
HCT VFR BLD AUTO: 29 % (ref 35–47)
HGB BLD-MCNC: 9.1 G/DL (ref 11.7–15.7)
HGB BLD-MCNC: 9.2 G/DL (ref 11.7–15.7)
HGB BLD-MCNC: 9.4 G/DL (ref 11.7–15.7)
LACTATE SERPL-SCNC: 0.6 MMOL/L (ref 0.7–2)
LACTATE SERPL-SCNC: 0.6 MMOL/L (ref 0.7–2)
LISTERIA SPECIES (DETECTED/NOT DETECTED): NOT DETECTED
MCH RBC QN AUTO: 33.9 PG (ref 26.5–33)
MCHC RBC AUTO-ENTMCNC: 32.4 G/DL (ref 31.5–36.5)
MCV RBC AUTO: 105 FL (ref 78–100)
PATH REPORT.COMMENTS IMP SPEC: NORMAL
PATH REPORT.FINAL DX SPEC: NORMAL
PATH REPORT.GROSS SPEC: NORMAL
PATH REPORT.MICROSCOPIC SPEC OTHER STN: NORMAL
PATH REPORT.RELEVANT HX SPEC: NORMAL
PHOTO IMAGE: NORMAL
PLATELET # BLD AUTO: 178 10E3/UL (ref 150–450)
POTASSIUM SERPL-SCNC: 3.5 MMOL/L (ref 3.4–5.3)
POTASSIUM SERPL-SCNC: 3.6 MMOL/L (ref 3.4–5.3)
RBC # BLD AUTO: 2.77 10E6/UL (ref 3.8–5.2)
SCANNED LAB RESULT: NORMAL
SODIUM SERPL-SCNC: 137 MMOL/L (ref 135–145)
SODIUM SERPL-SCNC: 138 MMOL/L (ref 135–145)
STAPHYLOCOCCUS AUREUS: DETECTED
STAPHYLOCOCCUS EPIDERMIDIS: NOT DETECTED
STAPHYLOCOCCUS LUGDUNENSIS: NOT DETECTED
STREPTOCOCCUS AGALACTIAE: NOT DETECTED
STREPTOCOCCUS ANGINOSUS GROUP: NOT DETECTED
STREPTOCOCCUS PNEUMONIAE: NOT DETECTED
STREPTOCOCCUS PYOGENES: NOT DETECTED
STREPTOCOCCUS SPECIES: NOT DETECTED
WBC # BLD AUTO: 17.2 10E3/UL (ref 4–11)

## 2023-12-08 PROCEDURE — 85018 HEMOGLOBIN: CPT | Performed by: INTERNAL MEDICINE

## 2023-12-08 PROCEDURE — 999N000197 HC STATISTIC WOC PT EDUCATION, 0-15 MIN

## 2023-12-08 PROCEDURE — 36415 COLL VENOUS BLD VENIPUNCTURE: CPT | Performed by: INTERNAL MEDICINE

## 2023-12-08 PROCEDURE — 87324 CLOSTRIDIUM AG IA: CPT | Performed by: INTERNAL MEDICINE

## 2023-12-08 PROCEDURE — 120N000001 HC R&B MED SURG/OB

## 2023-12-08 PROCEDURE — 250N000013 HC RX MED GY IP 250 OP 250 PS 637: Performed by: INTERNAL MEDICINE

## 2023-12-08 PROCEDURE — 87507 IADNA-DNA/RNA PROBE TQ 12-25: CPT | Performed by: INTERNAL MEDICINE

## 2023-12-08 PROCEDURE — 99222 1ST HOSP IP/OBS MODERATE 55: CPT | Performed by: INTERNAL MEDICINE

## 2023-12-08 PROCEDURE — 85018 HEMOGLOBIN: CPT | Performed by: PHYSICIAN ASSISTANT

## 2023-12-08 PROCEDURE — 258N000003 HC RX IP 258 OP 636: Performed by: INTERNAL MEDICINE

## 2023-12-08 PROCEDURE — 99233 SBSQ HOSP IP/OBS HIGH 50: CPT | Performed by: INTERNAL MEDICINE

## 2023-12-08 PROCEDURE — 80048 BASIC METABOLIC PNL TOTAL CA: CPT | Performed by: INTERNAL MEDICINE

## 2023-12-08 PROCEDURE — 83605 ASSAY OF LACTIC ACID: CPT | Performed by: INTERNAL MEDICINE

## 2023-12-08 PROCEDURE — 87493 C DIFF AMPLIFIED PROBE: CPT | Performed by: INTERNAL MEDICINE

## 2023-12-08 PROCEDURE — 250N000011 HC RX IP 250 OP 636: Mod: JZ | Performed by: INTERNAL MEDICINE

## 2023-12-08 PROCEDURE — 87040 BLOOD CULTURE FOR BACTERIA: CPT | Performed by: INTERNAL MEDICINE

## 2023-12-08 PROCEDURE — 250N000013 HC RX MED GY IP 250 OP 250 PS 637: Performed by: PHYSICIAN ASSISTANT

## 2023-12-08 PROCEDURE — 250N000011 HC RX IP 250 OP 636: Performed by: INTERNAL MEDICINE

## 2023-12-08 RX ORDER — POLYETHYLENE GLYCOL 3350 17 G/17G
238 POWDER, FOR SOLUTION ORAL ONCE
Status: DISCONTINUED | OUTPATIENT
Start: 2023-12-08 | End: 2023-12-08

## 2023-12-08 RX ORDER — HYDROMORPHONE HCL IN WATER/PF 6 MG/30 ML
0.4 PATIENT CONTROLLED ANALGESIA SYRINGE INTRAVENOUS
Status: DISCONTINUED | OUTPATIENT
Start: 2023-12-08 | End: 2023-12-22 | Stop reason: HOSPADM

## 2023-12-08 RX ORDER — CEFAZOLIN SODIUM 2 G/100ML
2 INJECTION, SOLUTION INTRAVENOUS EVERY 8 HOURS
Status: DISCONTINUED | OUTPATIENT
Start: 2023-12-08 | End: 2023-12-22 | Stop reason: HOSPADM

## 2023-12-08 RX ORDER — POTASSIUM CHLORIDE 1500 MG/1
20 TABLET, EXTENDED RELEASE ORAL ONCE
Status: COMPLETED | OUTPATIENT
Start: 2023-12-08 | End: 2023-12-08

## 2023-12-08 RX ORDER — POLYETHYLENE GLYCOL 3350 17 G/17G
17 POWDER, FOR SOLUTION ORAL DAILY
Status: DISCONTINUED | OUTPATIENT
Start: 2023-12-08 | End: 2023-12-08

## 2023-12-08 RX ORDER — HYDROMORPHONE HCL IN WATER/PF 6 MG/30 ML
0.2 PATIENT CONTROLLED ANALGESIA SYRINGE INTRAVENOUS
Status: DISCONTINUED | OUTPATIENT
Start: 2023-12-08 | End: 2023-12-22 | Stop reason: HOSPADM

## 2023-12-08 RX ORDER — POTASSIUM CHLORIDE 1500 MG/1
40 TABLET, EXTENDED RELEASE ORAL ONCE
Status: COMPLETED | OUTPATIENT
Start: 2023-12-08 | End: 2023-12-08

## 2023-12-08 RX ORDER — CEFAZOLIN SODIUM 2 G/100ML
2 INJECTION, SOLUTION INTRAVENOUS EVERY 8 HOURS
Status: DISCONTINUED | OUTPATIENT
Start: 2023-12-08 | End: 2023-12-08 | Stop reason: ALTCHOICE

## 2023-12-08 RX ORDER — CEFEPIME HYDROCHLORIDE 1 G/1
1 INJECTION, POWDER, FOR SOLUTION INTRAMUSCULAR; INTRAVENOUS EVERY 12 HOURS
Status: DISCONTINUED | OUTPATIENT
Start: 2023-12-08 | End: 2023-12-08

## 2023-12-08 RX ORDER — POTASSIUM CHLORIDE 7.45 MG/ML
10 INJECTION INTRAVENOUS
Status: DISCONTINUED | OUTPATIENT
Start: 2023-12-08 | End: 2023-12-08

## 2023-12-08 RX ADMIN — Medication 2.5 MG: at 00:23

## 2023-12-08 RX ADMIN — POTASSIUM CHLORIDE 20 MEQ: 1500 TABLET, EXTENDED RELEASE ORAL at 04:24

## 2023-12-08 RX ADMIN — POLYETHYLENE GLYCOL 3350 17 G: 17 POWDER, FOR SOLUTION ORAL at 11:01

## 2023-12-08 RX ADMIN — METRONIDAZOLE 500 MG: 500 INJECTION, SOLUTION INTRAVENOUS at 07:32

## 2023-12-08 RX ADMIN — SODIUM CHLORIDE, POTASSIUM CHLORIDE, SODIUM LACTATE AND CALCIUM CHLORIDE: 600; 310; 30; 20 INJECTION, SOLUTION INTRAVENOUS at 18:53

## 2023-12-08 RX ADMIN — POTASSIUM CHLORIDE 40 MEQ: 1500 TABLET, EXTENDED RELEASE ORAL at 01:19

## 2023-12-08 RX ADMIN — DICYCLOMINE HYDROCHLORIDE 20 MG: 10 CAPSULE ORAL at 16:58

## 2023-12-08 RX ADMIN — CEFAZOLIN SODIUM 2 G: 2 INJECTION, SOLUTION INTRAVENOUS at 15:16

## 2023-12-08 RX ADMIN — VANCOMYCIN HYDROCHLORIDE 1250 MG: 10 INJECTION, POWDER, LYOPHILIZED, FOR SOLUTION INTRAVENOUS at 07:51

## 2023-12-08 RX ADMIN — METRONIDAZOLE 500 MG: 500 INJECTION, SOLUTION INTRAVENOUS at 18:53

## 2023-12-08 RX ADMIN — CEFEPIME HYDROCHLORIDE 1 G: 1 INJECTION, POWDER, FOR SOLUTION INTRAMUSCULAR; INTRAVENOUS at 06:24

## 2023-12-08 RX ADMIN — SODIUM CHLORIDE, POTASSIUM CHLORIDE, SODIUM LACTATE AND CALCIUM CHLORIDE: 600; 310; 30; 20 INJECTION, SOLUTION INTRAVENOUS at 07:33

## 2023-12-08 RX ADMIN — POTASSIUM CHLORIDE 40 MEQ: 1500 TABLET, EXTENDED RELEASE ORAL at 11:00

## 2023-12-08 RX ADMIN — CEFAZOLIN SODIUM 2 G: 2 INJECTION, SOLUTION INTRAVENOUS at 23:27

## 2023-12-08 ASSESSMENT — ACTIVITIES OF DAILY LIVING (ADL)
ADLS_ACUITY_SCORE: 20
ADLS_ACUITY_SCORE: 20
ADLS_ACUITY_SCORE: 22
ADLS_ACUITY_SCORE: 20
ADLS_ACUITY_SCORE: 22

## 2023-12-08 NOTE — PROVIDER NOTIFICATION
MD Notification    Notified Person: MD    Notified Person Name: Dr. Harrison    Notification Date/Time: 12/08/23 00:53    Notification Interaction: Vocera    Purpose of Notification: Dr. Castillo requested pt be closely monitored. Labs just returned with potassium: 2.9, hemoglobin: 9.6, and pain worsening from 1/10 to 5/10. Vital signs stable. Please let me know if you would like to adjust any orders.    Orders Received: Potassium replacement protocol and IV hydromorphone ordered.    Comments:

## 2023-12-08 NOTE — CONSULTS
[facility]    Infectious Disease Consultation     Date of Admission:  12/6/2023  Date of Consult (When I saw the patient): 12/08/23    Assessment & Plan   Mary Jo Mcleod is a 78 year old who was admitted on 12/6/2023.     Impression:  79 yo admitted with abdominal pain and rectal bleeding on chemo and radiation for multiple myeloma.    Diagnosed with ischemic colitis by colonoscopy   Also blood cultures with MSSA   PCN listed as allergy but tolerating cefepime   On flagyl as well     Recommendations:  Possible ? GI source to bacteremia   No other complaints   No port no other intravascular devices   No prosthetic joints   Daily blood cultures till clear   No more diarrhea, ischemic colitis found  Avoid any long term lines   Change from cefepime to ancef        Tono Palm MD    Reason for Consult   Reason for consult: I was asked to evaluate this patient for bacteremia.    Primary Care Physician   Levi Mcdonnell    Chief Complaint   Rectal bleeding diarrhea     History is obtained from the patient and medical records    History of Present Illness   Mary Jo Mcleod is a 78 year old female who presents with presents with abdominal pain and rectal bleeding on chemo and radiation for multiple myeloma.  Her CT scan on 12/6 showed wall thickening and inflammatory stranding of the colon, particularly the transverse and descending colon, consistent with colitis.   Very quickly blood cultures with MSSA     Past Medical History   I have reviewed this patient's medical history and updated it with pertinent information if needed.   Past Medical History:   Diagnosis Date    Depressive disorder        Past Surgical History   I have reviewed this patient's surgical history and updated it with pertinent information if needed.  Past Surgical History:   Procedure Laterality Date    BONE MARROW BIOPSY, BONE SPECIMEN, NEEDLE/TROCAR N/A 2/8/2021    Procedure: BONE MARROW BIOPSY;  Surgeon: Naomie Saeed  MD Racheal;  Location:  GI    COLONOSCOPY N/A 12/7/2023    Procedure: Colonoscopy;  Surgeon: Pankaj Castillo MD;  Location:  GI    finger reattachment      TONSILLECTOMY         Prior to Admission Medications   Prior to Admission Medications   Prescriptions Last Dose Informant Patient Reported? Taking?   aspirin (ASA) 325 MG EC tablet 12/5/2023  No Yes   Sig: Take 1 tablet (325 mg) by mouth daily   calcium carbonate (TUMS) 500 MG chewable tablet 12/5/2023  Yes Yes   Sig: Take 2 chew tab by mouth daily   citalopram (CELEXA) 40 MG tablet 12/5/2023  No Yes   Sig: Take 1 tablet by mouth once daily   denosumab (PROLIA) 60 MG/ML SOSY injection Unknown  Yes Yes   Sig: Inject 1 mL (60 mg) Subcutaneous   hydrOXYzine (ATARAX) 25 MG tablet Unknown at prn  No Yes   Sig: TAKE UP TO 3 TABLETS PRN BEDTIME FOR INSOMNIA   ibuprofen (ADVIL/MOTRIN) 200 MG tablet Unknown at prn  No Yes   Sig: Take 1 tablet (200 mg) by mouth 2 times daily as needed for mild pain   oxyCODONE-acetaminophen (PERCOCET) 5-325 MG tablet Past Month  Yes Yes   Sig: Take 1 tablet by mouth daily as needed for pain   rosuvastatin (CRESTOR) 10 MG tablet 12/5/2023  No Yes   Sig: Take 1 tablet (10 mg) by mouth daily   sodium chloride 1 GM tablet Not Taking  No No   Sig: Take 1 tablet (1 g) by mouth daily   Patient not taking: Reported on 12/6/2023   triamcinolone (KENALOG) 0.1 % external cream Unknown  No Yes   Sig: Apply topically 2 times daily   Patient taking differently: Apply topically 2 times daily as needed for irritation   valACYclovir (VALTREX) 500 MG tablet 12/5/2023  Yes Yes   Sig: Take 1,000 mg by mouth daily   vitamin B-12 (CYANOCOBALAMIN) 250 MCG tablet 12/5/2023  Yes Yes   Sig: Take 1,000 mcg by mouth daily   vitamin D3 (CHOLECALCIFEROL) 50 mcg (2000 units) tablet 12/5/2023  Yes Yes   Sig: Take 1 tablet by mouth daily      Facility-Administered Medications: None     Allergies   Allergies   Allergen Reactions    Acetaminophen Shortness Of  Breath    Blood Transfusion Related (Informational Only) Other (See Comments)     Patient has a history of being on daratumumab which can interfere with blood bank testing.  A delay in compatible RBCs may occur.    Codeine Difficulty breathing, Other (See Comments) and Rash    Bupropion     Erythromycin     Hydrocodone Other (See Comments) and Itching     Ear ache    Lidocaine Other (See Comments)    Penicillin G Itching     Itching around mouth then heavy breathing    Revlimid [Lenalidomide]     Sulfa Antibiotics Itching     Itching around the mouth and then heavy breathing    Tetracycline     Trazodone      Does not work for patient    Conjugated Estrogens Dermatitis       Immunization History   Immunization History   Administered Date(s) Administered    COVID-19 Bivalent 18+ (Moderna) 01/17/2023    COVID-19 Monovalent 18+ (Moderna) 02/27/2021, 03/27/2021, 12/20/2021    Influenza (High Dose) 3 valent vaccine 11/19/2014    Influenza Vaccine 65+ (FLUAD) 11/09/2021    Pneumo Conj 13-V (2010&after) 05/19/2015    Pneumococcal 23 valent 01/11/2012    TD,PF 7+ (Tenivac) 01/01/2007    Tdap (Adult) Unspecified Formulation 05/09/2015    Zoster recombinant adjuvanted (SHINGRIX) 04/18/2023    Zoster vaccine, live 04/04/2011       Social History   I have reviewed this patient's social history and updated it with pertinent information if needed. Mary Jo Mcleod  reports that she has never smoked. She has never used smokeless tobacco. She reports that she does not currently use alcohol. She reports that she does not use drugs.    Family History   I have reviewed this patient's family history and updated it with pertinent information if needed.   Family History   Problem Relation Age of Onset    Myocardial Infarction Father 63    Bipolar Disorder Sister     Chronic Obstructive Pulmonary Disease Brother     Lung Cancer Brother     Suicide Brother     Influenza/Pneumonia Sister     Alcoholism Sister        Review of Systems   The  "10 point Review of Systems is negative    Physical Exam   Temp: 99.3  F (37.4  C) Temp src: Oral BP: (!) 143/68 Pulse: 107   Resp: 18 SpO2: 98 % O2 Device: None (Room air)    Vital Signs with Ranges  Temp:  [99.3  F (37.4  C)-101.2  F (38.4  C)] 99.3  F (37.4  C)  Pulse:  [104-113] 107  Resp:  [18] 18  BP: (114-148)/(64-78) 143/68  SpO2:  [96 %-99 %] 98 %  130 lbs 0 oz  Body mass index is 23.03 kg/m .    GENERAL APPEARANCE:  awake  EYES: Eyes grossly normal to inspection  NECK: no adenopathy  RESP: lungs clear   CV: regular rates and rhythm  LYMPHATICS: normal ant/post cervical and supraclavicular nodes  ABDOMEN: soft, nontender  MS: extremities normal  SKIN: no suspicious lesions or rashes        Data   All laboratory and imaging data in the past 24 hours reviewed  No results for input(s): \"CULT\" in the last 168 hours.  No lab results found.    Invalid input(s): \"UC\"       All cultures:  Recent Labs   Lab 12/07/23  1640 12/07/23  1633   CULTURE Positive on the 1st day of incubation*  Gram positive cocci in clusters* No growth after 12 hours      Blood culture:  Results for orders placed or performed during the hospital encounter of 12/06/23   Blood Culture Arm, Right    Specimen: Arm, Right; Blood   Result Value Ref Range    Culture Positive on the 1st day of incubation (A)     Culture Gram positive cocci in clusters (AA)    Blood Culture Arm, Left    Specimen: Arm, Left; Blood   Result Value Ref Range    Culture No growth after 12 hours       Urine culture:  No results found for this or any previous visit.          "

## 2023-12-08 NOTE — PLAN OF CARE
Goal Outcome Evaluation:    Orientation: A&Ox4  Activity: Ax1 w/ walker  Diet/BS Checks: NPO  Tele:  NA  IV Access/Drains: L PIV infusing CDI, R PIV SL CDI  Pain Management: 5/10 abdominal pain, oxy x1  Abnormal VS/Results: Hgb 9.6, K+ 2.9, gram + cocci in clusters  Bowel/Bladder: Cont bladder, incont bowel loose bloody stools x2  Skin/Wounds: blanchable redness to coccyx  Consults: GI, Colorectal, WOC  D/C Disposition: Pending    Spoke w/ Dr. Koch concerning hemoglobin level, potassium level, and increased pain, alerted her to result of gram positive cocci in clusters. She confirmed beginning Maxipime antibiotic as we were waiting for pharmacy to fill Flagyl then following with Vancomycin.

## 2023-12-08 NOTE — PROGRESS NOTES
[facility]    Medicine Progress Note - Hospitalist Service    Date of Admission:  12/6/2023    Interval History   Patient had a fever overnight. TMAX 101.2   Started on cefepime and flagyl.   1 of 2 blood cultures with gram + cocci. Vancomycin added.   Colon rectal consult this am.     Assessment & Plan   Mary Jo Mcleod is a 78 year old female with a history of multiple myeloma, stage III CKD, rheumatoid arthritis, osteopenia admitted on 12/6/2023 with abdominal pain, BRBPR and imaging showing colitis of transverse and descending colon    Transverse and descending colon wall thickening and inflammatory stranding  Colitis  12/7/2023: Colonoscopy with ischemic colitis  GRAM + cocci in blood (1 of 2)   12/7 sepsis with fever.   On evening of admission 12/5 patient described lower abdominal cramping and bright red blood per rectum.  Patient had associated nausea.  Had eaten egg salad 1 hour before.  In ED AFVSS. Hgb 12. WBC 13.2.   12/6 CT a/p w/ colitis (raj transverse and descending colon), infectious/ inflammatory favored.   Currently on clear liquid diet.  GI consult  12/7 Stool culture still requested but unable to complete enteric bacteria and C. difficile toxin as no stools   12/7 Febrile 101.5 sepsis treatment started on BSA with cefepime and flagyl. Aggressive fluid 1500 + /hour   12/7 CXR negative. COVID, influenza and RSV negative   12/7 CT abdomen/pelvis:  Persistent severe colitis involving the distal colon. No evidence of bowel perforation and no fluid collection. Mild small bowel distention with scattered air-fluid levels may reflect mild ileus   12/8 CRS consult.   12/8 continue aggressive fluids.  Thus far lactate normal  Serial lactate and clinical examination.   BMP, lactate, hgb q 6 hours     Gram positive bacteremia:   12/7 1 of 2 blood cultures Gram + cocci in clusters  12/8 ADDED vanco this am but taper quickly if appears contaminate   REPEAT blood cultures X 2  STAT   Called micro and 1 of 2     Hypokalemia:   Potassium 2.9   Replacement started with IV and monitor.     Tachycardia:   Likely secondary to sepsis/infection   EKG with sinus tachycardia and NSST changes. T wave abnormality in inferior leads. NSST changes.   HR improving with fluids/ABx    Multiple myeloma  IgG Kappa multiple myeloma   Follows with Dr Dreyer w/ [facility]. Hx multiple pathologic fractures.   Of note plans for MR pelvis by [facility](to compare w/ CT 5/4/23 from [place] w/ multiple abnormalities).  Patient by last note with [facility] had MRI with TRIA   On prophylactic valtrex   Patient on maintenance bortezomib  12/7 Consult [facility]   12/7 as per oncology visit.  Not likely myeloma or myeloma treatment associated.  Patient will follow-up in the clinic for bortezomib>> moved out 2-week     Polyneuropathy  Patient had developed a sensory neuropathy over the last 6 months.  Occurs mainly at nighttime    HLD  With ischemic colitis critical meds only po      MDD  Anxiety  PTSD  Citalopram 40 mg daily will hold for now.         Diet: NPO for Medical/Clinical Reasons Except for: No Exceptions    DVT Prophylaxis: Pneumatic Compression Devices  Astudillo Catheter: Not present  Lines: None     Cardiac Monitoring: None  Code Status:  Full       Clinically Significant Risk Factors        # Hypokalemia: Lowest K = 2.9 mmol/L in last 2 days, will replace as needed                        # Financial/Environmental Concerns: none         Disposition Plan      Expected Discharge Date: 12/09/2023, 10:36 AM    Destination: home              TAIWO GRIMALDO MD  Hospitalist Service  [facility]  Securely message with [employer] (more info)  Text page via [employer] Paging/Directory   ______________________________________________________________________  Physical Exam   Vital Signs: Temp: 99.3  F (37.4  C) Temp src: Oral BP: 121/64 Pulse: 104   Resp: 18 SpO2: 97 % O2 Device: None (Room air)     Weight: 130 lbs 0 oz    General Appearance: Patient is awake and alert  Respiratory: Lungs are clear to auscultation bilaterally without wheezes or rhonchi  Cardiovascular: Regular rate and rhythm without gallop rub normal S1-S2  GI: + BS soft, and moderate tender. No rebound or guarding   Skin: No edema      Medical Decision Making       55 MINUTES SPENT BY ME on the date of service doing chart review, history, exam, documentation & further activities per the note.      Data     I have personally reviewed the following data over the past 24 hrs:    16.5 (H)  \   9.2 (L)   / 187     136 104 6.4 (L) /  103 (H)   2.9 (L) 21 (L) 0.68 \     Procal: 0.14 CRP: N/A Lactic Acid: 0.6 (L)         Imaging results reviewed over the past 24 hrs:   Recent Results (from the past 24 hour(s))   XR Chest Port 1 View    Narrative    EXAM: XR CHEST PORT 1 VIEW  LOCATION: [facility]  DATE: 12/7/2023    INDICATION: fevers  COMPARISON: 6/10/2023      Impression    IMPRESSION: Negative chest.   CT Abdomen Pelvis w/o Contrast    Narrative    EXAM: CT ABDOMEN PELVIS W/O CONTRAST  LOCATION: [facility]  DATE: 12/7/2023    INDICATION: colitis with colonoscopy  COMPARISON: CT 12/06/2023, 06/25/2022 and PET/CT 08/14/2023  TECHNIQUE: CT scan of the abdomen and pelvis was performed without IV contrast. Multiplanar reformats were obtained. Dose reduction techniques were used.  CONTRAST: None.    FINDINGS:   LOWER CHEST: Tiny hiatal hernia. Bibasilar atelectasis/scarring.    HEPATOBILIARY: Vicarious contrast excretion into the gallbladder lumen. Mild periportal edema which may reflect sequelae of IV hydration. Normal bile ducts.    PANCREAS: Normal.    SPLEEN: Normal.    ADRENAL GLANDS: Normal.    KIDNEYS/BLADDER: No hydronephrosis, hydroureter or urinary tract stone.    BOWEL: Tiny fat-containing umbilical hernia. Mild small bowel distention with scattered air-fluid levels. No small bowel wall  edema. Normal appendix. Severe colitis extending from the distal transverse through the mid sigmoid segment. Associated   circumferential mural thickening and surrounding edema. Trace ascites. No free air and no fluid collection. No pneumatosis.    LYMPH NODES: Normal.    VASCULATURE: Moderate aortoiliac atherosclerosis.    PELVIC ORGANS: Enlarged retroflexed uterus with multiple calcified fibroids. Trace pelvic free fluid.    MUSCULOSKELETAL: Stable old fractures of the right sacral ala and left pubic bone. Severe osseous demineralization. Stable severe L5 vertebral body compression fracture. Spinal degenerative changes.      Impression    IMPRESSION:   1.  Persistent severe colitis involving the distal colon.  2.  Trace simple ascites. No evidence for bowel perforation and no fluid collection.  3.  Mild small bowel distention with scattered air-fluid levels which may reflect a mild ileus.

## 2023-12-08 NOTE — PROGRESS NOTES
Chart Check:    No new recommendations from [facility]. Bortezomib and ischemic colitis is rare (<0.01%), but there are case reports in the literature. There is nothing to do from a treatment standpoint for her myeloma at this time. Will continue to have ongoing discussions as an outpatient with Dr. Dreyer. We will continue to follow along peripherally.    Adelso MASON, CNP  [facility]  [phone] (office)

## 2023-12-08 NOTE — PHARMACY-VANCOMYCIN DOSING SERVICE
Pharmacy Vancomycin Initial Note  Date of Service 2023  Patient's  1945  78 year old, female    Indication: Bacteremia and Intra-abdominal infection    Current estimated CrCl = Estimated Creatinine Clearance: 62.7 mL/min (based on SCr of 0.7 mg/dL).    Creatinine for last 3 days  2023:  2:56 AM Creatinine 0.86 mg/dL  2023:  2:23 PM Creatinine 0.77 mg/dL; 11:16 PM Creatinine 0.68 mg/dL  2023:  7:53 AM Creatinine 0.70 mg/dL    Recent Vancomycin Level(s) for last 3 days  No results found for requested labs within last 3 days.      Vancomycin IV Administrations (past 72 hours)                     vancomycin (VANCOCIN) 1,250 mg in 0.9% NaCl 250 mL intermittent infusion (mg) 1,250 mg New Bag 23 0751                    Nephrotoxins and other renal medications (From now, onward)      Start     Dose/Rate Route Frequency Ordered Stop    23 0700  vancomycin (VANCOCIN) 1,250 mg in 0.9% NaCl 250 mL intermittent infusion         1,250 mg  over 90 Minutes Intravenous ONCE 23 0627      23 1745  [Held by provider]  valACYclovir (VALTREX) tablet 1,000 mg        (On hold since yesterday at 1646 until manually unheld; held by Oralia Koch MDHold Reason: Other)   Note to Pharmacy: PTA Sig:Take 1,000 mg by mouth daily      1,000 mg Oral DAILY 23 1625              Contrast Orders - past 72 hours (72h ago, onward)      Start     Dose/Rate Route Frequency Stop    23 0345  iopamidol (ISOVUE-370) solution 66 mL         66 mL Intravenous ONCE 23 0347            [employer] Prediction of Planned Initial Vancomycin Regimen  Regimen: 1250 mg IV every 24 hours.  Start time: 19:51 on 2023  Exposure target: AUC24 (range)400-600 mg/L.hr   AUC24,ss: 459 mg/L.hr  Probability of AUC24 > 400: 66 %  Ctrough,ss: 12.5 mg/L  Probability of Ctrough,ss > 20: 15 %  Probability of nephrotoxicity (Lodise ELIZABETH ): 8 %          Plan:  Start vancomycin  1250 mg IV q24h.    Vancomycin monitoring method: AUC  Vancomycin therapeutic monitoring goal: 400-600 mg*h/L  Pharmacy will check vancomycin levels as appropriate in 1-3 Days.    Serum creatinine levels will be ordered every 48 hours.      Marielena Cheek RPH

## 2023-12-08 NOTE — PROGRESS NOTES
Reviewed CT abdomen/pelvis with Dr. Castillo  Will continue treatment with cefepime and flagyl.   Continue support over night.   Ruma

## 2023-12-08 NOTE — PLAN OF CARE
Goal Outcome Evaluation:  Orientation: A&Ox4  Activity: Ax1 + walker  Diet/BS Checks:Kept NPO d/t fever and work up for sepsis  Tele: ST  IV Access/Drains: New L PIV SL, R PIV NS @ 125mL/hr after 1 L NS bolus  Pain Management: mild abd pain  Abnormal VS/Results: Multiple labs drawn d/t fever, see epic. Hgb stable, recheck a 12hours. LA WDL.  BC, UC, nasal swab, CT Pending. CXR WDL.   Bowel/Bladder: Continent. Rectal bleeding with scant BRB. Need stool sample.  Skin/Wounds: Blanchable redness to sacrum  Consults: GI  D/C Disposition: Plan pending  Other Info:  Temp spike 101.2, full sepsis w/up complete, see notes. Pt appears stable and comfortable throughout. IV abx done. No tylenol, as allergic, temp improved. Needs a PT, OT, and slums done for  discharge planning.

## 2023-12-08 NOTE — PROGRESS NOTES
[facility]  Gastroenterology Progress Note     Mary Jo Mcleod MRN# [id number]   YOB: 1945 Age: 78 year old          Assessment and Plan:     Mary Jo Mcleod is a 78 year old female admitted on 12/6/2023 with abdominal pain and BRBPR     Generalized abdominal pain  Colitis  Rectal bleeding  CT A/P w/notes colitis- with wall thickening and stranding  Hemoglobin 12->9.2, WBC 16.5   Last colonoscopy 2010 with diverticulosis and other unremarkable  Blood cultures positive for gram positive cocci in clusters- ID was consulted    12/7 Colonoscopy revealed extensive ischemic colitis in sigmoid, descending, splenic flexure, and distal transverse colon. Biopsies taken     - miralax daily  - dicyclomine 20 mg QID prn for cramping  - CBC ordered  - continue antibiotics  - appreciate colorectal consult- request NPO. Defer to CRS for diet management at this time  - Await ID consult              Interval History:     no new complaints, doing well, and doing well; no cp, sob. C/o ongoing abdominal pain. Minor in character.              Review of Systems:     C: NEGATIVE for fever, chills, change in weight  E/M: NEGATIVE for ear, mouth and throat problems  R: NEGATIVE for significant cough or SOB  CV: NEGATIVE for chest pain, palpitations or peripheral edema             Medications:   I have reviewed this patient's current medications   ceFEPIme  1 g Intravenous Q12H    [Held by provider] citalopram  40 mg Oral Daily    metroNIDAZOLE  500 mg Intravenous Q12H    sodium chloride (PF)  3 mL Intracatheter Q8H    [Held by provider] valACYclovir  1,000 mg Oral Daily    vancomycin  1,250 mg Intravenous Once    [START ON 12/9/2023] vancomycin  1,250 mg Intravenous Q24H                  Physical Exam:   Vitals were reviewed  Vital Signs with Ranges  Temp:  [99.3  F (37.4  C)-101.2  F (38.4  C)] 99.3  F (37.4  C)  Pulse:  [104-120] 107  Resp:  [8-24] 18  BP: (107-151)/(51-78) 143/68  SpO2:  [91  %-100 %] 98 %  No intake/output data recorded.  Constitutional: healthy, alert, and no distress   Cardiovascular: negative, PMI normal. No lifts, heaves, or thrills. RRR. No murmurs, clicks gallops or rub  Respiratory: negative, Percussion normal. Good diaphragmatic excursion. Lungs clear  Abdomen: Abdomen soft, tender. BS normal. No masses, organomegaly           Data:   I reviewed the patient's new clinical lab test results.   Recent Labs   Lab Test 12/08/23  0753 12/07/23  2316 12/07/23  1640 12/06/23  1314 12/06/23  0256 12/06/22  1141   WBC  --   --  16.5*  --  13.2* 3.1*   HGB 9.2* 9.6* 10.7*   < > 12.0 8.3*   MCV  --   --  103*  --  101* 87.3   PLT  --   --  187  --  210 178    < > = values in this interval not displayed.     Recent Labs   Lab Test 12/08/23  0753 12/07/23  2316 12/07/23  1423   POTASSIUM 3.5 2.9* 3.5   CHLORIDE 106 104 104   CO2 22 21* 25   BUN 6.4* 6.4* 7.4*   ANIONGAP 10 11 6*     Recent Labs   Lab Test 12/07/23  1836 12/06/23  0256 07/08/22  1452 06/25/22  1851   ALBUMIN  --  3.9 2.8* 2.9*   BILITOTAL  --  0.3 0.4 0.7   ALT  --  12 14 19   AST  --  22 21 22   PROTEIN 20*  --   --   --    LIPASE  --  37  --  362       I reviewed the patient's new imaging results.    All laboratory data reviewed  All imaging studies reviewed by me.    My Davis PA-C,  12/7/2023  [facility]  Office : [phone]  Cell: [phone] (Dr. Castillo)  Cell: [phone] (My Davis PA-C)

## 2023-12-08 NOTE — CONSULTS
[facility]  Colon and Rectal Surgery Consult Note  Name: Mary Jo Mcleod    MRN: [id number]  YOB: 1945    Age: 78 year old  Date of admission: 12/6/2023  Primary care provider: Levi Mcdonnell     Requesting Physician:  Dr. Harrison  Reason for consult:  ischemic           History of Present Illness:   Mary Jo Mcleod is a 78 year old female, seen at the request of Dr. Harrison, presents with abdominal pain and rectal bleeding on chemo and radiation for multiple myeloma.  Her CT scan on 12/6 showed wall thickening and inflammatory stranding of the colon, particularly the transverse and descending colon, consistent with colitis. No pneumatosis, perforation or abscess. Colonic diverticulosis without evidence for diverticulitis. Normal   appendix. There are also fractures of the sacrum and left pubic bone.     A colonoscopy was done yesterday showing A scattered area of moderately congested, erythematous, eroded (linear-pattern), friable (with contact bleeding), inflamed and ulcerated mucosa was found in the descending colon, at the splenic flexure and in the transverse colon. Biopsies were taken with a cold forceps for histology.     She had fevers yesterday afternoon and a follow up CT scan showed severe colitis extending from the distal transverse to the mid sigmoid without free air, pneumatosis or collection. She was givne fluids and electrolytes over night, abx were started.     This morning she notes mild LLQ pain without nausea or vomiting. She has had loose bloody stools. SHe remains tachycardic with a normal blood pressure. Labs not resulted yet this morning. WBC count yesterday at 4 pm was 16.5, hgb is 10.7. down to 9.6 at 11pm. Normal lactate this AM and last night.      Colonoscopy History:  yesterday as above     No prior abdominal surgery.            Past Medical History:     Past Medical History:   Diagnosis Date    Depressive disorder              Past Surgical  History:     Past Surgical History:   Procedure Laterality Date    BONE MARROW BIOPSY, BONE SPECIMEN, NEEDLE/TROCAR N/A 2/8/2021    Procedure: BONE MARROW BIOPSY;  Surgeon: Naomie Saeed MD;  Location:  GI    COLONOSCOPY N/A 12/7/2023    Procedure: Colonoscopy;  Surgeon: Pankaj Castillo MD;  Location:  GI    finger reattachment      TONSILLECTOMY                 Social History:     Social History     Tobacco Use    Smoking status: Never    Smokeless tobacco: Never   Substance Use Topics    Alcohol use: Not Currently     Alcohol/week: 0.0 - 2.0 standard drinks of alcohol             Family History:     Family History   Problem Relation Age of Onset    Myocardial Infarction Father 63    Bipolar Disorder Sister     Chronic Obstructive Pulmonary Disease Brother     Lung Cancer Brother     Suicide Brother     Influenza/Pneumonia Sister     Alcoholism Sister              Allergies:     Allergies   Allergen Reactions    Acetaminophen Shortness Of Breath    Blood Transfusion Related (Informational Only) Other (See Comments)     Patient has a history of being on daratumumab which can interfere with blood bank testing.  A delay in compatible RBCs may occur.    Codeine Difficulty breathing, Other (See Comments) and Rash    Bupropion     Erythromycin     Hydrocodone Other (See Comments) and Itching     Ear ache    Lidocaine Other (See Comments)    Penicillin G Itching     Itching around mouth then heavy breathing    Revlimid [Lenalidomide]     Sulfa Antibiotics Itching     Itching around the mouth and then heavy breathing    Tetracycline     Trazodone      Does not work for patient    Conjugated Estrogens Dermatitis             Medications:      ceFEPIme  1 g Intravenous Q12H    [Held by provider] citalopram  40 mg Oral Daily    metroNIDAZOLE  500 mg Intravenous Q12H    sodium chloride (PF)  3 mL Intracatheter Q8H    [Held by provider] valACYclovir  1,000 mg Oral Daily    vancomycin  1,250 mg Intravenous  "Once             Review of Systems:   A comprehensive greater than 10 system review of systems was carried out.  Pertinent positives and negatives are noted above.  Otherwise negative for contributory info.            Physical Exam:     Blood pressure 114/71, pulse 113, temperature 99.4  F (37.4  C), temperature source Oral, resp. rate 18, height 1.6 m (5' 3\"), weight 59 kg (130 lb), last menstrual period 12/04/1989, SpO2 99%.  No intake or output data in the 24 hours ending 12/08/23 0747  EXAM:  GEN: Awake alert and oriented, appears her stated age  PULM: Non-labored breathing with normal respiratory effort  CVS: reg rate and rhythm, no peripheral edema  ABD: Soft, mild LLQ tenderness, mildly distended. no rebound or guarding   RECTAL: Rectal exam was deferred given colonoscopy yesterday  NEURO: CN II-XII grossly intact  MSK: extremeties with no clubbing, cyanosis or edema; able to ambulate  PSYCH: responsive, alert, cooperative; oriented x3; appropriate mood and affect  EXT/SKIN: inspection reveals no rashes, lesions or ulcers, normal coloring         Data Reviewed:     Results for orders placed or performed during the hospital encounter of 12/06/23   CT Abdomen Pelvis w IV Contrast    Narrative    EXAM: CT ABDOMEN PELVIS W CONTRAST  LOCATION: [facility]  DATE: 12/6/2023    INDICATION: bright red blood per rectum, abdominal cramping  COMPARISON: PET/CT 08/14/2023. CT abdomen and pelvis 06/25/2022.  TECHNIQUE: CT scan of the abdomen and pelvis was performed following injection of IV contrast. Multiplanar reformats were obtained. Dose reduction techniques were used.  CONTRAST: 66mL Isovue 370    FINDINGS:   LOWER CHEST: Normal.    HEPATOBILIARY: Normal.    PANCREAS: Normal.    SPLEEN: Normal.    ADRENAL GLANDS: No change in 10 mm bilateral adrenal nodules are indeterminate by density measurements.    KIDNEYS/BLADDER: Tiny bilateral probable benign renal cysts do not warrant " follow-up.    BOWEL: Wall thickening and inflammatory stranding of the colon, particularly the transverse and descending colon, consistent with colitis. No pneumatosis, perforation or abscess. Colonic diverticulosis without evidence for diverticulitis. Normal   appendix.    LYMPH NODES: Normal.    VASCULATURE: Mild to moderate aortoiliac atherosclerosis. No aneurysm.    PELVIC ORGANS: Multiple calcified uterine fibroids.    MUSCULOSKELETAL: Chronic fracture deformities of the right sacrum and left pubic bone. Chronic moderate compression deformity of the L5 vertebral body. Chronic 4 cm calcified structure medial to the left inferior pubic ramus is unchanged from PET/CT   08/14/2023. This is suggestive of an old hematoma.      Impression    IMPRESSION:   Colitis. An infectious or inflammatory etiology is favored.   XR Chest Port 1 View    Narrative    EXAM: XR CHEST PORT 1 VIEW  LOCATION: [facility]  DATE: 12/7/2023    INDICATION: fevers  COMPARISON: 6/10/2023      Impression    IMPRESSION: Negative chest.   CT Abdomen Pelvis w/o Contrast    Narrative    EXAM: CT ABDOMEN PELVIS W/O CONTRAST  LOCATION: [facility]  DATE: 12/7/2023    INDICATION: colitis with colonoscopy  COMPARISON: CT 12/06/2023, 06/25/2022 and PET/CT 08/14/2023  TECHNIQUE: CT scan of the abdomen and pelvis was performed without IV contrast. Multiplanar reformats were obtained. Dose reduction techniques were used.  CONTRAST: None.    FINDINGS:   LOWER CHEST: Tiny hiatal hernia. Bibasilar atelectasis/scarring.    HEPATOBILIARY: Vicarious contrast excretion into the gallbladder lumen. Mild periportal edema which may reflect sequelae of IV hydration. Normal bile ducts.    PANCREAS: Normal.    SPLEEN: Normal.    ADRENAL GLANDS: Normal.    KIDNEYS/BLADDER: No hydronephrosis, hydroureter or urinary tract stone.    BOWEL: Tiny fat-containing umbilical hernia. Mild small bowel distention with scattered  air-fluid levels. No small bowel wall edema. Normal appendix. Severe colitis extending from the distal transverse through the mid sigmoid segment. Associated   circumferential mural thickening and surrounding edema. Trace ascites. No free air and no fluid collection. No pneumatosis.    LYMPH NODES: Normal.    VASCULATURE: Moderate aortoiliac atherosclerosis.    PELVIC ORGANS: Enlarged retroflexed uterus with multiple calcified fibroids. Trace pelvic free fluid.    MUSCULOSKELETAL: Stable old fractures of the right sacral ala and left pubic bone. Severe osseous demineralization. Stable severe L5 vertebral body compression fracture. Spinal degenerative changes.      Impression    IMPRESSION:   1.  Persistent severe colitis involving the distal colon.  2.  Trace simple ascites. No evidence for bowel perforation and no fluid collection.  3.  Mild small bowel distention with scattered air-fluid levels which may reflect a mild ileus.         Recent Labs   Lab 12/08/23  0753 12/07/23  2316 12/07/23  1640 12/06/23  1314 12/06/23  0256   WBC  --   --  16.5*  --  13.2*   HGB 9.2* 9.6* 10.7*   < > 12.0   HCT  --   --  32.1*  --  35.7   MCV  --   --  103*  --  101*   PLT  --   --  187  --  210    < > = values in this interval not displayed.          Lab Results   Component Value Date     12/07/2023     12/07/2023     12/06/2023     07/08/2022     02/07/2022     06/23/2021    Lab Results   Component Value Date    CHLORIDE 104 12/07/2023    CHLORIDE 104 12/07/2023    CHLORIDE 97 12/06/2023    CHLORIDE 108 07/29/2022    CHLORIDE 101 07/28/2022    CHLORIDE 97 07/27/2022    CHLORIDE 101 07/08/2022    CHLORIDE 100 02/07/2022    CHLORIDE 104 06/23/2021    Lab Results   Component Value Date    BUN 6.4 12/07/2023    BUN 7.4 12/07/2023    BUN 18.5 12/06/2023    BUN 13 07/29/2022    BUN 20 07/28/2022    BUN 27 07/27/2022    BUN 15 07/08/2022    BUN 12 07/08/2022    BUN 27 02/07/2022    BUN 21 02/07/2022  "   BUN 18 06/23/2021      Lab Results   Component Value Date    POTASSIUM 2.9 12/07/2023    POTASSIUM 3.5 12/07/2023    POTASSIUM 3.6 12/06/2023    POTASSIUM 4.1 07/29/2022    POTASSIUM 4.3 07/28/2022    POTASSIUM 4.3 07/27/2022    POTASSIUM 3.9 07/08/2022    POTASSIUM 4.5 02/07/2022    POTASSIUM 4.1 06/23/2021    Lab Results   Component Value Date    CO2 21 12/07/2023    CO2 25 12/07/2023    CO2 27 12/06/2023    CO2 24 07/29/2022    CO2 24 07/28/2022    CO2 30 07/27/2022    CO2 28 06/23/2021    CO2 30 03/31/2021    CO2 25 12/09/2017    Lab Results   Component Value Date    CR 0.68 12/07/2023    CR 0.77 12/07/2023    CR 0.86 12/06/2023    CR 1.23 07/08/2022    CR 1.29 02/07/2022    CR 1.11 06/23/2021        Recent Labs   Lab 12/06/23  0256   AST 22   ALT 12   ALKPHOS 71   BILITOTAL 0.3     No results for input(s): \"INR\" in the last 168 hours.  Recent Labs   Lab 12/08/23  0753 12/07/23  2316 12/07/23  1640   LACT 0.6* 0.5* 0.8         Assessment and Plan:   Tee is a 78  year old woman  with ischemic colitis and MM with chemo.   She is stable but may need surgery if she fails to improve or worsens. We discussed the roll for resection and colostomy is needed. This may be temporary or permanent.   Plan:  Admit to hospitalisst  Surgery: not at this time by may need soon if she fails to improve  Diet: NPO  IV Fluids: per hospitalist  Antibiotics:  on cefipime and flagyl  Medications:  hold chemo, would be due today.  I&O s:  strict I&O s, optimize hydration  Labs:   Await morning labs  Imaging:   - reviewed by me  Activity: ambulate as tolerated  DVT prophylaxis: SCD s,   Patient specific identified risk factors considered as part of today s evaluation include: patient and the charge.   Additional history obtained from patient and the chart.  Time spent on consultation: 60 minutes    Carrie Victor MD  [facility]  Office Phone # [phone]     "

## 2023-12-08 NOTE — PROGRESS NOTES
Patient is clinically doing well patient was evaluated with colonoscopy earlier today patient has been running low-grade fevers due to patient's finding patient was started on IV antibiotics IV fluids.  Patient was advised to be evaluated further with stat CT scan of the abdomen.  CT scan findings reviewed with the hospitalist team findings are consistent with rather significant inflammation involving splenic flexure and left colon patient's colonoscopy findings are also consistent with moderate to severe ischemic colitis with significant mucosal sloughing and dark coloration.  Biopsies were taken.  There is no clinical indication or CT indications of perforation or bowel wall necrosis however patient significantly severe ischemic/acute colitis patient will be at risk for further progression and possible complication.  I will recommend to continue to monitor closely repeat labs again tomorrow continue on IV antibiotic.  Finding and plan discussed in detail.      Pankaj Castillo MD FACP  [facility]

## 2023-12-08 NOTE — PROGRESS NOTES
Called later in the day that patient was seen by [facility] in reference to suicidal thoughts.  Later confirmed by the nurse that patient just expressed the frustration of having an ostomy but had no intentions of hurting herself or having suicidal thoughts.  Following this statement the patient reported then that Viki Francisco was taking her money.     Requested psychiatry consult.  Evaluate patient's thought process.  Does not appear to be suicidal but will evaluate further.     In addition her blood culture did return positive for staff aureus: Had been given a dose of Vanco.  2 additional blood cultures done.  ID consult.  Remains on cefepime/Flagyl.    Dr. Patti Koch MD

## 2023-12-08 NOTE — CONSULTS
[facility] Nurse Inpatient Assessment     Consulted for: colostomy vs ileostomy marking    Summary: marked 12/8/23 bilateral upper and lower quadrants  BLAINE P, My ERVIN. Notified of patients comments about suicidal ideations    Patient History (according to provider note(s):    Mary Jo Mcleod is a 78 year old female, seen at the request of Dr. Harrison, presents with abdominal pain and rectal bleeding on chemo and radiation for multiple myeloma.  Her CT scan on 12/6 showed wall thickening and inflammatory stranding of the colon, particularly the transverse and descending colon, consistent with colitis. No pneumatosis, perforation or abscess. Colonic diverticulosis without evidence for diverticulitis. Normal   appendix. There are also fractures of the sacrum and left pubic bone.      A colonoscopy was done yesterday showing A scattered area of moderately congested, erythematous, eroded (linear-pattern), friable (with contact bleeding), inflamed and ulcerated mucosa was found in the descending colon, at the splenic flexure and in the transverse colon. Biopsies were taken with a cold forceps for histology.      She had fevers yesterday afternoon and a follow up CT scan showed severe colitis extending from the distal transverse to the mid sigmoid without free air, pneumatosis or collection. She was givne fluids and electrolytes over night, abx were started.      This morning she notes mild LLQ pain without nausea or vomiting. She has had loose bloody stools. SHe remains tachycardic with a normal blood pressure. Labs not resulted yet this morning. WBC count yesterday at 4 pm was 16.5, hgb is 10.7. down to 9.6 at 11pm. Normal lactate this AM and last night.       Tee is a 78  year old woman  with ischemic colitis and MM with chemo.   She is stable but may need surgery if she fails to improve or worsens. We discussed the roll for resection and colostomy is needed. This may be temporary or  "permanent.   Plan:  Admit to hospitalisst  Surgery: not at this time by may need soon if she fails to improve  Diet: NPO  IV Fluids: per hospitalist  Antibiotics:  on cefipime and flagyl  Medications:  hold chemo, would be due today.  I&O s:  strict I&O s, optimize hydration  Labs:   Await morning labs  Imaging:   - reviewed by me  Activity: ambulate as tolerated  DVT prophylaxis: SCD s,   Patient specific identified risk factors considered as part of today s evaluation include: patient and the charge.   Additional history obtained from patient and the chart.  Time spent on consultation: 60 minutes    Assessment:      Areas visualized during today's visit: Abdomen, marking for ileostomy vs colostomy         Preferred markings are to upper Right and Left quadrants. Large crease just superior to umbilicus area. Patietn able to visualize upper quadrants more easily than lower quadrants. At the end of the marking session the patient asked WOC \"Do people talk about suicide with this type of pouch? I just don't think I can live like this at my age.\" I asked the patient if she had a plan and she said she \"would look around the room if she had to but no immediate plan\". I moved her tray away from her and immediately notified the charge Nurse, Primary Nurse and Dr. Koch who is rounding at this time.     Treatment Plan:     Please re-consult WOC if patient undergoes surgery and has new ostomy creation.    RECOMMEND PRIMARY TEAM ORDER: None, at this time  Education provided: importance of repositioning, plan of care, wound progress, Infection prevention , Moisture management, Hygiene, and Off-loading pressure  Discussed plan of care with: Patient  WOC nurse follow-up plan:  please re-consult WOC RN if patient undergoes surgery and has new ostomy creation  Notify WOC if wound(s) deteriorate.  Nursing to notify the Provider(s) and re-consult the WOC Nurse if new skin concern.    DATA:     Current support surface: Standard  " Standard gel/foam mattress (IsoFlex, Atmos air, etc)  Containment of urine/stool: Incontinence Protocol and Incontinent pad in bed  BMI: Body mass index is 23.03 kg/m .   Active diet order: Orders Placed This Encounter      NPO per Anesthesia Guidelines for Procedure/Surgery Except for: Meds     Output: No intake/output data recorded.     Labs:   Recent Labs   Lab 12/08/23  0753 12/07/23  2316 12/07/23  1640 12/06/23  1314 12/06/23  0256   ALBUMIN  --   --   --   --  3.9   HGB 9.2*   < > 10.7*   < > 12.0   WBC  --   --  16.5*  --  13.2*    < > = values in this interval not displayed.     Pressure injury risk assessment:   Sensory Perception: 4-->no impairment  Moisture: 4-->rarely moist  Activity: 3-->walks occasionally  Mobility: 3-->slightly limited  Nutrition: 3-->adequate  Friction and Shear: 3-->no apparent problem  Gio Score: 20    Betty Cross RN, BSN, CWON   Pager no longer is use, please contact through [employer] group: [facility] Nurse Kaylen  Dept. Office Number: [phone]

## 2023-12-09 LAB
ADV 40+41 DNA STL QL NAA+NON-PROBE: NEGATIVE
ANION GAP SERPL CALCULATED.3IONS-SCNC: 12 MMOL/L (ref 7–15)
ANION GAP SERPL CALCULATED.3IONS-SCNC: 13 MMOL/L (ref 7–15)
ANION GAP SERPL CALCULATED.3IONS-SCNC: 13 MMOL/L (ref 7–15)
ASTRO TYP 1-8 RNA STL QL NAA+NON-PROBE: NEGATIVE
ATRIAL RATE - MUSE: 106 BPM
BUN SERPL-MCNC: 5.8 MG/DL (ref 8–23)
BUN SERPL-MCNC: 6.2 MG/DL (ref 8–23)
BUN SERPL-MCNC: 6.6 MG/DL (ref 8–23)
C CAYETANENSIS DNA STL QL NAA+NON-PROBE: NEGATIVE
C DIFF GDH STL QL IA: POSITIVE
C DIFF TOX A+B STL QL IA: NEGATIVE
C DIFF TOX B STL QL: POSITIVE
CALCIUM SERPL-MCNC: 7.2 MG/DL (ref 8.8–10.2)
CALCIUM SERPL-MCNC: 7.3 MG/DL (ref 8.8–10.2)
CALCIUM SERPL-MCNC: 7.3 MG/DL (ref 8.8–10.2)
CAMPYLOBACTER DNA SPEC NAA+PROBE: NEGATIVE
CHLORIDE SERPL-SCNC: 103 MMOL/L (ref 98–107)
CHLORIDE SERPL-SCNC: 103 MMOL/L (ref 98–107)
CHLORIDE SERPL-SCNC: 105 MMOL/L (ref 98–107)
CREAT SERPL-MCNC: 0.67 MG/DL (ref 0.51–0.95)
CREAT SERPL-MCNC: 0.68 MG/DL (ref 0.51–0.95)
CREAT SERPL-MCNC: 0.69 MG/DL (ref 0.51–0.95)
CRYPTOSP DNA STL QL NAA+NON-PROBE: NEGATIVE
DEPRECATED HCO3 PLAS-SCNC: 20 MMOL/L (ref 22–29)
DEPRECATED HCO3 PLAS-SCNC: 21 MMOL/L (ref 22–29)
DEPRECATED HCO3 PLAS-SCNC: 23 MMOL/L (ref 22–29)
DIASTOLIC BLOOD PRESSURE - MUSE: NORMAL MMHG
E COLI O157 DNA STL QL NAA+NON-PROBE: NORMAL
E HISTOLYT DNA STL QL NAA+NON-PROBE: NEGATIVE
EAEC ASTA GENE ISLT QL NAA+PROBE: NEGATIVE
EC STX1+STX2 GENES STL QL NAA+NON-PROBE: NEGATIVE
EGFRCR SERPLBLD CKD-EPI 2021: 89 ML/MIN/1.73M2
EGFRCR SERPLBLD CKD-EPI 2021: 89 ML/MIN/1.73M2
EGFRCR SERPLBLD CKD-EPI 2021: 90 ML/MIN/1.73M2
EPEC EAE GENE STL QL NAA+NON-PROBE: NEGATIVE
ETEC LTA+ST1A+ST1B TOX ST NAA+NON-PROBE: NEGATIVE
G LAMBLIA DNA STL QL NAA+NON-PROBE: NEGATIVE
GLUCOSE SERPL-MCNC: 75 MG/DL (ref 70–99)
GLUCOSE SERPL-MCNC: 82 MG/DL (ref 70–99)
GLUCOSE SERPL-MCNC: 94 MG/DL (ref 70–99)
HGB BLD-MCNC: 8.7 G/DL (ref 11.7–15.7)
HGB BLD-MCNC: 9 G/DL (ref 11.7–15.7)
HGB BLD-MCNC: 9.1 G/DL (ref 11.7–15.7)
INTERPRETATION ECG - MUSE: NORMAL
LACTATE SERPL-SCNC: 0.6 MMOL/L (ref 0.7–2)
LACTATE SERPL-SCNC: 0.7 MMOL/L (ref 0.7–2)
NOROVIRUS GI+II RNA STL QL NAA+NON-PROBE: NEGATIVE
P AXIS - MUSE: 66 DEGREES
P SHIGELLOIDES DNA STL QL NAA+NON-PROBE: NEGATIVE
POTASSIUM SERPL-SCNC: 3.4 MMOL/L (ref 3.4–5.3)
POTASSIUM SERPL-SCNC: 3.6 MMOL/L (ref 3.4–5.3)
POTASSIUM SERPL-SCNC: 3.6 MMOL/L (ref 3.4–5.3)
PR INTERVAL - MUSE: 140 MS
QRS DURATION - MUSE: 78 MS
QT - MUSE: 302 MS
QTC - MUSE: 401 MS
R AXIS - MUSE: 30 DEGREES
RVA RNA STL QL NAA+NON-PROBE: NEGATIVE
SALMONELLA SP RPOD STL QL NAA+PROBE: NEGATIVE
SAPO I+II+IV+V RNA STL QL NAA+NON-PROBE: NEGATIVE
SHIGELLA SP+EIEC IPAH ST NAA+NON-PROBE: NEGATIVE
SODIUM SERPL-SCNC: 136 MMOL/L (ref 135–145)
SODIUM SERPL-SCNC: 138 MMOL/L (ref 135–145)
SODIUM SERPL-SCNC: 139 MMOL/L (ref 135–145)
SYSTOLIC BLOOD PRESSURE - MUSE: NORMAL MMHG
T AXIS - MUSE: 51 DEGREES
V CHOLERAE DNA SPEC QL NAA+PROBE: NEGATIVE
VENTRICULAR RATE- MUSE: 106 BPM
VIBRIO DNA SPEC NAA+PROBE: NEGATIVE
Y ENTEROCOL DNA STL QL NAA+PROBE: NEGATIVE

## 2023-12-09 PROCEDURE — 83605 ASSAY OF LACTIC ACID: CPT | Performed by: INTERNAL MEDICINE

## 2023-12-09 PROCEDURE — 99233 SBSQ HOSP IP/OBS HIGH 50: CPT | Performed by: INTERNAL MEDICINE

## 2023-12-09 PROCEDURE — 250N000011 HC RX IP 250 OP 636: Mod: JZ | Performed by: INTERNAL MEDICINE

## 2023-12-09 PROCEDURE — 120N000001 HC R&B MED SURG/OB

## 2023-12-09 PROCEDURE — 250N000013 HC RX MED GY IP 250 OP 250 PS 637: Performed by: INTERNAL MEDICINE

## 2023-12-09 PROCEDURE — 85018 HEMOGLOBIN: CPT | Performed by: INTERNAL MEDICINE

## 2023-12-09 PROCEDURE — 250N000013 HC RX MED GY IP 250 OP 250 PS 637: Performed by: PHYSICIAN ASSISTANT

## 2023-12-09 PROCEDURE — 80048 BASIC METABOLIC PNL TOTAL CA: CPT | Performed by: INTERNAL MEDICINE

## 2023-12-09 PROCEDURE — 258N000003 HC RX IP 258 OP 636: Performed by: INTERNAL MEDICINE

## 2023-12-09 PROCEDURE — 99232 SBSQ HOSP IP/OBS MODERATE 35: CPT | Performed by: PHYSICIAN ASSISTANT

## 2023-12-09 PROCEDURE — 36415 COLL VENOUS BLD VENIPUNCTURE: CPT | Performed by: INTERNAL MEDICINE

## 2023-12-09 RX ORDER — VANCOMYCIN HYDROCHLORIDE 125 MG/1
125 CAPSULE ORAL 4 TIMES DAILY
Status: DISCONTINUED | OUTPATIENT
Start: 2023-12-09 | End: 2023-12-22 | Stop reason: HOSPADM

## 2023-12-09 RX ADMIN — VANCOMYCIN HYDROCHLORIDE 125 MG: 125 CAPSULE ORAL at 22:16

## 2023-12-09 RX ADMIN — VANCOMYCIN HYDROCHLORIDE 125 MG: 125 CAPSULE ORAL at 08:57

## 2023-12-09 RX ADMIN — DICYCLOMINE HYDROCHLORIDE 20 MG: 10 CAPSULE ORAL at 01:26

## 2023-12-09 RX ADMIN — CEFAZOLIN SODIUM 2 G: 2 INJECTION, SOLUTION INTRAVENOUS at 09:13

## 2023-12-09 RX ADMIN — DICYCLOMINE HYDROCHLORIDE 20 MG: 10 CAPSULE ORAL at 18:17

## 2023-12-09 RX ADMIN — VANCOMYCIN HYDROCHLORIDE 125 MG: 125 CAPSULE ORAL at 05:25

## 2023-12-09 RX ADMIN — VALACYCLOVIR HYDROCHLORIDE 1000 MG: 1 TABLET, FILM COATED ORAL at 07:41

## 2023-12-09 RX ADMIN — VANCOMYCIN HYDROCHLORIDE 125 MG: 125 CAPSULE ORAL at 13:07

## 2023-12-09 RX ADMIN — SODIUM CHLORIDE, POTASSIUM CHLORIDE, SODIUM LACTATE AND CALCIUM CHLORIDE: 600; 310; 30; 20 INJECTION, SOLUTION INTRAVENOUS at 12:05

## 2023-12-09 RX ADMIN — Medication 2.5 MG: at 04:09

## 2023-12-09 RX ADMIN — METRONIDAZOLE 500 MG: 500 INJECTION, SOLUTION INTRAVENOUS at 18:30

## 2023-12-09 RX ADMIN — VANCOMYCIN HYDROCHLORIDE 125 MG: 125 CAPSULE ORAL at 18:17

## 2023-12-09 RX ADMIN — METRONIDAZOLE 500 MG: 500 INJECTION, SOLUTION INTRAVENOUS at 07:41

## 2023-12-09 RX ADMIN — SODIUM CHLORIDE, POTASSIUM CHLORIDE, SODIUM LACTATE AND CALCIUM CHLORIDE: 600; 310; 30; 20 INJECTION, SOLUTION INTRAVENOUS at 02:37

## 2023-12-09 RX ADMIN — SODIUM CHLORIDE, POTASSIUM CHLORIDE, SODIUM LACTATE AND CALCIUM CHLORIDE: 600; 310; 30; 20 INJECTION, SOLUTION INTRAVENOUS at 22:16

## 2023-12-09 RX ADMIN — CEFAZOLIN SODIUM 2 G: 2 INJECTION, SOLUTION INTRAVENOUS at 17:12

## 2023-12-09 ASSESSMENT — ACTIVITIES OF DAILY LIVING (ADL)
ADLS_ACUITY_SCORE: 20

## 2023-12-09 NOTE — PROVIDER NOTIFICATION
MD Notification    Notified Person: MD    Notified Person Name:  Juanamarry    Notification Date/Time: 12/09/23 03:56    Notification Interaction: Vocelier    Purpose of Notification: FYI - lab results of stool sample was positive for C.diff. Enteric isolation precautions in place.     Orders Received: New orders rec'd    Comments:

## 2023-12-09 NOTE — PROGRESS NOTES
[facility]    Infectious Disease Progress Note    Date of Service (when I saw the patient): 12/09/2023     Assessment & Plan   Mary Jo Mcleod is a 78 year old who was admitted on 12/6/2023.     Impression:  79 yo admitted with abdominal pain and rectal bleeding on chemo and radiation for multiple myeloma.    Diagnosed with ischemic colitis by colonoscopy. C.diff studies show colonization. Enteric panel negative.   Also blood cultures with MSSA 12/7/23. Possible GI source? Repeat blood culture 12/8/23 no growth to date.   PCN listed as allergy but tolerating cefepime   On flagyl as well   No port or other intravascular devices   No prosthetic joints   Afebrile last 24 hours. WBC 17.2 yesterday, not drawn today.      Recommendations:  Daily blood cultures until blood is clear   No more diarrhea, ischemic colitis found on CT  Avoid any long term lines    Continue ancef for MSSA bacteremia. Will need several weeks of IV antibiotics for this.   Continue oral vanco for now as she has evidence of colitis on CT, although c.diff studies indicate colonization.   On IV flagyl with colitis on CT.  Follow WBC.     Patient an plan discussed with Dr. Palm.           Nae Cruz PA-C      Interval History   Tolerating antibiotics ok   No new rashes or issues with antibiotics   Continues to have low abdominal pain. No diarrhea.   Labs reviewed       Physical Exam   Temp: 98.2  F (36.8  C) Temp src: Oral BP: 122/63 Pulse: 87   Resp: 16 SpO2: 99 % O2 Device: None (Room air)    Vitals:    12/07/23 0920   Weight: 59 kg (130 lb)     Vital Signs with Ranges  Temp:  [98.2  F (36.8  C)-99.9  F (37.7  C)] 98.2  F (36.8  C)  Pulse:  [84-99] 87  Resp:  [16] 16  BP: (113-154)/(63-83) 122/63  SpO2:  [97 %-99 %] 99 %    Constitutional: Awake, alert, cooperative, no apparent distress  Lungs: Clear to auscultation bilaterally, no crackles or wheezing  Cardiovascular: Regular rate and rhythm, normal S1 and S2, and no  murmur noted  Abdomen: Normal bowel sounds, soft, non-distended, tender in lower abdomen.   Skin: No rashes, no cyanosis, no edema  Other:    Medications    lactated ringers 125 mL/hr at 12/09/23 0237      ceFAZolin  2 g Intravenous Q8H    [Held by provider] citalopram  40 mg Oral Daily    metroNIDAZOLE  500 mg Intravenous Q12H    sodium chloride (PF)  3 mL Intracatheter Q8H    valACYclovir  1,000 mg Oral Daily    vancomycin  125 mg Oral 4x Daily       Data   All microbiology laboratory data reviewed.  Recent Labs   Lab Test 12/09/23  0714 12/09/23  0028 12/08/23  1741 12/08/23  1021 12/07/23  2316 12/07/23  1640 12/06/23  1314 12/06/23  0256   WBC  --   --   --  17.2*  --  16.5*  --  13.2*   HGB 8.7* 9.1* 9.1* 9.4*   < > 10.7*   < > 12.0   HCT  --   --   --  29.0*  --  32.1*  --  35.7   MCV  --   --   --  105*  --  103*  --  101*   PLT  --   --   --  178  --  187  --  210    < > = values in this interval not displayed.     Recent Labs   Lab Test 12/09/23  0714 12/09/23 0028 12/08/23  1741   CR 0.68 0.69 0.68            Collected Updated Procedure Result Status    12/08/2023 2329 12/09/2023 0417 Enteric Bacteria and Virus Panel PCR [[id number]]    Stool from Per Rectum    Final result Component Value   Campylobacter species Negative   Salmonella species Negative   Vibrio species Negative   Vibrio cholerae Negative   Yersinia enterocolitica Negative   Enteropathogenic E. coli (EPEC) Negative   Shiga-like toxin-producing E. coli (STEC) Negative   Shigella/Enteroinvasive E. coli (EIEC) Negative   Cryptosporidium species Negative   Giardia lamblia Negative   Norovirus Gl/Gll Negative   Rotavirus A Negative   Plesiomonas shigelloides Negative   Enteroaggregative E. coli (EAEC) Negative   Enterotoxigenic E. coli (ETEC) Negative   E. coli O157 NA   Cyclospora cayetanensis Negative   Entamoeba histolytica Negative   Adenovirus F40/41 Negative   Astrovirus Negative   Sapovirus Negative          12/08/2023 2329 12/09/2023  0341 C. difficile Toxin B PCR with reflex to C. difficile Antigen and Toxins A/B EIA [[id number]]    (Abnormal)   Stool from Per Rectum    Final result Component Value   C Difficile Toxin B by PCR Positive Abnormal    Detection of C. difficile nucleic acid in stools confirms the presence of these organisms in diarrheal patients but may not indicate that C. difficile is the etiologic agent of the diarrhea. Results from the Xpert C. difficile assay should be interpreted in conjunction with other laboratory and clinical data available to the clinician.    Patients with a positive C. difficile PCR result will receive reflex GDH/Toxin Immunoassay testing. Please interpret the PCR test result in conjunction with GDH/Toxin Immunoassay results and the clinical status of patient.          12/08/2023 2329 12/09/2023 0432 C. difficile Antigen and Toxins A/B by Enzyme Immunoassay [[id number]]    (Abnormal)   Stool from Per Rectum    Final result Component Value   C. difficile GDH Antigen Positive Abnormal    C. difficile Toxin Negative          12/08/2023 1021 12/09/2023 0132 Blood Culture Hand, Left [[id number]]   Blood from Hand, Left    Preliminary result Component Value   Culture No growth after 12 hours P             12/08/2023 1021 12/09/2023 0132 Blood Culture Hand, Left [[id number]]   Blood from Hand, Left    Preliminary result Component Value   Culture No growth after 12 hours P             12/07/2023 1836 12/08/2023 1717 Urine Culture [[id number]]   Urine, Midstream    Final result Component Value   Culture <10,000 CFU/mL Mixture of Urogenital Kadi             12/07/2023 1709 12/07/2023 2052 Symptomatic Influenza A/B, RSV, & SARS-CoV2 PCR (COVID-19) Nasopharyngeal [[id number]]    Swab from Nasopharyngeal    Final result Component Value   Influenza A PCR Negative   Influenza B PCR Negative   RSV PCR Negative   SARS CoV2 PCR Negative   NEGATIVE: SARS-CoV-2 (COVID-19) RNA not detected, presumed negative.           12/07/2023 1640 12/09/2023 0733 Blood Culture Arm, Right [[id number]]   (Abnormal)   Blood from Arm, Right    Preliminary result Component Value   Culture Positive on the 1st day of incubation Abnormal  P    Staphylococcus aureus Panic  P    2 of 2 bottles             12/07/2023 1640 12/08/2023 0852 Verigene GP Panel [[id number]]    (Abnormal)   Blood from Arm, Right    Final result Component Value   Staphylococcus aureus Detected Abnormal    Positive for Staphylococcus aureus and negative for the mecA gene (not resistant to methicillin) by E-Ductionigene multiplex nucleic acid test. Final identification and antimicrobial susceptibility testing will be verified by standard methods.   Staphylococcus epidermidis Not Detected   Staphylococcus lugdunensis Not Detected   Enterococcus faecalis Not Detected   Enterococcus faecium Not Detected   Streptococcus species Not Detected   Streptococcus agalactiae Not Detected   Streptococcus anginosus group Not Detected   Streptococcus pneumoniae Not Detected   Streptococcus pyogenes Not Detected   Listeria species Not Detected          12/07/2023 1633 12/08/2023 1831 Blood Culture Arm, Left [[id number]]   Blood from Arm, Left    Preliminary result Component Value   Culture No growth after 1 day P

## 2023-12-09 NOTE — PROGRESS NOTES
"Colon & Rectal Surgery Progress Note             Interval History:     Doing better, no fevers for 2 days, loose stools with blood.                 Medications:   I have reviewed this patient's current medications               Physical Exam:   Blood pressure 122/63, pulse 87, temperature 98.2  F (36.8  C), temperature source Oral, resp. rate 16, height 1.6 m (5' 3\"), weight 59 kg (130 lb), last menstrual period 12/04/1989, SpO2 99%.  No intake or output data in the 24 hours ending 12/09/23 1127  GEN:  alert  ABD:  soft, round, non-tender         Data:        Lab Results   Component Value Date     12/09/2023     07/08/2022    Lab Results   Component Value Date    CHLORIDE 105 12/09/2023    CHLORIDE 108 07/29/2022    CHLORIDE 101 07/08/2022    Lab Results   Component Value Date    BUN 6.2 12/09/2023    BUN 13 07/29/2022    BUN 15 07/08/2022    BUN 12 07/08/2022      Lab Results   Component Value Date    POTASSIUM 3.6 12/09/2023    POTASSIUM 4.1 07/29/2022    POTASSIUM 3.9 07/08/2022    Lab Results   Component Value Date    CO2 20 12/09/2023    CO2 24 07/29/2022    CO2 28 06/23/2021    Lab Results   Component Value Date    CR 0.68 12/09/2023    CR 0.69 12/09/2023    CR 1.23 07/08/2022    CR 1.29 02/07/2022        Lab Results   Component Value Date    HGB 8.7 (L) 12/09/2023    HGB 9.1 (L) 12/09/2023     Lab Results   Component Value Date     12/08/2023     12/07/2023     Lab Results   Component Value Date    WBC 17.2 (H) 12/08/2023    WBC 16.5 (H) 12/07/2023            Assessment and Plan:     Treat Cdif, okay for clears from surgery point of view.  Encourage ambulation  No plan for surgery     Carrie Victor MD  [facility]  Office Phone # [phone]    "

## 2023-12-09 NOTE — PROVIDER NOTIFICATION
MD Notification    Notified Person: MD    Notified Person Name: Dr. Milligan    Notification Date/Time: 12/08/23 22:17    Notification Interaction: Vocera    Purpose of Notification: Pt just had loose stool. We were unable to get a stool sample yesterday. I have collected the stool. Would you like to place an order with lab for this?     Orders Received: Order rec'd, stool sample sent to lab.    Comments:
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
## 2023-12-09 NOTE — PLAN OF CARE
Goal Outcome Evaluation:    Orientation: A&Ox4, intermittent forgetfulness and delusion  Activity: SBA  Diet/BS Checks: NPO, w/ ice chips & water for meds  Tele:  NSR  IV Access/Drains: R PIV SL CDI, L PIV infusing CDI  Pain Management: abdominal pain, Bentyl x1, oxy x1  Abnormal VS/Results: VSS on RA  Bowel/Bladder: Cont b/b, one watery brown stool this shift  Skin/Wounds: WNL  Consults: Colorectal, ID, GI, Hem/Onc, Psych  D/C Disposition: Pending  Other Info: Stool sample positive for C.diff PCR & antigens, abx adjusted accordingly, enteric precautions in place.

## 2023-12-09 NOTE — PROGRESS NOTES
Hospitalist service cross-cover note:     Paged regarding C. difficile results.  C. difficile PCR positive, antigen positive, toxin negative.  This may suggest colonization, however in the setting of diarrhea will empirically start vancomycin p.o., infectious diseases following and can be reviewed if appropriate to continue. Enteric precautions ordered.    Zack Harrison MD   Hospitalist  [phone]

## 2023-12-10 ENCOUNTER — APPOINTMENT (OUTPATIENT)
Dept: CARDIOLOGY | Facility: CLINIC | Age: 78
DRG: 371 | End: 2023-12-10
Attending: INTERNAL MEDICINE
Payer: COMMERCIAL

## 2023-12-10 LAB
ANION GAP SERPL CALCULATED.3IONS-SCNC: 11 MMOL/L (ref 7–15)
ANION GAP SERPL CALCULATED.3IONS-SCNC: 11 MMOL/L (ref 7–15)
ANION GAP SERPL CALCULATED.3IONS-SCNC: 12 MMOL/L (ref 7–15)
ANION GAP SERPL CALCULATED.3IONS-SCNC: 8 MMOL/L (ref 7–15)
BACTERIA BLD CULT: ABNORMAL
BACTERIA BLD CULT: ABNORMAL
BUN SERPL-MCNC: 3.3 MG/DL (ref 8–23)
BUN SERPL-MCNC: 4 MG/DL (ref 8–23)
BUN SERPL-MCNC: 4.1 MG/DL (ref 8–23)
BUN SERPL-MCNC: 4.8 MG/DL (ref 8–23)
CALCIUM SERPL-MCNC: 7.3 MG/DL (ref 8.8–10.2)
CALCIUM SERPL-MCNC: 7.4 MG/DL (ref 8.8–10.2)
CALCIUM SERPL-MCNC: 7.6 MG/DL (ref 8.8–10.2)
CALCIUM SERPL-MCNC: 7.9 MG/DL (ref 8.8–10.2)
CHLORIDE SERPL-SCNC: 104 MMOL/L (ref 98–107)
CHLORIDE SERPL-SCNC: 106 MMOL/L (ref 98–107)
CHLORIDE SERPL-SCNC: 106 MMOL/L (ref 98–107)
CHLORIDE SERPL-SCNC: 99 MMOL/L (ref 98–107)
CREAT SERPL-MCNC: 0.6 MG/DL (ref 0.51–0.95)
CREAT SERPL-MCNC: 0.61 MG/DL (ref 0.51–0.95)
CREAT SERPL-MCNC: 0.62 MG/DL (ref 0.51–0.95)
CREAT SERPL-MCNC: 0.64 MG/DL (ref 0.51–0.95)
DEPRECATED HCO3 PLAS-SCNC: 23 MMOL/L (ref 22–29)
DEPRECATED HCO3 PLAS-SCNC: 24 MMOL/L (ref 22–29)
DEPRECATED HCO3 PLAS-SCNC: 25 MMOL/L (ref 22–29)
DEPRECATED HCO3 PLAS-SCNC: 26 MMOL/L (ref 22–29)
EGFRCR SERPLBLD CKD-EPI 2021: >90 ML/MIN/1.73M2
GLUCOSE SERPL-MCNC: 110 MG/DL (ref 70–99)
GLUCOSE SERPL-MCNC: 134 MG/DL (ref 70–99)
GLUCOSE SERPL-MCNC: 93 MG/DL (ref 70–99)
GLUCOSE SERPL-MCNC: 99 MG/DL (ref 70–99)
HGB BLD-MCNC: 10 G/DL (ref 11.7–15.7)
HGB BLD-MCNC: 9.3 G/DL (ref 11.7–15.7)
HGB BLD-MCNC: 9.4 G/DL (ref 11.7–15.7)
HGB BLD-MCNC: 9.9 G/DL (ref 11.7–15.7)
LVEF ECHO: NORMAL
POTASSIUM SERPL-SCNC: 3.1 MMOL/L (ref 3.4–5.3)
POTASSIUM SERPL-SCNC: 3.2 MMOL/L (ref 3.4–5.3)
POTASSIUM SERPL-SCNC: 3.5 MMOL/L (ref 3.4–5.3)
POTASSIUM SERPL-SCNC: 3.7 MMOL/L (ref 3.4–5.3)
POTASSIUM SERPL-SCNC: 3.8 MMOL/L (ref 3.4–5.3)
SODIUM SERPL-SCNC: 135 MMOL/L (ref 135–145)
SODIUM SERPL-SCNC: 138 MMOL/L (ref 135–145)
SODIUM SERPL-SCNC: 140 MMOL/L (ref 135–145)
SODIUM SERPL-SCNC: 142 MMOL/L (ref 135–145)

## 2023-12-10 PROCEDURE — 99233 SBSQ HOSP IP/OBS HIGH 50: CPT | Performed by: INTERNAL MEDICINE

## 2023-12-10 PROCEDURE — 120N000001 HC R&B MED SURG/OB

## 2023-12-10 PROCEDURE — 250N000011 HC RX IP 250 OP 636: Mod: JZ | Performed by: INTERNAL MEDICINE

## 2023-12-10 PROCEDURE — 80048 BASIC METABOLIC PNL TOTAL CA: CPT | Performed by: INTERNAL MEDICINE

## 2023-12-10 PROCEDURE — 250N000013 HC RX MED GY IP 250 OP 250 PS 637: Performed by: INTERNAL MEDICINE

## 2023-12-10 PROCEDURE — 93306 TTE W/DOPPLER COMPLETE: CPT | Mod: 26 | Performed by: INTERNAL MEDICINE

## 2023-12-10 PROCEDURE — 36415 COLL VENOUS BLD VENIPUNCTURE: CPT | Performed by: INTERNAL MEDICINE

## 2023-12-10 PROCEDURE — 85018 HEMOGLOBIN: CPT | Performed by: INTERNAL MEDICINE

## 2023-12-10 PROCEDURE — 93306 TTE W/DOPPLER COMPLETE: CPT

## 2023-12-10 RX ORDER — POTASSIUM CHLORIDE 1.5 G/1.58G
40 POWDER, FOR SOLUTION ORAL ONCE
Status: COMPLETED | OUTPATIENT
Start: 2023-12-10 | End: 2023-12-10

## 2023-12-10 RX ORDER — POTASSIUM CHLORIDE 1500 MG/1
40 TABLET, EXTENDED RELEASE ORAL ONCE
Status: COMPLETED | OUTPATIENT
Start: 2023-12-10 | End: 2023-12-10

## 2023-12-10 RX ADMIN — POTASSIUM CHLORIDE 40 MEQ: 1500 TABLET, EXTENDED RELEASE ORAL at 03:27

## 2023-12-10 RX ADMIN — CEFAZOLIN SODIUM 2 G: 2 INJECTION, SOLUTION INTRAVENOUS at 00:29

## 2023-12-10 RX ADMIN — CEFAZOLIN SODIUM 2 G: 2 INJECTION, SOLUTION INTRAVENOUS at 17:17

## 2023-12-10 RX ADMIN — CEFAZOLIN SODIUM 2 G: 2 INJECTION, SOLUTION INTRAVENOUS at 09:48

## 2023-12-10 RX ADMIN — VANCOMYCIN HYDROCHLORIDE 125 MG: 125 CAPSULE ORAL at 08:19

## 2023-12-10 RX ADMIN — VANCOMYCIN HYDROCHLORIDE 125 MG: 125 CAPSULE ORAL at 21:31

## 2023-12-10 RX ADMIN — METRONIDAZOLE 500 MG: 500 INJECTION, SOLUTION INTRAVENOUS at 19:13

## 2023-12-10 RX ADMIN — VALACYCLOVIR HYDROCHLORIDE 1000 MG: 1 TABLET, FILM COATED ORAL at 08:19

## 2023-12-10 RX ADMIN — METRONIDAZOLE 500 MG: 500 INJECTION, SOLUTION INTRAVENOUS at 08:23

## 2023-12-10 RX ADMIN — VANCOMYCIN HYDROCHLORIDE 125 MG: 125 CAPSULE ORAL at 17:17

## 2023-12-10 RX ADMIN — VANCOMYCIN HYDROCHLORIDE 125 MG: 125 CAPSULE ORAL at 14:50

## 2023-12-10 RX ADMIN — POTASSIUM CHLORIDE 40 MEQ: 1500 TABLET, EXTENDED RELEASE ORAL at 23:51

## 2023-12-10 ASSESSMENT — ACTIVITIES OF DAILY LIVING (ADL)
ADLS_ACUITY_SCORE: 20
ADLS_ACUITY_SCORE: 21
ADLS_ACUITY_SCORE: 21
ADLS_ACUITY_SCORE: 20
ADLS_ACUITY_SCORE: 21
ADLS_ACUITY_SCORE: 20
ADLS_ACUITY_SCORE: 21

## 2023-12-10 NOTE — PROGRESS NOTES
Orientation: Answers orientation questions appropriately but forgetful and delusional at times.    Activity: SBA  Diet/BS Checks: Advanced to low fiber with mechanical/dental soft diet, BS more WDL today  Tele:  NSR  IV Access/Drains: L PIV infusing  Pain Management: Denies this shift.    Abnormal VS/Results: Some HTN.   Bowel/Bladder: Continent, no stools this shift  Skin/Wounds: WDL  Consults: ID, [facility], Colorectal and Hem/Onc  D/C Disposition: Pending ID plan and tolerating diet.  Other Info:

## 2023-12-10 NOTE — PROGRESS NOTES
Orientation: Answers orientation questions appropriately but forgetful and delusional at times.    Activity: SBA  Diet/BS Checks: Advanced to Clears, BS more WDL today  Tele:  NSR  IV Access/Drains: L PIV infusing  Pain Management: Abdominal cramping this evening, Bentyl given with moderate relief.    Abnormal VS/Results: Some HTN.  .  Bowel/Bladder: Continent, no stools this shift  Skin/Wounds: WDL  Consults: ID, [facility], Colorectal and Hem/Onc  D/C Disposition: Pending improvement and advancement of diet.  Other Info:

## 2023-12-10 NOTE — PROGRESS NOTES
"COLON & RECTAL SURGERY PROGRESS NOTE    S:  in good spirits, feels \"less suicidal\" this AM. Passing \"sand color and consistency\" stool. Minimal abdominal cramping.     Vitals:  Vitals:    12/08/23 2335 12/09/23 0729 12/09/23 1549 12/10/23 0832   BP: 113/71 122/63 (!) 155/68 135/66   BP Location: Right arm Right arm Right arm Left arm   Cuff Size:    Adult Regular   Pulse: 84 87 88 92   Resp: 16 16 16 16   Temp: 98.2  F (36.8  C) 98.2  F (36.8  C) 99.2  F (37.3  C) 98.4  F (36.9  C)   TempSrc: Oral Oral Oral Oral   SpO2: 97% 99% 98% 100%   Weight:       Height:         I/O:  No intake/output data recorded.    PE:  General Appearance: no acute distress, alert and oriented   Abdomen: soft minimally tender LUQ minimal distension   Ext: warm and well-perfused     Labs:  Recent Labs   Lab 12/10/23  0838 12/10/23  0837 12/10/23  0611 12/10/23  0024 12/09/23  1722 12/08/23  1741 12/08/23  1021 12/07/23  2316 12/07/23  1640 12/06/23  1314 12/06/23  0256   WBC  --   --   --   --   --   --  17.2*  --  16.5*  --  13.2*   HGB 10.0*  --  9.4* 9.3* 9.0*   < > 9.4*   < > 10.7*   < > 12.0   PLT  --   --   --   --   --   --  178  --  187  --  210   NA  --  142 135 138 139   < >  --    < >  --    < > 135   POTASSIUM  --  3.8  3.7 3.5 3.1* 3.6   < >  --    < >  --    < > 3.6   CHLORIDE  --  106 99 104 103   < >  --    < >  --    < > 97*   CO2  --  25 24 23 23   < >  --    < >  --    < > 27   BUN  --  4.0* 4.1* 4.8* 5.8*   < >  --    < >  --    < > 18.5   CR  --  0.61 0.64 0.62 0.67   < >  --    < >  --    < > 0.86   GLC  --  93 99 110* 94   < >  --    < >  --    < > 181*   AST  --   --   --   --   --   --   --   --   --   --  22   ALT  --   --   --   --   --   --   --   --   --   --  12    < > = values in this interval not displayed.     A/P: 78YF w/MM found to have ischemic colitis +/- C.diff.     Doing well, OK to advance diet as tolerated. Will defer mgmt of antibiotics to primary/ID. No indication for surgery. Will continue to " follow.     For questions/paging, please contact the CRS office at [phone].     Gemma Mohan MD Colorectal Surgery Fellow  [facility]  [address] [place] [id number]  T: [phone]  F: [phone]   [email or web address]
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
## 2023-12-10 NOTE — PLAN OF CARE
Goal Outcome Evaluation:    Orientation: A&Ox4, intermittent forgetfulness & delusion  Activity: SBA  Diet/BS Checks: Clear liquids, tolerating well  Tele:  NSR  IV Access/Drains: L PIV infusing CDI  Pain Management: abdominal discomfort, no interventions  Abnormal VS/Results: VSS on RA  Bowel/Bladder: cont bladder, incont bowel, brief in place, 2 BM's this shift, passing a lot of gas, still experiencing discomfort and bloating  Skin/Wounds: Bruise on L inner arm  Consults: Psych, Colorectal, ID, GI, Hem/Onc  D/C Disposition: Pending  Other Info: Ambulated around hallways with walker. Would like to walk again today. K+ proctocol

## 2023-12-10 NOTE — PROGRESS NOTES
[facility]  Gastroenterology Progress Note     Mary Jo Mcleod MRN# [id number]   YOB: 1945 Age: 78 year old          Assessment and Plan:       Generalized abdominal pain    Colitis    Rectal bleeding  Patient is overall doing significantly better no further fevers patient stools were positive for C. difficile patient is currently on vancomycin.  Patient blood cultures grew staph which is most likely not from colon source patient is currently being evaluated greatly appreciate input from infectious disease team.  Agree with possible colonization with C. difficile  Continue supportive care continue on vancomycin patient's findings discussed in detail with the hospitalist team.            Rectal bleeding  Colitis  Generalized abdominal pain  Ostomy nurse consultation      Interval History:     doing well; no cp, sob, n/v/d, or abd pain.              Review of Systems:     C: NEGATIVE for fever, chills, change in weight  E/M: NEGATIVE for ear, mouth and throat problems  R: NEGATIVE for significant cough or SOB  CV: NEGATIVE for chest pain, palpitations or peripheral edema             Medications:   I have reviewed this patient's current medications    ceFAZolin  2 g Intravenous Q8H     [Held by provider] citalopram  40 mg Oral Daily     metroNIDAZOLE  500 mg Intravenous Q12H     sodium chloride (PF)  3 mL Intracatheter Q8H     valACYclovir  1,000 mg Oral Daily     vancomycin  125 mg Oral 4x Daily                  Physical Exam:   Vitals were reviewed  Vital Signs with Ranges  Temp:  [98.2  F (36.8  C)-99.2  F (37.3  C)] 99.2  F (37.3  C)  Pulse:  [84-88] 88  Resp:  [16] 16  BP: (113-155)/(63-71) 155/68  SpO2:  [97 %-99 %] 98 %  No intake/output data recorded.  Cardiovascular: negative, PMI normal. No lifts, heaves, or thrills. RRR. No murmurs, clicks gallops or rub  Respiratory: negative, Percussion normal. Good diaphragmatic excursion. Lungs clear  Head: Normocephalic. No  masses, lesions, tenderness or abnormalities  Neck: Neck supple. No adenopathy. Thyroid symmetric, normal size,, Carotids without bruits.  Abdomen: Abdomen soft, non-tender. BS normal. No masses, organomegaly           Data:   I reviewed the patient's new clinical lab test results.   Recent Labs   Lab Test 12/09/23  1722 12/09/23  0714 12/09/23  0028 12/08/23  1741 12/08/23  1021 12/07/23  2316 12/07/23  1640 12/06/23  1314 12/06/23  0256   WBC  --   --   --   --  17.2*  --  16.5*  --  13.2*   HGB 9.0* 8.7* 9.1*   < > 9.4*   < > 10.7*   < > 12.0   MCV  --   --   --   --  105*  --  103*  --  101*   PLT  --   --   --   --  178  --  187  --  210    < > = values in this interval not displayed.     Recent Labs   Lab Test 12/09/23  1722 12/09/23  0714 12/09/23  0028   POTASSIUM 3.6 3.6 3.4   CHLORIDE 103 105 103   CO2 23 20* 21*   BUN 5.8* 6.2* 6.6*   ANIONGAP 13 13 12     Recent Labs   Lab Test 12/07/23  1836 12/06/23  0256 07/08/22  1452 06/25/22  1851   ALBUMIN  --  3.9 2.8* 2.9*   BILITOTAL  --  0.3 0.4 0.7   ALT  --  12 14 19   AST  --  22 21 22   PROTEIN 20*  --   --   --    LIPASE  --  37  --  362       I reviewed the patient's new imaging results.    All laboratory data reviewed  All imaging studies reviewed by me.    Pankaj Castillo MD,  12/9/2023  [facility]  Office : [phone]  Cell: [phone]      [facility]  Ph: [phone] Fax: [phone]

## 2023-12-11 LAB
ANION GAP SERPL CALCULATED.3IONS-SCNC: 6 MMOL/L (ref 7–15)
ANION GAP SERPL CALCULATED.3IONS-SCNC: 7 MMOL/L (ref 7–15)
ANION GAP SERPL CALCULATED.3IONS-SCNC: 8 MMOL/L (ref 7–15)
BUN SERPL-MCNC: 2.7 MG/DL (ref 8–23)
BUN SERPL-MCNC: 3 MG/DL (ref 8–23)
BUN SERPL-MCNC: 4.6 MG/DL (ref 8–23)
CALCIUM SERPL-MCNC: 7.9 MG/DL (ref 8.8–10.2)
CALCIUM SERPL-MCNC: 7.9 MG/DL (ref 8.8–10.2)
CALCIUM SERPL-MCNC: 8.1 MG/DL (ref 8.8–10.2)
CHLORIDE SERPL-SCNC: 105 MMOL/L (ref 98–107)
CHLORIDE SERPL-SCNC: 106 MMOL/L (ref 98–107)
CHLORIDE SERPL-SCNC: 106 MMOL/L (ref 98–107)
CREAT SERPL-MCNC: 0.61 MG/DL (ref 0.51–0.95)
CREAT SERPL-MCNC: 0.67 MG/DL (ref 0.51–0.95)
CREAT SERPL-MCNC: 0.72 MG/DL (ref 0.51–0.95)
DEPRECATED HCO3 PLAS-SCNC: 27 MMOL/L (ref 22–29)
DEPRECATED HCO3 PLAS-SCNC: 29 MMOL/L (ref 22–29)
DEPRECATED HCO3 PLAS-SCNC: 29 MMOL/L (ref 22–29)
EGFRCR SERPLBLD CKD-EPI 2021: 86 ML/MIN/1.73M2
EGFRCR SERPLBLD CKD-EPI 2021: 90 ML/MIN/1.73M2
EGFRCR SERPLBLD CKD-EPI 2021: >90 ML/MIN/1.73M2
GLUCOSE SERPL-MCNC: 109 MG/DL (ref 70–99)
GLUCOSE SERPL-MCNC: 115 MG/DL (ref 70–99)
GLUCOSE SERPL-MCNC: 173 MG/DL (ref 70–99)
HGB BLD-MCNC: 9 G/DL (ref 11.7–15.7)
HGB BLD-MCNC: 9.4 G/DL (ref 11.7–15.7)
HGB BLD-MCNC: 9.5 G/DL (ref 11.7–15.7)
POTASSIUM SERPL-SCNC: 3.6 MMOL/L (ref 3.4–5.3)
POTASSIUM SERPL-SCNC: 3.7 MMOL/L (ref 3.4–5.3)
POTASSIUM SERPL-SCNC: 4.3 MMOL/L (ref 3.4–5.3)
SODIUM SERPL-SCNC: 141 MMOL/L (ref 135–145)

## 2023-12-11 PROCEDURE — 250N000013 HC RX MED GY IP 250 OP 250 PS 637: Performed by: INTERNAL MEDICINE

## 2023-12-11 PROCEDURE — 36415 COLL VENOUS BLD VENIPUNCTURE: CPT | Performed by: INTERNAL MEDICINE

## 2023-12-11 PROCEDURE — 250N000011 HC RX IP 250 OP 636: Mod: JZ | Performed by: INTERNAL MEDICINE

## 2023-12-11 PROCEDURE — 272N000433 HC KIT CATH IV 18 OR 20G CM, POWERGLIDE W MAX BARRIER

## 2023-12-11 PROCEDURE — 99233 SBSQ HOSP IP/OBS HIGH 50: CPT | Performed by: INTERNAL MEDICINE

## 2023-12-11 PROCEDURE — 99232 SBSQ HOSP IP/OBS MODERATE 35: CPT | Performed by: SPECIALIST

## 2023-12-11 PROCEDURE — 80048 BASIC METABOLIC PNL TOTAL CA: CPT | Performed by: INTERNAL MEDICINE

## 2023-12-11 PROCEDURE — 120N000001 HC R&B MED SURG/OB

## 2023-12-11 PROCEDURE — 85018 HEMOGLOBIN: CPT | Performed by: INTERNAL MEDICINE

## 2023-12-11 PROCEDURE — 36569 INSJ PICC 5 YR+ W/O IMAGING: CPT

## 2023-12-11 PROCEDURE — 250N000009 HC RX 250: Performed by: SPECIALIST

## 2023-12-11 PROCEDURE — 250N000013 HC RX MED GY IP 250 OP 250 PS 637: Performed by: SPECIALIST

## 2023-12-11 RX ORDER — CEFAZOLIN SODIUM 2 G/100ML
2 INJECTION, SOLUTION INTRAVENOUS EVERY 8 HOURS
DISCHARGE
Start: 2023-12-11 | End: 2023-12-21

## 2023-12-11 RX ORDER — METRONIDAZOLE 500 MG/1
500 TABLET ORAL 2 TIMES DAILY
Status: DISCONTINUED | OUTPATIENT
Start: 2023-12-11 | End: 2023-12-17

## 2023-12-11 RX ADMIN — CEFAZOLIN SODIUM 2 G: 2 INJECTION, SOLUTION INTRAVENOUS at 01:44

## 2023-12-11 RX ADMIN — CEFAZOLIN SODIUM 2 G: 2 INJECTION, SOLUTION INTRAVENOUS at 11:29

## 2023-12-11 RX ADMIN — VANCOMYCIN HYDROCHLORIDE 125 MG: 125 CAPSULE ORAL at 17:32

## 2023-12-11 RX ADMIN — CEFAZOLIN SODIUM 2 G: 2 INJECTION, SOLUTION INTRAVENOUS at 17:00

## 2023-12-11 RX ADMIN — METRONIDAZOLE 500 MG: 500 TABLET ORAL at 22:00

## 2023-12-11 RX ADMIN — VANCOMYCIN HYDROCHLORIDE 125 MG: 125 CAPSULE ORAL at 08:27

## 2023-12-11 RX ADMIN — METRONIDAZOLE 500 MG: 500 INJECTION, SOLUTION INTRAVENOUS at 06:39

## 2023-12-11 RX ADMIN — VANCOMYCIN HYDROCHLORIDE 125 MG: 125 CAPSULE ORAL at 22:00

## 2023-12-11 RX ADMIN — VANCOMYCIN HYDROCHLORIDE 125 MG: 125 CAPSULE ORAL at 13:38

## 2023-12-11 RX ADMIN — LIDOCAINE HYDROCHLORIDE 1 ML: 10 INJECTION, SOLUTION INFILTRATION; PERINEURAL at 13:29

## 2023-12-11 RX ADMIN — VALACYCLOVIR HYDROCHLORIDE 1000 MG: 1 TABLET, FILM COATED ORAL at 08:27

## 2023-12-11 RX ADMIN — CITALOPRAM HYDROBROMIDE 40 MG: 40 TABLET ORAL at 08:27

## 2023-12-11 ASSESSMENT — ACTIVITIES OF DAILY LIVING (ADL)
ADLS_ACUITY_SCORE: 22
ADLS_ACUITY_SCORE: 22
ADLS_ACUITY_SCORE: 20
ADLS_ACUITY_SCORE: 22
ADLS_ACUITY_SCORE: 20
ADLS_ACUITY_SCORE: 22
ADLS_ACUITY_SCORE: 20
ADLS_ACUITY_SCORE: 22
ADLS_ACUITY_SCORE: 20
ADLS_ACUITY_SCORE: 22
ADLS_ACUITY_SCORE: 20
ADLS_ACUITY_SCORE: 20

## 2023-12-11 NOTE — PROGRESS NOTES
[facility]    Infectious Disease Progress Note    Date of Service : 12/11/2023     Assessment:  78 YF with multiple myeloma, on chemoradiation therapy, who has been admitted with abdominal pain and rectal bleeding and found to have ischemic colitis by colonoscopy with C.diff colonization. Additionally had one positive blood cx for MSSA     -MSSA bacteremia on one blood cx only ?    Possible GI source? Repeat blood culture 12/8/23 no growth to date.   -Ischemic colitis  -Multiple myeloma, on chemoradiation therapy  -PCN listed as allergy but tolerating cefepime       Recommendations:  IV Cefazolin for 2 weeks  Midline  Continue Metronidazole for 7 days total  Oral vancomycin prophylaxis while on IV antibiotics  Care integration for discharge planning  OPIV antibiotic orders placed in Epic for discharge for cefazolin until 12/21 (2 weeks from negative blood cxs)  Patient would like to go to a TCU for antibiotics        Aisha See MD    Interval History   Has remained afebrile, no labs this morning, tolerating antimicrobial therapy without side effects. Little bit of formed stool today . Blood cxs have cleared    Physical Exam   Temp: 98.6  F (37  C) Temp src: Oral BP: 121/65 Pulse: 102   Resp: 18 SpO2: 99 % O2 Device: None (Room air)    Vitals:    12/07/23 0920   Weight: 59 kg (130 lb)     Vital Signs with Ranges  Temp:  [98.4  F (36.9  C)-99.5  F (37.5  C)] 98.6  F (37  C)  Pulse:  [] 102  Resp:  [16-18] 18  BP: (121-142)/(65-74) 121/65  SpO2:  [99 %-100 %] 99 %    Constitutional: Awake, alert, cooperative, no apparent distress  Lungs: non labored  Abdomen: non tender  Skin: No rashes, no cyanosis, no edema    Other:    Medications      ceFAZolin  2 g Intravenous Q8H    citalopram  40 mg Oral Daily    metroNIDAZOLE  500 mg Intravenous Q12H    sodium chloride (PF)  3 mL Intracatheter Q8H    valACYclovir  1,000 mg Oral Daily    vancomycin  125 mg Oral 4x Daily       Data   All  microbiology laboratory data reviewed.  Recent Labs   Lab Test 12/11/23  0501 12/11/23  0126 12/10/23  1724 12/08/23  1741 12/08/23  1021 12/07/23  2316 12/07/23  1640 12/06/23  1314 12/06/23  0256   WBC  --   --   --   --  17.2*  --  16.5*  --  13.2*   HGB 9.5* 9.4* 9.9*   < > 9.4*   < > 10.7*   < > 12.0   HCT  --   --   --   --  29.0*  --  32.1*  --  35.7   MCV  --   --   --   --  105*  --  103*  --  101*   PLT  --   --   --   --  178  --  187  --  210    < > = values in this interval not displayed.     Recent Labs   Lab Test 12/11/23  0501 12/11/23  0126 12/10/23  2112   CR 0.72 0.61 0.60     Microbiology  12/8 blood cx negative  12/7 blood cx positive  12/07/2023 1640 12/10/2023 0728 Blood Culture Arm, Right [[id number]]    (Abnormal)   Blood from Arm, Right    Final result Component Value   Culture Positive on the 1st day of incubation Abnormal     Staphylococcus aureus Panic     2 of 2 bottles       Susceptibility     Staphylococcus aureus     TYRONE     Ciprofloxacin <=0.5 ug/mL Susceptible *     Clindamycin 0.25 ug/mL Susceptible     Daptomycin 0.25 ug/mL Susceptible     Doxycycline <=0.5 ug/mL Susceptible     Erythromycin <=0.25 ug/mL Susceptible     Gentamicin <=0.5 ug/mL Susceptible     Inducible macrolide resistance test Negative ug/mL Negative *     Levofloxacin 0.25 ug/mL Susceptible *     Linezolid 4 ug/mL Susceptible *     Moxifloxacin <=0.25 ug/mL Susceptible *     Nitrofurantoin 32 ug/mL Susceptible *     Oxacillin 0.5 ug/mL Susceptible 1     Rifampin <=0.5 ug/mL Susceptible *     Tetracycline <=1 ug/mL Susceptible     Tigecycline <=0.12 ug/mL Susceptible *     Trimethoprim/Sulfamethoxazole <=0.5/9.5 u... Susceptible     Vancomycin 1 ug/mL Susceptible                      Imaging  EXAM: CT ABDOMEN PELVIS W/O CONTRAST  LOCATION: [facility]  DATE: 12/7/2023     INDICATION: colitis with colonoscopy  COMPARISON: CT 12/06/2023, 06/25/2022 and PET/CT 08/14/2023  TECHNIQUE: CT  scan of the abdomen and pelvis was performed without IV contrast. Multiplanar reformats were obtained. Dose reduction techniques were used.  CONTRAST: None.     FINDINGS:   LOWER CHEST: Tiny hiatal hernia. Bibasilar atelectasis/scarring.     HEPATOBILIARY: Vicarious contrast excretion into the gallbladder lumen. Mild periportal edema which may reflect sequelae of IV hydration. Normal bile ducts.     PANCREAS: Normal.     SPLEEN: Normal.     ADRENAL GLANDS: Normal.     KIDNEYS/BLADDER: No hydronephrosis, hydroureter or urinary tract stone.     BOWEL: Tiny fat-containing umbilical hernia. Mild small bowel distention with scattered air-fluid levels. No small bowel wall edema. Normal appendix. Severe colitis extending from the distal transverse through the mid sigmoid segment. Associated   circumferential mural thickening and surrounding edema. Trace ascites. No free air and no fluid collection. No pneumatosis.     LYMPH NODES: Normal.     VASCULATURE: Moderate aortoiliac atherosclerosis.     PELVIC ORGANS: Enlarged retroflexed uterus with multiple calcified fibroids. Trace pelvic free fluid.     MUSCULOSKELETAL: Stable old fractures of the right sacral ala and left pubic bone. Severe osseous demineralization. Stable severe L5 vertebral body compression fracture. Spinal degenerative changes.                                                                      IMPRESSION:   1.  Persistent severe colitis involving the distal colon.  2.  Trace simple ascites. No evidence for bowel perforation and no fluid collection.  3.  Mild small bowel distention with scattered air-fluid levels which may reflect a mild ileus.

## 2023-12-11 NOTE — PROGRESS NOTES
ONCOLOGY CC:    No new recommendations from Hem Onc. Bortezomib and ischemic colitis is rare (<0.01%), but there are case reports in the literature. There is nothing to do from a treatment standpoint for her myeloma at this time. Will continue to have ongoing discussions as an outpatient with Dr. Dreyer. We will continue to follow along peripherally.
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
## 2023-12-11 NOTE — PLAN OF CARE
Goal Outcome Evaluation:      Plan of Care Reviewed With: patient      Summary: 12/10-12/11   [id number]    Primary Diagnosis: Abdominal pain secondary to ischemic colitis.  Orientation: A&O x4.  VS/Tele:VSS on RA, Tele-   .Denies pain.  Activity: SBA  Diet/BS Checks: Mech/dental soft & lower fiber  IV Access/Drains: PIV SL with intermittent antx.  Abnormal VS/Results: K+ protocol,Hgb 9.5  Bowel/Bladder: Cont B/B. Walks to bathroom.  Skin/Wounds: Intact  Consults: Psych/GI/ID/Hemo  D/C Disposition: Pending ID plan  Other Info: Q6 Hgb checks. Enteric precaution maintained.On antx.

## 2023-12-11 NOTE — PROGRESS NOTES
WOC follow-up post stoma marking:     Chart reviewed, noted no current plans for abdominal surgery and colorectal has signed off.  WOC will sign off.

## 2023-12-11 NOTE — PROGRESS NOTES
ONCOLOGY CC:    No new recommendations from Hem Onc. Bortezomib and ischemic colitis is rare (<0.01%), but there are case reports in the literature. There is nothing to do from a treatment standpoint for her myeloma at this time. Will continue to have ongoing discussions as an outpatient. Discharge pending.  We will continue to follow along peripherally.

## 2023-12-11 NOTE — PLAN OF CARE
Orientation: A/Ox4    Vitals/Tele: VSS - HTN, LSC - RA, SR BBB    IV Access/drains: 1 PIV - SL    Diet: Mechanical soft/ Low fiber    Mobility: SBA    GI/: Adequate UO via toilet, BS audible - tenderness with palpitations    Wound/Skin: WDL    Consults: Psych, GI, Colorectal surg    Discharge Plan: Pending       See Flow sheets for assessment    Goal Outcome Evaluation:

## 2023-12-11 NOTE — PROGRESS NOTES
[facility]  Gastroenterology Progress Note     Mary Jo Mcleod MRN# [id number]   YOB: 1945 Age: 78 year old          Assessment and Plan:       Generalized abdominal pain    Colitis    Rectal bleeding  Patient is clinically doing much better patient is sitting up in the chair no new GI complaint denies any abdominal cramps patient has slowed amount of blood in the stool patient is on clear liquid diet.  I will recommend the patient to advance to soft diet today once patient is switched to oral antibiotics patient can be discharged from GI standpoint further evaluation and management per infectious disease.  I will recommend follow-up in GI clinic in 1 week after discharge.  Finding and plan discussed with the hospitalist team.            Rectal bleeding  Colitis  Generalized abdominal pain  Ostomy nurse consultation      Interval History:     doing well; no cp, sob, n/v/d, or abd pain.              Review of Systems:     C: NEGATIVE for fever, chills, change in weight  E/M: NEGATIVE for ear, mouth and throat problems  R: NEGATIVE for significant cough or SOB  CV: NEGATIVE for chest pain, palpitations or peripheral edema             Medications:   I have reviewed this patient's current medications    ceFAZolin  2 g Intravenous Q8H     citalopram  40 mg Oral Daily     metroNIDAZOLE  500 mg Intravenous Q12H     sodium chloride (PF)  3 mL Intracatheter Q8H     valACYclovir  1,000 mg Oral Daily     vancomycin  125 mg Oral 4x Daily                  Physical Exam:   Vitals were reviewed  Vital Signs with Ranges  Temp:  [98.4  F (36.9  C)-99.5  F (37.5  C)] 99.5  F (37.5  C)  Pulse:  [92-94] 94  Resp:  [16] 16  BP: (135-142)/(66-74) 142/74  SpO2:  [100 %] 100 %  No intake/output data recorded.  Cardiovascular: negative, PMI normal. No lifts, heaves, or thrills. RRR. No murmurs, clicks gallops or rub  Respiratory: negative, Percussion normal. Good diaphragmatic excursion. Lungs  clear  Head: Normocephalic. No masses, lesions, tenderness or abnormalities  Neck: Neck supple. No adenopathy. Thyroid symmetric, normal size,, Carotids without bruits.  Abdomen: Abdomen soft, non-tender. BS normal. No masses, organomegaly  SKIN: no suspicious lesions or rashes           Data:   I reviewed the patient's new clinical lab test results.   Recent Labs   Lab Test 12/10/23  1724 12/10/23  0838 12/10/23  0611 12/08/23  1741 12/08/23  1021 12/07/23  2316 12/07/23  1640 12/06/23  1314 12/06/23  0256   WBC  --   --   --   --  17.2*  --  16.5*  --  13.2*   HGB 9.9* 10.0* 9.4*   < > 9.4*   < > 10.7*   < > 12.0   MCV  --   --   --   --  105*  --  103*  --  101*   PLT  --   --   --   --  178  --  187  --  210    < > = values in this interval not displayed.     Recent Labs   Lab Test 12/10/23  0837 12/10/23  0611 12/10/23  0024   POTASSIUM 3.8  3.7 3.5 3.1*   CHLORIDE 106 99 104   CO2 25 24 23   BUN 4.0* 4.1* 4.8*   ANIONGAP 11 12 11     Recent Labs   Lab Test 12/07/23  1836 12/06/23  0256 07/08/22  1452 06/25/22  1851   ALBUMIN  --  3.9 2.8* 2.9*   BILITOTAL  --  0.3 0.4 0.7   ALT  --  12 14 19   AST  --  22 21 22   PROTEIN 20*  --   --   --    LIPASE  --  37  --  362       I reviewed the patient's new imaging results.    All laboratory data reviewed  All imaging studies reviewed by me.    Pankaj Castillo MD,  12/10/2023  [facility]  Office : [phone]  Cell: [phone]      [facility]  Ph: [phone] Fax: [phone]

## 2023-12-11 NOTE — PROCEDURES
[facility]    Single Lumen Midline Placement    Date/Time: 12/11/2023 1:35 PM    Performed by: Jessie Martinez RN  Authorized by: Aisha See MD  Indications: vascular access      UNIVERSAL PROTOCOL   Site Marked: Yes  Prior Images Obtained and Reviewed:  Yes  Required items: Required blood products, implants, devices and special equipment available    Patient identity confirmed:  Verbally with patient, arm band, provided demographic data and hospital-assigned identification number  NA - No sedation, light sedation, or local anesthesia  Confirmation Checklist:  Patient's identity using two indicators, relevant allergies, procedure was appropriate and matched the consent or emergent situation and correct equipment/implants were available  Time out: Immediately prior to the procedure a time out was called    Universal Protocol: the Joint Commission Universal Protocol was followed    Preparation: Patient was prepped and draped in usual sterile fashion    ESBL (mL):  0     ANESTHESIA    Anesthesia:  See MAR for details  Anesthetic Total (mL):  1      SEDATION    Patient Sedated: No        Preparation: skin prepped with ChloraPrep  Skin prep agent: skin prep agent completely dried prior to procedure  Sterile barriers: maximum sterile barriers were used: cap, mask, sterile gown, sterile gloves, and large sterile sheet  Hand hygiene: hand hygiene performed prior to central venous catheter insertion  Type of line used: Midline  Catheter type: single lumen  Lumen type: non-valved  Brand: Bard  Placement method: venipuncture, MST and ultrasound  Number of attempts: 1  Difficulty threading catheter: no  Successful placement: yes  Orientation: left    Location: cephalic vein  Arm circumference: adults 10 cm  Extremity circumference: 28  Visible catheter length: 1  Internal length: 19 cm  Total catheter length: 20  Dressing and securement: adhesive securement device, antibiotic disc placed, blood  removed, dressing applied, gloves changed prior to final dressing, secured with tape, site cleansed, sterile dressing applied, taped, transparent dressing and occlusive dressing applied  Post procedure assessment: blood return through all ports and free fluid flow  PROCEDURE   Patient Tolerance:  Patient tolerated the procedure well with no immediate complicationsDescribe Procedure: A midline catheter is a form of peripheral venous access. Not recommended for the infusion of vesicants (Vancomycin, Vasopressors, TPN, etc.)     Procedure when well and she is aware of the need for Midline and course of antibiotics   No problems with line insert Thanks Anderson Martinez RN

## 2023-12-11 NOTE — PROGRESS NOTES
COLON & RECTAL SURGERY  PROGRESS NOTE    December 11, 2023    SUBJECTIVE:  Doing well. Denies abdominal pain. Tolerating low fiber diet. Having soft, non-bloody stools. AVSS. Hgb stable.     OBJECTIVE:  Temp:  [98.6  F (37  C)-99.5  F (37.5  C)] 98.6  F (37  C)  Pulse:  [] 102  Resp:  [16-18] 18  BP: (121-142)/(65-74) 121/65  SpO2:  [99 %-100 %] 99 %  No intake or output data in the 24 hours ending 12/11/23 0837    GENERAL:  Awake, alert, no acute distress  HEAD: Normocephalic atraumatic  SCLERA: Anicteric  EXTREMITIES: Warm and well perfused  ABDOMEN:  Soft, minimally tender in LLQ, non-distended. No guarding, rigidity, or peritoneal signs.     LABS:  Lab Results   Component Value Date    WBC 17.2 12/08/2023    WBC 3.1 12/06/2022    WBC 5.5 06/23/2021     Lab Results   Component Value Date    HGB 9.5 12/11/2023    HGB 8.3 12/06/2022     Lab Results   Component Value Date    HCT 29.0 12/08/2023    HCT 23.9 12/06/2022     Lab Results   Component Value Date     12/08/2023     12/06/2022     Last Basic Metabolic Panel:  Lab Results   Component Value Date     12/11/2023     07/08/2022      Lab Results   Component Value Date    POTASSIUM 4.3 12/11/2023    POTASSIUM 4.1 07/29/2022    POTASSIUM 3.9 07/08/2022     Lab Results   Component Value Date    CHLORIDE 106 12/11/2023    CHLORIDE 108 07/29/2022    CHLORIDE 101 07/08/2022     Lab Results   Component Value Date    ARA 7.9 12/11/2023    ARA 9.9 07/08/2022     Lab Results   Component Value Date    CO2 29 12/11/2023    CO2 24 07/29/2022    CO2 28 06/23/2021     Lab Results   Component Value Date    BUN 2.7 12/11/2023    BUN 13 07/29/2022    BUN 15 07/08/2022    BUN 12 07/08/2022     Lab Results   Component Value Date    CR 0.72 12/11/2023    CR 1.23 07/08/2022     Lab Results   Component Value Date     12/11/2023    GLC 91 07/29/2022    GLC 90 07/08/2022       ASSESSMENT/PLAN: 79 yo F with multiple myeloma who presented with  abdominal pain and rectal bleeding, workup suggestive of ischemic colitis +/- c diff. Improving with conservative management.     - On low fiber diet, advance as tolerated  - Abx per ID  - Avoid constipation  - No indication for surgical intervention. CRS will sign off, call with questions/concerns.    Discussed with Dr. Holt.    For questions/paging, please contact the CRS office at [phone].    Maninder Moore PA-C  Colorectal Physician Assistant    [facility]  [address] [address]  T: [phone]  F: [phone]

## 2023-12-12 ENCOUNTER — APPOINTMENT (OUTPATIENT)
Dept: OCCUPATIONAL THERAPY | Facility: CLINIC | Age: 78
DRG: 371 | End: 2023-12-12
Attending: INTERNAL MEDICINE
Payer: COMMERCIAL

## 2023-12-12 LAB
ANION GAP SERPL CALCULATED.3IONS-SCNC: 7 MMOL/L (ref 7–15)
BACTERIA BLD CULT: NO GROWTH
BUN SERPL-MCNC: 3.2 MG/DL (ref 8–23)
CALCIUM SERPL-MCNC: 8.1 MG/DL (ref 8.8–10.2)
CHLORIDE SERPL-SCNC: 105 MMOL/L (ref 98–107)
CREAT SERPL-MCNC: 0.67 MG/DL (ref 0.51–0.95)
DEPRECATED HCO3 PLAS-SCNC: 30 MMOL/L (ref 22–29)
EGFRCR SERPLBLD CKD-EPI 2021: 90 ML/MIN/1.73M2
GLUCOSE SERPL-MCNC: 117 MG/DL (ref 70–99)
POTASSIUM SERPL-SCNC: 3.7 MMOL/L (ref 3.4–5.3)
SODIUM SERPL-SCNC: 142 MMOL/L (ref 135–145)

## 2023-12-12 PROCEDURE — 82374 ASSAY BLOOD CARBON DIOXIDE: CPT | Performed by: INTERNAL MEDICINE

## 2023-12-12 PROCEDURE — 120N000001 HC R&B MED SURG/OB

## 2023-12-12 PROCEDURE — 99232 SBSQ HOSP IP/OBS MODERATE 35: CPT | Performed by: INTERNAL MEDICINE

## 2023-12-12 PROCEDURE — 36415 COLL VENOUS BLD VENIPUNCTURE: CPT | Performed by: INTERNAL MEDICINE

## 2023-12-12 PROCEDURE — 999N000147 HC STATISTIC PT IP EVAL DEFER

## 2023-12-12 PROCEDURE — 250N000011 HC RX IP 250 OP 636: Mod: JZ | Performed by: INTERNAL MEDICINE

## 2023-12-12 PROCEDURE — 250N000013 HC RX MED GY IP 250 OP 250 PS 637: Performed by: SPECIALIST

## 2023-12-12 PROCEDURE — 97535 SELF CARE MNGMENT TRAINING: CPT | Mod: GO

## 2023-12-12 PROCEDURE — 250N000013 HC RX MED GY IP 250 OP 250 PS 637: Performed by: INTERNAL MEDICINE

## 2023-12-12 PROCEDURE — 97165 OT EVAL LOW COMPLEX 30 MIN: CPT | Mod: GO

## 2023-12-12 RX ADMIN — VANCOMYCIN HYDROCHLORIDE 125 MG: 125 CAPSULE ORAL at 08:59

## 2023-12-12 RX ADMIN — METRONIDAZOLE 500 MG: 500 TABLET ORAL at 08:59

## 2023-12-12 RX ADMIN — METRONIDAZOLE 500 MG: 500 TABLET ORAL at 22:11

## 2023-12-12 RX ADMIN — CEFAZOLIN SODIUM 2 G: 2 INJECTION, SOLUTION INTRAVENOUS at 09:14

## 2023-12-12 RX ADMIN — CEFAZOLIN SODIUM 2 G: 2 INJECTION, SOLUTION INTRAVENOUS at 17:32

## 2023-12-12 RX ADMIN — VANCOMYCIN HYDROCHLORIDE 125 MG: 125 CAPSULE ORAL at 22:10

## 2023-12-12 RX ADMIN — CEFAZOLIN SODIUM 2 G: 2 INJECTION, SOLUTION INTRAVENOUS at 23:28

## 2023-12-12 RX ADMIN — VANCOMYCIN HYDROCHLORIDE 125 MG: 125 CAPSULE ORAL at 13:24

## 2023-12-12 RX ADMIN — VANCOMYCIN HYDROCHLORIDE 125 MG: 125 CAPSULE ORAL at 17:31

## 2023-12-12 RX ADMIN — CEFAZOLIN SODIUM 2 G: 2 INJECTION, SOLUTION INTRAVENOUS at 03:07

## 2023-12-12 RX ADMIN — VALACYCLOVIR HYDROCHLORIDE 1000 MG: 1 TABLET, FILM COATED ORAL at 08:59

## 2023-12-12 RX ADMIN — CITALOPRAM HYDROBROMIDE 40 MG: 40 TABLET ORAL at 08:59

## 2023-12-12 ASSESSMENT — ACTIVITIES OF DAILY LIVING (ADL)
IADL_COMMENTS: PT REPORTS BEING IND W/ ALL IADL'S AT BASELINE PRIOR TO ADMISSION. PT REPORTS THAT THEY STILL DRIVE.
ADLS_ACUITY_SCORE: 20
PREVIOUS_RESPONSIBILITIES: MEAL PREP;HOUSEKEEPING;LAUNDRY;MEDICATION MANAGEMENT;FINANCES;DRIVING
ADLS_ACUITY_SCORE: 20

## 2023-12-12 NOTE — PLAN OF CARE
Physical Therapy: Orders received. Chart reviewed and discussed with care team.? Physical Therapy not indicated due to pt moving IND in room and at baseline mobility. Per OT evaluation, pt has no further inpatient skilled therapy needs.? Defer discharge recommendations to OT and medical team.? Will complete orders.

## 2023-12-12 NOTE — PLAN OF CARE
Goal Outcome Evaluation:             Summary: 12/11   [id number]    Primary Diagnosis: Abdominal pain secondary to ischemic colitis.  Orientation: A&O x4.  VS/Tele:VSS on RA, Denies pain.  Activity: SBA  Diet/BS Checks: Mech/dental soft & lower fiber  IV Access/Drains: PIV SL with intermittent abx. MIDLINE placed on day shift.  Abnormal VS/Results: K+ protocol,Hgb 9.5  Bowel/Bladder: Cont B/B. Walks to bathroom.  Skin/Wounds: Intact  Consults: Psych/GI/ID/Hemo  D/C Disposition: TCU for 2 weeks of IV ABX  Other Info: Q6 Hgb checks. Cdiff Enteric precautions.

## 2023-12-12 NOTE — PLAN OF CARE
Primary Diagnosis: Abdominal pain secondary to ischemic colitis.  Orientation: A&O x4.  VS/Tele:VSS on RA,   Tele- NSR  Pain: Denies pain.  Activity: SBA  Diet/BS Checks: Mech/dental soft & lower fiber  IV Access/Drains: PIV SL with intermittent antx.  Abnormal VS/Results: K+ protocol WNL ,Hgb 9.0  Bowel/Bladder: Cont B/B  Skin/Wounds: Intact  Consults: Psych/GI/ID/Hemo  D/C Disposition: Pending ID plan  Other Info: Q6 Hgb checks. Enteric precaution maintained.On anbtx.

## 2023-12-12 NOTE — PLAN OF CARE
Shift note: 0722-9366  Orientation: AOx4  Activity: SBA  Diet/BS Checks: soft food  Tele:  NSR  IV Access/Drains: Left Midline SL, R PIV SL  Pain Management: abd discomfort. No pain meds given  Abnormal VS/Results: AVSS on RA. K protocol AM recheck.  Bowel/Bladder: cont of b/b  Skin/Wounds: bruising on L arm  Consults: OT. PT  D/C Disposition: pending improvement  Other Info: Enteric precaution maintained.

## 2023-12-12 NOTE — PROGRESS NOTES
12/12/23 1420   Appointment Info   Signing Clinician's Name / Credentials (OT) Elgin Heller OTR/L   Living Environment   People in Home alone   Current Living Arrangements condominium   Home Accessibility no concerns   Transportation Anticipated car, drives self;public transportation   Living Environment Comments Pt reports living alone in condo. No stairs to enter. Bed/bath on one level. Tub shower w/ grab bars.   Self-Care   Usual Activity Tolerance good   Current Activity Tolerance moderate   Regular Exercise Yes   Activity/Exercise Type other (see comments)  (Senior classes at [employer])   Exercise Amount/Frequency 3-5 times/wk   Equipment Currently Used at Home cane, straight;walker, rolling   Fall history within last six months yes   Number of times patient has fallen within last six months 1   Activity/Exercise/Self-Care Comment Pt reports being IND w/ all ADL's at baseline prior to admission.   Instrumental Activities of Daily Living (IADL)   Previous Responsibilities meal prep;housekeeping;laundry;medication management;finances;driving   IADL Comments Pt reports being IND w/ all IADL's at baseline prior to admission. Pt reports that they still drive.   General Information   Onset of Illness/Injury or Date of Surgery 12/06/23   Referring Physician Nishi Benítez MD   Patient/Family Therapy Goal Statement (OT) Return to home.   Additional Occupational Profile Info/Pertinent History of Current Problem Mary Jo Mcleod is a 78 year old female with a history of multiple myeloma, stage III CKD, rheumatoid arthritis, osteopenia admitted on 12/6/2023 with abdominal pain, BRBPR and imaging showing colitis of transverse and descending colon on CT on admission   Existing Precautions/Restrictions fall   Cognitive Status Examination   Orientation Status orientation to person, place and time   Visual Perception   Visual Impairment/Limitations corrective lenses for reading   Sensory   Sensory Quick Adds sensation intact    Pain Assessment   Patient Currently in Pain Yes, see Vital Sign flowsheet  (2/10 lower abdomen)   Range of Motion Comprehensive   General Range of Motion no range of motion deficits identified   Strength Comprehensive (MMT)   Comment, General Manual Muscle Testing (MMT) Assessment Generalized weakness bilaterally   Bed Mobility   Bed Mobility supine-sit   Supine-Sit [place] (Bed Mobility) independent   Transfers   Transfers sit-stand transfer;toilet transfer   Sit-Stand Transfer   Sit-Stand [place] (Transfers) modified independence   Assistive Device (Sit-Stand Transfers) walker, front-wheeled   Toilet Transfer   Type (Toilet Transfer) stand-sit;sit-stand   [place] Level (Toilet Transfer) modified independence   Assistive Device (Toilet Transfer) walker, front-wheeled   Activities of Daily Living   BADL Assessment/Intervention lower body dressing;grooming;toileting   Lower Body Dressing Assessment/Training   Position (Lower Body Dressing) edge of bed sitting   [place] Level (Lower Body Dressing) independent   Grooming Assessment/Training   Position (Grooming) sink side   [place] Level (Grooming) independent   Toileting   [place] Level (Toileting) independent   Clinical Impression   Criteria for Skilled Therapeutic Interventions Met (OT) Yes, treatment indicated   OT Diagnosis Decreased activity tolerance   OT Problem List-Impairments impacting ADL problems related to;activity tolerance impaired;strength   Clinical Decision Making Complexity (OT) problem focused assessment/low complexity   Risk & Benefits of therapy have been explained evaluation/treatment results reviewed;care plan/treatment goals reviewed;risks/benefits reviewed;current/potential barriers reviewed;patient   OT Total Evaluation Time   OT Eval, Low Complexity Minutes (72370) 10   OT Goals   Therapy Frequency (OT) One time eval and treatment   OT Predicted Duration/Target Date for Goal Attainment 12/12/23   OT Goals  Hygiene/Grooming;Lower Body Dressing;Toilet Transfer/Toileting   OT: Hygiene/Grooming independent;while standing;Goal Met   OT: Lower Body Dressing Independent;including set-up/clothing retrieval;Goal Met   OT: Toilet Transfer/Toileting Independent;toilet transfer;cleaning and garment management;Goal Met   Self-Care/Home Management   Self-Care/Home Mgmt/ADL, Compensatory, Meal Prep Minutes (25187) 10   Symptoms Noted During/After Treatment (Meal Preparation/Planning Training) fatigue   Treatment Detail/Skilled Intervention Pt greeted supine in bed and agreeable for OT evaluation. Pt very pleasant throughout therapy session. IND for pt to complete sup > sit edge of bed. Pt able to demonstrate doffing/donning B socks. Sit > stand completed w/ Mod IND and use of FWW. Pt ambulated within room to bathroom and completed toilet transfer w/ Mod IND. Pt able to doff briefs IND. Pt voided and completed luis f cares. Upon completion, pt stood sinkside to complete 1 g/h task prior to returning edge of bed. Pt reports moving at slightly slower pace than baseline. Educated pt on process of increasing activity tolerance through pacing and energy conservation. Pt verbalized understanding. Pt remained seated edge of bed at end of therapy session w/ all needs met.   OT Discharge Planning   OT Plan Discharge OT   OT Discharge Recommendation (DC Rec) home   OT Rationale for DC Rec Pt appears to be functioning at/near baseline. Pt lives alone in [facility] and reports being IND w/ all I/ADL's at baseline. Pt is currently IND within the room w/ FWW in which pt owns a walker and cane at home. Anticipate that pt will be safe to d/c home once medically ready from a therapy standpoint.   OT Brief overview of current status See above   Total Session Time   Timed Code Treatment Minutes 10   Total Session Time (sum of timed and untimed services) 20

## 2023-12-12 NOTE — PROGRESS NOTES
[facility]    Medicine Progress Note - Hospitalist Service    Date of Admission:  12/6/2023    Interval History:   Patient is comfortable.  Diarrhea improving.  Feels like her strength is coming back to her.  Using walker more often than prior to admission.  Needs to discharge to TCU for IV antibiotics.  PT OT consultations requested.  Patient is agreeable to going to TCU on discharge ON discussion with her.  She just had coffee which seem to irritate her bowels in the having some diarrhea from that today  Assessment & Plan   Mary Jo Mcleod is a 78 year old female with a history of multiple myeloma, stage III CKD, rheumatoid arthritis, osteopenia admitted on 12/6/2023 with abdominal pain, BRBPR and imaging showing colitis of transverse and descending colon on CT on admission     12/7 CT Transverse and descending colon wall thickening and inflammatory stranding secondary to C. difficile  Colitis and ischemic colitis with MSSA bacteremia  12/7/2023: Colonoscopy with ischemic colitis  12/7 sepsis with fever.   On evening of admission 12/5 patient described lower abdominal cramping and bright red blood per rectum.  Patient had associated nausea.  Had eaten egg salad 1 hour before.  In ED AFVSS. Hgb 12. WBC 13.2.   12/6 CT a/p w/ colitis (rja transverse and descending colon), infectious/ inflammatory favored.   12/6 GI consult: Dr. Castillo   12/7 Febrile with temp 101.5 sepsis treatment started on BSA with cefepime and flagyl. Aggressive fluid 1500 + /hour   12/7 CXR negative. COVID, influenza and RSV negative   Lactate negative this admission.   12/7 CT abdomen/pelvis (repeat to ensure no perforation after colonoscopy):  Persistent severe colitis involving the distal colon. No evidence of bowel perforation and no fluid collection. Mild small bowel distention with scattered air-fluid levels may reflect mild ileus   12/8 CRS consult :following   12/8 with ID consult: Changed cefepime to  cefazolin. + Flagyl still. (Staph aureus bacteremia)   Daily improvement   12/11 CRS signed off. On regular diet   She is now on a regular diet and would be okay to discharge from her ischemic coliti/C. Difficile   12/11 ID recommending Flagyl for 7 full days total 12/8 - 12/15 (IV to oral)     C. difficile toxin B+/GDH antigen positive with toxin negative  CT findings of severe colitis  Vancomycin 125 mg 4 times daily added given clinical scenario and imaging findings  ID consulted  Recommend oral Vanco while on IV antibiotics (IV antibiotics for 2 weeks)     Staph aureus (MSSA) 12/7 blood culture  12/7 bacteremia (1 of 2 blood cultures + 12/7 positive MSSA)   12/8 blood cultures x 2 negative  12/8  vanco x 1 given while awaiting sensitivities   12/8 ID consult.: Changed from cefepime to Ancef with Flagyl  12/10 ECHO: EF 60-65%, Right ventricle normal. Mild 1+ MR. No obvious valvular vegetations.   12/11 ID follow-up.  Patient will require IV cefazolin for 2 weeks.  Needs a midline.  Will need TCU   PT OT consultations requested on 12/12/2023    Hypokalemia: (Intermittent)   Replacement protocol    Tachycardia: resolved secondary to sepsis   Likely secondary to sepsis/infection   EKG with sinus tachycardia and NSST changes. T wave abnormality in inferior leads. NSST changes.   HR improving with fluids/Abx  12/9 echo ordered given staph  12/10 ECHO as above with no WMA, and normal EF     Multiple myeloma  IgG Kappa multiple myeloma   Follows with Dr Dreyer w/ [facility]. Hx multiple pathologic fractures.   Of note plans for MR pelvis by [facility](to compare w/ CT 5/4/23 from [place] w/ multiple abnormalities).  Patient by last note with [facility] had MRI with TRIA   On prophylactic valtrex   Patient on maintenance bortezomib  12/7 Consult [facility]   12/7 as per oncology visit.  Not likely myeloma or myeloma treatment associated.  Patient will follow-up in the clinic for bortezomib>> moved out 2-week      Polyneuropathy  Patient had developed a sensory neuropathy over the last 6 months.  Occurs mainly at nighttime    HLD  With ischemic colitis critical meds only po      Suicidal statement (not suicidal now)  Paranoid of politicians   12/8 patient reported suicidal statement to [facility] nurse.   12/11 patient had made suicidal statements earlier in her hospital course with a discussion about potential surgery and an ostomy.  She subsequently stated that she is not suicidal.  Patient also made comments about [employer] which  stealing her money. (See prior notes)   12/11 psychiatry consult with [facility]: Lore Hanson (saw patient in 2022)   12/11. Patient not felt to be suicidal.  History of paranoid thoughts about politicians.  No medications at this point    MDD  Anxiety  PTSD  Citalopram 40 mg daily resumed   See above for assessment of suicidal statements and paranoid thoughts        Diet: Regular diet  DVT Prophylaxis: Pneumatic Compression ambulating    Astudillo Catheter: Not present  Lines: None     Cardiac Monitoring: None  Code Status:  Full       Clinically Significant Risk Factors        # Hypokalemia: Lowest K = 3.2 mmol/L in last 2 days, will replace as needed                        # Financial/Environmental Concerns: none         Disposition Plan   Patient will require a TCU with midline.  Will require 2 weeks of IV antibiotics.    Nishi Benítez MD  Hospitalist Service  [facility]  Securely message with [employer] (more info)  Text page via [employer] Paging/Directory   ______________________________________________________________________  Physical Exam   Vital Signs: Temp: 98.2  F (36.8  C) Temp src: Oral BP: 131/76 Pulse: 87   Resp: 16 SpO2: 99 % O2 Device: None (Room air)    Weight: 130 lbs 0 oz    General Appearance: Patient is awake and alert  Respiratory: Lungs are clear to auscultation bilaterally without wheezes or rhonchi  Cardiovascular: Regular rate and rhythm without gallop rub  normal S1-S2  GI: + Bowel sounds soft no rebound guarding or tenderness.  Skin: No edema      Medical Decision Making       45 MINUTES SPENT BY ME on the date of service doing chart review, history, exam, documentation & further activities per the note.      Data     I have personally reviewed the following data over the past 24 hrs:    N/A  \   9.0 (L)   / N/A     142 105 3.2 (L) /  117 (H)   3.7 30 (H) 0.67 \       Imaging results reviewed over the past 24 hrs:   No results found for this or any previous visit (from the past 24 hour(s)).

## 2023-12-12 NOTE — PLAN OF CARE
Occupational Therapy Discharge Summary    Reason for therapy discharge:    All goals and outcomes met, no further needs identified.    Progress towards therapy goal(s). See goals on Care Plan in [facility] electronic health record for goal details.  Goals met    Therapy recommendation(s):    No further therapy is recommended. Pt appears to be functioning at/near baseline. Pt lives alone in [facility] and reports being IND w/ all I/ADL's at baseline. Pt is currently IND within the room w/ FWW in which pt owns a walker and cane at home. Anticipate that pt will be safe to d/c home once medically ready from a therapy standpoint.

## 2023-12-12 NOTE — PROGRESS NOTES
Care Management Follow Up    Length of Stay (days): 6    Expected Discharge Date: 12/12/2023     Concerns to be Addressed: all concerns addressed in this encounter     Patient plan of care discussed at interdisciplinary rounds: Yes    Anticipated Discharge Disposition: Home, Transitional Care     Anticipated Discharge Services: None  Anticipated Discharge DME: None    Patient/family educated on Medicare website which has current facility and service quality ratings:    Education Provided on the Discharge Plan:    Patient/Family in Agreement with the Plan: no    Referrals Placed by CM/SW:    Private pay costs discussed: Not applicable    Additional Information:  Email to [facility] for benefit check to pharm intake and also to [facility] liaison..  Needing Ancef 2 gm every 8 hour til 12/21.      Renée Richard RN  Inpatient Float Care Coordinator  [facility]

## 2023-12-13 ENCOUNTER — HOME INFUSION (PRE-WILLOW HOME INFUSION) (OUTPATIENT)
Dept: PHARMACY | Facility: CLINIC | Age: 78
End: 2023-12-13
Payer: COMMERCIAL

## 2023-12-13 LAB
ANION GAP SERPL CALCULATED.3IONS-SCNC: 7 MMOL/L (ref 7–15)
BACTERIA BLD CULT: NO GROWTH
BACTERIA BLD CULT: NO GROWTH
BUN SERPL-MCNC: 5.3 MG/DL (ref 8–23)
CALCIUM SERPL-MCNC: 8.3 MG/DL (ref 8.8–10.2)
CHLORIDE SERPL-SCNC: 104 MMOL/L (ref 98–107)
CREAT SERPL-MCNC: 0.62 MG/DL (ref 0.51–0.95)
DEPRECATED HCO3 PLAS-SCNC: 29 MMOL/L (ref 22–29)
EGFRCR SERPLBLD CKD-EPI 2021: >90 ML/MIN/1.73M2
ERYTHROCYTE [DISTWIDTH] IN BLOOD BY AUTOMATED COUNT: 12.9 % (ref 10–15)
GLUCOSE SERPL-MCNC: 91 MG/DL (ref 70–99)
HCT VFR BLD AUTO: 30.4 % (ref 35–47)
HGB BLD-MCNC: 9.9 G/DL (ref 11.7–15.7)
MCH RBC QN AUTO: 33.2 PG (ref 26.5–33)
MCHC RBC AUTO-ENTMCNC: 32.6 G/DL (ref 31.5–36.5)
MCV RBC AUTO: 102 FL (ref 78–100)
PLATELET # BLD AUTO: 230 10E3/UL (ref 150–450)
POTASSIUM SERPL-SCNC: 3.8 MMOL/L (ref 3.4–5.3)
RBC # BLD AUTO: 2.98 10E6/UL (ref 3.8–5.2)
SODIUM SERPL-SCNC: 140 MMOL/L (ref 135–145)
WBC # BLD AUTO: 5.4 10E3/UL (ref 4–11)

## 2023-12-13 PROCEDURE — 80048 BASIC METABOLIC PNL TOTAL CA: CPT | Performed by: INTERNAL MEDICINE

## 2023-12-13 PROCEDURE — 85027 COMPLETE CBC AUTOMATED: CPT | Performed by: INTERNAL MEDICINE

## 2023-12-13 PROCEDURE — 250N000013 HC RX MED GY IP 250 OP 250 PS 637: Performed by: INTERNAL MEDICINE

## 2023-12-13 PROCEDURE — 36415 COLL VENOUS BLD VENIPUNCTURE: CPT | Performed by: INTERNAL MEDICINE

## 2023-12-13 PROCEDURE — 250N000013 HC RX MED GY IP 250 OP 250 PS 637: Performed by: SPECIALIST

## 2023-12-13 PROCEDURE — 99207 PR NO BILLABLE SERVICE THIS VISIT: CPT | Performed by: INTERNAL MEDICINE

## 2023-12-13 PROCEDURE — 120N000001 HC R&B MED SURG/OB

## 2023-12-13 PROCEDURE — 250N000011 HC RX IP 250 OP 636: Mod: JZ | Performed by: INTERNAL MEDICINE

## 2023-12-13 PROCEDURE — 99232 SBSQ HOSP IP/OBS MODERATE 35: CPT | Performed by: INTERNAL MEDICINE

## 2023-12-13 PROCEDURE — 999N000190 HC STATISTIC VAT ROUNDS

## 2023-12-13 RX ORDER — METRONIDAZOLE 500 MG/1
500 TABLET ORAL 2 TIMES DAILY
Qty: 4 TABLET | Refills: 0 | Status: SHIPPED | OUTPATIENT
Start: 2023-12-13 | End: 2023-12-13

## 2023-12-13 RX ORDER — METRONIDAZOLE 500 MG/1
500 TABLET ORAL 2 TIMES DAILY
Qty: 5 TABLET | Refills: 0 | Status: SHIPPED | OUTPATIENT
Start: 2023-12-13 | End: 2023-12-17

## 2023-12-13 RX ORDER — VANCOMYCIN HYDROCHLORIDE 125 MG/1
125 CAPSULE ORAL 4 TIMES DAILY
Qty: 84 CAPSULE | Refills: 0 | Status: SHIPPED | OUTPATIENT
Start: 2023-12-13 | End: 2023-12-21

## 2023-12-13 RX ORDER — VANCOMYCIN HYDROCHLORIDE 125 MG/1
125 CAPSULE ORAL 4 TIMES DAILY
Qty: 84 CAPSULE | Refills: 0 | Status: SHIPPED | OUTPATIENT
Start: 2023-12-13 | End: 2023-12-13

## 2023-12-13 RX ADMIN — VANCOMYCIN HYDROCHLORIDE 125 MG: 125 CAPSULE ORAL at 12:21

## 2023-12-13 RX ADMIN — METRONIDAZOLE 500 MG: 500 TABLET ORAL at 09:11

## 2023-12-13 RX ADMIN — VANCOMYCIN HYDROCHLORIDE 125 MG: 125 CAPSULE ORAL at 22:25

## 2023-12-13 RX ADMIN — VALACYCLOVIR HYDROCHLORIDE 1000 MG: 1 TABLET, FILM COATED ORAL at 09:11

## 2023-12-13 RX ADMIN — VANCOMYCIN HYDROCHLORIDE 125 MG: 125 CAPSULE ORAL at 18:24

## 2023-12-13 RX ADMIN — METRONIDAZOLE 500 MG: 500 TABLET ORAL at 20:52

## 2023-12-13 RX ADMIN — CITALOPRAM HYDROBROMIDE 40 MG: 40 TABLET ORAL at 09:11

## 2023-12-13 RX ADMIN — CEFAZOLIN SODIUM 2 G: 2 INJECTION, SOLUTION INTRAVENOUS at 09:12

## 2023-12-13 RX ADMIN — VANCOMYCIN HYDROCHLORIDE 125 MG: 125 CAPSULE ORAL at 09:11

## 2023-12-13 RX ADMIN — CEFAZOLIN SODIUM 2 G: 2 INJECTION, SOLUTION INTRAVENOUS at 21:58

## 2023-12-13 ASSESSMENT — ACTIVITIES OF DAILY LIVING (ADL)
ADLS_ACUITY_SCORE: 20
ADLS_ACUITY_SCORE: 18
ADLS_ACUITY_SCORE: 20
ADLS_ACUITY_SCORE: 18
ADLS_ACUITY_SCORE: 18
ADLS_ACUITY_SCORE: 20

## 2023-12-13 NOTE — PROGRESS NOTES
[facility]    Received request for benefit check should pt require home IV abx. Patient does have coverage for IV abx under [facility] Medicare Advantage plan, however the drug must be a a CADD pump for coverage. If not on a CADD pump, then the patient would be self pay due to no coverage. Currently on  Cefazolin 2gm q8h, this drug can go a CADD pump, so patient should have coverage at this time. Coverage of 80/20, OOP $3000 has been met in full, coverage should be at 100%.    Please note that [facility] requires a central line for infusions done via CADD pump in the home setting.     Nursing is covered if patient is homebound (outside agency would be utilized as [facility] is no Medicare contracted), if not homebound, nursing is not covered and [facility] can see patient if patient agrees to self pay, cost is $90 per visit.    Addendum @ 1500h: since pt has a midline, she will not be able to dose via CADD pump. Out of pocket cost for IVP cefazolin is $37.07/day for drug and supplies. Pt okay with cost and would like to proceed with [facility] for home IV abx needs.     Thank you for the referral     Lisbeth Lu RN  [facility] Liaison  [phone] (Mon thru Fri 8am - 5pm)  [phone] Office

## 2023-12-13 NOTE — PLAN OF CARE
Shift note: 9018-0098  Orientation: AOx4  Activity: SBA  Diet/BS Checks: soft food  Tele:  NSR  IV Access/Drains: Left Midline SL, R PIV SL  Pain Management: abd discomfort. No pain meds given  Abnormal VS/Results: AVSS on RA. K protocol AM recheck.  Bowel/Bladder: cont of b/b  Skin/Wounds: bruising on L arm  Consults: SW, Hem/onc, Spiritual Health  D/C Disposition: discharge planned 10pm tonight after abx.  Other Info: Enteric precaution maintained.

## 2023-12-13 NOTE — PROGRESS NOTES
Addendum @ 1600h: Medication/supplies to be delivered to [facility] by 8pm- via  service. Should there be any issues with delivery, please call our 24hr triage line, [phone].     Home Infusion  Mary Jo will be discharging today and going home on IV cefazolin q8.  I met with Mary Jo at bedside and instructed in IV administration via SL midline with SAS flushing.   Had Mary Jo perform hands on with practice equipment and teaching sheets. Provided information about supplies and supply delivery, storage of medication, checking of label, dosing times, plan for SNV and 24/7 availability of [facility] staff. [facility] will follow for home RN.  Mary Jo verbalized understanding of information given. She demonstrated very good technique with practice and verbalized good understanding of process.  She is requesting a home RN for first home dose; however, she does not want that visit tonight because she will be too tired. Plan is for pt to discharge after 10pm dose via taxi. [facility] will coordinate a nurse visit for tomorrow at 0800.   Dosing schedule: Pt will dose 0000, 0800, 1600h in the home setting.   Delivery: Medication and supplies will be delivered to [facility] prior to discharge home. Unfortunately, somebody needs to be home to accept the delivery since she lives in an apartment building and she doesn't have anybody available.   Mary Jo is ready for discharge from [facility] perspective after she receives her 2200h dose.    Lisbeth Lu RN  [facility] Liaison  [phone] (M-F 8a-5p)  [phone] Office

## 2023-12-13 NOTE — PROGRESS NOTES
3554 - 1170    Orientation: AOx4  Activity: SBA  Diet/BS Checks: soft food  Tele:  NSR  IV Access/Drains: Left Midline SL, R PIV SL  Pain Management: abd discomfort. No pain meds given  Abnormal VS/Results: AVSS on RA. K protocol AM recheck.  Bowel/Bladder: cont of b/b  Skin/Wounds: bruising on L arm  Consults: OT. PT  D/C Disposition: pending improvement  Other Info: Enteric precautions maintained

## 2023-12-13 NOTE — PLAN OF CARE
Orientation: A&Ox4  Activity: SBA w/ walker  Diet/BS Checks: Softs, low fiber  Tele: NSR  IV Access/Drains: L midline SL, R PIV SL  Pain Management: Denies, PRN dilaudid, oxycodone   Abnormal VS/Results:   Bowel/Bladder: Cont of b/b, diarrhea   Skin/Wounds:   Consults: PT, OT, SW  D/C Disposition: TBD, likely TCU, needs IV abx until 12/21    Other Info:    -Enteric precautions maintained   -Requested not to be disturbed overnight, states she has not been sleeping well d/t being woken up often during the night

## 2023-12-13 NOTE — PROGRESS NOTES
Chart Check:    No new recommendations from Hem Onc. Bortezomib and ischemic colitis is rare (<0.01%), but there are case reports in the literature. There is nothing to do from a treatment standpoint for her myeloma at this time. Will continue to have ongoing discussions as an outpatient. Discharge to TCU anticipated.  She will need follow up in our clinic scheduled. Anticipate this will be after she is discharged from TCU..        Ede MASON, [facility]  [phone] (office) or [phone] (cell)

## 2023-12-13 NOTE — PROGRESS NOTES
Therapy: IV abx     Insurance: [facility] Medicare Adv    Patient does have coverage for IV abx under [facility] Medicare Advantage plan, however the drug must be a a CADD pump for coverage. If not on a CADD pump, then the patient would be self pay due to no coverage. Currently on  Cefazolin 2gm q8h, this drug can go a CADD pump, so patient should have coverage at this time. Coverage of 80/20, OOP $3000 has been met in full, coverage should be at 100%.    Nursing is covered if patient is homebound (outside agency would be utilized as [facility] is no Medicare contracted), if not homebound, nursing is not covered and [facility] can see patient if patient agrees to self pay, cost is $90 per visit.    In reference to admission on 12/06/23 to check IV abx coverage    Please contact Intake with any questions, [phone] or In Basket pool,  Home Infusion ([phone]).

## 2023-12-13 NOTE — PROGRESS NOTES
[facility]    Medicine Progress Note - Hospitalist Service    Date of Admission:  12/6/2023    Interval History:   Patient is sitting comfortably in chair.  Feels well.  Denies any new complaints today.  Discussed with her that PT is recommending discharge to home so will need to have home antibiotics set up with home infusion services.  Discussed with care coordinator to send the referrals and setting this up at home.  No other acute issues today    Assessment & Plan   Mary Jo Mcleod is a 78 year old female with a history of multiple myeloma, stage III CKD, rheumatoid arthritis, osteopenia admitted on 12/6/2023 with abdominal pain, BRBPR and imaging showing colitis of transverse and descending colon on CT on admission     12/7 CT Transverse and descending colon wall thickening and inflammatory stranding secondary to C. difficile  Colitis and ischemic colitis with MSSA bacteremia  12/7/2023: Colonoscopy with ischemic colitis  12/7 sepsis with fever.   On evening of admission 12/5 patient described lower abdominal cramping and bright red blood per rectum.  Patient had associated nausea.  Had eaten egg salad 1 hour before.  In ED AFVSS. Hgb 12. WBC 13.2.   12/6 CT a/p w/ colitis (raj transverse and descending colon), infectious/ inflammatory favored.   12/6 GI consult: Dr. Castillo   12/7 Febrile with temp 101.5 sepsis treatment started on BSA with cefepime and flagyl. Aggressive fluid 1500 + /hour   12/7 CXR negative. COVID, influenza and RSV negative   Lactate negative this admission.   12/7 CT abdomen/pelvis (repeat to ensure no perforation after colonoscopy):  Persistent severe colitis involving the distal colon. No evidence of bowel perforation and no fluid collection. Mild small bowel distention with scattered air-fluid levels may reflect mild ileus   12/8 CRS consult :following   12/8 with ID consult: Changed cefepime to cefazolin. + Flagyl still. (Staph aureus bacteremia)    Daily improvement   12/11 CRS signed off. On regular diet   She is now on a regular diet and would be okay to discharge from her ischemic coliti/C. Difficile   12/11 ID recommending Flagyl for 7 full days total 12/8 - 12/15 (IV to oral)     C. difficile toxin B+/GDH antigen positive with toxin negative  CT findings of severe colitis  Vancomycin 125 mg 4 times daily added given clinical scenario and imaging findings  ID consulted  Recommend oral Vanco while on IV antibiotics (IV antibiotics for 2 weeks)     Staph aureus (MSSA) 12/7 blood culture  12/7 bacteremia (1 of 2 blood cultures + 12/7 positive MSSA)   12/8 blood cultures x 2 negative  12/8  vanco x 1 given while awaiting sensitivities   12/8 ID consult.: Changed from cefepime to Ancef with Flagyl  12/10 ECHO: EF 60-65%, Right ventricle normal. Mild 1+ MR. No obvious valvular vegetations.   12/11 ID follow-up.  Patient will require IV cefazolin for 2 weeks.  Needs a midline.  Will need TCU   PT OT consultations requested on 12/12/2023    Hypokalemia: (Intermittent)   Replacement protocol    Tachycardia: resolved secondary to sepsis   Likely secondary to sepsis/infection   EKG with sinus tachycardia and NSST changes. T wave abnormality in inferior leads. NSST changes.   HR improving with fluids/Abx  12/9 echo ordered given stap  12/10 ECHO as above with no WMA, and normal EF     Multiple myeloma  IgG Kappa multiple myeloma   Follows with Dr Dreyer w/ [facility]. Hx multiple pathologic fractures.   Of note plans for MR pelvis by [facility](to compare w/ CT 5/4/23 from [place] w/ multiple abnormalities).  Patient by last note with [facility] had MRI with TRIA   On prophylactic valtrex   Patient on maintenance bortezomib  12/7 Consult [facility]   12/7 as per oncology visit.  Not likely myeloma or myeloma treatment associated.  Patient will follow-up in the clinic for bortezomib>> moved out 2-week     Polyneuropathy  Patient had developed a sensory neuropathy  over the last 6 months.  Occurs mainly at nighttime    HLD  With ischemic colitis critical meds only po      Suicidal statement (not suicidal now)  Paranoid of politicians   12/8 patient reported suicidal statement to [facility] nurse.   12/11 patient had made suicidal statements earlier in her hospital course with a discussion about potential surgery and an ostomy.  She subsequently stated that she is not suicidal.  Patient also made comments about [employer] which  stealing her money. (See prior notes)   12/11 psychiatry consult with [facility]: Lore Hanson (saw patient in 2022)   12/11. Patient not felt to be suicidal.  History of paranoid thoughts about politicians.  No medications at this point    MDD  Anxiety  PTSD  Citalopram 40 mg daily resumed   See above for assessment of suicidal statements and paranoid thoughts        Diet: Regular diet  DVT Prophylaxis: Pneumatic Compression ambulating    Astudillo Catheter: Not present  Lines: None     Cardiac Monitoring: None  Code Status:  Full       Clinically Significant Risk Factors                               # Financial/Environmental Concerns: none         Disposition Plan   Patient   Will require 2 weeks of IV antibiotics.  Care coordinator consultation requested for arrangement of antibiotics at home with home infusion services    Nishi Benítez MD  Hospitalist Service  [facility]  Securely message with [employer] (more info)  Text page via [employer] Paging/Directory   ______________________________________________________________________  Physical Exam   Vital Signs: Temp: 98.3  F (36.8  C) Temp src: Oral BP: (!) 140/70 Pulse: 85   Resp: 16 SpO2: 99 % O2 Device: None (Room air)    Weight: 130 lbs 0 oz    General Appearance: Patient is awake and alert  Respiratory: Lungs are clear to auscultation bilaterally without wheezes or rhonchi  Cardiovascular: Regular rate and rhythm without gallop rub normal S1-S2  GI: + Bowel sounds soft no rebound guarding  or tenderness.  Skin: No edema      Medical Decision Making       45 MINUTES SPENT BY ME on the date of service doing chart review, history, exam, documentation & further activities per the note.      Data     I have personally reviewed the following data over the past 24 hrs:    5.4  \   9.9 (L)   / 230     140 104 5.3 (L) /  91   3.8 29 0.62 \       Imaging results reviewed over the past 24 hrs:   No results found for this or any previous visit (from the past 24 hour(s)).

## 2023-12-13 NOTE — DISCHARGE SUMMARY
[facility]  Hospitalist Discharge Summary      Date of Admission:  12/6/2023  Date of Discharge:  12/13/2023  Discharging Provider: Nishi Benítez MD  Discharge Service: Hospitalist Service    Discharge Diagnoses   Please see hospital course     Clinically Significant Risk Factors          Follow-ups Needed After Discharge   Follow-up Appointments     Follow-up and recommended labs and tests       Follow up with primary care provider, Levi Mcdonnell, within 7 days for   hospital follow- up.  The following labs/tests are recommended: CBC, BMP   in 1week .           Unresulted Labs Ordered in the Past 30 Days of this Admission       Date and Time Order Name Status Description    12/7/2023  5:13 AM Prepare red blood cells (unit) Preliminary     12/7/2023  5:13 AM Prepare red blood cells (unit) Preliminary           Discharge Disposition   Discharged to home  Condition at discharge: Stable    Hospital Course   Mary Jo Mcleod is a 78 year old female with a history of multiple myeloma, stage III CKD, rheumatoid arthritis, osteopenia admitted on 12/6/2023 with abdominal pain, BRBPR and imaging showing colitis of transverse and descending colon on CT on admission      12/7 CT Transverse and descending colon wall thickening and inflammatory stranding secondary to C. difficile  Colitis and ischemic colitis with MSSA bacteremia  12/7/2023: Colonoscopy with ischemic colitis  12/7 sepsis with fever.   On evening of admission 12/5 patient described lower abdominal cramping and bright red blood per rectum.  Patient had associated nausea.  Had eaten egg salad 1 hour before.  In ED AFVSS. Hgb 12. WBC 13.2.   12/6 CT a/p w/ colitis (raj transverse and descending colon), infectious/ inflammatory favored.   12/6 GI consult: Dr. Castillo   12/7 Febrile with temp 101.5 sepsis treatment started on BSA with cefepime and flagyl. Aggressive fluid 1500 + /hour   12/7 CXR negative. COVID, influenza and RSV  negative   Lactate negative this admission.   12/7 CT abdomen/pelvis (repeat to ensure no perforation after colonoscopy):  Persistent severe colitis involving the distal colon. No evidence of bowel perforation and no fluid collection. Mild small bowel distention with scattered air-fluid levels may reflect mild ileus   12/8 CRS consult :following   12/8 with ID consult: Changed cefepime to cefazolin. + Flagyl still. (Staph aureus bacteremia)   Daily improvement   12/11 CRS signed off. On regular diet   She is now on a regular diet and would be okay to discharge from her ischemic coliti/C. Difficile   12/11 ID recommending Flagyl for 7 full days total 12/8 - 12/15 (IV to oral)   Is discharging to home with home infusion services for IV antibiotics on discharge for MSSA bacteremia        C. difficile toxin B+/GDH antigen positive with toxin negative  CT findings of severe colitis  Vancomycin 125 mg 4 times daily added given clinical scenario and imaging findings  ID consulted  Recommend oral Vanco while on IV antibiotics (IV antibiotics for 2 weeks)      Staph aureus (MSSA) 12/7 blood culture  12/7 bacteremia (1 of 2 blood cultures + 12/7 positive MSSA)   12/8 blood cultures x 2 negative  12/8  vanco x 1 given while awaiting sensitivities   12/8 ID consult.: Changed from cefepime to Ancef with Flagyl  12/10 ECHO: EF 60-65%, Right ventricle normal. Mild 1+ MR. No obvious valvular vegetations.   12/11 ID follow-up.  Patient will require IV cefazolin for 2 weeks.  Needs a midline.  Is discharging to home with home infusion services for IV antibiotics on discharge       Hypokalemia: (Intermittent)   Replacement protocol     Tachycardia: resolved secondary to sepsis   Likely secondary to sepsis/infection   EKG with sinus tachycardia and NSST changes. T wave abnormality in inferior leads. NSST changes.   HR improving with fluids/Abx  12/9 echo ordered given staph  12/10 ECHO as above with no WMA, and normal EF      Multiple  myeloma  IgG Kappa multiple myeloma   Follows with Dr Dreyer w/ [facility]. Hx multiple pathologic fractures.   Of note plans for MR pelvis by [facility](to compare w/ CT 5/4/23 from [place] w/ multiple abnormalities).  Patient by last note with [facility] had MRI with TRIA   On prophylactic valtrex   Patient on maintenance bortezomib  12/7 Consult [facility]   12/7 as per oncology visit.  Not likely myeloma or myeloma treatment associated.  Patient will follow-up in the clinic for bortezomib>> moved out 2-week     Polyneuropathy  Patient had developed a sensory neuropathy over the last 6 months.  Occurs mainly at nighttime     HLD  With ischemic colitis critical meds only po      Suicidal statement (not suicidal now)  Paranoid of politicians   12/8 patient reported suicidal statement to [facility] nurse.   12/11 patient had made suicidal statements earlier in her hospital course with a discussion about potential surgery and an ostomy.  She subsequently stated that she is not suicidal.  Patient also made comments about [employer] which  stealing her money. (See prior notes)   12/11 psychiatry consult with [facility]: Lore Hanson (saw patient in 2022)   12/11. Patient not felt to be suicidal.  History of paranoid thoughts about politicians.  No medications at this point     MDD  Anxiety  PTSD  Citalopram 40 mg daily resumed   See above for assessment of suicidal statements and paranoid thoughts        Diet: Regular diet  DVT Prophylaxis: Pneumatic Compression ambulating    Astudillo Catheter: Not present  Lines: None     Cardiac Monitoring: None  Code Status:  Full              Clinically Significant Risk Factors                                 # Financial/Environmental Concerns: none                Disposition Plan  Patient   Will require 2 weeks of IV antibiotics.  Care coordinator consultation requested for arrangement of antibiotics at home with home infusion services     Nishi Benítez MD  Hospitalist Service  [facility]  [facility]  Securely message with [employer] (more info)  Text page via [employer]PAD Paging/Directory   ______________________________________________________________________          Consultations This Hospital Stay   GASTROENTEROLOGY IP CONSULT  HEMATOLOGY & ONCOLOGY IP CONSULT  VASCULAR ACCESS ADULT IP CONSULT  CARE MANAGEMENT / SOCIAL WORK IP CONSULT  VASCULAR ACCESS ADULT IP CONSULT  PHARMACY IP CONSULT  COLORECTAL SURGERY IP CONSULT  PHARMACY TO DOSE VANCO  WOUND OSTOMY CONTINENCE NURSE  IP CONSULT  INFECTIOUS DISEASES IP CONSULT  PSYCHIATRY IP CONSULT  VASCULAR ACCESS ADULT IP CONSULT  VASCULAR ACCESS ADULT IP CONSULT  PHYSICAL THERAPY ADULT IP CONSULT  OCCUPATIONAL THERAPY ADULT IP CONSULT    Code Status   Full Code    Time Spent on this Encounter   I, Nishi Benítez MD, personally saw the patient today and spent greater than 30 minutes discharging this patient.       Nishi Benítez MD  [facility] [id number] [facility]  [address] [place] [address], [address]  [facility] [address]  Phone: [phone]  ______________________________________________________________________    Physical Exam   Vital Signs: Temp: 98.3  F (36.8  C) Temp src: Oral BP: (!) 140/70 Pulse: 85   Resp: 16 SpO2: 99 % O2 Device: None (Room air)    Weight: 130 lbs 0 oz  General Appearance: Alert awake not in acute distress  Respiratory: Clear to auscultation bilaterally  Cardiovascular: Normal rate rhythm regular  GI: Soft, nontender nondistended bowel sounds positive  Skin: Warm and dry  Other:         Primary Care Physician   Levi Mcdonnell    Discharge Orders      Home Infusion Referral      Reason for your hospital stay    C.diff colitis and MSSA bacteremia     Follow-up and recommended labs and tests     Follow up with primary care provider, Levi Mcdonnell, within 7 days for hospital follow- up.  The following labs/tests are recommended: CBC, BMP in 1week .     Activity    Your activity upon discharge: activity as tolerated     Diet     Follow this diet upon discharge: Orders Placed This Encounter      Advance Diet as Tolerated: Low Fiber; Mechanical/Dental Soft Diet       Significant Results and Procedures   Most Recent 3 CBC's:  Recent Labs   Lab Test 12/13/23  0840 12/11/23  1421 12/11/23  0501 12/08/23  1741 12/08/23  1021 12/07/23  2316 12/07/23  1640   WBC 5.4  --   --   --  17.2*  --  16.5*   HGB 9.9* 9.0* 9.5*   < > 9.4*   < > 10.7*   *  --   --   --  105*  --  103*     --   --   --  178  --  187    < > = values in this interval not displayed.     Most Recent 3 BMP's:  Recent Labs   Lab Test 12/13/23  0840 12/12/23  0854 12/11/23  1421    142 141   POTASSIUM 3.8 3.7 3.7   CHLORIDE 104 105 105   CO2 29 30* 29   BUN 5.3* 3.2* 4.6*   CR 0.62 0.67 0.67   ANIONGAP 7 7 7   ARA 8.3* 8.1* 8.1*   GLC 91 117* 173*     Most Recent 2 LFT's:  Recent Labs   Lab Test 12/06/23  0256 07/08/22  1452   AST 22 21   ALT 12 14   ALKPHOS 71 53   BILITOTAL 0.3 0.4     Most Recent 3 INR's:No lab results found.,   Results for orders placed or performed during the hospital encounter of 12/06/23   CT Abdomen Pelvis w IV Contrast    Narrative    EXAM: CT ABDOMEN PELVIS W CONTRAST  LOCATION: [facility]  DATE: 12/6/2023    INDICATION: bright red blood per rectum, abdominal cramping  COMPARISON: PET/CT 08/14/2023. CT abdomen and pelvis 06/25/2022.  TECHNIQUE: CT scan of the abdomen and pelvis was performed following injection of IV contrast. Multiplanar reformats were obtained. Dose reduction techniques were used.  CONTRAST: 66mL Isovue 370    FINDINGS:   LOWER CHEST: Normal.    HEPATOBILIARY: Normal.    PANCREAS: Normal.    SPLEEN: Normal.    ADRENAL GLANDS: No change in 10 mm bilateral adrenal nodules are indeterminate by density measurements.    KIDNEYS/BLADDER: Tiny bilateral probable benign renal cysts do not warrant follow-up.    BOWEL: Wall thickening and inflammatory stranding of the colon, particularly the  transverse and descending colon, consistent with colitis. No pneumatosis, perforation or abscess. Colonic diverticulosis without evidence for diverticulitis. Normal   appendix.    LYMPH NODES: Normal.    VASCULATURE: Mild to moderate aortoiliac atherosclerosis. No aneurysm.    PELVIC ORGANS: Multiple calcified uterine fibroids.    MUSCULOSKELETAL: Chronic fracture deformities of the right sacrum and left pubic bone. Chronic moderate compression deformity of the L5 vertebral body. Chronic 4 cm calcified structure medial to the left inferior pubic ramus is unchanged from PET/CT   08/14/2023. This is suggestive of an old hematoma.      Impression    IMPRESSION:   Colitis. An infectious or inflammatory etiology is favored.   XR Chest Port 1 View    Narrative    EXAM: XR CHEST PORT 1 VIEW  LOCATION: [facility]  DATE: 12/7/2023    INDICATION: fevers  COMPARISON: 6/10/2023      Impression    IMPRESSION: Negative chest.   CT Abdomen Pelvis w/o Contrast    Narrative    EXAM: CT ABDOMEN PELVIS W/O CONTRAST  LOCATION: [facility]  DATE: 12/7/2023    INDICATION: colitis with colonoscopy  COMPARISON: CT 12/06/2023, 06/25/2022 and PET/CT 08/14/2023  TECHNIQUE: CT scan of the abdomen and pelvis was performed without IV contrast. Multiplanar reformats were obtained. Dose reduction techniques were used.  CONTRAST: None.    FINDINGS:   LOWER CHEST: Tiny hiatal hernia. Bibasilar atelectasis/scarring.    HEPATOBILIARY: Vicarious contrast excretion into the gallbladder lumen. Mild periportal edema which may reflect sequelae of IV hydration. Normal bile ducts.    PANCREAS: Normal.    SPLEEN: Normal.    ADRENAL GLANDS: Normal.    KIDNEYS/BLADDER: No hydronephrosis, hydroureter or urinary tract stone.    BOWEL: Tiny fat-containing umbilical hernia. Mild small bowel distention with scattered air-fluid levels. No small bowel wall edema. Normal appendix. Severe colitis extending from the  distal transverse through the mid sigmoid segment. Associated   circumferential mural thickening and surrounding edema. Trace ascites. No free air and no fluid collection. No pneumatosis.    LYMPH NODES: Normal.    VASCULATURE: Moderate aortoiliac atherosclerosis.    PELVIC ORGANS: Enlarged retroflexed uterus with multiple calcified fibroids. Trace pelvic free fluid.    MUSCULOSKELETAL: Stable old fractures of the right sacral ala and left pubic bone. Severe osseous demineralization. Stable severe L5 vertebral body compression fracture. Spinal degenerative changes.      Impression    IMPRESSION:   1.  Persistent severe colitis involving the distal colon.  2.  Trace simple ascites. No evidence for bowel perforation and no fluid collection.  3.  Mild small bowel distention with scattered air-fluid levels which may reflect a mild ileus.     Echocardiogram Complete     Value    LVEF  60-65%    Narrative    [id number]  [id number]  [id number]  [id number]^DILLAN^TAIWO^L     [facility]  Echocardiography Laboratory  [address] [place] [address]     Name: ROMA ELLIS  MRN: [id number]  : 1945  Study Date: 12/10/2023 10:30 AM  Age: 78 yrs  Gender: Female  Patient Location: [facility]  Reason For Study: Endocarditis  Ordering Physician: TAIWO GRIMALDO  Referring Physician: Levi Mcdonnell  Performed By: Jorgito Miranda     BSA: 1.6 m2  Height: 63 in  Weight: 130 lb  HR: 84  BP: 122/63 mmHg  ______________________________________________________________________________  Procedure  Complete Portable Echo Adult.  ______________________________________________________________________________  Interpretation Summary     Left ventricular systolic function is normal.  The visual ejection fraction is 60-65%.  The right ventricle is normal in size and function.  There is mild (1+) mitral regurgitation.  There are no obvious valvular vegetations. Consider ROCIO if clinically  indicated. ROCIO would be more  sensitive for detection of vegetations or masses.     No significant change in direct comparison to the echo from 5/30/2022.  ______________________________________________________________________________  Left Ventricle  The left ventricle is normal in size. mild proximal septal thickening. Left  ventricular systolic function is normal. The visual ejection fraction is 60-  65%. Grade I or early diastolic dysfunction. No regional wall motion  abnormalities noted.     Right Ventricle  The right ventricle is normal in size and function.     Atria  The left atrium is borderline dilated. The right atrium is borderline dilated.  Color Doppler suggestive interatrial shunt, likely a PFO.     Mitral Valve  The mitral valve leaflets are mildly thickened. There is mild (1+) mitral  regurgitation.     Tricuspid Valve  There is trace tricuspid regurgitation. The right ventricular systolic  pressure is approximated at 23.6 mmHg plus the right atrial pressure.     Aortic Valve  The aortic valve is trileaflet with aortic valve sclerosis. No hemodynamically  significant valvular aortic stenosis.     Pulmonic Valve  There is trace pulmonic valvular regurgitation.     Vessels  The aortic root is normal size. Normal size ascending aorta. The inferior vena  cava is normal.     Pericardium  Trivial pericardial effusion.     Rhythm  Sinus rhythm was noted. The patient exhibited occasional PVCs.  ______________________________________________________________________________  MMode/2D Measurements & Calculations  IVSd: 1.1 cm     LVIDd: 4.7 cm  LVIDs: 3.2 cm  LVPWd: 1.0 cm  FS: 31.4 %  LV mass(C)d: 175.5 grams  LV mass(C)dI: 109.0 grams/m2  Ao root diam: 2.8 cm  LA dimension: 3.1 cm  asc Aorta Diam: 2.8 cm  LA/Ao: 1.1  LVOT diam: 2.0 cm  LVOT area: 3.1 cm2  Ao root diam index Ht(cm/m): 1.7  Ao root diam index BSA (cm/m2): 1.7  Asc Ao diam index BSA (cm/m2): 1.7  Asc Ao diam index Ht(cm/m): 1.7  LA Volume (BP): 46.3 ml     LA Volume Index  (BP): 28.8 ml/m2  RWT: 0.43  TAPSE: 2.1 cm     Doppler Measurements & Calculations  MV E max leobardo: 83.1 cm/sec  MV A max leobardo: 118.3 cm/sec  MV E/A: 0.70  MV dec slope: 481.8 cm/sec2  MV dec time: 0.17 sec  PA acc time: 0.15 sec  TR max leobardo: 243.0 cm/sec  TR max P.6 mmHg  E/E' av.4  Lateral E/e': 8.7  Medial E/e': 10.1  RV S Leobardo: 14.3 cm/sec     ______________________________________________________________________________  Report approved by: MD Jovana Bobby 12/10/2023 01:58 PM             Discharge Medications   Current Discharge Medication List        START taking these medications    Details   ceFAZolin (ANCEF) intermittent infusion 2 g in 100 mL dextrose PRE-MIX Inject 100 mLs (2 g) into the vein every 8 hours for 10 days    Comments: Check weekly CBC/diff, creatinine, aST ,cRP  Send results to intermed consultants -to Nae Cruz  Associated Diagnoses: MSSA bacteremia      metroNIDAZOLE (FLAGYL) 500 MG tablet Take 1 tablet (500 mg) by mouth 2 times daily for 2 days  Qty: 4 tablet, Refills: 0    Associated Diagnoses: Colitis due to Clostridium difficile      vancomycin (VANCOCIN) 125 MG capsule Take 1 capsule (125 mg) by mouth 4 times daily for 21 days Then stop  Qty: 84 capsule, Refills: 0    Associated Diagnoses: Colitis due to Clostridium difficile           CONTINUE these medications which have NOT CHANGED    Details   calcium carbonate (TUMS) 500 MG chewable tablet Take 2 chew tab by mouth daily      citalopram (CELEXA) 40 MG tablet Take 1 tablet by mouth once daily  Qty: 90 tablet, Refills: 0    Associated Diagnoses: Moderate episode of recurrent major depressive disorder (H); PTSD (post-traumatic stress disorder); GERSON (generalized anxiety disorder)      denosumab (PROLIA) 60 MG/ML SOSY injection Inject 1 mL (60 mg) Subcutaneous    Comments: Administered/managed by oncology ([facility])      hydrOXYzine (ATARAX) 25 MG tablet TAKE UP TO 3 TABLETS PRN BEDTIME FOR INSOMNIA  Qty: 90 tablet,  Refills: 0    Associated Diagnoses: Insomnia, unspecified type      oxyCODONE-acetaminophen (PERCOCET) 5-325 MG tablet Take 1 tablet by mouth daily as needed for pain      rosuvastatin (CRESTOR) 10 MG tablet Take 1 tablet (10 mg) by mouth daily  Qty: 90 tablet, Refills: 3    Associated Diagnoses: Calcification of abdominal aorta (H24)      triamcinolone (KENALOG) 0.1 % external cream Apply topically 2 times daily  Qty: 80 g, Refills: 0    Associated Diagnoses: Healing pressure injury, stage 1; Dermatitis      valACYclovir (VALTREX) 500 MG tablet Take 1,000 mg by mouth daily      vitamin B-12 (CYANOCOBALAMIN) 250 MCG tablet Take 1,000 mcg by mouth daily      vitamin D3 (CHOLECALCIFEROL) 50 mcg (2000 units) tablet Take 1 tablet by mouth daily           STOP taking these medications       aspirin (ASA) 325 MG EC tablet Comments:   Reason for Stopping:         ibuprofen (ADVIL/MOTRIN) 200 MG tablet Comments:   Reason for Stopping:         sodium chloride 1 GM tablet Comments:   Reason for Stopping:             Allergies   Allergies   Allergen Reactions    Acetaminophen Shortness Of Breath    Blood Transfusion Related (Informational Only) Other (See Comments)     Patient has a history of being on daratumumab which can interfere with blood bank testing.  A delay in compatible RBCs may occur.    Codeine Difficulty breathing, Other (See Comments) and Rash    Bupropion     Erythromycin     Hydrocodone Other (See Comments) and Itching     Ear ache    Lidocaine Other (See Comments)    Penicillin G Itching     Itching around mouth then heavy breathing    Revlimid [Lenalidomide]     Sulfa Antibiotics Itching     Itching around the mouth and then heavy breathing    Tetracycline     Trazodone      Does not work for patient    Conjugated Estrogens Dermatitis

## 2023-12-13 NOTE — PROGRESS NOTES
"Care Management Follow Up    Length of Stay (days): 7    Expected Discharge Date: 12/13/2023     Concerns to be Addressed: all concerns addressed in this encounter     Patient plan of care discussed at interdisciplinary rounds: Yes    Anticipated Discharge Disposition: Home, Transitional Care     Anticipated Discharge Services: None  Anticipated Discharge DME: None    Patient/family educated on Medicare website which has current facility and service quality ratings:    Education Provided on the Discharge Plan:    Patient/Family in Agreement with the Plan: no    Referrals Placed by CM/SW:    Private pay costs discussed:  private pay costs to home infusion    Additional Information:  Met patient.  She appears competent and expresses confidence on administering antibiotic at home.  She states \"I've seen it done many times\".  She asks appropriate questions on care, showering with line in place.  Educated on necessity of keeping dry, clean and cap intact, she was able to reiterate.    Please see note on coverage; private pay coverage quote pending and will follow for hopeful discharge today.    Renée Richard RN  Inpatient Float Care Coordinator  [facility]      "

## 2023-12-13 NOTE — PROGRESS NOTES
Care Management Discharge Note    Discharge Date: 12/13/2023       Discharge Disposition: Home    Discharge Services: Home infusion    Discharge DME: None    Discharge Transportation: she will call a cab    Private pay costs discussed:  [facility]  private pay costs discussed by liaison.  $37/day plus $90 for nurse visits.    Does the patient's insurance plan have a 3 day qualifying hospital stay waiver?  No    PAS Confirmation Code:    Patient/family educated on Medicare website which has current facility and service quality ratings:      Education Provided on the Discharge Plan:    Persons Notified of Discharge Plans: patient, bedside nurse  Patient/Family in Agreement with the Plan: yes    Handoff Referral Completed: Yes    Additional Information:  Lisbeth with [facility] met with patient and reviewed care and private costs of home infusion.  She is being discharged today.  Per patient's request, she is discharging home after late evening dose and meet with home RN tomorrow early morning for the next dose; she was concerned that she would be too tired for self dosing of tonight's dose.    Discussed with bedside nurse Jaciel.  The 4 pm dose will be given at 3 pm and the midnight dose will be then given at 10 pm.  Her plan is then she will call a cab for transportation home.  The IV medication will be delivered here rather than home and sent home with her.  Also 2 oral antibiotics will be filled here.  IV team were consulted to redress the midline dressing as the clamp cannot be accessed without disrupting the main dressing and to also ensure it flushes well as it has not yet been used according to MAR.  Bedside nurse updated and understanding of the plan.  PCP appointment for hospital follow up was made for Wednesday, December 20th at 2:15 pm.    Renée Richard RN  Inpatient Float Care Coordinator  [facility]

## 2023-12-14 PROCEDURE — 99232 SBSQ HOSP IP/OBS MODERATE 35: CPT | Performed by: INTERNAL MEDICINE

## 2023-12-14 PROCEDURE — 250N000013 HC RX MED GY IP 250 OP 250 PS 637: Performed by: INTERNAL MEDICINE

## 2023-12-14 PROCEDURE — 250N000011 HC RX IP 250 OP 636: Mod: JZ | Performed by: INTERNAL MEDICINE

## 2023-12-14 PROCEDURE — 250N000013 HC RX MED GY IP 250 OP 250 PS 637: Performed by: SPECIALIST

## 2023-12-14 PROCEDURE — 120N000001 HC R&B MED SURG/OB

## 2023-12-14 RX ADMIN — VANCOMYCIN HYDROCHLORIDE 125 MG: 125 CAPSULE ORAL at 09:20

## 2023-12-14 RX ADMIN — VANCOMYCIN HYDROCHLORIDE 125 MG: 125 CAPSULE ORAL at 13:40

## 2023-12-14 RX ADMIN — CEFAZOLIN SODIUM 2 G: 2 INJECTION, SOLUTION INTRAVENOUS at 05:57

## 2023-12-14 RX ADMIN — VANCOMYCIN HYDROCHLORIDE 125 MG: 125 CAPSULE ORAL at 17:27

## 2023-12-14 RX ADMIN — METRONIDAZOLE 500 MG: 500 TABLET ORAL at 09:20

## 2023-12-14 RX ADMIN — CEFAZOLIN SODIUM 2 G: 2 INJECTION, SOLUTION INTRAVENOUS at 21:11

## 2023-12-14 RX ADMIN — VANCOMYCIN HYDROCHLORIDE 125 MG: 125 CAPSULE ORAL at 21:10

## 2023-12-14 RX ADMIN — CITALOPRAM HYDROBROMIDE 40 MG: 40 TABLET ORAL at 09:19

## 2023-12-14 RX ADMIN — CEFAZOLIN SODIUM 2 G: 2 INJECTION, SOLUTION INTRAVENOUS at 13:40

## 2023-12-14 RX ADMIN — METRONIDAZOLE 500 MG: 500 TABLET ORAL at 21:10

## 2023-12-14 RX ADMIN — VALACYCLOVIR HYDROCHLORIDE 1000 MG: 1 TABLET, FILM COATED ORAL at 09:19

## 2023-12-14 ASSESSMENT — ACTIVITIES OF DAILY LIVING (ADL)
ADLS_ACUITY_SCORE: 18

## 2023-12-14 NOTE — PROVIDER NOTIFICATION
Sumit vanegas to Dr Lauri Wheat  Pt is anxious about discharging home and has concerns about managing the home infusion. She said she does not have the dexterity to be able to do it. Can the pt stay the night and see if there could be a new plan on completing her ABX, she wants to know why it can't all be PO?      Response   Can stay tonight primary team to discuss further plan and rx

## 2023-12-14 NOTE — PROGRESS NOTES
Orientation: AOx4  Activity: SBA   Diet/BS Checks: Soft/low fiber  Tele:  SR  IV Access/Drains: Midline L arm SL.  Pain Management:   Abnormal VS/Results: VSS on RA   Bowel/Bladder: Continent B/B   Skin/Wounds: Intact. No adjustments   Consults: SW, Hem/Onc, Spiritual Health  D/C Disposition: Today, pending Abx plan

## 2023-12-14 NOTE — PROGRESS NOTES
Care Management Follow Up    Length of Stay (days): 8    Expected Discharge Date: 12/14/2023     Concerns to be Addressed: all concerns addressed in this encounter     Patient plan of care discussed at interdisciplinary rounds: Yes    Anticipated Discharge Disposition: Home, Transitional Care     Anticipated Discharge Services: None  Anticipated Discharge DME: None    Patient/family educated on Medicare website which has current facility and service quality ratings:    Education Provided on the Discharge Plan:    Patient/Family in Agreement with the Plan: no    Referrals Placed by CM/SW:    Private pay costs discussed: Not applicable    Additional Information:  Writer sent referrals to [facility]     Message out to [facility] to see if patient is accepted.     TARA Rankin

## 2023-12-14 NOTE — PLAN OF CARE
Goal Outcome Evaluation:                    Summary Abdominal pain, BRBPR and imaging showing colitis of transverse and descending colon on CT on admission     Hx multiple myeloma, stage III CKD, rheumatoid arthritis, osteopenia MDD  Anxiety, PTSD     Orientation A & O x 4     Vitals/Tele VSS on Rm air/ S Tachy with T wave abnormalities     IV Access/drains L midline     Diet Mechanical soft / low fiber     Mobility up ad acosta     GI/ Continent     Wound/Skin no adjustments needed     Discharge Plan:  Pt was supposed to discharge at 10pm after  Ancef IV medication. The plan was for her to have [facility]  come at 8am for next dose. But pt is very anxious, and does not think it is best to have home infusions, she also says she does not have the dexterity  Paged on call Dr Wheat and ok for pt to stay night and have primary team discuss a new plan tomorrow.     Called [facility] to notify them pt is remaining I the hospital overnight.     See Flow sheets for assessment

## 2023-12-14 NOTE — PROGRESS NOTES
Care Management Follow Up    Length of Stay (days): 8    Expected Discharge Date: 12/14/2023     Concerns to be Addressed: all concerns addressed in this encounter     Patient plan of care discussed at interdisciplinary rounds: Yes    Anticipated Discharge Disposition: Home, Transitional Care     Anticipated Discharge Services: None  Anticipated Discharge DME: None    Patient/family educated on Medicare website which has current facility and service quality ratings:    Education Provided on the Discharge Plan:    Patient/Family in Agreement with the Plan: no    Referrals Placed by CM/SW:    Private pay costs discussed:  home infusion medication    Additional Information:  Met with patient to discuss discharging to home with planned home infusion.  Pt shared that she does not feel that she can do this.  Pt asked if she could have a nurse come into the home to administer or go to an infusion center.  Explained that infusion centers are not able to accommodate 3x/day antibiotics and home health care nursing does not come to the home to administer the medication, they do dressing changes and ordered labs weekly.  Discussed if she has any friends or family that could assist at home and pt stated she does not. Pt would now like to go to a TCU    Pt given list of TCU's in area and requested she give at least 3 choices.  CM to check back with patient once she has looked at list for choices.    Maddie Mcallister RN, BS  Care Coordinator  [email or web address]

## 2023-12-14 NOTE — PHARMACY - DISCHARGE MEDICATION RECONCILIATION
Requested  Dr Lauri Wheat to order :   Discharge meds E-prescribed to [facility] in [place] for pt to    Flagyl &  Vancomycin

## 2023-12-14 NOTE — PROGRESS NOTES
Care Management Follow Up    Length of Stay (days): 8    Expected Discharge Date: 12/15/2023     Concerns to be Addressed: all concerns addressed in this encounter     Patient plan of care discussed at interdisciplinary rounds: Yes    Anticipated Discharge Disposition:  Transitional Care     Anticipated Discharge Services: None  Anticipated Discharge DME: None    Patient/family educated on Medicare website which has current facility and service quality ratings:    Education Provided on the Discharge Plan:    Patient/Family in Agreement with the Plan: no    Referrals Placed by CM/SW:    Private pay costs discussed: Not applicable    Additional Information:  Messaged Lisbeth, with [facility], regarding home IV antibiotics that were delivered via  yesterday for pt.  Updated change in plan as pt now feels unable to do home IV antibiotics and looking for TCU placement.    Per Lisbeth  service will  delivered home IV antibiotics this evening-updated charge RN that medication will be picked up.     Updated pt that still awaiting response from [facility] and that [facility] declined.  Also updated pt that she will not be billed for home IV antibiotics, since she was not admitted to services per Lisbeth.     Maddie Mcallister RN, BS  Care Coordinator  [email or web address]

## 2023-12-14 NOTE — PLAN OF CARE
Shift note: [id number]  Orientation: AOx4  Activity: SBA  Diet/BS Checks: soft food  Tele:  NSR  IV Access/Drains: Left Midline SL.  Pain Management: abd discomfort. No pain meds given  Abnormal VS/Results: AVSS on RA.  Bowel/Bladder: cont of b/b  Skin/Wounds: bruising on L arm  Consults: SW  D/C Disposition: pending placement.  Other Info: Enteric precaution maintained.

## 2023-12-14 NOTE — PROGRESS NOTES
[facility]    Medicine Progress Note - Hospitalist Service    Date of Admission:  12/6/2023    Assessment & Plan   Mary Jo Mcleod is a 78 year old female with a history of multiple myeloma, stage III CKD, rheumatoid arthritis, osteopenia admitted on 12/6/2023 with abdominal pain, BRBPR and imaging showing colitis of transverse and descending colon on CT on admission      # Transverse and descending colon wall thickening and inflammatory stranding secondary to C. difficile  # Colitis and ischemic colitis with MSSA bacteremia  # Colonoscopy with ischemic colitis (12/7/2023)  # Sepsis with fever (12/7/23)     On evening of admission 12/5 patient described lower abdominal cramping and bright red blood per rectum.    Patient had associated nausea.  Had eaten egg salad 1 hour before.    In ED AFVSS. Hgb 12. WBC 13.2.     12/6 CT a/p w/ colitis (raj transverse and descending colon), infectious/ inflammatory favored.     12/6 GI consult: Dr. Castillo     12/7 Febrile with temp 101.5 sepsis treatment started on BSA with cefepime and flagyl. Aggressive fluid 1500 + /hour     12/7 CXR negative. COVID, influenza and RSV negative     Lactate negative this admission.     12/7 CT abdomen/pelvis (repeat to ensure no perforation after colonoscopy):  Persistent severe colitis involving the distal colon. No evidence of bowel perforation and no fluid collection. Mild small bowel distention with scattered air-fluid levels may reflect mild ileus     12/8 CRS consult :following     12/8 with ID consult: Changed cefepime to cefazolin. + Flagyl still. (Staph aureus bacteremia)     Daily improvement     12/11 CRS signed off. On regular diet     She is now on a regular diet and would be okay to discharge from her ischemic coliti/C. Difficile   12/11 ID recommending Flagyl for 7 full days total 12/8 - 12/15  Patient to complete Ancef on 12/21/2023.     # C. difficile toxin B+/GDH antigen positive with toxin  negative    CT findings of severe colitis    Vancomycin 125 mg 4 times daily added given clinical scenario and imaging findings    ID consulted  Recommend oral Vanco while on IV antibiotics (IV antibiotics for 2 weeks)      # Staph aureus (MSSA) 12/7 blood culture  12/7 bacteremia (1 of 2 blood cultures + 12/7 positive MSSA)   12/8 blood cultures x 2 negative    12/8  vanco x 1 given while awaiting sensitivities     12/8 ID consult.: Changed from cefepime to Ancef with Flagyl    12/10 ECHO: EF 60-65%, Right ventricle normal. Mild 1+ MR. No obvious valvular vegetations.     12/11 ID follow-up.  Patient will require IV cefazolin for 2 weeks.  Needs a midline.    Will need TCU     PT OT consultations requested on 12/12/2023     #Hypokalemia: (Intermittent)     Replacement protocol     #Tachycardia: resolved secondary to sepsis     Likely secondary to sepsis/infection     EKG with sinus tachycardia and NSST changes. T wave abnormality in inferior leads. NSST changes.     HR improving with fluids/Abx    12/9 echo ordered given staph    12/10 ECHO as above with no WMA, and normal EF      # Multiple myeloma  # IgG Kappa multiple myeloma     Follows with Dr Dreyer w/ [facility]. Hx multiple pathologic fractures.     Of note plans for MR pelvis by [facility](to compare w/ CT 5/4/23 from [place] w/ multiple abnormalities).    Patient by last note with [facility] had MRI with TRIA     On prophylactic valtrex     Patient on maintenance bortezomib    12/7 Consult [facility]     12/7 as per oncology visit.  Not likely myeloma or myeloma treatment associated.    Patient will follow-up in the clinic for bortezomib>> moved out 2-week     #Polyneuropathy    Patient had developed a sensory neuropathy over the last 6 months.    Occurs mainly at nighttime     # HLD    With ischemic colitis critical meds only po      # Suicidal statement (not suicidal now)  # Paranoid of politicians     12/8 patient reported suicidal statement to [facility]  nurse.     12/11 patient had made suicidal statements earlier in her hospital course with a discussion about potential surgery and an ostomy.  She subsequently stated that she is not suicidal.    Patient also made comments about [employer] which  stealing her money. (See prior notes)     12/11 psychiatry consult with [facility]: Lore Hanson (saw patient in 2022)     12/11. Patient not felt to be suicidal.  History of paranoid thoughts about politicians.    No medications at this point     # MDD  # Anxiety  # PTSD    Citalopram 40 mg daily resumed     See above for assessment of suicidal statements and paranoid thoughts          Diet: Advance Diet as Tolerated: Low Fiber; Mechanical/Dental Soft Diet  Diet    DVT Prophylaxis: Pneumatic Compression Devices  Astudillo Catheter: Not present  Lines: PRESENT             Cardiac Monitoring: ACTIVE order. Indication: Tachyarrhythmias, acute (48 hours)  Code Status: Full Code      Clinically Significant Risk Factors                             # Financial/Environmental Concerns: none         Disposition Plan pending discharge to TCU.     Expected Discharge Date: 12/14/2023,  6:00 PM  Discharge Delays: *Medically Ready for Discharge  Destination: home  Discharge Comments: LTC with hospice            MADELINE CARABALLO MD  Hospitalist Service  [facility]  Securely message with [email or web address] (more info)  Text page via [employer] Paging/Directory   ______________________________________________________________________    Interval History   -Unfortunately, patient could not be discharged home yesterday given increased anxiety on managing home infusion  -Care team currently assisting in finding TCU at this time  -Denies any complaints at this time.    Physical Exam   Vital Signs: Temp: 98.8  F (37.1  C) Temp src: Oral BP: 113/64 Pulse: 84   Resp: 16 SpO2: 100 % O2 Device: None (Room air)    Weight: 130 lbs 0 oz    General Appearance: Sitting comfortably in chair, on room  air, in no acute distress or discomfort.  HEENT: PERRL: EOMI; moist mucous membrane w/o lesions  Neck: No JVD  Pulmonary: Clear to auscultation bilaterally, no wheezes or crackles  CVS: Regular rhythm, no murmurs, rubs or gallops  GI: BS (+), soft nontender, no rebound or guarding   Extremities: No LE edema.   Skin: No rashes or lesions  Neurologic: A&O x3      Medical Decision Making             Data

## 2023-12-15 ENCOUNTER — APPOINTMENT (OUTPATIENT)
Dept: ULTRASOUND IMAGING | Facility: CLINIC | Age: 78
DRG: 371 | End: 2023-12-15
Attending: STUDENT IN AN ORGANIZED HEALTH CARE EDUCATION/TRAINING PROGRAM
Payer: COMMERCIAL

## 2023-12-15 PROCEDURE — 250N000011 HC RX IP 250 OP 636: Mod: JZ | Performed by: INTERNAL MEDICINE

## 2023-12-15 PROCEDURE — 250N000013 HC RX MED GY IP 250 OP 250 PS 637: Performed by: SPECIALIST

## 2023-12-15 PROCEDURE — 120N000001 HC R&B MED SURG/OB

## 2023-12-15 PROCEDURE — 250N000013 HC RX MED GY IP 250 OP 250 PS 637: Performed by: INTERNAL MEDICINE

## 2023-12-15 PROCEDURE — 93971 EXTREMITY STUDY: CPT | Mod: LT

## 2023-12-15 PROCEDURE — 999N000040 HC STATISTIC CONSULT NO CHARGE VASC ACCESS

## 2023-12-15 PROCEDURE — 99232 SBSQ HOSP IP/OBS MODERATE 35: CPT | Performed by: STUDENT IN AN ORGANIZED HEALTH CARE EDUCATION/TRAINING PROGRAM

## 2023-12-15 PROCEDURE — 999N000128 HC STATISTIC PERIPHERAL IV START W/O US GUIDANCE

## 2023-12-15 RX ORDER — CALCIUM CARBONATE 500 MG/1
500 TABLET, CHEWABLE ORAL DAILY PRN
Status: DISCONTINUED | OUTPATIENT
Start: 2023-12-15 | End: 2023-12-17

## 2023-12-15 RX ADMIN — METRONIDAZOLE 500 MG: 500 TABLET ORAL at 08:25

## 2023-12-15 RX ADMIN — VANCOMYCIN HYDROCHLORIDE 125 MG: 125 CAPSULE ORAL at 13:25

## 2023-12-15 RX ADMIN — VANCOMYCIN HYDROCHLORIDE 125 MG: 125 CAPSULE ORAL at 22:15

## 2023-12-15 RX ADMIN — CITALOPRAM HYDROBROMIDE 40 MG: 40 TABLET ORAL at 08:25

## 2023-12-15 RX ADMIN — CEFAZOLIN SODIUM 2 G: 2 INJECTION, SOLUTION INTRAVENOUS at 22:15

## 2023-12-15 RX ADMIN — CEFAZOLIN SODIUM 2 G: 2 INJECTION, SOLUTION INTRAVENOUS at 06:12

## 2023-12-15 RX ADMIN — METRONIDAZOLE 500 MG: 500 TABLET ORAL at 20:34

## 2023-12-15 RX ADMIN — VALACYCLOVIR HYDROCHLORIDE 1000 MG: 1 TABLET, FILM COATED ORAL at 08:25

## 2023-12-15 RX ADMIN — VANCOMYCIN HYDROCHLORIDE 125 MG: 125 CAPSULE ORAL at 17:12

## 2023-12-15 RX ADMIN — CEFAZOLIN SODIUM 2 G: 2 INJECTION, SOLUTION INTRAVENOUS at 13:26

## 2023-12-15 RX ADMIN — VANCOMYCIN HYDROCHLORIDE 125 MG: 125 CAPSULE ORAL at 08:25

## 2023-12-15 ASSESSMENT — ACTIVITIES OF DAILY LIVING (ADL)
ADLS_ACUITY_SCORE: 18

## 2023-12-15 NOTE — PROGRESS NOTES
MD Notification    Notified Person: MD    Notified Person Name: Theodore    Notification Date/Time: 12/15/23    Notification Interaction: [employer] messaging    Purpose of Notification: pt. c/o increased pain in LUE above midline. She says it's progressively been getting worse since the midline placement. It does feel a little swollen to the touch.     Orders Received: place vascular access consult to come evaluate, remove if needed, and ultrasound of LUE ordered.     Comments:

## 2023-12-15 NOTE — PROGRESS NOTES
Orientation: AOx4  Activity: Ind  Diet/BS Checks: Mechanical Soft  Tele:  NA  IV Access/Drains: Left Midline SL, with Intermittent Abx   Pain Management: Denies   Abnormal VS/Results:   Bowel/Bladder: Continent B/B, No BM this shift,   Skin/Wounds: bruising on L arm   Consults: SW  D/C Disposition: Pending Placement   Other Info: Enteric Precaution maintained.

## 2023-12-15 NOTE — PLAN OF CARE
Primary Diagnosis: Abdominal pain secondary to ischemic colitis.    Orientation: AOx4  Activity: Ind  Diet/BS Checks: Mechanical Soft  Tele:  NA  IV Access/Drains: Left Midline SL, with Intermittent Abx   Pain Management: Denies   Abnormal VS/Results:  VSS on RA  Bowel/Bladder: Continent B/B. 2 Loose Bms this shift per patient.   Skin/Wounds: bruising on L arm   Consults: SW  D/C Disposition: Pending Placement- needs 2 weeks of IV ABX.   Other Info: Enteric Precaution maintained. Pt. C/o increased LUE pain above midline with some swelling- vascular access consult placed to evaluate and LUE ultrasound ordered.

## 2023-12-15 NOTE — PROGRESS NOTES
[facility]    Medicine Progress Note - Hospitalist Service    Date of Admission:  12/6/2023    Assessment & Plan    Mary Jo Mcleod is a 78 year old female with a history of multiple myeloma, stage III CKD, rheumatoid arthritis, osteopenia admitted on 12/6/2023 with abdominal pain, BRBPR and imaging showing colitis of transverse and descending colon on CT on admission      12/7 CT Transverse and descending colon wall thickening and inflammatory stranding secondary to C. difficile  Colitis and ischemic colitis with MSSA bacteremia  12/7/2023: Colonoscopy with ischemic colitis  12/7 sepsis with fever.   On evening of admission 12/5 patient described lower abdominal cramping and bright red blood per rectum.  Patient had associated nausea.  Had eaten egg salad 1 hour before.  In ED AFVSS. Hgb 12. WBC 13.2.   12/6 CT a/p w/ colitis (raj transverse and descending colon), infectious/ inflammatory favored.   12/6 GI consult: Dr. Castillo   12/7 Febrile with temp 101.5 sepsis treatment started on BSA with cefepime and flagyl. Aggressive fluid 1500 + /hour   12/7 CXR negative. COVID, influenza and RSV negative   Lactate negative this admission.   12/7 CT abdomen/pelvis (repeat to ensure no perforation after colonoscopy):  Persistent severe colitis involving the distal colon. No evidence of bowel perforation and no fluid collection. Mild small bowel distention with scattered air-fluid levels may reflect mild ileus   12/8 CRS consult :following   12/8 with ID consult: Changed cefepime to cefazolin. + Flagyl still. (Staph aureus bacteremia)   Daily improvement   12/11 CRS signed off. On regular diet   She is now on a regular diet and would be okay to discharge from her ischemic coliti/C. Difficile   12/11 ID recommending Flagyl for 7 full days total 12/8 - 12/15 (IV to oral)   12/15/23 Awaiting placement      C. difficile toxin B+/GDH antigen positive with toxin negative   CT findings of severe  colitis  Vancomycin 125 mg 4 times daily added given clinical scenario and imaging findings  ID consulted  Recommend oral Vanco while on IV antibiotics (IV antibiotics for 2 weeks)   No diarrhea        Staph aureus (MSSA) 12/7 blood culture    12/7 bacteremia (1 of 2 blood cultures + 12/7 positive MSSA)   12/8 blood cultures x 2 negative  Possible GI source  12/8  vanco x 1 given while awaiting sensitivities   12/8 ID consult.: Changed from cefepime to Ancef with Flagyl  12/10 ECHO: EF 60-65%, Right ventricle normal. Mild 1+ MR. No obvious valvular vegetations.   12/11 ID follow-up.  Patient will require IV cefazolin for 2 weeks.  Needs a midline.  Will need TCU   PT OT consultations requested on 12/12/2023    12/15/23 Hemodynamically stable .Awaiting placement to TCU      Hypokalemia: (Intermittent)   Replacement protocol     Tachycardia: resolved secondary to sepsis   Likely secondary to sepsis/infection   EKG with sinus tachycardia and NSST changes. T wave abnormality in inferior leads. NSST changes.   HR improving with fluids/Abx  12/9 echo ordered given staph  12/10 ECHO as above with no WMA, and normal EF      Multiple myeloma  IgG Kappa multiple myeloma   Follows with Dr Dreyer w/ [facility]. Hx multiple pathologic fractures.   Of note plans for MR pelvis by [facility](to compare w/ CT 5/4/23 from [place] w/ multiple abnormalities).  [facility] last note with [facility] had MRI with TRIA   On prophylactic valtrex   Patient on maintenance bortezomib  12/7 Consult [facility]   12/7 as per oncology visit.  Not likely myeloma or myeloma treatment associated.  Patient will follow-up in the clinic for bortezomib>> moved out 2-week     Polyneuropathy  Patient had developed a sensory neuropathy over the last 6 months.  Occurs mainly at nighttime     HLD  With ischemic colitis critical meds only po      Suicidal statement (not suicidal now)  Paranoid of politicians   12/8 patient reported suicidal statement to [facility]  nurse.   12/11 patient had made suicidal statements earlier in her hospital course with a discussion about potential surgery and an ostomy.  She subsequently stated that she is not suicidal.  Patient also made comments about [employer] which  stealing her money. (See prior notes)   12/11 psychiatry consult with [facility]: Lore Hanson (saw patient in 2022)   12/11. Patient not felt to be suicidal.  History of paranoid thoughts about politicians.  No medications at this point     MDD  Anxiety  PTSD  Citalopram 40 mg daily resumed   See above for assessment of suicidal statements and paranoid thoughts          Diet: Advance Diet as Tolerated: Low Fiber; Mechanical/Dental Soft Diet  Diet    DVT Prophylaxis: Pneumatic Compression Devices  Astudillo Catheter: Not present  Lines: PRESENT             Cardiac Monitoring: None  Code Status: Full Code      Clinically Significant Risk Factors                             # Financial/Environmental Concerns: none         Disposition Plan      Expected Discharge Date: 12/16/2023,  6:00 PM  Discharge Delays: *Medically Ready for Discharge  Destination: home  Discharge Comments: LTC with hospice            Vernon Jaimes MD  Hospitalist Service  [facility]  Securely message with [employer] (more info)  Text page via [employer] Paging/Directory   ______________________________________________________________________    Interval History       Patient seen and examined   Feels well  Has no diarrhea  Has mild cramps in abdomen occasionally    Physical Exam   Vital Signs: Temp: 98.4  F (36.9  C) Temp src: Oral BP: 102/57 Pulse: 93   Resp: 16 SpO2: 99 % O2 Device: None (Room air)    Weight: 130 lbs 0 oz    Physical Exam  Cardiovascular:      Rate and Rhythm: Normal rate and regular rhythm.      Heart sounds: Normal heart sounds.   Pulmonary:      Effort: Pulmonary effort is normal. No respiratory distress.   Abdominal:      General: There is no distension.       Palpations: Abdomen is soft.      Tenderness: There is no abdominal tenderness. There is no guarding or rebound.          Medical Decision Making       =      Data         Imaging results reviewed over the past 24 hrs:   No results found for this or any previous visit (from the past 24 hour(s)).

## 2023-12-15 NOTE — PROGRESS NOTES
[facility] Quick Note    Patient pending placement. She will follow up with us outpatient for continued treatment for multiple myeloma.     Patrick Dreyer, DO  [facility]

## 2023-12-15 NOTE — PLAN OF CARE
Goal Outcome Evaluation:                    Summary BRBPR and imaging showing colitis of transverse and descending colon on CT on admission      Hx multiple myeloma, stage III CKD, rheumatoid arthritis, osteopenia     Enteric precaution     12/13/2023 [id number] pt very sleepy this shift, complained of lower RQ abdominal discomfort but did not want anything for it.     Orientation A & O x 4     Vitals/Tele VSS, removed tele     IV Access/drains midline, SL, with intermittent ABX     Diet mechanical soft     Mobility Up ad acosta     GI/ continent sated had a  BM earlier in the day and noted no blood     Wound/Skin no adjustments needed    Consults Care coordinator     Discharge Plan Pending TCU placement     See Flow sheets for assessment

## 2023-12-16 PROCEDURE — 250N000013 HC RX MED GY IP 250 OP 250 PS 637: Performed by: INTERNAL MEDICINE

## 2023-12-16 PROCEDURE — 99232 SBSQ HOSP IP/OBS MODERATE 35: CPT | Performed by: STUDENT IN AN ORGANIZED HEALTH CARE EDUCATION/TRAINING PROGRAM

## 2023-12-16 PROCEDURE — 250N000011 HC RX IP 250 OP 636: Mod: JZ | Performed by: INTERNAL MEDICINE

## 2023-12-16 PROCEDURE — 250N000013 HC RX MED GY IP 250 OP 250 PS 637: Performed by: SPECIALIST

## 2023-12-16 PROCEDURE — 120N000001 HC R&B MED SURG/OB

## 2023-12-16 RX ADMIN — CEFAZOLIN SODIUM 2 G: 2 INJECTION, SOLUTION INTRAVENOUS at 21:23

## 2023-12-16 RX ADMIN — CEFAZOLIN SODIUM 2 G: 2 INJECTION, SOLUTION INTRAVENOUS at 05:20

## 2023-12-16 RX ADMIN — VALACYCLOVIR HYDROCHLORIDE 1000 MG: 1 TABLET, FILM COATED ORAL at 08:13

## 2023-12-16 RX ADMIN — VANCOMYCIN HYDROCHLORIDE 125 MG: 125 CAPSULE ORAL at 08:13

## 2023-12-16 RX ADMIN — CITALOPRAM HYDROBROMIDE 40 MG: 40 TABLET ORAL at 08:13

## 2023-12-16 RX ADMIN — CEFAZOLIN SODIUM 2 G: 2 INJECTION, SOLUTION INTRAVENOUS at 14:16

## 2023-12-16 RX ADMIN — METRONIDAZOLE 500 MG: 500 TABLET ORAL at 08:13

## 2023-12-16 RX ADMIN — VANCOMYCIN HYDROCHLORIDE 125 MG: 125 CAPSULE ORAL at 18:34

## 2023-12-16 RX ADMIN — VANCOMYCIN HYDROCHLORIDE 125 MG: 125 CAPSULE ORAL at 21:21

## 2023-12-16 RX ADMIN — METRONIDAZOLE 500 MG: 500 TABLET ORAL at 21:21

## 2023-12-16 RX ADMIN — VANCOMYCIN HYDROCHLORIDE 125 MG: 125 CAPSULE ORAL at 13:29

## 2023-12-16 ASSESSMENT — ACTIVITIES OF DAILY LIVING (ADL)
ADLS_ACUITY_SCORE: 18

## 2023-12-16 NOTE — PROGRESS NOTES
Nursing note  Pt c/o pain on the left upper arm just above ML insertion site. US revealed Superficial thrombus within the left cephalic vein in the region of the antecubital fossa and proximal forearm. . ML was removed. VAT will [place] back in am, and try to place a new ML.

## 2023-12-16 NOTE — PROGRESS NOTES
[facility]    Medicine Progress Note - Hospitalist Service    Date of Admission:  12/6/2023    Assessment & Plan   Mary Jo Mcleod is a 78 year old female with a history of multiple myeloma, stage III CKD, rheumatoid arthritis, osteopenia admitted on 12/6/2023 with abdominal pain, BRBPR and imaging showing colitis of transverse and descending colon on CT on admission      12/7 CT Transverse and descending colon wall thickening and inflammatory stranding secondary to C. difficile  Colitis and ischemic colitis with MSSA bacteremia  12/7/2023: Colonoscopy with ischemic colitis  12/7 sepsis with fever.   On evening of admission 12/5 patient described lower abdominal cramping and bright red blood per rectum.  Patient had associated nausea.  Had eaten egg salad 1 hour before.  In ED AFVSS. Hgb 12. WBC 13.2.   12/6 CT a/p w/ colitis (raj transverse and descending colon), infectious/ inflammatory favored.   12/6 GI consult: Dr. Castillo   12/7 Febrile with temp 101.5 sepsis treatment started on BSA with cefepime and flagyl. Aggressive fluid 1500 + /hour   12/7 CXR negative. COVID, influenza and RSV negative   Lactate negative this admission.   12/7 CT abdomen/pelvis (repeat to ensure no perforation after colonoscopy):  Persistent severe colitis involving the distal colon. No evidence of bowel perforation and no fluid collection. Mild small bowel distention with scattered air-fluid levels may reflect mild ileus   12/8 CRS consult :following   12/8 with ID consult: Changed cefepime to cefazolin. + Flagyl still. (Staph aureus bacteremia)   Daily improvement   12/11 CRS signed off. On regular diet   She is now on a regular diet and would be okay to discharge from her ischemic coliti/C. Difficile   12/11 ID recommending Flagyl for 7 full days total 12/8 - 12/15 (IV to oral)   12/15/23 Awaiting placement      C. difficile toxin B+/GDH antigen positive with toxin negative   CT findings of severe  colitis  Vancomycin 125 mg 4 times daily added given clinical scenario and imaging findings  ID consulted  Recommend oral Vanco while on IV antibiotics (IV antibiotics for 2 weeks)   No diarrhea        Staph aureus (MSSA) 12/7 blood culture    12/7 bacteremia (1 of 2 blood cultures + 12/7 positive MSSA)   12/8 blood cultures x 2 negative  Possible GI source  12/8  vanco x 1 given while awaiting sensitivities   12/8 ID consult.: Changed from cefepime to Ancef with Flagyl  12/10 ECHO: EF 60-65%, Right ventricle normal. Mild 1+ MR. No obvious valvular vegetations.   12/11 ID follow-up.  Patient will require IV cefazolin for 2 weeks.  Needs a midline.  Will need TCU   PT OT consultations requested on 12/12/2023    12/15/23 Hemodynamically stable .Awaiting placement to TCU      Hypokalemia: (Intermittent)   Replacement protocol     Tachycardia: resolved secondary to sepsis   Likely secondary to sepsis/infection   EKG with sinus tachycardia and NSST changes. T wave abnormality in inferior leads. NSST changes.   HR improving with fluids/Abx  12/9 echo ordered given staph  12/10 ECHO as above with no WMA, and normal EF     # Superficial thrombosis at midline site  -DVT Showed superficial clot at left upper extremity midline site  -Will need close outpatient follow up and a repeat ultrasound in a weeks time     Multiple myeloma  IgG Kappa multiple myeloma   Follows with Dr Dreyer w/ [facility]. Hx multiple pathologic fractures.   Of note plans for MR pelvis by [facility](to compare w/ CT 5/4/23 from [place] w/ multiple abnormalities).  [facility] by last note with [facility] had MRI with TRIA   On prophylactic valtrex   Patient on maintenance bortezomib  12/7 Consult [facility]   12/7 as per oncology visit.  Not likely myeloma or myeloma treatment associated.  Patient will follow-up in the clinic for bortezomib>> moved out 2-week     Polyneuropathy  Patient had developed a sensory neuropathy over the last 6 months.  Occurs  mainly at nighttime     HLD  With ischemic colitis critical meds only po      Suicidal statement (not suicidal now)  Paranoid of politicians   12/8 patient reported suicidal statement to [facility] nurse.   12/11 patient had made suicidal statements earlier in her hospital course with a discussion about potential surgery and an ostomy.  She subsequently stated that she is not suicidal.  Patient also made comments about [employer] which  stealing her money. (See prior notes)   12/11 psychiatry consult with [facility]: Lore Hanson (saw patient in 2022)   12/11. Patient not felt to be suicidal.  History of paranoid thoughts about politicians.  No medications at this point     MDD  Anxiety  PTSD  Citalopram 40 mg daily resumed   See above for assessment of suicidal statements and paranoid thoughts          Diet: Advance Diet as Tolerated: Low Fiber; Mechanical/Dental Soft Diet  Diet    DVT Prophylaxis: Pneumatic Compression Devices  Astudillo Catheter: Not present  Lines: None     Cardiac Monitoring: None  Code Status: Full Code      Clinically Significant Risk Factors                             # Financial/Environmental Concerns: none         Disposition Plan      Expected Discharge Date: 12/17/2023,  6:00 PM  Discharge Delays: *Medically Ready for Discharge  Destination: home  Discharge Comments: LTC with hospice            Vernon Jaimes MD  Hospitalist Service  [facility]  Securely message with [employer] (more info)  Text page via [employer] Paging/Directory   ______________________________________________________________________    Interval History   Patient seen and examined at bedside  Has mild abdominal cramps  Having Bowel movements  Passing gas       Discussed with Patient's nurse            Physical Exam   Vital Signs: Temp: 99.4  F (37.4  C) Temp src: Oral BP: 95/52 Pulse: 89   Resp: 16 SpO2: 97 % O2 Device: None (Room air)    Weight: 130 lbs 0 oz    Physical Exam  Cardiovascular:       Rate and Rhythm: Normal rate and regular rhythm.      Heart sounds: Normal heart sounds.   Pulmonary:      Effort: Pulmonary effort is normal. No respiratory distress.      Breath sounds: Normal breath sounds.   Abdominal:      General: There is no distension.      Palpations: Abdomen is soft.      Tenderness: There is no abdominal tenderness. There is no guarding or rebound.   Musculoskeletal:      Comments: Left upper extremity no erythema or tenderness          Medical Decision Making             Data

## 2023-12-16 NOTE — PLAN OF CARE
Orientation: AOx4  Activity: Ind  Diet/BS Checks: Mechanical Soft  Tele:  NA  IV Access/Drains: R PIV SL int abx.  Pain Management: Denies   Abnormal VS/Results:  VSS on RA  Bowel/Bladder: Continent B/B.   Skin/Wounds: bruising on L arm   Consults: SW  D/C Disposition: Pending Placement  Other Info: L midline removed due to shoulder swelling and small thrombus. Will be replaced 12/16. R PIV placed for now.

## 2023-12-17 PROCEDURE — 120N000001 HC R&B MED SURG/OB

## 2023-12-17 PROCEDURE — 250N000013 HC RX MED GY IP 250 OP 250 PS 637: Performed by: INTERNAL MEDICINE

## 2023-12-17 PROCEDURE — 250N000013 HC RX MED GY IP 250 OP 250 PS 637: Performed by: STUDENT IN AN ORGANIZED HEALTH CARE EDUCATION/TRAINING PROGRAM

## 2023-12-17 PROCEDURE — 99232 SBSQ HOSP IP/OBS MODERATE 35: CPT | Performed by: STUDENT IN AN ORGANIZED HEALTH CARE EDUCATION/TRAINING PROGRAM

## 2023-12-17 PROCEDURE — 250N000011 HC RX IP 250 OP 636: Mod: JZ | Performed by: INTERNAL MEDICINE

## 2023-12-17 PROCEDURE — 250N000013 HC RX MED GY IP 250 OP 250 PS 637: Performed by: SPECIALIST

## 2023-12-17 RX ORDER — CALCIUM CARBONATE 500 MG/1
500 TABLET, CHEWABLE ORAL 3 TIMES DAILY PRN
Status: DISCONTINUED | OUTPATIENT
Start: 2023-12-17 | End: 2023-12-17

## 2023-12-17 RX ORDER — CALCIUM CARBONATE 500 MG/1
500 TABLET, CHEWABLE ORAL 4 TIMES DAILY PRN
Status: DISCONTINUED | OUTPATIENT
Start: 2023-12-17 | End: 2023-12-22 | Stop reason: HOSPADM

## 2023-12-17 RX ADMIN — VANCOMYCIN HYDROCHLORIDE 125 MG: 125 CAPSULE ORAL at 18:21

## 2023-12-17 RX ADMIN — CEFAZOLIN SODIUM 2 G: 2 INJECTION, SOLUTION INTRAVENOUS at 14:08

## 2023-12-17 RX ADMIN — VANCOMYCIN HYDROCHLORIDE 125 MG: 125 CAPSULE ORAL at 21:09

## 2023-12-17 RX ADMIN — CITALOPRAM HYDROBROMIDE 40 MG: 40 TABLET ORAL at 08:41

## 2023-12-17 RX ADMIN — CALCIUM CARBONATE (ANTACID) CHEW TAB 500 MG 500 MG: 500 CHEW TAB at 12:58

## 2023-12-17 RX ADMIN — VALACYCLOVIR HYDROCHLORIDE 1000 MG: 1 TABLET, FILM COATED ORAL at 08:41

## 2023-12-17 RX ADMIN — VANCOMYCIN HYDROCHLORIDE 125 MG: 125 CAPSULE ORAL at 12:42

## 2023-12-17 RX ADMIN — METRONIDAZOLE 500 MG: 500 TABLET ORAL at 08:41

## 2023-12-17 RX ADMIN — CALCIUM CARBONATE (ANTACID) CHEW TAB 500 MG 500 MG: 500 CHEW TAB at 10:00

## 2023-12-17 RX ADMIN — CEFAZOLIN SODIUM 2 G: 2 INJECTION, SOLUTION INTRAVENOUS at 21:09

## 2023-12-17 RX ADMIN — CEFAZOLIN SODIUM 2 G: 2 INJECTION, SOLUTION INTRAVENOUS at 05:03

## 2023-12-17 RX ADMIN — VANCOMYCIN HYDROCHLORIDE 125 MG: 125 CAPSULE ORAL at 08:41

## 2023-12-17 ASSESSMENT — ACTIVITIES OF DAILY LIVING (ADL)
ADLS_ACUITY_SCORE: 18

## 2023-12-17 NOTE — PLAN OF CARE
Goal Outcome Evaluation:12/7 CT Transverse and descending colon wall thickening and inflammatory stranding secondary to C. difficile  Colitis and ischemic colitis with MSSA bacteremia  12/7/2023: Colonoscopy with ischemic colitis  12/7 sepsis with fever.        Date & Time: 12/16/23 -2792-3305  Surgery/POD#: none   Behavior & Aggression: green calm and pleasant   Fall Risk: no due to ind in room   Orientation:a/o x4 calm and quite ambulates in in room, CONTACT [employer]   ABNL VS/ vss on R A  ABNL Labs: NONE   Pain Management:  Bowel/Bladder: Incontinent both B/B      Drains: bruising on L arm L midline removed due to shoulder swelling and small thrombus. Will be replaced 12/16. R PIV placed for now  Diet:  Activity Level: Ind in room   Tests/Procedures: none   Anticipated  DC Date: waiting for TCU replacement    Significant Information: IV Midline will be in place for discharge, due to MD  order

## 2023-12-17 NOTE — PLAN OF CARE
Orientation: AOx4  Activity: Ind  Diet/BS Checks: Mechanical Soft, low fiber  Tele:  NA  IV Access/Drains: R PIV SL int abx.  Pain Management: Denies   Abnormal VS/Results:  VSS on RA  Bowel/Bladder: Continent B/B.   Skin/Wounds: bruising on L arm   Consults: SW  D/C Disposition: Pending TCU Placement  Other Info: New midline will be placed once placement has been found. Pt resting comfortably this shift.

## 2023-12-18 LAB
CREAT SERPL-MCNC: 0.79 MG/DL (ref 0.51–0.95)
EGFRCR SERPLBLD CKD-EPI 2021: 77 ML/MIN/1.73M2

## 2023-12-18 PROCEDURE — 250N000013 HC RX MED GY IP 250 OP 250 PS 637: Performed by: INTERNAL MEDICINE

## 2023-12-18 PROCEDURE — 36415 COLL VENOUS BLD VENIPUNCTURE: CPT | Performed by: INTERNAL MEDICINE

## 2023-12-18 PROCEDURE — 250N000011 HC RX IP 250 OP 636: Mod: JZ | Performed by: INTERNAL MEDICINE

## 2023-12-18 PROCEDURE — 120N000001 HC R&B MED SURG/OB

## 2023-12-18 PROCEDURE — 82565 ASSAY OF CREATININE: CPT | Performed by: INTERNAL MEDICINE

## 2023-12-18 PROCEDURE — 99232 SBSQ HOSP IP/OBS MODERATE 35: CPT | Performed by: INTERNAL MEDICINE

## 2023-12-18 PROCEDURE — 250N000013 HC RX MED GY IP 250 OP 250 PS 637: Performed by: STUDENT IN AN ORGANIZED HEALTH CARE EDUCATION/TRAINING PROGRAM

## 2023-12-18 RX ADMIN — CALCIUM CARBONATE (ANTACID) CHEW TAB 500 MG 500 MG: 500 CHEW TAB at 21:41

## 2023-12-18 RX ADMIN — CEFAZOLIN SODIUM 2 G: 2 INJECTION, SOLUTION INTRAVENOUS at 14:08

## 2023-12-18 RX ADMIN — VANCOMYCIN HYDROCHLORIDE 125 MG: 125 CAPSULE ORAL at 09:05

## 2023-12-18 RX ADMIN — CALCIUM CARBONATE (ANTACID) CHEW TAB 500 MG 500 MG: 500 CHEW TAB at 19:50

## 2023-12-18 RX ADMIN — VANCOMYCIN HYDROCHLORIDE 125 MG: 125 CAPSULE ORAL at 14:09

## 2023-12-18 RX ADMIN — VANCOMYCIN HYDROCHLORIDE 125 MG: 125 CAPSULE ORAL at 18:27

## 2023-12-18 RX ADMIN — VALACYCLOVIR HYDROCHLORIDE 1000 MG: 1 TABLET, FILM COATED ORAL at 09:05

## 2023-12-18 RX ADMIN — CALCIUM CARBONATE (ANTACID) CHEW TAB 500 MG 500 MG: 500 CHEW TAB at 11:12

## 2023-12-18 RX ADMIN — VANCOMYCIN HYDROCHLORIDE 125 MG: 125 CAPSULE ORAL at 21:41

## 2023-12-18 RX ADMIN — CEFAZOLIN SODIUM 2 G: 2 INJECTION, SOLUTION INTRAVENOUS at 05:06

## 2023-12-18 RX ADMIN — CEFAZOLIN SODIUM 2 G: 2 INJECTION, SOLUTION INTRAVENOUS at 21:41

## 2023-12-18 RX ADMIN — CITALOPRAM HYDROBROMIDE 40 MG: 40 TABLET ORAL at 09:05

## 2023-12-18 ASSESSMENT — ACTIVITIES OF DAILY LIVING (ADL)
ADLS_ACUITY_SCORE: 23
ADLS_ACUITY_SCORE: 18
ADLS_ACUITY_SCORE: 23
ADLS_ACUITY_SCORE: 21
ADLS_ACUITY_SCORE: 18
ADLS_ACUITY_SCORE: 21
ADLS_ACUITY_SCORE: 18
ADLS_ACUITY_SCORE: 23

## 2023-12-18 NOTE — PROGRESS NOTES
Care Management Follow Up    Length of Stay (days): 12    Expected Discharge Date: 12/18/2023     Concerns to be Addressed: all concerns addressed in this encounter     Patient plan of care discussed at interdisciplinary rounds: Yes    Anticipated Discharge Disposition: Home, Transitional Care     Anticipated Discharge Services: None  Anticipated Discharge DME: None    Patient/family educated on Medicare website which has current facility and service quality ratings:    Education Provided on the Discharge Plan:    Patient/Family in Agreement with the Plan: no    Referrals Placed by CM/SW:    Private pay costs discussed: Not applicable    Additional Information:  MILAN sent additional referrals via [facility].    TARA Jung    [facility]

## 2023-12-18 NOTE — PROGRESS NOTES
Midline pulled due to thrombus.  Pt only needs IV antbx thru 12-21  Will hold on placing midline at this time  Unit will re consult if needed.

## 2023-12-18 NOTE — PROGRESS NOTES
[facility]    Medicine Progress Note - Hospitalist Service    Date of Admission:  12/6/2023    Assessment & Plan   Mary Jo Mcleod is a 78 year old female with a history of multiple myeloma, stage III CKD, rheumatoid arthritis, osteopenia admitted on 12/6/2023 with abdominal pain, BRBPR and imaging showing colitis of transverse and descending colon on CT on admission      12/7 CT Transverse and descending colon wall thickening and inflammatory stranding secondary to C. difficile  Colitis and ischemic colitis with MSSA bacteremia  12/7/2023: Colonoscopy with ischemic colitis  12/7 sepsis with fever.   On evening of admission 12/5 patient described lower abdominal cramping and bright red blood per rectum.  Patient had associated nausea.  Had eaten egg salad 1 hour before.  In ED AFVSS. Hgb 12. WBC 13.2.   12/6 CT a/p w/ colitis (raj transverse and descending colon), infectious/ inflammatory favored.   12/6 GI consult: Dr. Castillo   12/7 Febrile with temp 101.5 sepsis treatment started on BSA with cefepime and flagyl. Aggressive fluid 1500 + /hour   12/7 CXR negative. COVID, influenza and RSV negative   Lactate negative this admission.   12/7 CT abdomen/pelvis (repeat to ensure no perforation after colonoscopy):  Persistent severe colitis involving the distal colon. No evidence of bowel perforation and no fluid collection. Mild small bowel distention with scattered air-fluid levels may reflect mild ileus   12/8 CRS consult :following   12/8 with ID consult: Changed cefepime to cefazolin. + Flagyl still. (Staph aureus bacteremia)   Daily improvement   12/11 CRS signed off. On regular diet   She is now on a regular diet and would be okay to discharge from her ischemic coliti/C. Difficile   12/11 ID recommending Flagyl for 7 full days total 12/8 - 12/15 (IV to oral)   12/15/23 Awaiting placement     12/11 ID recommending Flagyl for 7 full days total 12/8 - 12/15 (IV to oral)   12/15/23  Awaiting placement   Will need cefazolin till 12/21 ((2 weeks from negative blood cxs) .Will need ajustment of duration on discharge.Awaiting placement to TCU for anitibiotics as patient unable to do home infusions  -Continue oral vancomycin while on antibiotics  Will need midline order on day of discharge   Flagyl completed     C. difficile toxin B+/GDH antigen positive with toxin negative   CT findings of severe colitis  Vancomycin 125 mg 4 times daily added given clinical scenario and imaging findings  ID consulted  Recommend oral Vanco while on IV antibiotics (IV antibiotics for 2 weeks)   No diarrhea        Staph aureus (MSSA) 12/7 blood culture    12/7 bacteremia (1 of 2 blood cultures + 12/7 positive MSSA)   12/8 blood cultures x 2 negative  Possible GI source  12/8  vanco x 1 given while awaiting sensitivities   12/8 ID consult.: Changed from cefepime to Ancef with Flagyl  12/10 ECHO: EF 60-65%, Right ventricle normal. Mild 1+ MR. No obvious valvular vegetations.   12/11 ID follow-up.  Patient will require IV cefazolin for 2 weeks.  Needs a midline.  Will need TCU   PT OT consultations requested on 12/12/2023    12/15/23 Hemodynamically stable .Awaiting placement to TCU      Hypokalemia: (Intermittent)   Replacement protocol     Tachycardia: resolved secondary to sepsis   Likely secondary to sepsis/infection   EKG with sinus tachycardia and NSST changes. T wave abnormality in inferior leads. NSST changes.   HR improving with fluids/Abx  12/9 echo ordered given stap  12/10 ECHO as above with no WMA, and normal EF     # Superficial thrombosis at midline site  -DVT Showed superficial clot at left upper extremity midline site  Midline removed  -Will need close outpatient follow up and a repeat ultrasound in a weeks time     Multiple myeloma  IgG Kappa multiple myeloma   Follows with Dr Dreyer w/ [facility]. Hx multiple pathologic fractures.   Of note plans for MR pelvis by [facility](to compare w/ CT 5/4/23  from [place] w/ multiple abnormalities).  [facility] last note with [facility] had MRI with TRIA   On prophylactic valtrex   Patient on maintenance bortezomib  12/7 Consult [facility]   12/7 as per oncology visit.  Not likely myeloma or myeloma treatment associated.  Patient will follow-up in the clinic for bortezomib>> moved out 2-week     Polyneuropathy  Patient had developed a sensory neuropathy over the last 6 months.  Occurs mainly at nighttime     HLD  With ischemic colitis critical meds only po      Suicidal statement (not suicidal now)  Paranoid of politicians   12/8 patient reported suicidal statement to [facility] nurse.   12/11 patient had made suicidal statements earlier in her hospital course with a discussion about potential surgery and an ostomy.  She subsequently stated that she is not suicidal.  Patient also made comments about [employer] which  stealing her money. (See prior notes)   12/11 psychiatry consult with [facility]: Lore Hanson (saw patient in 2022)   12/11. Patient not felt to be suicidal.  History of paranoid thoughts about politicians.  No medications at this point     MDD  Anxiety  PTSD  Citalopram 40 mg daily resumed   See above for assessment of suicidal statements and paranoid thoughts          Diet: Advance Diet as Tolerated: Low Fiber; Mechanical/Dental Soft Diet  Diet    DVT Prophylaxis: Pneumatic Compression Devices  Astudillo Catheter: Not present  Lines: None     Cardiac Monitoring: None  Code Status: Full Code      Clinically Significant Risk Factors                             # Financial/Environmental Concerns: none         Disposition Plan      Expected Discharge Date: 12/18/2023,  6:00 PM  Discharge Delays: *Medically Ready for Discharge  Destination: home  Discharge Comments: LTC with hospice            Vernon Jaimes MD  Hospitalist Service  [facility]  Securely message with [email or web address] (more info)  Text page via [employer] Paging/Directory    ______________________________________________________________________    Interval History   Patient seen and examined at bedside  Having heart burn this morning which resolved with Tums    No abdominal cramps          Physical Exam   Vital Signs: Temp: 99.9  F (37.7  C) Temp src: Oral BP: 115/49 Pulse: 89   Resp: 18 SpO2: 99 % O2 Device: None (Room air)    Weight: 130 lbs 0 oz    Physical Exam  Cardiovascular:      Rate and Rhythm: Normal rate and regular rhythm.      Heart sounds: Normal heart sounds.   Pulmonary:      Effort: Pulmonary effort is normal. No respiratory distress.      Breath sounds: Normal breath sounds.   Abdominal:      General: There is no distension.      Palpations: Abdomen is soft.      Tenderness: There is no abdominal tenderness.   Musculoskeletal:      Comments: Left upper extremity no erythema or tenderness   Neurological:      Mental Status: She is alert.          Medical Decision Making             Data

## 2023-12-18 NOTE — PROGRESS NOTES
Chart Check:    No new recommendations at this time. Bortezomib can be delayed while at TCU if needed.    Adelso MASON, CNP  [facility]  [phone] (office)

## 2023-12-18 NOTE — PLAN OF CARE
Orientation: AOx4  Activity: Ind  Diet/BS Checks: Mechanical Soft, low fiber  Tele:  NA  IV Access/Drains: R PIV SL int abx.  Pain Management: Denies   Abnormal VS/Results:  VSS on RA  Bowel/Bladder: Continent B/B.   Skin/Wounds: intact  Consults: MILAN  D/C Disposition: Pending TCU Placement  Other Info: New midline will be placed once placement has been found. Pt resting comfortably this shift.pt has neuropathy in feet.

## 2023-12-18 NOTE — PROGRESS NOTES
[facility]    Hospitalist Progress Note    Interval History   - Doing well, no acute complaints, ambulating with assistance, eating well. Abdominal pain improving. Awaiting disposition to TCU    Assessment & Plan   Summary: Mary Jo Mcleod is a 78 year old female with PMH  multiple myeloma, stage III CKD, rheumatoid arthritis, osteopenia admitted who was admitted on 12/6/2023 with abdominal pain, BRBPR due to transverse and descending colitis.     Transverse and descending colon wall thickening and inflammatory stranding secondary to C. difficile  Colitis and ischemic colitis with MSSA bacteremia  Sepsis due to above, improved  On evening of admission 12/5 patient described lower abdominal cramping and bright red blood per rectum. 12/6 CT a/p w/ colitis (raj transverse and descending colon), infectious/ inflammatory favored.   12/6 GI consult: Dr. Castillo. 12/7 Colonoscopy with ischemic colitis.  12/7 Febrile with temp 101.5 sepsis treatment started on BSA with cefepime and flagyl. Aggressive fluid 1500 + /hour. 12/7 CXR negative. COVID, influenza and RSV negative. 12/7 CT abdomen/pelvis showed Persistent severe colitis involving the distal colon. No evidence of bowel perforation and no fluid collection. Mild small bowel distention with scattered air-fluid levels may reflect mild ileus   12/8 with ID consult: Changed cefepime to cefazolin. + Flagyl still. (Staph aureus bacteremia) . Improved. 12/11 CRS signed off. On regular diet   - Completed course of Flagyl  - Continue Cefazolin for 2 weeks ending 12/21     C. difficile toxin B+/GDH antigen positive with toxin negative   CT findings of severe colitis  - Vancomycin 125 mg 4 times daily added given clinical scenario and imaging findings  - ID consulted: Recommend oral Vanco while on IV antibiotics (IV antibiotics for 2 weeks)   - No diarrhea     12/7 bacteremia (1 of 2 blood cultures + 12/7 positive MSSA)   12/8 blood cultures x 2  negative  Possible GI source  12/8  vanco x 1 given while awaiting sensitivities   12/8 ID consult.: Changed from cefepime to Ancef with Flagyl  12/10 ECHO: EF 60-65%, Right ventricle normal. Mild 1+ MR. No obvious valvular vegetations.   12/11 ID follow-up.  Patient will require IV cefazolin for 2 weeks.  Needs a midline.  Will need TCU   PT OT consultations requested on 12/12/2023    12/15/23 Hemodynamically stable .Awaiting placement to TCU     Superficial thrombosis at midline site  LUE US Showed superficial clot at left upper extremity midline site  Midline removed  -Will need close outpatient follow up and a repeat ultrasound in a weeks time     Multiple myeloma  IgG Kappa multiple myeloma   Follows with Dr Dreyer w/ [facility]. Hx multiple pathologic fractures. Of note plans for MR pelvis by [facility](to compare w/ CT 5/4/23 from [place] w/ multiple abnormalities). [facility] note with [facility] MRI with [facility]A   - On prophylactic valtrex   - Patient on maintenance bortezomib  - Patient will follow-up in the clinic for bortezomib>> moved out 2-week     Polyneuropathy  - Patient had developed a sensory neuropathy over the last 6 months.  - Occurs mainly at nighttime     HLD: With ischemic colitis critical meds only po      Suicidal statement (not suicidal now)  Paranoid of politicians   12/8 patient reported suicidal statement to [facility] nurse.   12/11 patient had made suicidal statements earlier in her hospital course with a discussion about potential surgery and an ostomy.  She subsequently stated that she is not suicidal.  Patient also made comments about [employer] which  stealing her money. (See prior notes)   12/11 psychiatry consult with [facility]: Lore Hanson (saw patient in 2022)   12/11. Patient not felt to be suicidal.  History of paranoid thoughts about politicians.  No medications at this point     MDD  Anxiety  PTSD  - Citalopram 40 mg daily resumed     Clinically Significant Risk Factors                              # Financial/Environmental Concerns: none          PT/OT: ordered  Diet: Advance Diet as Tolerated: Low Fiber; Mechanical/Dental Soft Diet  Diet    DVT Prophylaxis: Pneumatic Compression Devices  Astudillo Catheter: Not present  Lines: None     Cardiac Monitoring: None  Code Status: Full Code    Disposition: Anticipated discharge to TCU when found    Teja Cam MD  Hospitalist Service  [facility]  Securely message with [employer] (more info)  Text page via [employer] Paging/Directory     Data reviewed today: I reviewed all new labs and imaging results over the last 24 hours.    Physical Exam   Temp: 99.2  F (37.3  C) Temp src: Oral BP: 111/56 Pulse: 80   Resp: 18 SpO2: 96 % O2 Device: None (Room air)    Vitals:    12/07/23 0920   Weight: 59 kg (130 lb)     Vital Signs with Ranges  Temp:  [97.3  F (36.3  C)-99.9  F (37.7  C)] 99.2  F (37.3  C)  Pulse:  [80-97] 80  Resp:  [16-18] 18  BP: (105-115)/(49-73) 111/56  SpO2:  [96 %-99 %] 96 %  I/O last 3 completed shifts:  In: 240 [P.O.:240]  Out: -   O2 requirements: none    Constitutional: Female in NAD  HEENT: Eyes nonicteric, oral mucosa moist  Cardiovascular: RRR, normal S1/2, no m/r/g  Respiratory: CTAB, no wheezing or crackles  Vascular: No LE pitting edema, noted distal pedal pulses  GI: Normoactive bowel sounds, nontender, nondistended  Skin/Integumen: No rashes  Neuro/Psych: Appropriate affect and mood. A&Ox3, moves all extremities    Medications      ceFAZolin  2 g Intravenous Q8H    citalopram  40 mg Oral Daily    sodium chloride (PF)  3 mL Intracatheter Q8H    valACYclovir  1,000 mg Oral Daily    vancomycin  125 mg Oral 4x Daily       Data   Recent Labs   Lab 12/13/23  0840 12/12/23  0854 12/11/23  1421   WBC 5.4  --   --    HGB 9.9*  --  9.0*   *  --   --      --   --     142 141   POTASSIUM 3.8 3.7 3.7   CHLORIDE 104 105 105   CO2 29 30* 29   BUN 5.3* 3.2* 4.6*   CR 0.62 0.67 0.67    ANIONGAP 7 7 7   ARA 8.3* 8.1* 8.1*   GLC 91 117* 173*       Imaging:   No results found for this or any previous visit (from the past 24 hour(s)).

## 2023-12-18 NOTE — PLAN OF CARE
Alert and oriented x4. VSS on RA. Denies chest pain and SOB. Tolerating [facility] soft/low fiber diet. Gave prn Tums for heartburn. Denies pain. Baseline neuropathy to BLE. Up independent. Ambulated in halls. Discharge pending placement.

## 2023-12-18 NOTE — PLAN OF CARE
Orientation: AOx4  Activity: Ind  Diet/BS Checks: Mechanical Soft, low fiber  Tele:  NA  IV Access/Drains: R PIV SL int abx.  Pain Management: Denies   Abnormal VS/Results:  VSS, enteric precautions  Bowel/Bladder: Continent B/B, intermittent soft stools /diarrhea continue.   Skin/Wounds: WDL  Consults: MILAN  D/C Disposition: Pending TCU Placement  Other Info: New midline to be placed if discharge placement found.  L superficial thrombus from previous midline.

## 2023-12-19 PROCEDURE — 250N000013 HC RX MED GY IP 250 OP 250 PS 637: Performed by: STUDENT IN AN ORGANIZED HEALTH CARE EDUCATION/TRAINING PROGRAM

## 2023-12-19 PROCEDURE — 250N000011 HC RX IP 250 OP 636: Mod: JZ | Performed by: INTERNAL MEDICINE

## 2023-12-19 PROCEDURE — 99232 SBSQ HOSP IP/OBS MODERATE 35: CPT | Performed by: INTERNAL MEDICINE

## 2023-12-19 PROCEDURE — 250N000013 HC RX MED GY IP 250 OP 250 PS 637: Performed by: INTERNAL MEDICINE

## 2023-12-19 PROCEDURE — 120N000001 HC R&B MED SURG/OB

## 2023-12-19 RX ADMIN — VANCOMYCIN HYDROCHLORIDE 125 MG: 125 CAPSULE ORAL at 09:41

## 2023-12-19 RX ADMIN — CALCIUM CARBONATE (ANTACID) CHEW TAB 500 MG 500 MG: 500 CHEW TAB at 14:52

## 2023-12-19 RX ADMIN — CALCIUM CARBONATE (ANTACID) CHEW TAB 500 MG 500 MG: 500 CHEW TAB at 16:17

## 2023-12-19 RX ADMIN — CITALOPRAM HYDROBROMIDE 40 MG: 40 TABLET ORAL at 09:41

## 2023-12-19 RX ADMIN — VANCOMYCIN HYDROCHLORIDE 125 MG: 125 CAPSULE ORAL at 22:01

## 2023-12-19 RX ADMIN — CEFAZOLIN SODIUM 2 G: 2 INJECTION, SOLUTION INTRAVENOUS at 14:02

## 2023-12-19 RX ADMIN — CEFAZOLIN SODIUM 2 G: 2 INJECTION, SOLUTION INTRAVENOUS at 22:01

## 2023-12-19 RX ADMIN — VANCOMYCIN HYDROCHLORIDE 125 MG: 125 CAPSULE ORAL at 18:31

## 2023-12-19 RX ADMIN — VALACYCLOVIR HYDROCHLORIDE 1000 MG: 1 TABLET, FILM COATED ORAL at 09:41

## 2023-12-19 RX ADMIN — CEFAZOLIN SODIUM 2 G: 2 INJECTION, SOLUTION INTRAVENOUS at 05:50

## 2023-12-19 RX ADMIN — VANCOMYCIN HYDROCHLORIDE 125 MG: 125 CAPSULE ORAL at 13:20

## 2023-12-19 ASSESSMENT — ACTIVITIES OF DAILY LIVING (ADL)
ADLS_ACUITY_SCORE: 21
ADLS_ACUITY_SCORE: 21
ADLS_ACUITY_SCORE: 23
ADLS_ACUITY_SCORE: 21
ADLS_ACUITY_SCORE: 23
ADLS_ACUITY_SCORE: 21
ADLS_ACUITY_SCORE: 21

## 2023-12-19 NOTE — PROGRESS NOTES
Chart Check:      No new recommendations at this time. Bortezomib can be delayed while at TCU if needed.   At present, Mary Jo has a scheduled visit with Dr. Dreyer in our [place] clinic on 1/5/24 at 11:05 am.       Ede MASON, [facility]  [phone] (office) or [phone] (cell)

## 2023-12-19 NOTE — PLAN OF CARE
07:00-15:30 Shift  Orientation: A/O x4; forgetful sometimes.   Activity: Independent w/walker. Ambulated twice around unit   Diet/BS Checks: Low fiber, mechanical soft, advance as tolerated.  Tele: NA  IV Access/Drains: R PIV SL   Pain Management: Denies. Mild abd. discomfort, relieved with ambulation.  Abnormal VS/Results: VSS on RA. On enteric precautions for C.Diff.  Bowel/Bladder: Continent B/B. Soft, loose BM continue  Skin/Wounds: WDL  Consults: SW following for placement.  D/C Disposition: Pending TCU placement.  Other Info: New midline to be placed when discharge placement found. L superficial thrombus from previous midline.   Tums given x1 for GI discomfort.

## 2023-12-19 NOTE — PROGRESS NOTES
Care Management Follow Up    Length of Stay (days): 13    Expected Discharge Date: 12/18/2023     Concerns to be Addressed: all concerns addressed in this encounter     Patient plan of care discussed at interdisciplinary rounds: Yes    Anticipated Discharge Disposition: Home, Transitional Care     Anticipated Discharge Services: None  Anticipated Discharge DME: None    Patient/family educated on Medicare website which has current facility and service quality ratings:    Education Provided on the Discharge Plan:    Patient/Family in Agreement with the Plan: no    Referrals Placed by CM/SW:    Private pay costs discussed: Not applicable    Additional Information:  MILAN sent additional TCU referrals via [facility].    TARA Jung    [facility]

## 2023-12-19 NOTE — PROGRESS NOTES
[facility]    Hospitalist Progress Note    Interval History   - Doing well, no acute complaints, abdominal pain further improving today. Awaiting dispo to TCU per     Assessment & Plan   Summary: Mary Jo Mcleod is a 78 year old female with PMH  multiple myeloma, stage III CKD, rheumatoid arthritis, osteopenia admitted who was admitted on 12/6/2023 with abdominal pain, BRBPR due to transverse and descending colitis.     Transverse and descending colon wall thickening and inflammatory stranding secondary to C. difficile  Colitis and ischemic colitis  MSSA bacteremia  Sepsis due to above, improved  On evening of admission 12/5 patient described lower abdominal cramping and bright red blood per rectum. CT abdomen pelvis showed colitis, infectious/ inflammatory favored. GI consulted; 12/7 Colonoscopy showed colitis, biopsied, showing ischemic pattern of injury and negative for malignancy.  12/7 Patient developed sepsis with temp 101.5, started on cefepime and flagyl. CT abdomen/pelvis showed Persistent severe colitis involving the distal colon. No evidence of bowel perforation and no fluid collection. Mild small bowel distention with scattered air-fluid levels may reflect mild ileus. Blood cultures grew MSSA. ID consulted, patient completing a course of antibiotics for bacteremia.  12/10 ECHO: EF 60-65%, Right ventricle normal. Mild 1+ MR. No obvious valvular vegetations.   Now improved and stable to discharge to TCU  - Completed course of Flagyl  - Continue Cefazolin for 2 weeks ending 12/21  - If patient discharges on 12/20, consider peripheral IV for one day over midline given her superficial thrombosis this admission with midline     C. difficile toxin B+/GDH antigen positive with toxin negative   CT findings of severe colitis  - ID consul appreciated: Recommend oral Vanco while on IV antibiotics (IV antibiotics for 2 weeks)   - No diarrhea     Superficial thrombosis at  midline site  LUE US on 12/15 Showed superficial clot at left upper extremity midline site  Midline removed  -Will need close outpatient follow up and a repeat ultrasound in a weeks time     Multiple myeloma  IgG Kappa multiple myeloma   Follows with Dr Dreyer w/ [facility]. Hx multiple pathologic fractures. Of note plans for MR pelvis by [facility](to compare w/ CT 5/4/23 from [place] w/ multiple abnormalities). Patient by last note with [facility] had MRI with [facility]   - On prophylactic valtrex   - Patient on maintenance bortezomib  - Patient will follow-up in the clinic for bortezomib>> moved out 2-week     Polyneuropathy  - Patient had developed a sensory neuropathy over the last 6 months.  - Occurs mainly at nighttime     HLD: With ischemic colitis critical meds only po      Suicidal statement (not suicidal now)  Paranoid of politicians   12/8 patient reported suicidal statement to [facility] nurse.   12/11 patient had made suicidal statements earlier in her hospital course with a discussion about potential surgery and an ostomy.  She subsequently stated that she is not suicidal.  Patient also made comments about [employer] Francisco which  stealing her money. (See prior notes)   12/11 psychiatry consult with [facility]: Lore Hanson (saw patient in 2022)   12/11. Patient not felt to be suicidal.  History of paranoid thoughts about politicians.  No medications at this point     MDD  Anxiety  PTSD  - Citalopram 40 mg daily resumed     Clinically Significant Risk Factors                             # Financial/Environmental Concerns: none          PT/OT: ordered  Diet: Advance Diet as Tolerated: Low Fiber; Mechanical/Dental Soft Diet  Diet    DVT Prophylaxis: Pneumatic Compression Devices  Astudillo Catheter: Not present  Lines: None     Cardiac Monitoring: None  Code Status: Full Code    Disposition: Anticipated discharge to TCU when found    Teja Cam MD  Hospitalist Service  [facility]  Securely  message with Sum[employer] (more info)  Text page via [employer] Paging/Directory     Data reviewed today: I reviewed all new labs and imaging results over the last 24 hours.    Physical Exam   Temp: 99.6  F (37.6  C) Temp src: Oral BP: 104/55 Pulse: 103   Resp: 16 SpO2: 100 % O2 Device: None (Room air)    Vitals:    12/07/23 0920   Weight: 59 kg (130 lb)     Vital Signs with Ranges  Temp:  [99.4  F (37.4  C)-99.6  F (37.6  C)] 99.6  F (37.6  C)  Pulse:  [] 103  Resp:  [16] 16  BP: (104-112)/(55) 104/55  SpO2:  [95 %-100 %] 100 %  No intake/output data recorded.  O2 requirements: none    Constitutional: Female in NAD  HEENT: Eyes nonicteric, oral mucosa moist  Cardiovascular: RRR, normal S1/2, no m/r/g  Respiratory: CTAB, no wheezing or crackles  Vascular: No LE pitting edema, noted distal pedal pulses  GI: Normoactive bowel sounds, nontender, nondistended  Skin/Integumen: No rashes  Neuro/Psych: Appropriate affect and mood. A&Ox3, moves all extremities    Medications      ceFAZolin  2 g Intravenous Q8H    citalopram  40 mg Oral Daily    sodium chloride (PF)  3 mL Intracatheter Q8H    valACYclovir  1,000 mg Oral Daily    vancomycin  125 mg Oral 4x Daily       Data   Recent Labs   Lab 12/18/23  1153 12/13/23  0840 12/12/23  0854   WBC  --  5.4  --    HGB  --  9.9*  --    MCV  --  102*  --    PLT  --  230  --    NA  --  140 142   POTASSIUM  --  3.8 3.7   CHLORIDE  --  104 105   CO2  --  29 30*   BUN  --  5.3* 3.2*   CR 0.79 0.62 0.67   ANIONGAP  --  7 7   ARA  --  8.3* 8.1*   GLC  --  91 117*       Imaging:   No results found for this or any previous visit (from the past 24 hour(s)).

## 2023-12-19 NOTE — PLAN OF CARE
Shift: 3970-9560  Orientation: A&Ox4; forgetful.   Activity: Indep w/ walker; walks freq SBA w/ walker around unit.  Diet/BS Checks: Low fiber, mechanical soft. PRN tums given 2x for heartburn after dinner and at bedtime due to persistence.  Tele:  NA  IV Access/Drains: R PIV SL int abx.  Pain Management: Denies.   Abnormal VS/Results: VSS on RA. On enteric precautions for C.Diff.  Bowel/Bladder: Continent B/B, no BMs this shift.   Skin/Wounds: WDL  Consults: SW following for placement.  D/C Disposition: Pending TCU placement.  Other Info: New midline to be placed when discharge placement found. L superficial thrombus from previous midline.

## 2023-12-19 NOTE — PLAN OF CARE
Orientation: A&O x4  Activity: Indep w walker  Diet/BS Checks: Low Fiber; mechanical/Dental soft diet  Tele:  N/A  IV Access/Drains: R PIV SL with int abx  Pain Management: Denies pain this shift  Abnormal VS/Results: VSS on RA. On enteric precautions for C-diff   Bowel/Bladder: Continent of B/B. No BM this shift  Skin/Wounds: WDL  Consults: SW,   D/C Disposition: Plan to discharge to TCU  Other Info: New midline to be placed when discharge placement found. Previous midline removed d/t  L superficial thrombus

## 2023-12-20 LAB
ANION GAP SERPL CALCULATED.3IONS-SCNC: 6 MMOL/L (ref 7–15)
BUN SERPL-MCNC: 13.7 MG/DL (ref 8–23)
CALCIUM SERPL-MCNC: 9 MG/DL (ref 8.8–10.2)
CHLORIDE SERPL-SCNC: 100 MMOL/L (ref 98–107)
CREAT SERPL-MCNC: 0.81 MG/DL (ref 0.51–0.95)
DEPRECATED HCO3 PLAS-SCNC: 32 MMOL/L (ref 22–29)
EGFRCR SERPLBLD CKD-EPI 2021: 74 ML/MIN/1.73M2
GLUCOSE SERPL-MCNC: 93 MG/DL (ref 70–99)
POTASSIUM SERPL-SCNC: 4.3 MMOL/L (ref 3.4–5.3)
SODIUM SERPL-SCNC: 138 MMOL/L (ref 135–145)

## 2023-12-20 PROCEDURE — 250N000013 HC RX MED GY IP 250 OP 250 PS 637: Performed by: STUDENT IN AN ORGANIZED HEALTH CARE EDUCATION/TRAINING PROGRAM

## 2023-12-20 PROCEDURE — 250N000013 HC RX MED GY IP 250 OP 250 PS 637: Performed by: INTERNAL MEDICINE

## 2023-12-20 PROCEDURE — 120N000001 HC R&B MED SURG/OB

## 2023-12-20 PROCEDURE — 36415 COLL VENOUS BLD VENIPUNCTURE: CPT | Performed by: INTERNAL MEDICINE

## 2023-12-20 PROCEDURE — 80048 BASIC METABOLIC PNL TOTAL CA: CPT | Performed by: INTERNAL MEDICINE

## 2023-12-20 PROCEDURE — 99232 SBSQ HOSP IP/OBS MODERATE 35: CPT | Performed by: INTERNAL MEDICINE

## 2023-12-20 PROCEDURE — 250N000011 HC RX IP 250 OP 636: Mod: JZ | Performed by: INTERNAL MEDICINE

## 2023-12-20 RX ADMIN — VANCOMYCIN HYDROCHLORIDE 125 MG: 125 CAPSULE ORAL at 12:51

## 2023-12-20 RX ADMIN — VANCOMYCIN HYDROCHLORIDE 125 MG: 125 CAPSULE ORAL at 17:34

## 2023-12-20 RX ADMIN — VANCOMYCIN HYDROCHLORIDE 125 MG: 125 CAPSULE ORAL at 08:01

## 2023-12-20 RX ADMIN — CALCIUM CARBONATE (ANTACID) CHEW TAB 500 MG 500 MG: 500 CHEW TAB at 13:43

## 2023-12-20 RX ADMIN — CITALOPRAM HYDROBROMIDE 40 MG: 40 TABLET ORAL at 08:01

## 2023-12-20 RX ADMIN — VALACYCLOVIR HYDROCHLORIDE 1000 MG: 1 TABLET, FILM COATED ORAL at 08:01

## 2023-12-20 RX ADMIN — CALCIUM CARBONATE (ANTACID) CHEW TAB 500 MG 500 MG: 500 CHEW TAB at 09:18

## 2023-12-20 RX ADMIN — CALCIUM CARBONATE (ANTACID) CHEW TAB 500 MG 500 MG: 500 CHEW TAB at 12:51

## 2023-12-20 RX ADMIN — CEFAZOLIN SODIUM 2 G: 2 INJECTION, SOLUTION INTRAVENOUS at 21:54

## 2023-12-20 RX ADMIN — CEFAZOLIN SODIUM 2 G: 2 INJECTION, SOLUTION INTRAVENOUS at 05:51

## 2023-12-20 RX ADMIN — VANCOMYCIN HYDROCHLORIDE 125 MG: 125 CAPSULE ORAL at 21:54

## 2023-12-20 RX ADMIN — CEFAZOLIN SODIUM 2 G: 2 INJECTION, SOLUTION INTRAVENOUS at 13:10

## 2023-12-20 ASSESSMENT — ACTIVITIES OF DAILY LIVING (ADL)
ADLS_ACUITY_SCORE: 21

## 2023-12-20 NOTE — PROGRESS NOTES
[facility]    Medicine Progress Note - Hospitalist Service    Date of Admission:  12/6/2023    Assessment & Plan   Mary Jo Mcleod is a 78 year old female with PMH  multiple myeloma, stage III CKD, rheumatoid arthritis, osteopenia admitted who was admitted on 12/6/2023 with abdominal pain, BRBPR due to transverse and descending colitis.     Transverse and descending colon wall thickening and inflammatory stranding secondary to C. difficile  Colitis and ischemic colitis  MSSA bacteremia  Sepsis due to above, improved  On evening of admission 12/5 patient described lower abdominal cramping and bright red blood per rectum. CT abdomen pelvis showed colitis, infectious/ inflammatory favored. GI consulted; 12/7 Colonoscopy showed colitis, biopsied, showing ischemic pattern of injury and negative for malignancy.  12/7 Patient developed sepsis with temp 101.5, started on cefepime and flagyl. CT abdomen/pelvis showed Persistent severe colitis involving the distal colon. No evidence of bowel perforation and no fluid collection. Mild small bowel distention with scattered air-fluid levels may reflect mild ileus. Blood cultures grew MSSA. ID consulted, patient completing a course of antibiotics for bacteremia.  12/10 ECHO: EF 60-65%, Right ventricle normal. Mild 1+ MR. No obvious valvular vegetations.   Now improved and stable to discharge to TCU  - Completed course of Flagyl  - Continue Cefazolin for 2 weeks ending 12/21.  Can discharge home after completion     C. difficile toxin B+/GDH antigen positive with toxin negative   CT findings of severe colitis  - ID consul appreciated: Recommend oral Vanco while on IV antibiotics (IV antibiotics for 2 weeks)   - No diarrhea but loose stools     Superficial thrombosis at midline site  LUE US on 12/15 Showed superficial clot at left upper extremity midline site.  Midline removed since  - Will need close outpatient follow up and a repeat ultrasound in a weeks  time     Multiple myeloma  IgG Kappa multiple myeloma   Follows with Dr Dreyer w/ [facility]. Hx multiple pathologic fractures. Of note plans for MR pelvis by [facility](to compare w/ CT 5/4/23 from [place] w/ multiple abnormalities). [facility] by last note with [facility] MRI with [facility]   - On prophylactic valtrex   - Patient on maintenance bortezomib  - Patient will follow-up in the clinic for bortezomib>> moved out 2-week  - Hem/onc following      Polyneuropathy  - Patient had developed a sensory neuropathy over the last 6 months.  - Occurs mainly at nighttime     HLD: With ischemic colitis critical meds only po      Suicidal statement (not suicidal now)  Paranoid of politicians   12/8 patient reported suicidal statement to [facility] nurse.   12/11 patient had made suicidal statements earlier in her hospital course with a discussion about potential surgery and an ostomy.  She subsequently stated that she is not suicidal.  Patient also made comments about [employer] which  stealing her money. (See prior notes)   12/11 psychiatry consult with [facility]: Lore Hanson (saw patient in 2022)   12/11. Patient not felt to be suicidal.  History of paranoid thoughts about politicians.  No medications at this point     MDD  Anxiety  PTSD  - Citalopram 40 mg daily resumed                 Diet: Diet  Regular Diet Adult    DVT Prophylaxis: Pneumatic Compression Devices  Astudillo Catheter: Not present  Lines: None     Cardiac Monitoring: None  Code Status: Full Code      Clinically Significant Risk Factors                             # Financial/Environmental Concerns: none         Disposition Plan      Expected Discharge Date: 12/21/2023,  6:00 PM  Discharge Delays: *Medically Ready for Discharge  Destination: home  Discharge Comments: LTC with hospice            Levi Jennings DO  Hospitalist Service  [facility]  Securely message with [employer] (more info)  Text page via [employer] Paging/Directory    ______________________________________________________________________    Interval History   Patient seen and examined.  No acute events over night.  No fevers noted.  Still having some loose stools but no diarrhea.  No chest pain or SOB.  No nausea or vomiting.     Physical Exam   Vital Signs: Temp: 99.1  F (37.3  C) Temp src: Oral BP: 109/58 Pulse: 80   Resp: 16 SpO2: 96 % O2 Device: None (Room air)    Weight: 130 lbs 0 oz    General Appearance: Resting comfortably in bed.  NAD  Respiratory: No respiratory distress.  Lungs sound clear  Cardiovascular: RRR.  No obvious murmurs  GI: Soft.  Non-distended  Skin: No obvious rashes or cyanosis to exposed skin  Other: Alert.  No edema      Medical Decision Making       38 MINUTES SPENT BY ME on the date of service doing chart review, history, exam, documentation & further activities per the note.      Data   ------------------------- PAST 24 HR DATA REVIEWED -----------------------------------------------    I have personally reviewed the following data over the past 24 hrs:    N/A  \   N/A   / N/A     138 100 13.7 /  93   4.3 32 (H) 0.81 \       Imaging results reviewed over the past 24 hrs:   No results found for this or any previous visit (from the past 24 hour(s)).

## 2023-12-20 NOTE — PLAN OF CARE
Goal Outcome Evaluation:    Orientation: A&Ox4  Activity: ind w/ walker  Diet/BS Checks: low fiber, mechanical soft, tolerating well, interested in advancing diet  Tele:  NA  IV Access/Drains: R PIV SL  Pain Management: denies  Abnormal VS/Results: VSS on RA  Bowel/Bladder: cont b/b  Skin/Wounds: WNL  Consults: MILAN  D/C Disposition: pending placement & completion of antibiotic treatment on 12/21

## 2023-12-20 NOTE — PROGRESS NOTES
Chart Check:    No new recommendations at this time. Bortezomib can be delayed while at TCU if needed.   At present, Mary Jo has a scheduled visit with Dr. Dreyer in our [place] clinic on 1/5/24 at 11:05 am.       Ede MASON, [facility]  [phone] (office) or [phone] (cell)

## 2023-12-20 NOTE — PLAN OF CARE
0313-9526  Orientation: a&ox4  Activity: ind w/ walker  Diet/BS Checks: regular  Tele:  n/a  IV Access/Drains: R PIV SL w/ int antibx  Pain Management: denies  Abnormal VS/Results: VSS on RA  Bowel/Bladder: cont b/b. BM 12/19  Skin/Wounds: bruise on left arm. Redness on sacrum Numbness in both toes  Consults: SW. Heme/onc  D/C Disposition: pending TCU placement and completion of antibx treatment 12/21  Other Info: on enteric precautions for cdiff. New midline to be placed when discharge placement found. L superficial thrombus from previous midline

## 2023-12-21 VITALS
BODY MASS INDEX: 23.04 KG/M2 | TEMPERATURE: 98.3 F | OXYGEN SATURATION: 97 % | SYSTOLIC BLOOD PRESSURE: 117 MMHG | RESPIRATION RATE: 16 BRPM | DIASTOLIC BLOOD PRESSURE: 57 MMHG | HEIGHT: 63 IN | WEIGHT: 130 LBS | HEART RATE: 90 BPM

## 2023-12-21 PROCEDURE — 250N000011 HC RX IP 250 OP 636: Mod: JZ | Performed by: INTERNAL MEDICINE

## 2023-12-21 PROCEDURE — 250N000013 HC RX MED GY IP 250 OP 250 PS 637: Performed by: STUDENT IN AN ORGANIZED HEALTH CARE EDUCATION/TRAINING PROGRAM

## 2023-12-21 PROCEDURE — 250N000013 HC RX MED GY IP 250 OP 250 PS 637: Performed by: INTERNAL MEDICINE

## 2023-12-21 PROCEDURE — 99239 HOSP IP/OBS DSCHRG MGMT >30: CPT | Performed by: INTERNAL MEDICINE

## 2023-12-21 PROCEDURE — 250N000013 HC RX MED GY IP 250 OP 250 PS 637: Performed by: PHYSICIAN ASSISTANT

## 2023-12-21 RX ADMIN — VANCOMYCIN HYDROCHLORIDE 125 MG: 125 CAPSULE ORAL at 09:08

## 2023-12-21 RX ADMIN — VANCOMYCIN HYDROCHLORIDE 125 MG: 125 CAPSULE ORAL at 21:06

## 2023-12-21 RX ADMIN — DICYCLOMINE HYDROCHLORIDE 20 MG: 10 CAPSULE ORAL at 12:24

## 2023-12-21 RX ADMIN — CITALOPRAM HYDROBROMIDE 40 MG: 40 TABLET ORAL at 09:08

## 2023-12-21 RX ADMIN — CEFAZOLIN SODIUM 2 G: 2 INJECTION, SOLUTION INTRAVENOUS at 14:18

## 2023-12-21 RX ADMIN — CEFAZOLIN SODIUM 2 G: 2 INJECTION, SOLUTION INTRAVENOUS at 21:06

## 2023-12-21 RX ADMIN — VANCOMYCIN HYDROCHLORIDE 125 MG: 125 CAPSULE ORAL at 12:24

## 2023-12-21 RX ADMIN — VALACYCLOVIR HYDROCHLORIDE 1000 MG: 1 TABLET, FILM COATED ORAL at 09:08

## 2023-12-21 RX ADMIN — CEFAZOLIN SODIUM 2 G: 2 INJECTION, SOLUTION INTRAVENOUS at 05:44

## 2023-12-21 RX ADMIN — CALCIUM CARBONATE (ANTACID) CHEW TAB 500 MG 500 MG: 500 CHEW TAB at 17:53

## 2023-12-21 RX ADMIN — VANCOMYCIN HYDROCHLORIDE 125 MG: 125 CAPSULE ORAL at 19:38

## 2023-12-21 RX ADMIN — CALCIUM CARBONATE (ANTACID) CHEW TAB 500 MG 500 MG: 500 CHEW TAB at 09:08

## 2023-12-21 RX ADMIN — CALCIUM CARBONATE (ANTACID) CHEW TAB 500 MG 500 MG: 500 CHEW TAB at 19:38

## 2023-12-21 ASSESSMENT — ACTIVITIES OF DAILY LIVING (ADL)
ADLS_ACUITY_SCORE: 21

## 2023-12-21 NOTE — PLAN OF CARE
Goal Outcome Evaluation:    Orientation: AOx4  Activity: ind w/ walker  Diet/BS Checks: Regular, fair appetite  Tele: N/A  IV Access/Drains: R PIV SL w/ int abx  Pain Management: denies pain this shift  Abnormal VS/Results: VSS on RA  Bowel/Bladder: Cont B/B. Pt reports 3 loose BM this shift  Skin/Wounds: LUE bruise. Blanchable redness to sacrum.   Consults: SW, HemOnc  D/C Disposition: to home pending IV abx completion tomorrow  Other Info: Enteric precautions maintained. PRN Tums given 2x for indigestion.

## 2023-12-21 NOTE — CONSULTS
Care Management Discharge Note    Discharge Date: 12/21/2023       Discharge Disposition: Home, Homecare Skilled RN through [facility]  Discharge Services: None    Discharge DME: None    Discharge Transportation: car, drives self, public transportation    Private pay costs discussed: transportation costs    Does the patient's insurance plan have a 3 day qualifying hospital stay waiver?  No    PAS Confirmation Code:  n/a  Patient/family educated on Medicare website which has current facility and service quality ratings:  no    Education Provided on the Discharge Plan:  yes  Persons Notified of Discharge Plans: patient, discharging provider, Homecare agency  Patient/Family in Agreement with the Plan: no    Handoff Referral Completed: Yes    Additional Information:  Writer met with the patient at the bedside. Patient is A+Ox4 up independently. Patient is cleared for discharge to home with completion of IV ancef this evening.  Patient is comfortable with the plan for discharge to home and is aware of her PCP/[facility] follow up listed in the AVS.  Patient will need assistance with Bedside to call for a [employer] ride home.  Please make sure that the patient is dressed and ready to go before calling she has cash/card and is okay with blue and white phone [phone] option number one.   [employer] company will require address of the  with door number and patient's address and cell phone number.   No further needs identified. Updated Charge of the above.     Denise Siegel RN, BSN, ACM   Care Transitions Specialist  [facility]  Care Transitions Specialist  [address] [id number]  [email or web address]  Office: [phone] Fax: [phone]  [facility]

## 2023-12-21 NOTE — DISCHARGE SUMMARY
[facility]  Hospitalist Discharge Summary      Date of Admission:  12/6/2023  Date of Discharge:  12/21/2023 10:22 PM  Discharging Provider: Levi Jennings DO  Discharge Service: Hospitalist Service    Discharge Diagnoses      Transverse and descending colon wall thickening and inflammatory stranding secondary to C. difficile  Colitis and ischemic colitis  MSSA bacteremia  Sepsis due to above, improved  C. difficile toxin B+/GDH antigen positive with toxin negative   Superficial thrombosis at midline site  Multiple myeloma  IgG Kappa multiple myeloma   Polyneuropathy  HLD  Suicidal statement (not suicidal now)  Paranoid of politicians   MDD  Anxiety  PTSD      Clinically Significant Risk Factors          Follow-ups Needed After Discharge   Follow-up Appointments     Follow-up and recommended labs and tests       Follow up with primary care provider, Levi Mcdonnell, within 7 days for   hospital follow- up.  The following labs/tests are recommended: CBC, BMP   in 1week .  Needs a repeat ultrasound in 2-3 months to re-evaluate the   PICC line associated superficial thrombosis     Follow up with hem/onc and ID as recommended by them    You are scheduled for the following appointments:    Dr. Montalvo  [facility]  Wednesday December 27th, 2023  2:00 p.m.   Located at:  [facility]  [address], [place] [address] [phone]    [facility] follow up with Dr. Dreyer  Friday January 5, 2024   11:05 a.m.  Located at:  [address] [place] [address] , [address]  [place] [address]  Map / Directions  Phone: [phone]          Unresulted Labs Ordered in the Past 30 Days of this Admission       Date and Time Order Name Status Description    12/7/2023  5:13 AM Prepare red blood cells (unit) Preliminary     12/7/2023  5:13 AM Prepare red blood cells (unit) Preliminary           Discharge Disposition   Discharged to home  Condition at discharge:  Stable    Hospital Course   Mary Jo Mcleod is a 78 year old female with PMH  multiple myeloma, stage III CKD, rheumatoid arthritis, osteopenia admitted who was admitted on 12/6/2023 with abdominal pain, BRBPR due to transverse and descending colitis.     Transverse and descending colon wall thickening and inflammatory stranding secondary to C. difficile  Colitis and ischemic colitis  MSSA bacteremia  Sepsis due to above, improved  On evening of admission 12/5 patient described lower abdominal cramping and bright red blood per rectum. CT abdomen pelvis showed colitis, infectious/ inflammatory favored. GI consulted; 12/7 Colonoscopy showed colitis, biopsied, showing ischemic pattern of injury and negative for malignancy.  12/7 Patient developed sepsis with temp 101.5, started on cefepime and flagyl. CT abdomen/pelvis showed Persistent severe colitis involving the distal colon. No evidence of bowel perforation and no fluid collection. Mild small bowel distention with scattered air-fluid levels may reflect mild ileus. Blood cultures grew MSSA. ID consulted, patient completing a course of antibiotics for bacteremia.  12/10 ECHO: EF 60-65%, Right ventricle normal. Mild 1+ MR. No obvious valvular vegetations.   Now improved and stable to discharge to TCU  - Completed course of Flagyl  - Continue Cefazolin for 2 weeks ending 12/21.  Completed course of antibiotics and discharged home     C. difficile toxin B+/GDH antigen positive with toxin negative   CT findings of severe colitis  - ID consul appreciated: Recommend oral Vanco while on IV antibiotics (IV antibiotics for 2 weeks)   - No diarrhea but loose stools     Superficial thrombosis at midline site  LUE US on 12/15 Showed superficial clot at left upper extremity midline site.  Midline removed since  - Will need close outpatient follow up and a repeat ultrasound in a weeks time     Multiple myeloma  IgG Kappa multiple myeloma   Follows with Dr Dreyer w/ [facility].  Hx multiple pathologic fractures. Of note plans for MR pelvis by [facility](to compare w/ CT 5/4/23 from [place] w/ multiple abnormalities). Patient by last note with [facility] had MRI with [facility]   - On prophylactic valtrex   - Patient on maintenance bortezomib  - Patient will follow-up in the clinic for bortezomib>> moved out 2-week  - Hem/onc following      Polyneuropathy  - Patient had developed a sensory neuropathy over the last 6 months.  - Occurs mainly at nighttime     HLD: With ischemic colitis critical meds only po      Suicidal statement (not suicidal now)  Paranoid of politicians   12/8 patient reported suicidal statement to [employer] nurse.   12/11 patient had made suicidal statements earlier in her hospital course with a discussion about potential surgery and an ostomy.  She subsequently stated that she is not suicidal.  Patient also made comments about [employer] which  stealing her money. (See prior notes)   12/11 psychiatry consult with [facility]: Lore Hanson (saw patient in 2022)   12/11. Patient not felt to be suicidal.  History of paranoid thoughts about politicians.  No medications at this point     MDD  Anxiety  PTSD  - Citalopram 40 mg daily resumed       Consultations This Hospital Stay   GASTROENTEROLOGY IP CONSULT  HEMATOLOGY & ONCOLOGY IP CONSULT  VASCULAR ACCESS ADULT IP CONSULT  CARE MANAGEMENT / SOCIAL WORK IP CONSULT  VASCULAR ACCESS ADULT IP CONSULT  PHARMACY IP CONSULT  COLORECTAL SURGERY IP CONSULT  PHARMACY TO DOSE VANCO  WOUND OSTOMY CONTINENCE NURSE  IP CONSULT  INFECTIOUS DISEASES IP CONSULT  PSYCHIATRY IP CONSULT  VASCULAR ACCESS ADULT IP CONSULT  VASCULAR ACCESS ADULT IP CONSULT  PHYSICAL THERAPY ADULT IP CONSULT  OCCUPATIONAL THERAPY ADULT IP CONSULT  VASCULAR ACCESS ADULT IP CONSULT  VASCULAR ACCESS ADULT IP CONSULT  VASCULAR ACCESS ADULT IP CONSULT    Code Status   Full Code    Time Spent on this Encounter   Levi SOUZA DO, personally saw the patient today and spent  greater than 30 minutes discharging this patient.       Levi Jennings [place] [id number] [facility]  [address]  [facility] [address]  Phone: [phone]  ______________________________________________________________________    Physical Exam   Vital Signs: Temp: 98.7  F (37.1  C) Temp src: Oral BP: 91/60 Pulse: 79   Resp: 16 SpO2: 96 % O2 Device: None (Room air)    Weight: 130 lbs 0 oz  General Appearance: Resting comfortably. NAD   Respiratory: Clear to auscultation.  No respiratory distress  Cardiovascular: RRR.  No obvious murmurs  GI: Soft.  Non-distended   Skin: No obvious rashes or cyanosis  Other: Alert.  No edema         Primary Care Physician   Levi Mcdonnell    Discharge Orders      Primary Care - Care Coordination Referral      Home Care Referral      Reason for your hospital stay    C.diff colitis and MSSA bacteremia     Activity    Your activity upon discharge: activity as tolerated     Follow-up and recommended labs and tests     Follow up with primary care provider, Levi Mcdonnell, within 7 days for hospital follow- up.  The following labs/tests are recommended: CBC, BMP in 1week .  Needs a repeat ultrasound in 2-3 months to re-evaluate the PICC line associated superficial thrombosis     Follow up with hem/onc and ID as recommended by them    You are scheduled for the following appointments:    Dr. Montalvo  [facility]  Wednesday December 27th, 2023  2:00 p.m.   Located at:  [facility]  [employer]  [address], [place], [place] [address] [phone]    [facility] follow up with Dr. Dreyer  Friday January 5, 2024   11:05 a.m.  Located at:  [address] [facility] , [address]  [place] [address]  Map / Directions  Phone: [phone]     Diet    Follow this diet upon discharge: Orders Placed This Encounter      Advance Diet as Tolerated: Low Fiber; Mechanical/Dental Soft Diet       Significant Results and Procedures   Most  Recent 3 CBC's:  Recent Labs   Lab Test 12/13/23  0840 12/11/23  1421 12/11/23  0501 12/08/23  1741 12/08/23  1021 12/07/23  2316 12/07/23  1640   WBC 5.4  --   --   --  17.2*  --  16.5*   HGB 9.9* 9.0* 9.5*   < > 9.4*   < > 10.7*   *  --   --   --  105*  --  103*     --   --   --  178  --  187    < > = values in this interval not displayed.     Most Recent 3 BMP's:  Recent Labs   Lab Test 12/20/23  0715 12/18/23  1153 12/13/23  0840 12/12/23  0854     --  140 142   POTASSIUM 4.3  --  3.8 3.7   CHLORIDE 100  --  104 105   CO2 32*  --  29 30*   BUN 13.7  --  5.3* 3.2*   CR 0.81 0.79 0.62 0.67   ANIONGAP 6*  --  7 7   ARA 9.0  --  8.3* 8.1*   GLC 93  --  91 117*   ,   Results for orders placed or performed during the hospital encounter of 12/06/23   CT Abdomen Pelvis w IV Contrast    Narrative    EXAM: CT ABDOMEN PELVIS W CONTRAST  LOCATION: [facility]  DATE: 12/6/2023    INDICATION: bright red blood per rectum, abdominal cramping  COMPARISON: PET/CT 08/14/2023. CT abdomen and pelvis 06/25/2022.  TECHNIQUE: CT scan of the abdomen and pelvis was performed following injection of IV contrast. Multiplanar reformats were obtained. Dose reduction techniques were used.  CONTRAST: 66mL Isovue 370    FINDINGS:   LOWER CHEST: Normal.    HEPATOBILIARY: Normal.    PANCREAS: Normal.    SPLEEN: Normal.    ADRENAL GLANDS: No change in 10 mm bilateral adrenal nodules are indeterminate by density measurements.    KIDNEYS/BLADDER: Tiny bilateral probable benign renal cysts do not warrant follow-up.    BOWEL: Wall thickening and inflammatory stranding of the colon, particularly the transverse and descending colon, consistent with colitis. No pneumatosis, perforation or abscess. Colonic diverticulosis without evidence for diverticulitis. Normal   appendix.    LYMPH NODES: Normal.    VASCULATURE: Mild to moderate aortoiliac atherosclerosis. No aneurysm.    PELVIC ORGANS: Multiple calcified  uterine fibroids.    MUSCULOSKELETAL: Chronic fracture deformities of the right sacrum and left pubic bone. Chronic moderate compression deformity of the L5 vertebral body. Chronic 4 cm calcified structure medial to the left inferior pubic ramus is unchanged from PET/CT   08/14/2023. This is suggestive of an old hematoma.      Impression    IMPRESSION:   Colitis. An infectious or inflammatory etiology is favored.   XR Chest Port 1 View    Narrative    EXAM: XR CHEST PORT 1 VIEW  LOCATION: [facility]  DATE: 12/7/2023    INDICATION: fevers  COMPARISON: 6/10/2023      Impression    IMPRESSION: Negative chest.   CT Abdomen Pelvis w/o Contrast    Narrative    EXAM: CT ABDOMEN PELVIS W/O CONTRAST  LOCATION: [facility]  DATE: 12/7/2023    INDICATION: colitis with colonoscopy  COMPARISON: CT 12/06/2023, 06/25/2022 and PET/CT 08/14/2023  TECHNIQUE: CT scan of the abdomen and pelvis was performed without IV contrast. Multiplanar reformats were obtained. Dose reduction techniques were used.  CONTRAST: None.    FINDINGS:   LOWER CHEST: Tiny hiatal hernia. Bibasilar atelectasis/scarring.    HEPATOBILIARY: Vicarious contrast excretion into the gallbladder lumen. Mild periportal edema which may reflect sequelae of IV hydration. Normal bile ducts.    PANCREAS: Normal.    SPLEEN: Normal.    ADRENAL GLANDS: Normal.    KIDNEYS/BLADDER: No hydronephrosis, hydroureter or urinary tract stone.    BOWEL: Tiny fat-containing umbilical hernia. Mild small bowel distention with scattered air-fluid levels. No small bowel wall edema. Normal appendix. Severe colitis extending from the distal transverse through the mid sigmoid segment. Associated   circumferential mural thickening and surrounding edema. Trace ascites. No free air and no fluid collection. No pneumatosis.    LYMPH NODES: Normal.    VASCULATURE: Moderate aortoiliac atherosclerosis.    PELVIC ORGANS: Enlarged retroflexed uterus with  multiple calcified fibroids. Trace pelvic free fluid.    MUSCULOSKELETAL: Stable old fractures of the right sacral ala and left pubic bone. Severe osseous demineralization. Stable severe L5 vertebral body compression fracture. Spinal degenerative changes.      Impression    IMPRESSION:   1.  Persistent severe colitis involving the distal colon.  2.  Trace simple ascites. No evidence for bowel perforation and no fluid collection.  3.  Mild small bowel distention with scattered air-fluid levels which may reflect a mild ileus.     US Upper Extremity Venous Duplex Left    Narrative    EXAM: US UPPER EXTREMITY VENOUS DUPLEX LEFT  LOCATION: [facility]  DATE: 12/15/2023    INDICATION: swelling near the left midline site  COMPARISON: None.  TECHNIQUE: Venous Duplex ultrasound of the left upper extremity with (when possible) and without compression, augmentation, and duplex. Color flow and spectral Doppler with waveform analysis performed.    FINDINGS: Ultrasound includes evaluation of the internal jugular vein, innominate vein, subclavian vein, axillary vein, and brachial vein. The superficial cephalic and basilic veins were also evaluated where seen.     LEFT: No deep venous thrombosis. Superficial thrombus is noted within the left cephalic vein in the region of the antecubital fossae and proximal forearm.       Impression    IMPRESSION:   1.  No deep venous thrombosis in the left upper extremity.  2.  Superficial thrombus within the left cephalic vein in the region of the antecubital fossa and proximal forearm.   Echocardiogram Complete     Value    LVEF  60-65%    Narrative    [id number]  [id number]  [id number]  [id number]^DILLAN^TAIWO^L     [facility]  Echocardiography Laboratory  [address] [place] [address]     Name: ROMA ELLIS  MRN: [id number]  : 1945  Study Date: 12/10/2023 10:30 AM  Age: 78 yrs  Gender: Female  Patient Location: [facility]  Reason For Study:  Endocarditis  Ordering Physician: TAIWO GRIMALDO  Referring Physician: Levi Mcdonnell  Performed By: Jorgito Miranda     BSA: 1.6 m2  Height: 63 in  Weight: 130 lb  HR: 84  BP: 122/63 mmHg  ______________________________________________________________________________  Procedure  Complete Portable Echo Adult.  ______________________________________________________________________________  Interpretation Summary     Left ventricular systolic function is normal.  The visual ejection fraction is 60-65%.  The right ventricle is normal in size and function.  There is mild (1+) mitral regurgitation.  There are no obvious valvular vegetations. Consider ROCIO if clinically  indicated. ROCIO would be more sensitive for detection of vegetations or masses.     No significant change in direct comparison to the echo from 5/30/2022.  ______________________________________________________________________________  Left Ventricle  The left ventricle is normal in size. mild proximal septal thickening. Left  ventricular systolic function is normal. The visual ejection fraction is 60-  65%. Grade I or early diastolic dysfunction. No regional wall motion  abnormalities noted.     Right Ventricle  The right ventricle is normal in size and function.     Atria  The left atrium is borderline dilated. The right atrium is borderline dilated.  Color Doppler suggestive interatrial shunt, likely a PFO.     Mitral Valve  The mitral valve leaflets are mildly thickened. There is mild (1+) mitral  regurgitation.     Tricuspid Valve  There is trace tricuspid regurgitation. The right ventricular systolic  pressure is approximated at 23.6 mmHg plus the right atrial pressure.     Aortic Valve  The aortic valve is trileaflet with aortic valve sclerosis. No hemodynamically  significant valvular aortic stenosis.     Pulmonic Valve  There is trace pulmonic valvular regurgitation.     Vessels  The aortic root is normal size. Normal size ascending aorta. The  inferior vena  cava is normal.     Pericardium  Trivial pericardial effusion.     Rhythm  Sinus rhythm was noted. The patient exhibited occasional PVCs.  ______________________________________________________________________________  MMode/2D Measurements & Calculations  IVSd: 1.1 cm     LVIDd: 4.7 cm  LVIDs: 3.2 cm  LVPWd: 1.0 cm  FS: 31.4 %  LV mass(C)d: 175.5 grams  LV mass(C)dI: 109.0 grams/m2  Ao root diam: 2.8 cm  LA dimension: 3.1 cm  asc Aorta Diam: 2.8 cm  LA/Ao: 1.1  LVOT diam: 2.0 cm  LVOT area: 3.1 cm2  Ao root diam index Ht(cm/m): 1.7  Ao root diam index BSA (cm/m2): 1.7  Asc Ao diam index BSA (cm/m2): 1.7  Asc Ao diam index Ht(cm/m): 1.7  LA Volume (BP): 46.3 ml     LA Volume Index (BP): 28.8 ml/m2  RWT: 0.43  TAPSE: 2.1 cm     Doppler Measurements & Calculations  MV E max leobardo: 83.1 cm/sec  MV A max leobardo: 118.3 cm/sec  MV E/A: 0.70  MV dec slope: 481.8 cm/sec2  MV dec time: 0.17 sec  PA acc time: 0.15 sec  TR max leobardo: 243.0 cm/sec  TR max P.6 mmHg  E/E' av.4  Lateral E/e': 8.7  Medial E/e': 10.1  RV S Leobardo: 14.3 cm/sec     ______________________________________________________________________________  Report approved by: MD Jovana Bobby 12/10/2023 01:58 PM             Discharge Medications   Current Discharge Medication List        CONTINUE these medications which have NOT CHANGED    Details   calcium carbonate (TUMS) 500 MG chewable tablet Take 2 chew tab by mouth daily      citalopram (CELEXA) 40 MG tablet Take 1 tablet by mouth once daily  Qty: 90 tablet, Refills: 0    Associated Diagnoses: Moderate episode of recurrent major depressive disorder (H); PTSD (post-traumatic stress disorder); GERSON (generalized anxiety disorder)      denosumab (PROLIA) 60 MG/ML SOSY injection Inject 1 mL (60 mg) Subcutaneous    Comments: Administered/managed by oncology ([facility])      hydrOXYzine (ATARAX) 25 MG tablet TAKE UP TO 3 TABLETS PRN BEDTIME FOR INSOMNIA  Qty: 90 tablet, Refills: 0    Associated  Diagnoses: Insomnia, unspecified type      oxyCODONE-acetaminophen (PERCOCET) 5-325 MG tablet Take 1 tablet by mouth daily as needed for pain      rosuvastatin (CRESTOR) 10 MG tablet Take 1 tablet (10 mg) by mouth daily  Qty: 90 tablet, Refills: 3    Associated Diagnoses: Calcification of abdominal aorta (H24)      triamcinolone (KENALOG) 0.1 % external cream Apply topically 2 times daily  Qty: 80 g, Refills: 0    Associated Diagnoses: Healing pressure injury, stage 1; Dermatitis      valACYclovir (VALTREX) 500 MG tablet Take 1,000 mg by mouth daily      vitamin B-12 (CYANOCOBALAMIN) 250 MCG tablet Take 1,000 mcg by mouth daily      vitamin D3 (CHOLECALCIFEROL) 50 mcg (2000 units) tablet Take 1 tablet by mouth daily           STOP taking these medications       aspirin (ASA) 325 MG EC tablet Comments:   Reason for Stopping:         ibuprofen (ADVIL/MOTRIN) 200 MG tablet Comments:   Reason for Stopping:         sodium chloride 1 GM tablet Comments:   Reason for Stopping:             Allergies   Allergies   Allergen Reactions    Acetaminophen Shortness Of Breath    Blood Transfusion Related (Informational Only) Other (See Comments)     Patient has a history of being on daratumumab which can interfere with blood bank testing.  A delay in compatible RBCs may occur.    Codeine Difficulty breathing, Other (See Comments) and Rash    Bupropion     Erythromycin     Hydrocodone Other (See Comments) and Itching     Ear ache    Lidocaine Other (See Comments)    Penicillin G Itching     Itching around mouth then heavy breathing    Revlimid [Lenalidomide]     Sulfa Antibiotics Itching     Itching around the mouth and then heavy breathing    Tetracycline     Trazodone      Does not work for patient    Conjugated Estrogens Dermatitis

## 2023-12-21 NOTE — PLAN OF CARE
[id number]  Orientation: a/ox4, forgetful  Activity: ind. W/ walker  Diet/BS Checks: regular  Tele:  n/a  IV Access/Drains: PIV RA w/ int. antibx  Pain Management: denies   Abnormal VS/Results: VSS on RA  Bowel/Bladder: cont. B/b. Pt reports having diarrhea  Skin/Wounds: LUE bruise. Blanchable redness on sacrum  Consults: SW, heme/onc  D/C Disposition: discharge home after completion of antibx  Other Info: enteric precautions maintained

## 2023-12-21 NOTE — PROGRESS NOTES
Chart Check:    Dale is going to discharge tonight. She does not need to go to [facility] tomorrow. Her appointment for hospital follow up and possible treatment are being rescheduled for 1/5/2024. The clinic will reach out with a time. I shared this information with her today.    Adelso MASON, CNP  [facility]  [phone] (office)

## 2023-12-21 NOTE — DISCHARGE INSTRUCTIONS
Please note that it is common to have loose stools during antibiotic therapy and with c.diff.  Most Providers do not recommend an anti diarrheal medication and the loose stools should resolve with completion of antibiotics.  Please continue to discuss with your primary care provider if you continue to have diarrhea or more frequency of loose stools.

## 2023-12-22 NOTE — PLAN OF CARE
Pt discharged @2230  transported via wheelchair to [facility]. Pt given and explained AVS. Personal belongings with pt.

## 2023-12-22 NOTE — PLAN OF CARE
Goal Outcome Evaluation:    Shift Note: [id number]    Orientation: A/Ox4  Activity: Independent  Diet/BS Checks: Regular diet, good appetite  Tele: N/a  IV Access/Drains: New PIV placed in L hand, R PIV began to leak  Pain Management: C/o pain rating 2/10 in abdomen, no PRN's needed   Abnormal VS/Results: VSS, on RA   Bowel/Bladder: Continent of B/B  Skin/Wounds: Blanchable redness to coccyx, scattered bruising   Consults: Hem/Onc, SW following   D/C Disposition: Discharge this evening after PM dose of abx    Other Info: Pt c/o heartburn throughout shift, PRN TUMs given x3. Dicyclomine was given for abdominal bloating/gas x1-pt had good relief.

## 2023-12-26 NOTE — PROGRESS NOTES
SUBJECTIVE:    Mary Jo Mcleod, is a 78 year old female presenting for the below:     Hospital Follow-up Visit:    Hospital/Nursing Home/IP Rehab Facility: [facility]  Date of Admission: 12/6/23  Date of Discharge: 12/21/23  Reason(s) for Admission:   C. difficile  Colitis and ischemic colitis  MSSA bacteremia  Sepsis due to above, improved    Was your hospitalization related to COVID-19? No   Problems taking medications regularly:  None   Medication changes since discharge:   Stopped taking:   Aspirin 81 mg  Ibuprofen  Sodium chloride tabs  Problems adhering to non-medication therapy:  None    Summary of hospitalization:  [facility] discharge summary reviewed  Diagnostic Tests/Treatments reviewed.      Follow up needed:    -CBC, BMP in 1week .    -Needs a repeat ultrasound in 2-3 months to re-evaluate the   PICC line associated superficial thrombosis   Next appointment with Dr. Dreyer 1/5/2024    Other Healthcare Providers Involved in Patient s Care:         Homecare and oncology   Update since discharge: improved. Appetite has returned. Stools becoming more firm. Denies any fevers or chills.     Plan of care communicated with patient     OBJECTIVE:  Vitals:    12/27/23 1402   BP: 102/62   Pulse: 94   SpO2: 99%    There is no height or weight on file to calculate BMI.    General: no acute distress, cooperative with exam.  Lungs: clear to auscultation bilaterally, normal respiratory effort.  Heart: regular rate, normal S1 and S2, no murmurs.   Abdomen: normal bowel sounds, nontender, no palpable organomegaly.    ASSESSMENT / PLAN:      Hospital discharge follow-up  History of ischemic colitis  H/O Clostridium difficile infection  H/O sepsis  -completed course of Flagyl  -completed Cefazolin for 2 weeks ending 12/21.    -symptoms improved. Appetite has returned. Stools becoming more firm. Denies any fevers or chills.   -     CBC with Diff/Plt ([facility])  -     VENOUS  COLLECTION    Moderate episode of recurrent major depressive disorder (H)  PTSD (post-traumatic stress disorder)  GERSON (generalized anxiety disorder)  -     citalopram (CELEXA) 40 MG tablet; Take 1 tablet (40 mg) by mouth daily    Chronic kidney disease, stage 3a (H)  Hyponatremia  -     Basic metabolic panel; Future    Multiple myeloma not having achieved remission (H)  IgG Kappa MM.  Follows with Dr Dreyer w/ [facility]. Hx multiple pathologic fractures.   Has upcoming appointment with Dr. Dreyer 1/5/2024    Superficial thrombosis of arm  LUE US on 12/15 showed superficial clot at left upper extremity midline site.  Midline removed. Needs a repeat ultrasound in 2-3 months to re-evaluate the   PICC line associated superficial thrombosis. Will discuss with Dr. Dreyer at upcoming appointment.

## 2023-12-27 ENCOUNTER — OFFICE VISIT (OUTPATIENT)
Dept: FAMILY MEDICINE | Facility: CLINIC | Age: 78
End: 2023-12-27

## 2023-12-27 ENCOUNTER — TELEPHONE (OUTPATIENT)
Dept: FAMILY MEDICINE | Facility: CLINIC | Age: 78
End: 2023-12-27

## 2023-12-27 VITALS — SYSTOLIC BLOOD PRESSURE: 102 MMHG | HEART RATE: 94 BPM | OXYGEN SATURATION: 99 % | DIASTOLIC BLOOD PRESSURE: 62 MMHG

## 2023-12-27 DIAGNOSIS — I82.619 SUPERFICIAL VENOUS THROMBOSIS OF UPPER EXTREMITY, UNSPECIFIED LATERALITY: ICD-10-CM

## 2023-12-27 DIAGNOSIS — N18.31 CHRONIC KIDNEY DISEASE, STAGE 3A (H): ICD-10-CM

## 2023-12-27 DIAGNOSIS — F41.1 GAD (GENERALIZED ANXIETY DISORDER): ICD-10-CM

## 2023-12-27 DIAGNOSIS — Z09 HOSPITAL DISCHARGE FOLLOW-UP: Primary | ICD-10-CM

## 2023-12-27 DIAGNOSIS — F43.10 PTSD (POST-TRAUMATIC STRESS DISORDER): ICD-10-CM

## 2023-12-27 DIAGNOSIS — Z86.19 H/O SEPSIS: ICD-10-CM

## 2023-12-27 DIAGNOSIS — F33.1 MODERATE EPISODE OF RECURRENT MAJOR DEPRESSIVE DISORDER (H): ICD-10-CM

## 2023-12-27 DIAGNOSIS — C90.00 MULTIPLE MYELOMA NOT HAVING ACHIEVED REMISSION (H): ICD-10-CM

## 2023-12-27 DIAGNOSIS — E87.1 HYPONATREMIA: ICD-10-CM

## 2023-12-27 DIAGNOSIS — Z87.19 HISTORY OF ISCHEMIC COLITIS: ICD-10-CM

## 2023-12-27 DIAGNOSIS — Z86.19 H/O CLOSTRIDIUM DIFFICILE INFECTION: ICD-10-CM

## 2023-12-27 PROBLEM — M84.454A: Status: RESOLVED | Noted: 2022-07-21 | Resolved: 2023-12-27

## 2023-12-27 PROBLEM — R15.2 INCONTINENCE OF FECES WITH FECAL URGENCY: Status: RESOLVED | Noted: 2018-08-13 | Resolved: 2023-12-27

## 2023-12-27 PROBLEM — K62.5 RECTAL BLEEDING: Status: RESOLVED | Noted: 2023-12-06 | Resolved: 2023-12-27

## 2023-12-27 PROBLEM — K52.9 COLITIS: Status: RESOLVED | Noted: 2023-12-06 | Resolved: 2023-12-27

## 2023-12-27 PROBLEM — R10.84 GENERALIZED ABDOMINAL PAIN: Status: RESOLVED | Noted: 2023-12-06 | Resolved: 2023-12-27

## 2023-12-27 PROBLEM — R07.9 CHEST PAIN, UNSPECIFIED TYPE: Status: RESOLVED | Noted: 2022-05-28 | Resolved: 2023-12-27

## 2023-12-27 PROBLEM — R15.9 INCONTINENCE OF FECES WITH FECAL URGENCY: Status: RESOLVED | Noted: 2018-08-13 | Resolved: 2023-12-27

## 2023-12-27 LAB
% GRANULOCYTES: 68.4 % (ref 42.2–75.2)
ANION GAP SERPL CALCULATED.3IONS-SCNC: 6 MMOL/L (ref 7–15)
BUN SERPL-MCNC: 17.2 MG/DL (ref 8–23)
CALCIUM SERPL-MCNC: 9.2 MG/DL (ref 8.8–10.2)
CHLORIDE SERPL-SCNC: 102 MMOL/L (ref 98–107)
CREAT SERPL-MCNC: 0.87 MG/DL (ref 0.51–0.95)
DEPRECATED HCO3 PLAS-SCNC: 31 MMOL/L (ref 22–29)
EGFRCR SERPLBLD CKD-EPI 2021: 68 ML/MIN/1.73M2
GLUCOSE SERPL-MCNC: 103 MG/DL (ref 70–99)
HCT VFR BLD AUTO: 31 % (ref 35–46)
HEMOGLOBIN: 10.7 G/DL (ref 11.8–15.5)
LYMPHOCYTES NFR BLD AUTO: 23.4 % (ref 20.5–51.1)
MCH RBC QN AUTO: 34 PG (ref 27–31)
MCHC RBC AUTO-ENTMCNC: 34.5 G/DL (ref 33–37)
MCV RBC AUTO: 98.6 FL (ref 80–100)
MONOCYTES NFR BLD AUTO: 8.2 % (ref 1.7–9.3)
PLATELET # BLD AUTO: 334 K/UL (ref 140–450)
POTASSIUM SERPL-SCNC: 3.9 MMOL/L (ref 3.4–5.3)
RBC # BLD AUTO: 3.15 X10/CMM (ref 3.7–5.2)
SODIUM SERPL-SCNC: 139 MMOL/L (ref 135–145)
WBC # BLD AUTO: 4.5 X10/CMM (ref 3.8–11)

## 2023-12-27 PROCEDURE — 80048 BASIC METABOLIC PNL TOTAL CA: CPT | Performed by: FAMILY MEDICINE

## 2023-12-27 PROCEDURE — 36415 COLL VENOUS BLD VENIPUNCTURE: CPT | Performed by: FAMILY MEDICINE

## 2023-12-27 PROCEDURE — 99214 OFFICE O/P EST MOD 30 MIN: CPT | Performed by: FAMILY MEDICINE

## 2023-12-27 PROCEDURE — 85025 COMPLETE CBC W/AUTO DIFF WBC: CPT | Performed by: FAMILY MEDICINE

## 2023-12-27 RX ORDER — CITALOPRAM HYDROBROMIDE 40 MG/1
40 TABLET ORAL DAILY
Qty: 90 TABLET | Refills: 3 | Status: SHIPPED | OUTPATIENT
Start: 2023-12-27

## 2023-12-27 NOTE — PATIENT INSTRUCTIONS
Discuss with Dr. Dreyer : Needs a repeat ultrasound in 2-3 months to re-evaluate the PICC line associated superficial thrombosis at your upcoming appointment.

## 2024-01-05 ENCOUNTER — TRANSFERRED RECORDS (OUTPATIENT)
Dept: FAMILY MEDICINE | Facility: CLINIC | Age: 79
End: 2024-01-05

## 2024-01-18 ENCOUNTER — ANCILLARY PROCEDURE (OUTPATIENT)
Dept: MRI IMAGING | Facility: CLINIC | Age: 79
End: 2024-01-18
Attending: INTERNAL MEDICINE
Payer: COMMERCIAL

## 2024-01-18 DIAGNOSIS — C90.00 MULTIPLE MYELOMA (H): ICD-10-CM

## 2024-01-18 DIAGNOSIS — K62.89 RECTAL MASS: ICD-10-CM

## 2024-01-18 PROCEDURE — 72195 MRI PELVIS W/O DYE: CPT

## 2024-02-05 ENCOUNTER — TRANSFERRED RECORDS (OUTPATIENT)
Dept: FAMILY MEDICINE | Facility: CLINIC | Age: 79
End: 2024-02-05

## 2024-03-04 ENCOUNTER — TRANSFERRED RECORDS (OUTPATIENT)
Dept: FAMILY MEDICINE | Facility: CLINIC | Age: 79
End: 2024-03-04

## 2024-03-05 ENCOUNTER — APPOINTMENT (OUTPATIENT)
Dept: CT IMAGING | Facility: CLINIC | Age: 79
End: 2024-03-05
Attending: EMERGENCY MEDICINE
Payer: COMMERCIAL

## 2024-03-05 ENCOUNTER — HOSPITAL ENCOUNTER (EMERGENCY)
Facility: CLINIC | Age: 79
Discharge: HOME | End: 2024-03-05
Attending: EMERGENCY MEDICINE | Admitting: EMERGENCY MEDICINE
Payer: COMMERCIAL

## 2024-03-05 ENCOUNTER — APPOINTMENT (OUTPATIENT)
Dept: GENERAL RADIOLOGY | Facility: CLINIC | Age: 79
End: 2024-03-05
Attending: EMERGENCY MEDICINE
Payer: COMMERCIAL

## 2024-03-05 VITALS
HEART RATE: 80 BPM | RESPIRATION RATE: 18 BRPM | TEMPERATURE: 97.9 F | BODY MASS INDEX: 23.04 KG/M2 | DIASTOLIC BLOOD PRESSURE: 60 MMHG | OXYGEN SATURATION: 98 % | WEIGHT: 130 LBS | SYSTOLIC BLOOD PRESSURE: 121 MMHG | HEIGHT: 63 IN

## 2024-03-05 DIAGNOSIS — S00.81XA FACIAL ABRASION, INITIAL ENCOUNTER: ICD-10-CM

## 2024-03-05 DIAGNOSIS — M89.9 SKULL LESION: ICD-10-CM
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
## 2024-03-05 DIAGNOSIS — S29.012A UPPER BACK STRAIN, INITIAL ENCOUNTER: ICD-10-CM

## 2024-03-05 DIAGNOSIS — S16.1XXA CERVICAL STRAIN, INITIAL ENCOUNTER: ICD-10-CM

## 2024-03-05 DIAGNOSIS — S09.90XA CLOSED HEAD INJURY, INITIAL ENCOUNTER: ICD-10-CM

## 2024-03-05 PROCEDURE — 72128 CT CHEST SPINE W/O DYE: CPT

## 2024-03-05 PROCEDURE — 71046 X-RAY EXAM CHEST 2 VIEWS: CPT

## 2024-03-05 PROCEDURE — 70450 CT HEAD/BRAIN W/O DYE: CPT

## 2024-03-05 PROCEDURE — 250N000013 HC RX MED GY IP 250 OP 250 PS 637: Performed by: EMERGENCY MEDICINE

## 2024-03-05 PROCEDURE — 99285 EMERGENCY DEPT VISIT HI MDM: CPT | Mod: 25

## 2024-03-05 PROCEDURE — 72125 CT NECK SPINE W/O DYE: CPT

## 2024-03-05 RX ORDER — ACETAMINOPHEN 325 MG/1
325 TABLET ORAL ONCE
Status: COMPLETED | OUTPATIENT
Start: 2024-03-05 | End: 2024-03-05

## 2024-03-05 RX ADMIN — ACETAMINOPHEN 325 MG: 325 TABLET, FILM COATED ORAL at 15:32

## 2024-03-05 ASSESSMENT — ACTIVITIES OF DAILY LIVING (ADL)
ADLS_ACUITY_SCORE: 37
ADLS_ACUITY_SCORE: 37

## 2024-03-05 ASSESSMENT — COLUMBIA-SUICIDE SEVERITY RATING SCALE - C-SSRS
2. HAVE YOU ACTUALLY HAD ANY THOUGHTS OF KILLING YOURSELF IN THE PAST MONTH?: YES
1. IN THE PAST MONTH, HAVE YOU WISHED YOU WERE DEAD OR WISHED YOU COULD GO TO SLEEP AND NOT WAKE UP?: NO
5. HAVE YOU STARTED TO WORK OUT OR WORKED OUT THE DETAILS OF HOW TO KILL YOURSELF? DO YOU INTEND TO CARRY OUT THIS PLAN?: YES
6. HAVE YOU EVER DONE ANYTHING, STARTED TO DO ANYTHING, OR PREPARED TO DO ANYTHING TO END YOUR LIFE?: NO
3. HAVE YOU BEEN THINKING ABOUT HOW YOU MIGHT KILL YOURSELF?: YES

## 2024-03-05 NOTE — ED TRIAGE NOTES
Patient BIBA from [facility] after a fall tripping on curb. Did brace self with arms but landed on R side has small abrasion to R chin and pain for R shoulder, chest, neck and head. EMS arrived patient was on ground assisted to stretcher and brought to [facility] ER for evaluation.

## 2024-03-05 NOTE — ED PROVIDER NOTES
"    History     Chief Complaint:  Fall      HPI   Mary Jo Mcleod is a 78 year old female who presents via EMS for evaluation after a fall. The patient states 10 minutes ago she tripped on a curb and fell hitting the right side of her head and face.  She subsequently has developed right-sided neck pain and an abrasion to her chin.  She did not lose consciousness.  She reports having a headache, feels a little shortness of breath, and back pain from the scapula up. She adds that her right hip is hurting a little bit as well, but she is able to ambulate.  She denies wrist pain, chest pain, abdominal pain, nausea, and vomiting.     Independent Historian:    Patient    Review of External Notes:  I reviewed the [facility] and discharge summary from 12/21/23    Medications:    Celexa  Valtrex    Past Medical History:    Depression  GERD  Hyponatremia  PTSD  Vitamin D deficiency  Arthritis  Tachycardia   Multiple myeloma  Hypercholesterolemia    Past Surgical History:    Tonsillectomy  Colonoscopy  Bone marrow biopsy       Physical Exam   Patient Vitals for the past 24 hrs:   BP Temp Temp src Pulse Resp SpO2 Height Weight   03/05/24 1600 121/60 -- -- 80 18 98 % -- --   03/05/24 1453 119/55 97.9  F (36.6  C) Oral 80 18 100 % 1.6 m (5' 3\") 59 kg (130 lb)        Physical Exam  Nursing note and vitals reviewed.  Constitutional:  Oriented to person, place, and time. Cooperative.   HENT:   Nose:    Nose normal.   Mouth/Throat:   Mucous membranes are normal.   Eyes:    Conjunctivae normal and EOM are normal.      Pupils are equal, round, and reactive to light.   Neck:    Trachea normal.   Cardiovascular:  Normal rate, regular rhythm, normal heart sounds and normal pulses. No murmur heard.  Pulmonary/Chest:  Effort normal and breath sounds normal.   Abdominal:   Soft. Normal appearance and bowel sounds are normal.      There is no tenderness.      There is no rebound and no CVA tenderness.   Musculoskeletal:  The right hip is " atraumatic in appearance and no tenderness to palpation over the right hip.  Full range of motion of the right hip present.  Some tenderness to palpation over the right scapular region as well as the right side of the neck and right thoracic back region.  Extremities atraumatic x 4.   Lymphadenopathy:  No cervical adenopathy.   Neurological:   Alert and oriented to person, place, and time. Normal strength.      No cranial nerve deficit or sensory deficit. GCS eye subscore is 4. GCS verbal subscore is 5. GCS motor subscore is 6.   Skin:    Small abrasion to the chin.  No rash noted.   Psychiatric:   Normal mood and affect.      Emergency Department Course       Imaging:  CT Head w/o Contrast   Final Result   IMPRESSION: Diffuse cerebral volume loss and cerebral white matter   changes consistent with chronic small vessel ischemic disease. No   evidence for acute intracranial pathology. Lucent lesion in the clivus   and right occipital condyle again noted. Recommend skull base MRI for   better characterization.            Radiation dose for this scan was reduced using automated exposure   control, adjustment of the mA and/or kV according to patient size, or   iterative reconstruction technique      CATIE ÁLVAREZ MD            SYSTEM ID:  [id number]      CT Thoracic Spine w/o Contrast   Final Result   IMPRESSION: Normal alignment. Vertebral body heights normal. No   fractures. No spinal canal stenosis.         Radiation dose for this scan was reduced using automated exposure   control, adjustment of the mA and/or kV according to patient size, or   iterative reconstruction technique      CATIE ÁLVAREZ MD            SYSTEM ID:  [id number]      CT Cervical Spine w/o Contrast   Final Result   IMPRESSION: There is normal alignment of the cervical vertebrae.   Vertebral body heights of the cervical spine are normal.   Craniocervical alignment is normal. There are no fractures of the   cervical spine. Loss of disc space height  and degenerative endplate   spurring at C4-C5, C5-C6 and C6-C7. Mild facet arthropathy throughout   cervical spine. No high-grade spinal canal stenosis. No prevertebral   soft tissue swelling.         Radiation dose for this scan was reduced using automated exposure   control, adjustment of the mA and/or kV according to patient size, or   iterative reconstruction technique      CATIE ÁLVAREZ MD            SYSTEM ID:  [id number]      XR Chest 2 Views   Final Result   IMPRESSION: Cardiopericardial silhouette is within normal limits. No   focal airspace consolidation. No pleural effusion. No discernible   pneumothorax. No acute displaced fracture.      YI BETANCUR MD            SYSTEM ID:  [id number]            Emergency Department Course & Assessments:    Interventions:  Medications   acetaminophen (TYLENOL) tablet 325 mg (325 mg Oral $Given 3/5/24 9799)        Assessments:  1452 I obtained history and performed physical exam as noted above.     Independent Interpretation (X-rays, CTs, rhythm strip):  I independently reviewed the patient's head CT and agree with the negative reading for intracranial hemorrhage.    Consultations/Discussion of Management or Tests:  None       Social Determinants of Health affecting care:  None     Disposition:  The patient was discharged.    Impression & Plan        Medical Decision Making:  This is a 78-year-old female who came in by ambulance after she had a mechanical fall.  She is not on any anticoagulation, and she seemed to have only minimal injuries.  However she was quite concerned that she might have something more serious especially with her neck and back.  I also was concerned for the possibility of an intracranial hemorrhage or skull fracture.  Additionally, I only ordered a CT scan of her head and cervical spine, as well as a chest x-ray to look at her scapula, ribs, chest, and thoracic spine.  However when she was in the CT scanner, she inquired about obtaining a CT  scan of her thoracic spine as well to the CT tech, who then called me and asked me about it as well.  I therefore then felt it was reasonable to just proceed with a CT scan.  All of her scans are unremarkable, with the exception of the small lesion in the base of the skull, which apparently has been seen previously.  I recommended she discuss that further with her primary care provider and if necessary they can proceed with an MRI.  At this point however I feel that she is safe for discharge and outpatient management.  Additionally, I did discuss her suicide screening and that her previous suicidal ideation.  She indicated that was in relation to family discord and her inheritance, but she also indicated that it seemed to be resolved and that she was no longer suicidal.  I recommended she use ibuprofen or Tylenol as well as ice.  She should follow-up with her primary care provider and return with any concerns or worsening symptoms.    Diagnosis:    ICD-10-CM    1. Fall, initial encounter  W19.XXXA       2. Closed head injury, initial encounter  S09.90XA       3. Cervical strain, initial encounter  S16.1XXA       4. Upper back strain, initial encounter  S29.012A       5. Facial abrasion, initial encounter  S00.81XA       6. Skull lesion  M89.9            Discharge Medications:  Discharge Medication List as of 3/5/2024  4:38 PM             Scribe Disclosure:  I, Richard Pacheco, am serving as a scribe at 4:36 PM on 3/5/2024 to document services personally performed by Cristian Sepulveda MD based on my observations and the provider's statements to me.    3/5/2024   Cristian Sepulveda MD Lashkowitz, Seth H, MD  03/05/24 2227       Cristian Sepulveda MD  03/05/24 2221

## 2024-03-07 ENCOUNTER — TELEPHONE (OUTPATIENT)
Dept: FAMILY MEDICINE | Facility: CLINIC | Age: 79
End: 2024-03-07

## 2024-03-07 NOTE — TELEPHONE ENCOUNTER
----- Message from YOVANI Borges sent at 3/6/2024  3:35 PM CST -----  Regarding: Help to schedule ED follow-up?  Helrebekah!  Mary Jo was evaluated in the ED yesterday falling and hitting her head. The AVS does recommend f/up with PCP.  Could someone reach out to offer an appointment?  Thanks!  MILAN Chapa

## 2024-03-22 ENCOUNTER — OFFICE VISIT (OUTPATIENT)
Dept: FAMILY MEDICINE | Facility: CLINIC | Age: 79
End: 2024-03-22

## 2024-03-22 VITALS
BODY MASS INDEX: 23.31 KG/M2 | OXYGEN SATURATION: 97 % | WEIGHT: 131.6 LBS | TEMPERATURE: 100 F | HEART RATE: 102 BPM | DIASTOLIC BLOOD PRESSURE: 42 MMHG | SYSTOLIC BLOOD PRESSURE: 113 MMHG

## 2024-03-22 DIAGNOSIS — J01.00 ACUTE NON-RECURRENT MAXILLARY SINUSITIS: Primary | ICD-10-CM

## 2024-03-22 DIAGNOSIS — R05.1 ACUTE COUGH: ICD-10-CM

## 2024-03-22 DIAGNOSIS — H61.22 IMPACTED CERUMEN OF LEFT EAR: ICD-10-CM

## 2024-03-22 DIAGNOSIS — R09.82 POST-NASAL DRIP: ICD-10-CM

## 2024-03-22 LAB
INFLUENZA A (RMG): NEGATIVE
INFLUENZA B (RMG): NEGATIVE
SARS ANTIGEN (RMG): NEGATIVE

## 2024-03-22 PROCEDURE — 99213 OFFICE O/P EST LOW 20 MIN: CPT | Mod: 25

## 2024-03-22 PROCEDURE — 87428 SARSCOV & INF VIR A&B AG IA: CPT

## 2024-03-22 PROCEDURE — 69209 REMOVE IMPACTED EAR WAX UNI: CPT

## 2024-03-22 RX ORDER — LENALIDOMIDE 5 MG/1
CAPSULE ORAL
COMMUNITY
Start: 2024-01-15

## 2024-03-22 RX ORDER — CEFDINIR 300 MG/1
300 CAPSULE ORAL 2 TIMES DAILY
Qty: 14 CAPSULE | Refills: 0 | Status: SHIPPED | OUTPATIENT
Start: 2024-03-22 | End: 2024-03-29

## 2024-03-22 NOTE — PROGRESS NOTES
Assessment & Plan     Acute non-recurrent maxillary sinusitis  S/s consistent with a sinus infection. Rx for Cefdinir due to multiple allergies. Has tolerated cephalosporins in the past. Discussed main side effects from antibiotics.Recommend taking with food to help reduce GI complications from antibiotic use. Recommended symptomatic cares such as decongestants, expectorants, steroid nasal spray for next few weeks, OTC analgesics, and/or nasal/sinus lavage with Netti pot or Sinus Rinse Kit. Decongestant nasal sprays can be used, but use with caution and not for more than 3 days. Follow up as needed for new or worsening symptoms or if symptoms fail to improve. Patient agreeable to plan. All questions answered.   - cefdinir (OMNICEF) 300 MG capsule  Dispense: 14 capsule; Refill: 0    Post-nasal drip  Acute cough  - Influenza + SARS Antigen ([facility])    Impacted cerumen of left ear  Ear wax was unsuccessfully removed from left ear. Patient tolerated procedure well. Discussed OTC Debrox and follow up for ear wax removal. Follow up as needed sooner for new or worsening symptoms. Patient agreeable to plan. All questions answered.     - MT REMOVAL IMPACTED CERUMEN IRRIGATION/LVG UNILAT          See Patient Instructions    Return if symptoms worsen or fail to improve, for Follow up.    Grace Camargo is a 78 year old, presenting for the following health issues:  URI (1/1/24, 2/1/24 and nows its back , unable to sleep and breathe , post nasal drip)    HPI       Concern - URI  Onset: Since January on and off. Restarted Feb again and hasn't got better.   Description: post nasal drip, mucus, nausea, dry heave, off balance (since possibly concussion the beginning of the month) cough, runny nose, congestion. Significant stress related to not getting her inheritance and now needing it.   Intensity: moderate to severe with congestion causing fatigue  Progression of Symptoms:  worsening  Accompanying Signs & Symptoms: PND,  cough, mucus, runny nose, congestion   Previous history of similar problem: none  Precipitating factors:        Worsened by: stress with court case  Alleviating factors:        Improved by: recumbent biking  Therapies tried and outcome: Ibuprofen helping, benadryl        Review of Systems  Constitutional, HEENT, cardiovascular, pulmonary, gi and gu systems are negative, except as otherwise noted.      Objective    /42   Pulse 102   Temp 100  F (37.8  C) (Oral)   Wt 59.7 kg (131 lb 9.6 oz)   LMP 12/04/1989   SpO2 97%   BMI 23.31 kg/m    Body mass index is 23.31 kg/m .  Physical Exam   GENERAL: alert and no distress  EYES: Eyes grossly normal to inspection, PERRL and conjunctivae and sclerae normal  HENT: normal cephalic/atraumatic, right ear: clear effusion, left ear: occluded with wax, nose and mouth without ulcers or lesions, oropharynx clear, oral mucous membranes moist, and sinuses: maxillary tenderness on both sides  NECK: no adenopathy  RESP: lungs clear to auscultation - no rales, rhonchi or wheezes  CV: regular rate and rhythm, normal S1 S2, no S3 or S4, no murmur, click or rub, no peripheral edema  ABDOMEN: soft, nontender, no hepatosplenomegaly, no masses and bowel sounds normal  NEURO: Normal strength and tone, mentation intact and speech normal  PSYCH: mentation appears normal, affect normal/bright    Results for orders placed or performed in visit on 03/22/24 (from the past 24 hour(s))   Influenza + SARS Antigen ([facility])   Result Value Ref Range    INFLUENZA A ([facility]) Negative Negative    INFLUENZA B ([facility]) Negative Negative    SARS ANTIGEN ([facility]) Negative Negative           Signed Electronically by: Fiona Coe, APRN CNP

## 2024-03-22 NOTE — NURSING NOTE
"Patient was leaving clinic after being seen and parked next to curb. As trying to get into truck, fell into snow. Patient came back inside and wanted to let us know she fell but she refused to be seen again and stated that she \"just wants to go home and go to bed.\"    Kendal Crain, [facility]  March 22, 2024  2:19 PM      "

## 2024-03-29 ENCOUNTER — TRANSFERRED RECORDS (OUTPATIENT)
Dept: FAMILY MEDICINE | Facility: CLINIC | Age: 79
End: 2024-03-29

## 2024-04-03 ENCOUNTER — OFFICE VISIT (OUTPATIENT)
Dept: FAMILY MEDICINE | Facility: CLINIC | Age: 79
End: 2024-04-03

## 2024-04-03 VITALS
OXYGEN SATURATION: 97 % | BODY MASS INDEX: 22.85 KG/M2 | HEART RATE: 76 BPM | WEIGHT: 129 LBS | SYSTOLIC BLOOD PRESSURE: 90 MMHG | DIASTOLIC BLOOD PRESSURE: 57 MMHG

## 2024-04-03 DIAGNOSIS — S32.050D COMPRESSION FRACTURE OF L5 VERTEBRA WITH ROUTINE HEALING, SUBSEQUENT ENCOUNTER: ICD-10-CM

## 2024-04-03 DIAGNOSIS — G63: ICD-10-CM

## 2024-04-03 DIAGNOSIS — I70.0 CALCIFICATION OF ABDOMINAL AORTA (H): ICD-10-CM

## 2024-04-03 DIAGNOSIS — F33.1 MODERATE EPISODE OF RECURRENT MAJOR DEPRESSIVE DISORDER (H): ICD-10-CM

## 2024-04-03 DIAGNOSIS — Z79.899 DRUG-INDUCED IMMUNODEFICIENCY (H): ICD-10-CM

## 2024-04-03 DIAGNOSIS — R10.84 ABDOMINAL PAIN, GENERALIZED: ICD-10-CM

## 2024-04-03 DIAGNOSIS — R19.7 DIARRHEA, UNSPECIFIED TYPE: Primary | ICD-10-CM

## 2024-04-03 DIAGNOSIS — C90.00 MULTIPLE MYELOMA NOT HAVING ACHIEVED REMISSION (H): ICD-10-CM

## 2024-04-03 DIAGNOSIS — C80.1: ICD-10-CM

## 2024-04-03 DIAGNOSIS — D84.821 DRUG-INDUCED IMMUNODEFICIENCY (H): ICD-10-CM

## 2024-04-03 PROBLEM — N18.31 CHRONIC KIDNEY DISEASE, STAGE 3A (H): Status: RESOLVED | Noted: 2021-11-09 | Resolved: 2024-04-03

## 2024-04-03 PROCEDURE — 99214 OFFICE O/P EST MOD 30 MIN: CPT | Performed by: FAMILY MEDICINE

## 2024-04-03 PROCEDURE — G2211 COMPLEX E/M VISIT ADD ON: HCPCS | Performed by: FAMILY MEDICINE

## 2024-04-03 NOTE — PROGRESS NOTES
Grace Camargo is a 78 year old patient who presents to clinic for evaluation.  She was seen 3/22 and diagnosed with sinusitis and treated with cefdinir.  She tolerated it well and sinus symptoms have nearly resolved.  Today had a bout of significant watery diarrhea associated with mild abdominal pain and cramping.  No fever, chills or other systemic symptoms.  Of note, she had severe colitis in December with positive C diff treated with vancomycin.  Symptoms resolved.      Her other chronic issues are stable as below:    MDD: stable on citalopram     Anxiety: stable on citalopram     MM: follows with Dr Dreyer, treated with Revlimid.  Doing well overall.     Osteopenia with history of insufficiency fractures, now on Prolia, managed by oncology     CKD3a: presumably secondary to MM.       HLD: on simva, tolerating well     Anemia: likely multifactorial (MM, chemo, B12 def)     Abd aorta calcifications: not currently on statin.  Asymptomatic.        Review of Systems   Constitutional, HEENT, cardiovascular, pulmonary, GI, , musculoskeletal, neuro, skin, endocrine and psych systems are negative, except as otherwise noted.      Objective    BP 90/57   Pulse 76   Wt 58.5 kg (129 lb)   LMP 12/04/1989   SpO2 97%   BMI 22.85 kg/m      General: Well appearing, NAD  Abd: mild diffuse lower abd tenderness.  No rebound or guarding  Psych: normal mood and affect        No results found for this or any previous visit (from the past 24 hour(s)).    Diarrhea, unspecified type  Although new given history and recent abx use would like to test for C diff  Supportive cares  Close follow up  - C. difficile Toxin B PCR with reflex to C. difficile Antigen and Toxins A/B EIA; Future    Abdominal pain, generalized  See above  - C. difficile Toxin B PCR with reflex to C. difficile Antigen and Toxins A/B EIA; Future    Multiple myeloma not having achieved remission (H)  Stable, cont current treatment and oncology follow  up    Drug-induced immunodeficiency  (H24)  On Revlimid, followed by oncology    Polyneuropathy in neoplastic disease (H)  Stable, related to MM    Moderate episode of recurrent major depressive disorder (H)  stable/controlled. Cont current medication(s) and treatment    Calcification of abdominal aorta (H24)  Asymptomatic, cont statin    Compression fracture of L5 vertebra with routine healing, subsequent encounter  No current pain, on Prolia, fall precautions    Patient is agreement with the assessment and plan as outlined above.  All questions answered.  Red flag symptoms that should prompt emergent evaluation discussed and understood.

## 2024-04-08 DIAGNOSIS — R10.84 ABDOMINAL PAIN, GENERALIZED: ICD-10-CM

## 2024-04-08 DIAGNOSIS — R19.7 DIARRHEA, UNSPECIFIED TYPE: ICD-10-CM

## 2024-04-08 LAB — C DIFF TOX B STL QL: NEGATIVE

## 2024-04-08 PROCEDURE — 87493 C DIFF AMPLIFIED PROBE: CPT

## 2024-06-06 ENCOUNTER — TELEPHONE (OUTPATIENT)
Dept: FAMILY MEDICINE | Facility: CLINIC | Age: 79
End: 2024-06-06

## 2024-06-21 ENCOUNTER — TRANSFERRED RECORDS (OUTPATIENT)
Dept: FAMILY MEDICINE | Facility: CLINIC | Age: 79
End: 2024-06-21

## 2024-07-11 ENCOUNTER — APPOINTMENT (OUTPATIENT)
Dept: GENERAL RADIOLOGY | Facility: CLINIC | Age: 79
End: 2024-07-11
Attending: EMERGENCY MEDICINE
Payer: COMMERCIAL

## 2024-07-11 ENCOUNTER — HOSPITAL ENCOUNTER (EMERGENCY)
Facility: CLINIC | Age: 79
Discharge: HOME | End: 2024-07-11
Attending: EMERGENCY MEDICINE | Admitting: EMERGENCY MEDICINE
Payer: COMMERCIAL

## 2024-07-11 VITALS
HEART RATE: 84 BPM | SYSTOLIC BLOOD PRESSURE: 116 MMHG | RESPIRATION RATE: 14 BRPM | OXYGEN SATURATION: 100 % | DIASTOLIC BLOOD PRESSURE: 80 MMHG | TEMPERATURE: 98 F

## 2024-07-11 DIAGNOSIS — R07.9 CHEST PAIN, UNSPECIFIED TYPE: ICD-10-CM

## 2024-07-11 LAB
ANION GAP SERPL CALCULATED.3IONS-SCNC: 8 MMOL/L (ref 7–15)
ATRIAL RATE - MUSE: 72 BPM
BASOPHILS # BLD AUTO: 0.1 10E3/UL (ref 0–0.2)
BASOPHILS NFR BLD AUTO: 3 %
BUN SERPL-MCNC: 19.2 MG/DL (ref 8–23)
CALCIUM SERPL-MCNC: 9.1 MG/DL (ref 8.8–10.2)
CHLORIDE SERPL-SCNC: 104 MMOL/L (ref 98–107)
CREAT SERPL-MCNC: 1.07 MG/DL (ref 0.51–0.95)
DEPRECATED HCO3 PLAS-SCNC: 28 MMOL/L (ref 22–29)
DIASTOLIC BLOOD PRESSURE - MUSE: NORMAL MMHG
EGFRCR SERPLBLD CKD-EPI 2021: 53 ML/MIN/1.73M2
EOSINOPHIL # BLD AUTO: 0.2 10E3/UL (ref 0–0.7)
EOSINOPHIL NFR BLD AUTO: 5 %
ERYTHROCYTE [DISTWIDTH] IN BLOOD BY AUTOMATED COUNT: 15.5 % (ref 10–15)
GLUCOSE SERPL-MCNC: 118 MG/DL (ref 70–99)
HCT VFR BLD AUTO: 35.9 % (ref 35–47)
HGB BLD-MCNC: 11.7 G/DL (ref 11.7–15.7)
HOLD SPECIMEN: NORMAL
HOLD SPECIMEN: NORMAL
IMM GRANULOCYTES # BLD: 0 10E3/UL
IMM GRANULOCYTES NFR BLD: 0 %
INTERPRETATION ECG - MUSE: NORMAL
LYMPHOCYTES # BLD AUTO: 1.3 10E3/UL (ref 0.8–5.3)
LYMPHOCYTES NFR BLD AUTO: 32 %
MCH RBC QN AUTO: 32.5 PG (ref 26.5–33)
MCHC RBC AUTO-ENTMCNC: 32.6 G/DL (ref 31.5–36.5)
MCV RBC AUTO: 100 FL (ref 78–100)
MONOCYTES # BLD AUTO: 0.8 10E3/UL (ref 0–1.3)
MONOCYTES NFR BLD AUTO: 19 %
NEUTROPHILS # BLD AUTO: 1.7 10E3/UL (ref 1.6–8.3)
NEUTROPHILS NFR BLD AUTO: 41 %
NRBC # BLD AUTO: 0 10E3/UL
NRBC BLD AUTO-RTO: 0 /100
P AXIS - MUSE: 57 DEGREES
PLATELET # BLD AUTO: 192 10E3/UL (ref 150–450)
POTASSIUM SERPL-SCNC: 3.8 MMOL/L (ref 3.4–5.3)
PR INTERVAL - MUSE: 140 MS
QRS DURATION - MUSE: 78 MS
QT - MUSE: 424 MS
QTC - MUSE: 464 MS
R AXIS - MUSE: -9 DEGREES
RBC # BLD AUTO: 3.6 10E6/UL (ref 3.8–5.2)
SODIUM SERPL-SCNC: 140 MMOL/L (ref 135–145)
SYSTOLIC BLOOD PRESSURE - MUSE: NORMAL MMHG
T AXIS - MUSE: 24 DEGREES
TROPONIN T SERPL HS-MCNC: 18 NG/L
TROPONIN T SERPL HS-MCNC: 19 NG/L
VENTRICULAR RATE- MUSE: 72 BPM
WBC # BLD AUTO: 4 10E3/UL (ref 4–11)

## 2024-07-11 PROCEDURE — 99285 EMERGENCY DEPT VISIT HI MDM: CPT | Mod: 25

## 2024-07-11 PROCEDURE — 71046 X-RAY EXAM CHEST 2 VIEWS: CPT

## 2024-07-11 PROCEDURE — 80048 BASIC METABOLIC PNL TOTAL CA: CPT | Performed by: EMERGENCY MEDICINE

## 2024-07-11 PROCEDURE — 36415 COLL VENOUS BLD VENIPUNCTURE: CPT | Performed by: EMERGENCY MEDICINE

## 2024-07-11 PROCEDURE — 84484 ASSAY OF TROPONIN QUANT: CPT | Performed by: EMERGENCY MEDICINE

## 2024-07-11 PROCEDURE — 85025 COMPLETE CBC W/AUTO DIFF WBC: CPT | Performed by: EMERGENCY MEDICINE

## 2024-07-11 PROCEDURE — 93005 ELECTROCARDIOGRAM TRACING: CPT

## 2024-07-11 ASSESSMENT — COLUMBIA-SUICIDE SEVERITY RATING SCALE - C-SSRS
6. HAVE YOU EVER DONE ANYTHING, STARTED TO DO ANYTHING, OR PREPARED TO DO ANYTHING TO END YOUR LIFE?: NO
1. IN THE PAST MONTH, HAVE YOU WISHED YOU WERE DEAD OR WISHED YOU COULD GO TO SLEEP AND NOT WAKE UP?: NO
2. HAVE YOU ACTUALLY HAD ANY THOUGHTS OF KILLING YOURSELF IN THE PAST MONTH?: NO

## 2024-07-11 ASSESSMENT — ACTIVITIES OF DAILY LIVING (ADL)
ADLS_ACUITY_SCORE: 37

## 2024-07-11 NOTE — ED PROVIDER NOTES
Emergency Department Note      History of Present Illness     Chief Complaint   Chest Pain      HPI   Mary Jo Mcleod is a 78 year old female with pmhx multiple myeloma who presents with a 3-day history of chest pain. Pain has been intermittent over the last few days with 1-2 episodes/day. She states the pain feels like tightness. Pain doesn't worsen with activity. She denies dyspnea. She reports pain is improved with distraction. Pain reoccurred about 2 hours ago. She denies fever/URI symptoms. Denies n/v/d. She states she is under a lot of stress related to a recent eviction and wonders if this could be contributing to her symptoms.     Independent Historian   None    Review of External Notes   [facility] note 6/21/24.     Past Medical History     Medical History and Problem List   Past Medical History:   Diagnosis Date    Depressive disorder    Multiple myeloma    Medications   calcium carbonate (TUMS) 500 MG chewable tablet  citalopram (CELEXA) 40 MG tablet  denosumab (PROLIA) 60 MG/ML SOSY injection  hydrOXYzine (ATARAX) 25 MG tablet  oxyCODONE-acetaminophen (PERCOCET) 5-325 MG tablet  REVLIMID 5 MG CAPS capsule  rosuvastatin (CRESTOR) 10 MG tablet  triamcinolone (KENALOG) 0.1 % external cream  valACYclovir (VALTREX) 500 MG tablet  vitamin B-12 (CYANOCOBALAMIN) 250 MCG tablet  vitamin D3 (CHOLECALCIFEROL) 50 mcg (2000 units) tablet        Surgical History   Past Surgical History:   Procedure Laterality Date    BONE MARROW BIOPSY, BONE SPECIMEN, NEEDLE/TROCAR N/A 2/8/2021    Procedure: BONE MARROW BIOPSY;  Surgeon: Naomie Saeed MD;  Location:  GI    COLONOSCOPY N/A 12/7/2023    Procedure: Colonoscopy;  Surgeon: Pankaj Castillo MD;  Location:  GI    finger reattachment      TONSILLECTOMY         Physical Exam     Patient Vitals for the past 24 hrs:   BP Temp Pulse Resp SpO2   07/11/24 0600 126/83 -- 67 -- 96 %   07/11/24 0530 108/46 -- 65 -- 97 %   07/11/24 0500 128/65 -- 62 -- 97 %    07/11/24 0430 (!) 146/66 -- 71 -- 99 %   07/11/24 0400 138/66 -- 70 15 98 %   07/11/24 0341 126/64 -- 69 -- 99 %   07/11/24 0328 -- 98  F (36.7  C) -- -- --   07/11/24 0327 (!) 152/66 -- 77 16 98 %     Physical Exam  Nursing note and vitals reviewed.  HENT:   Mouth/Throat: Moist mucous membranes.   Eyes: EOMI, nonicteric sclera  Cardiovascular: Normal rate, regular rhythm, no murmurs, rubs, or gallops  Pulmonary/Chest: Effort normal and breath sounds normal. No respiratory distress. No wheezes. No rales.   Abdominal: Soft. Nontender, nondistended, no guarding or rigidity.   Musculoskeletal: Normal range of motion.   Neurological: Alert. Moves all extremities spontaneously.   Skin: Skin is warm and dry. No rash noted.         Diagnostics     Lab Results   Labs Ordered and Resulted from Time of ED Arrival to Time of ED Departure   BASIC METABOLIC PANEL - Abnormal       Result Value    Sodium 140      Potassium 3.8      Chloride 104      Carbon Dioxide (CO2) 28      Anion Gap 8      Urea Nitrogen 19.2      Creatinine 1.07 (*)     GFR Estimate 53 (*)     Calcium 9.1      Glucose 118 (*)    TROPONIN T, HIGH SENSITIVITY - Abnormal    Troponin T, High Sensitivity 18 (*)    CBC WITH PLATELETS AND DIFFERENTIAL - Abnormal    WBC Count 4.0      RBC Count 3.60 (*)     Hemoglobin 11.7      Hematocrit 35.9            MCH 32.5      MCHC 32.6      RDW 15.5 (*)     Platelet Count 192      % Neutrophils 41      % Lymphocytes 32      % Monocytes 19      % Eosinophils 5      % Basophils 3      % Immature Granulocytes 0      NRBCs per 100 WBC 0      Absolute Neutrophils 1.7      Absolute Lymphocytes 1.3      Absolute Monocytes 0.8      Absolute Eosinophils 0.2      Absolute Basophils 0.1      Absolute Immature Granulocytes 0.0      Absolute NRBCs 0.0     TROPONIN T, HIGH SENSITIVITY - Abnormal    Troponin T, High Sensitivity 19 (*)        Imaging   Chest XR,  PA & LAT   Final Result   IMPRESSION: Negative chest.          EKG    ECG results from 07/11/24   EKG 12-lead, tracing only     Value    Systolic Blood Pressure     Diastolic Blood Pressure     Ventricular Rate 72    Atrial Rate 72    PA Interval 140    QRS Duration 78        QTc 464    P Axis 57    R AXIS -9    T Axis 24    Interpretation ECG      Sinus rhythm  Nonspecific ST abnormality  Abnormal ECG  When compared with ECG of 07-DEC-2023 14:12,  No significant change.          Reviewed @ 0334      Independent Interpretation   I independently reviewed the CXR. I see no evidence of ptx/infiltrate.       ED Course      Medications Administered   Medications - No data to display      Discussion of Management   None    ED Course   The patient arrived in triage where vitals were measured and recorded.   The patient was then escorted back to the emergency department.   The patient's medical records were reviewed.  Nursing notes and vitals were reviewed.    I performed an exam of the patient as documented above. The patient is in agreement with my plan of care.       Optional/Additional Documentation  None    Medical Decision Making / Diagnosis         PITER Mcleod is a 78 year old female who presents for evaluation of chest pain. Initial laboratory and imaging tests have come back normal and pt is now asymptomatic. There is no clinical, laboratory, or radiographic evidence of pulmonary embolism, aortic dissection, pneumonia, pneumothorax or cardiac ischemia.  Other etiologies of chest pain considered included myocarditis, pericarditis, acute valvular insufficiency, chest wall source, esophageal spasm or GI source, pleuritis, referred pain, etc.  I have no suspicion of unstable angina at this point and will therefore not admit formally and administer heparin.      Disposition   The patient was discharged.     Diagnosis     ICD-10-CM    1. Chest pain, unspecified type  R07.9                 Gerald Abdul MD  07/14/24 0323

## 2024-07-11 NOTE — ED TRIAGE NOTES
Pt c/o chest tightness x3 days intermittently. Pain returned tonight at 0230. Pt has Hx of multiple myeloma and is on oral chemo.     Pt reports this week she is being evicted and going through life stressors due to eviction      Triage Assessment (Adult)       Row Name 07/11/24 0325          Triage Assessment    Airway WDL WDL        Respiratory WDL    Respiratory WDL WDL        Cardiac WDL    Cardiac WDL chest pain        Chest Pain Assessment    Chest Pain Location midsternal;anterior chest, right     Duration 3 days

## 2024-07-15 ENCOUNTER — OFFICE VISIT (OUTPATIENT)
Dept: FAMILY MEDICINE | Facility: CLINIC | Age: 79
End: 2024-07-15

## 2024-07-15 VITALS
HEART RATE: 44 BPM | BODY MASS INDEX: 23.88 KG/M2 | SYSTOLIC BLOOD PRESSURE: 124 MMHG | OXYGEN SATURATION: 97 % | WEIGHT: 134.8 LBS | DIASTOLIC BLOOD PRESSURE: 45 MMHG

## 2024-07-15 DIAGNOSIS — M80.00XD OSTEOPOROSIS WITH CURRENT PATHOLOGICAL FRACTURE WITH ROUTINE HEALING, UNSPECIFIED OSTEOPOROSIS TYPE, SUBSEQUENT ENCOUNTER: ICD-10-CM

## 2024-07-15 DIAGNOSIS — R07.89 ATYPICAL CHEST PAIN: Primary | ICD-10-CM

## 2024-07-15 DIAGNOSIS — R00.1 BRADYCARDIA: ICD-10-CM

## 2024-07-15 DIAGNOSIS — S32.050D COMPRESSION FRACTURE OF L5 VERTEBRA WITH ROUTINE HEALING, SUBSEQUENT ENCOUNTER: ICD-10-CM

## 2024-07-15 PROCEDURE — G2211 COMPLEX E/M VISIT ADD ON: HCPCS | Performed by: FAMILY MEDICINE

## 2024-07-15 PROCEDURE — 99214 OFFICE O/P EST MOD 30 MIN: CPT | Performed by: FAMILY MEDICINE

## 2024-07-15 NOTE — PROGRESS NOTES
Grace Camargo is a 78 year old patient who presents to clinic for ER follow up.  She was seen recently with atypical chest pain.  EKG, CXR and labs performed.  HS trop mildly elevated but stable.  Felt to be noncardiac.  She reports resolution of her symptoms.  It is not exertional.  She reports she has significant financial struggles due to a complex issue involving her family.  See previous notes for details.  She thinks this is what has been triggering the symptoms.          Review of Systems   Constitutional, HEENT, cardiovascular, pulmonary, GI, , musculoskeletal, neuro, skin, endocrine and psych systems are negative, except as otherwise noted.      Objective    /45   Pulse (!) 44   Wt 61.1 kg (134 lb 12.8 oz)   LMP 12/04/1989   SpO2 97%   BMI 23.88 kg/m      General: Well appearing, NAD  Psych: normal mood and affect        No results found for this or any previous visit (from the past 24 hour(s)).    Atypical chest pain  Resolved.  Likely anxiety related.  If recurs will consider stress test    Osteoporosis with current pathological fracture with routine healing, unspecified osteoporosis type, subsequent encounter  Cont fall precautions, calcium and D, and Prolia    Compression fracture of L5 vertebra with routine healing, subsequent encounter  Pain free    Bradycardia  Asymptomatic.  Recent HR normal.  Will monitor    Follow up in 3 months as planned, sooner if needed.          The longitudinal plan of care for the diagnosis(es)/condition(s) as documented were addressed during this visit. Due to the added complexity in care, I will continue to support Mary Jo in the subsequent management and with ongoing continuity of care.

## 2024-09-20 ENCOUNTER — TRANSFERRED RECORDS (OUTPATIENT)
Dept: FAMILY MEDICINE | Facility: CLINIC | Age: 79
End: 2024-09-20

## 2024-10-15 ENCOUNTER — OFFICE VISIT (OUTPATIENT)
Dept: FAMILY MEDICINE | Facility: CLINIC | Age: 79
End: 2024-10-15

## 2024-10-15 VITALS
DIASTOLIC BLOOD PRESSURE: 52 MMHG | HEART RATE: 61 BPM | WEIGHT: 140 LBS | OXYGEN SATURATION: 100 % | SYSTOLIC BLOOD PRESSURE: 130 MMHG | BODY MASS INDEX: 24.8 KG/M2

## 2024-10-15 DIAGNOSIS — R07.89 ATYPICAL CHEST PAIN: Primary | ICD-10-CM

## 2024-10-15 DIAGNOSIS — Z13.6 SCREENING FOR HYPERTENSION: ICD-10-CM

## 2024-10-15 DIAGNOSIS — I70.0 CALCIFICATION OF ABDOMINAL AORTA (H): ICD-10-CM

## 2024-10-15 DIAGNOSIS — C90.01 MULTIPLE MYELOMA IN REMISSION (H): ICD-10-CM

## 2024-10-15 PROCEDURE — G2211 COMPLEX E/M VISIT ADD ON: HCPCS | Performed by: FAMILY MEDICINE

## 2024-10-15 PROCEDURE — 99214 OFFICE O/P EST MOD 30 MIN: CPT | Performed by: FAMILY MEDICINE

## 2024-10-15 PROCEDURE — 93000 ELECTROCARDIOGRAM COMPLETE: CPT | Performed by: FAMILY MEDICINE

## 2024-10-15 PROCEDURE — 3075F SYST BP GE 130 - 139MM HG: CPT | Performed by: FAMILY MEDICINE

## 2024-10-15 RX ORDER — ROSUVASTATIN CALCIUM 10 MG/1
10 TABLET, COATED ORAL DAILY
Qty: 90 TABLET | Refills: 3 | Status: SHIPPED | OUTPATIENT
Start: 2024-10-15 | End: 2024-10-25

## 2024-10-15 ASSESSMENT — ANXIETY QUESTIONNAIRES
5. BEING SO RESTLESS THAT IT IS HARD TO SIT STILL: NOT AT ALL
1. FEELING NERVOUS, ANXIOUS, OR ON EDGE: NOT AT ALL
GAD7 TOTAL SCORE: 5
7. FEELING AFRAID AS IF SOMETHING AWFUL MIGHT HAPPEN: SEVERAL DAYS
6. BECOMING EASILY ANNOYED OR IRRITABLE: SEVERAL DAYS
GAD7 TOTAL SCORE: 5
3. WORRYING TOO MUCH ABOUT DIFFERENT THINGS: SEVERAL DAYS
IF YOU CHECKED OFF ANY PROBLEMS ON THIS QUESTIONNAIRE, HOW DIFFICULT HAVE THESE PROBLEMS MADE IT FOR YOU TO DO YOUR WORK, TAKE CARE OF THINGS AT HOME, OR GET ALONG WITH OTHER PEOPLE: NOT DIFFICULT AT ALL
2. NOT BEING ABLE TO STOP OR CONTROL WORRYING: SEVERAL DAYS

## 2024-10-15 ASSESSMENT — PATIENT HEALTH QUESTIONNAIRE - PHQ9
SUM OF ALL RESPONSES TO PHQ QUESTIONS 1-9: 9
5. POOR APPETITE OR OVEREATING: SEVERAL DAYS

## 2024-10-15 NOTE — PROGRESS NOTES
Grace Camargo is a 79 year old patient who presents to clinic for evaluation.  She reports intermittent episodes of chest pain.  Yesterday she was exercising and had a brief episode of sharp chest pain.  It resolved quickly.  She states this has occurred multiple times over the past few months.  The previous one was three weeks ago.  They have primarily been at rest.  No persistent symptoms.  No associated shortness of breath, nausea, diaphoresis.  She is symptom free currently.  Previous chest CT has shown no coronary artery calcifications        Review of Systems   Constitutional, HEENT, cardiovascular, pulmonary, GI, , musculoskeletal, neuro, skin, endocrine and psych systems are negative, except as otherwise noted.      Objective    /52   Pulse 61   Wt 63.5 kg (140 lb)   LMP 12/04/1989   SpO2 100%   BMI 24.80 kg/m      General: Well appearing, NAD  HEENT: Clear  Heart: RRR, no murmur  Chest: Lungs CTAB  Skin: Clear  MSK: no reproducible tenderness  Psych: normal mood and affect        EKG - Reviewed and interpreted by me appears normal, NSR, normal axis, normal intervals, no acute ST/T changes c/w ischemia, no LVH by voltage criteria, unchanged from previous tracings    Atypical chest pain  History not consistent with angina.  Discussed stress testing but declines for now.  If recurs, particularly with exertion, recommend further work up  - EKG 12-lead complete w/read - Clinics    Calcification of abdominal aorta (H)  Cont statin  - rosuvastatin (CRESTOR) 10 MG tablet; Take 1 tablet (10 mg) by mouth daily.    Multiple myeloma in remission (H)  Most recent oncology note indicates remission    Screening for hypertension  - MOST RECENT SYSTOLIC BLOOD PRESS -139MM HG    Patient is agreement with the assessment and plan as outlined above.  All questions answered.  Red flag symptoms that should prompt emergent evaluation discussed and understood.

## 2024-10-25 ENCOUNTER — OFFICE VISIT (OUTPATIENT)
Dept: FAMILY MEDICINE | Facility: CLINIC | Age: 79
End: 2024-10-25

## 2024-10-25 VITALS
OXYGEN SATURATION: 100 % | BODY MASS INDEX: 24.13 KG/M2 | HEART RATE: 78 BPM | WEIGHT: 136.2 LBS | DIASTOLIC BLOOD PRESSURE: 52 MMHG | HEIGHT: 63 IN | SYSTOLIC BLOOD PRESSURE: 108 MMHG

## 2024-10-25 DIAGNOSIS — R21 RASH: ICD-10-CM

## 2024-10-25 DIAGNOSIS — F41.1 GAD (GENERALIZED ANXIETY DISORDER): ICD-10-CM

## 2024-10-25 DIAGNOSIS — N18.31 STAGE 3A CHRONIC KIDNEY DISEASE (H): ICD-10-CM

## 2024-10-25 DIAGNOSIS — M80.08XA: ICD-10-CM

## 2024-10-25 DIAGNOSIS — C80.1: ICD-10-CM

## 2024-10-25 DIAGNOSIS — Z00.00 ENCOUNTER FOR MEDICARE ANNUAL WELLNESS EXAM: Primary | ICD-10-CM

## 2024-10-25 DIAGNOSIS — F33.1 MODERATE EPISODE OF RECURRENT MAJOR DEPRESSIVE DISORDER (H): ICD-10-CM

## 2024-10-25 DIAGNOSIS — G63: ICD-10-CM

## 2024-10-25 DIAGNOSIS — F43.10 PTSD (POST-TRAUMATIC STRESS DISORDER): ICD-10-CM

## 2024-10-25 DIAGNOSIS — I70.0 CALCIFICATION OF ABDOMINAL AORTA (H): ICD-10-CM

## 2024-10-25 LAB
ALBUMIN SERPL BCG-MCNC: 3.7 G/DL (ref 3.5–5.2)
ALP SERPL-CCNC: 48 U/L (ref 40–150)
ALT SERPL W P-5'-P-CCNC: 14 U/L (ref 0–50)
ANION GAP SERPL CALCULATED.3IONS-SCNC: 5 MMOL/L (ref 7–15)
AST SERPL W P-5'-P-CCNC: 25 U/L (ref 0–45)
BILIRUB SERPL-MCNC: 0.4 MG/DL
BUN SERPL-MCNC: 13.3 MG/DL (ref 8–23)
CALCIUM SERPL-MCNC: 8.6 MG/DL (ref 8.8–10.4)
CHLORIDE SERPL-SCNC: 104 MMOL/L (ref 98–107)
CHOLESTEROL: 113 MG/DL (ref 100–199)
CREAT SERPL-MCNC: 1.11 MG/DL (ref 0.51–0.95)
EGFRCR SERPLBLD CKD-EPI 2021: 51 ML/MIN/1.73M2
FASTING STATUS PATIENT QL REPORTED: YES
FASTING?: YES
GLUCOSE SERPL-MCNC: 92 MG/DL (ref 70–99)
HCO3 SERPL-SCNC: 30 MMOL/L (ref 22–29)
HDL (RMG): 41 MG/DL (ref 40–?)
LDL CALCULATED (RMG): 57 MG/DL (ref 0–130)
POTASSIUM SERPL-SCNC: 4.2 MMOL/L (ref 3.4–5.3)
PROT SERPL-MCNC: 6.5 G/DL (ref 6.4–8.3)
SODIUM SERPL-SCNC: 139 MMOL/L (ref 135–145)
TRIGLYCERIDES (RMG): 73 MG/DL (ref 0–149)

## 2024-10-25 PROCEDURE — G0439 PPPS, SUBSEQ VISIT: HCPCS | Performed by: FAMILY MEDICINE

## 2024-10-25 PROCEDURE — 80053 COMPREHEN METABOLIC PANEL: CPT | Performed by: FAMILY MEDICINE

## 2024-10-25 PROCEDURE — 80061 LIPID PANEL: CPT | Mod: QW | Performed by: FAMILY MEDICINE

## 2024-10-25 PROCEDURE — 99214 OFFICE O/P EST MOD 30 MIN: CPT | Performed by: FAMILY MEDICINE

## 2024-10-25 PROCEDURE — 36415 COLL VENOUS BLD VENIPUNCTURE: CPT | Performed by: FAMILY MEDICINE

## 2024-10-25 RX ORDER — TRIAMCINOLONE ACETONIDE 1 MG/G
CREAM TOPICAL 2 TIMES DAILY
Qty: 80 G | Refills: 0 | Status: SHIPPED | OUTPATIENT
Start: 2024-10-25

## 2024-10-25 RX ORDER — CITALOPRAM HYDROBROMIDE 40 MG/1
40 TABLET ORAL DAILY
Qty: 90 TABLET | Refills: 3 | Status: SHIPPED | OUTPATIENT
Start: 2024-10-25

## 2024-10-25 RX ORDER — HYDROXYZINE HYDROCHLORIDE 25 MG/1
TABLET, FILM COATED ORAL
Qty: 90 TABLET | Refills: 0 | Status: CANCELLED | OUTPATIENT
Start: 2024-10-25

## 2024-10-25 RX ORDER — ROSUVASTATIN CALCIUM 10 MG/1
10 TABLET, COATED ORAL DAILY
Qty: 90 TABLET | Refills: 3 | Status: SHIPPED | OUTPATIENT
Start: 2024-10-25

## 2024-10-25 SDOH — HEALTH STABILITY: PHYSICAL HEALTH: ON AVERAGE, HOW MANY MINUTES DO YOU ENGAGE IN EXERCISE AT THIS LEVEL?: 40 MIN

## 2024-10-25 SDOH — HEALTH STABILITY: PHYSICAL HEALTH: ON AVERAGE, HOW MANY DAYS PER WEEK DO YOU ENGAGE IN MODERATE TO STRENUOUS EXERCISE (LIKE A BRISK WALK)?: 5 DAYS

## 2024-10-25 ASSESSMENT — SOCIAL DETERMINANTS OF HEALTH (SDOH): HOW OFTEN DO YOU GET TOGETHER WITH FRIENDS OR RELATIVES?: ONCE A WEEK

## 2024-10-25 NOTE — PATIENT INSTRUCTIONS
Patient Education   Preventive Care Advice   This is general advice given by our system to help you stay healthy. However, your care team may have specific advice just for you. Please talk to your care team about your preventive care needs.  Nutrition  Eat 5 or more servings of fruits and vegetables each day.  Try wheat bread, brown rice and whole grain pasta (instead of white bread, rice, and pasta).  Get enough calcium and vitamin D. Check the label on foods and aim for 100% of the RDA (recommended daily allowance).  Lifestyle  Exercise at least 150 minutes each week  (30 minutes a day, 5 days a week).  Do muscle strengthening activities 2 days a week. These help control your weight and prevent disease.  No smoking.  Wear sunscreen to prevent skin cancer.  Have a dental exam and cleaning every 6 months.  Yearly exams  See your health care team every year to talk about:  Any changes in your health.  Any medicines your care team has prescribed.  Preventive care, family planning, and ways to prevent chronic diseases.  Shots (vaccines)   HPV shots (up to age 26), if you've never had them before.  Hepatitis B shots (up to age 59), if you've never had them before.  COVID-19 shot: Get this shot when it's due.  Flu shot: Get a flu shot every year.  Tetanus shot: Get a tetanus shot every 10 years.  Pneumococcal, hepatitis A, and RSV shots: Ask your care team if you need these based on your risk.  Shingles shot (for age 50 and up)  General health tests  Diabetes screening:  Starting at age 35, Get screened for diabetes at least every 3 years.  If you are younger than age 35, ask your care team if you should be screened for diabetes.  Cholesterol test: At age 39, start having a cholesterol test every 5 years, or more often if advised.  Bone density scan (DEXA): At age 50, ask your care team if you should have this scan for osteoporosis (brittle bones).  Hepatitis C: Get tested at least once in your life.  STIs (sexually  transmitted infections)  Before age 24: Ask your care team if you should be screened for STIs.  After age 24: Get screened for STIs if you're at risk. You are at risk for STIs (including HIV) if:  You are sexually active with more than one person.  You don't use condoms every time.  You or a partner was diagnosed with a sexually transmitted infection.  If you are at risk for HIV, ask about PrEP medicine to prevent HIV.  Get tested for HIV at least once in your life, whether you are at risk for HIV or not.  Cancer screening tests  Cervical cancer screening: If you have a cervix, begin getting regular cervical cancer screening tests starting at age 21.  Breast cancer scan (mammogram): If you've ever had breasts, begin having regular mammograms starting at age 40. This is a scan to check for breast cancer.  Colon cancer screening: It is important to start screening for colon cancer at age 45.  Have a colonoscopy test every 10 years (or more often if you're at risk) Or, ask your provider about stool tests like a FIT test every year or Cologuard test every 3 years.  To learn more about your testing options, visit:   .  For help making a decision, visit:   https://bit.ly/go84888.  Prostate cancer screening test: If you have a prostate, ask your care team if a prostate cancer screening test (PSA) at age 55 is right for you.  Lung cancer screening: If you are a current or former smoker ages 50 to 80, ask your care team if ongoing lung cancer screenings are right for you.  For informational purposes only. Not to replace the advice of your health care provider. Copyright   2023 [employer]. All rights reserved. Clinically reviewed by the [facility]. [employer] [id number] - REV 01/24.

## 2024-10-25 NOTE — PROGRESS NOTES
Preventive Care Visit  [facility]  Levi Mcdonnell MD, Family Medicine  Oct 25, 2024      Assessment & Plan     Encounter for Medicare annual wellness exam  - discussed preventative guidelines, healthy diet, exercise and weight management  - VENOUS COLLECTION    Moderate episode of recurrent major depressive disorder (H)  stable/controlled. Cont current medication(s) and treatment  - citalopram (CELEXA) 40 MG tablet; Take 1 tablet (40 mg) by mouth daily.    PTSD (post-traumatic stress disorder)  stable/controlled. Cont current medication(s) and treatment  - citalopram (CELEXA) 40 MG tablet; Take 1 tablet (40 mg) by mouth daily.    GERSON (generalized anxiety disorder)  stable/controlled. Cont current medication(s) and treatment  - citalopram (CELEXA) 40 MG tablet; Take 1 tablet (40 mg) by mouth daily.    Calcification of abdominal aorta (H)  Asymptomatic, cont statin  - rosuvastatin (CRESTOR) 10 MG tablet; Take 1 tablet (10 mg) by mouth daily.  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  Likely due to MM.   Blood pressure control, blood sugar control, vitamin D supplementation and avoidance of NSAIDs and other nephrotoxic medications discussed.  Not on ACEi due to low BP      Patient has been advised of split billing requirements and indicates understanding: Yes        Counseling  Appropriate preventive services were addressed with this patient via screening, questionnaire, or discussion as appropriate for fall prevention, nutrition, physical activity, Tobacco-use cessation, social engagement, weight loss  and cognition.  Checklist reviewing preventive services available has been given to the patient.  Reviewed patient's diet, addressing concerns and/or questions.   Patient reported safety concerns were addressed today.    See Patient Instructions    No follow-ups on file.    Grace Camargo is a 79 year old, presenting for the following:  Wellness Visit (FASTING /LOV 10/15 for chest pain- has had since but not worse/Dexa last 2023//Cancer in remission- only taking relivimid, returns to oncology in 3 months) and Hearing Screening (R: pass all, L: only missed 5000, pass others)      HPI    MDD: stable on citalopram     Anxiety: stable on citalopram     MM: follows with Dr Dreyer, treated with Revlimid.  In hematologic remission.  Doing well overall.     Osteopenia with history of insufficiency fractures, now on Prolia, managed by oncology     Borderline CKD3a: presumably secondary to MM.       HLD: on simva, tolerating well     Anemia: likely multifactorial (MM, chemo, B12 def)     Abd aorta calcifications: not currently on statin.  Asymptomatic        Health Care Directive  Patient has a Health Care Directive on file  Advance care planning document is on file and is current.      10/25/2024   General Health   How would you rate your overall physical health? Excellent   Feel stress (tense, anxious, or unable to sleep) Not at all            10/25/2024   Nutrition   Diet: Regular (no restrictions)            10/25/2024   Exercise   Days per week of moderate/strenous exercise 5 days   Average minutes spent exercising at this level 40 min            10/25/2024   Social Factors   Frequency of gathering with friends or relatives Once a week   Worry food won't last until get money to buy more No   Food not last or not have enough money for food? No   Do you have housing? (Housing is defined as stable permanent housing and does not include staying ouside in a car, in a tent, in an abandoned building, in an overnight  shelter, or couch-surfing.) Yes   Are you worried about losing your housing? No   Lack of transportation? Yes   Unable to get utilities (heat,electricity)? No       (!) TRANSPORTATION CONCERN PRESENT      10/25/2024   Fall Risk   Fallen 2 or more times in the past year? No    Trouble with walking or balance? Yes        Patient-reported           10/25/2024   Activities of Daily Living- Home Safety   Needs help with the following daily activites None of the above   Safety concerns in the home Throw rugs in the hallway            10/25/2024   Dental   Dentist two times every year? Yes            10/25/2024   Hearing Screening   Hearing concerns? None of the above      Hearing Screening:  Right Ear  4000Hz: pass  2000Hz: pass  1000Hz: pass  500Hz: pass    Left Ear  4000Hz: FAIL  2000Hz: pass  1000Hz: pass  500Hz: pass         10/25/2024   Driving Risk Screening   Patient/family members have concerns about driving No            10/25/2024   General Alertness/Fatigue Screening   Have you been more tired than usual lately? No            10/25/2024   Urinary Incontinence Screening   Bothered by leaking urine in past 6 months No            10/25/2024   TB Screening   Were you born outside of the [place]? No          10/25/2024   Substance Use   Alcohol more than 3/day or more than 7/wk No   Do you have a current opioid prescription? No   How severe/bad is pain from 1 to 10? 0/10 (No Pain)   Do you use any other substances recreationally? No        Social History     Tobacco Use    Smoking status: Never    Smokeless tobacco: Never   Substance Use Topics    Alcohol use: Not Currently     Alcohol/week: 0.0 - 2.0 standard drinks of alcohol    Drug use: No           12/20/2021   LAST FHS-7 RESULTS   1st degree relative breast or ovarian cancer Yes   Any relative bilateral breast cancer No   Any male have breast cancer No   Any ONE woman have BOTH breast AND ovarian cancer No   Any woman with breast cancer before 50yrs No   2 or more  "relatives with breast AND/OR ovarian cancer No   2 or more relatives with breast AND/OR bowel cancer No           Mammogram Screening - After age 74- determine frequency with patient based on health status, life expectancy and patient goals    ASCVD Risk   The ASCVD Risk score (Collins CONNORS, et al., 2019) failed to calculate for the following reasons:    The valid total cholesterol range is 130 to 320 mg/dL            Reviewed and updated as needed this visit by Provider   Tobacco  Allergies  Meds  Problems  Med Hx  Surg Hx  Fam Hx            Lab work is in process  Current providers sharing in care for this patient include:  Patient Care Team:  Levi Mcdonnell MD as PCP - General (Family Practice)  Levi Mcdonnell MD as Assigned PCP    The following health maintenance items are reviewed in Epic and correct as of today:  Health Maintenance   Topic Date Due    ZOSTER IMMUNIZATION (2 of 2) 06/13/2023    COVID-19 Vaccine (7 - 2024-25 season) 11/20/2024    PHQ-9  04/15/2025    DTAP/TDAP/TD IMMUNIZATION (2 - Td or Tdap) 05/09/2025    MEDICARE ANNUAL WELLNESS VISIT  10/25/2025    LIPID  10/25/2025    FALL RISK ASSESSMENT  10/25/2025    GLUCOSE  07/11/2027    ADVANCE CARE PLANNING  10/27/2028    DEXA  08/10/2038    HEPATITIS C SCREENING  Completed    DEPRESSION ACTION PLAN  Completed    INFLUENZA VACCINE  Completed    Pneumococcal Vaccine: 65+ Years  Completed    RSV VACCINE  Completed    HPV IMMUNIZATION  Aged Out    MENINGITIS IMMUNIZATION  Aged Out    RSV MONOCLONAL ANTIBODY  Aged Out    MAMMO SCREENING  Discontinued    COLORECTAL CANCER SCREENING  Discontinued         Review of Systems  Constitutional, HEENT, cardiovascular, pulmonary, GI, , musculoskeletal, neuro, skin, endocrine and psych systems are negative, except as otherwise noted.     Objective    Exam  /52   Pulse 78   Ht 1.6 m (5' 3\")   Wt 61.8 kg (136 lb 3.2 oz)   LMP 12/04/1989   SpO2 100%   BMI 24.13 kg/m   " "  Estimated body mass index is 24.13 kg/m  as calculated from the following:    Height as of this encounter: 1.6 m (5' 3\").    Weight as of this encounter: 61.8 kg (136 lb 3.2 oz).    Physical Exam  GENERAL: alert and no distress  EYES: Eyes grossly normal to inspection, PERRL and conjunctivae and sclerae normal  HENT: ear canals and TM's normal, nose and mouth without ulcers or lesions  NECK: no adenopathy, no asymmetry, masses, or scars  RESP: lungs clear to auscultation - no rales, rhonchi or wheezes  CV: regular rate and rhythm, normal S1 S2, no S3 or S4, no murmur, click or rub, no peripheral edema  ABDOMEN: soft, nontender, no hepatosplenomegaly, no masses and bowel sounds normal  MS: no gross musculoskeletal defects noted, no edema  SKIN: no suspicious lesions or rashes  NEURO: Normal strength and tone, mentation intact and speech normal  PSYCH: mentation appears normal, affect normal/bright         10/25/2024   Mini Cog   Clock Draw Score 2 Normal   3 Item Recall 3 objects recalled   Mini Cog Total Score 5                 Signed Electronically by: Levi Mcdonnell MD    "

## 2024-11-05 ENCOUNTER — OFFICE VISIT (OUTPATIENT)
Dept: FAMILY MEDICINE | Facility: CLINIC | Age: 79
End: 2024-11-05

## 2024-11-05 VITALS
TEMPERATURE: 99.5 F | OXYGEN SATURATION: 98 % | WEIGHT: 135 LBS | HEART RATE: 102 BPM | SYSTOLIC BLOOD PRESSURE: 124 MMHG | DIASTOLIC BLOOD PRESSURE: 44 MMHG | BODY MASS INDEX: 23.91 KG/M2

## 2024-11-05 DIAGNOSIS — R05.8 PRODUCTIVE COUGH: Primary | ICD-10-CM

## 2024-11-05 RX ORDER — CEFDINIR 300 MG/1
300 CAPSULE ORAL 2 TIMES DAILY
Qty: 20 CAPSULE | Refills: 0 | Status: SHIPPED | OUTPATIENT
Start: 2024-11-05

## 2024-11-05 NOTE — PROGRESS NOTES
Productive cough for a week  Positive chills    ROS:  General and Resp. completed and negative except as noted above    Histories: reviewed    OBJECTIVE:                                                    /44   Pulse 102   Temp 99.5  F (37.5  C) (Oral)   Wt 61.2 kg (135 lb)   LMP 12/04/1989   SpO2 98%   BMI 23.91 kg/m    Body mass index is 23.91 kg/m .   APPEARANCE: = Relaxed and in no distress  Conj/Eyelids = noninjected and lids and lashes are without inflammation  PERRLA/Irises = Pupils Round Reactive to Light and Irisis without inflammation  Ears/Nose = External structures and Nares have usual shape and form  Ear canals and TM's = Canals are not inflammed and have none or little wax and the drums are not injected and have a light reflex   Lips/Teeth/Gums = No lesions seen, good dentition, and gums seem healthy  Oropharynx = No leukoplakia, No injection to the tissues, Normal Uvula  Neck = No anterior or posterior adenopathy appreciated.  Resp effort = Calm regular breathing  Breath Sounds = Good air movement with no rales or rhonchi in any lung fields  Mood/Affect = Cooperative and interested     ASSESSMENT/PLAN:                                                    Quality gaps reviewed    Mary Jo was seen today for uri.    Diagnoses and all orders for this visit:    Productive cough  -     X-ray Chest 2 vws*  -     cefdinir (OMNICEF) 300 MG capsule; Take 1 capsule (300 mg) by mouth 2 times daily.    her cough is potenially pertussis, from school exposure and she would benefit from omnicef 300 bid  Lots of allergies and discussed risks of all options.      Regular exercise    Health maintenance items are reviewed in Epic and correct as of today:    Sriram Baca MD  [facility]  Family Practice  [facility]  [phone]    For any issues my office # is [phone]

## 2024-11-18 ENCOUNTER — OFFICE VISIT (OUTPATIENT)
Dept: FAMILY MEDICINE | Facility: CLINIC | Age: 79
End: 2024-11-18

## 2024-11-18 VITALS
WEIGHT: 135.4 LBS | SYSTOLIC BLOOD PRESSURE: 117 MMHG | BODY MASS INDEX: 23.99 KG/M2 | HEART RATE: 82 BPM | OXYGEN SATURATION: 99 % | TEMPERATURE: 98.9 F | DIASTOLIC BLOOD PRESSURE: 60 MMHG

## 2024-11-18 DIAGNOSIS — R05.2 SUBACUTE COUGH: Primary | ICD-10-CM

## 2024-11-18 RX ORDER — GUAIFENESIN 600 MG/1
1200 TABLET, EXTENDED RELEASE ORAL 2 TIMES DAILY
Qty: 40 TABLET | Refills: 0 | Status: SHIPPED | OUTPATIENT
Start: 2024-11-18 | End: 2024-11-28

## 2024-11-18 RX ORDER — BENZONATATE 100 MG/1
100 CAPSULE ORAL 3 TIMES DAILY PRN
Qty: 30 CAPSULE | Refills: 0 | Status: SHIPPED | OUTPATIENT
Start: 2024-11-18 | End: 2024-11-28

## 2024-11-18 ASSESSMENT — ENCOUNTER SYMPTOMS
COUGH: 1
HEADACHES: 0
SHORTNESS OF BREATH: 1
CHILLS: 0
VOMITING: 0
SORE THROAT: 0
FEVER: 0
NAUSEA: 0

## 2024-11-18 NOTE — PROGRESS NOTES
Assessment & Plan   Problem List Items Addressed This Visit          Respiratory    Subacute cough - Primary     -per patient, she has been having symptoms for 18 days  -physical exam  -TMs had no erythema or bulging  -pharynx and tonsils have no exudates, no cervical lymphadenopathy, cough present  -clear to auscultation bilaterally in all lung fields  -no pain with sinus palpation  -CXR as read by me: Clear lung fields with no obvious infiltrates. Normal cardiac size and shape. No fractures or abnormalities seen of ribs.   -will await radiology interpretation  -discussed physical exam and CXR with patient   -will treat symptoms, tablespoon of honey for cough, tessalon perles and guaifenesin for production  -discussed that some people have post viral cough for up to a month  -patient to contact us if no improvement or worsening of sx         Relevant Medications    benzonatate (TESSALON) 100 MG capsule    guaiFENesin (MUCINEX) 600 MG 12 hr tablet    Other Relevant Orders    X-ray Chest 2 vws*        Return if symptoms worsen or fail to improve.      Grace Camargo is a 79 year old, presenting for the following health issues:  URI (X's 2 week, chest and head cold /Seen [facility] on 115/24 and was given New Cefdinir 300 mg- had diarrhea from med and was told to stop it and get on antidiarrhetic )    URI  Associated symptoms include coughing. Pertinent negatives include no chills, fever, headaches, nausea, sore throat or vomiting.      Patient was seen in this clinic on 11/05/2024 for 5 days of fever, chills and productive cough. There was concern for pertussis and she was prescribed cefdinir. Patient stopped the medication due to diarrhea.    Patient continues to have a cough with gray phlegm. She denies continued fever or chills. States that her esophagus is raw from the coughing. She endorses fatigue.    Has been usign bendryl to stop the drip.     Review of Systems   Constitutional:  Positive for  malaise/fatigue. Negative for chills and fever.   HENT:  Negative for sore throat.         Rawness in esophagus   Respiratory:  Positive for cough and shortness of breath.    Gastrointestinal:  Negative for nausea and vomiting.   Neurological:  Negative for headaches.           Objective    /60   Pulse 82   Temp 98.9  F (37.2  C) (Oral)   Wt 61.4 kg (135 lb 6.4 oz)   LMP 12/04/1989   SpO2 99%   BMI 23.99 kg/m    Body mass index is 23.99 kg/m .  Physical Exam  Constitutional:       General: She is not in acute distress.     Appearance: Normal appearance.   HENT:      Head: Normocephalic and atraumatic.      Right Ear: Tympanic membrane, ear canal and external ear normal.      Left Ear: Tympanic membrane, ear canal and external ear normal.      Mouth/Throat:      Mouth: Mucous membranes are moist.      Pharynx: No oropharyngeal exudate or posterior oropharyngeal erythema.   Eyes:      General:         Right eye: No discharge.         Left eye: No discharge.      Conjunctiva/sclera: Conjunctivae normal.      Pupils: Pupils are equal, round, and reactive to light.   Cardiovascular:      Rate and Rhythm: Normal rate and regular rhythm.      Heart sounds: Normal heart sounds. No murmur heard.  Pulmonary:      Effort: Pulmonary effort is normal.      Breath sounds: Normal breath sounds.   Lymphadenopathy:      Cervical: No cervical adenopathy.   Neurological:      Mental Status: She is alert.            Xray - Reviewed and interpreted by me. Clear lung fields with no obvious infiltrates. Normal cardiac size and shape. No fractures or abnormalities seen of ribs.        Signed Electronically by: Maggie Dee DO

## 2024-11-18 NOTE — ASSESSMENT & PLAN NOTE
-per patient, she has been having symptoms for 18 days  -physical exam  -TMs had no erythema or bulging  -pharynx and tonsils have no exudates, no cervical lymphadenopathy, cough present  -clear to auscultation bilaterally in all lung fields  -no pain with sinus palpation  -CXR as read by me: Clear lung fields with no obvious infiltrates. Normal cardiac size and shape. No fractures or abnormalities seen of ribs.   -will await radiology interpretation  -discussed physical exam and CXR with patient   -will treat symptoms, tablespoon of honey for cough, tessalon perles and guaifenesin for production  -discussed that some people have post viral cough for up to a month  -patient to contact us if no improvement or worsening of sx

## 2024-12-03 ENCOUNTER — OFFICE VISIT (OUTPATIENT)
Dept: FAMILY MEDICINE | Facility: CLINIC | Age: 79
End: 2024-12-03

## 2024-12-03 VITALS
TEMPERATURE: 99 F | DIASTOLIC BLOOD PRESSURE: 78 MMHG | SYSTOLIC BLOOD PRESSURE: 104 MMHG | HEART RATE: 81 BPM | WEIGHT: 137 LBS | OXYGEN SATURATION: 98 % | BODY MASS INDEX: 24.27 KG/M2

## 2024-12-03 DIAGNOSIS — J06.9 VIRAL URI WITH COUGH: Primary | ICD-10-CM

## 2024-12-03 PROCEDURE — G2211 COMPLEX E/M VISIT ADD ON: HCPCS

## 2024-12-03 PROCEDURE — 99213 OFFICE O/P EST LOW 20 MIN: CPT

## 2024-12-03 NOTE — PROGRESS NOTES
Assessment & Plan     Viral URI with cough  We discussed that nearly all upper respiratory infections are viral and that antibiotics generally do not improve outcomes. No signs of a bacterial cause on exam. Reviewed supportive care including fluids, cool mist humidifier, OTC analgesics, nasal irrigation, steroid nasal spray, antihistamines and Guaifenesin discussed as needed. Reviewed may trial Benzonatate previously prescribed as needed for the cough. Red flags that warrant emergent evaluation discussed. Follow up as needed for new or worsening symptoms or if symptoms fail to improve. Patient agreeable to plan. All questions answered.             See Patient Instructions    Return if symptoms worsen or fail to improve, for Follow up.    Grace Cmaargo is a 79 year old, presenting for the following health issues:  URI (Ongoing URI sx: congestion, sinus congestion, cough, body aches, fatigue/Denies fever/Has been using benadryl with no relief)    HPI       Concern - URI  Onset: 1 month. Seen 11/5 and 11/18 with chest xrays completed. No pneumonia. Ongoing URI symptoms. Given Cefdinir-got the runs, benzonatate and Guaifenesin last visit- did not  due to cost  Description: congestion and PND with cough  Intensity: no pain  Progression of Symptoms:  same-steady  Accompanying Signs & Symptoms: NO fevers or chills. Some body aches. Congestion, PND, ivory color phlegm with productive cough  Previous history of similar problem: none in the past  Precipitating factors:        Worsened by: none  Alleviating factors:        Improved by: ice cream and Benadryl  Therapies tried and outcome: Benadryl to stop the PND        Review of Systems  Constitutional, HEENT, cardiovascular, pulmonary, gi and gu systems are negative, except as otherwise noted.      Objective    /78   Pulse 81   Temp 99  F (37.2  C)   Wt 62.1 kg (137 lb)   LMP 12/04/1989   SpO2 98%   BMI 24.27 kg/m    Body mass index is 24.27  kg/m .  Physical Exam   GENERAL: alert and no distress  HENT: normal cephalic/atraumatic, both ears: occluded with wax, nose and mouth without ulcers or lesions, nasal mucosa edematous , oropharynx clear, oral mucous membranes moist, and sinuses: not tender  RESP: lungs clear to auscultation - no rales, rhonchi or wheezes  CV: regular rate and rhythm, normal S1 S2, no S3 or S4, no murmur, click or rub, no peripheral edema  PSYCH: mentation appears normal, affect normal/bright          Signed Electronically by: MARLON Tiwari CNP

## 2024-12-03 NOTE — PATIENT INSTRUCTIONS
Saline nasal rinses 3-4 times a day  Flonase (nasal steroid) daily for 2 weeks  Antihistamine-Zyrtec daily for 2 weeks.

## 2025-01-03 ENCOUNTER — HOSPITAL ENCOUNTER (EMERGENCY)
Facility: CLINIC | Age: 80
Discharge: HOME | End: 2025-01-03
Attending: EMERGENCY MEDICINE | Admitting: EMERGENCY MEDICINE
Payer: COMMERCIAL

## 2025-01-03 ENCOUNTER — APPOINTMENT (OUTPATIENT)
Dept: CT IMAGING | Facility: CLINIC | Age: 80
End: 2025-01-03
Attending: EMERGENCY MEDICINE
Payer: COMMERCIAL

## 2025-01-03 VITALS
WEIGHT: 135 LBS | RESPIRATION RATE: 13 BRPM | SYSTOLIC BLOOD PRESSURE: 105 MMHG | TEMPERATURE: 98.2 F | DIASTOLIC BLOOD PRESSURE: 44 MMHG | OXYGEN SATURATION: 99 % | BODY MASS INDEX: 22.49 KG/M2 | HEART RATE: 85 BPM | HEIGHT: 65 IN

## 2025-01-03 DIAGNOSIS — R11.10 VOMITING AND DIARRHEA: ICD-10-CM

## 2025-01-03 DIAGNOSIS — R19.7 VOMITING AND DIARRHEA: ICD-10-CM

## 2025-01-03 LAB
ALBUMIN SERPL BCG-MCNC: 3.5 G/DL (ref 3.5–5.2)
ALP SERPL-CCNC: 51 U/L (ref 40–150)
ALT SERPL W P-5'-P-CCNC: 20 U/L (ref 0–50)
ANION GAP SERPL CALCULATED.3IONS-SCNC: 9 MMOL/L (ref 7–15)
AST SERPL W P-5'-P-CCNC: 28 U/L (ref 0–45)
ATRIAL RATE - MUSE: 85 BPM
BASOPHILS # BLD AUTO: 0 10E3/UL (ref 0–0.2)
BASOPHILS NFR BLD AUTO: 1 %
BILIRUB DIRECT SERPL-MCNC: <0.2 MG/DL (ref 0–0.3)
BILIRUB SERPL-MCNC: 0.6 MG/DL
BUN SERPL-MCNC: 23.9 MG/DL (ref 8–23)
CALCIUM SERPL-MCNC: 8.4 MG/DL (ref 8.8–10.4)
CHLORIDE SERPL-SCNC: 106 MMOL/L (ref 98–107)
CREAT SERPL-MCNC: 1.24 MG/DL (ref 0.51–0.95)
DIASTOLIC BLOOD PRESSURE - MUSE: NORMAL MMHG
EGFRCR SERPLBLD CKD-EPI 2021: 44 ML/MIN/1.73M2
EOSINOPHIL # BLD AUTO: 0.2 10E3/UL (ref 0–0.7)
EOSINOPHIL NFR BLD AUTO: 3 %
ERYTHROCYTE [DISTWIDTH] IN BLOOD BY AUTOMATED COUNT: 14.1 % (ref 10–15)
GLUCOSE SERPL-MCNC: 150 MG/DL (ref 70–99)
HCO3 SERPL-SCNC: 24 MMOL/L (ref 22–29)
HCT VFR BLD AUTO: 37.2 % (ref 35–47)
HGB BLD-MCNC: 12.1 G/DL (ref 11.7–15.7)
HOLD SPECIMEN: NORMAL
IMM GRANULOCYTES # BLD: 0.1 10E3/UL
IMM GRANULOCYTES NFR BLD: 1 %
INTERPRETATION ECG - MUSE: NORMAL
LIPASE SERPL-CCNC: 40 U/L (ref 13–60)
LYMPHOCYTES # BLD AUTO: 0.3 10E3/UL (ref 0.8–5.3)
LYMPHOCYTES NFR BLD AUTO: 5 %
MCH RBC QN AUTO: 30.8 PG (ref 26.5–33)
MCHC RBC AUTO-ENTMCNC: 32.5 G/DL (ref 31.5–36.5)
MCV RBC AUTO: 95 FL (ref 78–100)
MONOCYTES # BLD AUTO: 0.3 10E3/UL (ref 0–1.3)
MONOCYTES NFR BLD AUTO: 6 %
NEUTROPHILS # BLD AUTO: 4.6 10E3/UL (ref 1.6–8.3)
NEUTROPHILS NFR BLD AUTO: 84 %
NRBC # BLD AUTO: 0 10E3/UL
NRBC BLD AUTO-RTO: 0 /100
P AXIS - MUSE: 48 DEGREES
PLATELET # BLD AUTO: 153 10E3/UL (ref 150–450)
POTASSIUM SERPL-SCNC: 4.9 MMOL/L (ref 3.4–5.3)
PR INTERVAL - MUSE: 120 MS
PROT SERPL-MCNC: 6.5 G/DL (ref 6.4–8.3)
QRS DURATION - MUSE: 74 MS
QT - MUSE: 412 MS
QTC - MUSE: 490 MS
R AXIS - MUSE: 34 DEGREES
RBC # BLD AUTO: 3.93 10E6/UL (ref 3.8–5.2)
SODIUM SERPL-SCNC: 139 MMOL/L (ref 135–145)
SYSTOLIC BLOOD PRESSURE - MUSE: NORMAL MMHG
T AXIS - MUSE: 23 DEGREES
TROPONIN T SERPL HS-MCNC: 16 NG/L
TROPONIN T SERPL HS-MCNC: 17 NG/L
VENTRICULAR RATE- MUSE: 85 BPM
WBC # BLD AUTO: 5.5 10E3/UL (ref 4–11)

## 2025-01-03 PROCEDURE — 82435 ASSAY OF BLOOD CHLORIDE: CPT | Performed by: EMERGENCY MEDICINE

## 2025-01-03 PROCEDURE — 36415 COLL VENOUS BLD VENIPUNCTURE: CPT | Performed by: EMERGENCY MEDICINE

## 2025-01-03 PROCEDURE — 96360 HYDRATION IV INFUSION INIT: CPT

## 2025-01-03 PROCEDURE — 258N000003 HC RX IP 258 OP 636: Performed by: EMERGENCY MEDICINE

## 2025-01-03 PROCEDURE — 250N000011 HC RX IP 250 OP 636: Performed by: EMERGENCY MEDICINE

## 2025-01-03 PROCEDURE — 85004 AUTOMATED DIFF WBC COUNT: CPT | Performed by: EMERGENCY MEDICINE

## 2025-01-03 PROCEDURE — 74177 CT ABD & PELVIS W/CONTRAST: CPT

## 2025-01-03 PROCEDURE — 96361 HYDRATE IV INFUSION ADD-ON: CPT

## 2025-01-03 PROCEDURE — 82248 BILIRUBIN DIRECT: CPT | Performed by: EMERGENCY MEDICINE

## 2025-01-03 PROCEDURE — 93005 ELECTROCARDIOGRAM TRACING: CPT

## 2025-01-03 PROCEDURE — 83690 ASSAY OF LIPASE: CPT | Performed by: EMERGENCY MEDICINE

## 2025-01-03 PROCEDURE — 82565 ASSAY OF CREATININE: CPT | Performed by: EMERGENCY MEDICINE

## 2025-01-03 PROCEDURE — 85018 HEMOGLOBIN: CPT | Performed by: EMERGENCY MEDICINE

## 2025-01-03 PROCEDURE — 82947 ASSAY GLUCOSE BLOOD QUANT: CPT | Performed by: EMERGENCY MEDICINE

## 2025-01-03 PROCEDURE — 250N000009 HC RX 250: Performed by: EMERGENCY MEDICINE

## 2025-01-03 PROCEDURE — 99285 EMERGENCY DEPT VISIT HI MDM: CPT | Mod: 25

## 2025-01-03 PROCEDURE — 84484 ASSAY OF TROPONIN QUANT: CPT | Performed by: EMERGENCY MEDICINE

## 2025-01-03 PROCEDURE — 82040 ASSAY OF SERUM ALBUMIN: CPT | Performed by: EMERGENCY MEDICINE

## 2025-01-03 RX ORDER — ONDANSETRON 4 MG/1
4 TABLET, ORALLY DISINTEGRATING ORAL EVERY 8 HOURS PRN
Qty: 12 TABLET | Refills: 0 | Status: SHIPPED | OUTPATIENT
Start: 2025-01-03

## 2025-01-03 RX ORDER — IOPAMIDOL 755 MG/ML
68 INJECTION, SOLUTION INTRAVASCULAR ONCE
Status: COMPLETED | OUTPATIENT
Start: 2025-01-03 | End: 2025-01-03

## 2025-01-03 RX ORDER — ONDANSETRON 2 MG/ML
4 INJECTION INTRAMUSCULAR; INTRAVENOUS EVERY 30 MIN PRN
Status: DISCONTINUED | OUTPATIENT
Start: 2025-01-03 | End: 2025-01-03 | Stop reason: HOSPADM

## 2025-01-03 RX ADMIN — IOPAMIDOL 68 ML: 755 INJECTION, SOLUTION INTRAVENOUS at 06:55

## 2025-01-03 RX ADMIN — SODIUM CHLORIDE 1000 ML: 9 INJECTION, SOLUTION INTRAVENOUS at 05:36

## 2025-01-03 RX ADMIN — SODIUM CHLORIDE 60 ML: 9 INJECTION, SOLUTION INTRAVENOUS at 06:55

## 2025-01-03 ASSESSMENT — COLUMBIA-SUICIDE SEVERITY RATING SCALE - C-SSRS
6. HAVE YOU EVER DONE ANYTHING, STARTED TO DO ANYTHING, OR PREPARED TO DO ANYTHING TO END YOUR LIFE?: NO
2. HAVE YOU ACTUALLY HAD ANY THOUGHTS OF KILLING YOURSELF IN THE PAST MONTH?: NO
1. IN THE PAST MONTH, HAVE YOU WISHED YOU WERE DEAD OR WISHED YOU COULD GO TO SLEEP AND NOT WAKE UP?: NO

## 2025-01-03 ASSESSMENT — ACTIVITIES OF DAILY LIVING (ADL)
ADLS_ACUITY_SCORE: 64

## 2025-01-03 NOTE — ED PROVIDER NOTES
Emergency Department Note      History of Present Illness     Chief Complaint   Abdominal Pain, Vomiting, and Diarrhea      HPI   Mary Jo Mcleod is a 79 year old female with history of CKD, multiple myeloma, anxiety who presents with vomiting and diarrhea since 10 PM last night.  She denies any nausea.  She has some lower abdominal pain as well.  She denies any known sick contacts.  She has not had a fever.  She denies any chest pain, cough, shortness of breath.  She denies any recent antibiotic use.  No urinary symptoms.    Independent Historian   None    Review of External Notes   Reviewed primary care office visit from 12/3/2024.  Patient was seen for a viral URI.  She was not given antibiotics at that time.    Past Medical History     Medical History and Problem List   Past Medical History:   Diagnosis Date    Depressive disorder        Medications   calcium carbonate (TUMS) 500 MG chewable tablet  citalopram (CELEXA) 40 MG tablet  denosumab (PROLIA) 60 MG/ML SOSY injection  REVLIMID 5 MG CAPS capsule  rosuvastatin (CRESTOR) 10 MG tablet  triamcinolone (KENALOG) 0.1 % external cream  valACYclovir (VALTREX) 500 MG tablet  vitamin B-12 (CYANOCOBALAMIN) 250 MCG tablet  vitamin D3 (CHOLECALCIFEROL) 50 mcg (2000 units) tablet        Surgical History   Past Surgical History:   Procedure Laterality Date    BONE MARROW BIOPSY, BONE SPECIMEN, NEEDLE/TROCAR N/A 2/8/2021    Procedure: BONE MARROW BIOPSY;  Surgeon: Naomie Saeed MD;  Location:  GI    COLONOSCOPY N/A 12/7/2023    Procedure: Colonoscopy;  Surgeon: Pankaj Castillo MD;  Location:  GI    finger reattachment      TONSILLECTOMY         Physical Exam     Patient Vitals for the past 24 hrs:   BP Temp Temp src Pulse Resp SpO2 Height Weight   01/03/25 0830 105/44 -- -- 85 -- -- -- --   01/03/25 0800 105/47 -- -- 81 13 99 % -- --   01/03/25 0730 107/53 -- -- 80 18 99 % -- --   01/03/25 0630 125/51 -- -- 78 17 99 % -- --   01/03/25 0600 126/58  "-- -- 80 20 100 % -- --   01/03/25 0530 122/53 -- -- 85 17 100 % -- --   01/03/25 0522 -- -- -- 88 -- -- -- --   01/03/25 0514 124/43 98.2  F (36.8  C) Oral -- 16 99 % 1.638 m (5' 4.5\") 61.2 kg (135 lb)     Physical Exam  Vitals and nursing note reviewed.   Constitutional:       General: She is not in acute distress.     Appearance: She is ill-appearing. She is not toxic-appearing.   HENT:      Head: Normocephalic and atraumatic.      Right Ear: External ear normal.      Left Ear: External ear normal.      Nose: Nose normal.      Mouth/Throat:      Mouth: Mucous membranes are moist.   Eyes:      Extraocular Movements: Extraocular movements intact.      Conjunctiva/sclera: Conjunctivae normal.   Cardiovascular:      Rate and Rhythm: Normal rate and regular rhythm.      Heart sounds: No murmur heard.  Pulmonary:      Effort: Pulmonary effort is normal. No respiratory distress.      Breath sounds: Normal breath sounds. No wheezing, rhonchi or rales.   Abdominal:      General: Abdomen is flat. Bowel sounds are normal. There is no distension.      Palpations: Abdomen is soft.      Tenderness: There is abdominal tenderness (very mild) in the right lower quadrant, suprapubic area and left lower quadrant. There is no guarding or rebound.   Musculoskeletal:         General: No deformity or signs of injury.      Cervical back: Normal range of motion and neck supple.   Skin:     General: Skin is warm and dry.      Findings: No rash.   Neurological:      Mental Status: She is alert and oriented to person, place, and time.   Psychiatric:         Behavior: Behavior normal.           Diagnostics     Lab Results   Labs Ordered and Resulted from Time of ED Arrival to Time of ED Departure   BASIC METABOLIC PANEL - Abnormal       Result Value    Sodium 139      Potassium 4.9      Chloride 106      Carbon Dioxide (CO2) 24      Anion Gap 9      Urea Nitrogen 23.9 (*)     Creatinine 1.24 (*)     GFR Estimate 44 (*)     Calcium 8.4 (*)  "    Glucose 150 (*)    CBC WITH PLATELETS AND DIFFERENTIAL - Abnormal    WBC Count 5.5      RBC Count 3.93      Hemoglobin 12.1      Hematocrit 37.2      MCV 95      MCH 30.8      MCHC 32.5      RDW 14.1      Platelet Count 153      % Neutrophils 84      % Lymphocytes 5      % Monocytes 6      % Eosinophils 3      % Basophils 1      % Immature Granulocytes 1      NRBCs per 100 WBC 0      Absolute Neutrophils 4.6      Absolute Lymphocytes 0.3 (*)     Absolute Monocytes 0.3      Absolute Eosinophils 0.2      Absolute Basophils 0.0      Absolute Immature Granulocytes 0.1      Absolute NRBCs 0.0     TROPONIN T, HIGH SENSITIVITY - Abnormal    Troponin T, High Sensitivity 17 (*)    TROPONIN T, HIGH SENSITIVITY - Abnormal    Troponin T, High Sensitivity 16 (*)    HEPATIC FUNCTION PANEL - Normal    Protein Total 6.5      Albumin 3.5      Bilirubin Total 0.6      Alkaline Phosphatase 51      AST 28      ALT 20      Bilirubin Direct <0.20     LIPASE - Normal    Lipase 40         Imaging   CT Abdomen Pelvis w Contrast   Preliminary Result   IMPRESSION: No acute abnormality in the abdomen or pelvis. No cause   for identified.          EKG   ECG results from 01/03/25   EKG 12 lead     Value    Systolic Blood Pressure     Diastolic Blood Pressure     Ventricular Rate 85    Atrial Rate 85    [place] Interval 120    QRS Duration 74        QTc 490    P Axis 48    R AXIS 34    T Axis 23    Interpretation ECG      Sinus rhythm  Nonspecific ST and T wave abnormality  Prolonged QT  Abnormal ECG  When compared with ECG of 11-Jul-2024 03:25,  T wave inversion now evident in Anterior leads  Confirmed by GENERATED REPORT, COMPUTER ([id number]),  Yasir White ([id number]) on 1/3/2025 5:40:49 AM       Independent Interpretation   None    ED Course      Medications Administered   Medications   sodium chloride 0.9% BOLUS 1,000 mL (0 mLs Intravenous Stopped 1/3/25 0800)   iopamidol (ISOVUE-370) solution 68 mL (68 mLs Intravenous $Given 1/3/25 0655)    sodium chloride 0.9 % bag 500mL for CT scan flush use (60 mLs Intravenous $Given 1/3/25 1436)       Procedures   Procedures     Discussion of Management   None    ED Course   ED Course as of 01/03/25 1354   Fri Jan 03, 2025   1027 I rechecked the patient and updated her on results and plan.       Additional Documentation  None    Medical Decision Making / Diagnosis     CMS Diagnoses: None    MIPS       None    MDM   Mary Jo Mcleod is a 79 year old female who presents with vomiting and diarrhea since last night.  I suspect this is viral gastroenteritis, possibly norovirus as we are seeing a high volume of this in the community right now.  Otherwise could be colitis.  Lower suspicion for diverticulitis, bowel obstruction, pancreatitis, etc.  Abdominal exam is relatively benign.  Lab work is fairly unremarkable including no leukocytosis or anemia.  LFTs and lipase are also normal.  She does have a slight bump in her creatinine above her baseline CKD which is likely due to dehydration.  We will give IV fluids and antiemetics and check a CT to ensure there is no other more worrisome etiology of her pain.    1027 patient's labs and CT are reassuring.  She is tolerating p.o. fluids here in the ED.  She is able to ambulate with a steady gait.  Will plan to discharge home with a prescription for Zofran and recommend oral hydration.  We discussed return precautions.    Disposition   The patient was discharged.     Diagnosis     ICD-10-CM    1. Vomiting and diarrhea  R11.10     R19.7            Discharge Medications   Discharge Medication List as of 1/3/2025 10:52 AM        START taking these medications    Details   ondansetron (ZOFRAN ODT) 4 MG ODT tab Take 1 tablet (4 mg) by mouth every 8 hours as needed for nausea or vomiting., Disp-12 tablet, R-0, E-Prescribe               MD Iris Apodaca Shaun M, MD  01/03/25 [id number]

## 2025-01-03 NOTE — ED TRIAGE NOTES
[facility]  ED Arrival Note    Pt BIBA from home c/o lower abdominal pain, multiple episodes of vomiting and diarrhea since last night at 10pm.  Pt is concerned about dehydration but  declined Zofran when EMS offered en route.   EMS stated that ECG appeared abnormal with (Wide QRS) complex in every other beat and QTc 508. Other VSS.    Visitors during triage: None    Directed to: Main ED    Pronouns: she/her

## 2025-01-03 NOTE — ED NOTES
Pt tolerated ice chips. Pt given ice water and instructed to drink small amounts at a time. Pt instructed to notify team if feels like she will throw up or if she does throw up.

## 2025-01-03 NOTE — ED NOTES
Bed: ED03  Expected date:   Expected time:   Means of arrival:   Comments:  HEMS 439 79F v/d all night

## 2025-01-03 NOTE — ED NOTES
Hand off given to dot SAUCEDA.    Mirian Hodges RN,.......................................... 1/3/2025   7:17 AM

## 2025-01-06 ENCOUNTER — TELEPHONE (OUTPATIENT)
Dept: FAMILY MEDICINE | Facility: CLINIC | Age: 80
End: 2025-01-06

## 2025-01-06 NOTE — TELEPHONE ENCOUNTER
"ED / Discharge Outreach Protocol    Patient Contact    Attempt # 1    Was call answered?  Yes.  \"May I please speak with <patient name>\"  Is patient available?   Yes  Spoke with patient. States the vomiting and diarrhea has stopped but she is very weak. Still having pain in lower ABD. Possible fever this morning but did not check temperature. The weakness is her biggest concern. Scheduled an appointment with Dr. Maggie Dee today 1/6/25 to be seen for further evaluation.   "

## 2025-01-07 ENCOUNTER — OFFICE VISIT (OUTPATIENT)
Dept: FAMILY MEDICINE | Facility: CLINIC | Age: 80
End: 2025-01-07

## 2025-01-07 VITALS
DIASTOLIC BLOOD PRESSURE: 69 MMHG | HEIGHT: 64 IN | HEART RATE: 78 BPM | WEIGHT: 135.8 LBS | TEMPERATURE: 98.7 F | BODY MASS INDEX: 23.18 KG/M2 | SYSTOLIC BLOOD PRESSURE: 116 MMHG | OXYGEN SATURATION: 99 %

## 2025-01-07 DIAGNOSIS — A08.4 VIRAL GASTROENTERITIS: Primary | ICD-10-CM

## 2025-01-07 PROCEDURE — 99213 OFFICE O/P EST LOW 20 MIN: CPT

## 2025-01-07 PROCEDURE — G2211 COMPLEX E/M VISIT ADD ON: HCPCS

## 2025-01-07 NOTE — PROGRESS NOTES
"  Assessment & Plan     Viral gastroenteritis  Recent visit to the ER. Symptoms improving. Reviewed recommendations include rest, hydration, and following the BRAT diet (bananas, rice, apple sauce, toast). Anticipatory guidance given. May continue Zofran as needed. Red flags that warrant emergent evaluation discussed. Follow up as needed for new or worsening symptoms or if symptoms fail to improve. Patient agreeable to plan. All questions answered.         MED REC REQUIRED  Post Medication Reconciliation Status: discharge medications reconciled, continue medications without change    See Patient Instructions    Return if symptoms worsen or fail to improve, for Follow up.    The longitudinal plan of care for the diagnosis(es)/condition(s) as documented were addressed during this visit. Due to the added complexity in care, I will continue to support Mary Jo in the subsequent management and with ongoing continuity of care.      Subjective   Mary Jo is a 79 year old, presenting for the following health issues:  Hospital F/U (Seen in ER on 1/3/2025 for vomiting and diarrhea, was having fevers on and off, not taking tylenol or ibuprofen, was put on Zofran which she took once and not again, but she is feeling better now )    HPI       ER follow up: Was seen 1/3. Was sent home from the ER. Was seen for nausea and vomiting. No symptoms since then. Just feeling exhausted little lingering lower abdomina discomfort. Feels BM forming. Not a normal BM yet. Given nausea medication. But didn't have nausea. Came up like a cough. Appetite has improved.  No fevers.       Review of Systems  Constitutional, HEENT, cardiovascular, pulmonary, GI, , musculoskeletal, neuro, skin, endocrine and psych systems are negative, except as otherwise noted.      Objective    /69   Pulse 78   Temp 98.7  F (37.1  C)   Ht 1.613 m (5' 3.5\")   Wt 61.6 kg (135 lb 12.8 oz)   LMP 12/04/1989   SpO2 99%   BMI 23.68 kg/m    Body mass index is " 23.68 kg/m .  Physical Exam   GENERAL: alert and no distress  RESP: lungs clear to auscultation - no rales, rhonchi or wheezes  CV: regular rate and rhythm, normal S1 S2, no S3 or S4, no murmur, click or rub, no peripheral edema  ABDOMEN: mild tenderness RLQ and bowel sounds normal  PSYCH: mentation appears normal, affect normal/bright    Admission on 01/03/2025, Discharged on 01/03/2025   Component Date Value Ref Range Status    Sodium 01/03/2025 139  135 - 145 mmol/L Final    Potassium 01/03/2025 4.9  3.4 - 5.3 mmol/L Final    Chloride 01/03/2025 106  98 - 107 mmol/L Final    Carbon Dioxide (CO2) 01/03/2025 24  22 - 29 mmol/L Final    Anion Gap 01/03/2025 9  7 - 15 mmol/L Final    Urea Nitrogen 01/03/2025 23.9 (H)  8.0 - 23.0 mg/dL Final    Creatinine 01/03/2025 1.24 (H)  0.51 - 0.95 mg/dL Final    GFR Estimate 01/03/2025 44 (L)  >60 mL/min/1.73m2 Final    eGFR calculated using 2021 CKD-EPI equation.    Calcium 01/03/2025 8.4 (L)  8.8 - 10.4 mg/dL Final    Reference intervals for this test were updated on 7/16/2024 to reflect our healthy population more accurately. There may be differences in the flagging of prior results with similar values performed with this method. Those prior results can be interpreted in the context of the updated reference intervals.    Glucose 01/03/2025 150 (H)  70 - 99 mg/dL Final    Ventricular Rate 01/03/2025 85  BPM Final    Atrial Rate 01/03/2025 85  BPM Final    [place] Interval 01/03/2025 120  ms Final    QRS Duration 01/03/2025 74  ms Final    QT 01/03/2025 412  ms Final    QTc 01/03/2025 490  ms Final    P Axis 01/03/2025 48  degrees Final    R AXIS 01/03/2025 34  degrees Final    T [place] 01/03/2025 23  degrees Final    Interpretation ECG 01/03/2025    Final                    Value:Sinus rhythm  Nonspecific ST and T wave abnormality  Prolonged QT  Abnormal ECG  When compared with ECG of 11-Jul-2024 03:25,  T wave inversion now evident in Anterior leads  Confirmed by GENERATED REPORT,  COMPUTER ([id number]),  Yasir White ([id number]) on 1/3/2025 5:40:49 AM      WBC Count 01/03/2025 5.5  4.0 - 11.0 10e3/uL Final    RBC Count 01/03/2025 3.93  3.80 - 5.20 10e6/uL Final    Hemoglobin 01/03/2025 12.1  11.7 - 15.7 g/dL Final    Hematocrit 01/03/2025 37.2  35.0 - 47.0 % Final    MCV 01/03/2025 95  78 - 100 fL Final    MCH 01/03/2025 30.8  26.5 - 33.0 pg Final    MCHC 01/03/2025 32.5  31.5 - 36.5 g/dL Final    RDW 01/03/2025 14.1  10.0 - 15.0 % Final    Platelet Count 01/03/2025 153  150 - 450 10e3/uL Final    % Neutrophils 01/03/2025 84  % Final    % Lymphocytes 01/03/2025 5  % Final    % Monocytes 01/03/2025 6  % Final    % Eosinophils 01/03/2025 3  % Final    % Basophils 01/03/2025 1  % Final    % Immature Granulocytes 01/03/2025 1  % Final    NRBCs per 100 WBC 01/03/2025 0  <1 /100 Final    Absolute Neutrophils 01/03/2025 4.6  1.6 - 8.3 10e3/uL Final    Absolute Lymphocytes 01/03/2025 0.3 (L)  0.8 - 5.3 10e3/uL Final    Absolute Monocytes 01/03/2025 0.3  0.0 - 1.3 10e3/uL Final    Absolute Eosinophils 01/03/2025 0.2  0.0 - 0.7 10e3/uL Final    Absolute Basophils 01/03/2025 0.0  0.0 - 0.2 10e3/uL Final    Absolute Immature Granulocytes 01/03/2025 0.1  <=0.4 10e3/uL Final    Absolute NRBCs 01/03/2025 0.0  10e3/uL Final    Hold Specimen 01/03/2025 x   Final    Protein Total 01/03/2025 6.5  6.4 - 8.3 g/dL Final    Albumin 01/03/2025 3.5  3.5 - 5.2 g/dL Final    Bilirubin Total 01/03/2025 0.6  <=1.2 mg/dL Final    Alkaline Phosphatase 01/03/2025 51  40 - 150 U/L Final    AST 01/03/2025 28  0 - 45 U/L Final    ALT 01/03/2025 20  0 - 50 U/L Final    Bilirubin Direct 01/03/2025 <0.20  0.00 - 0.30 mg/dL Final    Lipase 01/03/2025 40  13 - 60 U/L Final    Troponin T, High Sensitivity 01/03/2025 17 (H)  <=14 ng/L Final    Either a High Sensitivity Troponin T baseline (0 hours) value = 100 ng/L, or an increase in High Sensitivity Troponin T = 7 ng/L at 2 hours compared to 0 hours (2-0 hours), suggests myocardial  injury, and urgent clinical attention is required.    If the 2-0 hours increase is <7 ng/L, a High Sensitivity Troponin T result above gender-specific reference ranges warrants further evaluation.   Recommendations for further evaluation include correlation with clinical decision-making tool (e.g., HEART), a 3rd High Sensitivity Troponin T test 2 hours after the 2nd (a 20% change from baseline would represent concern), admission for observation, close PCC/cardiology follow-up, or urgent outpatient provocative testing.    Troponin T, High Sensitivity 01/03/2025 16 (H)  <=14 ng/L Final    Either a High Sensitivity Troponin T baseline (0 hours) value = 100 ng/L, or an increase in High Sensitivity Troponin T = 7 ng/L at 2 hours compared to 0 hours (2-0 hours), suggests myocardial injury, and urgent clinical attention is required.    If the 2-0 hours increase is <7 ng/L, a High Sensitivity Troponin T result above gender-specific reference ranges warrants further evaluation.   Recommendations for further evaluation include correlation with clinical decision-making tool (e.g., HEART), a 3rd High Sensitivity Troponin T test 2 hours after the 2nd (a 20% change from baseline would represent concern), admission for observation, close PCC/cardiology follow-up, or urgent outpatient provocative testing.           Signed Electronically by: MARLON Tiwari CNP

## 2025-01-24 PROBLEM — N18.32 CHRONIC KIDNEY DISEASE, STAGE 3B (H): Status: ACTIVE | Noted: 2025-01-24

## 2025-01-24 PROCEDURE — 87086 URINE CULTURE/COLONY COUNT: CPT | Mod: ORL | Performed by: FAMILY MEDICINE

## 2025-02-13 ENCOUNTER — HOSPITAL ENCOUNTER (OUTPATIENT)
Dept: ULTRASOUND IMAGING | Facility: CLINIC | Age: 80
Discharge: HOME | End: 2025-02-13
Attending: INTERNAL MEDICINE
Payer: COMMERCIAL

## 2025-02-13 DIAGNOSIS — N17.9 ACUTE KIDNEY FAILURE, UNSPECIFIED: ICD-10-CM

## 2025-02-13 PROCEDURE — 76770 US EXAM ABDO BACK WALL COMP: CPT

## 2025-03-22 ENCOUNTER — HEALTH MAINTENANCE LETTER (OUTPATIENT)
Age: 80
End: 2025-03-22

## 2025-04-07 ENCOUNTER — MEDICAL CORRESPONDENCE (OUTPATIENT)
Dept: HEALTH INFORMATION MANAGEMENT | Facility: CLINIC | Age: 80
End: 2025-04-07

## 2025-04-07 ENCOUNTER — LAB (OUTPATIENT)
Dept: LAB | Facility: CLINIC | Age: 80
End: 2025-04-07
Payer: COMMERCIAL

## 2025-04-07 DIAGNOSIS — I20.0 UNSTABLE ANGINA (H): ICD-10-CM

## 2025-04-07 DIAGNOSIS — C90.00 MULTIPLE MYELOMA (H): Primary | ICD-10-CM

## 2025-04-09 ENCOUNTER — HOSPITAL ENCOUNTER (OUTPATIENT)
Facility: CLINIC | Age: 80
Discharge: HOME | End: 2025-04-09
Admitting: INTERNAL MEDICINE
Payer: COMMERCIAL

## 2025-04-09 VITALS
RESPIRATION RATE: 16 BRPM | OXYGEN SATURATION: 95 % | HEART RATE: 68 BPM | DIASTOLIC BLOOD PRESSURE: 62 MMHG | TEMPERATURE: 98.7 F | SYSTOLIC BLOOD PRESSURE: 149 MMHG

## 2025-04-09 LAB
BLD PROD TYP BPU: NORMAL
BLOOD COMPONENT TYPE: NORMAL
CODING SYSTEM: NORMAL
CROSSMATCH: NORMAL
ISSUE DATE AND TIME: NORMAL
UNIT ABO/RH: NORMAL
UNIT NUMBER: NORMAL
UNIT STATUS: NORMAL
UNIT TYPE ISBT: 5100

## 2025-04-09 PROCEDURE — 36430 TRANSFUSION BLD/BLD COMPNT: CPT

## 2025-04-09 PROCEDURE — 999N000087 HC STATISTIC IV OP-OVERFLOW

## 2025-04-09 ASSESSMENT — ACTIVITIES OF DAILY LIVING (ADL)
ADLS_ACUITY_SCORE: 58

## 2025-04-09 NOTE — PROGRESS NOTES
Care Suites Admission Nursing Note    Patient Information  Name: Mary Jo Mcleod  Age: 79 year old   Reason for admission: Blood transfusion  Care Suites arrival time: ~1430     Patient Admission/Assessment   Pre-procedure assessment complete: Yes  If abnormal assessment/labs, provider notified: N/A  NPO: N/A  Medications held per instructions/orders: N/A  Consent: obtained  Patient oriented to room: Yes  Education/questions answered: Yes  Plan/other: Blood transfusion    Discharge Planning  Discharge location: [place]    Treasure Ventura RN

## 2025-04-09 NOTE — PROGRESS NOTES
Care Suites Discharge Nursing Note    Patient Information  Name: Mary Jo Mcleod  Age: 79 year old    Discharge Education:  Discharge instructions reviewed: Yes  Additional education/resources provided: Pt questioned when she should follow up. Dr Brennan wrote for the pt to follow up with cardiology in one month. I wrote this information on pt's AVS.  Patient/patient representative verbalizes understanding: Yes  Patient discharging on new medications: No  Medication education completed: N/A    Discharge Plans:   Discharge location: home  Discharge ride contacted: N/A  Approximate discharge time: 1700    Discharge Criteria:  Discharge criteria met and vital signs stable: Yes    Patient Belongs:  Patient belongings returned to patient: Yes    Pam Carroll RN

## 2025-05-05 ENCOUNTER — TRANSFERRED RECORDS (OUTPATIENT)
Dept: FAMILY MEDICINE | Facility: CLINIC | Age: 80
End: 2025-05-05

## 2025-06-20 ENCOUNTER — TRANSFERRED RECORDS (OUTPATIENT)
Dept: FAMILY MEDICINE | Facility: CLINIC | Age: 80
End: 2025-06-20

## 2025-07-10 ENCOUNTER — TELEPHONE (OUTPATIENT)
Dept: FAMILY MEDICINE | Facility: CLINIC | Age: 80
End: 2025-07-10

## 2025-07-24 ENCOUNTER — HOSPITAL ENCOUNTER (OUTPATIENT)
Facility: CLINIC | Age: 80
Setting detail: OBSERVATION
End: 2025-07-24
Attending: EMERGENCY MEDICINE | Admitting: STUDENT IN AN ORGANIZED HEALTH CARE EDUCATION/TRAINING PROGRAM
Payer: COMMERCIAL

## 2025-07-24 ENCOUNTER — APPOINTMENT (OUTPATIENT)
Dept: GENERAL RADIOLOGY | Facility: CLINIC | Age: 80
End: 2025-07-24
Attending: EMERGENCY MEDICINE
Payer: COMMERCIAL

## 2025-07-24 ENCOUNTER — TRANSFERRED RECORDS (OUTPATIENT)
Dept: FAMILY MEDICINE | Facility: CLINIC | Age: 80
End: 2025-07-24

## 2025-07-24 ENCOUNTER — APPOINTMENT (OUTPATIENT)
Dept: ULTRASOUND IMAGING | Facility: CLINIC | Age: 80
End: 2025-07-24
Attending: EMERGENCY MEDICINE
Payer: COMMERCIAL

## 2025-07-24 VITALS
OXYGEN SATURATION: 98 % | BODY MASS INDEX: 23.15 KG/M2 | HEIGHT: 64 IN | TEMPERATURE: 97.9 F | DIASTOLIC BLOOD PRESSURE: 63 MMHG | SYSTOLIC BLOOD PRESSURE: 115 MMHG | WEIGHT: 135.58 LBS | HEART RATE: 69 BPM | RESPIRATION RATE: 18 BRPM

## 2025-07-24 DIAGNOSIS — R07.9 ACUTE CHEST PAIN: Primary | ICD-10-CM

## 2025-07-24 DIAGNOSIS — D64.9 ANEMIA, UNSPECIFIED TYPE: ICD-10-CM

## 2025-07-24 PROBLEM — F41.1 GAD (GENERALIZED ANXIETY DISORDER): Status: ACTIVE | Noted: 2018-08-13

## 2025-07-24 PROBLEM — G89.3 CHRONIC PAIN DUE TO MALIGNANT NEOPLASTIC DISEASE: Status: ACTIVE | Noted: 2025-07-24

## 2025-07-24 PROBLEM — D70.1 CHEMOTHERAPY-INDUCED NEUTROPENIA: Status: ACTIVE | Noted: 2025-07-24

## 2025-07-24 PROBLEM — F33.1 MODERATE EPISODE OF RECURRENT MAJOR DEPRESSIVE DISORDER (H): Status: ACTIVE | Noted: 2018-08-13

## 2025-07-24 PROBLEM — N18.31 STAGE 3A CHRONIC KIDNEY DISEASE (H): Status: ACTIVE | Noted: 2021-11-09

## 2025-07-24 PROBLEM — G62.0 DRUG-INDUCED POLYNEUROPATHY: Status: RESOLVED | Noted: 2025-07-24 | Resolved: 2025-07-24

## 2025-07-24 PROBLEM — E78.00 HYPERCHOLESTEROLEMIA: Status: ACTIVE | Noted: 2018-08-13

## 2025-07-24 PROBLEM — T45.1X5A CHEMOTHERAPY-INDUCED NEUTROPENIA: Status: ACTIVE | Noted: 2025-07-24

## 2025-07-24 PROBLEM — F43.10 PTSD (POST-TRAUMATIC STRESS DISORDER): Status: ACTIVE | Noted: 2018-08-13

## 2025-07-24 PROBLEM — Z79.620 LONG-TERM CURRENT USE OF IMMUNOSUPPRESSIVE BIOLOGIC AGENT: Status: ACTIVE | Noted: 2023-10-24

## 2025-07-24 LAB
ABO + RH BLD: NORMAL
ALBUMIN SERPL BCG-MCNC: 4 G/DL (ref 3.5–5.2)
ALP SERPL-CCNC: 40 U/L (ref 40–150)
ALT SERPL W P-5'-P-CCNC: 10 U/L (ref 0–50)
ANION GAP SERPL CALCULATED.3IONS-SCNC: 10 MMOL/L (ref 7–15)
AST SERPL W P-5'-P-CCNC: 19 U/L (ref 0–45)
ATRIAL RATE - MUSE: 73 BPM
ATRIAL RATE - MUSE: 74 BPM
BASOPHILS # BLD AUTO: 0.1 10E3/UL (ref 0–0.2)
BASOPHILS NFR BLD AUTO: 1 %
BILIRUB SERPL-MCNC: 0.3 MG/DL
BLD GP AB SCN SERPL QL: NEGATIVE
BUN SERPL-MCNC: 35.9 MG/DL (ref 8–23)
CALCIUM SERPL-MCNC: 8.6 MG/DL (ref 8.8–10.4)
CHLORIDE SERPL-SCNC: 104 MMOL/L (ref 98–107)
CREAT SERPL-MCNC: 2.09 MG/DL (ref 0.51–0.95)
D DIMER PPP FEU-MCNC: 0.47 UG/ML FEU (ref 0–0.5)
DIASTOLIC BLOOD PRESSURE - MUSE: NORMAL MMHG
DIASTOLIC BLOOD PRESSURE - MUSE: NORMAL MMHG
EGFRCR SERPLBLD CKD-EPI 2021: 24 ML/MIN/1.73M2
EOSINOPHIL # BLD AUTO: 0.1 10E3/UL (ref 0–0.7)
EOSINOPHIL NFR BLD AUTO: 2 %
ERYTHROCYTE [DISTWIDTH] IN BLOOD BY AUTOMATED COUNT: 13.2 % (ref 10–15)
GLUCOSE SERPL-MCNC: 100 MG/DL (ref 70–99)
HCO3 SERPL-SCNC: 25 MMOL/L (ref 22–29)
HCT VFR BLD AUTO: 27.5 % (ref 35–47)
HGB BLD-MCNC: 9 G/DL (ref 11.7–15.7)
HOLD SPECIMEN: NORMAL
IMM GRANULOCYTES # BLD: 0 10E3/UL
IMM GRANULOCYTES NFR BLD: 0 %
INTERPRETATION ECG - MUSE: NORMAL
INTERPRETATION ECG - MUSE: NORMAL
LYMPHOCYTES # BLD AUTO: 1.3 10E3/UL (ref 0.8–5.3)
LYMPHOCYTES NFR BLD AUTO: 26 %
MAGNESIUM SERPL-MCNC: 2.1 MG/DL (ref 1.7–2.3)
MCH RBC QN AUTO: 33.6 PG (ref 26.5–33)
MCHC RBC AUTO-ENTMCNC: 32.7 G/DL (ref 31.5–36.5)
MCV RBC AUTO: 103 FL (ref 78–100)
MONOCYTES # BLD AUTO: 0.6 10E3/UL (ref 0–1.3)
MONOCYTES NFR BLD AUTO: 12 %
NEUTROPHILS # BLD AUTO: 2.9 10E3/UL (ref 1.6–8.3)
NEUTROPHILS NFR BLD AUTO: 59 %
NRBC # BLD AUTO: 0 10E3/UL
NRBC BLD AUTO-RTO: 0 /100
P AXIS - MUSE: 11 DEGREES
P AXIS - MUSE: 70 DEGREES
PLATELET # BLD AUTO: 190 10E3/UL (ref 150–450)
POTASSIUM SERPL-SCNC: 4.4 MMOL/L (ref 3.4–5.3)
PR INTERVAL - MUSE: 134 MS
PR INTERVAL - MUSE: 138 MS
PROT SERPL-MCNC: 6.4 G/DL (ref 6.4–8.3)
QRS DURATION - MUSE: 74 MS
QRS DURATION - MUSE: 76 MS
QT - MUSE: 430 MS
QT - MUSE: 448 MS
QTC - MUSE: 477 MS
QTC - MUSE: 493 MS
R AXIS - MUSE: 41 DEGREES
R AXIS - MUSE: 50 DEGREES
RBC # BLD AUTO: 2.68 10E6/UL (ref 3.8–5.2)
SODIUM SERPL-SCNC: 139 MMOL/L (ref 135–145)
SPECIMEN EXP DATE BLD: NORMAL
SYSTOLIC BLOOD PRESSURE - MUSE: NORMAL MMHG
SYSTOLIC BLOOD PRESSURE - MUSE: NORMAL MMHG
T AXIS - MUSE: 27 DEGREES
T AXIS - MUSE: 35 DEGREES
TROPONIN T SERPL HS-MCNC: 21 NG/L
TROPONIN T SERPL HS-MCNC: 23 NG/L
TROPONIN T SERPL HS-MCNC: 24 NG/L
VENTRICULAR RATE- MUSE: 73 BPM
VENTRICULAR RATE- MUSE: 74 BPM
WBC # BLD AUTO: 4.9 10E3/UL (ref 4–11)

## 2025-07-24 PROCEDURE — G0378 HOSPITAL OBSERVATION PER HR: HCPCS

## 2025-07-24 PROCEDURE — 93010 ELECTROCARDIOGRAM REPORT: CPT | Performed by: INTERNAL MEDICINE

## 2025-07-24 PROCEDURE — 93005 ELECTROCARDIOGRAM TRACING: CPT

## 2025-07-24 PROCEDURE — 84484 ASSAY OF TROPONIN QUANT: CPT | Performed by: EMERGENCY MEDICINE

## 2025-07-24 PROCEDURE — 85379 FIBRIN DEGRADATION QUANT: CPT | Performed by: EMERGENCY MEDICINE

## 2025-07-24 PROCEDURE — 84484 ASSAY OF TROPONIN QUANT: CPT | Performed by: SOCIAL WORKER

## 2025-07-24 PROCEDURE — 36415 COLL VENOUS BLD VENIPUNCTURE: CPT

## 2025-07-24 PROCEDURE — 96372 THER/PROPH/DIAG INJ SC/IM: CPT | Performed by: STUDENT IN AN ORGANIZED HEALTH CARE EDUCATION/TRAINING PROGRAM

## 2025-07-24 PROCEDURE — 99285 EMERGENCY DEPT VISIT HI MDM: CPT | Mod: 25 | Performed by: EMERGENCY MEDICINE

## 2025-07-24 PROCEDURE — 84484 ASSAY OF TROPONIN QUANT: CPT

## 2025-07-24 PROCEDURE — 36415 COLL VENOUS BLD VENIPUNCTURE: CPT | Performed by: EMERGENCY MEDICINE

## 2025-07-24 PROCEDURE — 250N000011 HC RX IP 250 OP 636: Performed by: STUDENT IN AN ORGANIZED HEALTH CARE EDUCATION/TRAINING PROGRAM

## 2025-07-24 PROCEDURE — 250N000013 HC RX MED GY IP 250 OP 250 PS 637: Performed by: STUDENT IN AN ORGANIZED HEALTH CARE EDUCATION/TRAINING PROGRAM

## 2025-07-24 PROCEDURE — 86922 COMPATIBILITY TEST ANTIGLOB: CPT | Performed by: STUDENT IN AN ORGANIZED HEALTH CARE EDUCATION/TRAINING PROGRAM

## 2025-07-24 PROCEDURE — 99207 PR NO BILLABLE SERVICE THIS VISIT: CPT

## 2025-07-24 PROCEDURE — 71046 X-RAY EXAM CHEST 2 VIEWS: CPT

## 2025-07-24 PROCEDURE — 36415 COLL VENOUS BLD VENIPUNCTURE: CPT | Performed by: SOCIAL WORKER

## 2025-07-24 PROCEDURE — 93970 EXTREMITY STUDY: CPT

## 2025-07-24 PROCEDURE — 80053 COMPREHEN METABOLIC PANEL: CPT | Performed by: SOCIAL WORKER

## 2025-07-24 PROCEDURE — 80053 COMPREHEN METABOLIC PANEL: CPT | Performed by: EMERGENCY MEDICINE

## 2025-07-24 PROCEDURE — 86901 BLOOD TYPING SEROLOGIC RH(D): CPT | Performed by: EMERGENCY MEDICINE

## 2025-07-24 PROCEDURE — 99223 1ST HOSP IP/OBS HIGH 75: CPT | Performed by: STUDENT IN AN ORGANIZED HEALTH CARE EDUCATION/TRAINING PROGRAM

## 2025-07-24 PROCEDURE — 85025 COMPLETE CBC W/AUTO DIFF WBC: CPT | Performed by: EMERGENCY MEDICINE

## 2025-07-24 PROCEDURE — 83735 ASSAY OF MAGNESIUM: CPT | Performed by: EMERGENCY MEDICINE

## 2025-07-24 PROCEDURE — 85004 AUTOMATED DIFF WBC COUNT: CPT | Performed by: SOCIAL WORKER

## 2025-07-24 RX ORDER — AMOXICILLIN 250 MG
2 CAPSULE ORAL 2 TIMES DAILY PRN
Status: DISCONTINUED | OUTPATIENT
Start: 2025-07-24 | End: 2025-07-25 | Stop reason: HOSPADM

## 2025-07-24 RX ORDER — NITROGLYCERIN 0.4 MG/1
0.4 TABLET SUBLINGUAL EVERY 5 MIN PRN
Status: DISCONTINUED | OUTPATIENT
Start: 2025-07-24 | End: 2025-07-25 | Stop reason: HOSPADM

## 2025-07-24 RX ORDER — NALOXONE HYDROCHLORIDE 0.4 MG/ML
0.2 INJECTION, SOLUTION INTRAMUSCULAR; INTRAVENOUS; SUBCUTANEOUS
Status: DISCONTINUED | OUTPATIENT
Start: 2025-07-24 | End: 2025-07-25 | Stop reason: HOSPADM

## 2025-07-24 RX ORDER — SENNOSIDES 8.6 MG
325 CAPSULE ORAL EVERY EVENING
COMMUNITY

## 2025-07-24 RX ORDER — ONDANSETRON 4 MG/1
4 TABLET, ORALLY DISINTEGRATING ORAL EVERY 6 HOURS PRN
Status: DISCONTINUED | OUTPATIENT
Start: 2025-07-24 | End: 2025-07-25 | Stop reason: HOSPADM

## 2025-07-24 RX ORDER — ACETAMINOPHEN 500 MG
1000 TABLET ORAL EVERY 6 HOURS PRN
Status: DISCONTINUED | OUTPATIENT
Start: 2025-07-24 | End: 2025-07-25 | Stop reason: HOSPADM

## 2025-07-24 RX ORDER — AMOXICILLIN 250 MG
1 CAPSULE ORAL 2 TIMES DAILY PRN
Status: DISCONTINUED | OUTPATIENT
Start: 2025-07-24 | End: 2025-07-25 | Stop reason: HOSPADM

## 2025-07-24 RX ORDER — ONDANSETRON 2 MG/ML
4 INJECTION INTRAMUSCULAR; INTRAVENOUS EVERY 6 HOURS PRN
Status: DISCONTINUED | OUTPATIENT
Start: 2025-07-24 | End: 2025-07-25 | Stop reason: HOSPADM

## 2025-07-24 RX ORDER — LIDOCAINE 40 MG/G
CREAM TOPICAL
Status: DISCONTINUED | OUTPATIENT
Start: 2025-07-24 | End: 2025-07-25 | Stop reason: HOSPADM

## 2025-07-24 RX ORDER — PROCHLORPERAZINE MALEATE 5 MG/1
5 TABLET ORAL EVERY 6 HOURS PRN
Status: DISCONTINUED | OUTPATIENT
Start: 2025-07-24 | End: 2025-07-25 | Stop reason: HOSPADM

## 2025-07-24 RX ORDER — NALOXONE HYDROCHLORIDE 0.4 MG/ML
0.4 INJECTION, SOLUTION INTRAMUSCULAR; INTRAVENOUS; SUBCUTANEOUS
Status: DISCONTINUED | OUTPATIENT
Start: 2025-07-24 | End: 2025-07-25 | Stop reason: HOSPADM

## 2025-07-24 RX ORDER — HYDROXYZINE HYDROCHLORIDE 25 MG/1
25 TABLET, FILM COATED ORAL 3 TIMES DAILY PRN
Status: DISCONTINUED | OUTPATIENT
Start: 2025-07-24 | End: 2025-07-25 | Stop reason: HOSPADM

## 2025-07-24 RX ORDER — ENOXAPARIN SODIUM 100 MG/ML
30 INJECTION SUBCUTANEOUS EVERY 24 HOURS
Status: DISCONTINUED | OUTPATIENT
Start: 2025-07-24 | End: 2025-07-25 | Stop reason: HOSPADM

## 2025-07-24 RX ORDER — CITALOPRAM HYDROBROMIDE 40 MG/1
40 TABLET ORAL DAILY
Status: DISCONTINUED | OUTPATIENT
Start: 2025-07-25 | End: 2025-07-25 | Stop reason: HOSPADM

## 2025-07-24 RX ORDER — ROSUVASTATIN CALCIUM 10 MG/1
10 TABLET, COATED ORAL DAILY
Status: DISCONTINUED | OUTPATIENT
Start: 2025-07-25 | End: 2025-07-25 | Stop reason: HOSPADM

## 2025-07-24 RX ADMIN — ENOXAPARIN SODIUM 30 MG: 30 INJECTION SUBCUTANEOUS at 21:31

## 2025-07-24 RX ADMIN — NITROGLYCERIN 0.4 MG: 0.4 TABLET SUBLINGUAL at 21:40

## 2025-07-24 RX ADMIN — OXYCODONE HYDROCHLORIDE 2.5 MG: 5 TABLET ORAL at 22:20

## 2025-07-24 RX ADMIN — NITROGLYCERIN 0.4 MG: 0.4 TABLET SUBLINGUAL at 21:25

## 2025-07-24 RX ADMIN — NITROGLYCERIN 0.4 MG: 0.4 TABLET SUBLINGUAL at 21:35

## 2025-07-24 ASSESSMENT — COLUMBIA-SUICIDE SEVERITY RATING SCALE - C-SSRS
2. HAVE YOU ACTUALLY HAD ANY THOUGHTS OF KILLING YOURSELF IN THE PAST MONTH?: NO
1. IN THE PAST MONTH, HAVE YOU WISHED YOU WERE DEAD OR WISHED YOU COULD GO TO SLEEP AND NOT WAKE UP?: NO
6. HAVE YOU EVER DONE ANYTHING, STARTED TO DO ANYTHING, OR PREPARED TO DO ANYTHING TO END YOUR LIFE?: NO

## 2025-07-24 ASSESSMENT — ACTIVITIES OF DAILY LIVING (ADL)
ADLS_ACUITY_SCORE: 58
ADLS_ACUITY_SCORE: 58
ADLS_ACUITY_SCORE: 52
ADLS_ACUITY_SCORE: 58
ADLS_ACUITY_SCORE: 52
ADLS_ACUITY_SCORE: 52
ADLS_ACUITY_SCORE: 58

## 2025-07-24 NOTE — ED PROVIDER NOTES
Emergency Department Note      History of Present Illness     Chief Complaint   Chest Pain      HPI   Mary Jo Mcleod is a 79 year old female with history of multiple myeloma, CKD, prediabetes, and hyperlipidemia presenting with chest pain. Mary Jo reports that she has had intermittent pain under the left breast that began 4 days ago. The episodes last minutes at a time and seem unrelated to exertion. She also has shortness of breath. She denies nausea, diaphoresis, lightheadedness, palpitations, cough, fever, leg pain/swelling, coughing up blood, or long trips or surgeries. Denies history of blood clot. Denies blood thinners.    Independent Historian   None    Review of External Notes   I reviewed the nephrology office visit note from July 1, 2025 to Dr. Richardson.      Past medical history includig:  Multiple myeloma in remission-IgG kappa Received radiation therapy to bone lesions, chemotherapy with Humera-VD-> bortezomib. Now on Revlimid. Last PET scan negative.  -On 3 monthly denosumab. Last dose 2 weeks ago    2 CKD 3B/4 -creatinine 1.1 until January 2025. Sustained DOROTHEA with a creatinine of 2.4 of unclear etiology- ? ATN in setting of diarrhea/vomiting. Urinalysis showed microscopic hematuria, low-grade proteinuria-UPCR 0.3 g this gram. Vasculitis panel was negative. Monoclonal studies and viral panel negative.  ? Incomplete recovery of ATN  Biopsy deferred with negative vasculitis panel and slowly improving creatinine. Creatinine 1.9 at last check.  Labs today-  Renal panel, CBC, UA, UPCR,     3 osteoporosis/bone disease and myeloma-on 3 monthly denosumab. Last dose 2 weeks ago. On higher dose of calcium. Check calcium today.    I reviewed the office visit note to cardiology on April 9, 2025.  Thornton to have likely demand ischemia symptoms of angina at a high workload secondary to development of severe anemia from baseline after hemoglobin dropped from 12-8.  It was unknown if it was a side effect from Prolia  "versus recurrence of multiple myeloma and patient was receiving EPO plus a transfusion.    I reviewed the March 2024.  Zio patch results from March 2024.  It appears that it was canceled.    I reviewed the echocardiogram from December 6, 2023.  Interpretation as below:    Interpretation Summary     Left ventricular systolic function is normal.  The visual ejection fraction is 60-65%.  The right ventricle is normal in size and function.  There is mild (1+) mitral regurgitation.  There are no obvious valvular vegetations. Consider ROCIO if clinically  indicated. ROCIO would be more sensitive for detection of vegetations or masses.     No significant change in direct comparison to the echo from 5/30/2022.    Past Medical History     Medical History and Problem List   CKD (chronic kidney disease), stage IIIa  Generalized anxiety disorder  GERD (gastroesophageal reflux disease)  Hyperlipidemia  Major depressive disorder, moderate episode  Multiple myeloma  Osteoporosis  Prediabetes  PTSD (post-traumatic stress disorder)  Radiculopathy, lumbar    Medications   citalopram (Celexa)  denosumab (Prolia)  lenalidomide (Revlimid)  rosuvastatin (Crestor)    Surgical History   Tonsillectomy     Physical Exam     Patient Vitals for the past 24 hrs:   BP Temp Temp src Pulse Resp SpO2 Height Weight   07/24/25 2149 108/68 -- -- 68 16 97 % -- --   07/24/25 2141 138/76 -- -- 73 18 96 % -- --   07/24/25 2137 (!) 144/72 -- -- 60 -- 98 % -- --   07/24/25 2128 128/77 -- -- 68 18 99 % -- --   07/24/25 2033 (!) 144/73 97.8  F (36.6  C) Oral 67 18 100 % 1.626 m (5' 4\") 61.5 kg (135 lb 9.3 oz)   07/24/25 1810 -- -- -- 65 -- -- -- --   07/24/25 1755 -- -- -- 68 -- -- -- --   07/24/25 1740 -- -- -- 67 -- -- -- --   07/24/25 1728 104/79 -- -- 70 18 -- -- --   07/24/25 1419 (!) 146/72 97.9  F (36.6  C) Temporal 55 16 95 % -- 61.2 kg (135 lb)     Physical Exam  General: Well-nourished  Eyes: PERRL, conjunctivae pale no scleral icterus or conjunctival " injection  ENT:  Moist mucus membranes, posterior oropharynx clear without erythema or exudates  Respiratory:  Lungs clear to auscultation bilaterally, no crackles/rubs/wheezes.  Good air movement  CV: Normal rate and rhythm, no murmurs/rubs/gallops  GI:  Abdomen soft and non-distended.  Normoactive BS.  No tenderness, guarding or rebound  Skin: Warm, dry.  No rashes or petechiae  Musculoskeletal: No peripheral edema or calf tenderness  Neuro: Alert and oriented to person/place/time  Psychiatric: Normal affect    Diagnostics     Lab Results   Labs Ordered and Resulted from Time of ED Arrival to Time of ED Departure   COMPREHENSIVE METABOLIC PANEL (LIMITED OCCURRENCES) - Abnormal       Result Value    Sodium 139      Potassium 4.4      Carbon Dioxide (CO2) 25      Anion Gap 10      Urea Nitrogen 35.9 (*)     Creatinine 2.09 (*)     GFR Estimate 24 (*)     Calcium 8.6 (*)     Chloride 104      Glucose 100 (*)     Alkaline Phosphatase 40      AST 19      ALT 10      Protein Total 6.4      Albumin 4.0      Bilirubin Total 0.3     TROPONIN T, HIGH SENSITIVITY - Abnormal    Troponin T, High Sensitivity 24 (*)    CBC WITH PLATELETS AND DIFFERENTIAL - Abnormal    WBC Count 4.9      RBC Count 2.68 (*)     Hemoglobin 9.0 (*)     Hematocrit 27.5 (*)      (*)     MCH 33.6 (*)     MCHC 32.7      RDW 13.2      Platelet Count 190      % Neutrophils 59      % Lymphocytes 26      % Monocytes 12      % Eosinophils 2      % Basophils 1      % Immature Granulocytes 0      NRBCs per 100 WBC 0      Absolute Neutrophils 2.9      Absolute Lymphocytes 1.3      Absolute Monocytes 0.6      Absolute Eosinophils 0.1      Absolute Basophils 0.1      Absolute Immature Granulocytes 0.0      Absolute NRBCs 0.0     TROPONIN T, HIGH SENSITIVITY - Abnormal    Troponin T, High Sensitivity 21 (*)    D DIMER QUANTITATIVE - Normal    D-Dimer Quantitative 0.47     MAGNESIUM (LIMITED OCCURRENCES) - Normal    Magnesium 2.1     TYPE AND SCREEN, ADULT     ABO/RH(D) O POS      Antibody Screen Negative      SPECIMEN EXPIRATION DATE 7/27/2025 11:59:00 PM CDT     ABO/RH TYPE AND SCREEN       Imaging   US Lower Extremity Venous Duplex Bilateral   Final Result   IMPRESSION:   1.  No deep venous thrombosis in the bilateral lower extremities.      Chest XR,  PA & LAT   Final Result   IMPRESSION: Negative chest.      Echocardiogram Complete    (Results Pending)       EKG   ECG taken at 1420, ECG read at 1600  Normal sinus rhythm  Normal ECG   No significant change as compared to prior, dated 01/03/2025.  Rate 74 bpm. NV interval 138 ms. QRS duration 74 ms. QT/QTc 430/477 ms. P-R-T axes 70 50 27.      Independent Interpretation   CXR: No pneumothorax.    ED Course      Medications Administered   Medications   citalopram (celeXA) tablet 40 mg (has no administration in time range)   rosuvastatin (CRESTOR) tablet 10 mg (has no administration in time range)   senna-docusate (SENOKOT-S/PERICOLACE) 8.6-50 MG per tablet 1 tablet (has no administration in time range)     Or   senna-docusate (SENOKOT-S/PERICOLACE) 8.6-50 MG per tablet 2 tablet (has no administration in time range)   ondansetron (ZOFRAN ODT) ODT tab 4 mg (has no administration in time range)     Or   ondansetron (ZOFRAN) injection 4 mg (has no administration in time range)   prochlorperazine (COMPAZINE) injection 5 mg (has no administration in time range)     Or   prochlorperazine (COMPAZINE) tablet 5 mg (has no administration in time range)   lidocaine 1 % 0.1-1 mL (has no administration in time range)   lidocaine (LMX4) cream (has no administration in time range)   sodium chloride (PF) 0.9% PF flush 3 mL (has no administration in time range)   sodium chloride (PF) 0.9% PF flush 3 mL (3 mLs Intracatheter $Given 7/24/25 2133)   enoxaparin ANTICOAGULANT (LOVENOX) injection 30 mg (30 mg Subcutaneous $Given 7/24/25 2131)   nitroGLYcerin (NITROSTAT) sublingual tablet 0.4 mg (0.4 mg Sublingual $Given 7/24/25 2140)    acetaminophen (TYLENOL) tablet 1,000 mg (has no administration in time range)   oxyCODONE IR (ROXICODONE) half-tab 2.5 mg (2.5 mg Oral $Given 7/24/25 2220)   hydrOXYzine HCl (ATARAX) tablet 25 mg (25 mg Oral Not Given 7/24/25 2226)   naloxone (NARCAN) injection 0.2 mg (has no administration in time range)     Or   naloxone (NARCAN) injection 0.4 mg (has no administration in time range)     Or   naloxone (NARCAN) injection 0.2 mg (has no administration in time range)     Or   naloxone (NARCAN) injection 0.4 mg (has no administration in time range)       Procedures   None     Discussion of Management   Hospitalist - Tee Rodríguez MD    ED Course   ED Course as of 07/24/25 2303   Thu Jul 24, 2025   1612 I evaluated the patient and obtained history.     1808 I spoke with Dr. Rodríguez of the hospitalist service regarding admission.       Additional Documentation  None    Medical Decision Making / Diagnosis     CMS Diagnoses: None    MIPS   None      MDM       HEART Score  Criteria   0-2 points for each of 5 items (maximum of 10 points):  Score 2- History highly suspicious for coronary syndrome  Score 1- EKG with Non-specific repolarization disturbance  Score 2- Age 65 years or older  Score 1- One to 2 risk factors for atherosclerotic disease  Score 1- One to 2 times the normal troponin upper limit  Interpretation  Risk of adverse outcome  Heart Score: 7  Total Score 7-10- Adverse Outcome Risk 72.7% - Supports early aggressive management, typically including cardiac catheterization        Mary Jo Mcleod is a 79 year old female with history of multiple myeloma, chronic kidney disease, prediabetes and dyslipidemia who presented with intermittent chest pain concerning for an anginal equivalent.  She was, however, significantly anemic.  Unclear if this is secondary to acute blood loss or secondary to her multiple myeloma or medications used to treat it.  In past cardiology was consulted and felt that her symptoms were  secondary to angina in the setting of demand ischemia due to anemia.  She consented for blood but we will monitor and decide if blood transfusion is indicated. Chest pain resolved upon arrival and high-sensitivity troponin did not increase, however, she is high risk from a cardiac standpoint.  Platelets as well as blood thinners were held given concerns for possible bleeding in the anemia.  However, she will be admitted to the hospital for ongoing cardiac monitoring cardiac rule out and cardiac consultation.  Dr. Larkin of the hospitalist service graciously agreed to meet the patient.    Disposition   The patient was admitted to the hospital.     Diagnosis     ICD-10-CM    1. Acute chest pain  R07.9       2. Anemia, unspecified type  D64.9                  Scribe Disclosure:  IAle, am serving as a scribe at 4:21 PM on 7/24/2025 to document services personally performed by Viki Mendez MD based on my observations and the provider's statements to me.        Viki Mendez MD  07/24/25 4789

## 2025-07-24 NOTE — ED TRIAGE NOTES
Chest pain on Ilan 5/10, came back today 3/10, under left breast, more so with deep breathing. Pt went to ONC appoint and they sent her here for evaluation. Denies fever, N/V.     Triage Assessment (Adult)       Row Name 07/24/25 1416          Triage Assessment    Airway WDL WDL        Respiratory WDL    Respiratory WDL X        Skin Circulation/Temperature WDL    Skin Circulation/Temperature WDL WDL        Cardiac WDL    Cardiac WDL X;chest pain        Peripheral/Neurovascular WDL    Peripheral Neurovascular WDL WDL        Cognitive/Neuro/Behavioral WDL    Cognitive/Neuro/Behavioral WDL WDL           
negative

## 2025-07-24 NOTE — PHARMACY-ADMISSION MEDICATION HISTORY
Pharmacist Admission Medication History    Admission medication history is complete. The information provided in this note is only as accurate as the sources available at the time of the update.    Information Source(s): Patient and CareEverywhere/SureScripts via in-person    Pertinent Information: None    Changes made to PTA medication list:  Added: aspirin  Deleted: Revlimid, triamcinolone cream, valacyclovir  Changed: None    Allergies reviewed with patient and updates made in EHR: no    Medication History Completed By: Love Euceda RPH 7/24/2025 6:51 PM    PTA Med List   Medication Sig Last Dose/Taking    aspirin (ASA) 325 MG EC tablet Take 325 mg by mouth every evening. 7/23/2025 Evening    calcium carbonate (TUMS) 500 MG chewable tablet Take 2 chew tab by mouth daily 7/23/2025 Evening    citalopram (CELEXA) 40 MG tablet Take 1 tablet (40 mg) by mouth daily. 7/24/2025 Morning    denosumab (PROLIA) 60 MG/ML SOSY injection Inject 60 mg subcutaneously every 3 months. Past Month    rosuvastatin (CRESTOR) 10 MG tablet Take 1 tablet (10 mg) by mouth daily. 7/24/2025 Morning    vitamin B-12 (CYANOCOBALAMIN) 250 MCG tablet Take 1,000 mcg by mouth daily 7/24/2025 Morning    vitamin D3 (CHOLECALCIFEROL) 50 mcg (2000 units) tablet Take 1 tablet by mouth daily 7/24/2025 Morning

## 2025-07-24 NOTE — ED NOTES
[facility]  ED Nurse Handoff Report    ED Chief complaint: Chest Pain      ED Diagnosis:   Final diagnoses:   None       Code Status: Full Code    Allergies:   Allergies   Allergen Reactions    Blood Transfusion Related (Informational Only) Other (See Comments)     Patient has a history of being on daratumumab which can interfere with blood bank testing.  A delay in compatible RBCs may occur.    Codeine Difficulty breathing, Other (See Comments) and Rash    Bupropion     Erythromycin     Hydrocodone Other (See Comments) and Itching     Ear ache    Lidocaine Other (See Comments)    Penicillin G Itching     Itching around mouth then heavy breathing    Sulfa Antibiotics Itching     Itching around the mouth and then heavy breathing    Tetracycline     Trazodone      Does not work for patient    Conjugated Estrogens Dermatitis       Patient Story: Chest pain on Ilan 5/10, came back today 3/10, under left breast, more so with deep breathing. Pt went to ONC appoint and they sent her here for evaluation. Denies fever, N/V.   Focused Assessment:  patient is A&O x4, independent in room. Hx of multiple myeloma, was at oncologist who sent her here for eval. Patient still having intermittent cp.     Treatments and/or interventions provided: labs, xray, monitor   Patient's response to treatments and/or interventions: tolerated well.   Labs Ordered and Resulted from Time of ED Arrival to Time of ED Departure   COMPREHENSIVE METABOLIC PANEL (LIMITED OCCURRENCES) - Abnormal       Result Value    Sodium 139      Potassium 4.4      Carbon Dioxide (CO2) 25      Anion Gap 10      Urea Nitrogen 35.9 (*)     Creatinine 2.09 (*)     GFR Estimate 24 (*)     Calcium 8.6 (*)     Chloride 104      Glucose 100 (*)     Alkaline Phosphatase 40      AST 19      ALT 10      Protein Total 6.4      Albumin 4.0      Bilirubin Total 0.3     TROPONIN T, HIGH SENSITIVITY - Abnormal    Troponin T, High Sensitivity 24 (*)    CBC WITH  PLATELETS AND DIFFERENTIAL - Abnormal    WBC Count 4.9      RBC Count 2.68 (*)     Hemoglobin 9.0 (*)     Hematocrit 27.5 (*)      (*)     MCH 33.6 (*)     MCHC 32.7      RDW 13.2      Platelet Count 190      % Neutrophils 59      % Lymphocytes 26      % Monocytes 12      % Eosinophils 2      % Basophils 1      % Immature Granulocytes 0      NRBCs per 100 WBC 0      Absolute Neutrophils 2.9      Absolute Lymphocytes 1.3      Absolute Monocytes 0.6      Absolute Eosinophils 0.1      Absolute Basophils 0.1      Absolute Immature Granulocytes 0.0      Absolute NRBCs 0.0     TROPONIN T, HIGH SENSITIVITY - Abnormal    Troponin T, High Sensitivity 21 (*)    D DIMER QUANTITATIVE - Normal    D-Dimer Quantitative 0.47     MAGNESIUM (LIMITED OCCURRENCES) - Normal    Magnesium 2.1     TYPE AND SCREEN, ADULT   ABO/RH TYPE AND SCREEN     US Lower Extremity Venous Duplex Bilateral   Final Result   IMPRESSION:   1.  No deep venous thrombosis in the bilateral lower extremities.      Chest XR,  PA & LAT   Final Result   IMPRESSION: Negative chest.      Lung vent and perf (NM)    (Results Pending)         To be done/followed up on inpatient unit:  monitor, will need VQ scan still    Does this patient have any cognitive concerns?: none    Activity level - Baseline/Home:  Independent  Activity Level - Current:   Independent    Patient's Preferred language: English   Needed?: No    Isolation: None  Infection: Not Applicable  Sepsis treatment initiated: No  Patient tested for COVID 19 prior to admission: NO  Bariatric?: No    Vital Signs:   Vitals:    07/24/25 1419   BP: (!) 146/72   Pulse: 55   Resp: 16   Temp: 97.9  F (36.6  C)   TempSrc: Temporal   SpO2: 95%   Weight: 61.2 kg (135 lb)       Cardiac Rhythm:     Was the PSS-3 completed:   Yes  What interventions are required if any?               Family Comments: none present   OBS brochure/video discussed/provided to patient/family: Yes              Name of person  given brochure if not patient:               Relationship to patient:     For the majority of the shift this patient's behavior was Green.   Behavioral interventions performed were .    ED NURSE PHONE NUMBER: *[phone]

## 2025-07-25 ENCOUNTER — APPOINTMENT (OUTPATIENT)
Dept: CARDIOLOGY | Facility: CLINIC | Age: 80
End: 2025-07-25
Attending: STUDENT IN AN ORGANIZED HEALTH CARE EDUCATION/TRAINING PROGRAM
Payer: COMMERCIAL

## 2025-07-25 ENCOUNTER — APPOINTMENT (OUTPATIENT)
Dept: CARDIOLOGY | Facility: CLINIC | Age: 80
End: 2025-07-25
Attending: INTERNAL MEDICINE
Payer: COMMERCIAL

## 2025-07-25 VITALS
SYSTOLIC BLOOD PRESSURE: 123 MMHG | BODY MASS INDEX: 23.18 KG/M2 | RESPIRATION RATE: 16 BRPM | TEMPERATURE: 98.1 F | HEART RATE: 74 BPM | WEIGHT: 135.8 LBS | DIASTOLIC BLOOD PRESSURE: 57 MMHG | HEIGHT: 64 IN | OXYGEN SATURATION: 95 %

## 2025-07-25 LAB
ALBUMIN SERPL BCG-MCNC: 3.6 G/DL (ref 3.5–5.2)
ALP SERPL-CCNC: 33 U/L (ref 40–150)
ALT SERPL W P-5'-P-CCNC: 8 U/L (ref 0–50)
ANION GAP SERPL CALCULATED.3IONS-SCNC: 5 MMOL/L (ref 7–15)
AST SERPL W P-5'-P-CCNC: 16 U/L (ref 0–45)
BI-PLANE LVEF ECHO: NORMAL
BILIRUB SERPL-MCNC: 0.3 MG/DL
BLD PROD TYP BPU: NORMAL
BLOOD COMPONENT TYPE: NORMAL
BUN SERPL-MCNC: 32.6 MG/DL (ref 8–23)
CALCIUM SERPL-MCNC: 8.2 MG/DL (ref 8.8–10.4)
CHLORIDE SERPL-SCNC: 108 MMOL/L (ref 98–107)
CODING SYSTEM: NORMAL
CREAT SERPL-MCNC: 1.97 MG/DL (ref 0.51–0.95)
CROSSMATCH: NORMAL
EGFRCR SERPLBLD CKD-EPI 2021: 25 ML/MIN/1.73M2
ERYTHROCYTE [DISTWIDTH] IN BLOOD BY AUTOMATED COUNT: 13.1 % (ref 10–15)
FERRITIN SERPL-MCNC: 456 NG/ML (ref 11–328)
GLUCOSE SERPL-MCNC: 92 MG/DL (ref 70–99)
HCO3 SERPL-SCNC: 26 MMOL/L (ref 22–29)
HCT VFR BLD AUTO: 24.9 % (ref 35–47)
HGB BLD-MCNC: 8.4 G/DL (ref 11.7–15.7)
IRON BINDING CAPACITY (ROCHE): 176 UG/DL (ref 240–430)
IRON SATN MFR SERPL: 34 % (ref 15–46)
IRON SERPL-MCNC: 59 UG/DL (ref 37–145)
ISSUE DATE AND TIME: NORMAL
LVEF ECHO: NORMAL
MCH RBC QN AUTO: 34 PG (ref 26.5–33)
MCHC RBC AUTO-ENTMCNC: 33.7 G/DL (ref 31.5–36.5)
MCV RBC AUTO: 101 FL (ref 78–100)
PLATELET # BLD AUTO: 146 10E3/UL (ref 150–450)
POTASSIUM SERPL-SCNC: 4.5 MMOL/L (ref 3.4–5.3)
PROT SERPL-MCNC: 5.7 G/DL (ref 6.4–8.3)
RBC # BLD AUTO: 2.47 10E6/UL (ref 3.8–5.2)
SODIUM SERPL-SCNC: 139 MMOL/L (ref 135–145)
TROPONIN T SERPL HS-MCNC: 21 NG/L
TROPONIN T SERPL HS-MCNC: 22 NG/L
UNIT ABO/RH: NORMAL
UNIT NUMBER: NORMAL
UNIT STATUS: NORMAL
UNIT TYPE ISBT: 5100
WBC # BLD AUTO: 4.1 10E3/UL (ref 4–11)

## 2025-07-25 PROCEDURE — 93321 DOPPLER ECHO F-UP/LMTD STD: CPT | Mod: 26 | Performed by: INTERNAL MEDICINE

## 2025-07-25 PROCEDURE — 83550 IRON BINDING TEST: CPT | Performed by: STUDENT IN AN ORGANIZED HEALTH CARE EDUCATION/TRAINING PROGRAM

## 2025-07-25 PROCEDURE — C8928 TTE W OR W/O FOL W/CON,STRES: HCPCS

## 2025-07-25 PROCEDURE — 93016 CV STRESS TEST SUPVJ ONLY: CPT | Performed by: INTERNAL MEDICINE

## 2025-07-25 PROCEDURE — 82728 ASSAY OF FERRITIN: CPT | Performed by: STUDENT IN AN ORGANIZED HEALTH CARE EDUCATION/TRAINING PROGRAM

## 2025-07-25 PROCEDURE — 85027 COMPLETE CBC AUTOMATED: CPT | Performed by: STUDENT IN AN ORGANIZED HEALTH CARE EDUCATION/TRAINING PROGRAM

## 2025-07-25 PROCEDURE — 96372 THER/PROPH/DIAG INJ SC/IM: CPT | Mod: 59 | Performed by: STUDENT IN AN ORGANIZED HEALTH CARE EDUCATION/TRAINING PROGRAM

## 2025-07-25 PROCEDURE — 93325 DOPPLER ECHO COLOR FLOW MAPG: CPT | Mod: 26 | Performed by: INTERNAL MEDICINE

## 2025-07-25 PROCEDURE — 36415 COLL VENOUS BLD VENIPUNCTURE: CPT | Performed by: STUDENT IN AN ORGANIZED HEALTH CARE EDUCATION/TRAINING PROGRAM

## 2025-07-25 PROCEDURE — 250N000011 HC RX IP 250 OP 636: Performed by: STUDENT IN AN ORGANIZED HEALTH CARE EDUCATION/TRAINING PROGRAM

## 2025-07-25 PROCEDURE — 93306 TTE W/DOPPLER COMPLETE: CPT | Mod: 26 | Performed by: INTERNAL MEDICINE

## 2025-07-25 PROCEDURE — 84484 ASSAY OF TROPONIN QUANT: CPT | Performed by: STUDENT IN AN ORGANIZED HEALTH CARE EDUCATION/TRAINING PROGRAM

## 2025-07-25 PROCEDURE — 84155 ASSAY OF PROTEIN SERUM: CPT | Performed by: STUDENT IN AN ORGANIZED HEALTH CARE EDUCATION/TRAINING PROGRAM

## 2025-07-25 PROCEDURE — 93306 TTE W/DOPPLER COMPLETE: CPT

## 2025-07-25 PROCEDURE — 255N000002 HC RX 255 OP 636: Performed by: INTERNAL MEDICINE

## 2025-07-25 PROCEDURE — 99222 1ST HOSP IP/OBS MODERATE 55: CPT | Mod: 25 | Performed by: INTERNAL MEDICINE

## 2025-07-25 PROCEDURE — 36430 TRANSFUSION BLD/BLD COMPNT: CPT

## 2025-07-25 PROCEDURE — G0378 HOSPITAL OBSERVATION PER HR: HCPCS

## 2025-07-25 PROCEDURE — 250N000013 HC RX MED GY IP 250 OP 250 PS 637: Performed by: STUDENT IN AN ORGANIZED HEALTH CARE EDUCATION/TRAINING PROGRAM

## 2025-07-25 PROCEDURE — 93018 CV STRESS TEST I&R ONLY: CPT | Performed by: INTERNAL MEDICINE

## 2025-07-25 PROCEDURE — 99239 HOSP IP/OBS DSCHRG MGMT >30: CPT | Performed by: STUDENT IN AN ORGANIZED HEALTH CARE EDUCATION/TRAINING PROGRAM

## 2025-07-25 PROCEDURE — 93350 STRESS TTE ONLY: CPT | Mod: 26 | Performed by: INTERNAL MEDICINE

## 2025-07-25 PROCEDURE — P9016 RBC LEUKOCYTES REDUCED: HCPCS | Performed by: STUDENT IN AN ORGANIZED HEALTH CARE EDUCATION/TRAINING PROGRAM

## 2025-07-25 RX ADMIN — PERFLUTREN 2 ML (DILUTED): 6.52 INJECTION, SUSPENSION INTRAVENOUS at 13:26

## 2025-07-25 RX ADMIN — HYDROXYZINE HYDROCHLORIDE 25 MG: 25 TABLET, FILM COATED ORAL at 04:04

## 2025-07-25 RX ADMIN — ENOXAPARIN SODIUM 30 MG: 30 INJECTION SUBCUTANEOUS at 20:38

## 2025-07-25 RX ADMIN — CITALOPRAM HYDROBROMIDE 40 MG: 40 TABLET ORAL at 09:30

## 2025-07-25 RX ADMIN — ROSUVASTATIN CALCIUM 10 MG: 10 TABLET, FILM COATED ORAL at 09:29

## 2025-07-25 ASSESSMENT — ACTIVITIES OF DAILY LIVING (ADL)
ADLS_ACUITY_SCORE: 52

## 2025-07-25 NOTE — PROGRESS NOTES
Observation goals  PRIOR TO DISCHARGE        Comments:   -diagnostic tests and consults completed and resulted: met   -vital signs normal or at patient baseline: met   -tolerating oral intake to maintain hydration: met   -adequate pain control on oral analgesics: met   -returns to baseline functional status: met   -safe disposition plan has been identified: met   Nurse to notify provider when observation goals have been met and patient is ready for discharge.

## 2025-07-25 NOTE — DISCHARGE SUMMARY
[facility]  Hospitalist Discharge Summary      Date of Admission:  7/24/2025  Date of Discharge:  7/25/2025  Discharging Provider: Tee Rodríguez MD  Discharge Service: Hospitalist Service    Discharge Diagnoses     Chest Pain   Anemia of Chronic Disease    Clinically Significant Risk Factors          Follow-ups Needed After Discharge   Follow-up Appointments       Hospital Follow-up with Existing Primary Care Provider (PCP)          Schedule Primary Care visit within: 30 Days               Unresulted Labs Ordered in the Past 30 Days of this Admission       Date and Time Order Name Status Description    7/25/2025  3:11 PM Ferritin In process     7/25/2025  3:11 PM Iron and iron binding capacity In process           Discharge Disposition   Discharged to home  Condition at discharge: Stable    Hospital Course   Mary Jo Mcleod is a 78 year old female with PMH  multiple myeloma, stage III CKD, rheumatoid arthritis, osteopenia admitted who was admitted on 07/24/2025 with chest pain.     Chest Pain  Assessment: Over the past few months she has noticed more exertional chest discomfort. Does follow cardiology for this who felt Likely demand ischemia with symptoms of angina at a high workload secondary to development of severe anemia. Hgb on admission is 9.0, no evidence of acute blood loss.  EKG with stable sinus rhythm with no dynamic ST changes. US LEs with No deep venous thrombosis in the bilateral lower extremities.  Chest x-ray with no acute airspace disease.Troponin mildly elevated at 21.  Plan:  - Registered observation  - Cardiology consulted -> Exercise echocardiogram is negative for myocardial ischemia at a low workload  of 3.0 METS.  - Cardiology not recommending invasive angiography given her significant renal dysfunction.   - Nitroglycerin as needed for chest pain  - Echocardiogram -> Left ventricular systolic function is normal. Biplane LVEF is 58%. No regional wall motion  abnormalities noted. The right ventricle is normal in size and function. No significant valve disease.      Multiple myeloma  IgG Kappa multiple myeloma   CKD  Anemia of Chronic Disease  Assessment: Follows with Dr Dreyer w/ [facility]. Hx multiple pathologic fractures. Cr 2.09 on admission -> 1.97 at discharge  Plan:  - 1U pRBC transfused before discharge  - Hold PTA denosumab     Polyneuropathy  - Patient had developed a sensory neuropathy over the last 6 months.  - Occurs mainly at nighttime  Plan:  - Treat PRN     HLD: continue Crestor     MDD  Anxiety  PTSD  - Citalopram 40 mg daily resumed     Consultations This Hospital Stay   CARDIOLOGY IP CONSULT  CARE MANAGEMENT / SOCIAL WORK IP CONSULT    Code Status   Full Code    Time Spent on this Encounter   I, Tee Rodríguez MD, personally saw the patient today and spent greater than 30 minutes discharging this patient.       Tee Rodríguez MD  [facility]  [address] [facility] [address]  Phone: [phone]  ______________________________________________________________________    Physical Exam   Vital Signs: Temp: 98.2  F (36.8  C) Temp src: Oral BP: 132/62 Pulse: 75   Resp: 16 SpO2: 97 % O2 Device: None (Room air)    Weight: 135 lbs 12.85 oz  ----------------------------------------------------------------------------------------       Primary Care Physician   Levi Mcdonnell    Discharge Orders      Reason for your hospital stay    You had chest pain and low hemoglobin levels.     Activity    Your activity upon discharge: activity as tolerated     Diet    Follow this diet upon discharge: Current Diet:Orders Placed This Encounter      Regular Diet Adult     Hospital Follow-up with Existing Primary Care Provider (PCP)            Significant Results and Procedures   Most Recent 3 CBC's:  Recent Labs   Lab Test 07/25/25  0539 07/24/25  1422 01/03/25  0523   WBC 4.1 4.9 5.5   HGB 8.4* 9.0* 12.1   *  103* 95   * 190 153     Most Recent 3 BMP's:  Recent Labs   Lab Test 07/25/25  0539 07/24/25  1422 01/03/25  0523    139 139   POTASSIUM 4.5 4.4 4.9   CHLORIDE 108* 104 106   CO2 26 25 24   BUN 32.6* 35.9* 23.9*   CR 1.97* 2.09* 1.24*   ANIONGAP 5* 10 9   ARA 8.2* 8.6* 8.4*   GLC 92 100* 150*     Most Recent 2 LFT's:  Recent Labs   Lab Test 07/25/25  0539 07/24/25  1422   AST 16 19   ALT 8 10   ALKPHOS 33* 40   BILITOTAL 0.3 0.3     Most Recent 3 INR's:No lab results found.  Most Recent 3 Troponin's:  Recent Labs   Lab Test 09/28/21  1648 12/09/17  0536 07/21/17  1011 07/21/17  0819   TROPI  --  <0.015 <0.015  The 99th percentile for upper reference range is 0.045 ug/L.  Troponin values in   the range of 0.045 - 0.120 ug/L may be associated with risks of adverse   clinical events.   <0.015  The 99th percentile for upper reference range is 0.045 ug/L.  Troponin values in   the range of 0.045 - 0.120 ug/L may be associated with risks of adverse   clinical events.     TROPONIN <0.015  --   --   --      Most Recent 3 BNP's:  Recent Labs   Lab Test 05/28/22  1614   NTBNPI 90     Most Recent D-dimer:  Recent Labs   Lab Test 07/24/25  1422   DD 0.47     Most Recent Cholesterol Panel:  Recent Labs   Lab Test 10/25/24  0000   CHOL 113   LDL 57   HDL 41   TRIG 73     7-Day Micro Results       No results found for the last 168 hours.          Most Recent TSH and T4:  Recent Labs   Lab Test 09/01/23  1603   TSH 1.13     Most Recent Hemoglobin A1c:  Recent Labs   Lab Test 10/24/23  1334   A1C 5.7*     Most Recent Urinalysis:  Recent Labs   Lab Test 01/24/25  0000 12/07/23  1836   COLOR Yellow Light Yellow   APPEARANCE  --  Clear   URINEGLC  --  Negative   URINEBILI  --  Negative   URINEKETONE  --  40*   SG 1.015 1.014   UBLD  --  Moderate*   URINEPH 5.0 6.0   PROTEIN  --  20*   NITRITE  --  Negative   LEUKEST  --  Negative   RBCU  --  1   WBCU  --  1     Most Recent ESR & CRP:No lab results found.,   Results for  orders placed or performed during the hospital encounter of 25   Chest XR,  PA & LAT    Narrative    EXAM: XR CHEST 2 VIEWS  LOCATION: [facility]  DATE: 2025    INDICATION: chest pain  COMPARISON: Chest radiograph 3/5/2024      Impression    IMPRESSION: Negative chest.   US Lower Extremity Venous Duplex Bilateral    Narrative    EXAM: US LOWER EXTREMITY VENOUS DUPLEX BILATERAL  LOCATION: [facility]  DATE: 2025    INDICATION: Pain and swelling.  COMPARISON: None.  TECHNIQUE: Venous Duplex ultrasound of bilateral lower extremities with and without compression, augmentation and duplex. Color flow and spectral Doppler with waveform analysis performed.    FINDINGS: Exam includes the common femoral, femoral, popliteal veins as well as segmentally visualized deep calf veins and greater saphenous vein.     RIGHT: No deep vein thrombosis. No superficial thrombophlebitis. No popliteal cyst.    LEFT: No deep vein thrombosis. No superficial thrombophlebitis. No popliteal cyst.      Impression    IMPRESSION:  1.  No deep venous thrombosis in the bilateral lower extremities.   Echocardiogram Complete     Value    Biplane LVEF 58%    Narrative    [id number]  [id number]  [id number]  [id number]^RAMONA^NICK^     [facility]  Echocardiography Laboratory  [address] [place] [address]     Name: ROMA ELLIS  MRN: [id number]  : 1945  Study Date: 2025 08:16 AM  Age: 79 yrs  Gender: Female  Patient Location: [facility]  Reason For Study: Chest Pain  Ordering Physician: PRIYANK GREENE  Referring Physician: PRIYANK GREENE  Performed By: Carito Florian     BSA: 1.7 m2  Height: 64 in  Weight: 135 lb  HR: 66  BP: 104/79 mmHg  ______________________________________________________________________________  Procedure  Echocardiogram with two-dimensional, color and spectral  Doppler.  ______________________________________________________________________________  Interpretation Summary     Left ventricular systolic function is normal.  Biplane LVEF is 58%.  No regional wall motion abnormalities noted.  The right ventricle is normal in size and function.  No significant valve disease.  There is trace to mild mitral regurgitation.     Compared to prior study, there is no significant change.  ______________________________________________________________________________  Left Ventricle  The left ventricle is normal in size. There is normal left ventricular wall  thickness. Left ventricular systolic function is normal. Biplane LVEF is 58%.  Grade 1 left ventricular diastolic function. No regional wall motion  abnormalities noted.     Right Ventricle  The right ventricle is normal in size and function.     Atria  The left atrium is mildly dilated. Right atrial size is normal. There is no  atrial shunt seen.     Mitral Valve  The mitral valve leaflets appear normal. There is no evidence of stenosis,  fluttering, or prolapse. There is trace to mild mitral regurgitation. There is  no mitral valve stenosis. The mean mitral valve gradient is 1.7 mmHg.     Tricuspid Valve  Normal tricuspid valve. There is trace tricuspid regurgitation. The right  ventricular systolic pressure is approximated at 8.4 mmHg plus the right  atrial pressure. The right ventricular systolic pressure is elevated at 8.4  mmHg.     Aortic Valve  The aortic valve is trileaflet. No aortic regurgitation is present. No aortic  stenosis is present.     Pulmonic Valve  There is trace pulmonic valvular regurgitation.     Vessels  The aortic root is normal size. Normal size ascending aorta. The inferior vena  cava is normal.     Pericardium  There is no pericardial effusion.     Rhythm  Sinus rhythm was noted.  ______________________________________________________________________________  MMode/2D Measurements & Calculations  IVSd:  0.90 cm     LVIDd: 4.4 cm  LVIDs: 3.1 cm  LVPWd: 1.0 cm  FS: 28.8 %  LV mass(C)d: 137.8 grams  LV mass(C)dI: 83.2 grams/m2  Ao root diam: 3.1 cm  asc Aorta Diam: 2.9 cm  LVOT diam: 2.1 cm  LVOT area: 3.5 cm2  Ao root diam index Ht(cm/m): 1.9  Ao root diam index BSA (cm/m2): 1.9  Asc Ao diam index BSA (cm/m2): 1.8  Asc Ao diam index Ht(cm/m): 1.8  EF Biplane: 57.0 %  LA Volume (BP): 62.5 ml     LA Volume Index (BP): 37.7 ml/m2  RWT: 0.45  TAPSE: 1.8 cm     Doppler Measurements & Calculations  MV E max leobardo: 71.8 cm/sec  MV A max leobardo: 85.4 cm/sec  MV E/A: 0.84  MV max P.0 mmHg  MV mean P.7 mmHg  MV V2 VTI: 50.6 cm  MV dec time: 0.24 sec  TR max leobardo: 145.0 cm/sec  TR max P.4 mmHg  E/E' av.6  Lateral E/e': 9.7  Medial E/e': 13.5  RV S Leobardo: 15.3 cm/sec     ______________________________________________________________________________  Report approved by: Dr Jerson Amato on 2025 10:38 AM         Echo Stress Echocardiogram     Value    LVEF  55-60%    Narrative    [id number]  [id number]  [id number]^DARIO^NASH^LOKI     [facility]  Echocardiography Laboratory  [address] [place] [id number]     Name: ROAM ELLIS  MRN: [id number]  : 1945  Study Date: 2025 01:06 PM  Age: 79 yrs  Gender: Female  Patient Location: [facility]  Reason For Study: Chest Pain  Ordering Physician: MD Nash Oliveira  Referring Physician: COLEMAN KNAPP  Performed By: Marciano Drake     BSA: 1.7 m2  Height: 64 in  Weight: 135 lb  HR: 86  BP: 116/62 mmHg  ______________________________________________________________________________  Procedure  Stress Echo Treadmill with two dimensional, color and spectral Doppler.  Definity contrast given intravenously [NDC #18649-253].  ______________________________________________________________________________  Interpretation Summary     Exercise echocardiogram is negative for myocardial ischemia at a low workload  of 3.0 METS.  Below  average functional capacity.  Test stopped due to fatigue.  Reduced exercise capacity limits the sensitivity of the stress test. Clinical  correlation recommended.     ______________________________________________________________________________  Stress  The patient exercised 6:00 minutes.  RPP 17,000.  A low workload was achieved.  There was a normal BP response to exercise.  The patient did not exhibit any symptoms during exercise.  The patient exhibited no chest pain during exercise.  Exercise was stopped due to fatigue.  A treadmill exercise test according to the Modified Eyal protocol was  performed.  Target Heart Rate was achieved.  This was a normal stress EKG with no evidence of stress-induced ischemia.  This was a normal stress echocardiogram with no evidence of stress-induced  ischemia.  Normal resting wall motion and no stress-induced wall motion abnormality.  The visual ejection fraction is 65-70%.  Left ventricular cavity size decreases with exercise.  Global LV systolic function augments with exercise.     Baseline  Normal baseline electrocardiogram.  Normal left ventricular function and wall motion at rest.  The visual ejection fraction is estimated at 55-60%.     Stress Results  Protocol:  Modifed Eyal Protocol        Maximum Predicted HR:   142 bpm  Target HR: 121 bpm                       % Maximum Predicted HR: 88 %      Stage  DurationHeart Rate  BP                  Comment          (mm:ss)   (bpm)  Stage 1   3:00     110    122/64  Stage 2   3:00     125    136/66  RecoveryR  6:00      82    118/60[facility] = 6; RPP = 17,000; FAC = Below AVG         Stress Duration:   6:00 mm:ss        Recovery Time: 6:00 mm:ss      Maximum Stress HR: 125 bpm           METS:          3     Left Ventricle  The left ventricle is normal in size. Left ventricular systolic function is  normal. The visual ejection fraction is 55-60%. No regional wall motion  abnormalities noted.     Right Ventricle  The right ventricle  is normal in size and function.     Mitral Valve  The mitral valve leaflets appear normal. There is no evidence of stenosis,  fluttering, or prolapse. There is trace mitral regurgitation.     Tricuspid Valve  Normal tricuspid valve. There is trace tricuspid regurgitation.     Aortic Valve  The aortic valve is trileaflet. No aortic regurgitation is present. No  hemodynamically significant valvular aortic stenosis.     Pericardium  There is no pericardial effusion.     Rhythm  Sinus rhythm was noted.  ______________________________________________________________________________  Report approved by: Dr Jerson Amato on 07/25/2025 02:21 PM     ______________________________________________________________________________          Discharge Medications      Review of your medicines        CONTINUE these medicines which have NOT CHANGED        Dose / Directions   aspirin 325 MG EC tablet  Commonly known as: ASA      Dose: 325 mg  Take 325 mg by mouth every evening.  Refills: 0     calcium carbonate 500 MG chewable tablet  Commonly known as: TUMS      Dose: 2 chew tab  Take 2 chew tab by mouth daily  Refills: 0     citalopram 40 MG tablet  Commonly known as: celeXA  Used for: Moderate episode of recurrent major depressive disorder (H), PTSD (post-traumatic stress disorder), GERSON (generalized anxiety disorder)      Dose: 40 mg  Take 1 tablet (40 mg) by mouth daily.  Quantity: 90 tablet  Refills: 3     denosumab 60 mg/mL injection  Commonly known as: PROLIA      Dose: 60 mg  Inject 60 mg subcutaneously every 3 months.  Refills: 0     rosuvastatin 10 MG tablet  Commonly known as: CRESTOR  Used for: Calcification of abdominal aorta      Dose: 10 mg  Take 1 tablet (10 mg) by mouth daily.  Quantity: 90 tablet  Refills: 3     vitamin B-12 250 MCG tablet  Commonly known as: CYANOCOBALAMIN      Dose: 1,000 mcg  Take 1,000 mcg by mouth daily  Refills: 0     vitamin D3 50 mcg (2000 units) tablet  Commonly known as: CHOLECALCIFEROL       Dose: 1 tablet  Take 1 tablet by mouth daily  Refills: 0            Allergies   Allergies   Allergen Reactions    Blood Transfusion Related (Informational Only) Other (See Comments)     Patient has a history of being on daratumumab which can interfere with blood bank testing.  A delay in compatible RBCs may occur.    Codeine Difficulty breathing, Other (See Comments) and Rash    Bupropion     Erythromycin     Hydrocodone Other (See Comments) and Itching     Ear ache    Penicillin G Itching     Itching around mouth then heavy breathing    Sulfa Antibiotics Itching     Itching around the mouth and then heavy breathing    Tetracycline     Trazodone      Does not work for patient    Conjugated Estrogens Dermatitis

## 2025-07-25 NOTE — PROGRESS NOTES
RECEIVING UNIT ED HANDOFF REVIEW    ED Nurse Handoff Report was reviewed by: Olga Cleveland RN on July 24, 2025 at 8:07 PM

## 2025-07-25 NOTE — CONSULTS
"[facility]    CARDIOLOGY CONSULTATION       Date of Admission:  2025    Assessment & Plan       Atypical chest discomfort with flat, minimal troponin elevation: Most suggestive of type II MI (demand ischemia)  Dyslipidemia  CKD 3B-4  Multiple myeloma  Rheumatoid arthritis  MDD/anxiety/PTSD  FH of CAD (father  of MI at age 63, though he smoked until age 54)  Patient is a never smoker      It was a pleasure to speak with Mary Jo at the bedside today.  I am sorry to hear about all of the stress that she has been going through recently, and I think that this absolutely could have contributed to the chest discomfort she is having at this time.  While her high-sensitivity troponin is technically abnormal, it is minimally elevated and flat, not out of the range of what would be expected for her given her underlying renal dysfunction.  Given that she is currently pain-free, and with all of the reassuring testing that she does have at this time, I would certainly not recommend invasive angiography given her significant renal dysfunction.  We did talk about options for other continued observation alone, or functional stress testing.  Given that she does attend regular exercise classes, I think it is reasonable for her to have a stress test just to confirm there are no high risk findings.  If we are able to coordinate this today, we will do so.  If not, this can be done as an outpatient.  If the stress test is normal or \"low risk\" abnormal, I think it is reasonable for her to be discharged with medical management alone.  If there are \"high risk\" findings, then we can discuss pros/cons of diagnostic angiography at that time.      -Exercise stress echo (if this cannot be done today, patient can be discharged and we will arrange this as an outpatient)  -Further plans pending SILVIA result  -Continue Crestor 10 mg daily      Thank you for the interesting consult.  Cardiology will follow-up exercise " stress echo results (if these can be done today), or will coordinate this as outpatient.  Cardiology will also coordinate outpatient clinic follow-up.      ADDENDUM:    - Exercise stress echo is negative.  While exertional tolerance is low (3 METs), she did achieve target HR, with no evidence of ischemia.  Given we would err on the side of conservative management anyway given her renal dysfunction, I think this is adequate reassurance to hold off on angiography at this time.    - OK for discharge from a cardiology standpoint.  Outpatient follow-up has been arranged with MARLON Valdes CNP, on 9/4/2025.    Cardiology will sign off at this time.  Please feel free to call back with future questions or concerns.        Moderate complexity     Nash Oliveira MD        Reason for Consult   Reason for consult: Patient is being evaluated for chest pain.    History of Present Illness   Mary Jo Mcleod is a 79 year old female with a history of multiple myeloma, CKD 3B-4, rheumatoid arthritis, and dyslipidemia, admitted with chest pain after being sent over from her oncology clinic.  Patient was seen by my colleague, Dr. Brennan, after a similar episode in April 2025, at which time it was felt that her symptoms were most likely due to demand ischemia, and given significant anemia and renal dysfunction (initially DOROTHEA, but slow to resolve and potentially now CKD), he very appropriately recommended conservative management.  She tells me that she did well with no recurrent chest pain until yesterday, when those symptoms recurred.  This is in the context of significant personal stress, and she tells me that she was apparently inappropriately evicted from her apartment, after which she apparently ketan the management company and won a large settlement, but she currently is having difficulty getting the money from her  to buy a new apartment.  She tells me that the chest pain was not necessarily exertional in nature,  lasted for roughly an hour yesterday, and then recurred again for an hour or so yesterday evening.  She has not had any recurrent chest pain since this morning.    Initial ECG was unremarkable.  High-sensitivity troponin has been minimally elevated and flat in the low 20s on multiple rechecks.  Echocardiogram today was normal with LVEF of 55 to 60%, no regional wall motion abnormalities, no pericardial effusion, and no significant valve abnormalities.        Prior to Admission Medications   Prior to Admission Medications   Prescriptions Last Dose Informant Patient Reported? Taking?   aspirin (ASA) 325 MG EC tablet 7/23/2025 Evening Self Yes Yes   Sig: Take 325 mg by mouth every evening.   calcium carbonate (TUMS) 500 MG chewable tablet 7/23/2025 Evening Self Yes Yes   Sig: Take 2 chew tab by mouth daily   citalopram (CELEXA) 40 MG tablet 7/24/2025 Morning Self No Yes   Sig: Take 1 tablet (40 mg) by mouth daily.   denosumab (PROLIA) 60 MG/ML SOSY injection Past Month Self Yes Yes   Sig: Inject 60 mg subcutaneously every 3 months.   rosuvastatin (CRESTOR) 10 MG tablet 7/24/2025 Morning Self No Yes   Sig: Take 1 tablet (10 mg) by mouth daily.   vitamin B-12 (CYANOCOBALAMIN) 250 MCG tablet 7/24/2025 Morning Self Yes Yes   Sig: Take 1,000 mcg by mouth daily   vitamin D3 (CHOLECALCIFEROL) 50 mcg (2000 units) tablet 7/24/2025 Morning Self Yes Yes   Sig: Take 1 tablet by mouth daily      Facility-Administered Medications: None     Current Facility-Administered Medications   Medication Dose Route Frequency Provider Last Rate Last Admin    acetaminophen (TYLENOL) tablet 1,000 mg  1,000 mg Oral Q6H PRN Tee Rodríguez MD        citalopram (celeXA) tablet 40 mg  40 mg Oral Daily Tee Rodríguez MD   40 mg at 07/25/25 0930    enoxaparin ANTICOAGULANT (LOVENOX) injection 30 mg  30 mg Subcutaneous Q24H Tee Rodríguez MD   30 mg at 07/24/25 2131    hydrOXYzine HCl (ATARAX) tablet 25 mg  25 mg Oral TID PRN Tee Rodríguez MD   25 mg at  07/25/25 0404    lidocaine (LMX4) cream   Topical Q1H PRN Tee Rodríguez MD        lidocaine 1 % 0.1-1 mL  0.1-1 mL Other Q1H PRN Tee Rodríguez MD        naloxone (NARCAN) injection 0.2 mg  0.2 mg Intravenous Q2 Min PRN Tee Rodríguez MD        Or    naloxone (NARCAN) injection 0.4 mg  0.4 mg Intravenous Q2 Min PRN Tee Rodríguez MD        Or    naloxone (NARCAN) injection 0.2 mg  0.2 mg Intramuscular Q2 Min PRN Tee Rodríguez MD        Or    naloxone (NARCAN) injection 0.4 mg  0.4 mg Intramuscular Q2 Min PRN Tee Rodríguez MD        nitroGLYcerin (NITROSTAT) sublingual tablet 0.4 mg  0.4 mg Sublingual Q5 Min PRN Tee Rodríguez MD   0.4 mg at 07/24/25 2140    ondansetron (ZOFRAN ODT) ODT tab 4 mg  4 mg Oral Q6H PRN Tee Rodríguez MD        Or    ondansetron (ZOFRAN) injection 4 mg  4 mg Intravenous Q6H PRN Tee Rodríguez MD        oxyCODONE IR (ROXICODONE) half-tab 2.5 mg  2.5 mg Oral Q4H PRN Tee Rodríguez MD   2.5 mg at 07/24/25 2220    prochlorperazine (COMPAZINE) injection 5 mg  5 mg Intravenous Q6H PRN Tee Rodríguez MD        Or    prochlorperazine (COMPAZINE) tablet 5 mg  5 mg Oral Q6H PRN Tee Rodríguez MD        rosuvastatin (CRESTOR) tablet 10 mg  10 mg Oral Daily Tee Rodríguez MD   10 mg at 07/25/25 0929    senna-docusate (SENOKOT-S/PERICOLACE) 8.6-50 MG per tablet 1 tablet  1 tablet Oral BID PRN Tee Rodríguez MD        Or    senna-docusate (SENOKOT-S/PERICOLACE) 8.6-50 MG per tablet 2 tablet  2 tablet Oral BID PRN Tee Rodríguez MD        sodium chloride (PF) 0.9% PF flush 3 mL  3 mL Intracatheter q1 min prn Tee Rodríguez MD        sodium chloride (PF) 0.9% PF flush 3 mL  3 mL Intracatheter Q8H Tee Rodríguez MD   3 mL at 07/24/25 2137     Current Facility-Administered Medications   Medication Dose Route Frequency Provider Last Rate Last Admin    acetaminophen (TYLENOL) tablet 1,000 mg  1,000 mg Oral Q6H PRN Tee Rodríguez MD        citalopram (celeXA) tablet 40 mg  40 mg Oral Daily Tee Rodríguez MD   40 mg at 07/25/25 2875     enoxaparin ANTICOAGULANT (LOVENOX) injection 30 mg  30 mg Subcutaneous Q24H Tee Rodríguez MD   30 mg at 07/24/25 2131    hydrOXYzine HCl (ATARAX) tablet 25 mg  25 mg Oral TID PRN Tee Rodríguez MD   25 mg at 07/25/25 0404    lidocaine (LMX4) cream   Topical Q1H PRN Tee Rodríguez MD        lidocaine 1 % 0.1-1 mL  0.1-1 mL Other Q1H PRN eTe Rodríguez MD        naloxone (NARCAN) injection 0.2 mg  0.2 mg Intravenous Q2 Min PRN Tee Rodríguez MD        Or    naloxone (NARCAN) injection 0.4 mg  0.4 mg Intravenous Q2 Min PRN Tee Rodríguez MD        Or    naloxone (NARCAN) injection 0.2 mg  0.2 mg Intramuscular Q2 Min PRN Tee Rodríguez MD        Or    naloxone (NARCAN) injection 0.4 mg  0.4 mg Intramuscular Q2 Min PRN Tee Rodríguez MD        nitroGLYcerin (NITROSTAT) sublingual tablet 0.4 mg  0.4 mg Sublingual Q5 Min PRN Tee Rodríguez MD   0.4 mg at 07/24/25 2140    ondansetron (ZOFRAN ODT) ODT tab 4 mg  4 mg Oral Q6H PRN Tee Rodríguez MD        Or    ondansetron (ZOFRAN) injection 4 mg  4 mg Intravenous Q6H PRN Tee Rodríguez MD        oxyCODONE IR (ROXICODONE) half-tab 2.5 mg  2.5 mg Oral Q4H PRN Tee Rodríguez MD   2.5 mg at 07/24/25 2220    prochlorperazine (COMPAZINE) injection 5 mg  5 mg Intravenous Q6H PRN Tee Rodríguez MD        Or    prochlorperazine (COMPAZINE) tablet 5 mg  5 mg Oral Q6H PRN Tee Rodríguez MD        rosuvastatin (CRESTOR) tablet 10 mg  10 mg Oral Daily Tee Rodríguez MD   10 mg at 07/25/25 0929    senna-docusate (SENOKOT-S/PERICOLACE) 8.6-50 MG per tablet 1 tablet  1 tablet Oral BID PRN Tee Rodríguez MD        Or    senna-docusate (SENOKOT-S/PERICOLACE) 8.6-50 MG per tablet 2 tablet  2 tablet Oral BID PRN Tee Rodríguez MD        sodium chloride (PF) 0.9% PF flush 3 mL  3 mL Intracatheter q1 min prn Tee Rodríguez MD        sodium chloride (PF) 0.9% PF flush 3 mL  3 mL Intracatheter Q8H Tee Rodríguez MD   3 mL at 07/24/25 3429     Allergies   Allergies   Allergen Reactions    Blood Transfusion Related (Informational Only)  "Other (See Comments)     Patient has a history of being on daratumumab which can interfere with blood bank testing.  A delay in compatible RBCs may occur.    Codeine Difficulty breathing, Other (See Comments) and Rash    Bupropion     Erythromycin     Hydrocodone Other (See Comments) and Itching     Ear ache    Penicillin G Itching     Itching around mouth then heavy breathing    Sulfa Antibiotics Itching     Itching around the mouth and then heavy breathing    Tetracycline     Trazodone      Does not work for patient    Conjugated Estrogens Dermatitis         Physical Exam   Vital Signs with Ranges  Temp:  [97.8  F (36.6  C)-98.1  F (36.7  C)] 97.9  F (36.6  C)  Pulse:  [55-74] 70  Resp:  [15-18] 15  BP: (104-146)/(62-79) 126/64  SpO2:  [95 %-100 %] 97 %  Wt Readings from Last 4 Encounters:   07/25/25 61.6 kg (135 lb 12.9 oz)   01/07/25 61.6 kg (135 lb 12.8 oz)   01/03/25 61.2 kg (135 lb)   12/03/24 62.1 kg (137 lb)     No intake/output data recorded.      Vitals: /64 (BP Location: Left arm)   Pulse 70   Temp 97.9  F (36.6  C) (Oral)   Resp 15   Ht 1.626 m (5' 4\")   Wt 61.6 kg (135 lb 12.9 oz)   LMP 12/04/1989   SpO2 97%   BMI 23.31 kg/m      Physical Exam:   Constitutional: Well-appearing, no acute distress, occasionally forgetful  Respiratory: Normal respiratory effort, CTAB  Cardiovascular: RRR, no m/r/g.  JVP < 7 cm H2O.  There is no significant LE edema.  Normal carotid upstrokes, no carotid bruits.        Clinically Significant Risk Factors Present on Admission          # Hyperchloremia: Highest Cl = 108 mmol/L in last 2 days, will monitor as appropriate      # Hypocalcemia: Lowest Ca = 8.2 mg/dL in last 2 days, will monitor and replace as appropriate       # Drug Induced Platelet Defect: home medication list includes an antiplatelet medication        # Anemia: based on hgb <11           # Financial/Environmental Concerns:          CKD POA List: Stage 4 (GFR 15-29)          "

## 2025-07-25 NOTE — PLAN OF CARE
DATE & SHIFT: 7/24 7540-2852  PRIMARY Concern: Chest pain/pressure  SAFETY RISK Concerns (fall risk, behaviors, etc.): None      Aggression Tool Color: Green  Isolation/Type: N/A  Tests/Procedures for NEXT shift: AM labs & Echo  Consults? (Pending/following, signed-off?) Cardiology to see  Where is patient from? (Home, TCU, etc.): Home alone  Other Important info for NEXT shift: Nitroglycerin given x3 with little results- RRT called from evening  Anticipated DC date & active delays: TBD    SUMMARY NOTE:  Orientation/Cognitive: AxOx4, forgetful  Observation Goals (Met/ Not Met): Not Met  Mobility Level/Assist Equipment: Ind  Antibiotics & Plan (IV/po, length of tx left):   Pain Management: denies pain overnight.  Complete Pain Reassessment: Y/N Y Due next: Next shift  Tele/VS/O2: VSS on RA. Tele: SR  ABNL Lab/BG: Trop 23-21-22, Hgb 8.4, Plt 146  Diet: Reg  Bowel/Bladder: Cont Bladder/ Incont Bowel  Skin Concerns: Scattered bruising  Drains/Devices: PIV SL  Patient Stated Goal for Today: Chest pain relief    Observation goals  PRIOR TO DISCHARGE       Comments:   -diagnostic tests and consults completed and resulted: NOT MET  -vital signs normal or at patient baseline: MET  -tolerating oral intake to maintain hydration: MET  -adequate pain control on oral analgesics: MET  -returns to baseline functional status: NOT MET  -safe disposition plan has been identified: NOT MET  Nurse to notify provider when observation goals have been met and patient is ready for discharge.

## 2025-07-25 NOTE — PROGRESS NOTES
Observation goals  PRIOR TO DISCHARGE        Comments:   -diagnostic tests and consults completed and resulted: not met, Cardiology following, echo stress test pending   -vital signs normal or at patient baseline: met   -tolerating oral intake to maintain hydration: met   -adequate pain control on oral analgesics: met   -returns to baseline functional status: met   -safe disposition plan has been identified: met   Nurse to notify provider when observation goals have been met and patient is ready for discharge.

## 2025-07-25 NOTE — PLAN OF CARE
.DATE & SHIFT: 7/25/2025 - [id number]  PRIMARY Concern: Chest pain/pressure  SAFETY RISK Concerns (fall risk, behaviors, etc.): N/A      Aggression Tool Color: Green   Isolation/Type: N/A  Tests/Procedures for NEXT shift: N/A  Consults? (Pending/following, signed-off?) Cardiology S/O  Where is patient from? (Home, TCU, etc.): Home - lives alone  Other Important info for NEXT shift: Sensitive to loud noises, irritable at times. 1 unit blood transfusion ordered and transfusing.  Anticipated DC date & active delays: Discharge pending, plan to discharge tonight after blood transfusion complete. Pt drove self to hospital, per pt car parked in the medical building parking ramp across the street from hospital.  _____________________________________________________________________________  SUMMARY NOTE:   Orientation/Cognitive: A&OX4, forgetful  Observation Goals (Met/ Not Met): Not met  Mobility Level/Assist Equipment: Ind  Antibiotics & Plan (IV/po, length of tx left): N/A  Pain Management: Denies   Complete Pain Reassessment: Y/N Yes Due next: Next shift  Tele/VS/O2: VSS on RA  ABNL Lab/BG: Cr 1.97, Trop T 22, Hgb 8.4  Diet: Regular  Bowel/Bladder: Cont bladder  Skin Concerns: Scattered bruising, pale skin  Drains/Devices: PIV SL  Patient Stated Goal for Today: N/A

## 2025-07-25 NOTE — PROGRESS NOTES
Observation goals  PRIOR TO DISCHARGE        Comments: -diagnostic tests and consults completed and resulted=Not Met  -vital signs normal or at patient baseline=Met  -tolerating oral intake to maintain hydration=Met  -adequate pain control on oral analgesics=Not Met  -returns to baseline functional status=Not Met  -safe disposition plan has been identified=Not Met  Nurse to notify provider when observation goals have been met and patient is ready for discharge.

## 2025-07-25 NOTE — H&P
[facility]    History and Physical - Hospitalist Service       Date of Admission:  7/24/2025    Assessment & Plan     Mary Jo Mcleod is a 78 year old female with PMH  multiple myeloma, stage III CKD, rheumatoid arthritis, osteopenia admitted who was admitted on 07/24/2025 with chest pain.     Chest Pain  Assessment: Over the past few months she has noticed more exertional chest discomfort. Does follow cardiology for this who felt Likely demand ischemia with symptoms of angina at a high workload secondary to development of severe anemia. Hgb on admission is 9.0, no evidence of acute blood loss.  EKG with stable sinus rhythm with no dynamic ST changes. US LEs with No deep venous thrombosis in the bilateral lower extremities.  Chest x-ray with no acute airspace disease.Troponin mildly elevated at 21.  Plan:  - Registered observation  - Cardiology consulted  - Nitroglycerin as needed for chest pain  - Follow vitals/labs  - Telemetry overnight  - Echocardiogram  - EKG PRN for chest pain    Multiple myeloma  IgG Kappa multiple myeloma   CKD  Assessment: Follows with Dr Dreyer w/ [facility]. Hx multiple pathologic fractures. Cr 2.09 on admission.  Plan:  - IVF overnight  - Hold PTA denosumab     Polyneuropathy  - Patient had developed a sensory neuropathy over the last 6 months.  - Occurs mainly at nighttime  Plan:  - Treat PRN     HLD: continue Crestor     MDD  Anxiety  PTSD  - Citalopram 40 mg daily resumed        Observation Goals: -diagnostic tests and consults completed and resulted, -vital signs normal or at patient baseline, -tolerating oral intake to maintain hydration, -adequate pain control on oral analgesics, -returns to baseline functional status, -safe disposition plan has been identified, Nurse to notify provider when observation goals have been met and patient is ready for discharge.  Diet: Regular Diet Adult    DVT Prophylaxis: Enoxaparin (Lovenox) SQ  Astudillo Catheter: Not  present  Lines: None     Cardiac Monitoring: ACTIVE order. Indication: Chest pain/ ACS rule out (24 hours)  Code Status: Full Code      Clinically Significant Risk Factors Present on Admission           # Hypocalcemia: Lowest Ca = 8.6 mg/dL in last 2 days, will monitor and replace as appropriate       # Drug Induced Platelet Defect: home medication list includes an antiplatelet medication        # Anemia: based on hgb <11           # Financial/Environmental Concerns:           Disposition Plan     Medically Ready for Discharge: Anticipated Tomorrow           Tee Rodríguez MD  Hospitalist Service  [facility]  Securely message with [employer] (more info)  Text page via [facility] Paging/Directory     ______________________________________________________________________    Chief Complaint     Chest Pain    History is obtained from the patient    History of Present Illness     Mary Jo Mcleod is a 78 year old female with PMH of  multiple myeloma, stage III CKD, rheumatoid arthritis, osteopenia admitted who was admitted on 07/24/2025 with chest pain.     Over the past few months she has noticed more exertional chest discomfort. Does follow cardiology for this who felt Likely demand ischemia with symptoms of angina at a high workload secondary to development of severe anemia.  She reports that on Sunday she developed 5 out of 10 left-sided chest pain that worsened with deep breathing.  Today she would to her oncology appointment where given her consolation symptoms they referred her to the ED for further evaluation.  She denies any fevers, she has no nausea/vomiting or abdominal pain.  She denies any cough.  She has no calf pain or leg swelling.  She has no recent blood in her stool, no weight loss or night sweats.  She has no urinary complaints of urgency or frequency, no dysuria or hematuria.  Patient otherwise has no headaches, slurred speech, localized weakness or numbness of her extremities.  Patient  has no other complaints at this time.    Past Medical History    Past Medical History:   Diagnosis Date    Chest pain     Depressive disorder     Multiple myeloma in remission (H)     Primary cancer of bone marrow (H)     SOB (shortness of breath)     Thyroid disease     Hypothyroidism       Past Surgical History   Past Surgical History:   Procedure Laterality Date    BONE MARROW BIOPSY, BONE SPECIMEN, NEEDLE/TROCAR N/A 2/8/2021    Procedure: BONE MARROW BIOPSY;  Surgeon: Naomie Saeed MD;  Location:  GI    COLONOSCOPY N/A 12/7/2023    Procedure: Colonoscopy;  Surgeon: Pankaj Castillo MD;  Location:  GI    finger reattachment      TONSILLECTOMY         Prior to Admission Medications   Prior to Admission Medications   Prescriptions Last Dose Informant Patient Reported? Taking?   aspirin (ASA) 325 MG EC tablet 7/23/2025 Evening Self Yes Yes   Sig: Take 325 mg by mouth every evening.   calcium carbonate (TUMS) 500 MG chewable tablet 7/23/2025 Evening Self Yes Yes   Sig: Take 2 chew tab by mouth daily   citalopram (CELEXA) 40 MG tablet 7/24/2025 Morning Self No Yes   Sig: Take 1 tablet (40 mg) by mouth daily.   denosumab (PROLIA) 60 MG/ML SOSY injection Past Month Self Yes Yes   Sig: Inject 60 mg subcutaneously every 3 months.   rosuvastatin (CRESTOR) 10 MG tablet 7/24/2025 Morning Self No Yes   Sig: Take 1 tablet (10 mg) by mouth daily.   vitamin B-12 (CYANOCOBALAMIN) 250 MCG tablet 7/24/2025 Morning Self Yes Yes   Sig: Take 1,000 mcg by mouth daily   vitamin D3 (CHOLECALCIFEROL) 50 mcg (2000 units) tablet 7/24/2025 Morning Self Yes Yes   Sig: Take 1 tablet by mouth daily      Facility-Administered Medications: None        Review of Systems    The 10 point Review of Systems is negative other than noted in the HPI or here.     Social History   I have reviewed this patient's social history and updated it with pertinent information if needed.  Social History     Tobacco Use    Smoking status: Never     Smokeless tobacco: Never   Substance Use Topics    Alcohol use: Not Currently     Alcohol/week: 0.0 - 2.0 standard drinks of alcohol    Drug use: No         Family History   I have reviewed this patient's family history and updated it with pertinent information if needed.  Family History   Problem Relation Age of Onset    Myocardial Infarction Father 63    Bipolar Disorder Sister     Chronic Obstructive Pulmonary Disease Brother     Lung Cancer Brother     Suicide Brother     Influenza/Pneumonia Sister     Alcoholism Sister          Allergies   Allergies   Allergen Reactions    Blood Transfusion Related (Informational Only) Other (See Comments)     Patient has a history of being on daratumumab which can interfere with blood bank testing.  A delay in compatible RBCs may occur.    Codeine Difficulty breathing, Other (See Comments) and Rash    Bupropion     Erythromycin     Hydrocodone Other (See Comments) and Itching     Ear ache    Penicillin G Itching     Itching around mouth then heavy breathing    Sulfa Antibiotics Itching     Itching around the mouth and then heavy breathing    Tetracycline     Trazodone      Does not work for patient    Conjugated Estrogens Dermatitis     ------------------------------------------------------------------------     Physical Exam   Vital Signs: Temp: 97.8  F (36.6  C) Temp src: Oral BP: (!) 144/73 Pulse: 67   Resp: 18 SpO2: 100 % O2 Device: None (Room air)    Weight: 135 lbs 9.33 oz    Constitutional: awake, alert, cooperative, no apparent distress.   Respiratory: CTABL   Cardiovascular: RRR with no m/r/g   GI: Normal bowel sounds, soft, non-distended, non-tender. Skin: normal skin color, texture, turgor   Musculoskeletal: There is no redness, warmth, or swelling of the joints. Full range of motion noted.   Neurologic: Awake, alert, oriented to name, place and time. Francesco. Motor is 5 out of 5 bilaterally. Sensory is intact.   Neuropsychiatric: normal mood and  affect    ----------------------------------------------------------------------------------------------------------------------------------    Medical Decision Making       55 MINUTES SPENT BY ME on the date of service doing chart review, history, exam, documentation & further activities per the note.      Data   ------------------------- PAST 24 HR DATA REVIEWED -----------------------------------------------    I have personally reviewed the following data over the past 24 hrs:    4.9  \   9.0 (L)   / 190     139 104 35.9 (H) /  100 (H)   4.4 25 2.09 (H) \     ALT: 10 AST: 19 AP: 40 TBILI: 0.3   ALB: 4.0 TOT PROTEIN: 6.4 LIPASE: N/A     Trop: 21 (H) BNP: N/A     INR:  N/A PTT:  N/A   D-dimer:  0.47 Fibrinogen:  N/A       Imaging results reviewed over the past 24 hrs:   Recent Results (from the past 24 hours)   Chest XR,  PA & LAT    Narrative    EXAM: XR CHEST 2 VIEWS  LOCATION: [facility]  DATE: 7/24/2025    INDICATION: chest pain  COMPARISON: Chest radiograph 3/5/2024      Impression    IMPRESSION: Negative chest.   US Lower Extremity Venous Duplex Bilateral    Narrative    EXAM: US LOWER EXTREMITY VENOUS DUPLEX BILATERAL  LOCATION: [facility]  DATE: 7/24/2025    INDICATION: Pain and swelling.  COMPARISON: None.  TECHNIQUE: Venous Duplex ultrasound of bilateral lower extremities with and without compression, augmentation and duplex. Color flow and spectral Doppler with waveform analysis performed.    FINDINGS: Exam includes the common femoral, femoral, popliteal veins as well as segmentally visualized deep calf veins and greater saphenous vein.     RIGHT: No deep vein thrombosis. No superficial thrombophlebitis. No popliteal cyst.    LEFT: No deep vein thrombosis. No superficial thrombophlebitis. No popliteal cyst.      Impression    IMPRESSION:  1.  No deep venous thrombosis in the bilateral lower extremities.     ------------------------- ENCOUNTER LABS  ----------------------------------------------------------------  Recent Labs   Lab 07/24/25  1422   WBC 4.9   HGB 9.0*   *         POTASSIUM 4.4   CHLORIDE 104   CO2 25   BUN 35.9*   CR 2.09*   ANIONGAP 10   ARA 8.6*   *   ALBUMIN 4.0   PROTTOTAL 6.4   BILITOTAL 0.3   ALKPHOS 40   ALT 10   AST 19       Most Recent 3 CBC's:  Recent Labs   Lab Test 07/24/25  1422 01/03/25  0523 07/11/24  0351   WBC 4.9 5.5 4.0   HGB 9.0* 12.1 11.7   * 95 100    153 192     Most Recent 3 BMP's:  Recent Labs   Lab Test 07/24/25  1422 01/03/25  0523 10/25/24  1122    139 139   POTASSIUM 4.4 4.9 4.2   CHLORIDE 104 106 104   CO2 25 24 30*   BUN 35.9* 23.9* 13.3   CR 2.09* 1.24* 1.11*   ANIONGAP 10 9 5*   ARA 8.6* 8.4* 8.6*   * 150* 92     Most Recent 2 LFT's:  Recent Labs   Lab Test 07/24/25  1422 01/03/25  0523   AST 19 28   ALT 10 20   ALKPHOS 40 51   BILITOTAL 0.3 0.6     Most Recent 3 INR's:No lab results found.  Most Recent 3 Troponin's:  Recent Labs   Lab Test 09/28/21  1648 12/09/17  0536 07/21/17  1011 07/21/17  0819   TROPI  --  <0.015 <0.015  The 99th percentile for upper reference range is 0.045 ug/L.  Troponin values in   the range of 0.045 - 0.120 ug/L may be associated with risks of adverse   clinical events.   <0.015  The 99th percentile for upper reference range is 0.045 ug/L.  Troponin values in   the range of 0.045 - 0.120 ug/L may be associated with risks of adverse   clinical events.     TROPONIN <0.015  --   --   --      Most Recent 3 BNP's:  Recent Labs   Lab Test 05/28/22  1614   NTBNPI 90     Most Recent D-dimer:  Recent Labs   Lab Test 07/24/25  1422   DD 0.47     Most Recent Cholesterol Panel:  Recent Labs   Lab Test 10/25/24  0000   CHOL 113   LDL 57   HDL 41   TRIG 73     Most Recent 6 Bacteria Isolates From Any Culture (See EPIC Reports for Culture Details):No lab results found.  Most Recent TSH and T4:  Recent Labs   Lab Test 09/01/23  1603   TSH 1.13      Most Recent Hemoglobin A1c:  Recent Labs   Lab Test 10/24/23  1334   A1C 5.7*     Most Recent Urinalysis:  Recent Labs   Lab Test 01/24/25  0000 12/07/23  1836   COLOR Yellow Light Yellow   APPEARANCE  --  Clear   URINEGLC  --  Negative   URINEBILI  --  Negative   URINEKETONE  --  40*   SG 1.015 1.014   UBLD  --  Moderate*   URINEPH 5.0 6.0   PROTEIN  --  20*   NITRITE  --  Negative   LEUKEST  --  Negative   RBCU  --  1   WBCU  --  1     Most Recent ESR & CRP:No lab results found.

## 2025-07-25 NOTE — CODE/RAPID RESPONSE
"[facility]    RRT Note  7/24/2025   Time Called: 2142    Code Status: Full Code    I was called to evaluate Mary Jo Mcleod, who is a 79 year old female who was admitted on 7/24/2025 for chest pain. PMH includes multiple myeloma, stage III CKD, rheumatoid arthritis, osteopenia.    Assessment & Plan   Acute non-exertional chest pain  I was paged to come evaluate Ms. Mcleod as part of a rapid response for acute left sided chest pain. Ms. Mcleod was admitted earlier this afternoon with chest pain/exertional chest discomfort. According to the patient's bedside nurse, she went into the room shortly before 2145 at which point Ms. Mcleod informed her that she was having 8/10 left sided chest pain. She administered a total of 3 doses of SL nitroglycerine with the patient reporting minimal relief. This prompted her to call an RRT.  Upon my arrival, Ms. Mcleod was laying in the bed with her eyes open. She was alert and did not appear to be in acute distress. She was hemodynamically stable with SBP of 106 and HR in the 80s. She was oxygenating well on room air. She describes the pain as \"stabbing\" and localized on the left side of her chest. The pain is reproducible when moderate pressure is applied. She denies any associated shortness of breath, abdominal pain or nausea. At that time, she rated the severity of her pain at a 1-2 out of 10. EKG shows NSR with slight Qt prolongation, but no signs of acute ischemia. Previous troponins where mildly elevated, but flat. However, I did order another recheck. Chest x-ray completed in the ED was clear and LE doppler was negative for DVT. Given that she is not hypoxic or dyspneic and is hemodynamically stable, low suspicion for PE. Of note, she does have CKD III with creatinine greater than 2. I do not believe this is ACS, but again, will check troponin. Cardiology has already been consulted and she is scheduled to have an echocardiogram tomorrow. On " recheck, she states that her pain is still very mild. Will give PRN oxycodone and continue to trend troponins. Formal cardiac work up pending.    INTERVENTIONS:  -EKG  -Troponin  -SL Nitroglycerine x 3  -Oxycodone PRN    Don Paula NP  [facility]  Securely message with the [employer] Web Console (learn more here)  Text page via [employer] Paging/Directory    Physical Exam   Vital Signs with Ranges:  Temp:  [97.8  F (36.6  C)-97.9  F (36.6  C)] 97.8  F (36.6  C)  Pulse:  [55-73] 68  Resp:  [16-18] 16  BP: (104-146)/(68-79) 108/68  SpO2:  [95 %-100 %] 97 %  No intake/output data recorded.  Physical Exam  Vitals reviewed.   Constitutional:       General: She is not in acute distress.  Cardiovascular:      Rate and Rhythm: Normal rate and regular rhythm.      Pulses: Normal pulses.      Heart sounds: Normal heart sounds.   Pulmonary:      Effort: Pulmonary effort is normal.      Breath sounds: Normal breath sounds and air entry.   Chest:      Chest wall: Tenderness present.      Comments: Left sided chest tenderness, reproducible with moderate pressure.  Abdominal:      General: Bowel sounds are normal.      Palpations: Abdomen is soft.      Tenderness: There is no abdominal tenderness.   Skin:     General: Skin is warm and dry.   Neurological:      General: No focal deficit present.      Mental Status: She is alert.   Psychiatric:         Attention and Perception: Attention normal.         Mood and Affect: Mood normal.         Speech: Speech normal.         Behavior: Behavior is cooperative.         Thought Content: Thought content normal.         Cognition and Memory: Cognition normal.         Judgment: Judgment normal.     Data   IMAGING: (X-ray/CT/MRI)   Recent Results (from the past 24 hours)   Chest XR,  PA & LAT    Narrative    EXAM: XR CHEST 2 VIEWS  LOCATION: [facility]  DATE: 7/24/2025    INDICATION: chest pain  COMPARISON: Chest radiograph 3/5/2024       Impression    IMPRESSION: Negative chest.   US Lower Extremity Venous Duplex Bilateral    Narrative    EXAM: US LOWER EXTREMITY VENOUS DUPLEX BILATERAL  LOCATION: [facility]  DATE: 7/24/2025    INDICATION: Pain and swelling.  COMPARISON: None.  TECHNIQUE: Venous Duplex ultrasound of bilateral lower extremities with and without compression, augmentation and duplex. Color flow and spectral Doppler with waveform analysis performed.    FINDINGS: Exam includes the common femoral, femoral, popliteal veins as well as segmentally visualized deep calf veins and greater saphenous vein.     RIGHT: No deep vein thrombosis. No superficial thrombophlebitis. No popliteal cyst.    LEFT: No deep vein thrombosis. No superficial thrombophlebitis. No popliteal cyst.      Impression    IMPRESSION:  1.  No deep venous thrombosis in the bilateral lower extremities.       CBC with Diff:  Recent Labs   Lab Test 07/24/25  1422   WBC 4.9   HGB 9.0*   *         Absolute Retic (10e9/L)   Date Value   02/08/2021 55.9     % Retic (%)   Date Value   02/08/2021 1.7       Comprehensive Metabolic Panel:  Recent Labs   Lab 07/24/25  1623 07/24/25  1422   NA  --  139   POTASSIUM  --  4.4   CHLORIDE  --  104   CO2  --  25   ANIONGAP  --  10   GLC  --  100*   BUN  --  35.9*   CR  --  2.09*   GFRESTIMATED  --  24*   ARA  --  8.6*   MAG 2.1  --    PROTTOTAL  --  6.4   ALBUMIN  --  4.0   BILITOTAL  --  0.3   ALKPHOS  --  40   AST  --  19   ALT  --  10         Time Spent on this Encounter   I spent 20 minutes on the unit/floor managing the care of Mary Jo Mcleod. Over 50% of my time was spent counseling the patient and/or coordinating care regarding services listed in this note.

## 2025-07-25 NOTE — PLAN OF CARE
Observation goals  PRIOR TO DISCHARGE       Comments:   -diagnostic tests and consults completed and resulted: NOT MET  -vital signs normal or at patient baseline: MET  -tolerating oral intake to maintain hydration: MET  -adequate pain control on oral analgesics: MET  -returns to baseline functional status: NOT MET  -safe disposition plan has been identified: NOT MET  Nurse to notify provider when observation goals have been met and patient is ready for discharge.

## 2025-07-25 NOTE — PROGRESS NOTES
DATE & SHIFT: 7/24 1900-2330  PRIMARY Concern: Chest pain/pressure  SAFETY RISK Concerns (fall risk, behaviors, etc.): None      Aggression Tool Color: Green  Isolation/Type: N/A  Tests/Procedures for NEXT shift: Labs  Consults? (Pending/following, signed-off?) Cardiology to see  Where is patient from? (Home, TCU, etc.): Home alone  Other Important info for NEXT shift: Nitroglycerin given x3 with little results- RRT called  Anticipated DC date & active delays: TBD  _____________________________________________________________________________  SUMMARY NOTE:   Orientation/Cognitive: AxOx4, forgetful  Observation Goals (Met/ Not Met): Not Met  Mobility Level/Assist Equipment: Ind  Antibiotics & Plan (IV/po, length of tx left): N/A  Pain Management: Nitroglycerin given x3, little results. PRN oxy given x1  Complete Pain Reassessment: Y/N Y   Due next: Next shift  Tele/VS/O2: VSS on RA ex htn at times. Tele= SR  ABNL Lab/BG: Trop= 21  Diet: Reg  Bowel/Bladder: Cont B/B  Skin Concerns: Scattered bruising  Drains/Devices: PIV SL  Patient Stated Goal for Today: Chest pain relief

## 2025-07-28 ENCOUNTER — TELEPHONE (OUTPATIENT)
Dept: CARDIOLOGY | Facility: CLINIC | Age: 80
End: 2025-07-28
Payer: COMMERCIAL

## 2025-07-28 NOTE — TELEPHONE ENCOUNTER
"Patient was admitted to [facility] on 7/24/25 admitted with chest pain in the presence of personal stress regarding housing, after being sent over from her oncology clinic.     PMH: multiple myeloma, CKD 3B-4, rheumatoid arthritis, and dyslipidemia.     7/25/25: Echo showed EF of 55 to 60%, no regional wall motion abnormalities, no pericardial effusion, and no significant valve abnormalities.     7/25/25: Stress echo was negative.    Per Dr. Oliveira's IP note, \"While exertional tolerance is low (3 METs), she did achieve target HR, with no evidence of ischemia. Given we would err on the side of conservative management anyway given her renal dysfunction, I think this is adequate reassurance to hold off on angiography at this time. \"    No medication changes made.    Pt is scheduled for an OV on 9/4/25 at 1310 with VINAY Kenya Lewis at our [place] Office.    Writer attempted to call pt for a cardiology post discharge phone call, but no answer. VM left to call back with any non emergent cardiac related questions. Reminder left of appt as scheduled above. Writer's phone number was provided. MAC Geronimo RN.     "

## 2025-07-29 LAB
ATRIAL RATE - MUSE: 73 BPM
DIASTOLIC BLOOD PRESSURE - MUSE: NORMAL MMHG
INTERPRETATION ECG - MUSE: NORMAL
P AXIS - MUSE: 11 DEGREES
PR INTERVAL - MUSE: 134 MS
QRS DURATION - MUSE: 76 MS
QT - MUSE: 448 MS
QTC - MUSE: 493 MS
R AXIS - MUSE: 41 DEGREES
SYSTOLIC BLOOD PRESSURE - MUSE: NORMAL MMHG
T AXIS - MUSE: 35 DEGREES
VENTRICULAR RATE- MUSE: 73 BPM

## 2025-08-06 ENCOUNTER — OFFICE VISIT (OUTPATIENT)
Dept: FAMILY MEDICINE | Facility: CLINIC | Age: 80
End: 2025-08-06

## 2025-08-06 VITALS
HEART RATE: 95 BPM | SYSTOLIC BLOOD PRESSURE: 118 MMHG | DIASTOLIC BLOOD PRESSURE: 61 MMHG | WEIGHT: 132.6 LBS | BODY MASS INDEX: 22.76 KG/M2 | OXYGEN SATURATION: 99 % | TEMPERATURE: 100 F

## 2025-08-06 DIAGNOSIS — R05.1 ACUTE COUGH: Primary | ICD-10-CM

## 2025-08-06 LAB
INFLUENZA A (RMG): NEGATIVE
INFLUENZA B (RMG): NEGATIVE
SARS ANTIGEN (RMG): NEGATIVE

## 2025-08-06 PROCEDURE — 3078F DIAST BP <80 MM HG: CPT | Performed by: FAMILY MEDICINE

## 2025-08-06 PROCEDURE — 87428 SARSCOV & INF VIR A&B AG IA: CPT | Performed by: FAMILY MEDICINE

## 2025-08-06 PROCEDURE — 3074F SYST BP LT 130 MM HG: CPT | Performed by: FAMILY MEDICINE

## 2025-08-06 PROCEDURE — 99213 OFFICE O/P EST LOW 20 MIN: CPT | Performed by: FAMILY MEDICINE

## 2025-08-06 PROCEDURE — G2211 COMPLEX E/M VISIT ADD ON: HCPCS | Performed by: FAMILY MEDICINE

## 2025-08-06 RX ORDER — DOXYCYCLINE HYCLATE 100 MG
100 TABLET ORAL 2 TIMES DAILY
Qty: 14 TABLET | Refills: 0 | Status: SHIPPED | OUTPATIENT
Start: 2025-08-06 | End: 2025-08-13

## 2025-08-06 ASSESSMENT — PATIENT HEALTH QUESTIONNAIRE - PHQ9
SUM OF ALL RESPONSES TO PHQ QUESTIONS 1-9: 7
SUM OF ALL RESPONSES TO PHQ QUESTIONS 1-9: 7

## 2025-08-15 ENCOUNTER — HOSPITAL ENCOUNTER (OUTPATIENT)
Dept: GENERAL RADIOLOGY | Facility: CLINIC | Age: 80
Discharge: HOME | End: 2025-08-15
Attending: FAMILY MEDICINE | Admitting: FAMILY MEDICINE
Payer: COMMERCIAL

## 2025-08-15 DIAGNOSIS — R05.1 ACUTE COUGH: ICD-10-CM

## 2025-08-15 PROCEDURE — 71046 X-RAY EXAM CHEST 2 VIEWS: CPT

## 2025-09-02 ENCOUNTER — HOSPITAL ENCOUNTER (EMERGENCY)
Facility: CLINIC | Age: 80
Discharge: LEFT BEFORE DISCHARGE | End: 2025-09-02
Admitting: EMERGENCY MEDICINE
Payer: COMMERCIAL

## 2025-09-02 VITALS
SYSTOLIC BLOOD PRESSURE: 114 MMHG | HEIGHT: 64 IN | OXYGEN SATURATION: 97 % | BODY MASS INDEX: 22.2 KG/M2 | HEART RATE: 90 BPM | TEMPERATURE: 97.2 F | DIASTOLIC BLOOD PRESSURE: 69 MMHG | WEIGHT: 130 LBS | RESPIRATION RATE: 16 BRPM

## 2025-09-02 LAB
ATRIAL RATE - MUSE: 94 BPM
DIASTOLIC BLOOD PRESSURE - MUSE: NORMAL MMHG
INTERPRETATION ECG - MUSE: NORMAL
P AXIS - MUSE: 61 DEGREES
PR INTERVAL - MUSE: 122 MS
QRS DURATION - MUSE: 70 MS
QT - MUSE: 364 MS
QTC - MUSE: 455 MS
R AXIS - MUSE: 52 DEGREES
SYSTOLIC BLOOD PRESSURE - MUSE: NORMAL MMHG
T AXIS - MUSE: 31 DEGREES
VENTRICULAR RATE- MUSE: 94 BPM

## 2025-09-02 PROCEDURE — 93005 ELECTROCARDIOGRAM TRACING: CPT | Performed by: EMERGENCY MEDICINE

## 2025-09-02 PROCEDURE — 99281 EMR DPT VST MAYX REQ PHY/QHP: CPT

## 2025-09-02 ASSESSMENT — ACTIVITIES OF DAILY LIVING (ADL)
ADLS_ACUITY_SCORE: 58

## 2025-09-02 ASSESSMENT — COLUMBIA-SUICIDE SEVERITY RATING SCALE - C-SSRS
2. HAVE YOU ACTUALLY HAD ANY THOUGHTS OF KILLING YOURSELF IN THE PAST MONTH?: NO
1. IN THE PAST MONTH, HAVE YOU WISHED YOU WERE DEAD OR WISHED YOU COULD GO TO SLEEP AND NOT WAKE UP?: NO
6. HAVE YOU EVER DONE ANYTHING, STARTED TO DO ANYTHING, OR PREPARED TO DO ANYTHING TO END YOUR LIFE?: NO

## (undated) DEVICE — PEN MARKING SKIN

## (undated) DEVICE — SPECIMEN CONTAINER 60MLW/10% FORMALIN 59601

## (undated) RX ORDER — FENTANYL CITRATE 50 UG/ML
INJECTION, SOLUTION INTRAMUSCULAR; INTRAVENOUS
Status: DISPENSED
Start: 2023-12-07

## (undated) RX ORDER — BUPIVACAINE HYDROCHLORIDE 5 MG/ML
INJECTION, SOLUTION EPIDURAL; INTRACAUDAL
Status: DISPENSED
Start: 2021-08-30

## (undated) RX ORDER — TRIAMCINOLONE ACETONIDE 40 MG/ML
INJECTION, SUSPENSION INTRA-ARTICULAR; INTRAMUSCULAR
Status: DISPENSED
Start: 2021-08-30